# Patient Record
Sex: MALE | Race: WHITE | NOT HISPANIC OR LATINO | Employment: OTHER | URBAN - METROPOLITAN AREA
[De-identification: names, ages, dates, MRNs, and addresses within clinical notes are randomized per-mention and may not be internally consistent; named-entity substitution may affect disease eponyms.]

---

## 2017-10-12 ENCOUNTER — NON-PROVIDER VISIT (OUTPATIENT)
Dept: OCCUPATIONAL MEDICINE | Facility: CLINIC | Age: 42
End: 2017-10-12

## 2017-10-12 DIAGNOSIS — Z02.1 PRE-EMPLOYMENT DRUG SCREENING: ICD-10-CM

## 2017-10-12 LAB
AMP AMPHETAMINE: NORMAL
COC COCAINE: NORMAL
INT CON NEG: NORMAL
INT CON POS: NORMAL
MET METHAMPHETAMINES: NORMAL
OPI OPIATES: NORMAL
PCP PHENCYCLIDINE: NORMAL
POC DRUG COMMENT 753798-OCCUPATIONAL HEALTH: NORMAL
THC: NORMAL

## 2017-10-12 PROCEDURE — 80305 DRUG TEST PRSMV DIR OPT OBS: CPT | Performed by: PREVENTIVE MEDICINE

## 2020-05-18 ENCOUNTER — HOSPITAL ENCOUNTER (OUTPATIENT)
Facility: MEDICAL CENTER | Age: 45
End: 2020-05-19
Attending: EMERGENCY MEDICINE | Admitting: INTERNAL MEDICINE
Payer: MEDICAID

## 2020-05-18 ENCOUNTER — APPOINTMENT (OUTPATIENT)
Dept: RADIOLOGY | Facility: MEDICAL CENTER | Age: 45
End: 2020-05-18
Attending: EMERGENCY MEDICINE
Payer: MEDICAID

## 2020-05-18 ENCOUNTER — APPOINTMENT (OUTPATIENT)
Dept: RADIOLOGY | Facility: MEDICAL CENTER | Age: 45
End: 2020-05-18
Attending: INTERNAL MEDICINE
Payer: MEDICAID

## 2020-05-18 DIAGNOSIS — E11.9 NEWLY DIAGNOSED DIABETES (HCC): ICD-10-CM

## 2020-05-18 DIAGNOSIS — S67.22XA CRUSHING INJURY OF LEFT HAND, INITIAL ENCOUNTER: ICD-10-CM

## 2020-05-18 DIAGNOSIS — M79.642 HAND PAIN, LEFT: ICD-10-CM

## 2020-05-18 DIAGNOSIS — L03.119 CELLULITIS OF HAND: ICD-10-CM

## 2020-05-18 DIAGNOSIS — N28.9 RENAL INSUFFICIENCY: ICD-10-CM

## 2020-05-18 LAB
ANION GAP SERPL CALC-SCNC: 12 MMOL/L (ref 7–16)
BASOPHILS # BLD AUTO: 0.3 % (ref 0–1.8)
BASOPHILS # BLD: 0.04 K/UL (ref 0–0.12)
BUN SERPL-MCNC: 37 MG/DL (ref 8–22)
CALCIUM SERPL-MCNC: 8.3 MG/DL (ref 8.5–10.5)
CHLORIDE SERPL-SCNC: 105 MMOL/L (ref 96–112)
CK SERPL-CCNC: 322 U/L (ref 0–154)
CO2 SERPL-SCNC: 21 MMOL/L (ref 20–33)
COVID ORDER STATUS COVID19: NORMAL
CREAT SERPL-MCNC: 1.92 MG/DL (ref 0.5–1.4)
CRP SERPL HS-MCNC: 8.37 MG/DL (ref 0–0.75)
EOSINOPHIL # BLD AUTO: 0.03 K/UL (ref 0–0.51)
EOSINOPHIL NFR BLD: 0.2 % (ref 0–6.9)
ERYTHROCYTE [DISTWIDTH] IN BLOOD BY AUTOMATED COUNT: 44.2 FL (ref 35.9–50)
ERYTHROCYTE [SEDIMENTATION RATE] IN BLOOD BY WESTERGREN METHOD: 80 MM/HOUR (ref 0–15)
GLUCOSE SERPL-MCNC: 304 MG/DL (ref 65–99)
HCT VFR BLD AUTO: 34.3 % (ref 42–52)
HGB BLD-MCNC: 11.1 G/DL (ref 14–18)
IMM GRANULOCYTES # BLD AUTO: 0.04 K/UL (ref 0–0.11)
IMM GRANULOCYTES NFR BLD AUTO: 0.3 % (ref 0–0.9)
LYMPHOCYTES # BLD AUTO: 2.08 K/UL (ref 1–4.8)
LYMPHOCYTES NFR BLD: 15.5 % (ref 22–41)
MCH RBC QN AUTO: 29.9 PG (ref 27–33)
MCHC RBC AUTO-ENTMCNC: 32.4 G/DL (ref 33.7–35.3)
MCV RBC AUTO: 92.5 FL (ref 81.4–97.8)
MONOCYTES # BLD AUTO: 1.23 K/UL (ref 0–0.85)
MONOCYTES NFR BLD AUTO: 9.2 % (ref 0–13.4)
NEUTROPHILS # BLD AUTO: 10.01 K/UL (ref 1.82–7.42)
NEUTROPHILS NFR BLD: 74.5 % (ref 44–72)
NRBC # BLD AUTO: 0 K/UL
NRBC BLD-RTO: 0 /100 WBC
PLATELET # BLD AUTO: 202 K/UL (ref 164–446)
PMV BLD AUTO: 11 FL (ref 9–12.9)
POTASSIUM SERPL-SCNC: 4.1 MMOL/L (ref 3.6–5.5)
RBC # BLD AUTO: 3.71 M/UL (ref 4.7–6.1)
SODIUM SERPL-SCNC: 138 MMOL/L (ref 135–145)
WBC # BLD AUTO: 13.4 K/UL (ref 4.8–10.8)

## 2020-05-18 PROCEDURE — G0378 HOSPITAL OBSERVATION PER HR: HCPCS

## 2020-05-18 PROCEDURE — 700111 HCHG RX REV CODE 636 W/ 250 OVERRIDE (IP): Performed by: EMERGENCY MEDICINE

## 2020-05-18 PROCEDURE — 700117 HCHG RX CONTRAST REV CODE 255: Performed by: EMERGENCY MEDICINE

## 2020-05-18 PROCEDURE — 99220 PR INITIAL OBSERVATION CARE,LEVL III: CPT | Performed by: INTERNAL MEDICINE

## 2020-05-18 PROCEDURE — 85025 COMPLETE CBC W/AUTO DIFF WBC: CPT

## 2020-05-18 PROCEDURE — 700102 HCHG RX REV CODE 250 W/ 637 OVERRIDE(OP): Performed by: INTERNAL MEDICINE

## 2020-05-18 PROCEDURE — 73220 MRI UPPR EXTREMITY W/O&W/DYE: CPT | Mod: LT

## 2020-05-18 PROCEDURE — 96375 TX/PRO/DX INJ NEW DRUG ADDON: CPT

## 2020-05-18 PROCEDURE — 87040 BLOOD CULTURE FOR BACTERIA: CPT | Mod: 91

## 2020-05-18 PROCEDURE — 86140 C-REACTIVE PROTEIN: CPT

## 2020-05-18 PROCEDURE — 85652 RBC SED RATE AUTOMATED: CPT

## 2020-05-18 PROCEDURE — 99285 EMERGENCY DEPT VISIT HI MDM: CPT

## 2020-05-18 PROCEDURE — 96365 THER/PROPH/DIAG IV INF INIT: CPT

## 2020-05-18 PROCEDURE — C9803 HOPD COVID-19 SPEC COLLECT: HCPCS | Performed by: ORTHOPAEDIC SURGERY

## 2020-05-18 PROCEDURE — A9576 INJ PROHANCE MULTIPACK: HCPCS | Performed by: EMERGENCY MEDICINE

## 2020-05-18 PROCEDURE — 73130 X-RAY EXAM OF HAND: CPT | Mod: LT

## 2020-05-18 PROCEDURE — 700105 HCHG RX REV CODE 258: Performed by: EMERGENCY MEDICINE

## 2020-05-18 PROCEDURE — 80048 BASIC METABOLIC PNL TOTAL CA: CPT

## 2020-05-18 PROCEDURE — 700105 HCHG RX REV CODE 258: Performed by: INTERNAL MEDICINE

## 2020-05-18 PROCEDURE — A9270 NON-COVERED ITEM OR SERVICE: HCPCS | Performed by: INTERNAL MEDICINE

## 2020-05-18 PROCEDURE — 82550 ASSAY OF CK (CPK): CPT

## 2020-05-18 PROCEDURE — U0004 COV-19 TEST NON-CDC HGH THRU: HCPCS

## 2020-05-18 RX ORDER — POLYETHYLENE GLYCOL 3350 17 G/17G
1 POWDER, FOR SOLUTION ORAL
Status: DISCONTINUED | OUTPATIENT
Start: 2020-05-18 | End: 2020-05-19 | Stop reason: HOSPADM

## 2020-05-18 RX ORDER — PROCHLORPERAZINE EDISYLATE 5 MG/ML
5-10 INJECTION INTRAMUSCULAR; INTRAVENOUS EVERY 4 HOURS PRN
Status: DISCONTINUED | OUTPATIENT
Start: 2020-05-18 | End: 2020-05-19 | Stop reason: HOSPADM

## 2020-05-18 RX ORDER — ONDANSETRON 4 MG/1
4 TABLET, ORALLY DISINTEGRATING ORAL EVERY 4 HOURS PRN
Status: DISCONTINUED | OUTPATIENT
Start: 2020-05-18 | End: 2020-05-19 | Stop reason: HOSPADM

## 2020-05-18 RX ORDER — OXYCODONE HYDROCHLORIDE 5 MG/1
5 TABLET ORAL EVERY 4 HOURS PRN
Status: DISCONTINUED | OUTPATIENT
Start: 2020-05-18 | End: 2020-05-19 | Stop reason: HOSPADM

## 2020-05-18 RX ORDER — HYDROMORPHONE HYDROCHLORIDE 1 MG/ML
1 INJECTION, SOLUTION INTRAMUSCULAR; INTRAVENOUS; SUBCUTANEOUS ONCE
Status: COMPLETED | OUTPATIENT
Start: 2020-05-18 | End: 2020-05-18

## 2020-05-18 RX ORDER — ONDANSETRON 2 MG/ML
4 INJECTION INTRAMUSCULAR; INTRAVENOUS ONCE
Status: COMPLETED | OUTPATIENT
Start: 2020-05-18 | End: 2020-05-18

## 2020-05-18 RX ORDER — NICOTINE 21 MG/24HR
14 PATCH, TRANSDERMAL 24 HOURS TRANSDERMAL
Status: DISCONTINUED | OUTPATIENT
Start: 2020-05-18 | End: 2020-05-19 | Stop reason: HOSPADM

## 2020-05-18 RX ORDER — AMOXICILLIN 250 MG
2 CAPSULE ORAL 2 TIMES DAILY
Status: DISCONTINUED | OUTPATIENT
Start: 2020-05-18 | End: 2020-05-19 | Stop reason: HOSPADM

## 2020-05-18 RX ORDER — BISACODYL 10 MG
10 SUPPOSITORY, RECTAL RECTAL
Status: DISCONTINUED | OUTPATIENT
Start: 2020-05-18 | End: 2020-05-19 | Stop reason: HOSPADM

## 2020-05-18 RX ORDER — ONDANSETRON 2 MG/ML
4 INJECTION INTRAMUSCULAR; INTRAVENOUS EVERY 4 HOURS PRN
Status: DISCONTINUED | OUTPATIENT
Start: 2020-05-18 | End: 2020-05-19 | Stop reason: HOSPADM

## 2020-05-18 RX ORDER — ACETAMINOPHEN 325 MG/1
650 TABLET ORAL EVERY 6 HOURS PRN
Status: DISCONTINUED | OUTPATIENT
Start: 2020-05-18 | End: 2020-05-19 | Stop reason: HOSPADM

## 2020-05-18 RX ORDER — LABETALOL HYDROCHLORIDE 5 MG/ML
10 INJECTION, SOLUTION INTRAVENOUS EVERY 4 HOURS PRN
Status: DISCONTINUED | OUTPATIENT
Start: 2020-05-18 | End: 2020-05-19 | Stop reason: HOSPADM

## 2020-05-18 RX ORDER — PROMETHAZINE HYDROCHLORIDE 25 MG/1
12.5-25 TABLET ORAL EVERY 4 HOURS PRN
Status: DISCONTINUED | OUTPATIENT
Start: 2020-05-18 | End: 2020-05-19 | Stop reason: HOSPADM

## 2020-05-18 RX ORDER — PROMETHAZINE HYDROCHLORIDE 12.5 MG/1
12.5-25 SUPPOSITORY RECTAL EVERY 4 HOURS PRN
Status: DISCONTINUED | OUTPATIENT
Start: 2020-05-18 | End: 2020-05-19 | Stop reason: HOSPADM

## 2020-05-18 RX ORDER — SODIUM CHLORIDE 9 MG/ML
INJECTION, SOLUTION INTRAVENOUS CONTINUOUS
Status: DISCONTINUED | OUTPATIENT
Start: 2020-05-18 | End: 2020-05-19 | Stop reason: HOSPADM

## 2020-05-18 RX ADMIN — ONDANSETRON 4 MG: 2 INJECTION INTRAMUSCULAR; INTRAVENOUS at 11:32

## 2020-05-18 RX ADMIN — SODIUM CHLORIDE: 9 INJECTION, SOLUTION INTRAVENOUS at 16:31

## 2020-05-18 RX ADMIN — CEFTRIAXONE SODIUM 2 G: 2 INJECTION, POWDER, FOR SOLUTION INTRAMUSCULAR; INTRAVENOUS at 12:31

## 2020-05-18 RX ADMIN — HYDROMORPHONE HYDROCHLORIDE 1 MG: 1 INJECTION, SOLUTION INTRAMUSCULAR; INTRAVENOUS; SUBCUTANEOUS at 11:32

## 2020-05-18 RX ADMIN — GADOTERIDOL 17 ML: 279.3 INJECTION, SOLUTION INTRAVENOUS at 14:24

## 2020-05-18 RX ADMIN — OXYCODONE 5 MG: 5 TABLET ORAL at 21:56

## 2020-05-18 ASSESSMENT — LIFESTYLE VARIABLES
TOTAL SCORE: 0
CONSUMPTION TOTAL: NEGATIVE
EVER_SMOKED: YES
TOTAL SCORE: 0
EVER FELT BAD OR GUILTY ABOUT YOUR DRINKING: NO
ON A TYPICAL DAY WHEN YOU DRINK ALCOHOL HOW MANY DRINKS DO YOU HAVE: 0
TOTAL SCORE: 0
HOW MANY TIMES IN THE PAST YEAR HAVE YOU HAD 5 OR MORE DRINKS IN A DAY: 0
ALCOHOL_USE: NO
EVER HAD A DRINK FIRST THING IN THE MORNING TO STEADY YOUR NERVES TO GET RID OF A HANGOVER: NO
HAVE PEOPLE ANNOYED YOU BY CRITICIZING YOUR DRINKING: NO
AVERAGE NUMBER OF DAYS PER WEEK YOU HAVE A DRINK CONTAINING ALCOHOL: 0
HAVE YOU EVER FELT YOU SHOULD CUT DOWN ON YOUR DRINKING: NO

## 2020-05-18 ASSESSMENT — ENCOUNTER SYMPTOMS
MYALGIAS: 1
DIZZINESS: 0
EYES NEGATIVE: 1
CARDIOVASCULAR NEGATIVE: 1
TREMORS: 0
GASTROINTESTINAL NEGATIVE: 1
WEAKNESS: 0
FALLS: 0
SENSORY CHANGE: 0
RESPIRATORY NEGATIVE: 1
PSYCHIATRIC NEGATIVE: 1
CONSTITUTIONAL NEGATIVE: 1
NECK PAIN: 0
FOCAL WEAKNESS: 0
HEADACHES: 0
SPEECH CHANGE: 0
BACK PAIN: 0
TINGLING: 0

## 2020-05-18 ASSESSMENT — PATIENT HEALTH QUESTIONNAIRE - PHQ9
2. FEELING DOWN, DEPRESSED, IRRITABLE, OR HOPELESS: NOT AT ALL
SUM OF ALL RESPONSES TO PHQ9 QUESTIONS 1 AND 2: 0
1. LITTLE INTEREST OR PLEASURE IN DOING THINGS: NOT AT ALL

## 2020-05-18 ASSESSMENT — COGNITIVE AND FUNCTIONAL STATUS - GENERAL
SUGGESTED CMS G CODE MODIFIER MOBILITY: CI
MOBILITY SCORE: 23
DAILY ACTIVITIY SCORE: 24
WALKING IN HOSPITAL ROOM: A LITTLE
SUGGESTED CMS G CODE MODIFIER DAILY ACTIVITY: CH

## 2020-05-18 ASSESSMENT — COPD QUESTIONNAIRES
COPD SCREENING SCORE: 2
IN THE PAST 12 MONTHS DO YOU DO LESS THAN YOU USED TO BECAUSE OF YOUR BREATHING PROBLEMS: DISAGREE/UNSURE
DURING THE PAST 4 WEEKS HOW MUCH DID YOU FEEL SHORT OF BREATH: NONE/LITTLE OF THE TIME
DO YOU EVER COUGH UP ANY MUCUS OR PHLEGM?: NO/ONLY WITH OCCASIONAL COLDS OR INFECTIONS
HAVE YOU SMOKED AT LEAST 100 CIGARETTES IN YOUR ENTIRE LIFE: YES

## 2020-05-18 NOTE — PROGRESS NOTES
Assumed care of pt. Pt is A&O x4. Reports acute pain. Pt ambulated to bed from wheelchair without assistance. Hourly rounding in place.

## 2020-05-18 NOTE — ED NOTES
"Pt ambulates to triage with c/c pain/swelling to left hand/arm x2 days secondary to \"smashing it\" while working on his car.  A&ox4.  Pt to lobby & advised to inform RN of any changes.    Pt denies recent travel, denies exposure to coivd19.  Mask on.  "

## 2020-05-18 NOTE — PROGRESS NOTES
43yo left hand hand injury while working on his car , hyperglycemia likely undiagnosed dm, suzy.  Dr Price from ortho consulted requested  Mri    Patient received ceftriaxone    Dr Jorgensen will admit patient

## 2020-05-18 NOTE — ED NOTES
Med rec updated and complete. Allergies reviewed.   Pt is not currently taking any medications.    Home [facility].

## 2020-05-18 NOTE — CONSULTS
DATE OF SERVICE:  05/18/2020    ORTHOPEDIC CONSULTATION    REQUESTING PHYSICIAN:  Marck Murphy MD, emergency department.    REASON FOR CONSULTATION:  Left hand pain, concern for infection.    CHIEF COMPLAINT:  Left hand pain, worsening over the past 2 days.    HISTORY OF PRESENT ILLNESS:  The patient is a 44-year-old male.  He has a   history of left hand pain.  He states he smashed it about 4 days ago on a   truck that he was working on; and since then, especially over the last couple   of days, the pain has gotten significantly worse to the degree where he has   been unable to straighten his fingers or flex them actively.  He does have a   history of uncontrolled diabetes.  He denies other injuries or pain elsewhere.    PAST MEDICAL HISTORY:  ALLERGIES:  No known drug allergies.    OUTPATIENT MEDICATIONS:  None listed in the medical record.    PAST MEDICAL DIAGNOSES:  Diabetes and hypertension.    PAST SURGICAL HISTORY:  He has had left ankle surgery in the past.    SOCIAL HISTORY:  Patient smokes half pack of cigarettes a day.  He drinks   alcohol occasionally.  Denies illicit drug use, specifically injectable drugs.    FAMILY HISTORY:  Negative for significant medical problems according to the   medical record.    REVIEW OF SYSTEMS:  He denies feeling febrile, nausea, vomiting, shortness of   breath, chest pain.  Otherwise, normal per AMA criteria other than that   already stated in the HPI.    PHYSICAL EXAMINATION:  VITAL SIGNS:  Temperature is 97.3, heart rate 89, respiratory rate 14, blood   pressure 166/87, pulse oximetry 96% on room air.  GENERAL APPEARANCE:  Patient seems somewhat somnolent, but does awake and   respond to questioning.  HEAD, EYES, EARS, NOSE AND THROAT:  He has a surgical mask in place, otherwise   normocephalic and atraumatic.  Mucous membranes are moist.  PULMONARY:  Symmetric, unlabored breathing.  CARDIOVASCULAR:  Extremities are well perfused.  ABDOMEN:  Not obviously  distended.  MUSCULOSKELETAL:  Left upper extremity, there is some mild swelling dorsally   and palmarly at the hand.  His hand is resting with all the fingers in   flexion.  He has significant pain with attempted passive extension.  There is   no significant swelling in any of the fingers.  There are no obvious open   wounds present.  He is nontender on the dorsal of the hand and tender to   palpation of the palm of the hand.  There is no obvious erythema tracking   proximally or at the hand itself.    DIAGNOSTIC IMAGING:  Plain x-rays of left hand show no evidence of destructive   bony changes or fracture or retained radiopaque foreign body noted.    LABORATORY DATA:  White blood cell count is 13.4.  CRP is 8.37.    ASSESSMENT:  A 44-year-old male with left hand injury and concern clinically   for deep space infection to the left hand.    RECOMMENDATIONS:  1.  The patient is currently being evaluated for admission to the hospitalist   service.  2.  I recommend MRI preferably with contrast if that is clinically appropriate   to rule out deep space abscess and help guide further surgical treatment   recommendations.  3.  If there is confirmed deep space abscess in the hand or within the flexor   tendon sheath, I recommend surgical intervention for drainage.  4.  I think in the meantime, in order to prevent clinical worsening,   continuing empiric antibiotics is reasonable.  5.  For now, I recommend the patient be n.p.o. until we see results of the MRI   and help coordinate further treatment recommendations at that point.       ____________________________________     MD CINTHIA Robison / MORAIMA    DD:  05/18/2020 13:49:27  DT:  05/18/2020 13:57:25    D#:  [id number]  Job#:  [id number]

## 2020-05-18 NOTE — ED PROVIDER NOTES
"ED Provider Note    Scribed for Marck Murphy M.D. by Keli Acuna. 5/18/2020  11:13 AM    Primary care provider: Pcp Pt States None  Means of arrival: Walk-In  History obtained from: Patient  History limited by: None    CHIEF COMPLAINT  Chief Complaint   Patient presents with   • T-5000     left hand/arm pain/swelling secondary \"smashing\" hand while working on his car, x2 days       HPI  Ambrose Watkins is a 44 y.o. male who presents to the Emergency Department for worsening left hand pain that started after he smashed his hand 4 days ago. Patient states he was working on his truck when the caliber fell onto his open left palm. He states he was still able to continue working on his truck after the incident but the pain and swelling has progressively gotten worsen over the last couple of days. He notes he is unable to move his fingers or make a fist. He notes the pain is worsened when he tries to extend his fingers. He denies any elbow or wrist pain.     REVIEW OF SYSTEMS  Pertinent positives include hand pain and swelling.   Pertinent negatives include no elbow or wrist pain.    All other systems reviewed and negative. See HPI for further details.     PAST MEDICAL HISTORY   has a past medical history of Diabetes and Hypertension.    SURGICAL HISTORY  patient denies any surgical history    SOCIAL HISTORY  Social History     Tobacco Use   • Smoking status: Current Every Day Smoker     Packs/day: 0.50   Substance Use Topics   • Alcohol use: Yes     Comment: occ   • Drug use: No      Social History     Substance and Sexual Activity   Drug Use No       FAMILY HISTORY  No family history on file.    CURRENT MEDICATIONS  Home Medications     Reviewed by Conchita Cates R.N. (Registered Nurse) on 05/18/20 at 1042  Med List Status: Complete   Medication Last Dose Status        Patient Ric Taking any Medications                       ALLERGIES  No Known Allergies    PHYSICAL EXAM  VITAL SIGNS: BP (!) 177/98  " " Pulse 94   Temp 36.3 °C (97.3 °F) (Temporal)   Resp 18   Ht 1.778 m (5' 10\")   Wt 88.4 kg (194 lb 14.2 oz)   SpO2 97%   BMI 27.96 kg/m²     Nursing note and vitals reviewed.  Constitutional: Well-developed and well-nourished. Moderate distress.   HENT: Head is normocephalic and atraumatic. Oropharynx is clear and moist without exudate or erythema.   Eyes: Pupils are equal, round, and reactive to light. Conjunctiva are normal.   Cardiovascular: Normal rate and regular rhythm. No murmur heard. Normal radial pulses.  Pulmonary/Chest: Breath sounds normal. No wheezes or rales.   Abdominal: Soft and non-tender. No distention    Extremities: Left hand is held with the fingers in a semi flexed position. Diffuse swelling over palmar and dorsal aspect of the hand extending to fingers. Slight reddish coloration. Slight increase in temperature of left hand compared to right. Increased pain with extension of the fingers.   Musculoskeletal: Extremities exhibit normal range of motion without edema or tenderness.   Neurological: Awake, alert and oriented to person, place, and time. No focal deficits noted.  Skin: Skin is warm and dry. No rash.   Psychiatric: Normal mood and affect. Appropriate for clinical situation.    I verified that the patient was wearing a mask and I was wearing appropriate PPE every time I entered the room. The patient's mask was on the patient at all times during my encounter except for a brief view of the oropharynx.     DIAGNOSTIC STUDIES / PROCEDURES    LABS  Results for orders placed or performed during the hospital encounter of 05/18/20   CBC WITH DIFFERENTIAL   Result Value Ref Range    WBC 13.4 (H) 4.8 - 10.8 K/uL    RBC 3.71 (L) 4.70 - 6.10 M/uL    Hemoglobin 11.1 (L) 14.0 - 18.0 g/dL    Hematocrit 34.3 (L) 42.0 - 52.0 %    MCV 92.5 81.4 - 97.8 fL    MCH 29.9 27.0 - 33.0 pg    MCHC 32.4 (L) 33.7 - 35.3 g/dL    RDW 44.2 35.9 - 50.0 fL    Platelet Count 202 164 - 446 K/uL    MPV 11.0 9.0 - " 12.9 fL    Neutrophils-Polys 74.50 (H) 44.00 - 72.00 %    Lymphocytes 15.50 (L) 22.00 - 41.00 %    Monocytes 9.20 0.00 - 13.40 %    Eosinophils 0.20 0.00 - 6.90 %    Basophils 0.30 0.00 - 1.80 %    Immature Granulocytes 0.30 0.00 - 0.90 %    Nucleated RBC 0.00 /100 WBC    Neutrophils (Absolute) 10.01 (H) 1.82 - 7.42 K/uL    Lymphs (Absolute) 2.08 1.00 - 4.80 K/uL    Monos (Absolute) 1.23 (H) 0.00 - 0.85 K/uL    Eos (Absolute) 0.03 0.00 - 0.51 K/uL    Baso (Absolute) 0.04 0.00 - 0.12 K/uL    Immature Granulocytes (abs) 0.04 0.00 - 0.11 K/uL    NRBC (Absolute) 0.00 K/uL   BASIC METABOLIC PANEL   Result Value Ref Range    Sodium 138 135 - 145 mmol/L    Potassium 4.1 3.6 - 5.5 mmol/L    Chloride 105 96 - 112 mmol/L    Co2 21 20 - 33 mmol/L    Glucose 304 (H) 65 - 99 mg/dL    Bun 37 (H) 8 - 22 mg/dL    Creatinine 1.92 (H) 0.50 - 1.40 mg/dL    Calcium 8.3 (L) 8.5 - 10.5 mg/dL    Anion Gap 12.0 7.0 - 16.0   CRP QUANTITIVE (NON-CARDIAC)   Result Value Ref Range    Stat C-Reactive Protein 8.37 (H) 0.00 - 0.75 mg/dL   ESTIMATED GFR   Result Value Ref Range    GFR If  47 (A) >60 mL/min/1.73 m 2    GFR If Non  39 (A) >60 mL/min/1.73 m 2      All labs reviewed by me.    RADIOLOGY  DX-HAND 3+ LEFT   Final Result      Soft tissue swelling without acute osseous abnormality.      MR-HAND - WITH LEFT    (Results Pending)     The radiologist's interpretation of all radiological studies have been reviewed by me.    COURSE & MEDICAL DECISION MAKING  Nursing notes, VS, PMSFHx reviewed in chart.     Review of past medical records shows the patient was last seen here on 5/4/2014 for unrelated complaints.      11:13 AM - Patient seen and examined at bedside. I informed the patient the need for labs and radiology to rule out any emergent processes, including infection. Currently awaiting results before deciding if intervention is necessary. Patient verbalizes understanding and agreement to this plan of care.  Patient will be treated with Dilaudid injection 1 mg and Zofran injection 4 mg. Ordered DX-hand left, Sed rate, CRP quantitative, blood culture x2, CBC with diff, and BMP to evaluate his symptoms. The differential diagnoses include but are not limited to: fracture, contusion, crush injury, vs infection .     12:00 PM - Reviewed lab results at this time. His WBC is elevated at 13.4. I am concerned for infection. Will page orthopedics and treat with Rocephin 2 g in  mL IVPB.    12:07 PM - Paged orthopedics.     12:21 PM I discussed the patient's case and the above findings with Dr. Price (Orthopedics) who agreed to consult on the patient and discussed ordering MR hand with left.     1:21 PM - Patient was reevaluated at bedside. Discussed lab and radiology results with the patient and informed them that the hand specialist and myself highly suspect and infection. Given this, he will be hospitalized for further treatment. Patient verbalizes understanding and agreement to this plan of care.        1:21 PM Paged hospitalist.      1:31 PM - I discussed the patient's case and the above findings with Dr. Mk Rice (Hospitalist) who agreed to evaluate patient for hospitalization. Patients care will be transferred at this time.      Patient will be admitted to hospital.  Given empiric antibiotics with ceftriaxone.  Hand consult.  Will obtain an MRI to rule out abscess.    DISPOSITION:  Patient will be hospitalized by Dr. Mk Rice in guarded condition.     FINAL IMPRESSION  1. Hand pain, left    2. Crushing injury of left hand, initial encounter    3. Renal insufficiency    4. Newly diagnosed diabetes (HCC)    5. Cellulitis of hand          IKeli), am scribing for, and in the presence of, Marck Murphy M.D..    Electronically signed by: Keli Johnson), 5/18/2020    IMarck M.D. personally performed the services described in this documentation, as scribed by Keli  Armand in my presence, and it is both accurate and complete. C.    The note accurately reflects work and decisions made by me.  Marck Murphy M.D.  5/18/2020  2:51 PM

## 2020-05-18 NOTE — CARE PLAN
Problem: Communication  Goal: The ability to communicate needs accurately and effectively will improve  Outcome: PROGRESSING AS EXPECTED  Intervention: [place] patient and significant other/support system to call light to alert staff of needs  Note: Encouraged pt to voice needs and concerns, pt verbalized understanding.      Problem: Pain Management  Goal: Pain level will decrease to patient's comfort goal  Outcome: PROGRESSING AS EXPECTED  Intervention: Educate and implement non-pharmacologic comfort measures. Examples: relaxation, distration, play therapy, activity therapy, massage, etc.  Flowsheets (Taken 5/18/2020 3617)  Intervention:   Rest   Distraction  Note: Pt encouraged to utilize non pharmacological pain relief methods along with pharmacological methods.

## 2020-05-19 ENCOUNTER — APPOINTMENT (OUTPATIENT)
Dept: CARDIOLOGY | Facility: MEDICAL CENTER | Age: 45
End: 2020-05-19
Attending: INTERNAL MEDICINE
Payer: MEDICAID

## 2020-05-19 ENCOUNTER — HOSPITAL ENCOUNTER (OUTPATIENT)
Dept: CARDIOLOGY | Facility: MEDICAL CENTER | Age: 45
End: 2020-05-19
Attending: INTERNAL MEDICINE | Admitting: INTERNAL MEDICINE
Payer: MEDICAID

## 2020-05-19 VITALS
RESPIRATION RATE: 16 BRPM | WEIGHT: 194.89 LBS | HEIGHT: 70 IN | OXYGEN SATURATION: 99 % | TEMPERATURE: 97.7 F | HEART RATE: 78 BPM | SYSTOLIC BLOOD PRESSURE: 171 MMHG | DIASTOLIC BLOOD PRESSURE: 108 MMHG | BODY MASS INDEX: 27.9 KG/M2

## 2020-05-19 LAB
ALBUMIN SERPL BCP-MCNC: 2.8 G/DL (ref 3.2–4.9)
ALBUMIN/GLOB SERPL: 0.9 G/DL
ALP SERPL-CCNC: 88 U/L (ref 30–99)
ALT SERPL-CCNC: 15 U/L (ref 2–50)
ANION GAP SERPL CALC-SCNC: 9 MMOL/L (ref 7–16)
APPEARANCE UR: CLEAR
AST SERPL-CCNC: 18 U/L (ref 12–45)
BACTERIA #/AREA URNS HPF: NEGATIVE /HPF
BASOPHILS # BLD AUTO: 0.3 % (ref 0–1.8)
BASOPHILS # BLD: 0.03 K/UL (ref 0–0.12)
BILIRUB SERPL-MCNC: 0.2 MG/DL (ref 0.1–1.5)
BILIRUB UR QL STRIP.AUTO: NEGATIVE
BUN SERPL-MCNC: 28 MG/DL (ref 8–22)
CALCIUM SERPL-MCNC: 8.2 MG/DL (ref 8.5–10.5)
CHLORIDE SERPL-SCNC: 104 MMOL/L (ref 96–112)
CO2 SERPL-SCNC: 23 MMOL/L (ref 20–33)
COLOR UR: YELLOW
CREAT SERPL-MCNC: 1.56 MG/DL (ref 0.5–1.4)
CRP SERPL HS-MCNC: 7.05 MG/DL (ref 0–0.75)
EKG IMPRESSION: NORMAL
EOSINOPHIL # BLD AUTO: 0.06 K/UL (ref 0–0.51)
EOSINOPHIL NFR BLD: 0.6 % (ref 0–6.9)
EPI CELLS #/AREA URNS HPF: NEGATIVE /HPF
ERYTHROCYTE [DISTWIDTH] IN BLOOD BY AUTOMATED COUNT: 44.8 FL (ref 35.9–50)
EST. AVERAGE GLUCOSE BLD GHB EST-MCNC: 160 MG/DL
GLOBULIN SER CALC-MCNC: 3.1 G/DL (ref 1.9–3.5)
GLUCOSE SERPL-MCNC: 214 MG/DL (ref 65–99)
GLUCOSE UR STRIP.AUTO-MCNC: 250 MG/DL
HBA1C MFR BLD: 7.2 % (ref 0–5.6)
HCT VFR BLD AUTO: 30.8 % (ref 42–52)
HGB BLD-MCNC: 10.2 G/DL (ref 14–18)
HYALINE CASTS #/AREA URNS LPF: ABNORMAL /LPF
IMM GRANULOCYTES # BLD AUTO: 0.04 K/UL (ref 0–0.11)
IMM GRANULOCYTES NFR BLD AUTO: 0.4 % (ref 0–0.9)
KETONES UR STRIP.AUTO-MCNC: NEGATIVE MG/DL
LEUKOCYTE ESTERASE UR QL STRIP.AUTO: NEGATIVE
LYMPHOCYTES # BLD AUTO: 2.34 K/UL (ref 1–4.8)
LYMPHOCYTES NFR BLD: 21.7 % (ref 22–41)
MAGNESIUM SERPL-MCNC: 1.9 MG/DL (ref 1.5–2.5)
MCH RBC QN AUTO: 30.8 PG (ref 27–33)
MCHC RBC AUTO-ENTMCNC: 33.1 G/DL (ref 33.7–35.3)
MCV RBC AUTO: 93.1 FL (ref 81.4–97.8)
MICRO URNS: ABNORMAL
MONOCYTES # BLD AUTO: 1.28 K/UL (ref 0–0.85)
MONOCYTES NFR BLD AUTO: 11.9 % (ref 0–13.4)
NEUTROPHILS # BLD AUTO: 7.02 K/UL (ref 1.82–7.42)
NEUTROPHILS NFR BLD: 65.1 % (ref 44–72)
NITRITE UR QL STRIP.AUTO: NEGATIVE
NRBC # BLD AUTO: 0 K/UL
NRBC BLD-RTO: 0 /100 WBC
PH UR STRIP.AUTO: 5.5 [PH] (ref 5–8)
PLATELET # BLD AUTO: 185 K/UL (ref 164–446)
PMV BLD AUTO: 10.7 FL (ref 9–12.9)
POTASSIUM SERPL-SCNC: 4.3 MMOL/L (ref 3.6–5.5)
PROCALCITONIN SERPL-MCNC: 0.07 NG/ML
PROT SERPL-MCNC: 5.9 G/DL (ref 6–8.2)
PROT UR QL STRIP: 300 MG/DL
RBC # BLD AUTO: 3.31 M/UL (ref 4.7–6.1)
RBC # URNS HPF: ABNORMAL /HPF
RBC UR QL AUTO: ABNORMAL
SARS-COV-2 RNA RESP QL NAA+PROBE: NOTDETECTED
SODIUM SERPL-SCNC: 136 MMOL/L (ref 135–145)
SP GR UR STRIP.AUTO: 1.02
SPECIMEN SOURCE: NORMAL
UROBILINOGEN UR STRIP.AUTO-MCNC: 1 MG/DL
WBC # BLD AUTO: 10.8 K/UL (ref 4.8–10.8)
WBC #/AREA URNS HPF: ABNORMAL /HPF

## 2020-05-19 PROCEDURE — 84145 PROCALCITONIN (PCT): CPT

## 2020-05-19 PROCEDURE — 86140 C-REACTIVE PROTEIN: CPT

## 2020-05-19 PROCEDURE — 99217 PR OBSERVATION CARE DISCHARGE: CPT | Performed by: INTERNAL MEDICINE

## 2020-05-19 PROCEDURE — 93010 ELECTROCARDIOGRAM REPORT: CPT | Performed by: INTERNAL MEDICINE

## 2020-05-19 PROCEDURE — 96367 TX/PROPH/DG ADDL SEQ IV INF: CPT

## 2020-05-19 PROCEDURE — 80053 COMPREHEN METABOLIC PANEL: CPT

## 2020-05-19 PROCEDURE — 36415 COLL VENOUS BLD VENIPUNCTURE: CPT

## 2020-05-19 PROCEDURE — 93005 ELECTROCARDIOGRAM TRACING: CPT | Performed by: ORTHOPAEDIC SURGERY

## 2020-05-19 PROCEDURE — 700102 HCHG RX REV CODE 250 W/ 637 OVERRIDE(OP): Performed by: INTERNAL MEDICINE

## 2020-05-19 PROCEDURE — A9270 NON-COVERED ITEM OR SERVICE: HCPCS | Performed by: INTERNAL MEDICINE

## 2020-05-19 PROCEDURE — 83036 HEMOGLOBIN GLYCOSYLATED A1C: CPT

## 2020-05-19 PROCEDURE — 81001 URINALYSIS AUTO W/SCOPE: CPT

## 2020-05-19 PROCEDURE — 700105 HCHG RX REV CODE 258: Performed by: INTERNAL MEDICINE

## 2020-05-19 PROCEDURE — 93971 EXTREMITY STUDY: CPT | Mod: LT

## 2020-05-19 PROCEDURE — 85025 COMPLETE CBC W/AUTO DIFF WBC: CPT

## 2020-05-19 PROCEDURE — G0378 HOSPITAL OBSERVATION PER HR: HCPCS

## 2020-05-19 PROCEDURE — 700111 HCHG RX REV CODE 636 W/ 250 OVERRIDE (IP): Performed by: INTERNAL MEDICINE

## 2020-05-19 PROCEDURE — 83735 ASSAY OF MAGNESIUM: CPT

## 2020-05-19 RX ORDER — HEPARIN SODIUM 5000 [USP'U]/ML
5000 INJECTION, SOLUTION INTRAVENOUS; SUBCUTANEOUS EVERY 8 HOURS
Status: DISCONTINUED | OUTPATIENT
Start: 2020-05-20 | End: 2020-05-19 | Stop reason: HOSPADM

## 2020-05-19 RX ORDER — LISINOPRIL 2.5 MG/1
5 TABLET ORAL
Status: DISCONTINUED | OUTPATIENT
Start: 2020-05-20 | End: 2020-05-19 | Stop reason: HOSPADM

## 2020-05-19 RX ADMIN — SODIUM CHLORIDE: 9 INJECTION, SOLUTION INTRAVENOUS at 04:22

## 2020-05-19 RX ADMIN — SODIUM CHLORIDE 3 G: 900 INJECTION INTRAVENOUS at 05:47

## 2020-05-19 RX ADMIN — OXYCODONE 5 MG: 5 TABLET ORAL at 04:22

## 2020-05-19 RX ADMIN — ACETAMINOPHEN 650 MG: 325 TABLET, FILM COATED ORAL at 09:42

## 2020-05-19 RX ADMIN — SODIUM CHLORIDE 3 G: 900 INJECTION INTRAVENOUS at 12:22

## 2020-05-19 RX ADMIN — SODIUM CHLORIDE 3 G: 900 INJECTION INTRAVENOUS at 00:27

## 2020-05-19 ASSESSMENT — ENCOUNTER SYMPTOMS
NAUSEA: 0
DIZZINESS: 0
EYE REDNESS: 0
DIARRHEA: 0
CONSTIPATION: 0
FALLS: 0
CHILLS: 0
WEAKNESS: 0
LOSS OF CONSCIOUSNESS: 0
SHORTNESS OF BREATH: 0
FOCAL WEAKNESS: 0
EYE PAIN: 0
HEMOPTYSIS: 0
FEVER: 0
SEIZURES: 0
MYALGIAS: 1
ABDOMINAL PAIN: 0
BLOOD IN STOOL: 0
PALPITATIONS: 0
HEADACHES: 0
COUGH: 0
TREMORS: 0
WHEEZING: 0
VOMITING: 0
NERVOUS/ANXIOUS: 0
INSOMNIA: 0

## 2020-05-19 NOTE — PROGRESS NOTES
Received call from pre op RN, patient refusing surgery, and wants to leave AMA.  She will remove PIV and have security come to up the room to get his belongings before leaving.

## 2020-05-19 NOTE — DISCHARGE PLANNING
Patient is eligible for Medicaid Meds to Beds at discharge if they have coverage with [place] Medicaid, Medicaid FFS, Medicaid HMO ([facility]), or [place]. This service is provided through the [facility] if orders are received by the pharmacy prior to 4pm Monday through Friday excluding holidays. Preferred pharmacy has been changed to [facility]. Please call [phone] prior to discharge.

## 2020-05-19 NOTE — ASSESSMENT & PLAN NOTE
"\"He is not on any medication at home  Continue monitoring and start with ACE inhibitors if needed after improving kidney function\"  Uncontrolled.  Cr- downtrending.  EKG sinus  Ordered echo  Start lisinopril 5/20  "

## 2020-05-19 NOTE — H&P
"Hospital Medicine History & Physical Note    Date of Service  5/18/2020    Primary Care Physician  Pcp Pt States None    Code Status  Full Code    Chief Complaint  Chief Complaint   Patient presents with   • T-5000     left hand/arm pain/swelling secondary \"smashing\" hand while working on his car, x2 days       History of Presenting Illness    44-year-old male with with history of diabetes and hypertension(no medications) presented 5/18 with left hand swelling, 4 days before this admission he smashed his hand when he was working on his truck, however his pain and swelling progressively getting worse, denied any fever or chills no diarrhea or constipation no chest pain or shortness of breath, on admission MRI did not show any signs of osteomyelitis and no abscess, labs showed leukocytosis, antibiotic with Unasyn have been started, the patient was evaluated by orthopedic surgeon and no intervention needed.     Review of Systems  Review of Systems   Constitutional: Negative.    HENT: Negative.    Eyes: Negative.    Respiratory: Negative.    Cardiovascular: Negative.    Gastrointestinal: Negative.    Genitourinary: Negative.    Musculoskeletal: Positive for joint pain and myalgias. Negative for back pain, falls and neck pain.   Skin: Negative.    Neurological: Negative for dizziness, tingling, tremors, sensory change, speech change, focal weakness, weakness and headaches.        Swelling on the left hand   Endo/Heme/Allergies: Negative.    Psychiatric/Behavioral: Negative.        Past Medical History   has a past medical history of Diabetes and Hypertension.    Surgical History  No surgery    Family History  Reviewed, history of diabetes    Social History   reports that he has been smoking. He has been smoking about 0.50 packs per day. He does not have any smokeless tobacco history on file. He reports current alcohol use. He reports that he does not use drugs.    Allergies  No Known Allergies    Medications  None "       Physical Exam  Temp:  [35.9 °C (96.7 °F)-36.3 °C (97.3 °F)] 35.9 °C (96.7 °F)  Pulse:  [82-94] 82  Resp:  [14-18] 16  BP: (165-177)/(87-98) 165/97  SpO2:  [96 %-99 %] 99 %    Physical Exam  Constitutional:       Appearance: Normal appearance.   Eyes:      General: No scleral icterus.  Cardiovascular:      Rate and Rhythm: Normal rate.      Heart sounds: No murmur.   Abdominal:      General: Abdomen is flat. Bowel sounds are normal. There is no distension.      Tenderness: There is no abdominal tenderness. There is no right CVA tenderness or guarding.   Musculoskeletal:         General: Swelling, tenderness and signs of injury present. No deformity.      Right lower leg: No edema.      Left lower leg: No edema.      Comments: Swelling and very painful left hand with redness   Skin:     General: Skin is warm.      Coloration: Skin is not jaundiced.      Findings: No bruising or lesion.   Neurological:      General: No focal deficit present.      Mental Status: He is alert and oriented to person, place, and time. Mental status is at baseline.      Cranial Nerves: No cranial nerve deficit.      Motor: No weakness.      Gait: Gait normal.   Psychiatric:         Mood and Affect: Mood normal.         Laboratory:  Recent Labs     05/18/20  1125   WBC 13.4*   RBC 3.71*   HEMOGLOBIN 11.1*   HEMATOCRIT 34.3*   MCV 92.5   MCH 29.9   MCHC 32.4*   RDW 44.2   PLATELETCT 202   MPV 11.0     Recent Labs     05/18/20  1125   SODIUM 138   POTASSIUM 4.1   CHLORIDE 105   CO2 21   GLUCOSE 304*   BUN 37*   CREATININE 1.92*   CALCIUM 8.3*     Recent Labs     05/18/20  1125   GLUCOSE 304*         No results for input(s): NTPROBNP in the last 72 hours.      No results for input(s): TROPONINT in the last 72 hours.    Imaging:  MR-HAND - WITH & W/O LEFT   Final Result      1.  No abnormal marrow signal to suggest osteomyelitis.      2.  Slight increase in fluid within the tendon sheaths of the flexor tendons      3.  Diffuse soft tissue  edema is most pronounced in the dorsum of the hand. No abscess is identified.      DX-HAND 3+ LEFT   Final Result      Soft tissue swelling without acute osseous abnormality.      US-EXTREMITY VENOUS UPPER UNILAT LEFT    (Results Pending)         Assessment/Plan:    * Swelling of left hand- (present on admission)  Assessment & Plan  After smashed during work  Swelling with redness and pain  MRI did not show osteomyelitis or abscess  Orthopedic evaluated the patient no need for intervention  Unasyn and IV fluid  We will order ultrasound to rule out clots    JANICE (acute kidney injury) (HCC)- (present on admission)  Assessment & Plan  Came with creatinine 1.9 however there is no labs before  Continue IV fluid and consider ultrasound if needed  Avoid nephrotoxic  Mild elevation of CPK.     Diabetes- (present on admission)  Assessment & Plan  He is not on any medication at home  We will check A1c tomorrow  SSI    Leukocytosis- (present on admission)  Assessment & Plan  Due to cellulitis   Continue Unasyn       Hypertension- (present on admission)  Assessment & Plan  He is not on any medication at home  Continue monitoring and start with ACE inhibitors if needed after improving kidney function    Lovenox for DVT prophylaxis

## 2020-05-19 NOTE — PROGRESS NOTES
"Swab obtained for COVID test for pre-op. RN explained to patient what was ordered and how we were going to go about collecting sample. RN also explained that It is currently a requirement for patients going to surgery. Pt stated, \"no man I came here to relieve pain and you guys are causing more pain.\" pt agreeable to do nasal swab at a later time.   "

## 2020-05-19 NOTE — OR NURSING
"Pt states we have been \"starving him for two days\" and requests to know when he will go to OR. Gave pt estimate of 40 minutes based on surgery schedule. Pt yells that he will not wait \"forty fucking more minutes,\" and that he wants to leave now because this is \"bullshit.\"    Called into OR to notify Dr. Price and request estimated wait time. Dr. Price confirms a 40-minute wait and states pt may leave if he wishes.    Updated pt on surgery status. Pt states he will be leaving now. With charge SHASHI Alves, attempted to educate pt that by the time transport arrives to return pt to room and belongings pt could nearly be in surgery. Pt interrupts to state \"Oh you're going to do it on purpose! I'm walking out of here then!\" Continues to yell and use profanity.     Security called. IV removed. Pt escorted via wheelchair with security back to room. Floor RN updated.   "

## 2020-05-19 NOTE — ASSESSMENT & PLAN NOTE
"\"He is not on any medication at home  We will check A1c tomorrow  SSI\"  A1c PENDING  Ordered diabetes teaching  "

## 2020-05-19 NOTE — ASSESSMENT & PLAN NOTE
"\"After smashed during work  Swelling with redness and pain  MRI did not show osteomyelitis or abscess  Orthopedic evaluated the patient no need for intervention  Unasyn and IV fluid  We will order ultrasound to rule out clots\"  MRI hand:  1.  No abnormal marrow signal to suggest osteomyelitis.  2.  Slight increase in fluid within the tendon sheaths of the flexor tendons  3.  Diffuse soft tissue edema is most pronounced in the dorsum of the hand. No abscess is identified.  No DVTs on US.  On antibiotics.  Orthopedics recommends nonsurgical, conservative management.    "

## 2020-05-19 NOTE — PROGRESS NOTES
MD paged regarding patients request for pain medication. Per MD he will enter orders. RN also address Blood pressure with MD, RN will continue to monitor.

## 2020-05-19 NOTE — PROGRESS NOTES
44yoM with left hand pain and concern for infected flexor tenosynovitis.    S: states pain no better with abx.  Worst pain in middle and ring fingers, which he can't straighten.  Denies pain in index, thumb and small fingers.    O:    Vitals:    05/19/20 0800   BP: (!) 167/93   Pulse: 91   Resp: 16   Temp: 36.6 °C (97.9 °F)   SpO2: 96%     Exam:  General-sleeping comfortably, wakes and following commands  LUE-all fingers resting in flexion, +TTP palmar aspect of ring and middle fingers, mild dorsal hand erythema.    A: 44yoM with left hand pain and concern for infected flexor tenosynovitis.  MRI shows some increased fluid in flexor tendon sheath but no deep abscess or osteomyelitis.    Recs:  --Given lack of improvement with empiric abx, MRI findings and persistent symptoms concerning for infection, I recommend going to the OR today for incision and drainage of left middle and ring finger flexor tendon sheath and other indicated procedures.  He expressed understanding and wishes to proceed.  --continue empiric abx and NPO pending surgery this afternoon.

## 2020-05-19 NOTE — PROGRESS NOTES
"[facility] Medicine Daily Progress Note    Date of Service  5/19/2020    Chief Complaint  44 y.o. male admitted 5/18/2020 with T-5000 (left hand/arm pain/swelling secondary \"smashing\" hand while working on his car, x2 days)        Hospital Course    History of hypertension and diabetes not on medications.  Presented with T-5000 (left hand/arm pain/swelling secondary \"smashing\" hand while working on his car, x2 days)  4d prior he smashed his hand while working on his truck. Swelling and pain worsened. He decided tto come in to be evaluated.  At the ED, he is afebrile but somewhat hypertensive.  MRI:  1.  No abnormal marrow signal to suggest osteomyelitis.  2.  Slight increase in fluid within the tendon sheaths of the flexor tendons  3.  Diffuse soft tissue edema is most pronounced in the dorsum of the hand. No abscess is identified.  No VT on US.  Leukocytosis. Hyperglycemic. Elevated creatinine.  Antibiotics started  Orthopedics consulted.         Interval Problem Update  He couldn't make a fist or open his hand fully  Decsion made that he will undergo surgery today    Consultants/Specialty  Orthopedics    Code Status  Full    Disposition  Home with PT when better    Review of Systems  Review of Systems   Constitutional: Negative for chills and fever.   HENT: Negative for congestion, hearing loss and nosebleeds.    Eyes: Negative for pain and redness.   Respiratory: Negative for cough, hemoptysis, shortness of breath and wheezing.    Cardiovascular: Negative for chest pain and palpitations.   Gastrointestinal: Negative for abdominal pain, blood in stool, constipation, diarrhea, nausea and vomiting.   Genitourinary: Negative for dysuria, frequency and hematuria.   Musculoskeletal: Positive for joint pain and myalgias. Negative for falls.   Skin: Negative for rash.   Neurological: Negative for dizziness, tremors, focal weakness, seizures, loss of consciousness, weakness and headaches.   Psychiatric/Behavioral: The " patient is not nervous/anxious and does not have insomnia.    All other systems reviewed and are negative.       Physical Exam  Temp:  [35.9 °C (96.7 °F)-36.6 °C (97.9 °F)] 36.6 °C (97.9 °F)  Pulse:  [80-94] 91  Resp:  [14-18] 16  BP: (150-179)/() 167/93  SpO2:  [95 %-99 %] 96 %    Physical Exam  Vitals signs and nursing note reviewed.   HENT:      Head: Normocephalic and atraumatic.      Right Ear: External ear normal.      Left Ear: External ear normal.      Nose: Nose normal.      Mouth/Throat:      Mouth: Mucous membranes are moist.   Eyes:      General: No scleral icterus.     Conjunctiva/sclera: Conjunctivae normal.   Neck:      Musculoskeletal: Normal range of motion and neck supple.   Cardiovascular:      Rate and Rhythm: Normal rate and regular rhythm.      Heart sounds: No murmur. No friction rub. No gallop.    Pulmonary:      Effort: Pulmonary effort is normal.      Breath sounds: Normal breath sounds.   Abdominal:      General: Abdomen is flat. Bowel sounds are normal. There is no distension.      Palpations: Abdomen is soft.      Tenderness: There is no abdominal tenderness. There is no guarding.   Musculoskeletal: Normal range of motion.         General: Swelling (L hand), tenderness (L hand) and deformity (L hand) present.   Skin:     General: Skin is warm.   Neurological:      Mental Status: He is alert and oriented to person, place, and time. Mental status is at baseline.      Coordination: Coordination abnormal (L hand, ROM limited.).   Psychiatric:         Mood and Affect: Mood normal.         Behavior: Behavior normal.         Thought Content: Thought content normal.         Judgment: Judgment normal.         Fluids    Intake/Output Summary (Last 24 hours) at 5/19/2020 0856  Last data filed at 5/18/2020 2100  Gross per 24 hour   Intake 240 ml   Output --   Net 240 ml       Laboratory  Recent Labs     05/18/20  1125 05/19/20  0033   WBC 13.4* 10.8   RBC 3.71* 3.31*   HEMOGLOBIN 11.1* 10.2*  "  HEMATOCRIT 34.3* 30.8*   MCV 92.5 93.1   MCH 29.9 30.8   MCHC 32.4* 33.1*   RDW 44.2 44.8   PLATELETCT 202 185   MPV 11.0 10.7     Recent Labs     05/18/20  1125 05/19/20  0033   SODIUM 138 136   POTASSIUM 4.1 4.3   CHLORIDE 105 104   CO2 21 23   GLUCOSE 304* 214*   BUN 37* 28*   CREATININE 1.92* 1.56*   CALCIUM 8.3* 8.2*                   Imaging  US-EXTREMITY VENOUS UPPER UNILAT LEFT   Final Result      MR-HAND - WITH & W/O LEFT   Final Result      1.  No abnormal marrow signal to suggest osteomyelitis.      2.  Slight increase in fluid within the tendon sheaths of the flexor tendons      3.  Diffuse soft tissue edema is most pronounced in the dorsum of the hand. No abscess is identified.      DX-HAND 3+ LEFT   Final Result      Soft tissue swelling without acute osseous abnormality.      EC-ECHOCARDIOGRAM COMPLETE W/O CONT    (Results Pending)        Assessment/Plan  * Swelling of left hand, diabetes not on meds- (present on admission)  Assessment & Plan  \"After smashed during work  Swelling with redness and pain  MRI did not show osteomyelitis or abscess  Orthopedic evaluated the patient no need for intervention  Unasyn and IV fluid  We will order ultrasound to rule out clots\"  MRI hand:  1.  No abnormal marrow signal to suggest osteomyelitis.  2.  Slight increase in fluid within the tendon sheaths of the flexor tendons  3.  Diffuse soft tissue edema is most pronounced in the dorsum of the hand. No abscess is identified.  No DVTs on US.  On antibiotics.  Orthopedics recommends nonsurgical, conservative management.      JANICE (acute kidney injury) (HCC)- (present on admission)  Assessment & Plan  \"Came with creatinine 1.9 however there is no labs before  Continue IV fluid and consider ultrasound if needed  Avoid nephrotoxic  Mild elevation of CPK.\"  CT renal in 2014 normal.   Downtrending  Complete work-up with urinalysis  Could be related to his uncontrolled diabetes. Outpatient follow-up.    Diabetes- (present " "on admission)  Assessment & Plan  \"He is not on any medication at home  We will check A1c tomorrow  SSI\"  A1c PENDING  Ordered diabetes teaching    Leukocytosis- (present on admission)  Assessment & Plan  Due to cellulitis   Continue Unasyn       Hypertension- (present on admission)  Assessment & Plan  \"He is not on any medication at home  Continue monitoring and start with ACE inhibitors if needed after improving kidney function\"  Uncontrolled.  Cr- downtrending.  EKG sinus  Ordered echo  Start lisinopril 5/20       VTE prophylaxis: Switch Lovenox to heparin SQ because of elevated Cr-  Gastrointestinal prophylaxis: None  Antibiotics:   Ordered Anti-infectives (9999h ago, onward)     Ordered     Start    05/18/20 1532  ampicillin/sulbactam (UNASYN) 3 g in  mL IVPB  EVERY 6 HOURS      05/19/20 0000    05/18/20 1222  cefTRIAXone (ROCEPHIN) 2 g in  mL IVPB  ONCE      05/18/20 1245              Diet:   Orders Placed This Encounter   Procedures   • Diet NPO     Standing Status:   Standing     Number of Occurrences:   8     Order Specific Question:   Restrict to:     Answer:   Sips with Medications [3]      Prognosis: Guarded**  Risk: The Patient is at HIGH risk for inpatient complications and decompensation secondary to his multiple cormorbidities  listed above, especially   Problem   Diabetes   Felipe (Acute Kidney Injury) (Hcc)   Swelling of left hand, diabetes not on meds   Hypertension      I have personally reviewed notes, labs, vitals, imaging.  I discussed the plan of care with bedside RN as well as on multidisciplinary rounds  I have performed a physical exam and reviewed and updated ROS and Plan today 5/19/2020. In review of yesterday's note 5/18/2020   there are no changes except as documented above.       "

## 2020-05-19 NOTE — CARE PLAN
Problem: Pain Management  Goal: Pain level will decrease to patient's comfort goal  Outcome: PROGRESSING AS EXPECTED  Pain relief with prn medication.      Problem: Mobility  Goal: Risk for activity intolerance will decrease  Outcome: PROGRESSING AS EXPECTED   Mobilizing well unassisted.

## 2020-05-19 NOTE — ASSESSMENT & PLAN NOTE
"\"Came with creatinine 1.9 however there is no labs before  Continue IV fluid and consider ultrasound if needed  Avoid nephrotoxic  Mild elevation of CPK.\"  CT renal in 2014 normal.   Downtrending  Complete work-up with urinalysis  Could be related to his uncontrolled diabetes. Outpatient follow-up.  "

## 2020-05-19 NOTE — PROGRESS NOTES
Patient requesting being unhooked from IV to walk on the unit, will attempt to give urine sample prior to walk.

## 2020-05-19 NOTE — CARE PLAN
Problem: Communication  Goal: The ability to communicate needs accurately and effectively will improve  Outcome: PROGRESSING AS EXPECTED    Patient able to communicate needs to staff. Call light within reach, patient educated to call for assistance. Patient calls appropriately. Will continue to assess.      Problem: Safety  Goal: Will remain free from injury  Outcome: PROGRESSING AS EXPECTED    Patient educated to call for assistance. Precautions in place; Call light within reach. Bed locked and in lowest position. Treaded socks in place. Hourly rounding. Will continue to monitor.      Problem: Pain Management  Goal: Pain level will decrease to patient's comfort goal  Outcome: PROGRESSING AS EXPECTED    Pharmacologic and non-pharmacologic interventions in place to promote comfort. Pt able sleep comfortably

## 2020-05-19 NOTE — PROGRESS NOTES
Saline locked, PIV flushed, pre op checklist started, CHG was done today, underwear removed, to pre op area with transport.

## 2020-05-20 NOTE — PROGRESS NOTES
Patient left AMA escorted out by security refused education, and instructions or to sign paperwork.  Call placed to Dr. Martinez to notify of aware patient left without instruction, did not have surgery, did not have echo, did not allow for chance to educate the patient or print out paperwork.   IV was removed by pre op RN.     Mitch Nava patient has chosen to leave the hospital against medical advice. The attending physician has not discharged the patient. Patient is not a risk to himself or others. I have discussed with the patient the following:  Physician has not determined patient is ready for discharge.      Discharge against medical advice form has been Patient left abruptly - no chance to sign.      Patient is a 62 Y/o male and a referral to  was made to unit SW.   Attending physician has been notified.

## 2020-05-20 NOTE — DISCHARGE SUMMARY
"Discharge Summary    CHIEF COMPLAINT ON ADMISSION  Chief Complaint   Patient presents with   • T-5000     left hand/arm pain/swelling secondary \"smashing\" hand while working on his car, x2 days       Reason for Admission  Left Hand Swelling     Admission Date  5/18/2020    CODE STATUS  Full Code    HPI & HOSPITAL COURSE  This is a 44 y.o. male here with T-5000 (left hand/arm pain/swelling secondary \"smashing\" hand while working on his car, x2 days)  Please review Dr. Fay Jorgensen M.D. notes for further details of history of present illness, past medical/social/family histories, allergies and medications. Please review Dr. Price consultation notes.  History of hypertension and diabetes not on medications.  Presented with T-5000 (left hand/arm pain/swelling secondary \"smashing\" hand while working on his car, x2 days)  4d prior he smashed his hand while working on his truck. Swelling and pain worsened. He decided tto come in to be evaluated.  At the ED, he is afebrile but somewhat hypertensive.  MRI:  1.  No abnormal marrow signal to suggest osteomyelitis.  2.  Slight increase in fluid within the tendon sheaths of the flexor tendons  3.  Diffuse soft tissue edema is most pronounced in the dorsum of the hand. No abscess is identified.  No VT on US.  Leukocytosis. Hyperglycemic. Elevated creatinine.  Antibiotics started  Orthopedics consulted.  Because the patient couldn;t open or close his hand, there was also concern for compartment syndrome. He was scheduled to have surgery on 5/19. However at the preop area, nursing reported he was belligerent and aggressive. He decided not to undergo surgery and was returned to the surgical floor. At the surgical floor, he took his belongings and left AGAINST MEDICAL ADVICE. Nursing reported he did not bother speaking with the staff and left without hesitation or warning.     Notably the patient has uncontrolled hypertension and diabetes and he is not on any medications for " these.     Imaging  US-EXTREMITY VENOUS UPPER UNILAT LEFT   Final Result      MR-HAND - WITH & W/O LEFT   Final Result      1.  No abnormal marrow signal to suggest osteomyelitis.      2.  Slight increase in fluid within the tendon sheaths of the flexor tendons      3.  Diffuse soft tissue edema is most pronounced in the dorsum of the hand. No abscess is identified.      DX-HAND 3+ LEFT   Final Result      Soft tissue swelling without acute osseous abnormality.      EC-ECHOCARDIOGRAM COMPLETE W/O CONT    (Results Pending)             Discharge Date  5/19/2020    FOLLOW UP ITEMS POST DISCHARGE      DISCHARGE DIAGNOSES  Principal Problem:    Swelling of left hand, diabetes not on meds POA: Yes  Active Problems:    Diabetes POA: Yes    JANICE (acute kidney injury) (HCC) POA: Yes    Hypertension POA: Yes    Leukocytosis POA: Yes  Resolved Problems:    * No resolved hospital problems. *      FOLLOW UP  No future appointments.  No follow-up provider specified.    MEDICATIONS ON DISCHARGE     Medication List      You have not been prescribed any medications.         Allergies  No Known Allergies    DIET  Orders Placed This Encounter   Procedures   • Diet NPO     Standing Status:   Standing     Number of Occurrences:   8     Order Specific Question:   Restrict to:     Answer:   Sips with Medications [3]       ACTIVITY  Left AMA    CONSULTATIONS  Orthopedics    PROCEDURES  Dx-hand 3+ Left    Result Date: 5/18/2020 5/18/2020 11:22 AM HISTORY/REASON FOR EXAM:  Pain/Deformity Following Trauma. Pain and swelling after smash injury TECHNIQUE/EXAM DESCRIPTION AND NUMBER OF VIEWS:  3 views of the LEFT hand. COMPARISON: None FINDINGS: There is no fracture or dislocation. There is probably old posttraumatic deformity of the first metacarpal head. The visualized osseous structures are in anatomic alignment. The joint spaces are preserved. Bone mineralization is age-appropriate.. Soft tissue swelling dorsum of the hand.     Soft tissue  swelling without acute osseous abnormality.    Us-extremity Venous Upper Unilat Left    Result Date: 2020   Upper Extremity  Venous Duplex Report  Vascular Laboratory  CONCLUSIONS  No evidence of deep vein thrombosis or superficial thrombophlebitis in the  left upper extremity.  Soft tissue edema.  CONY MORAN  Exam Date:     2020 06:07  Room #:     Inpatient  Priority:     Routine  Ht (in):             Wt (lb):  Ordering Physician:        DAVIDA BLANTON  Referring Physician:       HARVINDER Alonzo  Sonographer:               Amando Ma RDMS  Study Type:                Complete Unilateral  Technical Quality:         Adequate  Age:    44    Gender:     M  MRN:    [id number]  :    1975      BSA:  Indications:     Localized swelling, mass and lump, left upper limb  CPT Codes:       39248  ICD Codes:       R22.32  History:         No Previous UEV, Left upper extremity swellin/edema  Limitations:  PROCEDURES:  Left upper extremity venous duplex imaging.  The following venous structures were evaluated: internal jugular vein,  subclavian vein, axillary vein, and brachial veins.  In addition, the basilic vein and cephalic vein.  Serial compression, augmentation maneuvers, and spectral Doppler flow  evaluation were performed.  FINDINGS:  Left upper extremity.  The veins of the left upper extremity are readily compressible with normal  venous flow dynamics including spontaneous flow, respiratory phasic  variation and augmentation.  There is subcutaneous left upper extremity edema noted.  No evidence of deep vein thrombosis or superficial thrombophlebitis in the  left upper extremity.  Flow was evaluated in the contralateral subclavian vein and normal venous  flow dynamics including  spontaneous flow, respiratory phasic variation and augmentation were  demonstrated.  Caleb Soria  (Electronically Signed)  Final Date:      19 May 2020 07:57    Mr-hand - With & W/o  Left    Result Date: 5/18/2020 5/18/2020 2:13 PM HISTORY/REASON FOR EXAM:  Hand pain following injury approximately 4 days ago TECHNIQUE/EXAM DESCRIPTION: MRI of the LEFT hand and wrist without and with contrast. The study was performed on a [employer] 1.5 Jacqueline MRI scanner. T1 coronal, sagittal, and axial, fast spin-echo; T2 fat-suppressed axial and coronal; gradient coronal; and thin-section 3D fast spin-echo T2 fat-suppressed coronal images were obtained. 17 mL ProHance contrast was administered intravenously. COMPARISON:  Hand x-ray from the same day FINDINGS: Bone marrow signal is normal. No bony destruction is identified. No fracture is identified. The visualized flexor and extensor tendons are intact. There is a small amount of fluid in the tendon sheaths of the flexor tendons, slightly greater than physiologic. The fluid does not extend into the carpal tunnel. There is diffuse soft tissue edema, primarily in the dorsum of the hand. No discrete fluid collection is identified.     1.  No abnormal marrow signal to suggest osteomyelitis. 2.  Slight increase in fluid within the tendon sheaths of the flexor tendons 3.  Diffuse soft tissue edema is most pronounced in the dorsum of the hand. No abscess is identified.      LABORATORY  Lab Results   Component Value Date    SODIUM 136 05/19/2020    POTASSIUM 4.3 05/19/2020    CHLORIDE 104 05/19/2020    CO2 23 05/19/2020    GLUCOSE 214 (H) 05/19/2020    BUN 28 (H) 05/19/2020    CREATININE 1.56 (H) 05/19/2020        Lab Results   Component Value Date    WBC 10.8 05/19/2020    HEMOGLOBIN 10.2 (L) 05/19/2020    HEMATOCRIT 30.8 (L) 05/19/2020    PLATELETCT 185 05/19/2020        Total time of the discharge process exceeds 35 minutes.

## 2020-05-23 LAB
BACTERIA BLD CULT: NORMAL
BACTERIA BLD CULT: NORMAL
SIGNIFICANT IND 70042: NORMAL
SIGNIFICANT IND 70042: NORMAL
SITE SITE: NORMAL
SITE SITE: NORMAL
SOURCE SOURCE: NORMAL
SOURCE SOURCE: NORMAL

## 2020-11-14 ENCOUNTER — HOSPITAL ENCOUNTER (EMERGENCY)
Facility: MEDICAL CENTER | Age: 45
End: 2020-11-14
Attending: EMERGENCY MEDICINE
Payer: MEDICAID

## 2020-11-14 VITALS
HEIGHT: 71 IN | DIASTOLIC BLOOD PRESSURE: 93 MMHG | HEART RATE: 82 BPM | BODY MASS INDEX: 27.16 KG/M2 | TEMPERATURE: 98.2 F | SYSTOLIC BLOOD PRESSURE: 184 MMHG | OXYGEN SATURATION: 98 % | RESPIRATION RATE: 21 BRPM | WEIGHT: 194 LBS

## 2020-11-14 DIAGNOSIS — I10 HYPERTENSION, UNSPECIFIED TYPE: ICD-10-CM

## 2020-11-14 PROCEDURE — 99283 EMERGENCY DEPT VISIT LOW MDM: CPT

## 2020-11-14 PROCEDURE — 700102 HCHG RX REV CODE 250 W/ 637 OVERRIDE(OP): Performed by: EMERGENCY MEDICINE

## 2020-11-14 PROCEDURE — A9270 NON-COVERED ITEM OR SERVICE: HCPCS | Performed by: EMERGENCY MEDICINE

## 2020-11-14 RX ORDER — AMLODIPINE BESYLATE 5 MG/1
5 TABLET ORAL DAILY
Qty: 30 TAB | Refills: 0 | Status: SHIPPED | OUTPATIENT
Start: 2020-11-14 | End: 2022-08-08

## 2020-11-14 RX ORDER — AMLODIPINE BESYLATE 5 MG/1
10 TABLET ORAL
Status: DISCONTINUED | OUTPATIENT
Start: 2020-11-14 | End: 2020-11-14 | Stop reason: HOSPADM

## 2020-11-14 RX ADMIN — AMLODIPINE BESYLATE 10 MG: 5 TABLET ORAL at 10:27

## 2020-11-14 ASSESSMENT — LIFESTYLE VARIABLES
EVER FELT BAD OR GUILTY ABOUT YOUR DRINKING: NO
HAVE YOU EVER FELT YOU SHOULD CUT DOWN ON YOUR DRINKING: NO
TOTAL SCORE: 0
TOTAL SCORE: 0
CONSUMPTION TOTAL: INCOMPLETE
DO YOU DRINK ALCOHOL: NO
EVER HAD A DRINK FIRST THING IN THE MORNING TO STEADY YOUR NERVES TO GET RID OF A HANGOVER: NO
TOTAL SCORE: 0
HAVE PEOPLE ANNOYED YOU BY CRITICIZING YOUR DRINKING: NO

## 2020-11-14 ASSESSMENT — ENCOUNTER SYMPTOMS
VOMITING: 0
FEVER: 0
SHORTNESS OF BREATH: 1
COUGH: 0
HEADACHES: 1
TINGLING: 1

## 2020-11-14 ASSESSMENT — FIBROSIS 4 INDEX
FIB4 SCORE: 1.08
FIB4 SCORE: 1.08

## 2020-11-14 NOTE — ED TRIAGE NOTES
Chief Complaint   Patient presents with   • Hypertension     Pt BIB [facility] w/ /127 on arrival. Pt is complaint free.

## 2020-11-14 NOTE — ED PROVIDER NOTES
ED Provider Note    Scribed for Torey Cross M.D. by Gerardo Hernandez. 11/14/2020, 10:14 AM.    Primary care provider: Pcp Pt States None  Means of arrival: Police  History obtained from: Patient  History limited by: None    CHIEF COMPLAINT  Chief Complaint   Patient presents with   • Hypertension     Pt BIB [facility] w/ /127 on arrival. Pt is complaint free.          HPI  Ambrose Watkins is a 45 y.o. male who presents to the Emergency Department brought in by Law Enforcement for medical clearance with acute hypertension onset earlier this morning. Patient was brought in after he was found to have a BP of 226/127. He admits to smoking methamphetamine 12 hours ago, and reports experiencing headaches, brief shortness of breath, mild paraesthesias to his fingers for the last couple hours. Upon arrival to ED he is currently asymptomatic and complaint free, only requests for something to eat. Patient denies any known history of hypertension. He also denies any recent alcohol usage, fevers, cough, vomiting,     REVIEW OF SYSTEMS  Review of Systems   Constitutional: Negative for fever.   Respiratory: Positive for shortness of breath (  resolved). Negative for cough.    Gastrointestinal: Negative for vomiting.   Neurological: Positive for tingling (  resolved) and headaches (  resolved).   All other systems reviewed and are negative.      PAST MEDICAL HISTORY   has a past medical history of Diabetes and Hypertension.    SURGICAL HISTORY  patient denies any surgical history    SOCIAL HISTORY  Social History     Tobacco Use   • Smoking status: Current Every Day Smoker     Packs/day: 0.50   Substance Use Topics   • Alcohol use: Yes     Comment: occ   • Drug use: No      Social History     Substance and Sexual Activity   Drug Use No       FAMILY HISTORY  No family history noted    CURRENT MEDICATIONS  No current outpatient medications noted.     ALLERGIES  No Known Allergies    PHYSICAL EXAM  VITAL SIGNS: BP (!)  226/127   Pulse 84   Temp 36.8 °C (98.2 °F) (Temporal)   Resp 19   Wt 88.5 kg (195 lb)   SpO2 100%   BMI 27.98 kg/m²   Constitutional:  No acute distress  HENT: Upper plate, Moist mucous membranes  Eyes: No conjunctivitis or icterus  Neck: Trachea is midline, no palpable thyroid  Lymphatic:  No cervical lymphadenopathy  Cardiovascular:  Regular rate and rhythm, no murmurs  Thorax & Lungs:  Normal breath sounds, no rhonchi  Abdomen:  Soft, Non-tender  Skin:.  no rash  Back:  Non-tender, no CVA tenderness  Extremities:   no edema  Vascular:  symmetric radial pulse  Neurologic:  Normal gross motor    COURSE & MEDICAL DECISION MAKING  Pertinent Labs & Imaging studies reviewed. (See chart for details)    10:14 AM - Patient seen and examined at bedside. The differential diagnoses include but are not limited to: hypertension, plan to treat with 10 mg amlodapine and reassess.      11:05 AM - Patient's BP improved to 184/93. He was cleared for discharge to Law Enforcement custody with prescription for amlodipine.   -Medical Decision Making:   Patient was given amlodipine 10 mg in the ER and observed he had good improvement of his blood pressure he has been asymptomatic in the whole time.  At this point I think the patient has primary hypertension exacerbated by methamphetamine abuse he is going to [facility].  I am placing him on amlodipine 5 mg a day and is given referral follow-up.    The patient will return for new or worsening symptoms and is stable at the time of discharge.    The patient is referred to a primary physician for blood pressure management, diabetic screening, and for all other preventative health concerns.    DISPOSITION:  Patient will be discharged in stable condition.    FOLLOW UP:  [address] [address] [phone]          OUTPATIENT MEDICATIONS:  New Prescriptions    AMLODIPINE (NORVASC) 5 MG TAB    Take 1 Tab by mouth every day.       FINAL  IMPRESSION  1. Hypertension, unspecified type          I, Gerardo Hernandez (Bruceibe), am scribing for, and in the presence of, Torey Cross M.D..    Electronically signed by: Gerardo Hernandez (Mar), 11/14/2020    ITorey M.D. personally performed the services described in this documentation, as scribed by Gerardo Hernandez in my presence, and it is both accurate and complete. C.    The note accurately reflects work and decisions made by me.  oTrey Cross M.D.  11/14/2020  5:18 PM

## 2021-03-03 ENCOUNTER — HOSPITAL ENCOUNTER (INPATIENT)
Dept: HOSPITAL 8 - ED | Age: 46
LOS: 3 days | Discharge: LEFT BEFORE DISCHARGE | DRG: 291 | End: 2021-03-06
Attending: INTERNAL MEDICINE | Admitting: INTERNAL MEDICINE
Payer: MEDICAID

## 2021-03-03 VITALS — DIASTOLIC BLOOD PRESSURE: 127 MMHG | SYSTOLIC BLOOD PRESSURE: 188 MMHG

## 2021-03-03 VITALS — HEIGHT: 69 IN | BODY MASS INDEX: 31.51 KG/M2 | WEIGHT: 212.75 LBS

## 2021-03-03 VITALS — SYSTOLIC BLOOD PRESSURE: 188 MMHG | DIASTOLIC BLOOD PRESSURE: 127 MMHG

## 2021-03-03 VITALS — SYSTOLIC BLOOD PRESSURE: 153 MMHG | DIASTOLIC BLOOD PRESSURE: 86 MMHG

## 2021-03-03 VITALS — DIASTOLIC BLOOD PRESSURE: 109 MMHG | SYSTOLIC BLOOD PRESSURE: 184 MMHG

## 2021-03-03 VITALS — DIASTOLIC BLOOD PRESSURE: 126 MMHG | SYSTOLIC BLOOD PRESSURE: 191 MMHG

## 2021-03-03 VITALS — SYSTOLIC BLOOD PRESSURE: 175 MMHG | DIASTOLIC BLOOD PRESSURE: 102 MMHG

## 2021-03-03 DIAGNOSIS — F17.210: ICD-10-CM

## 2021-03-03 DIAGNOSIS — N18.9: ICD-10-CM

## 2021-03-03 DIAGNOSIS — Z53.29: ICD-10-CM

## 2021-03-03 DIAGNOSIS — I13.0: Primary | ICD-10-CM

## 2021-03-03 DIAGNOSIS — Z20.822: ICD-10-CM

## 2021-03-03 DIAGNOSIS — I50.41: ICD-10-CM

## 2021-03-03 DIAGNOSIS — Z91.19: ICD-10-CM

## 2021-03-03 DIAGNOSIS — F15.10: ICD-10-CM

## 2021-03-03 DIAGNOSIS — D64.9: ICD-10-CM

## 2021-03-03 DIAGNOSIS — E87.2: ICD-10-CM

## 2021-03-03 DIAGNOSIS — E11.22: ICD-10-CM

## 2021-03-03 DIAGNOSIS — Z91.14: ICD-10-CM

## 2021-03-03 DIAGNOSIS — N17.0: ICD-10-CM

## 2021-03-03 DIAGNOSIS — Z79.84: ICD-10-CM

## 2021-03-03 LAB
ALBUMIN SERPL-MCNC: 2.4 G/DL (ref 3.4–5)
ALP SERPL-CCNC: 146 U/L (ref 45–117)
ALT SERPL-CCNC: 31 U/L (ref 12–78)
ANION GAP SERPL CALC-SCNC: 7 MMOL/L (ref 5–15)
BASOPHILS # BLD AUTO: 0 X10^3/UL (ref 0–0.1)
BASOPHILS NFR BLD AUTO: 1 % (ref 0–1)
BILIRUB SERPL-MCNC: 0.3 MG/DL (ref 0.2–1)
CALCIUM SERPL-MCNC: 7.7 MG/DL (ref 8.5–10.1)
CHLORIDE SERPL-SCNC: 116 MMOL/L (ref 98–107)
CREAT SERPL-MCNC: 3.21 MG/DL (ref 0.7–1.3)
EOSINOPHIL # BLD AUTO: 0 X10^3/UL (ref 0–0.4)
EOSINOPHIL NFR BLD AUTO: 1 % (ref 1–7)
ERYTHROCYTE [DISTWIDTH] IN BLOOD BY AUTOMATED COUNT: 14.7 % (ref 9.4–14.8)
EST. AVERAGE GLUCOSE BLD GHB EST-MCNC: 148 MG/DL (ref 0–126)
LYMPHOCYTES # BLD AUTO: 2 X10^3/UL (ref 1–3.4)
LYMPHOCYTES NFR BLD AUTO: 28 % (ref 22–44)
MCH RBC QN AUTO: 30.9 PG (ref 27.5–34.5)
MCHC RBC AUTO-ENTMCNC: 33.8 G/DL (ref 33.2–36.2)
MD: NO
MONOCYTES # BLD AUTO: 0.7 X10^3/UL (ref 0.2–0.8)
MONOCYTES NFR BLD AUTO: 10 % (ref 2–9)
NEUTROPHILS # BLD AUTO: 4.5 X10^3/UL (ref 1.8–6.8)
NEUTROPHILS NFR BLD AUTO: 61 % (ref 42–75)
PLATELET # BLD AUTO: 187 X10^3/UL (ref 130–400)
PMV BLD AUTO: 8.8 FL (ref 7.4–10.4)
PROT SERPL-MCNC: 6 G/DL (ref 6.4–8.2)
RBC # BLD AUTO: 3.54 X10^6/UL (ref 4.38–5.82)

## 2021-03-03 PROCEDURE — 76770 US EXAM ABDO BACK WALL COMP: CPT

## 2021-03-03 PROCEDURE — 84300 ASSAY OF URINE SODIUM: CPT

## 2021-03-03 PROCEDURE — 82570 ASSAY OF URINE CREATININE: CPT

## 2021-03-03 PROCEDURE — 84133 ASSAY OF URINE POTASSIUM: CPT

## 2021-03-03 PROCEDURE — 93356 MYOCRD STRAIN IMG SPCKL TRCK: CPT

## 2021-03-03 PROCEDURE — 80048 BASIC METABOLIC PNL TOTAL CA: CPT

## 2021-03-03 PROCEDURE — 93005 ELECTROCARDIOGRAM TRACING: CPT

## 2021-03-03 PROCEDURE — 83880 ASSAY OF NATRIURETIC PEPTIDE: CPT

## 2021-03-03 PROCEDURE — 93306 TTE W/DOPPLER COMPLETE: CPT

## 2021-03-03 PROCEDURE — 93970 EXTREMITY STUDY: CPT

## 2021-03-03 PROCEDURE — 96374 THER/PROPH/DIAG INJ IV PUSH: CPT

## 2021-03-03 PROCEDURE — 82962 GLUCOSE BLOOD TEST: CPT

## 2021-03-03 PROCEDURE — 82436 ASSAY OF URINE CHLORIDE: CPT

## 2021-03-03 PROCEDURE — 99285 EMERGENCY DEPT VISIT HI MDM: CPT

## 2021-03-03 PROCEDURE — 80053 COMPREHEN METABOLIC PANEL: CPT

## 2021-03-03 PROCEDURE — 81001 URINALYSIS AUTO W/SCOPE: CPT

## 2021-03-03 PROCEDURE — 83036 HEMOGLOBIN GLYCOSYLATED A1C: CPT

## 2021-03-03 PROCEDURE — 71045 X-RAY EXAM CHEST 1 VIEW: CPT

## 2021-03-03 PROCEDURE — 85025 COMPLETE CBC W/AUTO DIFF WBC: CPT

## 2021-03-03 PROCEDURE — 36415 COLL VENOUS BLD VENIPUNCTURE: CPT

## 2021-03-03 PROCEDURE — 80061 LIPID PANEL: CPT

## 2021-03-03 RX ADMIN — SODIUM CHLORIDE, PRESERVATIVE FREE SCH ML: 5 INJECTION INTRAVENOUS at 20:44

## 2021-03-03 RX ADMIN — CARVEDILOL SCH MG: 12.5 TABLET, FILM COATED ORAL at 20:13

## 2021-03-03 RX ADMIN — INSULIN LISPRO SCH UNITS: 100 INJECTION, SOLUTION INTRAVENOUS; SUBCUTANEOUS at 20:44

## 2021-03-03 RX ADMIN — HEPARIN SODIUM SCH UNITS: 5000 INJECTION, SOLUTION INTRAVENOUS; SUBCUTANEOUS at 20:14

## 2021-03-03 RX ADMIN — INSULIN LISPRO SCH UNITS: 100 INJECTION, SOLUTION INTRAVENOUS; SUBCUTANEOUS at 21:20

## 2021-03-03 NOTE — NUR
Pt sent from senior care, law enforcement at bedside. Pt denies any complaints, "I 
avoid the hospital like I avoid senior care." 



New onset swelling from umbilicus down x approx 4 weeks. Pt states that when he 
lays flat he "wheezes like there's water in the lungs" this started approx 2 
nights ago. Pt's legs are tight, and he stated pain when this RN pushed to 
asses edema. Connected to all monitors, call light in reach, bedrails up x2. 
NADN.

## 2021-03-04 VITALS — SYSTOLIC BLOOD PRESSURE: 133 MMHG | DIASTOLIC BLOOD PRESSURE: 79 MMHG

## 2021-03-04 VITALS — DIASTOLIC BLOOD PRESSURE: 88 MMHG | SYSTOLIC BLOOD PRESSURE: 145 MMHG

## 2021-03-04 VITALS — DIASTOLIC BLOOD PRESSURE: 87 MMHG | SYSTOLIC BLOOD PRESSURE: 164 MMHG

## 2021-03-04 VITALS — SYSTOLIC BLOOD PRESSURE: 161 MMHG | DIASTOLIC BLOOD PRESSURE: 97 MMHG

## 2021-03-04 VITALS — SYSTOLIC BLOOD PRESSURE: 142 MMHG | DIASTOLIC BLOOD PRESSURE: 80 MMHG

## 2021-03-04 VITALS — SYSTOLIC BLOOD PRESSURE: 138 MMHG | DIASTOLIC BLOOD PRESSURE: 80 MMHG

## 2021-03-04 LAB
ANION GAP SERPL CALC-SCNC: 7 MMOL/L (ref 5–15)
BASOPHILS # BLD AUTO: 0.1 X10^3/UL (ref 0–0.1)
BASOPHILS NFR BLD AUTO: 1 % (ref 0–1)
CALCIUM SERPL-MCNC: 8.5 MG/DL (ref 8.5–10.1)
CHLORIDE SERPL-SCNC: 111 MMOL/L (ref 98–107)
CHLORIDE,URINE RANDOM: 133 MMOL/L
CHOL/HDL RATIO: 3
CREAT SERPL-MCNC: 3.28 MG/DL (ref 0.7–1.3)
EOSINOPHIL # BLD AUTO: 0 X10^3/UL (ref 0–0.4)
EOSINOPHIL NFR BLD AUTO: 1 % (ref 1–7)
ERYTHROCYTE [DISTWIDTH] IN BLOOD BY AUTOMATED COUNT: 15.1 % (ref 9.4–14.8)
HDL CHOL %: 33 % (ref 26–37)
HDL CHOLESTEROL (DIRECT): 59 MG/DL (ref 40–60)
LDL CHOLESTEROL,CALCULATED: 106 MG/DL (ref 54–169)
LDLC/HDLC SERPL: 1.8 {RATIO} (ref 0.5–3)
LYMPHOCYTES # BLD AUTO: 1.3 X10^3/UL (ref 1–3.4)
LYMPHOCYTES NFR BLD AUTO: 17 % (ref 22–44)
MCH RBC QN AUTO: 30.4 PG (ref 27.5–34.5)
MCHC RBC AUTO-ENTMCNC: 33.6 G/DL (ref 33.2–36.2)
MD: NO
MICROSCOPIC: (no result)
MONOCYTES # BLD AUTO: 0.7 X10^3/UL (ref 0.2–0.8)
MONOCYTES NFR BLD AUTO: 9 % (ref 2–9)
NEUTROPHILS # BLD AUTO: 5.4 X10^3/UL (ref 1.8–6.8)
NEUTROPHILS NFR BLD AUTO: 73 % (ref 42–75)
PLATELET # BLD AUTO: 177 X10^3/UL (ref 130–400)
PMV BLD AUTO: 9.5 FL (ref 7.4–10.4)
POTASSIUM UR-SCNC: 16 MMOL/L
RBC # BLD AUTO: 3.64 X10^6/UL (ref 4.38–5.82)
SODIUM,URINE RANDOM: 121 MMOL/L
TRIGL SERPL-MCNC: 68 MG/DL (ref 50–200)
VLDLC SERPL CALC-MCNC: 14 MG/DL (ref 0–25)

## 2021-03-04 RX ADMIN — HEPARIN SODIUM SCH UNITS: 5000 INJECTION, SOLUTION INTRAVENOUS; SUBCUTANEOUS at 04:00

## 2021-03-04 RX ADMIN — INSULIN LISPRO SCH UNITS: 100 INJECTION, SOLUTION INTRAVENOUS; SUBCUTANEOUS at 17:45

## 2021-03-04 RX ADMIN — SODIUM CHLORIDE, SODIUM LACTATE, POTASSIUM CHLORIDE, AND CALCIUM CHLORIDE SCH MLS/HR: .6; .31; .03; .02 INJECTION, SOLUTION INTRAVENOUS at 20:00

## 2021-03-04 RX ADMIN — INSULIN LISPRO SCH UNITS: 100 INJECTION, SOLUTION INTRAVENOUS; SUBCUTANEOUS at 08:54

## 2021-03-04 RX ADMIN — CARVEDILOL SCH MG: 12.5 TABLET, FILM COATED ORAL at 08:55

## 2021-03-04 RX ADMIN — INSULIN LISPRO SCH UNITS: 100 INJECTION, SOLUTION INTRAVENOUS; SUBCUTANEOUS at 11:00

## 2021-03-04 RX ADMIN — SODIUM CHLORIDE, PRESERVATIVE FREE SCH ML: 5 INJECTION INTRAVENOUS at 08:50

## 2021-03-04 RX ADMIN — SODIUM CHLORIDE, PRESERVATIVE FREE SCH ML: 5 INJECTION INTRAVENOUS at 21:00

## 2021-03-04 RX ADMIN — HEPARIN SODIUM SCH UNITS: 5000 INJECTION, SOLUTION INTRAVENOUS; SUBCUTANEOUS at 20:59

## 2021-03-04 RX ADMIN — INSULIN GLARGINE SCH UNITS: 100 INJECTION, SOLUTION SUBCUTANEOUS at 20:59

## 2021-03-04 RX ADMIN — DOCUSATE SODIUM 50MG AND SENNOSIDES 8.6MG SCH TAB: 8.6; 5 TABLET, FILM COATED ORAL at 08:54

## 2021-03-04 RX ADMIN — ACETAMINOPHEN PRN MG: 325 TABLET, FILM COATED ORAL at 09:05

## 2021-03-04 RX ADMIN — INSULIN GLARGINE SCH UNITS: 100 INJECTION, SOLUTION SUBCUTANEOUS at 21:46

## 2021-03-04 RX ADMIN — CARVEDILOL SCH MG: 12.5 TABLET, FILM COATED ORAL at 17:54

## 2021-03-04 RX ADMIN — ACETAMINOPHEN PRN MG: 325 TABLET, FILM COATED ORAL at 21:45

## 2021-03-04 RX ADMIN — HEPARIN SODIUM SCH UNITS: 5000 INJECTION, SOLUTION INTRAVENOUS; SUBCUTANEOUS at 11:22

## 2021-03-04 RX ADMIN — INSULIN LISPRO SCH UNITS: 100 INJECTION, SOLUTION INTRAVENOUS; SUBCUTANEOUS at 20:50

## 2021-03-05 VITALS — SYSTOLIC BLOOD PRESSURE: 160 MMHG | DIASTOLIC BLOOD PRESSURE: 81 MMHG

## 2021-03-05 VITALS — SYSTOLIC BLOOD PRESSURE: 152 MMHG | DIASTOLIC BLOOD PRESSURE: 85 MMHG

## 2021-03-05 VITALS — DIASTOLIC BLOOD PRESSURE: 107 MMHG | SYSTOLIC BLOOD PRESSURE: 175 MMHG

## 2021-03-05 VITALS — DIASTOLIC BLOOD PRESSURE: 70 MMHG | SYSTOLIC BLOOD PRESSURE: 134 MMHG

## 2021-03-05 LAB
ANION GAP SERPL CALC-SCNC: 4 MMOL/L (ref 5–15)
CALCIUM SERPL-MCNC: 8.1 MG/DL (ref 8.5–10.1)
CHLORIDE SERPL-SCNC: 111 MMOL/L (ref 98–107)
CREAT SERPL-MCNC: 3.13 MG/DL (ref 0.7–1.3)

## 2021-03-05 RX ADMIN — INSULIN LISPRO SCH UNITS: 100 INJECTION, SOLUTION INTRAVENOUS; SUBCUTANEOUS at 11:00

## 2021-03-05 RX ADMIN — ACETAMINOPHEN PRN MG: 325 TABLET, FILM COATED ORAL at 20:17

## 2021-03-05 RX ADMIN — FUROSEMIDE SCH MG: 10 INJECTION, SOLUTION INTRAMUSCULAR; INTRAVENOUS at 17:04

## 2021-03-05 RX ADMIN — INSULIN GLARGINE SCH UNITS: 100 INJECTION, SOLUTION SUBCUTANEOUS at 09:11

## 2021-03-05 RX ADMIN — CARVEDILOL SCH MG: 12.5 TABLET, FILM COATED ORAL at 04:54

## 2021-03-05 RX ADMIN — TAMSULOSIN HYDROCHLORIDE SCH MG: 0.4 CAPSULE ORAL at 17:47

## 2021-03-05 RX ADMIN — INSULIN GLARGINE SCH UNITS: 100 INJECTION, SOLUTION SUBCUTANEOUS at 20:40

## 2021-03-05 RX ADMIN — INSULIN LISPRO SCH UNITS: 100 INJECTION, SOLUTION INTRAVENOUS; SUBCUTANEOUS at 20:39

## 2021-03-05 RX ADMIN — INSULIN LISPRO SCH UNITS: 100 INJECTION, SOLUTION INTRAVENOUS; SUBCUTANEOUS at 17:05

## 2021-03-05 RX ADMIN — SODIUM CHLORIDE, PRESERVATIVE FREE SCH ML: 5 INJECTION INTRAVENOUS at 09:00

## 2021-03-05 RX ADMIN — DOCUSATE SODIUM 50MG AND SENNOSIDES 8.6MG SCH TAB: 8.6; 5 TABLET, FILM COATED ORAL at 09:09

## 2021-03-05 RX ADMIN — CARVEDILOL SCH MG: 12.5 TABLET, FILM COATED ORAL at 17:06

## 2021-03-05 RX ADMIN — HEPARIN SODIUM SCH UNITS: 5000 INJECTION, SOLUTION INTRAVENOUS; SUBCUTANEOUS at 20:17

## 2021-03-05 RX ADMIN — SODIUM CHLORIDE, PRESERVATIVE FREE SCH ML: 5 INJECTION INTRAVENOUS at 20:17

## 2021-03-05 RX ADMIN — FUROSEMIDE SCH MG: 10 INJECTION, SOLUTION INTRAMUSCULAR; INTRAVENOUS at 12:00

## 2021-03-05 RX ADMIN — HEPARIN SODIUM SCH UNITS: 5000 INJECTION, SOLUTION INTRAVENOUS; SUBCUTANEOUS at 11:39

## 2021-03-05 RX ADMIN — HEPARIN SODIUM SCH UNITS: 5000 INJECTION, SOLUTION INTRAVENOUS; SUBCUTANEOUS at 03:57

## 2021-03-05 RX ADMIN — SODIUM CHLORIDE, SODIUM LACTATE, POTASSIUM CHLORIDE, AND CALCIUM CHLORIDE SCH MLS/HR: .6; .31; .03; .02 INJECTION, SOLUTION INTRAVENOUS at 09:11

## 2021-03-05 RX ADMIN — INSULIN LISPRO SCH UNITS: 100 INJECTION, SOLUTION INTRAVENOUS; SUBCUTANEOUS at 07:00

## 2021-03-06 VITALS — SYSTOLIC BLOOD PRESSURE: 129 MMHG | DIASTOLIC BLOOD PRESSURE: 75 MMHG

## 2021-03-06 VITALS — SYSTOLIC BLOOD PRESSURE: 131 MMHG | DIASTOLIC BLOOD PRESSURE: 74 MMHG

## 2021-03-06 VITALS — DIASTOLIC BLOOD PRESSURE: 72 MMHG | SYSTOLIC BLOOD PRESSURE: 130 MMHG

## 2021-03-06 LAB
ANION GAP SERPL CALC-SCNC: 7 MMOL/L (ref 5–15)
CALCIUM SERPL-MCNC: 8.2 MG/DL (ref 8.5–10.1)
CHLORIDE SERPL-SCNC: 109 MMOL/L (ref 98–107)
CREAT SERPL-MCNC: 3.43 MG/DL (ref 0.7–1.3)

## 2021-03-06 RX ADMIN — INSULIN LISPRO SCH UNITS: 100 INJECTION, SOLUTION INTRAVENOUS; SUBCUTANEOUS at 08:12

## 2021-03-06 RX ADMIN — FUROSEMIDE SCH MG: 10 INJECTION, SOLUTION INTRAMUSCULAR; INTRAVENOUS at 08:11

## 2021-03-06 RX ADMIN — INSULIN GLARGINE SCH UNITS: 100 INJECTION, SOLUTION SUBCUTANEOUS at 08:12

## 2021-03-06 RX ADMIN — INSULIN LISPRO SCH UNITS: 100 INJECTION, SOLUTION INTRAVENOUS; SUBCUTANEOUS at 11:00

## 2021-03-06 RX ADMIN — SODIUM CHLORIDE, PRESERVATIVE FREE SCH ML: 5 INJECTION INTRAVENOUS at 08:11

## 2021-03-06 RX ADMIN — CARVEDILOL SCH MG: 12.5 TABLET, FILM COATED ORAL at 05:33

## 2021-03-06 RX ADMIN — HEPARIN SODIUM SCH UNITS: 5000 INJECTION, SOLUTION INTRAVENOUS; SUBCUTANEOUS at 03:46

## 2021-03-06 RX ADMIN — DOCUSATE SODIUM 50MG AND SENNOSIDES 8.6MG SCH TAB: 8.6; 5 TABLET, FILM COATED ORAL at 08:11

## 2021-03-06 RX ADMIN — TAMSULOSIN HYDROCHLORIDE SCH MG: 0.4 CAPSULE ORAL at 08:11

## 2021-03-06 RX ADMIN — HEPARIN SODIUM SCH UNITS: 5000 INJECTION, SOLUTION INTRAVENOUS; SUBCUTANEOUS at 12:16

## 2022-02-21 ENCOUNTER — HOSPITAL ENCOUNTER (INPATIENT)
Facility: MEDICAL CENTER | Age: 47
LOS: 14 days | DRG: 264 | End: 2022-03-08
Attending: STUDENT IN AN ORGANIZED HEALTH CARE EDUCATION/TRAINING PROGRAM | Admitting: STUDENT IN AN ORGANIZED HEALTH CARE EDUCATION/TRAINING PROGRAM
Payer: MEDICARE

## 2022-02-21 ENCOUNTER — APPOINTMENT (OUTPATIENT)
Dept: RADIOLOGY | Facility: MEDICAL CENTER | Age: 47
DRG: 264 | End: 2022-02-21
Attending: STUDENT IN AN ORGANIZED HEALTH CARE EDUCATION/TRAINING PROGRAM
Payer: MEDICARE

## 2022-02-21 DIAGNOSIS — F42.4 COMPULSIVE SKIN PICKING: ICD-10-CM

## 2022-02-21 DIAGNOSIS — N18.6 ESRD (END STAGE RENAL DISEASE) (HCC): ICD-10-CM

## 2022-02-21 DIAGNOSIS — R09.02 HYPOXEMIA: ICD-10-CM

## 2022-02-21 DIAGNOSIS — F15.10 METHAMPHETAMINE USE (HCC): ICD-10-CM

## 2022-02-21 DIAGNOSIS — E87.5 HYPERKALEMIA: ICD-10-CM

## 2022-02-21 LAB
ALBUMIN SERPL BCP-MCNC: 3.5 G/DL (ref 3.2–4.9)
ALBUMIN/GLOB SERPL: 0.9 G/DL
ALP SERPL-CCNC: 183 U/L (ref 30–99)
ALT SERPL-CCNC: 18 U/L (ref 2–50)
ANION GAP SERPL CALC-SCNC: 22 MMOL/L (ref 7–16)
AST SERPL-CCNC: 20 U/L (ref 12–45)
BASOPHILS # BLD AUTO: 0.3 % (ref 0–1.8)
BASOPHILS # BLD: 0.03 K/UL (ref 0–0.12)
BILIRUB SERPL-MCNC: 0.5 MG/DL (ref 0.1–1.5)
BUN SERPL-MCNC: 128 MG/DL (ref 8–22)
CALCIUM SERPL-MCNC: 6.7 MG/DL (ref 8.5–10.5)
CHLORIDE SERPL-SCNC: 107 MMOL/L (ref 96–112)
CO2 SERPL-SCNC: 8 MMOL/L (ref 20–33)
CREAT SERPL-MCNC: 14.93 MG/DL (ref 0.5–1.4)
CRP SERPL HS-MCNC: 14.27 MG/DL (ref 0–0.75)
EKG IMPRESSION: NORMAL
EOSINOPHIL # BLD AUTO: 0.04 K/UL (ref 0–0.51)
EOSINOPHIL NFR BLD: 0.4 % (ref 0–6.9)
ERYTHROCYTE [DISTWIDTH] IN BLOOD BY AUTOMATED COUNT: 57.9 FL (ref 35.9–50)
ERYTHROCYTE [SEDIMENTATION RATE] IN BLOOD BY WESTERGREN METHOD: 45 MM/HOUR (ref 0–20)
GLOBULIN SER CALC-MCNC: 3.7 G/DL (ref 1.9–3.5)
GLUCOSE BLD-MCNC: 137 MG/DL (ref 65–99)
GLUCOSE BLD-MCNC: 141 MG/DL (ref 65–99)
GLUCOSE SERPL-MCNC: 106 MG/DL (ref 65–99)
HCT VFR BLD AUTO: 34.8 % (ref 42–52)
HGB BLD-MCNC: 11.1 G/DL (ref 14–18)
IMM GRANULOCYTES # BLD AUTO: 0.06 K/UL (ref 0–0.11)
IMM GRANULOCYTES NFR BLD AUTO: 0.5 % (ref 0–0.9)
LYMPHOCYTES # BLD AUTO: 1.66 K/UL (ref 1–4.8)
LYMPHOCYTES NFR BLD: 14.9 % (ref 22–41)
MCH RBC QN AUTO: 29.8 PG (ref 27–33)
MCHC RBC AUTO-ENTMCNC: 31.9 G/DL (ref 33.7–35.3)
MCV RBC AUTO: 93.5 FL (ref 81.4–97.8)
MONOCYTES # BLD AUTO: 1.03 K/UL (ref 0–0.85)
MONOCYTES NFR BLD AUTO: 9.2 % (ref 0–13.4)
NEUTROPHILS # BLD AUTO: 8.35 K/UL (ref 1.82–7.42)
NEUTROPHILS NFR BLD: 74.7 % (ref 44–72)
NRBC # BLD AUTO: 0 K/UL
NRBC BLD-RTO: 0 /100 WBC
PLATELET # BLD AUTO: 193 K/UL (ref 164–446)
PMV BLD AUTO: 11.3 FL (ref 9–12.9)
POTASSIUM SERPL-SCNC: 5.9 MMOL/L (ref 3.6–5.5)
PROT SERPL-MCNC: 7.2 G/DL (ref 6–8.2)
RBC # BLD AUTO: 3.72 M/UL (ref 4.7–6.1)
SODIUM SERPL-SCNC: 137 MMOL/L (ref 135–145)
WBC # BLD AUTO: 11.2 K/UL (ref 4.8–10.8)

## 2022-02-21 PROCEDURE — 86140 C-REACTIVE PROTEIN: CPT

## 2022-02-21 PROCEDURE — 96375 TX/PRO/DX INJ NEW DRUG ADDON: CPT

## 2022-02-21 PROCEDURE — 93005 ELECTROCARDIOGRAM TRACING: CPT | Performed by: STUDENT IN AN ORGANIZED HEALTH CARE EDUCATION/TRAINING PROGRAM

## 2022-02-21 PROCEDURE — 80053 COMPREHEN METABOLIC PANEL: CPT

## 2022-02-21 PROCEDURE — 85025 COMPLETE CBC W/AUTO DIFF WBC: CPT

## 2022-02-21 PROCEDURE — 99285 EMERGENCY DEPT VISIT HI MDM: CPT

## 2022-02-21 PROCEDURE — 700111 HCHG RX REV CODE 636 W/ 250 OVERRIDE (IP): Performed by: STUDENT IN AN ORGANIZED HEALTH CARE EDUCATION/TRAINING PROGRAM

## 2022-02-21 PROCEDURE — 82962 GLUCOSE BLOOD TEST: CPT | Mod: 91

## 2022-02-21 PROCEDURE — 96365 THER/PROPH/DIAG IV INF INIT: CPT

## 2022-02-21 PROCEDURE — 71045 X-RAY EXAM CHEST 1 VIEW: CPT

## 2022-02-21 PROCEDURE — 700105 HCHG RX REV CODE 258: Performed by: STUDENT IN AN ORGANIZED HEALTH CARE EDUCATION/TRAINING PROGRAM

## 2022-02-21 PROCEDURE — 85652 RBC SED RATE AUTOMATED: CPT

## 2022-02-21 PROCEDURE — 93005 ELECTROCARDIOGRAM TRACING: CPT

## 2022-02-21 PROCEDURE — 700102 HCHG RX REV CODE 250 W/ 637 OVERRIDE(OP): Performed by: STUDENT IN AN ORGANIZED HEALTH CARE EDUCATION/TRAINING PROGRAM

## 2022-02-21 RX ORDER — CALCIUM GLUCONATE 20 MG/ML
2 INJECTION, SOLUTION INTRAVENOUS ONCE
Status: COMPLETED | OUTPATIENT
Start: 2022-02-21 | End: 2022-02-21

## 2022-02-21 RX ORDER — MORPHINE SULFATE 4 MG/ML
4 INJECTION INTRAVENOUS ONCE
Status: COMPLETED | OUTPATIENT
Start: 2022-02-21 | End: 2022-02-21

## 2022-02-21 RX ADMIN — MORPHINE SULFATE 4 MG: 4 INJECTION INTRAVENOUS at 21:58

## 2022-02-21 RX ADMIN — INSULIN HUMAN 5 UNITS: 100 INJECTION, SOLUTION PARENTERAL at 22:47

## 2022-02-21 RX ADMIN — DEXTROSE 250 ML: 10 SOLUTION INTRAVENOUS at 22:43

## 2022-02-21 RX ADMIN — CALCIUM GLUCONATE 2 G: 20 INJECTION, SOLUTION INTRAVENOUS at 22:42

## 2022-02-21 ASSESSMENT — ENCOUNTER SYMPTOMS
DOUBLE VISION: 0
CHILLS: 0
BACK PAIN: 1
FALLS: 0
SHORTNESS OF BREATH: 1
NECK PAIN: 0
HEADACHES: 0
VOMITING: 0
COUGH: 0
NAUSEA: 0
LOSS OF CONSCIOUSNESS: 0
ABDOMINAL PAIN: 0
BLURRED VISION: 0
FEVER: 0
SORE THROAT: 0

## 2022-02-21 ASSESSMENT — LIFESTYLE VARIABLES: DO YOU DRINK ALCOHOL: NO

## 2022-02-21 ASSESSMENT — FIBROSIS 4 INDEX
FIB4 SCORE: 1.11
FIB4 SCORE: 1.11

## 2022-02-21 ASSESSMENT — PAIN DESCRIPTION - PAIN TYPE: TYPE: ACUTE PAIN

## 2022-02-22 ENCOUNTER — APPOINTMENT (OUTPATIENT)
Dept: RADIOLOGY | Facility: MEDICAL CENTER | Age: 47
DRG: 264 | End: 2022-02-22
Attending: STUDENT IN AN ORGANIZED HEALTH CARE EDUCATION/TRAINING PROGRAM
Payer: MEDICARE

## 2022-02-22 PROBLEM — F15.10 METHAMPHETAMINE ABUSE (HCC): Status: ACTIVE | Noted: 2022-02-22

## 2022-02-22 PROBLEM — E87.5 HYPERKALEMIA: Status: ACTIVE | Noted: 2022-02-22

## 2022-02-22 PROBLEM — D64.9 ANEMIA: Status: ACTIVE | Noted: 2022-02-22

## 2022-02-22 PROBLEM — E83.51 HYPOCALCEMIA: Status: ACTIVE | Noted: 2022-02-22

## 2022-02-22 PROBLEM — M54.9 BACK PAIN: Status: ACTIVE | Noted: 2022-02-22

## 2022-02-22 PROBLEM — R19.7 DIARRHEA: Status: ACTIVE | Noted: 2022-02-22

## 2022-02-22 PROBLEM — N18.6 END STAGE RENAL DISEASE (HCC): Status: ACTIVE | Noted: 2022-02-22

## 2022-02-22 LAB
25(OH)D3 SERPL-MCNC: 9 NG/ML (ref 30–100)
ANION GAP SERPL CALC-SCNC: 19 MMOL/L (ref 7–16)
ANION GAP SERPL CALC-SCNC: 21 MMOL/L (ref 7–16)
ANION GAP SERPL CALC-SCNC: 23 MMOL/L (ref 7–16)
BUN SERPL-MCNC: 102 MG/DL (ref 8–22)
BUN SERPL-MCNC: 120 MG/DL (ref 8–22)
BUN SERPL-MCNC: 127 MG/DL (ref 8–22)
CALCIUM SERPL-MCNC: 6.8 MG/DL (ref 8.5–10.5)
CALCIUM SERPL-MCNC: 6.9 MG/DL (ref 8.5–10.5)
CALCIUM SERPL-MCNC: 7.2 MG/DL (ref 8.5–10.5)
CHLORIDE SERPL-SCNC: 105 MMOL/L (ref 96–112)
CHLORIDE SERPL-SCNC: 107 MMOL/L (ref 96–112)
CHLORIDE SERPL-SCNC: 107 MMOL/L (ref 96–112)
CO2 SERPL-SCNC: 11 MMOL/L (ref 20–33)
CO2 SERPL-SCNC: 12 MMOL/L (ref 20–33)
CO2 SERPL-SCNC: 6 MMOL/L (ref 20–33)
CREAT SERPL-MCNC: 13.08 MG/DL (ref 0.5–1.4)
CREAT SERPL-MCNC: 14.04 MG/DL (ref 0.5–1.4)
CREAT SERPL-MCNC: 14.8 MG/DL (ref 0.5–1.4)
EKG IMPRESSION: NORMAL
EST. AVERAGE GLUCOSE BLD GHB EST-MCNC: 140 MG/DL
FERRITIN SERPL-MCNC: 665 NG/ML (ref 22–322)
GLUCOSE BLD STRIP.AUTO-MCNC: 76 MG/DL (ref 65–99)
GLUCOSE BLD STRIP.AUTO-MCNC: 80 MG/DL (ref 65–99)
GLUCOSE BLD STRIP.AUTO-MCNC: 88 MG/DL (ref 65–99)
GLUCOSE BLD STRIP.AUTO-MCNC: 95 MG/DL (ref 65–99)
GLUCOSE BLD-MCNC: 131 MG/DL (ref 65–99)
GLUCOSE BLD-MCNC: 81 MG/DL (ref 65–99)
GLUCOSE SERPL-MCNC: 111 MG/DL (ref 65–99)
GLUCOSE SERPL-MCNC: 131 MG/DL (ref 65–99)
GLUCOSE SERPL-MCNC: 155 MG/DL (ref 65–99)
HAV IGM SERPL QL IA: NORMAL
HBA1C MFR BLD: 6.5 % (ref 4–5.6)
HBV CORE IGM SER QL: NORMAL
HBV SURFACE AB SERPL IA-ACNC: 144 MIU/ML (ref 0–10)
HBV SURFACE AG SER QL: NORMAL
HCV AB SER QL: NORMAL
IRON SATN MFR SERPL: ABNORMAL % (ref 15–55)
IRON SERPL-MCNC: 8 UG/DL (ref 50–180)
LIPASE SERPL-CCNC: 51 U/L (ref 11–82)
MAGNESIUM SERPL-MCNC: 2.2 MG/DL (ref 1.5–2.5)
PHOSPHATE SERPL-MCNC: 9.5 MG/DL (ref 2.5–4.5)
POTASSIUM SERPL-SCNC: 4.5 MMOL/L (ref 3.6–5.5)
POTASSIUM SERPL-SCNC: 5 MMOL/L (ref 3.6–5.5)
POTASSIUM SERPL-SCNC: 6.2 MMOL/L (ref 3.6–5.5)
PTH-INTACT SERPL-MCNC: 823 PG/ML (ref 14–72)
SODIUM SERPL-SCNC: 136 MMOL/L (ref 135–145)
SODIUM SERPL-SCNC: 137 MMOL/L (ref 135–145)
SODIUM SERPL-SCNC: 138 MMOL/L (ref 135–145)
TIBC SERPL-MCNC: 178 UG/DL (ref 250–450)
TRANSFERRIN SERPL-MCNC: 149 MG/DL (ref 200–370)
UIBC SERPL-MCNC: 170 UG/DL (ref 110–370)

## 2022-02-22 PROCEDURE — 700101 HCHG RX REV CODE 250: Performed by: STUDENT IN AN ORGANIZED HEALTH CARE EDUCATION/TRAINING PROGRAM

## 2022-02-22 PROCEDURE — 82962 GLUCOSE BLOOD TEST: CPT

## 2022-02-22 PROCEDURE — 5A1D70Z PERFORMANCE OF URINARY FILTRATION, INTERMITTENT, LESS THAN 6 HOURS PER DAY: ICD-10-PCS | Performed by: STUDENT IN AN ORGANIZED HEALTH CARE EDUCATION/TRAINING PROGRAM

## 2022-02-22 PROCEDURE — 90935 HEMODIALYSIS ONE EVALUATION: CPT

## 2022-02-22 PROCEDURE — A9576 INJ PROHANCE MULTIPACK: HCPCS | Performed by: STUDENT IN AN ORGANIZED HEALTH CARE EDUCATION/TRAINING PROGRAM

## 2022-02-22 PROCEDURE — 83036 HEMOGLOBIN GLYCOSYLATED A1C: CPT

## 2022-02-22 PROCEDURE — 700111 HCHG RX REV CODE 636 W/ 250 OVERRIDE (IP): Performed by: HOSPITALIST

## 2022-02-22 PROCEDURE — 80074 ACUTE HEPATITIS PANEL: CPT

## 2022-02-22 PROCEDURE — 700102 HCHG RX REV CODE 250 W/ 637 OVERRIDE(OP): Performed by: INTERNAL MEDICINE

## 2022-02-22 PROCEDURE — 700102 HCHG RX REV CODE 250 W/ 637 OVERRIDE(OP): Performed by: STUDENT IN AN ORGANIZED HEALTH CARE EDUCATION/TRAINING PROGRAM

## 2022-02-22 PROCEDURE — 99223 1ST HOSP IP/OBS HIGH 75: CPT | Mod: AI | Performed by: STUDENT IN AN ORGANIZED HEALTH CARE EDUCATION/TRAINING PROGRAM

## 2022-02-22 PROCEDURE — 72157 MRI CHEST SPINE W/O & W/DYE: CPT | Mod: MG

## 2022-02-22 PROCEDURE — A9270 NON-COVERED ITEM OR SERVICE: HCPCS | Performed by: STUDENT IN AN ORGANIZED HEALTH CARE EDUCATION/TRAINING PROGRAM

## 2022-02-22 PROCEDURE — 93005 ELECTROCARDIOGRAM TRACING: CPT | Performed by: STUDENT IN AN ORGANIZED HEALTH CARE EDUCATION/TRAINING PROGRAM

## 2022-02-22 PROCEDURE — 83735 ASSAY OF MAGNESIUM: CPT

## 2022-02-22 PROCEDURE — 86364 TISS TRNSGLTMNASE EA IG CLAS: CPT

## 2022-02-22 PROCEDURE — 82306 VITAMIN D 25 HYDROXY: CPT

## 2022-02-22 PROCEDURE — 700102 HCHG RX REV CODE 250 W/ 637 OVERRIDE(OP): Performed by: NURSE PRACTITIONER

## 2022-02-22 PROCEDURE — 700102 HCHG RX REV CODE 250 W/ 637 OVERRIDE(OP): Performed by: HOSPITALIST

## 2022-02-22 PROCEDURE — 72158 MRI LUMBAR SPINE W/O & W/DYE: CPT | Mod: MG

## 2022-02-22 PROCEDURE — 700111 HCHG RX REV CODE 636 W/ 250 OVERRIDE (IP): Performed by: STUDENT IN AN ORGANIZED HEALTH CARE EDUCATION/TRAINING PROGRAM

## 2022-02-22 PROCEDURE — 96375 TX/PRO/DX INJ NEW DRUG ADDON: CPT

## 2022-02-22 PROCEDURE — 84466 ASSAY OF TRANSFERRIN: CPT

## 2022-02-22 PROCEDURE — 83690 ASSAY OF LIPASE: CPT

## 2022-02-22 PROCEDURE — 82652 VIT D 1 25-DIHYDROXY: CPT

## 2022-02-22 PROCEDURE — 84630 ASSAY OF ZINC: CPT

## 2022-02-22 PROCEDURE — 82728 ASSAY OF FERRITIN: CPT

## 2022-02-22 PROCEDURE — 93010 ELECTROCARDIOGRAM REPORT: CPT | Performed by: INTERNAL MEDICINE

## 2022-02-22 PROCEDURE — 86480 TB TEST CELL IMMUN MEASURE: CPT

## 2022-02-22 PROCEDURE — 83970 ASSAY OF PARATHORMONE: CPT

## 2022-02-22 PROCEDURE — 700117 HCHG RX CONTRAST REV CODE 255: Performed by: STUDENT IN AN ORGANIZED HEALTH CARE EDUCATION/TRAINING PROGRAM

## 2022-02-22 PROCEDURE — 96372 THER/PROPH/DIAG INJ SC/IM: CPT

## 2022-02-22 PROCEDURE — 83550 IRON BINDING TEST: CPT

## 2022-02-22 PROCEDURE — 96376 TX/PRO/DX INJ SAME DRUG ADON: CPT

## 2022-02-22 PROCEDURE — 36415 COLL VENOUS BLD VENIPUNCTURE: CPT

## 2022-02-22 PROCEDURE — A9270 NON-COVERED ITEM OR SERVICE: HCPCS | Performed by: NURSE PRACTITIONER

## 2022-02-22 PROCEDURE — A9270 NON-COVERED ITEM OR SERVICE: HCPCS | Performed by: HOSPITALIST

## 2022-02-22 PROCEDURE — 80048 BASIC METABOLIC PNL TOTAL CA: CPT | Mod: 91

## 2022-02-22 PROCEDURE — 700105 HCHG RX REV CODE 258: Performed by: STUDENT IN AN ORGANIZED HEALTH CARE EDUCATION/TRAINING PROGRAM

## 2022-02-22 PROCEDURE — 700101 HCHG RX REV CODE 250: Performed by: HOSPITALIST

## 2022-02-22 PROCEDURE — 770020 HCHG ROOM/CARE - TELE (206)

## 2022-02-22 PROCEDURE — 84100 ASSAY OF PHOSPHORUS: CPT

## 2022-02-22 PROCEDURE — 86706 HEP B SURFACE ANTIBODY: CPT

## 2022-02-22 PROCEDURE — A9270 NON-COVERED ITEM OR SERVICE: HCPCS | Performed by: INTERNAL MEDICINE

## 2022-02-22 PROCEDURE — 83540 ASSAY OF IRON: CPT

## 2022-02-22 RX ORDER — CALCITRIOL 0.25 UG/1
0.25 CAPSULE, LIQUID FILLED ORAL DAILY
Status: DISCONTINUED | OUTPATIENT
Start: 2022-02-22 | End: 2022-03-08 | Stop reason: HOSPADM

## 2022-02-22 RX ORDER — HYDROMORPHONE HYDROCHLORIDE 1 MG/ML
0.5 INJECTION, SOLUTION INTRAMUSCULAR; INTRAVENOUS; SUBCUTANEOUS EVERY 4 HOURS PRN
Status: DISCONTINUED | OUTPATIENT
Start: 2022-02-22 | End: 2022-03-01

## 2022-02-22 RX ORDER — SODIUM BICARBONATE IN D5W 150/1000ML
PLASTIC BAG, INJECTION (ML) INTRAVENOUS CONTINUOUS
Status: DISCONTINUED | OUTPATIENT
Start: 2022-02-22 | End: 2022-02-22

## 2022-02-22 RX ORDER — FUROSEMIDE 10 MG/ML
80 INJECTION INTRAMUSCULAR; INTRAVENOUS
Status: DISCONTINUED | OUTPATIENT
Start: 2022-02-22 | End: 2022-02-23

## 2022-02-22 RX ORDER — SEVELAMER CARBONATE 800 MG/1
1600 TABLET, FILM COATED ORAL
Status: DISCONTINUED | OUTPATIENT
Start: 2022-02-22 | End: 2022-02-24

## 2022-02-22 RX ORDER — HEPARIN SODIUM 5000 [USP'U]/ML
5000 INJECTION, SOLUTION INTRAVENOUS; SUBCUTANEOUS EVERY 8 HOURS
Status: DISCONTINUED | OUTPATIENT
Start: 2022-02-22 | End: 2022-03-08 | Stop reason: HOSPADM

## 2022-02-22 RX ORDER — LIDOCAINE 50 MG/G
1 PATCH TOPICAL EVERY 24 HOURS
Status: DISCONTINUED | OUTPATIENT
Start: 2022-02-22 | End: 2022-03-08 | Stop reason: HOSPADM

## 2022-02-22 RX ORDER — HYDRALAZINE HYDROCHLORIDE 20 MG/ML
10 INJECTION INTRAMUSCULAR; INTRAVENOUS EVERY 4 HOURS PRN
Status: DISCONTINUED | OUTPATIENT
Start: 2022-02-22 | End: 2022-02-22

## 2022-02-22 RX ORDER — CALCIUM GLUCONATE 20 MG/ML
2 INJECTION, SOLUTION INTRAVENOUS ONCE
Status: COMPLETED | OUTPATIENT
Start: 2022-02-22 | End: 2022-02-22

## 2022-02-22 RX ORDER — VITAMIN B COMPLEX
2000 TABLET ORAL DAILY
Status: DISCONTINUED | OUTPATIENT
Start: 2022-02-22 | End: 2022-03-08 | Stop reason: HOSPADM

## 2022-02-22 RX ORDER — OXYCODONE HYDROCHLORIDE 5 MG/1
5 TABLET ORAL EVERY 4 HOURS PRN
Status: DISPENSED | OUTPATIENT
Start: 2022-02-22 | End: 2022-02-25

## 2022-02-22 RX ORDER — DEXTROSE MONOHYDRATE 25 G/50ML
25 INJECTION, SOLUTION INTRAVENOUS ONCE
Status: COMPLETED | OUTPATIENT
Start: 2022-02-22 | End: 2022-02-22

## 2022-02-22 RX ORDER — OXYCODONE HYDROCHLORIDE 5 MG/1
5 TABLET ORAL EVERY 4 HOURS PRN
Status: DISCONTINUED | OUTPATIENT
Start: 2022-02-22 | End: 2022-02-22

## 2022-02-22 RX ORDER — AMLODIPINE BESYLATE 10 MG/1
10 TABLET ORAL DAILY
Status: DISCONTINUED | OUTPATIENT
Start: 2022-02-22 | End: 2022-02-23

## 2022-02-22 RX ORDER — CALCIUM CHLORIDE 100 MG/ML
1 INJECTION INTRAVENOUS; INTRAVENTRICULAR ONCE
Status: DISCONTINUED | OUTPATIENT
Start: 2022-02-22 | End: 2022-02-22

## 2022-02-22 RX ORDER — AMLODIPINE BESYLATE 5 MG/1
5 TABLET ORAL DAILY
Status: DISCONTINUED | OUTPATIENT
Start: 2022-02-22 | End: 2022-02-22

## 2022-02-22 RX ORDER — HYDROMORPHONE HYDROCHLORIDE 1 MG/ML
0.5 INJECTION, SOLUTION INTRAMUSCULAR; INTRAVENOUS; SUBCUTANEOUS EVERY 4 HOURS PRN
Status: DISCONTINUED | OUTPATIENT
Start: 2022-02-22 | End: 2022-02-22

## 2022-02-22 RX ORDER — HEPARIN SODIUM 1000 [USP'U]/ML
3200 INJECTION, SOLUTION INTRAVENOUS; SUBCUTANEOUS
Status: DISCONTINUED | OUTPATIENT
Start: 2022-02-22 | End: 2022-03-08 | Stop reason: HOSPADM

## 2022-02-22 RX ORDER — SODIUM POLYSTYRENE SULFONATE 15 G/60ML
15 SUSPENSION ORAL; RECTAL ONCE
Status: COMPLETED | OUTPATIENT
Start: 2022-02-22 | End: 2022-02-22

## 2022-02-22 RX ADMIN — OXYCODONE 5 MG: 5 TABLET ORAL at 16:28

## 2022-02-22 RX ADMIN — HEPARIN SODIUM 5000 UNITS: 5000 INJECTION, SOLUTION INTRAVENOUS; SUBCUTANEOUS at 02:43

## 2022-02-22 RX ADMIN — GADOTERIDOL 20 ML: 279.3 INJECTION, SOLUTION INTRAVENOUS at 02:11

## 2022-02-22 RX ADMIN — SEVELAMER CARBONATE 1600 MG: 800 TABLET, FILM COATED ORAL at 11:50

## 2022-02-22 RX ADMIN — AMLODIPINE BESYLATE 10 MG: 10 TABLET ORAL at 03:10

## 2022-02-22 RX ADMIN — HEPARIN SODIUM 3200 UNITS: 1000 INJECTION, SOLUTION INTRAVENOUS; SUBCUTANEOUS at 10:55

## 2022-02-22 RX ADMIN — HYDROMORPHONE HYDROCHLORIDE 0.5 MG: 1 INJECTION, SOLUTION INTRAMUSCULAR; INTRAVENOUS; SUBCUTANEOUS at 01:25

## 2022-02-22 RX ADMIN — HYDRALAZINE HYDROCHLORIDE 10 MG: 20 INJECTION, SOLUTION INTRAMUSCULAR; INTRAVENOUS at 10:13

## 2022-02-22 RX ADMIN — DEXTROSE MONOHYDRATE 25 G: 25 INJECTION, SOLUTION INTRAVENOUS at 05:52

## 2022-02-22 RX ADMIN — CALCITRIOL CAPSULES 0.25 MCG 0.25 MCG: 0.25 CAPSULE ORAL at 11:50

## 2022-02-22 RX ADMIN — OXYCODONE 5 MG: 5 TABLET ORAL at 11:50

## 2022-02-22 RX ADMIN — SODIUM BICARBONATE 50 MEQ: 84 INJECTION, SOLUTION INTRAVENOUS at 02:32

## 2022-02-22 RX ADMIN — SODIUM POLYSTYRENE SULFONATE 15 G: 15 SUSPENSION ORAL; RECTAL at 05:57

## 2022-02-22 RX ADMIN — SODIUM BICARBONATE: 84 INJECTION, SOLUTION INTRAVENOUS at 06:48

## 2022-02-22 RX ADMIN — LIDOCAINE 1 PATCH: 50 PATCH TOPICAL at 11:52

## 2022-02-22 RX ADMIN — OXYCODONE 5 MG: 5 TABLET ORAL at 23:28

## 2022-02-22 RX ADMIN — FUROSEMIDE 80 MG: 10 INJECTION, SOLUTION INTRAMUSCULAR; INTRAVENOUS at 19:59

## 2022-02-22 RX ADMIN — SODIUM BICARBONATE 50 MEQ: 84 INJECTION, SOLUTION INTRAVENOUS at 05:40

## 2022-02-22 RX ADMIN — SODIUM BICARBONATE 50 MEQ: 84 INJECTION, SOLUTION INTRAVENOUS at 02:38

## 2022-02-22 RX ADMIN — CALCIUM GLUCONATE 2 G: 20 INJECTION, SOLUTION INTRAVENOUS at 05:55

## 2022-02-22 RX ADMIN — SEVELAMER CARBONATE 1600 MG: 800 TABLET, FILM COATED ORAL at 16:28

## 2022-02-22 RX ADMIN — Medication 2000 UNITS: at 11:50

## 2022-02-22 RX ADMIN — HEPARIN SODIUM 5000 UNITS: 5000 INJECTION, SOLUTION INTRAVENOUS; SUBCUTANEOUS at 22:00

## 2022-02-22 ASSESSMENT — COGNITIVE AND FUNCTIONAL STATUS - GENERAL
SUGGESTED CMS G CODE MODIFIER MOBILITY: CH
DAILY ACTIVITIY SCORE: 24
MOBILITY SCORE: 24
SUGGESTED CMS G CODE MODIFIER DAILY ACTIVITY: CH

## 2022-02-22 ASSESSMENT — ENCOUNTER SYMPTOMS
DIZZINESS: 0
HEADACHES: 1
DEPRESSION: 0
PHOTOPHOBIA: 1
DOUBLE VISION: 0
BLURRED VISION: 0
COUGH: 1
BLURRED VISION: 1
HEADACHES: 0
DIZZINESS: 1
VOMITING: 0
BRUISES/BLEEDS EASILY: 0
ABDOMINAL PAIN: 0
BACK PAIN: 1
SHORTNESS OF BREATH: 1
HEMOPTYSIS: 0
CHILLS: 1
COUGH: 0
HEARTBURN: 0
TINGLING: 1
CHILLS: 0
WEAKNESS: 1
TINGLING: 0
NAUSEA: 0
CONSTIPATION: 0
PALPITATIONS: 0
MYALGIAS: 0
FEVER: 0
DIARRHEA: 1
NECK PAIN: 0

## 2022-02-22 ASSESSMENT — PAIN DESCRIPTION - PAIN TYPE
TYPE: ACUTE PAIN
TYPE: ACUTE PAIN

## 2022-02-22 ASSESSMENT — LIFESTYLE VARIABLES
HAVE YOU EVER FELT YOU SHOULD CUT DOWN ON YOUR DRINKING: NO
SUBSTANCE_ABUSE: 0
DOES PATIENT WANT TO STOP DRINKING: NO
HAVE PEOPLE ANNOYED YOU BY CRITICIZING YOUR DRINKING: NO
EVER HAD A DRINK FIRST THING IN THE MORNING TO STEADY YOUR NERVES TO GET RID OF A HANGOVER: NO
ALCOHOL_USE: NO
TOTAL SCORE: 0
CONSUMPTION TOTAL: NEGATIVE
ON A TYPICAL DAY WHEN YOU DRINK ALCOHOL HOW MANY DRINKS DO YOU HAVE: 0
EVER FELT BAD OR GUILTY ABOUT YOUR DRINKING: NO
AVERAGE NUMBER OF DAYS PER WEEK YOU HAVE A DRINK CONTAINING ALCOHOL: 0
TOTAL SCORE: 0
TOTAL SCORE: 0
HOW MANY TIMES IN THE PAST YEAR HAVE YOU HAD 5 OR MORE DRINKS IN A DAY: 0

## 2022-02-22 ASSESSMENT — PATIENT HEALTH QUESTIONNAIRE - PHQ9
2. FEELING DOWN, DEPRESSED, IRRITABLE, OR HOPELESS: NOT AT ALL
1. LITTLE INTEREST OR PLEASURE IN DOING THINGS: NOT AT ALL
SUM OF ALL RESPONSES TO PHQ9 QUESTIONS 1 AND 2: 0

## 2022-02-22 ASSESSMENT — FIBROSIS 4 INDEX: FIB4 SCORE: 1.07

## 2022-02-22 NOTE — PROGRESS NOTES
[facility] Progress Note    Hemodialysis treatment x 3 hours completed as ordered per Dr. CANDACE Hamilton  Treatment started at 0747 and ended at 1050.      Net UF Removed: 3,500 mL (see Dialysis I & O flow sheet)    Patient tolerated treatment well with elevated blood pressures, gradually improved during last hour of treatment, PRN BP meds given by Tele RN per MAR. Patient agitated, restless, verbally aggressive and yelling at dialysis staff.  See Acute HD flow sheets on clinical data notes under media for details.      Post tx access: Right chest tunneled HD catheter flushed with saline then locked with heparin 1000 units/ml per designated amount in each lumen (see MAR) then clamped, capped aseptically and labeled properly. No s/s of infection to HD catheter site. Heparin lock to be aspirated prior to next dialysis/CVC use by dialysis RN only. Please do not flush or draw from ports.     Please notify Nephrologist/Dialysis for follow-up.     1105 Report given to primary care nurse RAFAEL Clemens RN.    1120 Patient transported back to room via bed by this RN and PCT with O2 @ 2L/NC. Patient restless and irritable.

## 2022-02-22 NOTE — PROGRESS NOTES
Daily Progress Note:     Date of Service: 2/22/2022  Primary Team: [facility] Orange Team   Attending: Rian Szymanski M.D.   Senior Resident: Dr. Lamb  Intern: Dr. Chávez  Contact:  [phone]    Chief Complaint:   Low Back Pain    ID: Mr. Watkins is a 46 year old male who presents 2/21/22 for back pain, His past medical history includes Methamphetamine abuse, Hypertension, Diabetes Mellitus, End Stage Renal Disease on hemodialysis at [facility] every Tues/Thurs/Sat.  Reportedly missing hemodialysis due to transportation issues. Describes his back pain as dull, nonradiating, 8 out of 10 intensity, worse with movement relieved with rest.      Subjective:  -Patient was taken for hemodialysis in the morning  -After hemodialysis, patient reports weakness, feeling cold, and shivering. He reports that he always has these symptoms with dialysis  -Concerned about skin lesions    Consultants/Specialty:  Nephrology    Review of Systems:    Review of Systems   Constitutional: Positive for chills and malaise/fatigue.   HENT: Positive for tinnitus.    Eyes: Positive for blurred vision and photophobia. Negative for double vision.   Respiratory: Positive for cough and shortness of breath.    Cardiovascular: Negative for chest pain and palpitations.   Gastrointestinal: Positive for diarrhea. Negative for abdominal pain, constipation, nausea and vomiting.   Genitourinary: Negative for dysuria and hematuria.   Musculoskeletal: Positive for back pain.   Neurological: Positive for dizziness, tingling (in fingertips after dialysis), weakness and headaches.       Objective Data:   Physical Exam:   Vitals:   Temp:  [35.8 °C (96.5 °F)-37.1 °C (98.7 °F)] 37.1 °C (98.7 °F)  Pulse:  [] 107  Resp:  [10-24] 24  BP: (150-199)/() 150/39  SpO2:  [91 %-100 %] 98 %    Physical Exam  Constitutional:       Appearance: He is obese. He is ill-appearing.   HENT:      Head: Normocephalic and atraumatic.      Nose: No congestion.       Mouth/Throat:      Mouth: Mucous membranes are moist.      Comments: Poor dentition    Eyes:      General:         Right eye: No discharge.         Left eye: No discharge.      Pupils: Pupils are equal, round, and reactive to light.   Cardiovascular:      Rate and Rhythm: Regular rhythm. Tachycardia present.      Pulses: Normal pulses.      Heart sounds: No murmur heard.  Pulmonary:      Effort: Pulmonary effort is normal. No respiratory distress.      Breath sounds: No wheezing.   Abdominal:      General: Bowel sounds are normal. There is distension.      Palpations: Abdomen is soft. There is no mass.      Tenderness: There is no abdominal tenderness. There is no guarding or rebound.   Musculoskeletal:      Cervical back: Normal range of motion and neck supple. Tenderness present. No rigidity.      Right lower leg: Edema present.      Left lower leg: Edema present.   Skin:     General: Skin is dry.      Coloration: Skin is not jaundiced.      Findings: No erythema.      Comments: Excoriations and scabs on face, chest, and arms   Neurological:      Mental Status: He is oriented to person, place, and time.         Labs:   HEMATOLOGY/ ONCOLOGY/ID:            Recent Labs     02/21/22  2100   WBC 11.2*   RBC 3.72*   HEMOGLOBIN 11.1*   HEMATOCRIT 34.8*   MCV 93.5   MCH 29.8   RDW 57.9*   PLATELETCT 193   MPV 11.3   NEUTSPOLYS 74.70*   LYMPHOCYTES 14.90*   MONOCYTES 9.20   EOSINOPHILS 0.40   BASOPHILS 0.30     No results found for: [id number], FOLATE, FERRITIN, IRON, TOTIRONBC    RENAL:        Estimated GFR/CRCL = Estimated Creatinine Clearance: 8.8 mL/min (A) (by C-G formula based on SCr of 14.04 mg/dL (HH)).  Recent Labs     02/21/22  2100 02/22/22  0406 02/22/22  0800   SODIUM 137 136 138   POTASSIUM 5.9* 6.2* 5.0   CHLORIDE 107 107 107   CO2 8* 6* 12*   GLUCOSE 106* 111* 131*   * 127* 120*   CREATININE 14.93* 14.80* 14.04*   CALCIUM 6.7* 6.8* 7.2*   MAGNESIUM  --   --  2.2   PHOSPHORUS  --   --  9.5*   ALBUMIN  3.5  --   --        GASTROINTESTINAL/ HEPATIC:          Recent Labs     02/21/22  2100   ALTSGPT 18   ASTSGOT 20   ALKPHOSPHAT 183*   TBILIRUBIN 0.5   ALBUMIN 3.5   GLOBULIN 3.7*     No results found for: AMMONIA    ENDOCRINE:              Recent Labs     02/21/22  2100 02/21/22  2242 02/21/22  2337 02/22/22  0313 02/22/22  0406 02/22/22  0543 02/22/22  0800   GLUCOSE 106*  --   --   --  111*  --  131*   POCGLUCOSE  --    < > 137* 81  --  131*  --     < > = values in this interval not displayed.     Lab Results   Component Value Date    HBA1C 6.5 (H) 02/22/2022    HBA1C 7.2 (H) 05/19/2020       Imaging:   MR-LUMBAR SPINE-WITH & W/O  02/22/22 0737  1. Bilateral L5 pars defects. However, there is no anterolisthesis.     2. Small left facet origin subligamentous synovial cyst projects into the posterolateral spinal canal behind L5 without significant thecal sac compression. Bilateral mild facet degeneration at L4-5 and L5-S1.     3. No abnormal enhancement is identified in the lumbar spine to suggest an infectious process.    MR-THORACIC SPINE-WITH & W/O  02/22/22 0731  1. MRI of the thoracic spine without and with contrast within normal limits.    DX-CHEST-PORTABLE (1 VIEW) 02/21/22 2135  1.  No acute cardiopulmonary disease.   2.  Cardiomegaly      Problem Representation:  Mr. Watkins is a 46 year old male who presents 2/21/22 for back pain, His past medical history includes Methamphetamine abuse, Hypertension, Diabetes Mellitus, End Stage Renal Disease on hemodialysis at [facility] every Tues/Thurs/Sat.  Reportedly missing hemodialysis due to transportation issues. Describes his back pain as dull, nonradiating, 8 out of 10 intensity, worse with movement relieved with rest.      * Back pain  Assessment & Plan  Upon presentation, had elevated ESR, CRP. Patient denied any saddle anesthesia, numbness, bowel or urinary incontinence upon presentation. Other red flags include fever, preceding trauma, and cancer.    - MRI  thoracic and lumbar with and without ordered - follow official results   -Dilaudid for pain 7-10 intensity  -Oxycodone for pain 3-6 in intesity  -Lidoderm       End stage renal disease (HCC)  Assessment & Plan  Patient has End Stage Renal Disease on hemodialysis at [facility] every Tues/Thurs/Sat but has been Non-compliant with treatments. Per chart review, patient has not had dialysis for over 1.5 weeks. Nephrology consulted will dialyze. !st dialysis session 2/22/22.  -Dialysis as needed while inpatient  -monitor and replete electrolytes  -Diuretics if volume overloaded after dialysis    Anemia  Assessment & Plan  Hemoglobin at 11.1, MCV 93.5, RDW elevated at 57.9. further evaluate with labwork:  -Serum Iron  -ferritin  -TIBC  -transferrin    Diarrhea  Assessment & Plan  Reports diarrhea of 2 weeks duration.  -C. Difficile  -Stool Ova & parasites  -stool lactoferrin  -Tissue Transglutaminase Antibodies (tTG-IgA) for celiac disease evaluation  -monitor and replete electrolytes    Hypocalcemia  Assessment & Plan  Upon presentation, calcium is low at 6.7. Albumin is normal at 3.5. If patient is truly hypocalcemic, back pain could be secondary to bone breakdown for calcium. Hypocalcemia could also be from malabsorption.  -Ionized calcium  -PTH  -Vitamin D  -Magnesium level  -Phosphate level  -Continue to monitor  -calcium supplementation depending on lab results        Methamphetamine abuse (HCC)  Assessment & Plan  Actively uses methamphetamines.   -Counseled on cessation, denies use  -Monitor for agitation and signs of withdrawal    Hyperkalemia- (present on admission)  Assessment & Plan  Patient presents with potassium of 5.9, it increased to 6.2 on 2/22/22. Patient received insulin/D50/calcium gluconate/2 Amps of bicarb 50 mEq upon presentation. Hyperkalemia likely secondary to ESRD and missed dialysis sessions but could be 2/2 constipation.   -Telemetry monitoring  -Recheck potassium  levels  -CTM      Leukocytosis- (present on admission)  Assessment & Plan  Currently elevated at 11.2. Patient afebrile. Might be reactionary to ESRD. Will continue to evaluate and watch for signs of infection.   -CTM    Hypertension- (present on admission)  Assessment & Plan  Uncontrolled, with BP at 106/104 on the floor (before dialysis). Hypertension could be secondary to ESRD.  -Amlodipine 10 mg daily  -IV hydralazine 10 mg Q4H PRN for SBP greater than 180    DM (diabetes mellitus) ([facility])  Assessment & Plan  Hemoglobin A1c 5/19/2020 was 7.2%. Recent hemoglobin A1c was 6.5%.  - Sliding Scale Insulin  - Hypoglycemia protocol  - Frequent accu-checks   - discuss outpatient management with patient prior to discharge      Code Status: FULL  DVT ppx: Heparin   Diet: Renal      Majo Chávez MD MPH  PGY-1, [facility]    Assessment and plan discussed with senior resident and attending physician.

## 2022-02-22 NOTE — ASSESSMENT & PLAN NOTE
Glucose stable with some variations per intake. Patient has been having food delivered to the hospital and has been non-compliant with his diet.  -Sliding scale  -Hypoglycemia protocol   -Continue to monitor  -Renal diet

## 2022-02-22 NOTE — ASSESSMENT & PLAN NOTE
Stable  -vitamin D3 (cholecalciferol) tablet 2,000 Units  -calcitRIOL (ROCALTROL) capsule 0.25 mcg  -Calcium acetate  -CTM

## 2022-02-22 NOTE — ED TRIAGE NOTES
Ambrose Watkins  46 y.o. male    Pt arrives with complaints of itchy sores on body, generalized swelling, and low back pain. Pt missed multiple dialysis appointments- last dialysis apt 1.5 weeks ago.    Denies dysuria/chest pain. Endorses SOB- worse with exertion. + dry cough.     Vitals:    02/21/22 2020   BP: (!) 168/106   Pulse: 100   Resp: 18   Temp: 36.6 °C (97.9 °F)   SpO2: 91%       Triage process explained to patient, apologized for wait time, and returned to lobby.  Pt informed to notify staff of any change in condition.

## 2022-02-22 NOTE — ED NOTES
Pt reports missing dialysis because it is too far and he does not have reliable transport. He wants to move to a [facility] closer to his house but doesn't know how to due that.

## 2022-02-22 NOTE — ASSESSMENT & PLAN NOTE
ESRD initially requiring daily dialysis.  Complex fluid balance management   -Nephrology following, started on Lasix on 3/4/2022 by nephrology. Patient reports urinating evening of 3/4/22  -Continue calcitriol, calcium, patiromer.  Hold patiromer if K less than 4  -Outpatient dialysis after discharge

## 2022-02-22 NOTE — PROGRESS NOTES
"Patient back to unit from dialysis. Patient extremely aggressive, shouting, yelling and cussing and stating \"stupid slow ass bitches\". Security called to reset expectations.  "

## 2022-02-22 NOTE — CARE PLAN
The patient is Watcher - Medium risk of patient condition declining or worsening    Shift Goals  Clinical Goals: Dialysis      Problem: Pain - Standard  Goal: Alleviation of pain or a reduction in pain to the patient’s comfort goal  Outcome: Progressing     Problem: Knowledge Deficit - Standard  Goal: Patient and family/care givers will demonstrate understanding of plan of care, disease process/condition, diagnostic tests and medications  Outcome: Progressing

## 2022-02-22 NOTE — DISCHARGE PLANNING
Outpatient Dialysis Note    Per HD clinic an outpatient referral to Ranjan Loaiza was initiated by Arie NGUYEN at [facility], pending acceptance and confirmation.     Update: 02-23 10:15am- per Arie from [facility] Phanmario Loaiza was  denied by the medical director due to non-adherence.     Confirmed patient is active at:    [facility]    [address] [place] [address], [place] [address]    Schedule: Tuesday, Thursday, Saturday   Time: 05:15am    Patient is seen by Dr. Hernadez in HD clinic.    Spoke with Arie at facility who confirmed.    Forwarded records for review.    Katheryn Heredia- Dialysis Coordinator Ph# [phone]  [facility]

## 2022-02-22 NOTE — PROGRESS NOTES
Pt arrived via gurney and ambulated to the bed without assistance. VSS. Telemetry monitor placed and verified with monitor room, SR 96. Educated to unit policies, expectations and call system. All needs met at this time.

## 2022-02-22 NOTE — ASSESSMENT & PLAN NOTE
Persistent hypertension likely secondary to fluid overload. Blood pressure fluctuates and increases when patient is non-compliant with his diet.  -Continue dialysis per nephrology, hydralazine  -Optimize pain control

## 2022-02-22 NOTE — ED NOTES
Medicated for elevated K.     Spoke to MRI. Pt cannot have the MRI contrast unless he is going immediately to dialysis. MRI tech direct to Dr. Ballesteros.

## 2022-02-22 NOTE — ASSESSMENT & PLAN NOTE
Hyperkalemia secondary to ESRD. Continue hemodialysis per nephrology. Insulin with dextrose for hyperkalemia plus calcium gluconate for cardiac protection. Currently stable.  -On patiromer daily for hyperK+. Stop/hold if serum K+ <4   -Monitor labs

## 2022-02-22 NOTE — ED NOTES
Dr. Ballesteros aware that MRI is delayed related to need to have dialysis scheduled before they start the test. ERP is contacting MRI.

## 2022-02-22 NOTE — ED PROVIDER NOTES
"ED Provider Note    Chief Complaint:   Back pain    HPI:  Ambrose Watkins is a very pleasant 46-year-old male who presents with low back pain ongoing for the past 4 to 5 days which is moderate to severe intensity, dull, nonradiating, constant worse with movement.  Patient denies extremity weakness or numbness, saddle anesthesia, bowel or bladder incontinence, urinary retention.  Patient also complaining of itchy source throughout the body, does endorse smoking methamphetamine.  Patient has not had dialysis and 10 days and is supposed to be receiving it 3 times a week.    Review of Systems:  Review of Systems   Constitutional: Positive for malaise/fatigue. Negative for chills and fever.   HENT: Negative for congestion and sore throat.    Eyes: Negative for blurred vision and double vision.   Respiratory: Positive for shortness of breath. Negative for cough.    Cardiovascular: Negative for chest pain and leg swelling.   Gastrointestinal: Negative for abdominal pain, nausea and vomiting.   Genitourinary: Negative for dysuria and hematuria.   Musculoskeletal: Positive for back pain. Negative for falls and neck pain.   Skin: Positive for itching and rash.   Neurological: Negative for loss of consciousness and headaches.       Past Medical History:   has a past medical history of Diabetes and Hypertension.    Social History:  Social History     Tobacco Use   • Smoking status: Current Every Day Smoker     Packs/day: 0.50   • Smokeless tobacco: Not on file   Substance and Sexual Activity   • Alcohol use: Yes     Comment: occ   • Drug use: No   • Sexual activity: Not on file       Surgical History:  patient denies any surgical history    Allergies:  No Known Allergies    Physical Exam:  Vital Signs: BP (!) 162/101   Pulse 93   Temp 35.8 °C (96.5 °F) (Temporal)   Resp (!) 10   Ht 1.778 m (5' 10\")   Wt 124 kg (274 lb 7.6 oz)   SpO2 97%   BMI 39.38 kg/m²   Physical Exam  Vitals and nursing note reviewed. "   Constitutional:       Comments: Patient is lying in bed supine, pleasant, conversant, speaking in complete sentences   HENT:      Head: Normocephalic and atraumatic.   Eyes:      Extraocular Movements: Extraocular movements intact.      Conjunctiva/sclera: Conjunctivae normal.      Pupils: Pupils are equal, round, and reactive to light.   Cardiovascular:      Pulses: Normal pulses.      Comments:   Pulmonary:      Effort: Pulmonary effort is normal. No respiratory distress.   Musculoskeletal:         General: No swelling. Normal range of motion.      Cervical back: Normal range of motion. No rigidity.      Comments: Midline lower T spine and L spine TTP   Skin:     General: Skin is warm and dry.      Capillary Refill: Capillary refill takes less than 2 seconds.      Comments: Picking sores picking sores throughout face and upper extremities   Neurological:      Mental Status: He is alert.      Comments: Patient is moving all extremities, sensation light touch grossly intact in the bilateral upper and lower extremities         Medical records reviewed for continuity of care.     Results for orders placed or performed during the hospital encounter of 02/21/22   CBC with Differential   Result Value Ref Range    WBC 11.2 (H) 4.8 - 10.8 K/uL    RBC 3.72 (L) 4.70 - 6.10 M/uL    Hemoglobin 11.1 (L) 14.0 - 18.0 g/dL    Hematocrit 34.8 (L) 42.0 - 52.0 %    MCV 93.5 81.4 - 97.8 fL    MCH 29.8 27.0 - 33.0 pg    MCHC 31.9 (L) 33.7 - 35.3 g/dL    RDW 57.9 (H) 35.9 - 50.0 fL    Platelet Count 193 164 - 446 K/uL    MPV 11.3 9.0 - 12.9 fL    Neutrophils-Polys 74.70 (H) 44.00 - 72.00 %    Lymphocytes 14.90 (L) 22.00 - 41.00 %    Monocytes 9.20 0.00 - 13.40 %    Eosinophils 0.40 0.00 - 6.90 %    Basophils 0.30 0.00 - 1.80 %    Immature Granulocytes 0.50 0.00 - 0.90 %    Nucleated RBC 0.00 /100 WBC    Neutrophils (Absolute) 8.35 (H) 1.82 - 7.42 K/uL    Lymphs (Absolute) 1.66 1.00 - 4.80 K/uL    Monos (Absolute) 1.03 (H) 0.00 -  0.85 K/uL    Eos (Absolute) 0.04 0.00 - 0.51 K/uL    Baso (Absolute) 0.03 0.00 - 0.12 K/uL    Immature Granulocytes (abs) 0.06 0.00 - 0.11 K/uL    NRBC (Absolute) 0.00 K/uL   Comp Metabolic Panel   Result Value Ref Range    Sodium 137 135 - 145 mmol/L    Potassium 5.9 (H) 3.6 - 5.5 mmol/L    Chloride 107 96 - 112 mmol/L    Co2 8 (LL) 20 - 33 mmol/L    Anion Gap 22.0 (H) 7.0 - 16.0    Glucose 106 (H) 65 - 99 mg/dL    Bun 128 (HH) 8 - 22 mg/dL    Creatinine 14.93 (HH) 0.50 - 1.40 mg/dL    Calcium 6.7 (LL) 8.5 - 10.5 mg/dL    AST(SGOT) 20 12 - 45 U/L    ALT(SGPT) 18 2 - 50 U/L    Alkaline Phosphatase 183 (H) 30 - 99 U/L    Total Bilirubin 0.5 0.1 - 1.5 mg/dL    Albumin 3.5 3.2 - 4.9 g/dL    Total Protein 7.2 6.0 - 8.2 g/dL    Globulin 3.7 (H) 1.9 - 3.5 g/dL    A-G Ratio 0.9 g/dL   Sed Rate   Result Value Ref Range    Sed Rate Westergren 45 (H) 0 - 20 mm/hour   ESTIMATED GFR   Result Value Ref Range    GFR If  4 (A) >60 mL/min/1.73 m 2    GFR If Non African American 4 (A) >60 mL/min/1.73 m 2   CRP QUANTITIVE (NON-CARDIAC)   Result Value Ref Range    Stat C-Reactive Protein 14.27 (H) 0.00 - 0.75 mg/dL   EKG   Result Value Ref Range    Report       [facility] Emergency Dept.    Test Date:  2022  Pt Name:    CONY MORAN                Department: ER  MRN:        [id number]                      Room:  Gender:     Male                         Technician: [id number]  :        [id number]                   Requested By:ER TRIAGE PROTOCOL  Order #:    [id number]                    Reading MD: Buck Ballesteros MD    Measurements  Intervals                                Axis  Rate:       97                           P:          47  OK:         180                          QRS:        -45  QRSD:       100                          T:          89  QT:         392  QTc:        498    Interpretive Statements  SINUS RHYTHM  Left axis deviation  No ST elevations, or ST depressions greater than 1  mm, no T wave inversions  except for aVL, no obvious ischemic changes  Electronically Signed On 2- 22:36:49 PST by Buck Ballesteros MD     POCT glucose device results   Result Value Ref Range    Glucose - Accu-Ck 141 (H) 65 - 99 mg/dL   POCT glucose device results   Result Value Ref Range    Glucose - Accu-Ck 137 (H) 65 - 99 mg/dL       Radiology:  DX-CHEST-PORTABLE (1 VIEW)   Final Result         1.  No acute cardiopulmonary disease.   2.  Cardiomegaly      MR-THORACIC SPINE-WITH & W/O    (Results Pending)   MR-LUMBAR SPINE-WITH & W/O    (Results Pending)        MDM:  I do believe the patient's sores are likely secondary to methamphetamine use, no evidence of acute skin infection.  Patient found to be hyperkalemic likely secondary to missed dialysis also other electrolyte abnormalities include elevated BUN and hypocalcemia, nephrology will be consulted for dialysis, patient will likely require admission.  MRI of the thoracic and lumbar spine to rule out epidural abscess, patient has a very elevated CRP and ESR.  CBC demonstrates mild leukocytosis and mild anemia.  Disposition pending work-up.  IV morphine for symptom control.  Dextrose, calcium, insulin for hyperkalemia.    Electronically signed by: Buck Ballesteros M.D., 2/21/2022, 10:30 PM    Patient's MRI of the lumbar and thoracic spine has been delayed substantially due to radiology concerns of contrast in a patient with renal failure.  I spoken to the MRI technician and Dr. Mota who have expressed a requirement from nephrology that they will dialyze the patient prior to contrast administration for MRI.  I was then able to speak with Dr. Leahy of [facility] who assured me that he will proceed with dialysis following MRI and also for emergent dialysis requirements including elevated BUN and hyperkalemia.  Patient continues to demonstrate no lower extremity deficits, saddle anesthesia, bowel or bladder incontinence.  I am concerned  about epidural abscess however thankfully patient has no evidence of spinal cord compression at this time.  Patient's ESR and CRP are elevated concerning for acute infection.  No obvious source at this time however I will defer antibiotic administration to the inpatient hospitalist team.  Dr. Gee of [facility] medicine has graciously accepted the patient to his service for admission.    This dictation has been created using voice recognition software. I am continuously working with the software to minimize the number of voice recognition errors and I have made every attempt to manually correct the errors within my dictation. However errors  related to this voice recognition software may still exist and should be interpreted within the appropriate context.     Electronically signed by: Buck Ballesteros M.D., 2/22/2022 12:40 AM    Critical Care    I spent 41 minutes of critical care time with this patient. This does not include time spent on separately reported billable procedures.   This includes pulse ox interpretation, medical record review when available, interpretation of labs and imaging, specialist consultation, and hemodynamic monitoring.      Disposition:  Hospital medicine service    Final Impression:  1. Hyperkalemia    2. Hypoxemia    3. ESRD (end stage renal disease) ([facility])    4. Compulsive skin picking    5. Methamphetamine use ([facility])

## 2022-02-22 NOTE — H&P
Hospital Medicine History & Physical Note    Date of Service  2/22/2022    Primary Care Physician  Pcp Pt States None    Consultants  nephrology    Specialist Names: Dr. Leahy     Code Status  Full Code    Chief Complaint  Chief Complaint   Patient presents with   • Low Back Pain   • Rash   • Shortness of Breath       History of Presenting Illness  Ambrose Watkins is a 46 y.o. male past medical history of diabetes mellitus, hypertension, ESRD on hemodialysis who presented 2/21/2022 with low back pain. Describes his back pain as dull, nonradiating, 8 out of 10 intensity, worse with movement relieved with rest. He reports missing his dialysis last time he went was 1/2 weeks ago. Patient reported his hemodialysis center is too far.   In the ER, patient was noted to have potassium of 5.9, bicarb of 8, anion gap 22, CRP of 14.27. Given his low back pain, meth abuse and elevated CRP MRI of lumbar and thoracic spine was ordered in ER by ERP.     I discussed the plan of care with patient.    Review of Systems  Review of Systems   Constitutional: Positive for malaise/fatigue. Negative for chills and fever.   HENT: Negative for hearing loss and tinnitus.    Eyes: Negative for blurred vision and double vision.   Respiratory: Negative for cough and hemoptysis.    Cardiovascular: Negative for chest pain and palpitations.   Gastrointestinal: Negative for heartburn and nausea.   Genitourinary: Negative for dysuria and urgency.   Musculoskeletal: Positive for back pain. Negative for myalgias and neck pain.   Skin: Positive for itching and rash.   Neurological: Negative for dizziness, tingling and headaches.   Endo/Heme/Allergies: Does not bruise/bleed easily.   Psychiatric/Behavioral: Negative for depression, substance abuse and suicidal ideas.       Past Medical History   has a past medical history of Diabetes and Hypertension.    Surgical History  Reviewed and not pertinent    Family History  Reviewed and not  pertinent  Family history reviewed with patient. There is no family history that is pertinent to the chief complaint.     Social History   reports that he has been smoking. He has been smoking about 0.50 packs per day. He does not have any smokeless tobacco history on file. He reports current alcohol use. He reports that he does not use drugs.    Allergies  No Known Allergies    Medications  Prior to Admission Medications   Prescriptions Last Dose Informant Patient Reported? Taking?   amLODIPine (NORVASC) 5 MG Tab   No No   Sig: Take 1 Tab by mouth every day.      Facility-Administered Medications: None       Physical Exam  Temp:  [35.8 °C (96.5 °F)-36.6 °C (97.9 °F)] 35.8 °C (96.5 °F)  Pulse:  [] 93  Resp:  [10-21] 10  BP: (162-199)/(101-124) 162/101  SpO2:  [91 %-97 %] 97 %  Blood Pressure: (!) 162/101   Temperature: 35.8 °C (96.5 °F)   Pulse: 93   Respiration: (!) 10   Pulse Oximetry: 97 %       Physical Exam  Vitals and nursing note reviewed.   Constitutional:       Appearance: Normal appearance.   HENT:      Head: Normocephalic and atraumatic.      Right Ear: Tympanic membrane normal.      Left Ear: Tympanic membrane normal.      Nose: Nose normal.      Mouth/Throat:      Mouth: Mucous membranes are moist.      Pharynx: Oropharynx is clear.   Eyes:      Extraocular Movements: Extraocular movements intact.      Pupils: Pupils are equal, round, and reactive to light.   Cardiovascular:      Rate and Rhythm: Normal rate and regular rhythm.      Pulses: Normal pulses.      Heart sounds: Normal heart sounds.   Pulmonary:      Effort: Pulmonary effort is normal. No respiratory distress.      Breath sounds: Normal breath sounds. No stridor. No wheezing or rhonchi.      Comments: hemodialysis catheter present  Abdominal:      General: Bowel sounds are normal. There is no distension.      Palpations: Abdomen is soft. There is no mass.      Tenderness: There is no abdominal tenderness.      Hernia: No hernia is  present.   Musculoskeletal:         General: Tenderness (Midline lumbar and thoracic tenderness to palpation) present. No swelling. Normal range of motion.      Cervical back: Neck supple.   Skin:     General: Skin is warm.      Capillary Refill: Capillary refill takes less than 2 seconds.      Coloration: Skin is not jaundiced or pale.      Findings: No bruising or erythema.   Neurological:      General: No focal deficit present.      Mental Status: He is alert and oriented to person, place, and time. Mental status is at baseline.      Cranial Nerves: No cranial nerve deficit.      Sensory: No sensory deficit.      Motor: No weakness.      Coordination: Coordination normal.   Psychiatric:         Mood and Affect: Mood normal.         Behavior: Behavior normal.         Laboratory:  Recent Labs     02/21/22  2100   WBC 11.2*   RBC 3.72*   HEMOGLOBIN 11.1*   HEMATOCRIT 34.8*   MCV 93.5   MCH 29.8   MCHC 31.9*   RDW 57.9*   PLATELETCT 193   MPV 11.3     Recent Labs     02/21/22  2100   SODIUM 137   POTASSIUM 5.9*   CHLORIDE 107   CO2 8*   GLUCOSE 106*   *   CREATININE 14.93*   CALCIUM 6.7*     Recent Labs     02/21/22  2100   ALTSGPT 18   ASTSGOT 20   ALKPHOSPHAT 183*   TBILIRUBIN 0.5   GLUCOSE 106*       Imaging:  DX-CHEST-PORTABLE (1 VIEW)   Final Result         1.  No acute cardiopulmonary disease.   2.  Cardiomegaly      MR-THORACIC SPINE-WITH & W/O    (Results Pending)   MR-LUMBAR SPINE-WITH & W/O    (Results Pending)       X-Ray:  I have personally reviewed the images and compared with prior images.    Assessment/Plan:  I anticipate this patient will require at least two midnights for appropriate medical management, necessitating inpatient admission.    * Hyperkalemia- (present on admission)  Assessment & Plan  Telemetry monitoring  Patient received insulin/D50/calcium gluconate/2 Amps of bicarb 50 mEq  Recheck potassium levels      Methamphetamine abuse (HCC)  Assessment & Plan  Actively uses  Counseled  on cessation     Back pain  Assessment & Plan  Given elevated ESR, CRP. MRI thoracic and lumbar with and without ordered - follow official results   Patient does not have any saddle anesthesia, numbness, bowel or urinary incontinence.       End stage renal disease ([facility])  Assessment & Plan  Non-compliant  Patient has not had dialysis for over 1.5 weeks  Nephrology consulted will dialyze      Hypertension- (present on admission)  Assessment & Plan  Uncontrolled  Amlodipine 10 mg daily  IV hydralazine 10 mg every 4 hours for SBP greater than 180    DM (diabetes mellitus) ([facility])  Assessment & Plan  ISS+hypoglycemia protocol+frequent accu-checks       VTE prophylaxis: heparin ppx

## 2022-02-22 NOTE — ASSESSMENT & PLAN NOTE
History of methamphetamine use. Urine drug screen ordered but not processed 2/2 poor urine output on admission. Patient denies regular use.  -Counseled on cessation, denies use but is interested in abstaining  -Monitor for agitation and signs of withdrawal

## 2022-02-23 ENCOUNTER — APPOINTMENT (OUTPATIENT)
Dept: RADIOLOGY | Facility: MEDICAL CENTER | Age: 47
DRG: 264 | End: 2022-02-23
Attending: NURSE PRACTITIONER
Payer: MEDICARE

## 2022-02-23 ENCOUNTER — APPOINTMENT (OUTPATIENT)
Dept: RADIOLOGY | Facility: MEDICAL CENTER | Age: 47
DRG: 264 | End: 2022-02-23
Attending: HOSPITALIST
Payer: MEDICARE

## 2022-02-23 LAB
ALBUMIN SERPL BCP-MCNC: 2.8 G/DL (ref 3.2–4.9)
ALBUMIN/GLOB SERPL: 0.9 G/DL
ALP SERPL-CCNC: 138 U/L (ref 30–99)
ALT SERPL-CCNC: 15 U/L (ref 2–50)
ANION GAP SERPL CALC-SCNC: 21 MMOL/L (ref 7–16)
ANISOCYTOSIS BLD QL SMEAR: ABNORMAL
ANISOCYTOSIS BLD QL SMEAR: ABNORMAL
AST SERPL-CCNC: 18 U/L (ref 12–45)
BASOPHILS # BLD AUTO: 0 % (ref 0–1.8)
BASOPHILS # BLD AUTO: 0 % (ref 0–1.8)
BASOPHILS # BLD: 0 K/UL (ref 0–0.12)
BASOPHILS # BLD: 0 K/UL (ref 0–0.12)
BILIRUB SERPL-MCNC: 0.6 MG/DL (ref 0.1–1.5)
BUN SERPL-MCNC: 99 MG/DL (ref 8–22)
BURR CELLS BLD QL SMEAR: NORMAL
C DIFF DNA SPEC QL NAA+PROBE: NEGATIVE
C DIFF TOX GENS STL QL NAA+PROBE: NEGATIVE
CALCIUM SERPL-MCNC: 6.7 MG/DL (ref 8.5–10.5)
CHLORIDE SERPL-SCNC: 103 MMOL/L (ref 96–112)
CO2 SERPL-SCNC: 12 MMOL/L (ref 20–33)
CREAT SERPL-MCNC: 12.37 MG/DL (ref 0.5–1.4)
EOSINOPHIL # BLD AUTO: 0 K/UL (ref 0–0.51)
EOSINOPHIL # BLD AUTO: 0 K/UL (ref 0–0.51)
EOSINOPHIL NFR BLD: 0 % (ref 0–6.9)
EOSINOPHIL NFR BLD: 0 % (ref 0–6.9)
ERYTHROCYTE [DISTWIDTH] IN BLOOD BY AUTOMATED COUNT: 53.9 FL (ref 35.9–50)
ERYTHROCYTE [DISTWIDTH] IN BLOOD BY AUTOMATED COUNT: 57 FL (ref 35.9–50)
GAMMA INTERFERON BACKGROUND BLD IA-ACNC: 0.11 IU/ML
GLOBULIN SER CALC-MCNC: 3.2 G/DL (ref 1.9–3.5)
GLUCOSE BLD STRIP.AUTO-MCNC: 175 MG/DL (ref 65–99)
GLUCOSE BLD STRIP.AUTO-MCNC: 176 MG/DL (ref 65–99)
GLUCOSE SERPL-MCNC: 186 MG/DL (ref 65–99)
HCT VFR BLD AUTO: 26.9 % (ref 42–52)
HCT VFR BLD AUTO: 31.4 % (ref 42–52)
HGB BLD-MCNC: 10.3 G/DL (ref 14–18)
HGB BLD-MCNC: 9.3 G/DL (ref 14–18)
LYMPHOCYTES # BLD AUTO: 0 K/UL (ref 1–4.8)
LYMPHOCYTES # BLD AUTO: 0.57 K/UL (ref 1–4.8)
LYMPHOCYTES NFR BLD: 0 % (ref 22–41)
LYMPHOCYTES NFR BLD: 1.7 % (ref 22–41)
M TB IFN-G BLD-IMP: NEGATIVE
M TB IFN-G CD4+ BCKGRND COR BLD-ACNC: 0.01 IU/ML
MACROCYTES BLD QL SMEAR: ABNORMAL
MACROCYTES BLD QL SMEAR: ABNORMAL
MANUAL DIFF BLD: NORMAL
MANUAL DIFF BLD: NORMAL
MCH RBC QN AUTO: 29.9 PG (ref 27–33)
MCH RBC QN AUTO: 30.4 PG (ref 27–33)
MCHC RBC AUTO-ENTMCNC: 32.8 G/DL (ref 33.7–35.3)
MCHC RBC AUTO-ENTMCNC: 34.6 G/DL (ref 33.7–35.3)
MCV RBC AUTO: 87.9 FL (ref 81.4–97.8)
MCV RBC AUTO: 91.3 FL (ref 81.4–97.8)
MITOGEN IGNF BCKGRD COR BLD-ACNC: 1.28 IU/ML
MONOCYTES # BLD AUTO: 0.55 K/UL (ref 0–0.85)
MONOCYTES # BLD AUTO: 2.03 K/UL (ref 0–0.85)
MONOCYTES NFR BLD AUTO: 1.7 % (ref 0–13.4)
MONOCYTES NFR BLD AUTO: 6.1 % (ref 0–13.4)
MORPHOLOGY BLD-IMP: NORMAL
MORPHOLOGY BLD-IMP: NORMAL
NEUTROPHILS # BLD AUTO: 30.7 K/UL (ref 1.82–7.42)
NEUTROPHILS # BLD AUTO: 31.75 K/UL (ref 1.82–7.42)
NEUTROPHILS NFR BLD: 87 % (ref 44–72)
NEUTROPHILS NFR BLD: 97.4 % (ref 44–72)
NEUTS BAND NFR BLD MANUAL: 0.9 % (ref 0–10)
NEUTS BAND NFR BLD MANUAL: 5.2 % (ref 0–10)
NRBC # BLD AUTO: 0 K/UL
NRBC # BLD AUTO: 0 K/UL
NRBC BLD-RTO: 0 /100 WBC
NRBC BLD-RTO: 0 /100 WBC
OVALOCYTES BLD QL SMEAR: NORMAL
PHOSPHATE SERPL-MCNC: 7.8 MG/DL (ref 2.5–4.5)
PLATELET # BLD AUTO: 129 K/UL (ref 164–446)
PLATELET # BLD AUTO: 143 K/UL (ref 164–446)
PLATELET BLD QL SMEAR: NORMAL
PLATELET BLD QL SMEAR: NORMAL
PMV BLD AUTO: 11.4 FL (ref 9–12.9)
PMV BLD AUTO: 11.7 FL (ref 9–12.9)
POIKILOCYTOSIS BLD QL SMEAR: NORMAL
POTASSIUM SERPL-SCNC: 4.8 MMOL/L (ref 3.6–5.5)
PROCALCITONIN SERPL-MCNC: 115.58 NG/ML
PROT SERPL-MCNC: 6 G/DL (ref 6–8.2)
QFT TB2 - NIL TBQ2: 0.01 IU/ML
RBC # BLD AUTO: 3.06 M/UL (ref 4.7–6.1)
RBC # BLD AUTO: 3.44 M/UL (ref 4.7–6.1)
RBC BLD AUTO: PRESENT
RBC BLD AUTO: PRESENT
SODIUM SERPL-SCNC: 136 MMOL/L (ref 135–145)
WBC # BLD AUTO: 32.3 K/UL (ref 4.8–10.8)
WBC # BLD AUTO: 33.3 K/UL (ref 4.8–10.8)

## 2022-02-23 PROCEDURE — 770020 HCHG ROOM/CARE - TELE (206)

## 2022-02-23 PROCEDURE — 93990 DOPPLER FLOW TESTING: CPT

## 2022-02-23 PROCEDURE — 74176 CT ABD & PELVIS W/O CONTRAST: CPT | Mod: ME

## 2022-02-23 PROCEDURE — 85027 COMPLETE CBC AUTOMATED: CPT | Mod: 91

## 2022-02-23 PROCEDURE — A9270 NON-COVERED ITEM OR SERVICE: HCPCS | Performed by: HOSPITALIST

## 2022-02-23 PROCEDURE — 83630 LACTOFERRIN FECAL (QUAL): CPT

## 2022-02-23 PROCEDURE — 84145 PROCALCITONIN (PCT): CPT

## 2022-02-23 PROCEDURE — 700111 HCHG RX REV CODE 636 W/ 250 OVERRIDE (IP): Performed by: STUDENT IN AN ORGANIZED HEALTH CARE EDUCATION/TRAINING PROGRAM

## 2022-02-23 PROCEDURE — 82705 FATS/LIPIDS FECES QUAL: CPT

## 2022-02-23 PROCEDURE — 700111 HCHG RX REV CODE 636 W/ 250 OVERRIDE (IP): Performed by: INTERNAL MEDICINE

## 2022-02-23 PROCEDURE — 700102 HCHG RX REV CODE 250 W/ 637 OVERRIDE(OP): Performed by: STUDENT IN AN ORGANIZED HEALTH CARE EDUCATION/TRAINING PROGRAM

## 2022-02-23 PROCEDURE — 87045 FECES CULTURE AEROBIC BACT: CPT

## 2022-02-23 PROCEDURE — 90935 HEMODIALYSIS ONE EVALUATION: CPT

## 2022-02-23 PROCEDURE — A9270 NON-COVERED ITEM OR SERVICE: HCPCS | Performed by: STUDENT IN AN ORGANIZED HEALTH CARE EDUCATION/TRAINING PROGRAM

## 2022-02-23 PROCEDURE — 93990 DOPPLER FLOW TESTING: CPT | Mod: 26 | Performed by: INTERNAL MEDICINE

## 2022-02-23 PROCEDURE — A9270 NON-COVERED ITEM OR SERVICE: HCPCS | Performed by: NURSE PRACTITIONER

## 2022-02-23 PROCEDURE — 87493 C DIFF AMPLIFIED PROBE: CPT

## 2022-02-23 PROCEDURE — 87040 BLOOD CULTURE FOR BACTERIA: CPT

## 2022-02-23 PROCEDURE — 82962 GLUCOSE BLOOD TEST: CPT | Mod: 91

## 2022-02-23 PROCEDURE — 87899 AGENT NOS ASSAY W/OPTIC: CPT

## 2022-02-23 PROCEDURE — 700102 HCHG RX REV CODE 250 W/ 637 OVERRIDE(OP): Performed by: HOSPITALIST

## 2022-02-23 PROCEDURE — 700102 HCHG RX REV CODE 250 W/ 637 OVERRIDE(OP): Performed by: INTERNAL MEDICINE

## 2022-02-23 PROCEDURE — 99233 SBSQ HOSP IP/OBS HIGH 50: CPT | Mod: GC | Performed by: INTERNAL MEDICINE

## 2022-02-23 PROCEDURE — 80053 COMPREHEN METABOLIC PANEL: CPT

## 2022-02-23 PROCEDURE — 87186 SC STD MICRODIL/AGAR DIL: CPT

## 2022-02-23 PROCEDURE — 85007 BL SMEAR W/DIFF WBC COUNT: CPT

## 2022-02-23 PROCEDURE — 36415 COLL VENOUS BLD VENIPUNCTURE: CPT

## 2022-02-23 PROCEDURE — 700102 HCHG RX REV CODE 250 W/ 637 OVERRIDE(OP): Performed by: NURSE PRACTITIONER

## 2022-02-23 PROCEDURE — 71045 X-RAY EXAM CHEST 1 VIEW: CPT

## 2022-02-23 PROCEDURE — 700101 HCHG RX REV CODE 250: Performed by: HOSPITALIST

## 2022-02-23 PROCEDURE — 51798 US URINE CAPACITY MEASURE: CPT

## 2022-02-23 PROCEDURE — 84100 ASSAY OF PHOSPHORUS: CPT

## 2022-02-23 PROCEDURE — 700111 HCHG RX REV CODE 636 W/ 250 OVERRIDE (IP): Performed by: HOSPITALIST

## 2022-02-23 PROCEDURE — A9270 NON-COVERED ITEM OR SERVICE: HCPCS | Performed by: INTERNAL MEDICINE

## 2022-02-23 RX ORDER — FERROUS SULFATE 325(65) MG
325 TABLET ORAL
Status: DISCONTINUED | OUTPATIENT
Start: 2022-02-23 | End: 2022-03-08 | Stop reason: HOSPADM

## 2022-02-23 RX ORDER — DEXTROSE MONOHYDRATE 25 G/50ML
50 INJECTION, SOLUTION INTRAVENOUS
Status: DISCONTINUED | OUTPATIENT
Start: 2022-02-23 | End: 2022-02-23

## 2022-02-23 RX ORDER — METRONIDAZOLE 500 MG/1
500 TABLET ORAL EVERY 8 HOURS
Status: DISCONTINUED | OUTPATIENT
Start: 2022-02-23 | End: 2022-02-26

## 2022-02-23 RX ORDER — PROCHLORPERAZINE MALEATE 5 MG/1
5 TABLET ORAL EVERY 6 HOURS PRN
Status: DISCONTINUED | OUTPATIENT
Start: 2022-02-23 | End: 2022-03-08 | Stop reason: HOSPADM

## 2022-02-23 RX ORDER — INSULIN LISPRO 100 [IU]/ML
2-9 INJECTION, SOLUTION INTRAVENOUS; SUBCUTANEOUS
Status: DISCONTINUED | OUTPATIENT
Start: 2022-02-23 | End: 2022-02-23

## 2022-02-23 RX ORDER — MIDODRINE HYDROCHLORIDE 5 MG/1
2.5 TABLET ORAL
Status: DISCONTINUED | OUTPATIENT
Start: 2022-02-23 | End: 2022-02-23

## 2022-02-23 RX ORDER — MIDODRINE HYDROCHLORIDE 5 MG/1
5 TABLET ORAL
Status: DISCONTINUED | OUTPATIENT
Start: 2022-02-23 | End: 2022-02-25

## 2022-02-23 RX ORDER — INSULIN LISPRO 100 [IU]/ML
5 INJECTION, SOLUTION INTRAVENOUS; SUBCUTANEOUS
Status: DISCONTINUED | OUTPATIENT
Start: 2022-02-23 | End: 2022-02-23

## 2022-02-23 RX ORDER — INSULIN LISPRO 100 [IU]/ML
5 INJECTION, SOLUTION INTRAVENOUS; SUBCUTANEOUS
Status: DISCONTINUED | OUTPATIENT
Start: 2022-02-24 | End: 2022-02-25

## 2022-02-23 RX ORDER — DEXTROSE MONOHYDRATE 25 G/50ML
50 INJECTION, SOLUTION INTRAVENOUS
Status: DISCONTINUED | OUTPATIENT
Start: 2022-02-23 | End: 2022-02-25

## 2022-02-23 RX ORDER — INSULIN LISPRO 100 [IU]/ML
2-9 INJECTION, SOLUTION INTRAVENOUS; SUBCUTANEOUS
Status: DISCONTINUED | OUTPATIENT
Start: 2022-02-24 | End: 2022-02-25

## 2022-02-23 RX ADMIN — HEPARIN SODIUM 5000 UNITS: 5000 INJECTION, SOLUTION INTRAVENOUS; SUBCUTANEOUS at 21:50

## 2022-02-23 RX ADMIN — HEPARIN SODIUM 5000 UNITS: 5000 INJECTION, SOLUTION INTRAVENOUS; SUBCUTANEOUS at 15:49

## 2022-02-23 RX ADMIN — LIDOCAINE 1 PATCH: 50 PATCH TOPICAL at 11:45

## 2022-02-23 RX ADMIN — HYDROMORPHONE HYDROCHLORIDE 0.5 MG: 1 INJECTION, SOLUTION INTRAMUSCULAR; INTRAVENOUS; SUBCUTANEOUS at 21:50

## 2022-02-23 RX ADMIN — INSULIN GLARGINE 15 UNITS: 100 INJECTION, SOLUTION SUBCUTANEOUS at 18:43

## 2022-02-23 RX ADMIN — SEVELAMER CARBONATE 1600 MG: 800 TABLET, FILM COATED ORAL at 11:45

## 2022-02-23 RX ADMIN — MIDODRINE HYDROCHLORIDE 5 MG: 5 TABLET ORAL at 11:45

## 2022-02-23 RX ADMIN — INSULIN LISPRO 5 UNITS: 100 INJECTION, SOLUTION INTRAVENOUS; SUBCUTANEOUS at 18:44

## 2022-02-23 RX ADMIN — INSULIN LISPRO 2 UNITS: 100 INJECTION, SOLUTION INTRAVENOUS; SUBCUTANEOUS at 18:44

## 2022-02-23 RX ADMIN — FUROSEMIDE 80 MG: 10 INJECTION, SOLUTION INTRAMUSCULAR; INTRAVENOUS at 05:55

## 2022-02-23 RX ADMIN — INSULIN LISPRO 5 UNITS: 100 INJECTION, SOLUTION INTRAVENOUS; SUBCUTANEOUS at 11:58

## 2022-02-23 RX ADMIN — SEVELAMER CARBONATE 1600 MG: 800 TABLET, FILM COATED ORAL at 18:44

## 2022-02-23 RX ADMIN — FERROUS SULFATE TAB 325 MG (65 MG ELEMENTAL FE) 325 MG: 325 (65 FE) TAB at 08:01

## 2022-02-23 RX ADMIN — METRONIDAZOLE 500 MG: 500 TABLET ORAL at 21:51

## 2022-02-23 RX ADMIN — Medication 2000 UNITS: at 05:51

## 2022-02-23 RX ADMIN — HEPARIN SODIUM 5000 UNITS: 5000 INJECTION, SOLUTION INTRAVENOUS; SUBCUTANEOUS at 05:50

## 2022-02-23 RX ADMIN — MIDODRINE HYDROCHLORIDE 5 MG: 5 TABLET ORAL at 18:43

## 2022-02-23 RX ADMIN — INSULIN LISPRO 2 UNITS: 100 INJECTION, SOLUTION INTRAVENOUS; SUBCUTANEOUS at 11:55

## 2022-02-23 RX ADMIN — SEVELAMER CARBONATE 1600 MG: 800 TABLET, FILM COATED ORAL at 08:00

## 2022-02-23 RX ADMIN — AMLODIPINE BESYLATE 10 MG: 10 TABLET ORAL at 05:50

## 2022-02-23 RX ADMIN — CALCITRIOL CAPSULES 0.25 MCG 0.25 MCG: 0.25 CAPSULE ORAL at 05:51

## 2022-02-23 RX ADMIN — CEFTRIAXONE SODIUM 2 G: 10 INJECTION, POWDER, FOR SOLUTION INTRAVENOUS at 21:50

## 2022-02-23 ASSESSMENT — ENCOUNTER SYMPTOMS
BACK PAIN: 1
PHOTOPHOBIA: 1
SHORTNESS OF BREATH: 1
FLANK PAIN: 1
DIARRHEA: 1
PALPITATIONS: 0
CHILLS: 0
DIZZINESS: 0
FEVER: 0
SORE THROAT: 0
DOUBLE VISION: 0
BLURRED VISION: 0
COUGH: 1
HEADACHES: 0

## 2022-02-23 ASSESSMENT — PAIN DESCRIPTION - PAIN TYPE
TYPE: ACUTE PAIN

## 2022-02-23 ASSESSMENT — FIBROSIS 4 INDEX: FIB4 SCORE: 1.43

## 2022-02-23 NOTE — THERAPY
Missed Therapy     Patient Name: Ambrose Watkins  Age:  46 y.o., Sex:  male  Medical Record #: [id number]  Today's Date: 2/23/2022    Discussed missed therapy with RN        02/23/22 111   Initial Contact Note    Initial Contact Note Order Received and Verified, Occupational Therapy Evaluation in Progress with Full Report to Follow.   Interdisciplinary Plan of Care Collaboration   Collaboration Comments OT eval attempted pt in HD will attempt later as able   Session Information   Date / Session Number  2/23 attempted   Priority   (needs eval)

## 2022-02-23 NOTE — PROGRESS NOTES
Discharge instructions give to patient at bedside. Pt verbalizes understanding and states plans for follow up. New and home medications reviewed, post discharge activity level and worsening of symptoms needing follow up care discussed. Telemetry monitor and IV cathlon removed. All belongings accounted for, all questions answered at this time. Pt taken by [facility] to group home. Discharge instructions and face sheet sent with pt.

## 2022-02-23 NOTE — ASSESSMENT & PLAN NOTE
Resolved, patient started reporting constipation 3/5/22  -monitor and replete electrolytes if diarrhea recurs  -CTM for recurrence

## 2022-02-23 NOTE — PROGRESS NOTES
HD treatment today per routine order.BP soft mostly.Treatment tolerated well.Net UF removed 2000 ml.CVC locked with heparin.Report given to primary Rn.

## 2022-02-23 NOTE — PROGRESS NOTES
Assumed care of PT A&O 4. Pt sitting in chair with no signs of labored breathing. On RA. Call light within reach, bed in lowest position, upper bed rails up. Pt was updated on plan of care for the day.

## 2022-02-23 NOTE — PROGRESS NOTES
Brenna from Lab called with critical result of WBC of 32.3 at 0410. Critical lab result read back to Brenna.   Dr. Villanueva notified of critical lab result at 0420.  Critical lab result read back by Dr. Villanueva.

## 2022-02-23 NOTE — ASSESSMENT & PLAN NOTE
Per ID pharmacy regarding the rare blood culture growth, levofloxacin renally/dialysis dosed for 14 days. WBC within normal limits at 8.8 starting 3/1/22 but elevated to 14.2 on 3/6/22. CXR revealed cardiomegaly.  -PO Levofloxacin 750mg once, PO Levofloxacin 500mg Y58queyt for 14 days (last dose 3/12/22)  -Blood cultures and urinalysis pending  -Consider abdominal CT with contrast if non-resolving

## 2022-02-23 NOTE — PROGRESS NOTES
".  [facility] -  PROGRESS NOTE               Author: EMMA Dial Date & Time: 2/23/2022  9:32 AM     HPI:  This is a 46 year old male with past medical history of End Stage Renal Disease on hemodialysis at [facility] every Tues/Thurs/Sat, diabetes mellitus and Hypertension who presented to the emergency department on 2/21/22 complaining of lower back pain.  Reportedly missing hemodialysis due to transportation issues. MRI this am and medicated for pain. Seen on Hemodialysis this am, lethargic after PRN medications.     DAILY NEPHROLOGY SUMMARY:  2/23: 3.5L Net UF. Resting in bed. Calm and agreeable to HD today. C/o cont. Back pain. Bps low this am.     REVIEW OF SYSTEMS:    10 point ROS reviewed and is as per HPI/daily summary or otherwise negative    PMH/PSH/SH/FH: Reviewed and unchanged since admission note  CURRENT MEDICATIONS: Reviewed from admission to present day    VS:  BP (!) 89/58   Pulse (!) 102   Temp 37.3 °C (99.1 °F) (Temporal)   Resp 18   Ht 1.778 m (5' 10\")   Wt 121 kg (265 lb 14 oz)   SpO2 96%   BMI 38.15 kg/m²   Physical Exam  Constitutional:       General: He is not in acute distress.     Appearance: He is ill-appearing.   HENT:      Head: Normocephalic and atraumatic.      Mouth/Throat:      Dentition: Abnormal dentition.   Eyes:      Extraocular Movements: Extraocular movements intact.      Conjunctiva/sclera: Conjunctivae normal.      Pupils: Pupils are equal, round, and reactive to light.   Cardiovascular:      Rate and Rhythm: Tachycardia present.      Pulses: Normal pulses.      Heart sounds: Normal heart sounds. No murmur heard.    No friction rub. No gallop.      Comments: R TDC  L AVF   Pulmonary:      Effort: Pulmonary effort is normal. No respiratory distress.      Breath sounds: Normal breath sounds. No stridor. No wheezing or rhonchi.   Abdominal:      General: There is no distension.      Palpations: Abdomen is soft. There is no mass. "   Musculoskeletal:         General: Swelling present. Normal range of motion.      Right lower leg: Edema present.      Left lower leg: Edema present.   Skin:     General: Skin is warm and dry.      Comments: Scattered Scabs through out   Neurological:      Mental Status: He is alert.   Psychiatric:         Mood and Affect: Mood normal.         Fluids:  In: 2140 [P.O.:1640; Dialysis:500]  Out: 4000     LABS:  Recent Labs     02/22/22  0800 02/22/22  2228 02/23/22  0314   SODIUM 138 137 136   POTASSIUM 5.0 4.5 4.8   CHLORIDE 107 105 103   CO2 12* 11* 12*   GLUCOSE 131* 155* 186*   * 102* 99*   CREATININE 14.04* 13.08* 12.37*   CALCIUM 7.2* 6.9* 6.7*     MR-LUMBAR SPINE-WITH & W/O  Order: [id number]   Status: Final result     Visible to patient: No (inaccessible in MyChart)     Next appt: None     0 Result Notes    Details    Reading Physician Reading Date Result Priority   Lisa Link M.D.  [phone] 2/22/2022 STAT     Narrative & Impression     2/22/2022 1:06 AM     HISTORY/REASON FOR EXAM:  Epidural abscess suspected        TECHNIQUE/EXAM DESCRIPTION:  MRI of the lumbar spine without and with contrast.     The study was performed on a Axeda Signa 1.5 Jacqueline MRI scanner.     T1 sagittal, T2 fast spin-echo sagittal, and T2 axial images were obtained of the lumbar spine. T1 postcontrast fat-suppressed sagittal images were obtained.     20 mL ProHance contrast was administered intravenously.     COMPARISON:  None.     FINDINGS:  Lumbar spine alignment is normal.  Bilateral L5 pars defects without anterolisthesis.  Bone marrow signal intensity is normal. Mild disc desiccation is noted at L4-L5.  Bilateral pars defects noted at L5. The conus terminates at L1.     Lumbar spine levels:     T12-L1: No significant abnormality.     L1-2: Mild facet degeneration. The stenosis.     L2-3: Minimal facet degeneration. No significant canal stenosis or foraminal narrowing.     L3-4: Broad-based disc bulge and  bilateral mild facet degeneration. There is no significant spinal canal narrowing or neuroforaminal narrowing.     L4-5, bilateral facet degeneration. Broad-based disc bulge with a focal central protrusion that minimally encroaches upon the spinal canal. There is no significant foraminal narrowing. There is minimal canal narrowing without thecal sac compression.   There is a left facet origin synovial cyst that extends into the left subligamentous at the level of the left L5 pars defect and minimally encroaches upon the spinal canal from the posterolateral aspect. Bilateral L5 pars defects are present.     Postcontrast images through the lumbar spine does not demonstrate any abnormal enhancement. Minimal epidural enhancement identified at the dorsal aspect of L5 is likely related to degenerative changes in the facet joints and synovial hypertrophy. No   pathological epidural enhancement to suggest infection identified.     IMPRESSION:        Bilateral L5 pars defects. However, there is no anterolisthesis.     Small left facet origin subligamentous synovial cyst projects into the posterolateral spinal canal behind L5 without significant thecal sac compression. Bilateral mild facet degeneration at L4-5 and L5-S1.     No abnormal enhancement is identified in the lumbar spine to suggest an infectious process.      HISTORY/REASON FOR EXAM:  Epidural abscess suspected        TECHNIQUE/EXAM DESCRIPTION:  MRI of the thoracic spine without and with contrast.     The study was performed on a BESOS Signa 1.5 Jacqueline MRI scanner. T1 sagittal, T2 fast spin-echo sagittal, and T2 axial images were obtained of the thoracic spine. T1 post-contrast fat suppressed sagittal images were obtained. Optional T1 post-contrast   axial images may be obtained.     20 mL ProHance contrast was administered intravenously.     COMPARISON:  None.     FINDINGS:     Alignment in the thoracic spine is normal. Marrow signal in the vertebral bodies is  within normal limits. There is no abnormal osseous enhancement in the vertebral bodies or other abnormal extradural enhancement. The disc spaces are intact at all   thoracic levels. There are no significant osteophytic changes. The prevertebral and paraspinous soft tissues are unremarkable.     The thoracic spinal cord is normal in caliber and signal throughout its course. There is no abnormal cord enhancement. There is no abnormal intradural extramedullary enhancement.     At T1-2 through T11-12 there is no significant disc bulge or protrusion. The neural foramina are intact at all thoracic levels. There is no congenital or acquired central spinal stenosis at any thoracic level. There is no significant ligamentous or facet   hypertrophy.     IMPRESSION:        MRI of the thoracic spine without and with contrast within normal limits.    IMPRESSION:  # ESRD  - Etiology likely 2/2 DM/HTN via R TDC  # HTN  - Goal BP < 140/90  - At goal  # Anemia of CKD  - Goal Hgb 10-11  - At goal  # CKD-MBD  -   - Vit D 9  - CCa 7.1  - PO4 9.5  # Hyperkalemia  - Received Insulin/D50/Ca Gluconate/2 Amps Bicarb  # Back Pain  - Elevated CRP  - MRI of Lumbar and Thoracic spine 2/22 (-) for epidural abscess  # Methamphetamine abuse   # DM   - A1C 6.5  - Per Primary Services  # Severe Met Acidosis  - Bicarb Gtt stopped  - Correct with HD      PLAN:  - iHD today (WED), then QMWF iHD during stay  - UF as tolerated  - US of L AVF   - Hold antihtn  - Start Midodrine 5 mg TID   - Continue Cholecalciferol   - Continue Phos Binder with meals  - Stop Bicarb Gtt  - Hold JACKIE   - No dietary protein restrictions  - Dose all meds per ESRD

## 2022-02-23 NOTE — CARE PLAN
The patient is Stable - Low risk of patient condition declining or worsening    Shift Goals  Clinical Goals: monitor labs  Patient Goals: rest    Progress made toward(s) clinical / shift goals:    Problem: Pain - Standard  Goal: Alleviation of pain or a reduction in pain to the patient’s comfort goal  Outcome: Progressing     Problem: Knowledge Deficit - Standard  Goal: Patient and family/care givers will demonstrate understanding of plan of care, disease process/condition, diagnostic tests and medications  Outcome: Progressing       Patient is not progressing towards the following goals:

## 2022-02-23 NOTE — PROGRESS NOTES
Ashu from Lab called with critical result of calcium of 6.9 at 2357. Critical lab result read back to Ashu.     notified of critical lab result at [facility].  Critical lab result read back by Dr. Villanueva.

## 2022-02-23 NOTE — PROGRESS NOTES
Daily Progress Note:     Date of Service: 2/23/2022  Primary Team: [facility]   Attending: Rian Szymanski M.D.   Senior Resident: Dr. Lamb  Intern: Dr. Chávez  Contact:  [phone]    Chief Complaint:   Low Back Pain     ID: Mr. Watkins is a 46 year old male who presents 2/21/22 for back pain, His past medical history includes Methamphetamine abuse, Hypertension, Diabetes Mellitus, End Stage Renal Disease on hemodialysis at [facility] every Tues/Thurs/Sat.  Reportedly missing hemodialysis due to transportation issues. Describes his back pain as dull, nonradiating, 8 out of 10 intensity, worse with movement relieved with rest.    Subjective:  -Reports back pain  -decreased urine output overnigth  -diarrhea, with semi solid stool  -flank pain  -WBC increasing: CT abdomen, CXR, UA, labs ordered  -Started on midodrine    Consultants/Specialty:  Nephrology    Review of Systems:    Review of Systems   Constitutional: Negative for chills and fever.   HENT: Negative for sore throat.    Eyes: Positive for photophobia. Negative for blurred vision and double vision.   Respiratory: Positive for cough and shortness of breath.    Cardiovascular: Negative for chest pain and palpitations.   Gastrointestinal: Positive for diarrhea.   Genitourinary: Positive for flank pain.   Musculoskeletal: Positive for back pain.   Neurological: Negative for dizziness and headaches.        Denies lightheadedness        Objective Data:   Physical Exam:   Vitals:   Temp:  [36.5 °C (97.7 °F)-37.6 °C (99.6 °F)] 36.5 °C (97.7 °F)  Pulse:  [] 97  Resp:  [18-20] 18  BP: ()/(58-77) 109/69  SpO2:  [93 %-97 %] 97 %    Physical Exam  Constitutional:       Appearance: He is obese. He is ill-appearing.   HENT:      Head: Normocephalic and atraumatic.      Nose: No congestion.      Mouth/Throat:      Mouth: Mucous membranes are moist.      Comments: Poor dentition    Eyes:      General:         Right eye: No discharge.         Left  eye: No discharge.      Pupils: Pupils are equal, round, and reactive to light.   Cardiovascular:      Rate and Rhythm: Regular rhythm. Tachycardia present.      Pulses: Normal pulses.      Heart sounds: No murmur heard.  Pulmonary:      Effort: Pulmonary effort is normal. No respiratory distress.      Breath sounds: No wheezing.   Abdominal:      General: Bowel sounds are normal. There is distension.      Palpations: Abdomen is soft. There is no mass.      Tenderness: There is abdominal tenderness. There is no guarding or rebound.      Comments: Rigid flanks bilaterally, 1-2+ pitting edema in flanks bilaterally   Musculoskeletal:      Cervical back: Normal range of motion and neck supple. Tenderness present. No rigidity.      Right lower leg: Edema present.      Left lower leg: Edema present.   Skin:     General: Skin is warm and dry.      Coloration: Skin is not jaundiced.      Findings: No erythema.      Comments: Excoriations and scabs on face, chest, and arms   Neurological:      Mental Status: He is oriented to person, place, and time.       Labs:   HEMATOLOGY/ ONCOLOGY/ID:            Recent Labs     02/21/22 2100 02/23/22  0314 02/23/22  1501   WBC 11.2* 32.3* 33.3*   RBC 3.72* 3.44* 3.06*   HEMOGLOBIN 11.1* 10.3* 9.3*   HEMATOCRIT 34.8* 31.4* 26.9*   MCV 93.5 91.3 87.9   MCH 29.8 29.9 30.4   RDW 57.9* 57.0* 53.9*   PLATELETCT 193 143* 129*   MPV 11.3 11.7 11.4   NEUTSPOLYS 74.70* 97.40*  --    LYMPHOCYTES 14.90* 0.00*  --    MONOCYTES 9.20 1.70  --    EOSINOPHILS 0.40 0.00  --    BASOPHILS 0.30 0.00  --    RBCMORPHOLO  --  Present  --      Lab Results   Component Value Date    FERRITIN 665.0 (H) 02/22/2022    IRON 8 (L) 02/22/2022    TOTIRONBC 178 (L) 02/22/2022       RENAL:        Estimated GFR/CRCL = Estimated Creatinine Clearance: 9.9 mL/min (A) (by C-G formula based on SCr of 12.37 mg/dL (HH)).  Recent Labs     02/21/22 2100 02/22/22  0406 02/22/22  0800 02/22/22  2228 02/23/22  0314   SODIUM 137   < >  138 137 136   POTASSIUM 5.9*   < > 5.0 4.5 4.8   CHLORIDE 107   < > 107 105 103   CO2 8*   < > 12* 11* 12*   GLUCOSE 106*   < > 131* 155* 186*   *   < > 120* 102* 99*   CREATININE 14.93*   < > 14.04* 13.08* 12.37*   CALCIUM 6.7*   < > 7.2* 6.9* 6.7*   MAGNESIUM  --   --  2.2  --   --    PHOSPHORUS  --   --  9.5*  --  7.8*   ALBUMIN 3.5  --   --   --  2.8*    < > = values in this interval not displayed.       GASTROINTESTINAL/ HEPATIC:          Recent Labs     02/21/22  2100 02/22/22  2228 02/23/22  0314   ALTSGPT 18  --  15   ASTSGOT 20  --  18   ALKPHOSPHAT 183*  --  138*   TBILIRUBIN 0.5  --  0.6   LIPASE  --  51  --    ALBUMIN 3.5  --  2.8*   GLOBULIN 3.7*  --  3.2   MACROCYTOSIS  --   --  1+     No results found for: AMMONIA    ENDOCRINE:              Recent Labs     02/21/22  2337 02/22/22  0313 02/22/22  0406 02/22/22  0543 02/22/22  0800 02/22/22 2228 02/23/22 0314   GLUCOSE  --   --    < >  --  131* 155* 186*   POCGLUCOSE 137* 81  --  131*  --   --   --     < > = values in this interval not displayed.     Lab Results   Component Value Date    HBA1C 6.5 (H) 02/22/2022    HBA1C 7.2 (H) 05/19/2020       Imaging:   MR-LUMBAR SPINE-WITH & W/O  02/22/22 0737  1. Bilateral L5 pars defects. However, there is no anterolisthesis.     2. Small left facet origin subligamentous synovial cyst projects into the posterolateral spinal canal behind L5 without significant thecal sac compression. Bilateral mild facet degeneration at L4-5 and L5-S1.     3. No abnormal enhancement is identified in the lumbar spine to suggest an infectious process.     MR-THORACIC SPINE-WITH & W/O  02/22/22 0716  1. MRI of the thoracic spine without and with contrast within normal limits.     DX-CHEST-PORTABLE (1 VIEW) 02/21/22 2135  1.  No acute cardiopulmonary disease.   2.  Cardiomegaly      Problem Representation:  Mr. Watkins is a 46 year old male who presents 2/21/22 for back pain, His past medical history includes Methamphetamine  abuse, Hypertension, Diabetes Mellitus, End Stage Renal Disease on hemodialysis at [facility] every Tues/Thurs/Sat.  Reportedly missing hemodialysis due to transportation issues. Describes his back pain as dull, nonradiating, 8 out of 10 intensity, worse with movement relieved with rest.    * Back pain  Assessment & Plan  Upon presentation, had elevated ESR, CRP. Patient denied any saddle anesthesia, numbness, bowel or urinary incontinence upon presentation. Other red flags include fever, preceding trauma, and cancer.    - MRI thoracic and lumbar with and without ordered - follow official results   -Dilaudid for pain 7-10 intensity  -Oxycodone for pain 3-6 in intesity  -Lidoderm   -CTM      End stage renal disease (HCC)  Assessment & Plan  Patient has End Stage Renal Disease on hemodialysis at [facility] every Tues/Thurs/Sat but has been Non-compliant with treatments. Per chart review, patient has not had dialysis for over 1.5 weeks. Nephrology consulted will dialyze. !st dialysis session 2/22/22.  -Dialysis as needed while inpatient  -monitor and replete electrolytes  -Diuretics if volume overloaded after dialysis only if patient is able to produce urine    Anemia  Assessment & Plan  Hemoglobin at 11.1, MCV 93.5, RDW elevated at 57.9. 2/23/22 labwork revealed:Serum Iron 8 (L), ferritin 665 (H) , TIBC 178 (L),  Transferrin 149 (L). Suggesting a mixed picture of possible iron deficiency and ACD in the setting of renal disease.    -started on ferous sulfate 325mg  -consider epo if hgb decreased further    Diarrhea  Assessment & Plan  Reports diarrhea of 2 weeks duration. Semi-solid on 2/23/22.  -C. Difficile  -Stool Ova & parasites  -stool lactoferrin  -Tissue Transglutaminase Antibodies (tTG-IgA) for celiac disease evaluation  -monitor and replete electrolytes    Hypocalcemia  Assessment & Plan  Upon presentation, calcium is low at 6.7. Albumin is normal at 3.5. If patient is truly hypocalcemic, back pain could  be secondary to bone breakdown for calcium. Hypocalcemia could also be from malabsorption. On 2/23/22 labs revealed: Ionized calcium ,  (H), 25 Hydroxy Vitamin D 9 (L), Magnesium 2.2 (N), Phosphorus 7.8 (H).  -vitamin D3 (cholecalciferol) tablet 2,000 Units  -calcitRIOL (ROCALTROL) capsule 0.25 mcg  -CTM        Methamphetamine abuse ([facility])  Assessment & Plan  Actively uses methamphetamines.   -Counseled on cessation, denies use  -Monitor for agitation and signs of withdrawal    Hyperkalemia- (present on admission)  Assessment & Plan  Patient presents with potassium of 5.9, it increased to 6.2 on 2/22/22. Patient received insulin/D50/calcium gluconate/2 Amps of bicarb 50 mEq upon presentation. Hyperkalemia likely secondary to ESRD and missed dialysis sessions but could be 2/2 constipation. Improved after first dialysis session on 2/22/22  -Telemetry monitoring  -Recheck potassium levels and monitor   -CTM      Leukocytosis- (present on admission)  Assessment & Plan  Initialy elevated on 2/23/22 at 11.2, increased to 32.3 on 2/23. Patient afebrile. Might be reactionary to ESRD. Will continue to evaluate and watch for signs of infection.   -CXR  -Abdominal CT  -UA  -Bladder scan  -Blood cultures x2  -CTM    Hypertension- (present on admission)  Assessment & Plan  Uncontrolled, with BP at 106/104 on the floor (before dialysis). Hypertension could be secondary to ESRD.  -Amlodipine 10 mg daily held on 2/23/22  -IV hydralazine 10 mg Q4H PRN for SBP greater than 180    DM (diabetes mellitus) ([facility])  Assessment & Plan  Hemoglobin A1c 5/19/2020 was 7.2%. Recent hemoglobin A1c was 6.5%.  - Sliding Scale Insulin  - Hypoglycemia protocol  - Frequent accu-checks   - outpatient management and follow-up discussion with patient prior to discharge    Code Status: FULL  DVT ppx: Heparin   Diet: Renal    Majo Chávez MD MPH  PGY-1, [facility]    Assessment and plan discussed with senior resident and attending  physician.

## 2022-02-23 NOTE — ASSESSMENT & PLAN NOTE
Hemoglobin baseline is between 9 and 10.  -Ferous sulfate 325mg  -Epoetin 6,000 units during dialysis  -Dialysis  -nephrology onboard, recs appreciated   -CTM with routine labs

## 2022-02-24 ENCOUNTER — APPOINTMENT (OUTPATIENT)
Dept: CARDIOLOGY | Facility: MEDICAL CENTER | Age: 47
DRG: 264 | End: 2022-02-24
Attending: STUDENT IN AN ORGANIZED HEALTH CARE EDUCATION/TRAINING PROGRAM
Payer: MEDICARE

## 2022-02-24 ENCOUNTER — PATIENT OUTREACH (OUTPATIENT)
Dept: HEALTH INFORMATION MANAGEMENT | Facility: OTHER | Age: 47
End: 2022-02-24

## 2022-02-24 LAB
1,25(OH)2D3 SERPL-MCNC: 8.7 PG/ML (ref 19.9–79.3)
ALBUMIN SERPL BCP-MCNC: 2.5 G/DL (ref 3.2–4.9)
ALBUMIN/GLOB SERPL: 0.8 G/DL
ALP SERPL-CCNC: 169 U/L (ref 30–99)
ALT SERPL-CCNC: 11 U/L (ref 2–50)
ANION GAP SERPL CALC-SCNC: 14 MMOL/L (ref 7–16)
AST SERPL-CCNC: 9 U/L (ref 12–45)
BILIRUB SERPL-MCNC: 0.5 MG/DL (ref 0.1–1.5)
BUN SERPL-MCNC: 79 MG/DL (ref 8–22)
CA-I SERPL-SCNC: 0.8 MMOL/L (ref 1.1–1.3)
CALCIUM SERPL-MCNC: 6.6 MG/DL (ref 8.5–10.5)
CHLORIDE SERPL-SCNC: 100 MMOL/L (ref 96–112)
CO2 SERPL-SCNC: 22 MMOL/L (ref 20–33)
CREAT SERPL-MCNC: 12.38 MG/DL (ref 0.5–1.4)
E COLI SXT1+2 STL IA: NORMAL
ERYTHROCYTE [DISTWIDTH] IN BLOOD BY AUTOMATED COUNT: 54.4 FL (ref 35.9–50)
EXPOSED MRN EXMRN: NORMAL
GLOBULIN SER CALC-MCNC: 3.2 G/DL (ref 1.9–3.5)
GLUCOSE BLD STRIP.AUTO-MCNC: 103 MG/DL (ref 65–99)
GLUCOSE BLD STRIP.AUTO-MCNC: 127 MG/DL (ref 65–99)
GLUCOSE BLD STRIP.AUTO-MCNC: 147 MG/DL (ref 65–99)
GLUCOSE BLD STRIP.AUTO-MCNC: 97 MG/DL (ref 65–99)
GLUCOSE SERPL-MCNC: 89 MG/DL (ref 65–99)
HBV SURFACE AG SER QL: NORMAL
HCT VFR BLD AUTO: 28.4 % (ref 42–52)
HCV AB SER QL: NORMAL
HGB BLD-MCNC: 9.6 G/DL (ref 14–18)
HIV 1+2 AB+HIV1 P24 AG SERPL QL IA: NORMAL
INR PPP: 1.25 (ref 0.87–1.13)
LACTATE BLD-SCNC: 1.1 MMOL/L (ref 0.5–2)
LACTATE BLD-SCNC: 1.4 MMOL/L (ref 0.5–2)
LV EJECT FRACT  99904: 30
LV EJECT FRACT MOD 2C 99903: 27.74
LV EJECT FRACT MOD 4C 99902: 40.98
LV EJECT FRACT MOD BP 99901: 33.39
MCH RBC QN AUTO: 30 PG (ref 27–33)
MCHC RBC AUTO-ENTMCNC: 33.8 G/DL (ref 33.7–35.3)
MCV RBC AUTO: 88.8 FL (ref 81.4–97.8)
PHOSPHATE SERPL-MCNC: 6.8 MG/DL (ref 2.5–4.5)
PLATELET # BLD AUTO: 121 K/UL (ref 164–446)
PMV BLD AUTO: 11.5 FL (ref 9–12.9)
POTASSIUM SERPL-SCNC: 4 MMOL/L (ref 3.6–5.5)
PROT SERPL-MCNC: 5.7 G/DL (ref 6–8.2)
PROTHROMBIN TIME: 15.3 SEC (ref 12–14.6)
RBC # BLD AUTO: 3.2 M/UL (ref 4.7–6.1)
SIGNIFICANT IND 70042: NORMAL
SITE SITE: NORMAL
SODIUM SERPL-SCNC: 136 MMOL/L (ref 135–145)
SOURCE SOURCE: NORMAL
TTG IGA SER IA-ACNC: <2 U/ML (ref 0–3)
WBC # BLD AUTO: 26.5 K/UL (ref 4.8–10.8)

## 2022-02-24 PROCEDURE — 87077 CULTURE AEROBIC IDENTIFY: CPT

## 2022-02-24 PROCEDURE — 770020 HCHG ROOM/CARE - TELE (206)

## 2022-02-24 PROCEDURE — 93306 TTE W/DOPPLER COMPLETE: CPT | Mod: 26 | Performed by: INTERNAL MEDICINE

## 2022-02-24 PROCEDURE — 700111 HCHG RX REV CODE 636 W/ 250 OVERRIDE (IP): Performed by: STUDENT IN AN ORGANIZED HEALTH CARE EDUCATION/TRAINING PROGRAM

## 2022-02-24 PROCEDURE — 700102 HCHG RX REV CODE 250 W/ 637 OVERRIDE(OP): Performed by: STUDENT IN AN ORGANIZED HEALTH CARE EDUCATION/TRAINING PROGRAM

## 2022-02-24 PROCEDURE — C9803 HOPD COVID-19 SPEC COLLECT: HCPCS | Performed by: INTERNAL MEDICINE

## 2022-02-24 PROCEDURE — 36415 COLL VENOUS BLD VENIPUNCTURE: CPT

## 2022-02-24 PROCEDURE — 83605 ASSAY OF LACTIC ACID: CPT | Mod: 91

## 2022-02-24 PROCEDURE — 700101 HCHG RX REV CODE 250: Performed by: HOSPITALIST

## 2022-02-24 PROCEDURE — 97535 SELF CARE MNGMENT TRAINING: CPT

## 2022-02-24 PROCEDURE — 97165 OT EVAL LOW COMPLEX 30 MIN: CPT

## 2022-02-24 PROCEDURE — 84100 ASSAY OF PHOSPHORUS: CPT

## 2022-02-24 PROCEDURE — 700102 HCHG RX REV CODE 250 W/ 637 OVERRIDE(OP): Performed by: NURSE PRACTITIONER

## 2022-02-24 PROCEDURE — 99233 SBSQ HOSP IP/OBS HIGH 50: CPT | Mod: GC | Performed by: INTERNAL MEDICINE

## 2022-02-24 PROCEDURE — 87040 BLOOD CULTURE FOR BACTERIA: CPT | Mod: 91

## 2022-02-24 PROCEDURE — 82330 ASSAY OF CALCIUM: CPT

## 2022-02-24 PROCEDURE — A9270 NON-COVERED ITEM OR SERVICE: HCPCS | Performed by: NURSE PRACTITIONER

## 2022-02-24 PROCEDURE — 0240U HCHG SARS-COV-2 COVID-19 NFCT DS RESP RNA 3 TRGT MIC: CPT

## 2022-02-24 PROCEDURE — A9270 NON-COVERED ITEM OR SERVICE: HCPCS | Performed by: STUDENT IN AN ORGANIZED HEALTH CARE EDUCATION/TRAINING PROGRAM

## 2022-02-24 PROCEDURE — 80053 COMPREHEN METABOLIC PANEL: CPT

## 2022-02-24 PROCEDURE — 82962 GLUCOSE BLOOD TEST: CPT

## 2022-02-24 PROCEDURE — A9270 NON-COVERED ITEM OR SERVICE: HCPCS | Performed by: INTERNAL MEDICINE

## 2022-02-24 PROCEDURE — 700111 HCHG RX REV CODE 636 W/ 250 OVERRIDE (IP): Performed by: HOSPITALIST

## 2022-02-24 PROCEDURE — 700102 HCHG RX REV CODE 250 W/ 637 OVERRIDE(OP): Performed by: INTERNAL MEDICINE

## 2022-02-24 PROCEDURE — 700102 HCHG RX REV CODE 250 W/ 637 OVERRIDE(OP): Performed by: HOSPITALIST

## 2022-02-24 PROCEDURE — A9270 NON-COVERED ITEM OR SERVICE: HCPCS | Performed by: HOSPITALIST

## 2022-02-24 PROCEDURE — 85610 PROTHROMBIN TIME: CPT

## 2022-02-24 PROCEDURE — 93306 TTE W/DOPPLER COMPLETE: CPT

## 2022-02-24 PROCEDURE — 85027 COMPLETE CBC AUTOMATED: CPT

## 2022-02-24 PROCEDURE — 51798 US URINE CAPACITY MEASURE: CPT

## 2022-02-24 PROCEDURE — 700101 HCHG RX REV CODE 250: Performed by: INTERNAL MEDICINE

## 2022-02-24 PROCEDURE — 700111 HCHG RX REV CODE 636 W/ 250 OVERRIDE (IP): Performed by: INTERNAL MEDICINE

## 2022-02-24 RX ORDER — SEVELAMER CARBONATE 800 MG/1
2400 TABLET, FILM COATED ORAL
Status: DISCONTINUED | OUTPATIENT
Start: 2022-02-24 | End: 2022-02-25

## 2022-02-24 RX ADMIN — FERROUS SULFATE TAB 325 MG (65 MG ELEMENTAL FE) 325 MG: 325 (65 FE) TAB at 08:36

## 2022-02-24 RX ADMIN — MIDODRINE HYDROCHLORIDE 5 MG: 5 TABLET ORAL at 08:36

## 2022-02-24 RX ADMIN — METRONIDAZOLE 500 MG: 500 TABLET ORAL at 21:27

## 2022-02-24 RX ADMIN — SEVELAMER CARBONATE 2400 MG: 800 TABLET, FILM COATED ORAL at 18:22

## 2022-02-24 RX ADMIN — SEVELAMER CARBONATE 2400 MG: 800 TABLET, FILM COATED ORAL at 12:45

## 2022-02-24 RX ADMIN — METRONIDAZOLE 500 MG: 500 TABLET ORAL at 13:30

## 2022-02-24 RX ADMIN — HEPARIN SODIUM 5000 UNITS: 5000 INJECTION, SOLUTION INTRAVENOUS; SUBCUTANEOUS at 06:10

## 2022-02-24 RX ADMIN — HEPARIN SODIUM 5000 UNITS: 5000 INJECTION, SOLUTION INTRAVENOUS; SUBCUTANEOUS at 21:28

## 2022-02-24 RX ADMIN — MIDODRINE HYDROCHLORIDE 5 MG: 5 TABLET ORAL at 12:45

## 2022-02-24 RX ADMIN — MIDODRINE HYDROCHLORIDE 5 MG: 5 TABLET ORAL at 18:22

## 2022-02-24 RX ADMIN — CEFTRIAXONE SODIUM 2 G: 10 INJECTION, POWDER, FOR SOLUTION INTRAVENOUS at 21:27

## 2022-02-24 RX ADMIN — HYDROMORPHONE HYDROCHLORIDE 0.5 MG: 1 INJECTION, SOLUTION INTRAMUSCULAR; INTRAVENOUS; SUBCUTANEOUS at 21:27

## 2022-02-24 RX ADMIN — METRONIDAZOLE 500 MG: 500 TABLET ORAL at 06:10

## 2022-02-24 RX ADMIN — INSULIN LISPRO 5 UNITS: 100 INJECTION, SOLUTION INTRAVENOUS; SUBCUTANEOUS at 12:44

## 2022-02-24 RX ADMIN — Medication 2000 UNITS: at 06:10

## 2022-02-24 RX ADMIN — HEPARIN SODIUM 5000 UNITS: 5000 INJECTION, SOLUTION INTRAVENOUS; SUBCUTANEOUS at 13:30

## 2022-02-24 RX ADMIN — LIDOCAINE 1 PATCH: 50 PATCH TOPICAL at 12:45

## 2022-02-24 RX ADMIN — INSULIN LISPRO 5 UNITS: 100 INJECTION, SOLUTION INTRAVENOUS; SUBCUTANEOUS at 06:36

## 2022-02-24 RX ADMIN — CALCITRIOL CAPSULES 0.25 MCG 0.25 MCG: 0.25 CAPSULE ORAL at 06:10

## 2022-02-24 RX ADMIN — HYDROMORPHONE HYDROCHLORIDE 0.5 MG: 1 INJECTION, SOLUTION INTRAMUSCULAR; INTRAVENOUS; SUBCUTANEOUS at 06:29

## 2022-02-24 SDOH — ECONOMIC STABILITY: FOOD INSECURITY: WITHIN THE PAST 12 MONTHS, THE FOOD YOU BOUGHT JUST DIDN'T LAST AND YOU DIDN'T HAVE MONEY TO GET MORE.: NEVER TRUE

## 2022-02-24 SDOH — ECONOMIC STABILITY: TRANSPORTATION INSECURITY
IN THE PAST 12 MONTHS, HAS THE LACK OF TRANSPORTATION KEPT YOU FROM MEDICAL APPOINTMENTS OR FROM GETTING MEDICATIONS?: YES

## 2022-02-24 SDOH — ECONOMIC STABILITY: FOOD INSECURITY: WITHIN THE PAST 12 MONTHS, YOU WORRIED THAT YOUR FOOD WOULD RUN OUT BEFORE YOU GOT MONEY TO BUY MORE.: NEVER TRUE

## 2022-02-24 SDOH — ECONOMIC STABILITY: TRANSPORTATION INSECURITY
IN THE PAST 12 MONTHS, HAS LACK OF TRANSPORTATION KEPT YOU FROM MEETINGS, WORK, OR FROM GETTING THINGS NEEDED FOR DAILY LIVING?: YES

## 2022-02-24 ASSESSMENT — ENCOUNTER SYMPTOMS
BACK PAIN: 1
PHOTOPHOBIA: 1
PALPITATIONS: 0
VOMITING: 0
NAUSEA: 0
BLURRED VISION: 0
CONSTIPATION: 0
COUGH: 1
SORE THROAT: 0
FLANK PAIN: 1
CHILLS: 1
DIARRHEA: 0
DIZZINESS: 0
DOUBLE VISION: 0
HEADACHES: 0
FEVER: 0
SHORTNESS OF BREATH: 1
LOSS OF CONSCIOUSNESS: 0

## 2022-02-24 ASSESSMENT — PAIN DESCRIPTION - PAIN TYPE: TYPE: ACUTE PAIN

## 2022-02-24 ASSESSMENT — COGNITIVE AND FUNCTIONAL STATUS - GENERAL
SUGGESTED CMS G CODE MODIFIER DAILY ACTIVITY: CH
DAILY ACTIVITIY SCORE: 24

## 2022-02-24 ASSESSMENT — ACTIVITIES OF DAILY LIVING (ADL): TOILETING: INDEPENDENT

## 2022-02-24 ASSESSMENT — SOCIAL DETERMINANTS OF HEALTH (SDOH): HOW HARD IS IT FOR YOU TO PAY FOR THE VERY BASICS LIKE FOOD, HOUSING, MEDICAL CARE, AND HEATING?: SOMEWHAT HARD

## 2022-02-24 NOTE — DISCHARGE PLANNING
CHLUZ ELENA guillen introduced community care management to the patient. Patient states that he is living in his truck that does not run & is [place] front of the [place] [facility]. Patient states that he receives $700 from disability monthly, receives $100 SNAP benefits, and does not have a cell phone. CHW provided information to the [place] [address], [employer] [facility], 2 [employer] bus passes, and scheduled a new patient appointment with Magy Shukla on 3/11/2022. Patient states that [place] [facility] [place]. CHW talked to [facility] who explained that he was declined by lorin landry. CHW attempted to expain [place] benefits and purchasing a pay as you go phone from [employer] but the patient got upset. CHW left the resources bedside.     Community Health Worker Intake  • Social determinates of health intake complete.   • Identified barriers to housing.   • Contact information provided to Ambrose Watkins   • Has PCP appointment scheduled for 3/11/2022  • Accepted Meds-To-Beds.   • Inpatient assessment completed.    Plan: CHW has advised the patient to go to the [employer] [facility] to speak to CCM. Patient does not have a cell phone. CHW will remove the patient from CCM caseload after DC.

## 2022-02-24 NOTE — PROGRESS NOTES
Daily Progress Note:     Date of Service: 2/24/2022  Primary Team: [facility]   Attending: Rian Szymanski M.D.   Senior Resident: Dr. Lamb  Intern: Dr. Chávez  Contact:  [phone]    Chief Complaint:   Low Back Pain     ID: Mr. Watkins is a 46 year old male who presents 2/21/22 for back pain. His past medical history includes Methamphetamine abuse, Hypertension, Diabetes Mellitus, End Stage Renal Disease on hemodialysis at [facility] every Tues/Thurs/Sat.  Reportedly missing hemodialysis due to transportation issues. Describes his back pain as dull, nonradiating, 8 out of 10 intensity, worse with movement relieved with rest.    Subjective:  -still c/o photophobia, gets upset when the lights are on  -No bowel movement overnight  -No urine output since morning of 2/23  -Bladder scan reveals bladder with 221ml  -Blood cultures positive for gram negative rods, patient on ABx's  -US reveals No flow in the hemodialysis graft, vascular surgery consulted - they will  Evaluate patient and graft on 2/25/22; Patient NPO at midnight    Consultants/Specialty:  Nephrology    Review of Systems:    Review of Systems   Constitutional: Positive for chills. Negative for fever.   HENT: Positive for ear pain. Negative for sore throat.    Eyes: Positive for photophobia. Negative for blurred vision and double vision.   Respiratory: Positive for cough and shortness of breath.    Cardiovascular: Negative for chest pain and palpitations.   Gastrointestinal: Negative for constipation, diarrhea, nausea and vomiting.   Genitourinary: Positive for flank pain. Negative for dysuria, frequency, hematuria and urgency.   Musculoskeletal: Positive for back pain.   Neurological: Negative for dizziness, loss of consciousness and headaches.        Denies lightheadedness        Objective Data:   Physical Exam:   Vitals:   Temp:  [36.4 °C (97.5 °F)-37.5 °C (99.5 °F)] 36.4 °C (97.5 °F)  Pulse:  [77-96] 95  Resp:  [18] 18  BP:  (113-134)/(77-97) 134/89  SpO2:  [90 %-98 %] 94 %    Physical Exam  Constitutional:       Appearance: He is obese. He is ill-appearing.   HENT:      Head: Normocephalic and atraumatic.      Nose: No congestion.      Mouth/Throat:      Mouth: Mucous membranes are moist.      Comments: Poor dentition    Eyes:      General:         Right eye: No discharge.         Left eye: No discharge.      Pupils: Pupils are equal, round, and reactive to light.   Cardiovascular:      Rate and Rhythm: Regular rhythm. Tachycardia present.      Pulses: Normal pulses.      Heart sounds: No murmur heard.     Comments: No palpable thrill at fistula location  Pulmonary:      Effort: Pulmonary effort is normal. No respiratory distress.      Breath sounds: No wheezing.   Abdominal:      General: Bowel sounds are normal. There is distension.      Palpations: Abdomen is soft. There is no mass.      Tenderness: There is abdominal tenderness. There is no guarding or rebound.      Comments: Rigid flanks bilaterally, 1-2+ pitting edema in flanks bilaterally   Musculoskeletal:         General: Swelling (left leg slightly larger than right) present.      Cervical back: Normal range of motion and neck supple. Tenderness present. No rigidity.      Right lower leg: Edema present.      Left lower leg: Edema present.   Skin:     General: Skin is warm and dry.      Coloration: Skin is not jaundiced.      Findings: No erythema.      Comments: Excoriations and scabs on face, chest, and arms   Neurological:      Mental Status: He is oriented to person, place, and time. Mental status is at baseline.      Motor: Weakness present.       Labs:   HEMATOLOGY/ ONCOLOGY/ID:            Recent Labs     02/21/22  2100 02/23/22  0314 02/23/22  1501 02/24/22  0300   WBC 11.2* 32.3* 33.3* 26.5*   RBC 3.72* 3.44* 3.06* 3.20*   HEMOGLOBIN 11.1* 10.3* 9.3* 9.6*   HEMATOCRIT 34.8* 31.4* 26.9* 28.4*   MCV 93.5 91.3 87.9 88.8   MCH 29.8 29.9 30.4 30.0   RDW 57.9* 57.0* 53.9*  54.4*   PLATELETCT 193 143* 129* 121*   MPV 11.3 11.7 11.4 11.5   NEUTSPOLYS 74.70* 97.40* 87.00*  --    LYMPHOCYTES 14.90* 0.00* 1.70*  --    MONOCYTES 9.20 1.70 6.10  --    EOSINOPHILS 0.40 0.00 0.00  --    BASOPHILS 0.30 0.00 0.00  --    RBCMORPHOLO  --  Present Present  --      Lab Results   Component Value Date    FERRITIN 665.0 (H) 02/22/2022    IRON 8 (L) 02/22/2022    TOTIRONBC 178 (L) 02/22/2022       RENAL:        Estimated GFR/CRCL = Estimated Creatinine Clearance: 9.9 mL/min (A) (by C-G formula based on SCr of 12.38 mg/dL (HH)).  Recent Labs     02/21/22 2100 02/22/22  0406 02/22/22  0800 02/22/22 2228 02/23/22  0314 02/24/22  0300   SODIUM 137   < > 138 137 136 136   POTASSIUM 5.9*   < > 5.0 4.5 4.8 4.0   CHLORIDE 107   < > 107 105 103 100   CO2 8*   < > 12* 11* 12* 22   GLUCOSE 106*   < > 131* 155* 186* 89   *   < > 120* 102* 99* 79*   CREATININE 14.93*   < > 14.04* 13.08* 12.37* 12.38*   CALCIUM 6.7*   < > 7.2* 6.9* 6.7* 6.6*   MAGNESIUM  --   --  2.2  --   --   --    PHOSPHORUS  --   --  9.5*  --  7.8* 6.8*   ALBUMIN 3.5  --   --   --  2.8* 2.5*    < > = values in this interval not displayed.       GASTROINTESTINAL/ HEPATIC:          Recent Labs     02/21/22 2100 02/22/22 2228 02/23/22 0314 02/23/22  1501 02/24/22  0300   ALTSGPT 18  --  15  --  11   ASTSGOT 20  --  18  --  9*   ALKPHOSPHAT 183*  --  138*  --  169*   TBILIRUBIN 0.5  --  0.6  --  0.5   LIPASE  --  51  --   --   --    ALBUMIN 3.5  --  2.8*  --  2.5*   GLOBULIN 3.7*  --  3.2  --  3.2   MACROCYTOSIS  --   --  1+ 1+  --      No results found for: AMMONIA    ENDOCRINE:              Recent Labs     02/21/22  2337 02/22/22  0313 02/22/22  0406 02/22/22  0543 02/22/22  0800 02/22/22  2228 02/23/22  0314 02/24/22  0300   GLUCOSE  --   --    < >  --    < > 155* 186* 89   POCGLUCOSE 137* 81  --  131*  --   --   --   --     < > = values in this interval not displayed.     Lab Results   Component Value Date    HBA1C 6.5 (H) 02/22/2022     HBA1C 7.2 (H) 05/19/2020       Imaging:   MR-LUMBAR SPINE-WITH & W/O  02/22/22 0737  1. Bilateral L5 pars defects. However, there is no anterolisthesis.     2. Small left facet origin subligamentous synovial cyst projects into the posterolateral spinal canal behind L5 without significant thecal sac compression. Bilateral mild facet degeneration at L4-5 and L5-S1.     3. No abnormal enhancement is identified in the lumbar spine to suggest an infectious process.     MR-THORACIC SPINE-WITH & W/O  02/22/22 0731  1. MRI of the thoracic spine without and with contrast within normal limits.     DX-CHEST-PORTABLE (1 VIEW) 02/21/22 4854  1.  No acute cardiopulmonary disease.   2.  Cardiomegaly    CT scan of the abdomen and pelvis without contrast  1.  No evidence of acute inflammatory process in the abdomen or pelvis  2.  Anasarca  3.  Prominent venous collaterals in the BILATERAL inguinal region  4.  BILATERAL L5 pars defects without spondylolisthesis     Problem Representation:  Mr. Watkins is a 46 year old male who presents 2/21/22 for back pain, His past medical history includes Methamphetamine abuse, Hypertension, Diabetes Mellitus, End Stage Renal Disease on hemodialysis at [facility] every Tues/Thurs/Sat.  Reportedly missing hemodialysis due to transportation issues. Describes his back pain as dull, nonradiating, 8 out of 10 intensity, worse with movement relieved with rest.    * Back pain  Assessment & Plan  Upon presentation, had elevated ESR, CRP. Patient denied any saddle anesthesia, numbness, bowel or urinary incontinence upon presentation. Other red flags include fever, preceding trauma, and cancer.  CT scan of the abdomen and pelvis without contrast 2/23/22 revealed no evidence of acute inflammatory process in the abdomen or pelvis, Anasarca, Prominent venous collaterals in the BILATERAL inguinal region, and BILATERAL L5 pars defects without spondylolisthesis.  - MRI thoracic and lumbar with and without  ordered - follow official results   -Dilaudid for pain 7-10 intensity  -Oxycodone for pain 3-6 in intesity  -Lidoderm   -OT/PT  -CTM      End stage renal disease (HCC)  Assessment & Plan  Patient has End Stage Renal Disease on hemodialysis at [facility] every Tues/Thurs/Sat but has been Non-compliant with treatments. Per chart review, patient has not had dialysis for over 1.5 weeks. Nephrology consulted will dialyze. 1st dialysis session 2/22/22. Night of 2/23/22, US reveals no flow in the hemodialysis graft, vascular surgery consulted - they will evaluate patient and graft on 2/25/22; Patient NPO at midnight. On 2/24/22   -Dialysis as needed while inpatient  -monitor and replete electrolytes  -Diuretics if volume overloaded after dialysis only if patient is able to produce urine  -Vascular surgery consulted, following regarding hemodialysis graft occlusion     Anemia  Assessment & Plan  Hemoglobin at 11.1, MCV 93.5, RDW elevated at 57.9. 2/23/22 labwork revealed:Serum Iron 8 (L), ferritin 665 (H) , TIBC 178 (L),  Transferrin 149 (L). Suggesting a mixed picture of possible iron deficiency and ACD in the setting of renal disease.    -started on ferous sulfate 325mg  -consider epo if hgb decreased further  -nephrology onboard, recs appreciated     Diarrhea  Assessment & Plan  Reports diarrhea of 2 weeks duration. Semi-solid on 2/23/22, C. Difficile is negative. No bowel movement overnight or in the morning of 2/24.  -Stool Ova & parasites peneding  -stool lactoferrin  -Tissue Transglutaminase Antibodies (tTG-IgA) for celiac disease evaluation  -monitor and replete electrolytes    Hypocalcemia  Assessment & Plan  Upon presentation, calcium is low at 6.7. Albumin is normal at 3.5. If patient is truly hypocalcemic, back pain could be secondary to bone breakdown for calcium. Hypocalcemia could also be from malabsorption. On 2/23/22 labs revealed: Ionized calcium ,  (H), 25 Hydroxy Vitamin D 9 (L), Magnesium 2.2  (N), Phosphorus 7.8 (H).  -Ionized calcium pending  -vitamin D3 (cholecalciferol) tablet 2,000 Units  -calcitRIOL (ROCALTROL) capsule 0.25 mcg  -CTM        Methamphetamine abuse (HCC)  Assessment & Plan  Actively uses methamphetamines.   -Counseled on cessation, denies use  -Monitor for agitation and signs of withdrawal    Hyperkalemia- (present on admission)  Assessment & Plan  Patient presents with potassium of 5.9, it increased to 6.2 on 2/22/22. Patient received insulin/D50/calcium gluconate/2 Amps of bicarb 50 mEq upon presentation. Hyperkalemia likely secondary to ESRD and missed dialysis sessions but could be 2/2 constipation. Improved after first dialysis session on 2/22/22. Stable  -Telemetry monitoring  -Recheck potassium levels and monitor   -CTM      Leukocytosis- (present on admission)  Assessment & Plan  Initialy elevated on 2/23/22 at 11.2, increased to 32.3 on 2/23. Patient afebrile. Might be reactionary to ESRD. Will continue to evaluate and watch for signs of infection. CT scan of the abdomen and pelvis without contrast 2/23/22 revealed no evidence of acute inflammatory process in the abdomen or pelvis, Anasarca, Prominent venous collaterals in the BILATERAL inguinal region, and BILATERAL L5 pars defects without spondylolisthesis. ON 2/24/22 Bladder scan reveals bladder with 221ml (No urine output since morning of 2/23); Blood cultures positive for gram negative rods, patient placed on ABx's.  -cefTRIAXone (Rocephin) syringe 2 g  -metroNIDAZOLE (FLAGYL) tablet 500 mg  -ID consulted, following. Recs appreciated  -CTM    Hypertension- (present on admission)  Assessment & Plan  Uncontrolled on presentation, currently stable. BP at 106/104 on the floor (before dialysis on 2/23). Hypertension could be secondary to ESRD.  -Amlodipine 10 mg daily held on 2/23/22  -IV hydralazine 10 mg Q4H PRN for SBP greater than 180    DM (diabetes mellitus) ([facility])  Assessment & Plan  Hemoglobin A1c 5/19/2020 was 7.2%.  Recent hemoglobin A1c was 6.5%.  - Sliding Scale Insulin  - Hypoglycemia protocol  - Frequent accu-checks   - outpatient management and follow-up discussion with patient prior to discharge      Code Status: FULL  DVT ppx: Heparin   Diet: Renal      Majo Chávez MD MPH  PGY-1, [facility]    Assessment and plan discussed with senior resident and attending physician.

## 2022-02-24 NOTE — PROGRESS NOTES
".  [facility] -  PROGRESS NOTE               Author: EMMA Dial Date & Time: 2/24/2022  10:09 AM     HPI:  This is a 46 year old male with past medical history of End Stage Renal Disease on hemodialysis at [facility] every Tues/Thurs/Sat, diabetes mellitus and Hypertension who presented to the emergency department on 2/21/22 complaining of lower back pain.  Reportedly missing hemodialysis due to transportation issues. MRI this am and medicated for pain. Seen on Hemodialysis this am, lethargic after PRN medications.     DAILY NEPHROLOGY SUMMARY:  2/23: 3.5L Net UF. Resting in bed. Calm and agreeable to HD today. C/o cont. Back pain. Bps low this am.   2/24: Echo in progress. Primary team at . Resting in bed. C/o cont back pain. 2.0L net UF. K 4.0, CO2 22. Denies Diarrhea. Denies CP/SOB.     REVIEW OF SYSTEMS:    10 point ROS reviewed and is as per HPI/daily summary or otherwise negative    PMH/PSH/SH/FH: Reviewed and unchanged since admission note  CURRENT MEDICATIONS: Reviewed from admission to present day    VS:  /81   Pulse 94   Temp 36.4 °C (97.5 °F) (Temporal)   Resp 18   Ht 1.778 m (5' 10\")   Wt 121 kg (265 lb 14 oz)   SpO2 90%   BMI 38.15 kg/m²   Physical Exam  Constitutional:       General: He is not in acute distress.     Appearance: He is ill-appearing.   HENT:      Head: Normocephalic and atraumatic.      Mouth/Throat:      Dentition: Abnormal dentition.   Eyes:      Extraocular Movements: Extraocular movements intact.      Conjunctiva/sclera: Conjunctivae normal.      Pupils: Pupils are equal, round, and reactive to light.   Cardiovascular:      Rate and Rhythm: Tachycardia present.      Pulses: Normal pulses.      Heart sounds: Normal heart sounds. No murmur heard.    No friction rub. No gallop.      Comments: R TDC  L AVF   Pulmonary:      Effort: Pulmonary effort is normal. No respiratory distress.      Breath sounds: Normal breath sounds. No " stridor. No wheezing or rhonchi.   Abdominal:      General: There is no distension.      Palpations: Abdomen is soft. There is no mass.   Musculoskeletal:         General: Swelling present. Normal range of motion.      Right lower leg: Edema present.      Left lower leg: Edema present.   Skin:     General: Skin is warm and dry.      Comments: Scattered Scabs through out   Neurological:      Mental Status: He is alert.   Psychiatric:         Mood and Affect: Mood normal.         Fluids:  In: -   Out: 2000     LABS:  Recent Labs     02/22/22  2228 02/23/22  0314 02/24/22  0300   SODIUM 137 136 136   POTASSIUM 4.5 4.8 4.0   CHLORIDE 105 103 100   CO2 11* 12* 22   GLUCOSE 155* 186* 89   * 99* 79*   CREATININE 13.08* 12.37* 12.38*   CALCIUM 6.9* 6.7* 6.6*     MR-LUMBAR SPINE-WITH & W/O  Order: [id number]   Status: Final result     Visible to patient: No (inaccessible in MyChart)     Next appt: None     0 Result Notes    Details    Reading Physician Reading Date Result Priority   Lisa Link M.D.  [phone] 2/22/2022 STAT     Narrative & Impression     2/22/2022 1:06 AM     HISTORY/REASON FOR EXAM:  Epidural abscess suspected        TECHNIQUE/EXAM DESCRIPTION:  MRI of the lumbar spine without and with contrast.     The study was performed on a [employer] 1.5 Jacqueline MRI scanner.     T1 sagittal, T2 fast spin-echo sagittal, and T2 axial images were obtained of the lumbar spine. T1 postcontrast fat-suppressed sagittal images were obtained.     20 mL ProHance contrast was administered intravenously.     COMPARISON:  None.     FINDINGS:  Lumbar spine alignment is normal.  Bilateral L5 pars defects without anterolisthesis.  Bone marrow signal intensity is normal. Mild disc desiccation is noted at L4-L5.  Bilateral pars defects noted at L5. The conus terminates at L1.     Lumbar spine levels:     T12-L1: No significant abnormality.     L1-2: Mild facet degeneration. The stenosis.     L2-3: Minimal facet  degeneration. No significant canal stenosis or foraminal narrowing.     L3-4: Broad-based disc bulge and bilateral mild facet degeneration. There is no significant spinal canal narrowing or neuroforaminal narrowing.     L4-5, bilateral facet degeneration. Broad-based disc bulge with a focal central protrusion that minimally encroaches upon the spinal canal. There is no significant foraminal narrowing. There is minimal canal narrowing without thecal sac compression.   There is a left facet origin synovial cyst that extends into the left subligamentous at the level of the left L5 pars defect and minimally encroaches upon the spinal canal from the posterolateral aspect. Bilateral L5 pars defects are present.     Postcontrast images through the lumbar spine does not demonstrate any abnormal enhancement. Minimal epidural enhancement identified at the dorsal aspect of L5 is likely related to degenerative changes in the facet joints and synovial hypertrophy. No   pathological epidural enhancement to suggest infection identified.     IMPRESSION:        Bilateral L5 pars defects. However, there is no anterolisthesis.     Small left facet origin subligamentous synovial cyst projects into the posterolateral spinal canal behind L5 without significant thecal sac compression. Bilateral mild facet degeneration at L4-5 and L5-S1.     No abnormal enhancement is identified in the lumbar spine to suggest an infectious process.      HISTORY/REASON FOR EXAM:  Epidural abscess suspected        TECHNIQUE/EXAM DESCRIPTION:  MRI of the thoracic spine without and with contrast.     The study was performed on a [employer] 1.5 Jacqueline MRI scanner. T1 sagittal, T2 fast spin-echo sagittal, and T2 axial images were obtained of the thoracic spine. T1 post-contrast fat suppressed sagittal images were obtained. Optional T1 post-contrast   axial images may be obtained.     20 mL ProHance contrast was administered intravenously.     COMPARISON:   None.     FINDINGS:     Alignment in the thoracic spine is normal. Marrow signal in the vertebral bodies is within normal limits. There is no abnormal osseous enhancement in the vertebral bodies or other abnormal extradural enhancement. The disc spaces are intact at all   thoracic levels. There are no significant osteophytic changes. The prevertebral and paraspinous soft tissues are unremarkable.     The thoracic spinal cord is normal in caliber and signal throughout its course. There is no abnormal cord enhancement. There is no abnormal intradural extramedullary enhancement.     At T1-2 through T11-12 there is no significant disc bulge or protrusion. The neural foramina are intact at all thoracic levels. There is no congenital or acquired central spinal stenosis at any thoracic level. There is no significant ligamentous or facet   hypertrophy.     IMPRESSION:        MRI of the thoracic spine without and with contrast within normal limits.    US-HEMODIALYSIS GRAFT DUPLEX COMP UPPER EXTREMITY  Order: [id number]   Status: Final result     Visible to patient: No (inaccessible in MyChart)     Next appt: None     0 Result Notes    Details    Reading Physician Reading Date Result Priority   No Reading Provider Prelim 2/23/2022    Buck Kenyon M.D.  [phone] 2/23/2022      Narrative & Impression         Hemodialysis Access Report      Vascular Laboratory   Conclusions   Hemodialysis loop graft with the proximal anastomosis at the distal    brachial artery and the distal anastomosis at the basilic vein in the mid    biceps.   No flow is demonstrated in the hemodialysis graft. Echolucent material    fills the graft throughout its length.      No prior study available for comparison.      CONY MORAN      Exam Date:     02/23/2022 18:09      Room #:     Inpatient      Priority:     Routine      Ht (in):             Wt (lb):      Ordering Physician:        LIVAN BARBOZA      Referring Physician:       [id number],  TAMRA      Sonographer:               Kamala Ball RVT      Study Type:                Complete Unilateral      Technical Quality:         Adequate      Age:    46    Gender:     M      MRN:    [id number]      :    1975      BSA:      Indications:     AV Fistula - S/P, AV Fistula / Thrombosed      CPT Codes:       12391      ICD Codes:       447  996.73      History:         Left arm brachial to basilic loop graft. No prior duplex.      Limitations:      Exam Data:   Side:          Left            Access          Graft   Inflow Artery   Brachial   Outflow Vein    Basilic                         Velocity (cm/s)    Prox Inflow artery    Mid Inflow artery           109.0                                0    Distal Inflow artery        95.00    Inflow Anastomosis          0.00    Inflow Artery distal to     anastomosis          Proximal Graft              0.00    Mid Graft                   0.00    Distal Graft                0.00    Outflow Anastomosis         0.00    Proximal Outflow Vein       10.00    Mid Outflow Vein    Distal Outflow Vein    Flow Volume Mid Bicep            ml/min    Flow Volume Mid-Forearm          ml/min    Prox Outflow Vein Diam            cm    Mid Outflow Vein Diam             cm    Distal Outflow Vein Diam          cm      Findings   Hemodialysis loop graft with the proximal anastomosis at the distal    brachial artery and the distal anastomosis at the basilic vein in the mid    biceps.    Doppler waveforms of the brachial artery are of high amplitude and    triphasic.   No flow is demonstrated in the hemodialysis graft. Echolucent material    fills the graft throughout its length.    The basilic and brachial veins are compressible.      Buck Kenyon MD   (Electronically Signed)   Final Date:      2022                     20:51         IMPRESSION:  # ESRD  - Etiology likely 2/2 DM/HTN via R TDC  # Thrombosed Left AVF  - US  no flow  # HTN  - Goal BP < 140/90  - At  goal  # Anemia of CKD  - Goal Hgb 10-11  - At goal  # CKD-MBD  -   - Vit D 9  - CCa 7.1  - PO4 9.5  # Hyperkalemia, corrected   - Received Insulin/D50/Ca Gluconate/2 Amps Bicarb  # Back Pain  - Elevated CRP  - MRI of Lumbar and Thoracic spine 2/22 (-) for epidural abscess  # Methamphetamine abuse   # DM   - A1C 6.5  - Per Primary Services  # Severe Met Acidosis, Corrected  - Bicarb Gtt stopped  - Correct with HD   # Leukocytosis, Trending down  - 11.2 --> 32.3 -->33.3 --> 26.5  - BCx2 2/23 NGTD     PLAN:  - No iHD today (THURS), then QMWF iHD during stay  - UF as tolerated   - Primary to Consult Vascular Surgery  - Hold antihtn  - Start Midodrine 5 mg TID   - Continue Cholecalciferol   - Continue Phos Binder with meals  - Stop Bicarb Gtt  - Hold JACKIE   - No dietary protein restrictions  - Dose all meds per ESRD

## 2022-02-24 NOTE — CARE PLAN
The patient is Stable - Low risk of patient condition declining or worsening    Shift Goals  Clinical Goals: labs, i&os  Patient Goals: rest    Progress made toward(s) clinical / shift goals:    Problem: Pain - Standard  Goal: Alleviation of pain or a reduction in pain to the patient’s comfort goal  Outcome: Progressing     Problem: Knowledge Deficit - Standard  Goal: Patient and family/care givers will demonstrate understanding of plan of care, disease process/condition, diagnostic tests and medications  Outcome: Progressing       Patient is not progressing towards the following goals:

## 2022-02-24 NOTE — PROGRESS NOTES
Lab called with critical result of Ca at 6.6. Critical lab result read back to Lab.   Dr. Villanueva notified of critical lab result at [phone].  Critical lab result read back by Dr. Villanueva.

## 2022-02-24 NOTE — THERAPY
"Occupational Therapy   Initial Evaluation     Patient Name: Ambrose Watkins  Age:  46 y.o., Sex:  male  Medical Record #: [id number]  Today's Date: 2/24/2022       Assessment  Patient is 46 y.o. male admitted with back pain with Hx of meth abuse, HTN, diabetes, and ESRD (difficulty getting to apt 2/2 transport issues). Pt presented today at baseline level for mobility and self cares. Pt voiced concerns support upon DC (emotional and transportation concerns). Provided education regarding positioning and ADL modifications up DC, pt reported understanding and agreement. No further OT needs indicated at this time. Patient will not be actively followed for occupational therapy services at this time, however may be seen if requested by physician for 1 more visit within 30 days to address any discharge or equipment needs.     Plan    Recommend Occupational Therapy for Evaluation only          02/24/22 0805   Prior Living Situation   Prior Services None   Housing / Facility Homeless   Lives with - Patient's Self Care Capacity Alone and Able to Care For Self   Comments Pt lives in his car and reports he is able to comlete self cares and IADLs independently but has been having difficulty 2/2 to back pain   Prior Level of ADL Function   Self Feeding Independent   Grooming / Hygiene Independent   Bathing Independent   Dressing Independent   Toileting Independent   Prior Level of IADL Function   Comments hopeful for resources for assist with IADLs   Pain   Pain Scales 0 to 10 Scale    Pain 0 - 10 Group   Therapist Pain Assessment   (low back pain but agreeable to mobility)   Cognition    Cognition / Consciousness X   Comments pleasant and agreeable. verbalized \"if I dont get help, I might as well end it\" RN notified   Active ROM Upper Body   Active ROM Upper Body  WDL   Strength Upper Body   Upper Body Strength  WDL   Balance Assessment   Sitting Balance (Static) Fair +   Sitting Balance (Dynamic) Fair +   Standing Balance " (Static) Fair +   Standing Balance (Dynamic) Fair +   Weight Shift Sitting Good   Weight Shift Standing Good   Comments without AD   Bed Mobility    Supine to Sit Modified Independent   Sit to Supine Modified Independent   Scooting Modified Independent   Comments education on log roll for pain, education on positioning in care and hospital bed for LE swelling   ADL Assessment   Grooming Supervision;Standing   Upper Body Dressing Supervision   Lower Body Dressing Supervision   Toileting Supervision   How much help from another person does the patient currently need...   6 Clicks Daily Activity Score 24   Functional Mobility   Sit to Stand Supervised   Bed, Chair, Wheelchair Transfer Supervised   Toilet Transfers Supervised   Edema / Skin Assessment   Edema / Skin  X   Comments Swelling in BLE, increased swelling in LLE compared to R   Activity Tolerance   Sitting Edge of Bed 15 min   Standing 7 min   Patient / Family Goals   Patient / Family Goal #1 to get more help   Education Group   Education Provided Activities of Daily Living;Home Safety   Role of Occupational Therapist Patient Response Patient;Acceptance;Explanation   Home Safety Patient Response Patient;Acceptance;Explanation   ADL Patient Response Patient;Acceptance;Explanation

## 2022-02-24 NOTE — PROGRESS NOTES
CHLUZ ELENA guillen introduced community care management to the patient. Patient states that he is living in his truck that does not run & is parked in front of the [employer]. Patient states that he receives $700 from disability monthly, receives $100 SNAP benefits, and does not have a cell phone. CHW provided information to the [place] [address], [employer] [facility], 2 [employer] bus passes, and scheduled a new patient appointment with Magy Shukla on 3/11/2022. Patient states that he [place] not [place] at [place]. CHW talked to [facility] who explained that he was declined by lorin landry. CHW attempted to expain [place] benefits and purchasing a pay as you go phone from [employer] but the patient got upset. CHW left the resources bedside.     Community Health Worker Intake  • Social determinates of health intake complete.   • Identified barriers to housing.   • Contact information provided to Ambrose Watkins   • Has PCP appointment scheduled for 3/11/2022  • Accepted Meds-To-Beds.   • Inpatient assessment completed.    Plan: CHW has advised the patient to go to the [employer] to speak to CCM. Patient does not have a cell phone. CHW will remove the patient from CCM caseload after DC.

## 2022-02-24 NOTE — CARE PLAN
Problem: Pain - Standard  Goal: Alleviation of pain or a reduction in pain to the patient’s comfort goal  Outcome: Progressing     Problem: Knowledge Deficit - Standard  Goal: Patient and family/care givers will demonstrate understanding of plan of care, disease process/condition, diagnostic tests and medications  Outcome: Progressing   The patient is Watcher - Medium risk of patient condition declining or worsening    Shift Goals  Clinical Goals: labs, i&os  Patient Goals: rest    Progress made toward(s) clinical / shift goals:  labs, pt I&os recorded     Patient is not progressing towards the following goals:

## 2022-02-25 ENCOUNTER — ANESTHESIA EVENT (OUTPATIENT)
Dept: SURGERY | Facility: MEDICAL CENTER | Age: 47
DRG: 264 | End: 2022-02-25
Payer: MEDICARE

## 2022-02-25 ENCOUNTER — APPOINTMENT (OUTPATIENT)
Dept: RADIOLOGY | Facility: MEDICAL CENTER | Age: 47
DRG: 264 | End: 2022-02-25
Attending: HOSPITALIST
Payer: MEDICARE

## 2022-02-25 ENCOUNTER — APPOINTMENT (OUTPATIENT)
Dept: RADIOLOGY | Facility: MEDICAL CENTER | Age: 47
DRG: 264 | End: 2022-02-25
Attending: SURGERY
Payer: MEDICARE

## 2022-02-25 ENCOUNTER — ANESTHESIA (OUTPATIENT)
Dept: SURGERY | Facility: MEDICAL CENTER | Age: 47
DRG: 264 | End: 2022-02-25
Payer: MEDICARE

## 2022-02-25 LAB
ALBUMIN SERPL BCP-MCNC: 2.4 G/DL (ref 3.2–4.9)
ALBUMIN/GLOB SERPL: 0.7 G/DL
ALP SERPL-CCNC: 294 U/L (ref 30–99)
ALT SERPL-CCNC: 12 U/L (ref 2–50)
ANION GAP SERPL CALC-SCNC: 19 MMOL/L (ref 7–16)
AST SERPL-CCNC: 23 U/L (ref 12–45)
BACTERIA STL CULT: NORMAL
BASOPHILS # BLD AUTO: 0.3 % (ref 0–1.8)
BASOPHILS # BLD: 0.05 K/UL (ref 0–0.12)
BILIRUB SERPL-MCNC: 0.4 MG/DL (ref 0.1–1.5)
BUN SERPL-MCNC: 86 MG/DL (ref 8–22)
C JEJUNI+C COLI AG STL QL: NORMAL
CALCIUM SERPL-MCNC: 6.4 MG/DL (ref 8.5–10.5)
CHLORIDE SERPL-SCNC: 98 MMOL/L (ref 96–112)
CO2 SERPL-SCNC: 16 MMOL/L (ref 20–33)
CREAT SERPL-MCNC: 10.25 MG/DL (ref 0.5–1.4)
E COLI SXT1+2 STL IA: NORMAL
EOSINOPHIL # BLD AUTO: 0.1 K/UL (ref 0–0.51)
EOSINOPHIL NFR BLD: 0.6 % (ref 0–6.9)
ERYTHROCYTE [DISTWIDTH] IN BLOOD BY AUTOMATED COUNT: 55.3 FL (ref 35.9–50)
EXTERNAL QUALITY CONTROL: NORMAL
FLUAV RNA SPEC QL NAA+PROBE: NEGATIVE
FLUBV RNA SPEC QL NAA+PROBE: NEGATIVE
GGT SERPL-CCNC: 239 U/L (ref 7–51)
GLOBULIN SER CALC-MCNC: 3.6 G/DL (ref 1.9–3.5)
GLUCOSE BLD STRIP.AUTO-MCNC: 111 MG/DL (ref 65–99)
GLUCOSE BLD STRIP.AUTO-MCNC: 201 MG/DL (ref 65–99)
GLUCOSE BLD STRIP.AUTO-MCNC: 57 MG/DL (ref 65–99)
GLUCOSE BLD STRIP.AUTO-MCNC: 69 MG/DL (ref 65–99)
GLUCOSE BLD STRIP.AUTO-MCNC: 94 MG/DL (ref 65–99)
GLUCOSE SERPL-MCNC: 129 MG/DL (ref 65–99)
HCT VFR BLD AUTO: 28 % (ref 42–52)
HGB BLD-MCNC: 9.3 G/DL (ref 14–18)
IMM GRANULOCYTES # BLD AUTO: 0.35 K/UL (ref 0–0.11)
IMM GRANULOCYTES NFR BLD AUTO: 1.9 % (ref 0–0.9)
LACTATE BLD-SCNC: 0.9 MMOL/L (ref 0.5–2)
LYMPHOCYTES # BLD AUTO: 1.68 K/UL (ref 1–4.8)
LYMPHOCYTES NFR BLD: 9.3 % (ref 22–41)
MCH RBC QN AUTO: 30.1 PG (ref 27–33)
MCHC RBC AUTO-ENTMCNC: 33.2 G/DL (ref 33.7–35.3)
MCV RBC AUTO: 90.6 FL (ref 81.4–97.8)
MONOCYTES # BLD AUTO: 1.22 K/UL (ref 0–0.85)
MONOCYTES NFR BLD AUTO: 6.7 % (ref 0–13.4)
NEUTROPHILS # BLD AUTO: 14.71 K/UL (ref 1.82–7.42)
NEUTROPHILS NFR BLD: 81.2 % (ref 44–72)
NRBC # BLD AUTO: 0 K/UL
NRBC BLD-RTO: 0 /100 WBC
PHOSPHATE SERPL-MCNC: 7.9 MG/DL (ref 2.5–4.5)
PLATELET # BLD AUTO: 123 K/UL (ref 164–446)
PMV BLD AUTO: 11.4 FL (ref 9–12.9)
POTASSIUM SERPL-SCNC: 4.7 MMOL/L (ref 3.6–5.5)
PROT SERPL-MCNC: 6 G/DL (ref 6–8.2)
RBC # BLD AUTO: 3.09 M/UL (ref 4.7–6.1)
SARS-COV+SARS-COV-2 AG RESP QL IA.RAPID: NEGATIVE
SARS-COV-2 RNA RESP QL NAA+PROBE: NOTDETECTED
SIGNIFICANT IND 70042: NORMAL
SITE SITE: NORMAL
SODIUM SERPL-SCNC: 133 MMOL/L (ref 135–145)
SOURCE SOURCE: NORMAL
SPECIMEN SOURCE: NORMAL
WBC # BLD AUTO: 18.1 K/UL (ref 4.8–10.8)

## 2022-02-25 PROCEDURE — 160028 HCHG SURGERY MINUTES - 1ST 30 MINS LEVEL 3: Performed by: SURGERY

## 2022-02-25 PROCEDURE — 03180JD BYPASS LEFT BRACHIAL ARTERY TO UPPER ARM VEIN WITH SYNTHETIC SUBSTITUTE, OPEN APPROACH: ICD-10-PCS | Performed by: SURGERY

## 2022-02-25 PROCEDURE — 90935 HEMODIALYSIS ONE EVALUATION: CPT

## 2022-02-25 PROCEDURE — 36830 ARTERY-VEIN NONAUTOGRAFT: CPT | Performed by: SURGERY

## 2022-02-25 PROCEDURE — 501837 HCHG SUTURE CV: Performed by: SURGERY

## 2022-02-25 PROCEDURE — 770020 HCHG ROOM/CARE - TELE (206)

## 2022-02-25 PROCEDURE — 82977 ASSAY OF GGT: CPT

## 2022-02-25 PROCEDURE — 93970 EXTREMITY STUDY: CPT

## 2022-02-25 PROCEDURE — 700102 HCHG RX REV CODE 250 W/ 637 OVERRIDE(OP): Performed by: INTERNAL MEDICINE

## 2022-02-25 PROCEDURE — 700101 HCHG RX REV CODE 250: Performed by: INTERNAL MEDICINE

## 2022-02-25 PROCEDURE — 87426 SARSCOV CORONAVIRUS AG IA: CPT | Performed by: SURGERY

## 2022-02-25 PROCEDURE — 97162 PT EVAL MOD COMPLEX 30 MIN: CPT

## 2022-02-25 PROCEDURE — 64415 NJX AA&/STRD BRCH PLXS IMG: CPT | Performed by: SURGERY

## 2022-02-25 PROCEDURE — 83605 ASSAY OF LACTIC ACID: CPT

## 2022-02-25 PROCEDURE — 99233 SBSQ HOSP IP/OBS HIGH 50: CPT | Mod: GC | Performed by: INTERNAL MEDICINE

## 2022-02-25 PROCEDURE — 700105 HCHG RX REV CODE 258: Performed by: SURGERY

## 2022-02-25 PROCEDURE — 160009 HCHG ANES TIME/MIN: Performed by: SURGERY

## 2022-02-25 PROCEDURE — 97535 SELF CARE MNGMENT TRAINING: CPT

## 2022-02-25 PROCEDURE — 700102 HCHG RX REV CODE 250 W/ 637 OVERRIDE(OP): Performed by: NURSE PRACTITIONER

## 2022-02-25 PROCEDURE — 82962 GLUCOSE BLOOD TEST: CPT

## 2022-02-25 PROCEDURE — 700101 HCHG RX REV CODE 250: Performed by: ANESTHESIOLOGY

## 2022-02-25 PROCEDURE — 80053 COMPREHEN METABOLIC PANEL: CPT

## 2022-02-25 PROCEDURE — 700111 HCHG RX REV CODE 636 W/ 250 OVERRIDE (IP): Performed by: STUDENT IN AN ORGANIZED HEALTH CARE EDUCATION/TRAINING PROGRAM

## 2022-02-25 PROCEDURE — 700111 HCHG RX REV CODE 636 W/ 250 OVERRIDE (IP): Performed by: ANESTHESIOLOGY

## 2022-02-25 PROCEDURE — 700111 HCHG RX REV CODE 636 W/ 250 OVERRIDE (IP): Performed by: INTERNAL MEDICINE

## 2022-02-25 PROCEDURE — 700101 HCHG RX REV CODE 250: Performed by: SURGERY

## 2022-02-25 PROCEDURE — 501838 HCHG SUTURE GENERAL: Performed by: SURGERY

## 2022-02-25 PROCEDURE — 84100 ASSAY OF PHOSPHORUS: CPT

## 2022-02-25 PROCEDURE — 700111 HCHG RX REV CODE 636 W/ 250 OVERRIDE (IP): Performed by: SURGERY

## 2022-02-25 PROCEDURE — 36415 COLL VENOUS BLD VENIPUNCTURE: CPT

## 2022-02-25 PROCEDURE — 160039 HCHG SURGERY MINUTES - EA ADDL 1 MIN LEVEL 3: Performed by: SURGERY

## 2022-02-25 PROCEDURE — 110454 HCHG SHELL REV 250: Performed by: SURGERY

## 2022-02-25 PROCEDURE — 85025 COMPLETE CBC W/AUTO DIFF WBC: CPT

## 2022-02-25 PROCEDURE — A9270 NON-COVERED ITEM OR SERVICE: HCPCS | Performed by: NURSE PRACTITIONER

## 2022-02-25 PROCEDURE — 160048 HCHG OR STATISTICAL LEVEL 1-5: Performed by: SURGERY

## 2022-02-25 PROCEDURE — 700111 HCHG RX REV CODE 636 W/ 250 OVERRIDE (IP)

## 2022-02-25 PROCEDURE — C1768 GRAFT, VASCULAR: HCPCS | Performed by: SURGERY

## 2022-02-25 PROCEDURE — 160035 HCHG PACU - 1ST 60 MINS PHASE I: Performed by: SURGERY

## 2022-02-25 PROCEDURE — 160002 HCHG RECOVERY MINUTES (STAT): Performed by: SURGERY

## 2022-02-25 PROCEDURE — A9270 NON-COVERED ITEM OR SERVICE: HCPCS | Performed by: INTERNAL MEDICINE

## 2022-02-25 PROCEDURE — 99221 1ST HOSP IP/OBS SF/LOW 40: CPT | Mod: 57 | Performed by: SURGERY

## 2022-02-25 DEVICE — GRAFT GOR STRCH 4-7X45CM: Type: IMPLANTABLE DEVICE | Site: ARM | Status: FUNCTIONAL

## 2022-02-25 RX ORDER — ROPIVACAINE HYDROCHLORIDE 5 MG/ML
INJECTION, SOLUTION EPIDURAL; INFILTRATION; PERINEURAL
Status: COMPLETED | OUTPATIENT
Start: 2022-02-25 | End: 2022-02-25

## 2022-02-25 RX ORDER — HEPARIN SODIUM 1000 [USP'U]/ML
INJECTION, SOLUTION INTRAVENOUS; SUBCUTANEOUS
Status: COMPLETED
Start: 2022-02-25 | End: 2022-02-25

## 2022-02-25 RX ORDER — METOPROLOL TARTRATE 1 MG/ML
1 INJECTION, SOLUTION INTRAVENOUS
Status: DISCONTINUED | OUTPATIENT
Start: 2022-02-25 | End: 2022-02-25 | Stop reason: HOSPADM

## 2022-02-25 RX ORDER — HYDROMORPHONE HYDROCHLORIDE 1 MG/ML
0.1 INJECTION, SOLUTION INTRAMUSCULAR; INTRAVENOUS; SUBCUTANEOUS
Status: DISCONTINUED | OUTPATIENT
Start: 2022-02-25 | End: 2022-02-25 | Stop reason: HOSPADM

## 2022-02-25 RX ORDER — ONDANSETRON 2 MG/ML
4 INJECTION INTRAMUSCULAR; INTRAVENOUS
Status: DISCONTINUED | OUTPATIENT
Start: 2022-02-25 | End: 2022-02-25 | Stop reason: HOSPADM

## 2022-02-25 RX ORDER — PREDNISONE 20 MG/1
40 TABLET ORAL DAILY
Status: DISCONTINUED | OUTPATIENT
Start: 2022-02-26 | End: 2022-02-25

## 2022-02-25 RX ORDER — BUPIVACAINE HYDROCHLORIDE 5 MG/ML
INJECTION, SOLUTION EPIDURAL; INTRACAUDAL
Status: DISCONTINUED | OUTPATIENT
Start: 2022-02-25 | End: 2022-02-25 | Stop reason: HOSPADM

## 2022-02-25 RX ORDER — LABETALOL HYDROCHLORIDE 5 MG/ML
5 INJECTION, SOLUTION INTRAVENOUS
Status: DISCONTINUED | OUTPATIENT
Start: 2022-02-25 | End: 2022-02-25 | Stop reason: HOSPADM

## 2022-02-25 RX ORDER — HEPARIN SODIUM,PORCINE 1000/ML
VIAL (ML) INJECTION PRN
Status: DISCONTINUED | OUTPATIENT
Start: 2022-02-25 | End: 2022-02-25 | Stop reason: SURG

## 2022-02-25 RX ORDER — HYDROMORPHONE HYDROCHLORIDE 1 MG/ML
0.2 INJECTION, SOLUTION INTRAMUSCULAR; INTRAVENOUS; SUBCUTANEOUS
Status: DISCONTINUED | OUTPATIENT
Start: 2022-02-25 | End: 2022-02-25 | Stop reason: HOSPADM

## 2022-02-25 RX ORDER — OXYCODONE HCL 5 MG/5 ML
5 SOLUTION, ORAL ORAL
Status: DISCONTINUED | OUTPATIENT
Start: 2022-02-25 | End: 2022-02-25 | Stop reason: HOSPADM

## 2022-02-25 RX ORDER — LISINOPRIL 10 MG/1
10 TABLET ORAL
Status: DISCONTINUED | OUTPATIENT
Start: 2022-02-25 | End: 2022-02-25

## 2022-02-25 RX ORDER — OXYCODONE HCL 5 MG/5 ML
10 SOLUTION, ORAL ORAL
Status: DISCONTINUED | OUTPATIENT
Start: 2022-02-25 | End: 2022-02-25 | Stop reason: HOSPADM

## 2022-02-25 RX ORDER — DEXTROSE MONOHYDRATE 25 G/50ML
50 INJECTION, SOLUTION INTRAVENOUS
Status: DISCONTINUED | OUTPATIENT
Start: 2022-02-25 | End: 2022-02-26

## 2022-02-25 RX ORDER — PROTAMINE SULFATE 10 MG/ML
INJECTION, SOLUTION INTRAVENOUS PRN
Status: DISCONTINUED | OUTPATIENT
Start: 2022-02-25 | End: 2022-02-25 | Stop reason: SURG

## 2022-02-25 RX ORDER — MIDAZOLAM HYDROCHLORIDE 1 MG/ML
1 INJECTION INTRAMUSCULAR; INTRAVENOUS
Status: DISCONTINUED | OUTPATIENT
Start: 2022-02-25 | End: 2022-02-25 | Stop reason: HOSPADM

## 2022-02-25 RX ORDER — INSULIN LISPRO 100 [IU]/ML
2-9 INJECTION, SOLUTION INTRAVENOUS; SUBCUTANEOUS
Status: DISCONTINUED | OUTPATIENT
Start: 2022-02-25 | End: 2022-02-26

## 2022-02-25 RX ORDER — INSULIN LISPRO 100 [IU]/ML
5 INJECTION, SOLUTION INTRAVENOUS; SUBCUTANEOUS
Status: DISCONTINUED | OUTPATIENT
Start: 2022-02-25 | End: 2022-02-26

## 2022-02-25 RX ORDER — IPRATROPIUM BROMIDE AND ALBUTEROL SULFATE 2.5; .5 MG/3ML; MG/3ML
3 SOLUTION RESPIRATORY (INHALATION)
Status: DISCONTINUED | OUTPATIENT
Start: 2022-02-25 | End: 2022-02-25 | Stop reason: HOSPADM

## 2022-02-25 RX ORDER — MEPERIDINE HYDROCHLORIDE 25 MG/ML
12.5 INJECTION INTRAMUSCULAR; INTRAVENOUS; SUBCUTANEOUS
Status: DISCONTINUED | OUTPATIENT
Start: 2022-02-25 | End: 2022-02-25 | Stop reason: HOSPADM

## 2022-02-25 RX ORDER — SODIUM CHLORIDE, SODIUM LACTATE, POTASSIUM CHLORIDE, CALCIUM CHLORIDE 600; 310; 30; 20 MG/100ML; MG/100ML; MG/100ML; MG/100ML
INJECTION, SOLUTION INTRAVENOUS CONTINUOUS
Status: DISCONTINUED | OUTPATIENT
Start: 2022-02-25 | End: 2022-02-25 | Stop reason: HOSPADM

## 2022-02-25 RX ORDER — ROCURONIUM BROMIDE 10 MG/ML
INJECTION, SOLUTION INTRAVENOUS PRN
Status: DISCONTINUED | OUTPATIENT
Start: 2022-02-25 | End: 2022-02-25 | Stop reason: SURG

## 2022-02-25 RX ORDER — CEFAZOLIN SODIUM 1 G/3ML
INJECTION, POWDER, FOR SOLUTION INTRAMUSCULAR; INTRAVENOUS PRN
Status: DISCONTINUED | OUTPATIENT
Start: 2022-02-25 | End: 2022-02-25 | Stop reason: SURG

## 2022-02-25 RX ORDER — HALOPERIDOL 5 MG/ML
1 INJECTION INTRAMUSCULAR
Status: DISCONTINUED | OUTPATIENT
Start: 2022-02-25 | End: 2022-02-25 | Stop reason: HOSPADM

## 2022-02-25 RX ORDER — DIPHENHYDRAMINE HYDROCHLORIDE 50 MG/ML
12.5 INJECTION INTRAMUSCULAR; INTRAVENOUS
Status: DISCONTINUED | OUTPATIENT
Start: 2022-02-25 | End: 2022-02-25 | Stop reason: HOSPADM

## 2022-02-25 RX ORDER — CALCIUM ACETATE 667 MG/1
2001 TABLET ORAL
Status: DISCONTINUED | OUTPATIENT
Start: 2022-02-25 | End: 2022-02-27

## 2022-02-25 RX ORDER — HYDROMORPHONE HYDROCHLORIDE 1 MG/ML
0.4 INJECTION, SOLUTION INTRAMUSCULAR; INTRAVENOUS; SUBCUTANEOUS
Status: DISCONTINUED | OUTPATIENT
Start: 2022-02-25 | End: 2022-02-25 | Stop reason: HOSPADM

## 2022-02-25 RX ORDER — ENALAPRILAT 1.25 MG/ML
1.25 INJECTION INTRAVENOUS EVERY 6 HOURS PRN
Status: DISCONTINUED | OUTPATIENT
Start: 2022-02-25 | End: 2022-02-25 | Stop reason: HOSPADM

## 2022-02-25 RX ADMIN — METRONIDAZOLE 500 MG: 500 TABLET ORAL at 21:42

## 2022-02-25 RX ADMIN — HEPARIN SODIUM 3200 UNITS: 1000 INJECTION, SOLUTION INTRAVENOUS; SUBCUTANEOUS at 18:41

## 2022-02-25 RX ADMIN — PROTAMINE SULFATE 25 MG: 10 INJECTION, SOLUTION INTRAVENOUS at 11:58

## 2022-02-25 RX ADMIN — ROCURONIUM BROMIDE 50 MG: 10 INJECTION, SOLUTION INTRAVENOUS at 10:43

## 2022-02-25 RX ADMIN — CEFTRIAXONE SODIUM 2 G: 10 INJECTION, POWDER, FOR SOLUTION INTRAVENOUS at 21:42

## 2022-02-25 RX ADMIN — HEPARIN SODIUM 5000 UNITS: 5000 INJECTION, SOLUTION INTRAVENOUS; SUBCUTANEOUS at 21:42

## 2022-02-25 RX ADMIN — ROPIVACAINE HYDROCHLORIDE 25 ML: 5 INJECTION, SOLUTION EPIDURAL; INFILTRATION; PERINEURAL at 11:25

## 2022-02-25 RX ADMIN — Medication 2001 MG: at 19:21

## 2022-02-25 RX ADMIN — FENTANYL CITRATE 100 MCG: 50 INJECTION, SOLUTION INTRAMUSCULAR; INTRAVENOUS at 11:26

## 2022-02-25 RX ADMIN — CEFAZOLIN 3 G: 330 INJECTION, POWDER, FOR SOLUTION INTRAMUSCULAR; INTRAVENOUS at 10:45

## 2022-02-25 RX ADMIN — PROTAMINE SULFATE 25 MG: 10 INJECTION, SOLUTION INTRAVENOUS at 11:55

## 2022-02-25 RX ADMIN — PROPOFOL 150 MG: 10 INJECTION, EMULSION INTRAVENOUS at 10:43

## 2022-02-25 RX ADMIN — INSULIN LISPRO 3 UNITS: 100 INJECTION, SOLUTION INTRAVENOUS; SUBCUTANEOUS at 21:39

## 2022-02-25 RX ADMIN — METRONIDAZOLE 500 MG: 500 TABLET ORAL at 15:30

## 2022-02-25 RX ADMIN — FENTANYL CITRATE 50 MCG: 50 INJECTION, SOLUTION INTRAMUSCULAR; INTRAVENOUS at 11:39

## 2022-02-25 RX ADMIN — DEXTROSE MONOHYDRATE 50 ML: 25 INJECTION, SOLUTION INTRAVENOUS at 05:22

## 2022-02-25 RX ADMIN — HEPARIN SODIUM 8000 UNITS: 1000 INJECTION, SOLUTION INTRAVENOUS; SUBCUTANEOUS at 11:25

## 2022-02-25 ASSESSMENT — COGNITIVE AND FUNCTIONAL STATUS - GENERAL
TURNING FROM BACK TO SIDE WHILE IN FLAT BAD: A LITTLE
SUGGESTED CMS G CODE MODIFIER MOBILITY: CJ
MOBILITY SCORE: 22
MOVING TO AND FROM BED TO CHAIR: A LITTLE

## 2022-02-25 ASSESSMENT — GAIT ASSESSMENTS
GAIT LEVEL OF ASSIST: CONTACT GUARD ASSIST
DEVIATION: INCREASED BASE OF SUPPORT;BRADYKINETIC;DECREASED TOE OFF
DISTANCE (FEET): 10

## 2022-02-25 ASSESSMENT — ENCOUNTER SYMPTOMS
HEADACHES: 0
DIZZINESS: 0
BACK PAIN: 1

## 2022-02-25 ASSESSMENT — PAIN DESCRIPTION - PAIN TYPE: TYPE: ACUTE PAIN

## 2022-02-25 ASSESSMENT — FIBROSIS 4 INDEX: FIB4 SCORE: 2.38

## 2022-02-25 NOTE — ANESTHESIA PREPROCEDURE EVALUATION
Case: [id number] Date/Time: 02/25/22 1015    Procedures:       CREATION, AV FISTULA-UPPER EXTREMITY GRAFT, AND FISTULOGRAM (Left Arm Upper)      THROMBECTOMY (Left Arm Upper)    Anesthesia type: General    Location: [facility] OR  / SURGERY [facility]    Surgeons: Tone Hartman M.D.          Relevant Problems   CARDIAC   (positive) Hypertension         (positive) JANICE (acute kidney injury) (HCC)   (positive) End stage renal disease (HCC)       Physical Exam    Airway   Mallampati: II  TM distance: >3 FB  Neck ROM: full       Cardiovascular - normal exam  Rhythm: regular  Rate: normal  (-) murmur     Dental - normal exam           Pulmonary - normal exam  Breath sounds clear to auscultation     Abdominal    Neurological - normal exam                 Anesthesia Plan    ASA 4   ASA physical status 4 criteria: REJI, ESRD not undergoing regularly scheduled dialysis and other (comment)    Plan - general       Airway plan will be ETT          Induction: intravenous    Postoperative Plan: Postoperative administration of opioids is intended.    Pertinent diagnostic labs and testing reviewed    Informed Consent:    Anesthetic plan and risks discussed with patient.    Use of blood products discussed with: patient whom consented to blood products.

## 2022-02-25 NOTE — PROGRESS NOTES
"Daily Progress Note:     Date of Service: 2/25/2022  Primary Team: [facility] IM Orange Team   Attending: Rian Szymanski M.D.   Senior Resident: Dr. Lamb  Intern: Dr. Chávez  Contact:  [phone]    Chief Complaint:   Low Back Pain     ID: Mr. Watkins is a 46 year old male who presents 2/21/22 for back pain. His past medical history includes Methamphetamine abuse, Hypertension, Diabetes Mellitus, End Stage Renal Disease on hemodialysis at [facility] every Tues/Thurs/Sat.  Reportedly missing hemodialysis due to transportation issues. Describes his back pain as dull, nonradiating, 8 out of 10 intensity, worse with movement relieved with rest.    Subjective:  -Was NPO at midnight in preparation for hemodialysis graft  -Agitated 2/2 to being NPO for hemodialysis graft procedure  -Slept \"a little\"  -Reports back pain  -Patient will likely have AVG insertion as oppose to thrombectomy of existing hemodialysis graft, the existing graft however it has undergone 5 previous thrombectomies/revisions since it was put in 8 months ago  -WBC decreasing  -GGT ordered 2/2 elevated alkaline phosphatase  -LE ultrasound pending      Consultants/Specialty:  Nephrology  Vascular    Review of Systems:  Limited secondary to patient agitation and cooperation    Review of Systems   Musculoskeletal: Positive for back pain.   Neurological: Negative for dizziness and headaches.       Objective Data:   Physical Exam:   Vitals:   Temp:  [36.2 °C (97.2 °F)-37.4 °C (99.3 °F)] 36.2 °C (97.2 °F)  Pulse:  [78-98] 78  Resp:  [16-20] 17  BP: (129-152)/(78-97) 152/89  SpO2:  [88 %-97 %] 95 %      Physical Exam limited secondary to patient agitation and cooperation  Physical Exam  Constitutional:       General: He is in acute distress.      Appearance: He is obese. He is ill-appearing.   HENT:      Head: Normocephalic and atraumatic.      Nose: No congestion.      Mouth/Throat:      Mouth: Mucous membranes are moist.      Comments: Poor dentition    Eyes: "      General:         Right eye: No discharge.         Left eye: No discharge.      Pupils: Pupils are equal, round, and reactive to light.   Cardiovascular:      Rate and Rhythm: Regular rhythm.      Pulses: Normal pulses.      Heart sounds: Normal heart sounds. No murmur heard.     Comments: No palpable thrill at fistula location  Pulmonary:      Effort: Pulmonary effort is normal. No respiratory distress.      Breath sounds: No wheezing.   Abdominal:      General: Bowel sounds are normal. There is distension.      Palpations: Abdomen is soft. There is no mass.      Tenderness: There is abdominal tenderness. There is no guarding or rebound.      Comments: Rigid flanks bilaterally, 1-2+ pitting edema in flanks bilaterally   Musculoskeletal:         General: Tenderness present. Swelling: left leg slightly larger than right.      Cervical back: Normal range of motion.      Right lower leg: Edema present.      Left lower leg: Edema present.   Skin:     General: Skin is warm and dry.      Coloration: Skin is not jaundiced.      Findings: No erythema.      Comments: Excoriations and scabs on face, chest, and arms   Neurological:      Mental Status: He is oriented to person, place, and time. Mental status is at baseline.      Motor: Weakness present.        Labs:   HEMATOLOGY/ ONCOLOGY/ID:            Recent Labs     02/23/22  0314 02/23/22  1501 02/24/22  0300 02/25/22  0057   WBC 32.3* 33.3* 26.5* 18.1*   RBC 3.44* 3.06* 3.20* 3.09*   HEMOGLOBIN 10.3* 9.3* 9.6* 9.3*   HEMATOCRIT 31.4* 26.9* 28.4* 28.0*   MCV 91.3 87.9 88.8 90.6   MCH 29.9 30.4 30.0 30.1   RDW 57.0* 53.9* 54.4* 55.3*   PLATELETCT 143* 129* 121* 123*   MPV 11.7 11.4 11.5 11.4   NEUTSPOLYS 97.40* 87.00*  --  81.20*   LYMPHOCYTES 0.00* 1.70*  --  9.30*   MONOCYTES 1.70 6.10  --  6.70   EOSINOPHILS 0.00 0.00  --  0.60   BASOPHILS 0.00 0.00  --  0.30   RBCMORPHOLO Present Present  --   --      Lab Results   Component Value Date    FERRITIN 665.0 (H)  02/22/2022    IRON 8 (L) 02/22/2022    TOTIRONBC 178 (L) 02/22/2022       RENAL:        Estimated GFR/CRCL = Estimated Creatinine Clearance: 12 mL/min (A) (by C-G formula based on SCr of 10.25 mg/dL (HH)).  Recent Labs     02/23/22 0314 02/24/22  0300 02/25/22  0057   SODIUM 136 136 133*   POTASSIUM 4.8 4.0 4.7   CHLORIDE 103 100 98   CO2 12* 22 16*   GLUCOSE 186* 89 129*   BUN 99* 79* 86*   CREATININE 12.37* 12.38* 10.25*   CALCIUM 6.7* 6.6* 6.4*   PHOSPHORUS 7.8* 6.8* 7.9*   ALBUMIN 2.8* 2.5* 2.4*       GASTROINTESTINAL/ HEPATIC:          Recent Labs     02/22/22 2228 02/23/22 0314 02/23/22  1501 02/24/22  0300 02/24/22  1320 02/25/22  0057   ALTSGPT  --  15  --  11  --  12   ASTSGOT  --  18  --  9*  --  23   ALKPHOSPHAT  --  138*  --  169*  --  294*   TBILIRUBIN  --  0.6  --  0.5  --  0.4   GAMMAGT  --   --   --   --   --  239*   LIPASE 51  --   --   --   --   --    ALBUMIN  --  2.8*  --  2.5*  --  2.4*   GLOBULIN  --  3.2  --  3.2  --  3.6*   INR  --   --   --   --  1.25*  --    MACROCYTOSIS  --  1+ 1+  --   --   --      No results found for: AMMONIA    ENDOCRINE:              Recent Labs     02/23/22 0314 02/24/22  0300 02/25/22  0057   GLUCOSE 186* 89 129*     Lab Results   Component Value Date    HBA1C 6.5 (H) 02/22/2022    HBA1C 7.2 (H) 05/19/2020         Imaging:   MR-LUMBAR SPINE-WITH & W/O  02/22/22 0737  1. Bilateral L5 pars defects. However, there is no anterolisthesis.     2. Small left facet origin subligamentous synovial cyst projects into the posterolateral spinal canal behind L5 without significant thecal sac compression. Bilateral mild facet degeneration at L4-5 and L5-S1.     3. No abnormal enhancement is identified in the lumbar spine to suggest an infectious process.     MR-THORACIC SPINE-WITH & W/O  02/22/22 0587  1. MRI of the thoracic spine without and with contrast within normal limits.     DX-CHEST-PORTABLE (1 VIEW) 02/21/22 5721  1.  No acute cardiopulmonary disease.   2.   Cardiomegaly     CT scan of the abdomen and pelvis without contrast   1.  No evidence of acute inflammatory process in the abdomen or pelvis  2.  Anasarca  3.  Prominent venous collaterals in the BILATERAL inguinal region  4.  BILATERAL L5 pars defects without spondylolisthesis       ECHO  LV EF:  30    %   Global hypokinesis.  Probably grade II diastolic dysfunction.      Problem Representation:  Mr. Watkins is a 46 year old male who presents 2/21/22 for back pain, His past medical history includes Methamphetamine abuse, Hypertension, Diabetes Mellitus, End Stage Renal Disease on hemodialysis at [facility] every Tues/Thurs/Sat.  Reportedly missing hemodialysis due to transportation issues. Describes his back pain as dull, nonradiating, 8 out of 10 intensity, worse with movement relieved with rest.      * Back pain  Assessment & Plan  Upon presentation, had elevated ESR, CRP. Patient denied any saddle anesthesia, numbness, bowel or urinary incontinence upon presentation. Other red flags include fever, preceding trauma, and cancer.  CT scan of the abdomen and pelvis without contrast 2/23/22 revealed no evidence of acute inflammatory process in the abdomen or pelvis, Anasarca, Prominent venous collaterals in the BILATERAL inguinal region, and BILATERAL L5 pars defects without spondylolisthesis.  MRI thoracic WNL. MRI lumbar revealed Bilateral L5 pars defects, Small left facet origin subligamentous synovial cyst projects into the posterolateral spinal canal behind L5 without significant thecal sac compression, and Bilateral mild facet degeneration at L4-5 and L5-S1.   -Dilaudid for pain 7-10 intensity  -Oxycodone for pain 3-6 in intesity  -Lidoderm   -OT/PT  -CTM      End stage renal disease (HCC)  Assessment & Plan  Patient has End Stage Renal Disease on hemodialysis at [facility] every Tues/Thurs/Sat but has been Non-compliant with treatments. Per chart review, patient has not had dialysis for over 1.5 weeks.  Nephrology consulted will dialyze. 1st dialysis session 2/22/22. Night of 2/23/22, US reveals no flow in the hemodialysis graft, vascular surgery consulted - they will evaluate patient and graft on 2/25/22; Patient NPO at midnight. On 2/24/22, Vascular surgery consulted, they Originally considered thrombectomy of the existing graft however it has undergone 5 previous thrombectomies/revisions since it was put in 8 months ago, therefore  new AVG insertion on 2/25/22. Midodrine stopped 2/25/22.  -Dialysis as needed while inpatient  -monitor and replete electrolytes  -Diuretics if volume overloaded after dialysis only if patient is able to produce urine  -calcium acetate (phos-lo) 667mg tablet per nephrology    Anemia  Assessment & Plan  Hemoglobin at 11.1, MCV 93.5, RDW elevated at 57.9. 2/23/22 labwork revealed:Serum Iron 8 (L), ferritin 665 (H) , TIBC 178 (L),  Transferrin 149 (L). Suggesting a mixed picture of possibly iron deficiency and ACD in the setting of renal disease.    -started on ferous sulfate 325mg  -consider epo if hgb decreased further  -nephrology onboard, recs appreciated     Diarrhea  Assessment & Plan  Reports diarrhea of 2 weeks duration. Semi-solid on 2/23/22, C. Difficile is negative. Tissue Transglutaminase Antibodies (tTG-IgA) for celiac disease evaluation was negative. No bowel movement overnight or in the morning of 2/24. Stable  -Stool lactoferrin, Ova & parasites pending  -monitor and replete electrolytes    Hypocalcemia  Assessment & Plan  Upon presentation, calcium is low at 6.7. Albumin is normal at 3.5. If patient is truly hypocalcemic, back pain could be secondary to bone breakdown for calcium. Hypocalcemia could also be from malabsorption. On 2/23/22 labs revealed: Ionized calcium ,  (H), 25 Hydroxy Vitamin D 9 (L), Magnesium 2.2 (N), Phosphorus 7.8 (H). Ionized calcium low at 0.8 on 2/25/22. Started on calcium carbonate.   -vitamin D3 (cholecalciferol) tablet 2,000  Units  -calcitRIOL (ROCALTROL) capsule 0.25 mcg  -Calcium carbonate tablets for calcium repletion  -CTM        Methamphetamine abuse (HCC)  Assessment & Plan  Actively uses methamphetamines. Urine drug screen ordered but not processed 2/2 limited urine output.  -Counseled on cessation, denies use  -Monitor for agitation and signs of withdrawal    Hyperkalemia- (present on admission)  Assessment & Plan  Patient presents with potassium of 5.9, it increased to 6.2 on 2/22/22. Patient received insulin/D50/calcium gluconate/2 Amps of bicarb 50 mEq upon presentation. Hyperkalemia likely secondary to ESRD and missed dialysis sessions but could be 2/2 constipation. Improved after first dialysis session on 2/22/22. Stable  - Continue scheduled dialysis  -Telemetry monitoring  -Recheck potassium levels and monitor   -CTM      Leukocytosis- (present on admission)  Assessment & Plan  Initialy elevated on 2/23/22 at 11.2, increased to 32.3 on 2/23. Patient afebrile. Might be reactionary to ESRD. Will continue to evaluate and watch for signs of infection. CT scan of the abdomen and pelvis without contrast 2/23/22 revealed no evidence of acute inflammatory process in the abdomen or pelvis, Anasarca, Prominent venous collaterals in the BILATERAL inguinal region, and BILATERAL L5 pars defects without spondylolisthesis. ON 2/24/22 Bladder scan reveals bladder with 221ml (No urine output since morning of 2/23); Blood cultures positive for gram negative rods, patient will continue on ABx's.  -cefTRIAXone (Rocephin) syringe 2 g  -metroNIDAZOLE (FLAGYL) tablet 500 mg  -Repeat blood cultures x 2  -ID consulted, following. Recs appreciated  -CTM    Hypertension- (present on admission)  Assessment & Plan  Uncontrolled on presentation, currently stable. BP at 106/104 on the floor (before dialysis on 2/23). Hypertension could be secondary to ESRD. Patient had some soft blood pressures (SBP ~100) and high WBC's midodrine was started, but  discontinued on 2/25 when WBC continued to decrease and blood pressure stabilized. ECHO 2/24/22 revealed  LV EF:  30%, Global hypokinesis.  Probably grade II diastolic dysfunction. Consider lisinopril if SBP >130.  -Amlodipine 10 mg daily held on 2/23/22  -IV hydralazine 10 mg Q4H PRN for SBP greater than 180  -CTM    DM (diabetes mellitus) ([facility])  Assessment & Plan  Hemoglobin A1c 5/19/2020 was 7.2%. Recent hemoglobin A1c was 6.5%.  - Insulin Glargine 6 units in the evening  - Lispro 5 units TID  - Sliding Scale Insulin  - Hypoglycemia protocol  - Frequent accu-checks   - Outpatient management and follow-up discussion with patient prior to discharge      Code Status: FULL  DVT ppx: Heparin   Diet: Renal       Majo Chávez MD MPH  PGY-1, [employer] [facility]    Assessment and plan discussed with senior resident and attending physician.

## 2022-02-25 NOTE — OR SURGEON
Immediate Post OP Note    PreOp Diagnosis: esrd      PostOp Diagnosis: esrd      Procedure(s):  CREATION, AV FISTULA-UPPER EXTREMITY GRAFT, AND FISTULOGRAM - Wound Class: Clean    Insertion of left brachio-axillary AVG with 4-7mm PTFE conduit    Surgeon(s):  Tone Hartman M.D.    Anesthesiologist/Type of Anesthesia:  Anesthesiologist: Tevin Mccoy M.D.; Glenn Saleh M.D./General    Surgical Staff:  Circulator: Lukas D. Gansert, R.N.  Scrub Person: Nba Rosenthal    Specimens removed if any:  * No specimens in log *    Estimated Blood Loss: 35cc    Complications: none        2/25/2022 1:17 PM Tone Hartman M.D.

## 2022-02-25 NOTE — PROGRESS NOTES
".  [facility] -  PROGRESS NOTE               Author: EMMA Dial Date & Time: 2/25/2022  9:44 AM     HPI:  This is a 46 year old male with past medical history of End Stage Renal Disease on hemodialysis at [facility] every Tues/Thurs/Sat, diabetes mellitus and Hypertension who presented to the emergency department on 2/21/22 complaining of lower back pain.  Reportedly missing hemodialysis due to transportation issues. MRI this am and medicated for pain. Seen on Hemodialysis this am, lethargic after PRN medications.     DAILY NEPHROLOGY SUMMARY:  2/23: 3.5L Net UF. Resting in bed. Calm and agreeable to HD today. C/o cont. Back pain. Bps low this am.   2/24: Echo in progress. Primary team at . Resting in bed. C/o cont back pain. 2.0L net UF. K 4.0, CO2 22. Denies Diarrhea. Denies CP/SOB.   2/25: Upset about NPO this am for OR.  In bed, no acute distress. Bps improved. K 4.7, CO2 16.     REVIEW OF SYSTEMS:    10 point ROS reviewed and is as per HPI/daily summary or otherwise negative    PMH/PSH/SH/FH: Reviewed and unchanged since admission note  CURRENT MEDICATIONS: Reviewed from admission to present day    VS:  /91   Pulse 84   Temp 36.9 °C (98.5 °F) (Temporal)   Resp 16   Ht 1.778 m (5' 10\")   Wt 121 kg (265 lb 14 oz)   SpO2 97%   BMI 38.15 kg/m²   Physical Exam  Constitutional:       General: He is not in acute distress.     Appearance: He is ill-appearing.   HENT:      Head: Normocephalic and atraumatic.      Mouth/Throat:      Dentition: Abnormal dentition.   Eyes:      Extraocular Movements: Extraocular movements intact.      Conjunctiva/sclera: Conjunctivae normal.      Pupils: Pupils are equal, round, and reactive to light.   Cardiovascular:      Rate and Rhythm: Normal rate and regular rhythm.      Pulses: Normal pulses.      Heart sounds: Normal heart sounds. No murmur heard.    No friction rub. No gallop.      Comments: R TDC  L AVF   Pulmonary:      " Effort: Pulmonary effort is normal. No respiratory distress.      Breath sounds: Normal breath sounds. No stridor. No wheezing or rhonchi.   Abdominal:      General: There is no distension.      Palpations: Abdomen is soft. There is no mass.   Musculoskeletal:         General: Swelling present. Normal range of motion.      Right lower leg: Edema present.      Left lower leg: Edema present.   Skin:     General: Skin is warm and dry.      Comments: Scattered Scabs through out   Neurological:      Mental Status: He is alert.   Psychiatric:         Mood and Affect: Mood is anxious. Affect is angry.         Behavior: Behavior is agitated and aggressive.         Fluids:  In: 360 [P.O.:360]  Out: -     LABS:  Recent Labs     02/23/22  0314 02/24/22  0300 02/25/22  0057   SODIUM 136 136 133*   POTASSIUM 4.8 4.0 4.7   CHLORIDE 103 100 98   CO2 12* 22 16*   GLUCOSE 186* 89 129*   BUN 99* 79* 86*   CREATININE 12.37* 12.38* 10.25*   CALCIUM 6.7* 6.6* 6.4*     MR-LUMBAR SPINE-WITH & W/O  Order: [id number]   Status: Final result     Visible to patient: No (inaccessible in MyChart)     Next appt: None     0 Result Notes    Details    Reading Physician Reading Date Result Priority   Lisa Link M.D.  [phone] 2/22/2022 STAT     Narrative & Impression     2/22/2022 1:06 AM     HISTORY/REASON FOR EXAM:  Epidural abscess suspected        TECHNIQUE/EXAM DESCRIPTION:  MRI of the lumbar spine without and with contrast.     The study was performed on a [employer] 1.5 Jacqueline MRI scanner.     T1 sagittal, T2 fast spin-echo sagittal, and T2 axial images were obtained of the lumbar spine. T1 postcontrast fat-suppressed sagittal images were obtained.     20 mL ProHance contrast was administered intravenously.     COMPARISON:  None.     FINDINGS:  Lumbar spine alignment is normal.  Bilateral L5 pars defects without anterolisthesis.  Bone marrow signal intensity is normal. Mild disc desiccation is noted at L4-L5.  Bilateral pars  defects noted at L5. The conus terminates at L1.     Lumbar spine levels:     T12-L1: No significant abnormality.     L1-2: Mild facet degeneration. The stenosis.     L2-3: Minimal facet degeneration. No significant canal stenosis or foraminal narrowing.     L3-4: Broad-based disc bulge and bilateral mild facet degeneration. There is no significant spinal canal narrowing or neuroforaminal narrowing.     L4-5, bilateral facet degeneration. Broad-based disc bulge with a focal central protrusion that minimally encroaches upon the spinal canal. There is no significant foraminal narrowing. There is minimal canal narrowing without thecal sac compression.   There is a left facet origin synovial cyst that extends into the left subligamentous at the level of the left L5 pars defect and minimally encroaches upon the spinal canal from the posterolateral aspect. Bilateral L5 pars defects are present.     Postcontrast images through the lumbar spine does not demonstrate any abnormal enhancement. Minimal epidural enhancement identified at the dorsal aspect of L5 is likely related to degenerative changes in the facet joints and synovial hypertrophy. No   pathological epidural enhancement to suggest infection identified.     IMPRESSION:        Bilateral L5 pars defects. However, there is no anterolisthesis.     Small left facet origin subligamentous synovial cyst projects into the posterolateral spinal canal behind L5 without significant thecal sac compression. Bilateral mild facet degeneration at L4-5 and L5-S1.     No abnormal enhancement is identified in the lumbar spine to suggest an infectious process.      HISTORY/REASON FOR EXAM:  Epidural abscess suspected        TECHNIQUE/EXAM DESCRIPTION:  MRI of the thoracic spine without and with contrast.     The study was performed on a [employer] 1.5 Jacqueline MRI scanner. T1 sagittal, T2 fast spin-echo sagittal, and T2 axial images were obtained of the thoracic spine. T1 post-contrast  fat suppressed sagittal images were obtained. Optional T1 post-contrast   axial images may be obtained.     20 mL ProHance contrast was administered intravenously.     COMPARISON:  None.     FINDINGS:     Alignment in the thoracic spine is normal. Marrow signal in the vertebral bodies is within normal limits. There is no abnormal osseous enhancement in the vertebral bodies or other abnormal extradural enhancement. The disc spaces are intact at all   thoracic levels. There are no significant osteophytic changes. The prevertebral and paraspinous soft tissues are unremarkable.     The thoracic spinal cord is normal in caliber and signal throughout its course. There is no abnormal cord enhancement. There is no abnormal intradural extramedullary enhancement.     At T1-2 through T11-12 there is no significant disc bulge or protrusion. The neural foramina are intact at all thoracic levels. There is no congenital or acquired central spinal stenosis at any thoracic level. There is no significant ligamentous or facet   hypertrophy.     IMPRESSION:        MRI of the thoracic spine without and with contrast within normal limits.    US-HEMODIALYSIS GRAFT DUPLEX COMP UPPER EXTREMITY  Order: [id number]   Status: Final result     Visible to patient: No (inaccessible in MyChart)     Next appt: None     0 Result Notes    Details    Reading Physician Reading Date Result Priority   No Reading Provider Prelim 2/23/2022    Buck Kenyon M.D.  [phone] 2/23/2022      Narrative & Impression         Hemodialysis Access Report      Vascular Laboratory   Conclusions   Hemodialysis loop graft with the proximal anastomosis at the distal    brachial artery and the distal anastomosis at the basilic vein in the mid    biceps.   No flow is demonstrated in the hemodialysis graft. Echolucent material    fills the graft throughout its length.      No prior study available for comparison.      CONY MORAN      Exam Date:     02/23/2022  18:09      Room #:     Inpatient      Priority:     Routine      Ht (in):             Wt (lb):      Ordering Physician:        LIVAN BARBOZA      Referring Physician:       [id number]      Sonographer:               Kamala Ball RVBARNEY      Study Type:                Complete Unilateral      Technical Quality:         Adequate      Age:    46    Gender:     M      MRN:    [id number]      :    1975      BSA:      Indications:     AV Fistula - S/P, AV Fistula / Thrombosed      CPT Codes:       48120      ICD Codes:       447  996.73      History:         Left arm brachial to basilic loop graft. No prior duplex.      Limitations:      Exam Data:   Side:          Left            Access          Graft   Inflow Artery   Brachial   Outflow Vein    Basilic                         Velocity (cm/s)    Prox Inflow artery    Mid Inflow artery           109.0                                0    Distal Inflow artery        95.00    Inflow Anastomosis          0.00    Inflow Artery distal to     anastomosis          Proximal Graft              0.00    Mid Graft                   0.00    Distal Graft                0.00    Outflow Anastomosis         0.00    Proximal Outflow Vein       10.00    Mid Outflow Vein    Distal Outflow Vein    Flow Volume Mid Bicep            ml/min    Flow Volume Mid-Forearm          ml/min    Prox Outflow Vein Diam            cm    Mid Outflow Vein Diam             cm    Distal Outflow Vein Diam          cm      Findings   Hemodialysis loop graft with the proximal anastomosis at the distal    brachial artery and the distal anastomosis at the basilic vein in the mid    biceps.    Doppler waveforms of the brachial artery are of high amplitude and    triphasic.   No flow is demonstrated in the hemodialysis graft. Echolucent material    fills the graft throughout its length.    The basilic and brachial veins are compressible.      Buck Kenyon MD   (Electronically Signed)   Final Date:        February 2022                     20:51         IMPRESSION:  # ESRD  - Etiology likely 2/2 DM/HTN via R TDC  # Thrombosed Left AVF  - US 2/23 no flow  - OR for thrombectomy today 2/25  # HTN  - Goal BP < 140/90  - At goal  # Anemia of CKD  - Goal Hgb 10-11  - At goal  # CKD-MBD  -   - Vit D 9  - CCa 7.1 --> 7.68  - PO4 9.5  # Hyperkalemia, corrected   - Received Insulin/D50/Ca Gluconate/2 Amps Bicarb  # Back Pain  - Elevated CRP  - MRI of Lumbar and Thoracic spine 2/22 (-) for epidural abscess  # Methamphetamine abuse   # DM   - A1C 6.5  - Per Primary Services  # Severe Met Acidosis, Corrected  - Bicarb Gtt stopped  - Correct with HD   # Leukocytosis, Trending down  - 11.2 --> 32.3 -->33.3 --> 26.5 --> 18.1  - BCx2 2/23 (+) Gram - Rods   - C.diff (-) 2/23     PLAN:  - iHD today (FRI), then QMWF iHD during stay  - UF as tolerated   - To OR with Dr. Hartman, Vascular Surgery today  - Hold antihtn  - Stop Midodrine  - Continue Cholecalciferol   - Continue Phos Binder with meals, change to PhosLo  - Stop Bicarb Gtt  - Hold JACKIE   - No dietary protein restrictions  - Dose all meds per ESRD

## 2022-02-25 NOTE — ANESTHESIA PROCEDURE NOTES
Airway    Date/Time: 2/25/2022 10:44 AM  Performed by: Glenn Saleh M.D.  Authorized by: Glenn Saleh M.D.     Location:  OR  Urgency:  Elective  Indications for Airway Management:  Anesthesia      Spontaneous Ventilation: absent    Sedation Level:  Deep  Preoxygenated: Yes    Patient Position:  Sniffing  Final Airway Type:  Endotracheal airway  Final Endotracheal Airway:  ETT  Cuffed: Yes    Technique Used for Successful ETT Placement:  Direct laryngoscopy    Insertion Site:  Oral  Blade Type:  Oscar  Laryngoscope Blade/Videolaryngoscope Blade Size:  3  ETT Size (mm):  7.0  Measured from:  Teeth  ETT to Teeth (cm):  22  Placement Verified by: auscultation and capnometry    Cormack-Lehane Classification:  Grade I - full view of glottis  Number of Attempts at Approach:  1

## 2022-02-25 NOTE — PROGRESS NOTES
"Pt irritable and swearing at staff to get out of room and states he is \"pissed off\" because he is unable to eat breakfast due to procedure being scheduled at 10 am. Pt pacing around room at this time and states staff needs to \"get it together\" because procedure is too late in the day. Pt was educated on importance of being NPO and purpose of procedure.   "

## 2022-02-25 NOTE — PROGRESS NOTES
ACUTE CARE VASCULAR SERVICE  PROGRESS NOTE      OR today for AVG thrombectomy  Tentatively around 1030    Tone Hartman MD  [facility]  Voalte preferred. Otherwise text to cell [phone] or call my office [phone]  __________________________________________________________________  Patient:Ambrose Watkins   MRN:[id number]   CSN:[id number]

## 2022-02-25 NOTE — ANESTHESIA PROCEDURE NOTES
Peripheral Block    Date/Time: 2/25/2022 11:25 AM  Performed by: Glenn Saleh M.D.  Authorized by: Glenn Saleh M.D.     Patient Location:  OR  Start Time:  2/25/2022 11:25 AM  End Time:  2/25/2022 11:27 AM  Reason for Block: at surgeon's request and post-op pain management ONLY    patient identified, IV checked, site marked, risks and benefits discussed, surgical consent, monitors and equipment checked, pre-op evaluation and timeout performed    Patient Position:  Supine  Prep: ChloraPrep    Monitoring:  Heart rate, continuous pulse ox and cardiac monitor  Block Region:  Upper Extremity  Upper Extremity - Block Type:  BRACHIAL PLEXUS block, Infraclavicular approach    Laterality:  Left  Procedures: ultrasound guided  Image captured, interpreted and electronically stored.  Local Infiltration:  Lidocaine  Strength:  1 %  Dose:  3 ml  Block Type:  Single-shot  Needle Length:  100mm  Needle Gauge:  21 G  Needle Localization:  Ultrasound guidance  Injection Assessment:  Negative aspiration for heme, no paresthesia on injection, incremental injection and local visualized surrounding nerve on ultrasound  Evidence of intravascular injection: No     Ultrasound image in chart

## 2022-02-25 NOTE — CONSULTS
ACUTE CARE VASCULAR SERVICE  CONSULT NOTE      Date: 2/25/2022    Referring Provider: Rian Szymanski M.d.    Consulting Physician: Tone Hartman M.D. [facility]    -------------------------------------------------------------------------------------------------    Reason for consultation:  HD access issues    HPI:  This is a 46 y.o. male who is presenting with a thrombosed left forearm loop AV graft.  He says this graft was put in about 8 months ago in [place] and he reports that it has undergone 5 previous thrombectomies and revisions.  He is alert and conversant in no distress.    Past Medical History:   Diagnosis Date   • Diabetes    • Hypertension        History reviewed. No pertinent surgical history.    Current Facility-Administered Medications   Medication Dose Route Frequency Provider Last Rate Last Admin   • [MAR Hold] insulin GLARGINE (Lantus,Semglee) injection  6 Units Subcutaneous Q EVENING Rivka Lamb M.D.        And   • [MAR Hold] insulin LISPRO (AdmeLOG,HumaLOG) injection  5 Units Subcutaneous TID AC Rivka Lamb M.D.        And   • [MAR Hold] insulin LISPRO (AdmeLOG,HumaLOG) injection  2-9 Units Subcutaneous 4X/DAY ACHS Rivka Lamb M.D.        And   • [MAR Hold] dextrose 50% (D50W) injection 50 mL  50 mL Intravenous Q15 MIN PRN Rivka Lamb M.D.       • [MAR Hold] calcium acetate (PHOS-LO) 667 MG tablet 2,001 mg  2,001 mg Oral TID AC EMMA Dial       • [MAR Hold] ferrous sulfate tablet 325 mg  325 mg Oral QDAY with Breakfast Rivka Lamb M.D.   325 mg at 02/24/22 0836   • [MAR Hold] prochlorperazine (COMPAZINE) tablet 5 mg  5 mg Oral Q6HRS PRN Majo Kangimian, M.D.       • [MAR Hold] cefTRIAXone (Rocephin) syringe 2 g  2 g Intravenous Q24HR Rian Szymanski M.D.   2 g at 02/24/22 2127   • [MAR Hold] metroNIDAZOLE (FLAGYL) tablet 500 mg  500 mg Oral Q8HRS Rian Szymanski M.D.   500 mg at 02/24/22 2127   • [MAR  Hold] heparin injection 5,000 Units  5,000 Units Subcutaneous Q8HRS Tristan Gee M.D.   5,000 Units at 02/24/22 2128   • [MAR Hold] heparin injection 3,200 Units  3,200 Units Intracatheter ACUTE DIALYSIS PRN Lena Hamilton M.D.   3,200 Units at 02/22/22 1055   • [MAR Hold] lidocaine (LIDODERM) 5 % 1 Patch  1 Patch Transdermal Q24HR Majo Chávez M.D.   1 Patch at 02/24/22 1245   • [MAR Hold] vitamin D3 (cholecalciferol) tablet 2,000 Units  2,000 Units Oral DAILY Roxanne Chen, A.P.R.N.   2,000 Units at 02/24/22 0610   • [MAR Hold] calcitRIOL (ROCALTROL) capsule 0.25 mcg  0.25 mcg Oral DAILY Roxanne Chen A.P.R.N.   0.25 mcg at 02/24/22 0610   • [MAR Hold] HYDROmorphone (Dilaudid) injection 0.5 mg  0.5 mg Intravenous Q4HRS PRN Majo Chávez M.D.   0.5 mg at 02/24/22 2127   • [MAR Hold] oxyCODONE immediate-release (ROXICODONE) tablet 5 mg  5 mg Oral Q4HRS PRN Rian Szymanski M.D.   5 mg at 02/22/22 2328       Social History     Socioeconomic History   • Marital status: Single     Spouse name: Not on file   • Number of children: Not on file   • Years of education: Not on file   • Highest education level: Not on file   Occupational History   • Not on file   Tobacco Use   • Smoking status: Current Every Day Smoker     Packs/day: 0.50   • Smokeless tobacco: Not on file   Substance and Sexual Activity   • Alcohol use: Yes     Comment: occ   • Drug use: No   • Sexual activity: Not on file   Other Topics Concern   • Not on file   Social History Narrative   • Not on file     Social Determinants of Health     Financial Resource Strain: Medium Risk   • Difficulty of Paying Living Expenses: Somewhat hard   Food Insecurity: No Food Insecurity   • Worried About Running Out of Food in the Last Year: Never true   • Ran Out of Food in the Last Year: Never true   Transportation Needs: Unmet Transportation Needs   • Lack of Transportation (Medical): Yes   • Lack of Transportation (Non-Medical): Yes   Physical  "Activity: Not on file   Stress: Not on file   Social Connections: Not on file   Intimate Partner Violence: Not on file   Housing Stability: Not on file       History reviewed. No pertinent family history.    Allergies:  Patient has no known allergies.    Review of Systems:  Constitutional: Negative for fever, chills, weight loss,   HENT:   Negative for hearing loss or tinnitus    Eyes:    Negative for blurred vision, double vision, or loss of vision  Respiratory:  Negative for cough, hemoptysis, or wheezing    Cardiac:  Negative for chest pain or palpitations or orthopnea  Vascular:  Negative for claudication or rest pain   Gastrointestinal: Negative for nausea, vomiting, or abdominal pain     Negative for hematochezia or melena   Genitourinary: Negative for dysuria, frequency, or hematuria   Musculoskeletal: Negative for myalgias, back pain, or joint pain  Skin:   Negative for itching or rash  Neurological:  Negative for dizziness, headaches, or tremors     Negative for speech disturbance     Negative for extremity weakness or paresthesias  Endo/Heme:  Negative for easy bruising or bleeding  Psychiatric:  Negative for depression, suicidal ideas, or hallucinations    Physical Exam:  /89   Pulse 78   Temp 36.2 °C (97.2 °F) (Temporal)   Resp 17   Ht 1.778 m (5' 10\")   Wt 121 kg (265 lb 14 oz)   SpO2 95%     Constitutional: Alert, oriented, no acute distress  HEENT:  Normocephalic and atraumatic, EOMI  Neck:   Supple, no JVD, no bruits  Cardiovascular: Regular rate and rhythm, no murmurs  Pulmonary:  Good air entry bilaterally, no wheezing   Abdominal:  Soft, non-tender, non-distended     Aortic impulse not widened  Musculoskeletal: No edema, no tenderness  Neurological:  CN II-XII grossly intact, no focal deficits  Skin:   Skin is warm and dry. No rash noted.  Psychiatric:  Normal mood and affect.  Vascular:  Extremities warm and well perfused  Left forearm loop AV graft is thrombosed    Labs:  Recent Labs "     02/23/22  1501 02/24/22  0300 02/25/22  0057   WBC 33.3* 26.5* 18.1*   RBC 3.06* 3.20* 3.09*   HEMOGLOBIN 9.3* 9.6* 9.3*   HEMATOCRIT 26.9* 28.4* 28.0*   MCV 87.9 88.8 90.6   MCH 30.4 30.0 30.1   MCHC 34.6 33.8 33.2*   RDW 53.9* 54.4* 55.3*   PLATELETCT 129* 121* 123*   MPV 11.4 11.5 11.4     Recent Labs     02/23/22  0314 02/24/22  0300 02/25/22  0057   SODIUM 136 136 133*   POTASSIUM 4.8 4.0 4.7   CHLORIDE 103 100 98   CO2 12* 22 16*   GLUCOSE 186* 89 129*   BUN 99* 79* 86*   CREATININE 12.37* 12.38* 10.25*   CALCIUM 6.7* 6.6* 6.4*     Recent Labs     02/24/22  1320   INR 1.25*     Recent Labs     02/23/22  0314 02/24/22  0300 02/24/22  1320 02/25/22  0057   ASTSGOT 18 9*  --  23   ALTSGPT 15 11  --  12   TBILIRUBIN 0.6 0.5  --  0.4   ALKPHOSPHAT 138* 169*  --  294*   GLOBULIN 3.2 3.2  --  3.6*   INR  --   --  1.25*  --          Assessment/Plan:  -End-stage renal disease on dialysis  -Malfunctioning AV graft  -Diabetes  -Hypertension    OR today for new AVG insertion. Originally considered thrombectomy of the existing graft however it has undergone 5 previous thrombectomies/revisions since it was put in 8 months ago, therefore additional revisions would be futile.    We discussed the steps of the operation and the expected recovery and we also discussed the risks.  Questions were answered he agreed to proceed.    Tone Hartman MD  [facility]  Voalte preferred. Otherwise text to cell [phone] or call my office [phone]  __________________________________________________________________  Patient:Ambrose Galvez Roland   MRN:[id number]   [facility]:[id number]

## 2022-02-25 NOTE — PROGRESS NOTES
Report received. Assumed care. Pt sitting up in bed. A/O x4. VSS, tele monitoring in place. Responds appropriately. C/O pain, medicated per MAR, denies SOB. Assessment complete. Discussed POC, pt verbalizes understanding. Pt to be NPO at midnight for procedure tomorrow, pt verbalized understanding. Explained importance of calling before getting OOB. Call light and belongings within reach. Bed in the lowest position. Treaded socks in place. Hourly rounding in progress. Will continue to monitor.

## 2022-02-25 NOTE — PROGRESS NOTES
ACUTE CARE VASCULAR SERVICE  PROGRESS NOTE      OR today for new AVG insertion. Originally considered thrombectomy of the existing graft however it has undergone 5 previous thrombectomies/revisions since it was put in 8 months ago, therefore additional revisions would be futile.      Planning insertion of a new brachio-axillary AVG    Tone Hartman MD  [facility]  Voalte preferred. Otherwise text to cell [phone] or call my office [phone]  __________________________________________________________________  Patient:Ambrosemelody Galvez Watkins   MRN:[id number]   CSN:[id number]

## 2022-02-25 NOTE — PROGRESS NOTES
"Pt reports lethargy and \"feeling like something is wrong\". This RN checked pt blood glucose, FSBS = 69. This RN administered D50W per MAR. Rechecked blood glucose, FSBS = 111. Will continue to monitor.   " no

## 2022-02-25 NOTE — ANESTHESIA TIME REPORT
Anesthesia Start and Stop Event Times     Date Time Event    2/25/2022 1017 Ready for Procedure     1040 Anesthesia Start     1223 Anesthesia Stop        Responsible Staff  02/25/22    Name Role Begin End    Glenn Saleh M.D. Anesth 1040 1128    Tevin Mccoy M.D. Anesth 1128 1223        Preop Diagnosis (Free Text):  Pre-op Diagnosis     End Stage Renal Disease on hemodialysis         Preop Diagnosis (Codes):    Premium Reason  Non-Premium    Comments:

## 2022-02-25 NOTE — THERAPY
Physical Therapy   Initial Evaluation     Patient Name: Ambrose Watkins  Age:  46 y.o., Sex:  male  Medical Record #: [id number]  Today's Date: 2/25/2022     Precautions  Precautions: Other (See Comments)     HD MWF    Assessment  Patient is 46 y.o. male presenting for back pain, missed HD tx, and anemia w/ a PMH of homelessness, ESRD, and methamphetamine use. He reports being independent w/ functional mobility tasks prior to admit, and living out of his vehicle w/ limited support available.  The pt was received seated eob, and limited in functional mobility by low back pain at this time.  He demo's STS eob and chair sba w/ no AD, along w/ gait distance 10' using no AD CGA via slow dena, limited toe clearance, and increased BULL, no LOB observed, distance limited by pt c/o low back pain.  Proved pt edu on benefits of mobility and LE exercise for BLE edema management w/ limited carryover by the pt.  Recommend pt dc to community w/ OP PT follow up for higher level deficits and low back pain given functional independence demo'd at this time.  Will follow for increased gait distance.        Plan    Recommend Physical Therapy 4 times per week until therapy goals are met for the following treatments:  Bed Mobility, Community Re-integration, Equipment, Gait Training, Manual Therapy, Neuro Re-Education / Balance, Orthotics Training, Self Care/Home Evaluation, Stair Training, Therapeutic Activities, and Therapeutic Exercises    DC Equipment Recommendations: Unable to determine at this time  Discharge Recommendations: Recommend outpatient physical therapy services to address higher level deficits       Subjective/Objective       02/25/22 0931   Prior Living Situation   Equipment Owned None   Lives with - Patient's Self Care Capacity Alone and Able to Care For Self   Comments the pt reports living in his vehicle and previously independent w/ limited support available   Prior Level of Functional Mobility   Bed Mobility  Independent   Transfer Status Independent   Ambulation Independent   Distance Ambulation (Feet)   (community distances)   Assistive Devices Used None   Stairs Independent   History of Falls   History of Falls No   Date of Last Fall   (pt reports no falls)   Cognition    Cognition / Consciousness WDL   Comments pt cooperative but perseverating on low back pain throughout   Passive ROM Lower Body   Passive ROM Lower Body WDL   Active ROM Lower Body    Active ROM Lower Body  WDL   Comments observed during functional mobility   Strength Lower Body   Lower Body Strength  WDL   Comments as above   Sensation Lower Body   Lower Extremity Sensation   WDL   Comments sensation intact to LT/DP BLE   Coordination Lower Body    Coordination Lower Body  WDL   Balance Assessment   Sitting Balance (Static) Fair +   Sitting Balance (Dynamic) Fair +   Standing Balance (Static) Fair +   Standing Balance (Dynamic) Fair +   Weight Shift Sitting Good   Weight Shift Standing Good   Comments stand w/ no AD   Gait Analysis   Gait Level Of Assist Contact Guard Assist   Assistive Device None   Distance (Feet) 10   # of Times Distance was Traveled 1   Deviation Increased Base Of Support;Bradykinetic;Decreased Toe Off   # of Stairs Climbed 0   Weight Bearing Status no restrictions   Comments distance limited by pt c/o back pain   Bed Mobility    Supine to Sit   (pt position seated eob to begin session)   Sit to Supine   (pt position seated eob end of tx session)   Scooting Supervised   Comments pt seated eob pre- and post-   Functional Mobility   Sit to Stand Standby Assist   Bed, Chair, Wheelchair Transfer Contact Guard Assist   Transfer Method Stand Step   Mobility STS eob w/ no AD, eob<>chair w/ no AD   Activity Tolerance   Sitting in Chair <30s   Sitting Edge of Bed >30min   Standing 2min   Edema / Skin Assessment   Edema / Skin  Not Assessed   Short Term Goals    Short Term Goal # 1 Pt will demo gait distance >250' using no AD spv in a  moving environment for community ambulation.   Short Term Goal # 2 Pt will demo ability to ascend/descend 1 platform step w/ no AD spv in 6 visits for access to the community.   Education Group   Education Provided Role of Physical Therapist  (benefits of mobility on LE edema)   Role of Physical Therapist Patient Response Patient;Acceptance;Explanation;Demonstration;Action Demonstration;Reinforcement Needed   Additional Comments pt receptive of edu provided   Problem List    Problems Pain;Decreased Activity Tolerance   Session Information   Date / Session Number  2/25- 1 (1/4, 3/3)

## 2022-02-25 NOTE — PROGRESS NOTES
Lab notified this RN to critical lab result of calcium of 6.4. This RN notified MD, no new orders received. Will continue to monitor.

## 2022-02-25 NOTE — CARE PLAN
The patient is Watcher - Medium risk of patient condition declining or worsening    Shift Goals  Clinical Goals: labs, safety, NPO at midnight  Patient Goals: rest    Progress made toward(s) clinical / shift goals:    Problem: Pain - Standard  Goal: Alleviation of pain or a reduction in pain to the patient’s comfort goal  Outcome: Progressing     Problem: Knowledge Deficit - Standard  Goal: Patient and family/care givers will demonstrate understanding of plan of care, disease process/condition, diagnostic tests and medications  Outcome: Progressing     Problem: Hemodynamics  Goal: Patient's hemodynamics, fluid balance and neurologic status will be stable or improve  Outcome: Progressing     Problem: Respiratory  Goal: Patient will achieve/maintain optimum respiratory ventilation and gas exchange  Outcome: Progressing       Patient is not progressing towards the following goals:

## 2022-02-25 NOTE — ANESTHESIA POSTPROCEDURE EVALUATION
Patient: Ambrose Watkins    Procedure Summary     Date: 02/25/22 Room / Location: [place] [address] / SURGERY [facility]    Anesthesia Start: 1040 Anesthesia Stop: 1223    Procedures:       CREATION, AV FISTULA-UPPER EXTREMITY GRAFT, AND FISTULOGRAM (Left Arm Upper)      THROMBECTOMY (Left Arm Upper) Diagnosis: (End Stage Renal Disease on hemodialysis )    Surgeons: Tone Hartman M.D. Responsible Provider: Tevin Mccoy M.D.    Anesthesia Type: general ASA Status: 4          Final Anesthesia Type: general  Last vitals  BP   Blood Pressure: 152/89    Temp   36.2 °C (97.2 °F)    Pulse   78   Resp   17    SpO2   95 %      Anesthesia Post Evaluation    Patient location during evaluation: PACU  Patient participation: complete - patient participated  Level of consciousness: awake and alert    Airway patency: patent  Anesthetic complications: no  Cardiovascular status: hemodynamically stable  Respiratory status: acceptable  Hydration status: euvolemic    PONV: none          No complications documented.     Nurse Pain Score: 7 (NPRS)

## 2022-02-26 PROBLEM — R20.2 PARESTHESIA: Status: ACTIVE | Noted: 2022-02-26

## 2022-02-26 LAB
ALBUMIN SERPL BCP-MCNC: 2.6 G/DL (ref 3.2–4.9)
ALBUMIN/GLOB SERPL: 0.7 G/DL
ALP SERPL-CCNC: 415 U/L (ref 30–99)
ALT SERPL-CCNC: 14 U/L (ref 2–50)
ANION GAP SERPL CALC-SCNC: 16 MMOL/L (ref 7–16)
AST SERPL-CCNC: 20 U/L (ref 12–45)
BASOPHILS # BLD AUTO: 0.3 % (ref 0–1.8)
BASOPHILS # BLD: 0.04 K/UL (ref 0–0.12)
BILIRUB SERPL-MCNC: 0.5 MG/DL (ref 0.1–1.5)
BUN SERPL-MCNC: 60 MG/DL (ref 8–22)
CALCIUM SERPL-MCNC: 7.2 MG/DL (ref 8.5–10.5)
CHLORIDE SERPL-SCNC: 94 MMOL/L (ref 96–112)
CO2 SERPL-SCNC: 23 MMOL/L (ref 20–33)
CREAT SERPL-MCNC: 8.64 MG/DL (ref 0.5–1.4)
EOSINOPHIL # BLD AUTO: 0.12 K/UL (ref 0–0.51)
EOSINOPHIL NFR BLD: 0.9 % (ref 0–6.9)
ERYTHROCYTE [DISTWIDTH] IN BLOOD BY AUTOMATED COUNT: 53.4 FL (ref 35.9–50)
GLOBULIN SER CALC-MCNC: 3.8 G/DL (ref 1.9–3.5)
GLUCOSE BLD STRIP.AUTO-MCNC: 123 MG/DL (ref 65–99)
GLUCOSE BLD STRIP.AUTO-MCNC: 126 MG/DL (ref 65–99)
GLUCOSE BLD STRIP.AUTO-MCNC: 128 MG/DL (ref 65–99)
GLUCOSE SERPL-MCNC: 143 MG/DL (ref 65–99)
HCT VFR BLD AUTO: 30.7 % (ref 42–52)
HGB BLD-MCNC: 10.1 G/DL (ref 14–18)
IMM GRANULOCYTES # BLD AUTO: 0.12 K/UL (ref 0–0.11)
IMM GRANULOCYTES NFR BLD AUTO: 0.9 % (ref 0–0.9)
LYMPHOCYTES # BLD AUTO: 1.31 K/UL (ref 1–4.8)
LYMPHOCYTES NFR BLD: 10.1 % (ref 22–41)
MCH RBC QN AUTO: 29.7 PG (ref 27–33)
MCHC RBC AUTO-ENTMCNC: 32.9 G/DL (ref 33.7–35.3)
MCV RBC AUTO: 90.3 FL (ref 81.4–97.8)
MONOCYTES # BLD AUTO: 0.82 K/UL (ref 0–0.85)
MONOCYTES NFR BLD AUTO: 6.3 % (ref 0–13.4)
NEUTROPHILS # BLD AUTO: 10.58 K/UL (ref 1.82–7.42)
NEUTROPHILS NFR BLD: 81.5 % (ref 44–72)
NRBC # BLD AUTO: 0 K/UL
NRBC BLD-RTO: 0 /100 WBC
PHOSPHATE SERPL-MCNC: 6 MG/DL (ref 2.5–4.5)
PLATELET # BLD AUTO: 140 K/UL (ref 164–446)
PMV BLD AUTO: 12.5 FL (ref 9–12.9)
POTASSIUM SERPL-SCNC: 4.8 MMOL/L (ref 3.6–5.5)
PROT SERPL-MCNC: 6.4 G/DL (ref 6–8.2)
RBC # BLD AUTO: 3.4 M/UL (ref 4.7–6.1)
SODIUM SERPL-SCNC: 133 MMOL/L (ref 135–145)
WBC # BLD AUTO: 13 K/UL (ref 4.8–10.8)
ZINC SERPL-MCNC: 21.4 UG/DL (ref 60–120)

## 2022-02-26 PROCEDURE — 700102 HCHG RX REV CODE 250 W/ 637 OVERRIDE(OP): Performed by: INTERNAL MEDICINE

## 2022-02-26 PROCEDURE — 700111 HCHG RX REV CODE 636 W/ 250 OVERRIDE (IP): Performed by: HOSPITALIST

## 2022-02-26 PROCEDURE — 700111 HCHG RX REV CODE 636 W/ 250 OVERRIDE (IP): Performed by: STUDENT IN AN ORGANIZED HEALTH CARE EDUCATION/TRAINING PROGRAM

## 2022-02-26 PROCEDURE — 700111 HCHG RX REV CODE 636 W/ 250 OVERRIDE (IP): Performed by: INTERNAL MEDICINE

## 2022-02-26 PROCEDURE — 700102 HCHG RX REV CODE 250 W/ 637 OVERRIDE(OP): Performed by: NURSE PRACTITIONER

## 2022-02-26 PROCEDURE — 85025 COMPLETE CBC W/AUTO DIFF WBC: CPT

## 2022-02-26 PROCEDURE — A9270 NON-COVERED ITEM OR SERVICE: HCPCS | Performed by: NURSE PRACTITIONER

## 2022-02-26 PROCEDURE — 700102 HCHG RX REV CODE 250 W/ 637 OVERRIDE(OP): Performed by: STUDENT IN AN ORGANIZED HEALTH CARE EDUCATION/TRAINING PROGRAM

## 2022-02-26 PROCEDURE — A9270 NON-COVERED ITEM OR SERVICE: HCPCS | Performed by: INTERNAL MEDICINE

## 2022-02-26 PROCEDURE — 80053 COMPREHEN METABOLIC PANEL: CPT

## 2022-02-26 PROCEDURE — 700101 HCHG RX REV CODE 250: Performed by: INTERNAL MEDICINE

## 2022-02-26 PROCEDURE — 770020 HCHG ROOM/CARE - TELE (206)

## 2022-02-26 PROCEDURE — 700101 HCHG RX REV CODE 250: Performed by: HOSPITALIST

## 2022-02-26 PROCEDURE — 82962 GLUCOSE BLOOD TEST: CPT | Mod: 91

## 2022-02-26 PROCEDURE — A9270 NON-COVERED ITEM OR SERVICE: HCPCS | Performed by: STUDENT IN AN ORGANIZED HEALTH CARE EDUCATION/TRAINING PROGRAM

## 2022-02-26 PROCEDURE — 36415 COLL VENOUS BLD VENIPUNCTURE: CPT

## 2022-02-26 PROCEDURE — 84100 ASSAY OF PHOSPHORUS: CPT

## 2022-02-26 PROCEDURE — 99233 SBSQ HOSP IP/OBS HIGH 50: CPT | Mod: GC | Performed by: INTERNAL MEDICINE

## 2022-02-26 RX ORDER — LOSARTAN POTASSIUM 25 MG/1
25 TABLET ORAL
Status: DISCONTINUED | OUTPATIENT
Start: 2022-02-26 | End: 2022-03-01

## 2022-02-26 RX ORDER — DEXTROSE MONOHYDRATE 25 G/50ML
50 INJECTION, SOLUTION INTRAVENOUS
Status: DISCONTINUED | OUTPATIENT
Start: 2022-02-26 | End: 2022-03-08 | Stop reason: HOSPADM

## 2022-02-26 RX ORDER — GABAPENTIN 300 MG/1
300 CAPSULE ORAL ONCE
Status: DISCONTINUED | OUTPATIENT
Start: 2022-02-26 | End: 2022-02-26

## 2022-02-26 RX ORDER — INSULIN LISPRO 100 [IU]/ML
2-9 INJECTION, SOLUTION INTRAVENOUS; SUBCUTANEOUS
Status: DISCONTINUED | OUTPATIENT
Start: 2022-02-26 | End: 2022-03-08 | Stop reason: HOSPADM

## 2022-02-26 RX ORDER — ACETAMINOPHEN 325 MG/1
975 TABLET ORAL EVERY 6 HOURS
Status: DISCONTINUED | OUTPATIENT
Start: 2022-02-26 | End: 2022-03-07

## 2022-02-26 RX ADMIN — FERROUS SULFATE TAB 325 MG (65 MG ELEMENTAL FE) 325 MG: 325 (65 FE) TAB at 07:30

## 2022-02-26 RX ADMIN — Medication 2001 MG: at 10:20

## 2022-02-26 RX ADMIN — ACETAMINOPHEN 975 MG: 325 TABLET, FILM COATED ORAL at 23:24

## 2022-02-26 RX ADMIN — Medication 2000 UNITS: at 05:41

## 2022-02-26 RX ADMIN — INSULIN LISPRO 5 UNITS: 100 INJECTION, SOLUTION INTRAVENOUS; SUBCUTANEOUS at 05:49

## 2022-02-26 RX ADMIN — HEPARIN SODIUM 5000 UNITS: 5000 INJECTION, SOLUTION INTRAVENOUS; SUBCUTANEOUS at 20:37

## 2022-02-26 RX ADMIN — ACETAMINOPHEN 975 MG: 325 TABLET, FILM COATED ORAL at 17:26

## 2022-02-26 RX ADMIN — HEPARIN SODIUM 5000 UNITS: 5000 INJECTION, SOLUTION INTRAVENOUS; SUBCUTANEOUS at 05:41

## 2022-02-26 RX ADMIN — INSULIN LISPRO 2 UNITS: 100 INJECTION, SOLUTION INTRAVENOUS; SUBCUTANEOUS at 05:48

## 2022-02-26 RX ADMIN — CALCITRIOL CAPSULES 0.25 MCG 0.25 MCG: 0.25 CAPSULE ORAL at 05:41

## 2022-02-26 RX ADMIN — HYDROMORPHONE HYDROCHLORIDE 0.5 MG: 1 INJECTION, SOLUTION INTRAMUSCULAR; INTRAVENOUS; SUBCUTANEOUS at 22:26

## 2022-02-26 RX ADMIN — HYDROMORPHONE HYDROCHLORIDE 0.5 MG: 1 INJECTION, SOLUTION INTRAMUSCULAR; INTRAVENOUS; SUBCUTANEOUS at 11:46

## 2022-02-26 RX ADMIN — Medication 2001 MG: at 17:26

## 2022-02-26 RX ADMIN — HYDROMORPHONE HYDROCHLORIDE 0.5 MG: 1 INJECTION, SOLUTION INTRAMUSCULAR; INTRAVENOUS; SUBCUTANEOUS at 17:27

## 2022-02-26 RX ADMIN — CEFTRIAXONE SODIUM 2 G: 10 INJECTION, POWDER, FOR SOLUTION INTRAVENOUS at 20:37

## 2022-02-26 RX ADMIN — Medication 2001 MG: at 05:40

## 2022-02-26 RX ADMIN — METRONIDAZOLE 500 MG: 500 TABLET ORAL at 05:41

## 2022-02-26 RX ADMIN — HYDROMORPHONE HYDROCHLORIDE 0.5 MG: 1 INJECTION, SOLUTION INTRAMUSCULAR; INTRAVENOUS; SUBCUTANEOUS at 03:50

## 2022-02-26 RX ADMIN — LIDOCAINE 1 PATCH: 50 PATCH TOPICAL at 11:47

## 2022-02-26 RX ADMIN — HEPARIN SODIUM 5000 UNITS: 5000 INJECTION, SOLUTION INTRAVENOUS; SUBCUTANEOUS at 13:27

## 2022-02-26 RX ADMIN — LOSARTAN POTASSIUM 25 MG: 25 TABLET, FILM COATED ORAL at 17:26

## 2022-02-26 ASSESSMENT — ENCOUNTER SYMPTOMS
DEPRESSION: 0
FEVER: 0
HEMOPTYSIS: 0
NAUSEA: 0
DIARRHEA: 0
DIZZINESS: 0
VOMITING: 0
HEADACHES: 0
COUGH: 0
SORE THROAT: 0
SPUTUM PRODUCTION: 0
BACK PAIN: 0
TINGLING: 1
CHILLS: 0
WEAKNESS: 1
ABDOMINAL PAIN: 0
PALPITATIONS: 0

## 2022-02-26 ASSESSMENT — PAIN DESCRIPTION - PAIN TYPE
TYPE: ACUTE PAIN

## 2022-02-26 ASSESSMENT — FIBROSIS 4 INDEX
FIB4 SCORE: 1.68
FIB4 SCORE: 1.68

## 2022-02-26 NOTE — CARE PLAN
Problem: Pain - Standard  Goal: Alleviation of pain or a reduction in pain to the patient’s comfort goal  Outcome: Progressing     Problem: Knowledge Deficit - Standard  Goal: Patient and family/care givers will demonstrate understanding of plan of care, disease process/condition, diagnostic tests and medications  Outcome: Progressing   The patient is Stable - Low risk of patient condition declining or worsening    Shift Goals  Clinical Goals: Pain control  Patient Goals: Rest  Family Goals: N/A

## 2022-02-26 NOTE — PROGRESS NOTES
".  [facility] -  PROGRESS NOTE               Author: EMMA Dial Date & Time: 2/26/2022  11:18 AM     HPI:  This is a 46 year old male with past medical history of End Stage Renal Disease on hemodialysis at [facility] every Tues/Thurs/Sat, diabetes mellitus and Hypertension who presented to the emergency department on 2/21/22 complaining of lower back pain.  Reportedly missing hemodialysis due to transportation issues. MRI this am and medicated for pain. Seen on Hemodialysis this am, lethargic after PRN medications.     DAILY NEPHROLOGY SUMMARY:  2/23: 3.5L Net UF. Resting in bed. Calm and agreeable to HD today. C/o cont. Back pain. Bps low this am.   2/24: Echo in progress. Primary team at . Resting in bed. C/o cont back pain. 2.0L net UF. K 4.0, CO2 22. Denies Diarrhea. Denies CP/SOB.   2/25: Upset about NPO this am for OR.  In bed, no acute distress. Bps improved. K 4.7, CO2 16.   2/26: S/p AVG creation and fistulogram. 1.5 L net UF. Sitting up at side of bed, eating lunch. C/o SOB and nausea.  C/o L AVF pain.     REVIEW OF SYSTEMS:    10 point ROS reviewed and is as per HPI/daily summary or otherwise negative    PMH/PSH/SH/FH: Reviewed and unchanged since admission note  CURRENT MEDICATIONS: Reviewed from admission to present day    VS:  /96   Pulse 92   Temp 37.2 °C (99 °F) (Temporal)   Resp 19   Ht 1.778 m (5' 10\")   Wt (!) 129 kg (284 lb 9.8 oz)   SpO2 91%   BMI 40.84 kg/m²   Physical Exam  Constitutional:       General: He is not in acute distress.     Appearance: He is ill-appearing.   HENT:      Head: Normocephalic and atraumatic.      Mouth/Throat:      Dentition: Abnormal dentition.   Eyes:      Extraocular Movements: Extraocular movements intact.      Conjunctiva/sclera: Conjunctivae normal.      Pupils: Pupils are equal, round, and reactive to light.   Cardiovascular:      Rate and Rhythm: Normal rate and regular rhythm.      Pulses: Normal " pulses.      Heart sounds: Normal heart sounds. No murmur heard.    No friction rub. No gallop.      Comments: R TDC  L AVF   Pulmonary:      Effort: Pulmonary effort is normal. No respiratory distress.      Breath sounds: Normal breath sounds. No stridor. No wheezing or rhonchi.   Abdominal:      General: There is no distension.      Palpations: Abdomen is soft. There is no mass.   Musculoskeletal:         General: Swelling present. Normal range of motion.      Right lower leg: Edema present.      Left lower leg: Edema present.   Skin:     General: Skin is warm and dry.      Comments: Scattered Scabs through out   Neurological:      Mental Status: He is alert.   Psychiatric:         Behavior: Behavior is cooperative.         Fluids:  In: 1445 [P.O.:480; I.V.:465; Dialysis:500]  Out: 2070     LABS:  Recent Labs     02/24/22  0300 02/25/22  0057 02/26/22  0341   SODIUM 136 133* 133*   POTASSIUM 4.0 4.7 4.8   CHLORIDE 100 98 94*   CO2 22 16* 23   GLUCOSE 89 129* 143*   BUN 79* 86* 60*   CREATININE 12.38* 10.25* 8.64*   CALCIUM 6.6* 6.4* 7.2*     MR-LUMBAR SPINE-WITH & W/O  Order: [id number]   Status: Final result     Visible to patient: No ([facility])     Next appt: None     0 Result Notes    Details    Reading Physician Reading Date Result Priority   Lisa Link M.D.  [phone] 2/22/2022 STAT     Narrative & Impression     2/22/2022 1:06 AM     HISTORY/REASON FOR EXAM:  Epidural abscess suspected        TECHNIQUE/EXAM DESCRIPTION:  MRI of the lumbar spine without and with contrast.     The study was performed on a Proximianta 1.5 Jacqueline MRI scanner.     T1 sagittal, T2 fast spin-echo sagittal, and T2 axial images were obtained of the lumbar spine. T1 postcontrast fat-suppressed sagittal images were obtained.     20 mL ProHance contrast was administered intravenously.     COMPARISON:  None.     FINDINGS:  Lumbar spine alignment is normal.  Bilateral L5 pars defects without anterolisthesis.  Bone  marrow signal intensity is normal. Mild disc desiccation is noted at L4-L5.  Bilateral pars defects noted at L5. The conus terminates at L1.     Lumbar spine levels:     T12-L1: No significant abnormality.     L1-2: Mild facet degeneration. The stenosis.     L2-3: Minimal facet degeneration. No significant canal stenosis or foraminal narrowing.     L3-4: Broad-based disc bulge and bilateral mild facet degeneration. There is no significant spinal canal narrowing or neuroforaminal narrowing.     L4-5, bilateral facet degeneration. Broad-based disc bulge with a focal central protrusion that minimally encroaches upon the spinal canal. There is no significant foraminal narrowing. There is minimal canal narrowing without thecal sac compression.   There is a left facet origin synovial cyst that extends into the left subligamentous at the level of the left L5 pars defect and minimally encroaches upon the spinal canal from the posterolateral aspect. Bilateral L5 pars defects are present.     Postcontrast images through the lumbar spine does not demonstrate any abnormal enhancement. Minimal epidural enhancement identified at the dorsal aspect of L5 is likely related to degenerative changes in the facet joints and synovial hypertrophy. No   pathological epidural enhancement to suggest infection identified.     IMPRESSION:        Bilateral L5 pars defects. However, there is no anterolisthesis.     Small left facet origin subligamentous synovial cyst projects into the posterolateral spinal canal behind L5 without significant thecal sac compression. Bilateral mild facet degeneration at L4-5 and L5-S1.     No abnormal enhancement is identified in the lumbar spine to suggest an infectious process.      HISTORY/REASON FOR EXAM:  Epidural abscess suspected        TECHNIQUE/EXAM DESCRIPTION:  MRI of the thoracic spine without and with contrast.     The study was performed on a [employer] 1.5 Jacqueline MRI scanner. T1 sagittal, T2 fast  spin-echo sagittal, and T2 axial images were obtained of the thoracic spine. T1 post-contrast fat suppressed sagittal images were obtained. Optional T1 post-contrast   axial images may be obtained.     20 mL ProHance contrast was administered intravenously.     COMPARISON:  None.     FINDINGS:     Alignment in the thoracic spine is normal. Marrow signal in the vertebral bodies is within normal limits. There is no abnormal osseous enhancement in the vertebral bodies or other abnormal extradural enhancement. The disc spaces are intact at all   thoracic levels. There are no significant osteophytic changes. The prevertebral and paraspinous soft tissues are unremarkable.     The thoracic spinal cord is normal in caliber and signal throughout its course. There is no abnormal cord enhancement. There is no abnormal intradural extramedullary enhancement.     At T1-2 through T11-12 there is no significant disc bulge or protrusion. The neural foramina are intact at all thoracic levels. There is no congenital or acquired central spinal stenosis at any thoracic level. There is no significant ligamentous or facet   hypertrophy.     IMPRESSION:        MRI of the thoracic spine without and with contrast within normal limits.    US-HEMODIALYSIS GRAFT DUPLEX COMP UPPER EXTREMITY  Order: [id number]   Status: Final result     Visible to patient: No (inaccessible in MyChart)     Next appt: None     0 Result Notes    Details    Reading Physician Reading Date Result Priority   No Reading Provider Prelim 2/23/2022    Buck Kenyon M.D.  [phone] 2/23/2022      Narrative & Impression         Hemodialysis Access Report      Vascular Laboratory   Conclusions   Hemodialysis loop graft with the proximal anastomosis at the distal    brachial artery and the distal anastomosis at the basilic vein in the mid    biceps.   No flow is demonstrated in the hemodialysis graft. Echolucent material    fills the graft throughout its length.       No prior study available for comparison.      CONY MORAN      Exam Date:     2022 18:09      Room #:     Inpatient      Priority:     Routine      Ht (in):             Wt (lb):      Ordering Physician:        LIVAN BARBOZA      Referring Physician:       [id number]      Sonographer:               Kamala Ball RVBARNEY      Study Type:                Complete Unilateral      Technical Quality:         Adequate      Age:    46    Gender:     M      MRN:    [id number]      :    1975      BSA:      Indications:     AV Fistula - S/P, AV Fistula / Thrombosed      CPT Codes:       30340      ICD Codes:       447  996.73      History:         Left arm brachial to basilic loop graft. No prior duplex.      Limitations:      Exam Data:   Side:          Left            Access          Graft   Inflow Artery   Brachial   Outflow Vein    Basilic                         Velocity (cm/s)    Prox Inflow artery    Mid Inflow artery           109.0                                0    Distal Inflow artery        95.00    Inflow Anastomosis          0.00    Inflow Artery distal to     anastomosis          Proximal Graft              0.00    Mid Graft                   0.00    Distal Graft                0.00    Outflow Anastomosis         0.00    Proximal Outflow Vein       10.00    Mid Outflow Vein    Distal Outflow Vein    Flow Volume Mid Bicep            ml/min    Flow Volume Mid-Forearm          ml/min    Prox Outflow Vein Diam            cm    Mid Outflow Vein Diam             cm    Distal Outflow Vein Diam          cm      Findings   Hemodialysis loop graft with the proximal anastomosis at the distal    brachial artery and the distal anastomosis at the basilic vein in the mid    biceps.    Doppler waveforms of the brachial artery are of high amplitude and    triphasic.   No flow is demonstrated in the hemodialysis graft. Echolucent material    fills the graft throughout its length.    The basilic and brachial veins are  compressible.      Buck Kenyon MD   (Electronically Signed)   Final Date:      23 February 2022                     20:51         IMPRESSION:  # ESRD  - Etiology likely 2/2 DM/HTN via R TDC  # Thrombosed Left AVF  - US 2/23 no flow  - OR 2/25 AVG Brachio-axillary Creation, thrombectomy and fistulogram today 2/25  # HTN  - Goal BP < 140/90  - At goal  # Anemia of CKD  - Goal Hgb 10-11  - At goal  # CKD-MBD  -   - Vit D 9  - CCa 7.1 --> 7.68  - PO4 9.5  # Hyperkalemia, corrected   - Received Insulin/D50/Ca Gluconate/2 Amps Bicarb  # Back Pain  - Elevated CRP  - MRI of Lumbar and Thoracic spine 2/22 (-) for epidural abscess  # Methamphetamine abuse   # DM   - A1C 6.5  - Per Primary Services  # Severe Met Acidosis, Corrected  - Bicarb Gtt stopped  - Correct with HD   # Leukocytosis, Trending down  - 11.2 --> 32.3 -->33.3 --> 26.5 --> 18.1  - BCx2 2/23 (+) Gram - Rods   - C.diff (-) 2/23     PLAN:  - No iHD today (SAT), then QMWF iHD during stay  - UF as tolerated   - Hold antihtn  - Stop Midodrine  - Continue Cholecalciferol   - Continue Phos Binder with meals, change to PhosLo  - Stop Bicarb Gtt  - Start JACKIE with HD   - No dietary protein restrictions  - Dose all meds per ESRD

## 2022-02-26 NOTE — PROGRESS NOTES
Hemodialysis ordered by Dr. Hamilton. Treatment started at 1606 and ended at 1837 d/t clotting dialyzer and lines. Blood returned. Dr. Hamilton notified and ok to stop tx 30 mins early. Pt stable, vss, no c/o post tx. Net UF 1.545 L. Report to BALAJI Taylor RN.

## 2022-02-26 NOTE — PROGRESS NOTES
Report received. Assumed care. Pt in bed awake. A/O x4. VS, tele monitoring in place. Responds appropriately. Denies pain, denies SOB on RA. Assessment complete. Discussed POC, pt verbalizes understanding. Explained importance of calling before getting OOB. Call light and belongings within reach. Bed in the lowest position. Treaded socks in place. Hourly rounding in progress. Will continue to monitor.

## 2022-02-26 NOTE — PROGRESS NOTES
Pt reported L hand pain/numbess/weakness. Radial pulse present. Minimal swelling seen. This RN paged MD. MD to bedside. MD concerned about ischemic neuropathy. This RN paged vascular MD. Per vascular MD, not concerned at this time. Will continue to monitor.

## 2022-02-26 NOTE — PROGRESS NOTES
Daily Progress Note:     Date of Service: 2/26/2022  Primary Team: [facility] Team   Attending: Rian Szymanski M.D.   Senior Resident: Dr. Yarely Lamb  Intern: Dr. Chávez  Contact:  [phone]    Chief Complaint:   Low Back Pain     ID: Mr. Watkins is a 46 year old male who presents 2/21/22 for back pain. His past medical history includes Methamphetamine abuse, Hypertension, Diabetes Mellitus, End Stage Renal Disease on hemodialysis at [facility] every Tues/Thurs/Sat.  Reportedly missing hemodialysis due to transportation issues. Describes his back pain as dull, nonradiating, 8 out of 10 intensity, worse with movement relieved with rest.    Subjective:  -AV fistula placed on the 02/25/2022  -Post procedure, patient said that he felt better, but overnight, patient said that his arm felt numb.  -Dr Hartman spoken to on the phone on 02/26/2022 after he saw the patient.  His point of view was that there was no apparent Mishap from the surgery.  This would have to be watched.  -Patient crackles in the lungs better.  He still hypervolemic especially in the lower extremities.  Next session for hemodialysis is supposed to be 2 days from now      Consultants/Specialty:  Nephrology  Vascular    Review of Systems:    Review of Systems   Constitutional: Negative for chills and fever.   HENT: Negative for sore throat.    Respiratory: Negative for cough, hemoptysis and sputum production.    Cardiovascular: Negative for chest pain and palpitations.   Gastrointestinal: Negative for abdominal pain, diarrhea, nausea and vomiting.   Musculoskeletal: Negative for back pain.   Neurological: Positive for tingling (left arm) and weakness (left arm). Negative for dizziness and headaches.   Psychiatric/Behavioral: Negative for depression.       Objective Data:   Physical Exam:   Vitals:   Temp:  [35.7 °C (96.3 °F)-37.2 °C (99 °F)] 37.2 °C (99 °F)  Pulse:  [] 92  Resp:  [15-19] 19  BP: ()/(62-96) 159/96  SpO2:  [90  %-96 %] 91 %      Physical Exam  Constitutional:       General: He is in acute distress.      Appearance: He is obese. He is ill-appearing.   HENT:      Head: Normocephalic and atraumatic.      Nose: No congestion.      Mouth/Throat:      Mouth: Mucous membranes are moist.      Comments: Poor dentition    Eyes:      General:         Right eye: No discharge.         Left eye: No discharge.      Pupils: Pupils are equal, round, and reactive to light.   Cardiovascular:      Rate and Rhythm: Regular rhythm.      Pulses: Normal pulses.      Heart sounds: Normal heart sounds. No murmur heard.     Comments: No palpable thrill at fistula location  Pulmonary:      Effort: Pulmonary effort is normal. No respiratory distress.      Breath sounds: No wheezing.   Abdominal:      General: Bowel sounds are normal. There is distension.      Palpations: Abdomen is soft. There is no mass.      Tenderness: There is abdominal tenderness. There is no guarding or rebound.      Comments: Rigid flanks bilaterally, 1-2+ pitting edema in flanks bilaterally   Musculoskeletal:         General: Tenderness present. Swelling: left leg slightly larger than right.      Cervical back: Normal range of motion.      Right lower leg: Edema present.      Left lower leg: Edema present.   Skin:     General: Skin is warm and dry.      Coloration: Skin is not jaundiced.      Findings: No erythema.      Comments: Excoriations and scabs on face, chest, and arms   Neurological:      Mental Status: He is alert and oriented to person, place, and time. Mental status is at baseline.      Motor: Weakness present.        Labs:   HEMATOLOGY/ ONCOLOGY/ID:            Recent Labs     02/23/22  1501 02/24/22  0300 02/25/22  0057 02/26/22  0341   WBC 33.3* 26.5* 18.1* 13.0*   RBC 3.06* 3.20* 3.09* 3.40*   HEMOGLOBIN 9.3* 9.6* 9.3* 10.1*   HEMATOCRIT 26.9* 28.4* 28.0* 30.7*   MCV 87.9 88.8 90.6 90.3   MCH 30.4 30.0 30.1 29.7   RDW 53.9* 54.4* 55.3* 53.4*   PLATELETCT 129*  121* 123* 140*   MPV 11.4 11.5 11.4 12.5   NEUTSPOLYS 87.00*  --  81.20* 81.50*   LYMPHOCYTES 1.70*  --  9.30* 10.10*   MONOCYTES 6.10  --  6.70 6.30   EOSINOPHILS 0.00  --  0.60 0.90   BASOPHILS 0.00  --  0.30 0.30   RBCMORPHOLO Present  --   --   --      Lab Results   Component Value Date    FERRITIN 665.0 (H) 02/22/2022    IRON 8 (L) 02/22/2022    TOTIRONBC 178 (L) 02/22/2022       RENAL:        Estimated GFR/CRCL = Estimated Creatinine Clearance: 14.4 mL/min (A) (by C-G formula based on SCr of 8.64 mg/dL (HH)).  Recent Labs     02/24/22  0300 02/25/22 0057 02/26/22  0341   SODIUM 136 133* 133*   POTASSIUM 4.0 4.7 4.8   CHLORIDE 100 98 94*   CO2 22 16* 23   GLUCOSE 89 129* 143*   BUN 79* 86* 60*   CREATININE 12.38* 10.25* 8.64*   CALCIUM 6.6* 6.4* 7.2*   PHOSPHORUS 6.8* 7.9* 6.0*   ALBUMIN 2.5* 2.4* 2.6*       GASTROINTESTINAL/ HEPATIC:          Recent Labs     02/23/22  1501 02/24/22 0300 02/24/22  1320 02/25/22 0057 02/26/22  0341   ALTSGPT  --  11  --  12 14   ASTSGOT  --  9*  --  23 20   ALKPHOSPHAT  --  169*  --  294* 415*   TBILIRUBIN  --  0.5  --  0.4 0.5   GAMMAGT  --   --   --  239*  --    ALBUMIN  --  2.5*  --  2.4* 2.6*   GLOBULIN  --  3.2  --  3.6* 3.8*   INR  --   --  1.25*  --   --    MACROCYTOSIS 1+  --   --   --   --      No results found for: AMMONIA    ENDOCRINE:              Recent Labs     02/24/22  0300 02/25/22 0057 02/26/22  0341   GLUCOSE 89 129* 143*     Lab Results   Component Value Date    HBA1C 6.5 (H) 02/22/2022    HBA1C 7.2 (H) 05/19/2020         Imaging:   MR-LUMBAR SPINE-WITH & W/O  02/22/22 0737  1. Bilateral L5 pars defects. However, there is no anterolisthesis.     2. Small left facet origin subligamentous synovial cyst projects into the posterolateral spinal canal behind L5 without significant thecal sac compression. Bilateral mild facet degeneration at L4-5 and L5-S1.     3. No abnormal enhancement is identified in the lumbar spine to suggest an infectious  process.     MR-THORACIC SPINE-WITH & W/O  02/22/22 0731  1. MRI of the thoracic spine without and with contrast within normal limits.     DX-CHEST-PORTABLE (1 VIEW) 02/21/22 7528  1.  No acute cardiopulmonary disease.   2.  Cardiomegaly     CT scan of the abdomen and pelvis without contrast   1.  No evidence of acute inflammatory process in the abdomen or pelvis  2.  Anasarca  3.  Prominent venous collaterals in the BILATERAL inguinal region  4.  BILATERAL L5 pars defects without spondylolisthesis       ECHO  LV EF:  30    %   Global hypokinesis.  Probably grade II diastolic dysfunction.      Problem Representation:  Mr. Watkins is a 46 year old male who presents 2/21/22 for back pain, His past medical history includes Methamphetamine abuse, Hypertension, Diabetes Mellitus, End Stage Renal Disease on hemodialysis at [facility] every Tues/Thurs/Sat.  Reportedly missing hemodialysis due to transportation issues. Describes his back pain as dull, nonradiating, 8 out of 10 intensity, worse with movement relieved with rest.      * Back pain  Assessment & Plan  Upon presentation, had elevated ESR, CRP. Patient denied any saddle anesthesia, numbness, bowel or urinary incontinence upon presentation. Other red flags include fever, preceding trauma, and cancer.  CT scan of the abdomen and pelvis without contrast 2/23/22 revealed no evidence of acute inflammatory process in the abdomen or pelvis, Anasarca, Prominent venous collaterals in the BILATERAL inguinal region, and BILATERAL L5 pars defects without spondylolisthesis.  MRI thoracic WNL. MRI lumbar revealed Bilateral L5 pars defects, Small left facet origin subligamentous synovial cyst projects into the posterolateral spinal canal behind L5 without significant thecal sac compression, and Bilateral mild facet degeneration at L4-5 and L5-S1.   -Dilaudid for pain 7-10 intensity  -Oxycodone for pain 3-6 in intesity  -Lidoderm   -OT/PT  -CTM      Paresthesia  Assessment &  Plan  Patient started having left-sided paresthesia after his AV fistula placement  On physical exam, at 1 point he was able to grasp with the hand, but at other time, he seemed unable to do so.  Sensation also appear to be reduced, although patient states that he can feel pressure on his hand and forearm as well as the arms.  -Spoke to vascular surgeon Dr. Hartman and his remarks were greatly appreciated.  -We will continue pain management, and if symptoms are worsening, will consider doing an ultrasound of the left upper extremity.    End stage renal disease (HCC)  Assessment & Plan  Patient has End Stage Renal Disease on hemodialysis at [facility] every Tues/Thurs/Sat but has been Non-compliant with treatments. Per chart review, patient has not had dialysis for over 1.5 weeks. Nephrology consulted will dialyze. 1st dialysis session 2/22/22. Night of 2/23/22, US reveals no flow in the hemodialysis graft, vascular surgery consulted - they will evaluate patient and graft on 2/25/22; Patient NPO at midnight. On 2/24/22, Vascular surgery consulted, they Originally considered thrombectomy of the existing graft however it has undergone 5 previous thrombectomies/revisions since it was put in 8 months ago, therefore  new AVG insertion on 2/25/22. Midodrine stopped 2/25/22.  -Dialysis as needed while inpatient  -monitor and replete electrolytes  -Diuretics if volume overloaded after dialysis only if patient is able to produce urine  -calcium acetate (phos-lo) 667mg tablet per nephrology    Leukocytosis- (present on admission)  Assessment & Plan  Initialy elevated on 2/23/22 at 11.2, increased to 32.3 on 2/23.   Patient was without sign of sepsis such as fever and low blood pressure.  He was complaining of lower abdominal discomfort.  CT of the abdomen and pelvis without contrast without any evidence of acute inflammatory process.  Blood culture at that point showed gram-negative gian.  He was initially started on  ceftriaxone and Flagyl.  -We will continue ceftriaxone at the moment to cover gram-negative gian    Anemia  Assessment & Plan  Hemoglobin at 11.1, MCV 93.5, RDW elevated at 57.9. 2/23/22 labwork revealed:Serum Iron 8 (L), ferritin 665 (H) , TIBC 178 (L),  Transferrin 149 (L). Suggesting a mixed picture of possibly iron deficiency and ACD in the setting of renal disease.    -started on ferous sulfate 325mg  -EPO during dialysis  -nephrology onboard, recs appreciated     Hypocalcemia  Assessment & Plan  Upon presentation, calcium is low at 6.7. Albumin is normal at 3.5. If patient is truly hypocalcemic, back pain could be secondary to bone breakdown for calcium. Hypocalcemia could also be from malabsorption. On 2/23/22 labs revealed: Ionized calcium ,  (H), 25 Hydroxy Vitamin D 9 (L), Magnesium 2.2 (N), Phosphorus 7.8 (H). Ionized calcium low at 0.8 on 2/25/22. Started on calcium carbonate.   -vitamin D3 (cholecalciferol) tablet 2,000 Units  -calcitRIOL (ROCALTROL) capsule 0.25 mcg  -Calcium acetate        Methamphetamine abuse (HCC)  Assessment & Plan  Actively uses methamphetamines. Urine drug screen ordered but not processed 2/2 limited urine output.  -Counseled on cessation, denies use  -Monitor for agitation and signs of withdrawal    Diarrhea  Assessment & Plan  Reports diarrhea of 2 weeks duration. Semi-solid on 2/23/22, C. Difficile is negative. Tissue Transglutaminase Antibodies (tTG-IgA) for celiac disease evaluation was negative. No bowel movement overnight or in the morning of 2/24. Stable  -Stool lactoferrin, Ova & parasites pending  -monitor and replete electrolytes    Hyperkalemia- (present on admission)  Assessment & Plan  Patient presents with potassium of 5.9, it increased to 6.2 on 2/22/22. Patient received insulin/D50/calcium gluconate/2 Amps of bicarb 50 mEq upon presentation. Hyperkalemia likely secondary to ESRD and missed dialysis sessions but could be 2/2 constipation. Improved after  first dialysis session on 2/22/22. Stable  - Continue scheduled dialysis  -Telemetry monitoring  -Recheck potassium levels and monitor   -CTM      Hypertension- (present on admission)  Assessment & Plan  Uncontrolled on presentation, currently stable. BP at 106/104 on the floor (before dialysis on 2/23). Hypertension could be secondary to ESRD. Patient had some soft blood pressures (SBP ~100) and high WBC's midodrine was started, but discontinued on 2/25 when WBC continued to decrease and blood pressure stabilized. ECHO 2/24/22 revealed  LV EF:  30%, Global hypokinesis.  Probably grade II diastolic dysfunction. Consider lisinopril if SBP >130.  -Amlodipine initially held on 02/23/2022 because of hypotension.  Patient also has lower extremity edema, so we will not be restarting amlodipine.  -Blood pressure has been increasing, and a daily dose of losartan has been ordered.  -IV hydralazine 10 mg Q4H PRN for SBP greater than 180  -CTM    DM (diabetes mellitus) ([facility])  Assessment & Plan  Hemoglobin A1c 5/19/2020 was 7.2%. Recent hemoglobin A1c was 6.5%.  -Initially started on glargine, aspart and sliding scale  -This was scaled back given good glucose level      Code Status: FULL  DVT ppx: Heparin   Diet: Renal

## 2022-02-26 NOTE — CARE PLAN
The patient is Watcher - Medium risk of patient condition declining or worsening    Shift Goals  Clinical Goals: safety, rest, pain control  Patient Goals: rest    Progress made toward(s) clinical / shift goals:   Problem: Pain - Standard  Goal: Alleviation of pain or a reduction in pain to the patient’s comfort goal  Outcome: Progressing     Problem: Knowledge Deficit - Standard  Goal: Patient and family/care givers will demonstrate understanding of plan of care, disease process/condition, diagnostic tests and medications  Outcome: Progressing     Problem: Respiratory  Goal: Patient will achieve/maintain optimum respiratory ventilation and gas exchange  Outcome: Progressing       Patient is not progressing towards the following goals:

## 2022-02-26 NOTE — PROGRESS NOTES
CROSS COVERAGE NOTE:    Paged ~4 am that patient was having pain and numbness in left arm where AV fistula was placed. I examined patient at the bedside, he was noting pain with movement of the left shoulder and full extension at elbow but at rest no pain in left hand. He was noting numbness and tingling in the entire hand as well as left side of the forearm compared to the right with decreased pain sensation, as well as somewhat decreased motor strength with . Radial pulse is 2+ compared to opposite side, and compression of AV fistula did not demonstrate improvement in sensation.    A/P:  Acute neuropathy  -patient has never had this before, is post-op after AV fistula and diabetic with risk for Ischemic monomelic neuropathy > usual dialysis access steal syndrome, appears to not be severe    Plan:  -Instructed nursing to discuss with vascular Dr. Gonzalez to see if he would prefer imaging studies or assessment now or in the morning when he rounds. He informed nursing staff he will see in the morning  -Clinical observation indicated, discussed with patient and nursing staff to watch for evolution or change of symptoms

## 2022-02-26 NOTE — ASSESSMENT & PLAN NOTE
Some improvement on 3/7/22  -Vascular surgery consulted, following  -Pain management and observation for increased swelling, new loss of sensation, or loss of strength; if symptoms are worsening, ultrasound of the left upper extremity  -CTM

## 2022-02-26 NOTE — CARE PLAN
The patient is Stable - Low risk of patient condition declining or worsening    Shift Goals  Clinical Goals: labs, safety, NPO at midnight  Patient Goals: rest    Progress made toward(s) clinical / shift goals:    Problem: Knowledge Deficit - Standard  Goal: Patient and family/care givers will demonstrate understanding of plan of care, disease process/condition, diagnostic tests and medications  Outcome: Progressing       Patient is not progressing towards the following goals:

## 2022-02-27 LAB
ALBUMIN SERPL BCP-MCNC: 2.3 G/DL (ref 3.2–4.9)
ALBUMIN/GLOB SERPL: 0.6 G/DL
ALP SERPL-CCNC: 356 U/L (ref 30–99)
ALT SERPL-CCNC: 5 U/L (ref 2–50)
ANION GAP SERPL CALC-SCNC: 17 MMOL/L (ref 7–16)
AST SERPL-CCNC: 16 U/L (ref 12–45)
BACTERIA BLD CULT: ABNORMAL
BASOPHILS # BLD AUTO: 0.4 % (ref 0–1.8)
BASOPHILS # BLD: 0.04 K/UL (ref 0–0.12)
BILIRUB SERPL-MCNC: 0.4 MG/DL (ref 0.1–1.5)
BUN SERPL-MCNC: 79 MG/DL (ref 8–22)
CALCIUM SERPL-MCNC: 7.1 MG/DL (ref 8.5–10.5)
CHLORIDE SERPL-SCNC: 93 MMOL/L (ref 96–112)
CO2 SERPL-SCNC: 23 MMOL/L (ref 20–33)
CREAT SERPL-MCNC: 9.94 MG/DL (ref 0.5–1.4)
EOSINOPHIL # BLD AUTO: 0.17 K/UL (ref 0–0.51)
EOSINOPHIL NFR BLD: 1.7 % (ref 0–6.9)
ERYTHROCYTE [DISTWIDTH] IN BLOOD BY AUTOMATED COUNT: 53.2 FL (ref 35.9–50)
GLOBULIN SER CALC-MCNC: 3.6 G/DL (ref 1.9–3.5)
GLUCOSE BLD STRIP.AUTO-MCNC: 139 MG/DL (ref 65–99)
GLUCOSE BLD STRIP.AUTO-MCNC: 143 MG/DL (ref 65–99)
GLUCOSE SERPL-MCNC: 146 MG/DL (ref 65–99)
HCT VFR BLD AUTO: 30.7 % (ref 42–52)
HGB BLD-MCNC: 9.9 G/DL (ref 14–18)
IMM GRANULOCYTES # BLD AUTO: 0.09 K/UL (ref 0–0.11)
IMM GRANULOCYTES NFR BLD AUTO: 0.9 % (ref 0–0.9)
LACTOFERRIN STL QL IA: NEGATIVE
LYMPHOCYTES # BLD AUTO: 1.74 K/UL (ref 1–4.8)
LYMPHOCYTES NFR BLD: 17.5 % (ref 22–41)
MCH RBC QN AUTO: 29.6 PG (ref 27–33)
MCHC RBC AUTO-ENTMCNC: 32.2 G/DL (ref 33.7–35.3)
MCV RBC AUTO: 91.9 FL (ref 81.4–97.8)
MONOCYTES # BLD AUTO: 1.06 K/UL (ref 0–0.85)
MONOCYTES NFR BLD AUTO: 10.6 % (ref 0–13.4)
NEUTROPHILS # BLD AUTO: 6.87 K/UL (ref 1.82–7.42)
NEUTROPHILS NFR BLD: 68.9 % (ref 44–72)
NRBC # BLD AUTO: 0 K/UL
NRBC BLD-RTO: 0 /100 WBC
PHOSPHATE SERPL-MCNC: 7.5 MG/DL (ref 2.5–4.5)
PLATELET # BLD AUTO: 136 K/UL (ref 164–446)
PMV BLD AUTO: 11.9 FL (ref 9–12.9)
POTASSIUM SERPL-SCNC: 4.8 MMOL/L (ref 3.6–5.5)
PROT SERPL-MCNC: 5.9 G/DL (ref 6–8.2)
RBC # BLD AUTO: 3.34 M/UL (ref 4.7–6.1)
SIGNIFICANT IND 70042: ABNORMAL
SIGNIFICANT IND 70042: ABNORMAL
SITE SITE: ABNORMAL
SITE SITE: ABNORMAL
SODIUM SERPL-SCNC: 133 MMOL/L (ref 135–145)
SOURCE SOURCE: ABNORMAL
SOURCE SOURCE: ABNORMAL
WBC # BLD AUTO: 10 K/UL (ref 4.8–10.8)

## 2022-02-27 PROCEDURE — A9270 NON-COVERED ITEM OR SERVICE: HCPCS | Performed by: NURSE PRACTITIONER

## 2022-02-27 PROCEDURE — 770020 HCHG ROOM/CARE - TELE (206)

## 2022-02-27 PROCEDURE — 700102 HCHG RX REV CODE 250 W/ 637 OVERRIDE(OP): Performed by: NURSE PRACTITIONER

## 2022-02-27 PROCEDURE — 80053 COMPREHEN METABOLIC PANEL: CPT

## 2022-02-27 PROCEDURE — 700111 HCHG RX REV CODE 636 W/ 250 OVERRIDE (IP): Performed by: INTERNAL MEDICINE

## 2022-02-27 PROCEDURE — 99233 SBSQ HOSP IP/OBS HIGH 50: CPT | Mod: GC | Performed by: INTERNAL MEDICINE

## 2022-02-27 PROCEDURE — 36415 COLL VENOUS BLD VENIPUNCTURE: CPT

## 2022-02-27 PROCEDURE — 84100 ASSAY OF PHOSPHORUS: CPT

## 2022-02-27 PROCEDURE — 700102 HCHG RX REV CODE 250 W/ 637 OVERRIDE(OP): Performed by: STUDENT IN AN ORGANIZED HEALTH CARE EDUCATION/TRAINING PROGRAM

## 2022-02-27 PROCEDURE — 85025 COMPLETE CBC W/AUTO DIFF WBC: CPT

## 2022-02-27 PROCEDURE — A9270 NON-COVERED ITEM OR SERVICE: HCPCS | Performed by: STUDENT IN AN ORGANIZED HEALTH CARE EDUCATION/TRAINING PROGRAM

## 2022-02-27 PROCEDURE — 700101 HCHG RX REV CODE 250: Performed by: HOSPITALIST

## 2022-02-27 PROCEDURE — 82962 GLUCOSE BLOOD TEST: CPT | Mod: 91

## 2022-02-27 PROCEDURE — 700111 HCHG RX REV CODE 636 W/ 250 OVERRIDE (IP): Performed by: HOSPITALIST

## 2022-02-27 PROCEDURE — 700111 HCHG RX REV CODE 636 W/ 250 OVERRIDE (IP): Performed by: STUDENT IN AN ORGANIZED HEALTH CARE EDUCATION/TRAINING PROGRAM

## 2022-02-27 RX ORDER — CALCIUM ACETATE 667 MG/1
2668 TABLET ORAL
Status: DISCONTINUED | OUTPATIENT
Start: 2022-02-27 | End: 2022-03-08 | Stop reason: HOSPADM

## 2022-02-27 RX ORDER — HYDRALAZINE HYDROCHLORIDE 25 MG/1
25 TABLET, FILM COATED ORAL EVERY 8 HOURS
Status: DISCONTINUED | OUTPATIENT
Start: 2022-02-27 | End: 2022-03-08 | Stop reason: HOSPADM

## 2022-02-27 RX ORDER — OXYCODONE HYDROCHLORIDE 5 MG/1
2.5 TABLET ORAL EVERY 4 HOURS PRN
Status: DISCONTINUED | OUTPATIENT
Start: 2022-02-27 | End: 2022-03-01

## 2022-02-27 RX ADMIN — Medication 2668 MG: at 11:59

## 2022-02-27 RX ADMIN — OXYCODONE 2.5 MG: 5 TABLET ORAL at 23:14

## 2022-02-27 RX ADMIN — HEPARIN SODIUM 5000 UNITS: 5000 INJECTION, SOLUTION INTRAVENOUS; SUBCUTANEOUS at 05:16

## 2022-02-27 RX ADMIN — ACETAMINOPHEN 975 MG: 325 TABLET, FILM COATED ORAL at 11:59

## 2022-02-27 RX ADMIN — Medication 2000 UNITS: at 06:00

## 2022-02-27 RX ADMIN — ACETAMINOPHEN 975 MG: 325 TABLET, FILM COATED ORAL at 17:34

## 2022-02-27 RX ADMIN — CEFTRIAXONE SODIUM 2 G: 10 INJECTION, POWDER, FOR SOLUTION INTRAVENOUS at 20:10

## 2022-02-27 RX ADMIN — HYDROMORPHONE HYDROCHLORIDE 0.5 MG: 1 INJECTION, SOLUTION INTRAMUSCULAR; INTRAVENOUS; SUBCUTANEOUS at 12:00

## 2022-02-27 RX ADMIN — ACETAMINOPHEN 975 MG: 325 TABLET, FILM COATED ORAL at 05:16

## 2022-02-27 RX ADMIN — HYDROMORPHONE HYDROCHLORIDE 0.5 MG: 1 INJECTION, SOLUTION INTRAMUSCULAR; INTRAVENOUS; SUBCUTANEOUS at 18:10

## 2022-02-27 RX ADMIN — FERROUS SULFATE TAB 325 MG (65 MG ELEMENTAL FE) 325 MG: 325 (65 FE) TAB at 07:30

## 2022-02-27 RX ADMIN — Medication 2668 MG: at 17:34

## 2022-02-27 RX ADMIN — Medication 2001 MG: at 05:17

## 2022-02-27 RX ADMIN — HEPARIN SODIUM 5000 UNITS: 5000 INJECTION, SOLUTION INTRAVENOUS; SUBCUTANEOUS at 14:30

## 2022-02-27 RX ADMIN — ACETAMINOPHEN 975 MG: 325 TABLET, FILM COATED ORAL at 23:09

## 2022-02-27 RX ADMIN — LOSARTAN POTASSIUM 25 MG: 25 TABLET, FILM COATED ORAL at 05:21

## 2022-02-27 RX ADMIN — HEPARIN SODIUM 5000 UNITS: 5000 INJECTION, SOLUTION INTRAVENOUS; SUBCUTANEOUS at 23:10

## 2022-02-27 RX ADMIN — HYDRALAZINE HYDROCHLORIDE 25 MG: 25 TABLET, FILM COATED ORAL at 17:39

## 2022-02-27 RX ADMIN — HYDROMORPHONE HYDROCHLORIDE 0.5 MG: 1 INJECTION, SOLUTION INTRAMUSCULAR; INTRAVENOUS; SUBCUTANEOUS at 05:16

## 2022-02-27 RX ADMIN — LIDOCAINE 1 PATCH: 50 PATCH TOPICAL at 11:59

## 2022-02-27 RX ADMIN — HYDRALAZINE HYDROCHLORIDE 25 MG: 25 TABLET, FILM COATED ORAL at 23:09

## 2022-02-27 RX ADMIN — CALCITRIOL CAPSULES 0.25 MCG 0.25 MCG: 0.25 CAPSULE ORAL at 05:17

## 2022-02-27 ASSESSMENT — ENCOUNTER SYMPTOMS
TINGLING: 1
HEADACHES: 0
HEMOPTYSIS: 0
PALPITATIONS: 0
DEPRESSION: 0
MYALGIAS: 1
NAUSEA: 0
VOMITING: 0
ABDOMINAL PAIN: 0
COUGH: 0
FOCAL WEAKNESS: 1
BACK PAIN: 1
BLURRED VISION: 0
DOUBLE VISION: 0
SPUTUM PRODUCTION: 0
DIARRHEA: 0
FEVER: 0
SORE THROAT: 0
DIZZINESS: 0
WEAKNESS: 1
CHILLS: 0

## 2022-02-27 ASSESSMENT — PAIN DESCRIPTION - PAIN TYPE
TYPE: ACUTE PAIN

## 2022-02-27 ASSESSMENT — FIBROSIS 4 INDEX: FIB4 SCORE: 2.31

## 2022-02-27 NOTE — PROGRESS NOTES
ACUTE CARE VASCULAR SERVICE  PROGRESS NOTE      POD 2 LUE AVG insertion  Reporting left upper arm pain and left forearm numbness, reportedly unchanged from yesterday however appears more comfortable than yesterday.  Mild swelling, no hematoma, graft patent, strong palpable left radial pulse  Motor function of the hand is intact    No evidence of surgical complications, suspect this is postoperative pain/paresthesias and will resolve     Following along    Tone Hartman MD  [facility]  Voalte preferred. Otherwise text to cell [phone] or call my office [phone]  __________________________________________________________________  Patient:Ambrose Watkins   MRN:[id number]   CSN:[id number]

## 2022-02-27 NOTE — PROGRESS NOTES
Daily Progress Note:     Date of Service: 2/27/2022  Primary Team: [facility] Team   Attending: Rian Szymanski M.D.   Senior Resident: Dr. Lamb  Intern: Dr. Chávez  Contact:  [phone]    Chief Complaint:   Low Back Pain     ID: Mr. Watkins is a 46 year old male who presents 2/21/22 for back pain. His past medical history includes Methamphetamine abuse, Hypertension, Diabetes Mellitus, End Stage Renal Disease on hemodialysis at [facility] every Tues/Thurs/Sat.  Reportedly missing hemodialysis due to transportation issues. Describes his back pain as dull, nonradiating, 8 out of 10 intensity, worse with movement relieved with rest.    Subjective:  -Reports upper left arm swelling, pain, and discomfort  -Disappointed in food portion sizes  -Hypervolemic, with bilateral leg swelling, and abdominal edema  -pruritis worse  -Hemodialysis on Monday     Consultants/Specialty:  Nephrology  Vascular    Review of Systems:    Review of Systems   Constitutional: Negative for chills and fever.   HENT: Negative for sore throat.    Eyes: Negative for blurred vision and double vision.   Respiratory: Negative for cough, hemoptysis and sputum production.    Cardiovascular: Positive for leg swelling. Negative for chest pain and palpitations.   Gastrointestinal: Negative for abdominal pain, diarrhea, nausea and vomiting.   Musculoskeletal: Positive for back pain and myalgias (left upper arm pain).   Neurological: Positive for tingling (left arm), focal weakness (left upper arm weakness) and weakness (left arm). Negative for dizziness and headaches.   Psychiatric/Behavioral: Negative for depression.       Objective Data:   Physical Exam:   Vitals:   Temp:  [36 °C (96.8 °F)-37.2 °C (99 °F)] 36.2 °C (97.1 °F)  Pulse:  [80-94] 90  Resp:  [18-20] 18  BP: (153-159)/(95-99) 153/99  SpO2:  [91 %-94 %] 94 %    Physical Exam  Constitutional:       Appearance: He is obese. He is ill-appearing.   HENT:      Head: Normocephalic and  "atraumatic.      Nose: No congestion.      Mouth/Throat:      Mouth: Mucous membranes are moist.      Comments: Poor dentition    Eyes:      General:         Right eye: No discharge.         Left eye: No discharge.      Pupils: Pupils are equal, round, and reactive to light.   Cardiovascular:      Rate and Rhythm: Regular rhythm.      Pulses: Normal pulses.      Heart sounds: Normal heart sounds. No murmur heard.  Pulmonary:      Effort: Pulmonary effort is normal. No respiratory distress.      Breath sounds: No wheezing.   Abdominal:      General: Bowel sounds are normal. There is distension.      Palpations: Abdomen is soft. There is no mass.      Tenderness: There is abdominal tenderness. There is no guarding or rebound.      Comments: Rigid flanks bilaterally, 1-2+ pitting edema in flanks bilaterally   Musculoskeletal:         General: Swelling (left upper arm) and tenderness (left upper arm) present.      Cervical back: Normal range of motion.      Right lower leg: Edema present.      Left lower leg: Edema present.   Skin:     General: Skin is warm and dry.      Coloration: Skin is not jaundiced.      Findings: No erythema.      Comments: Excoriations and scabs on face, chest, and arms   Neurological:      Mental Status: He is alert and oriented to person, place, and time. Mental status is at baseline.      Motor: Weakness (left arm weakness, but able to dorsiflex his left hand) present.   Psychiatric:      Comments: Upset and agitated because of \"small food portions\"       Labs:   HEMATOLOGY/ ONCOLOGY/ID:            Recent Labs     02/25/22  0057 02/26/22  0341 02/27/22  0640   WBC 18.1* 13.0* 10.0   RBC 3.09* 3.40* 3.34*   HEMOGLOBIN 9.3* 10.1* 9.9*   HEMATOCRIT 28.0* 30.7* 30.7*   MCV 90.6 90.3 91.9   MCH 30.1 29.7 29.6   RDW 55.3* 53.4* 53.2*   PLATELETCT 123* 140* 136*   MPV 11.4 12.5 11.9   NEUTSPOLYS 81.20* 81.50* 68.90   LYMPHOCYTES 9.30* 10.10* 17.50*   MONOCYTES 6.70 6.30 10.60   EOSINOPHILS 0.60 0.90 " 1.70   BASOPHILS 0.30 0.30 0.40     Lab Results   Component Value Date    FERRITIN 665.0 (H) 02/22/2022    IRON 8 (L) 02/22/2022    TOTIRONBC 178 (L) 02/22/2022       RENAL:        Estimated GFR/CRCL = Estimated Creatinine Clearance: 12.5 mL/min (A) (by C-G formula based on SCr of 9.94 mg/dL (HH)).  Recent Labs     02/25/22 0057 02/26/22 0341 02/27/22  0640   SODIUM 133* 133* 133*   POTASSIUM 4.7 4.8 4.8   CHLORIDE 98 94* 93*   CO2 16* 23 23   GLUCOSE 129* 143* 146*   BUN 86* 60* 79*   CREATININE 10.25* 8.64* 9.94*   CALCIUM 6.4* 7.2* 7.1*   PHOSPHORUS 7.9* 6.0* 7.5*   ALBUMIN 2.4* 2.6* 2.3*       GASTROINTESTINAL/ HEPATIC:          Recent Labs     02/24/22  1320 02/25/22 0057 02/26/22 0341 02/27/22  0640   ALTSGPT  --  12 14 5   ASTSGOT  --  23 20 16   ALKPHOSPHAT  --  294* 415* 356*   TBILIRUBIN  --  0.4 0.5 0.4   GAMMAGT  --  239*  --   --    ALBUMIN  --  2.4* 2.6* 2.3*   GLOBULIN  --  3.6* 3.8* 3.6*   INR 1.25*  --   --   --      No results found for: AMMONIA    ENDOCRINE:              Recent Labs     02/25/22 0057 02/26/22 0341 02/27/22  0640   GLUCOSE 129* 143* 146*     Lab Results   Component Value Date    HBA1C 6.5 (H) 02/22/2022    HBA1C 7.2 (H) 05/19/2020       Imaging:   MR-LUMBAR SPINE-WITH & W/O  02/22/22 0737  1. Bilateral L5 pars defects. However, there is no anterolisthesis.     2. Small left facet origin subligamentous synovial cyst projects into the posterolateral spinal canal behind L5 without significant thecal sac compression. Bilateral mild facet degeneration at L4-5 and L5-S1.     3. No abnormal enhancement is identified in the lumbar spine to suggest an infectious process.     MR-THORACIC SPINE-WITH & W/O  02/22/22 0719  1. MRI of the thoracic spine without and with contrast within normal limits.     DX-CHEST-PORTABLE (1 VIEW) 02/21/22 3734  1.  No acute cardiopulmonary disease.   2.  Cardiomegaly     CT scan of the abdomen and pelvis without contrast   1.  No evidence of acute  inflammatory process in the abdomen or pelvis  2.  Anasarca  3.  Prominent venous collaterals in the BILATERAL inguinal region  4.  BILATERAL L5 pars defects without spondylolisthesis        ECHO  LV EF:  30    %   Global hypokinesis.  Probably grade II diastolic dysfunction.        Problem Representation:  Mr. Watkins is a 46 year old male who presents 2/21/22 for back pain, His past medical history includes Methamphetamine abuse, Hypertension, Diabetes Mellitus, End Stage Renal Disease on hemodialysis at [facility] every Tues/Thurs/Sat.  Reportedly missing hemodialysis due to transportation issues. Describes his back pain as dull, nonradiating, 8 out of 10 intensity, worse with movement relieved with rest.     * Back pain  Assessment & Plan  Upon presentation, had elevated ESR, CRP. Patient denied any saddle anesthesia, numbness, bowel or urinary incontinence upon presentation. Other red flags include fever, preceding trauma, and cancer.  CT scan of the abdomen and pelvis without contrast 2/23/22 revealed no evidence of acute inflammatory process in the abdomen or pelvis, Anasarca, Prominent venous collaterals in the BILATERAL inguinal region, and BILATERAL L5 pars defects without spondylolisthesis.  MRI thoracic WNL. MRI lumbar revealed Bilateral L5 pars defects, Small left facet origin subligamentous synovial cyst projects into the posterolateral spinal canal behind L5 without significant thecal sac compression, and Bilateral mild facet degeneration at L4-5 and L5-S1.   -Dilaudid for pain 7-10 intensity  -Oxycodone for pain 3-6 in intensity  -Tylenol PRN  -Lidoderm   -OT/PT  -CTM      End stage renal disease (HCC)  Assessment & Plan  Patient has End Stage Renal Disease on hemodialysis at [facility] every Tues/Thurs/Sat but has been Non-compliant with treatments. Per chart review, patient has not had dialysis for over 1.5 weeks. Nephrology consulted will dialyze. 1st dialysis session 2/22/22. Night of  2/23/22, US reveals no flow in the hemodialysis graft, vascular surgery consulted - they will evaluate patient and graft on 2/25/22; Patient NPO at midnight. On 2/24/22, Vascular surgery consulted, they Originally considered thrombectomy of the existing graft however it has undergone 5 previous thrombectomies/revisions since it was put in 8 months ago, therefore  new AVG insertion on 2/25/22. Midodrine stopped 2/25/22.  -Dialysis MWF while inpatient  -monitor and replete electrolytes  -Diuretics if volume overloaded after dialysis only if patient is able to produce urine  -calcium acetate (phos-lo) 667mg tablet per nephrology    Anemia  Assessment & Plan  Hemoglobin at 11.1, MCV 93.5, RDW elevated at 57.9. 2/23/22 labwork revealed:Serum Iron 8 (L), ferritin 665 (H) , TIBC 178 (L),  Transferrin 149 (L). Suggesting a mixed picture of possibly iron deficiency and ACD in the setting of renal disease.    -started on ferous sulfate 325mg  -Epoetin 6,000 units during dialysis  -Dialysis MWF  -nephrology onboard, recs appreciated     Paresthesia  Assessment & Plan  Patient started having left-sided paresthesia after his AV fistula placement  On physical exam, at 1 point he was able to grasp with the hand, but at other time, he seemed unable to do so.  Sensation also appear to be reduced, although patient states that he can feel pressure on his hand and forearm as well as the arms. Patient able to dorsiflex fingers on 2/27/22.  -Spoke to vascular surgeon Dr. Hartman and his remarks were greatly appreciated.  -We will continue pain management, and if symptoms are worsening, will consider doing an ultrasound of the left upper extremity.    Diarrhea  Assessment & Plan  Reports diarrhea of 2 weeks duration. Semi-solid on 2/23/22, C. Difficile is negative. Tissue Transglutaminase Antibodies (tTG-IgA) for celiac disease evaluation was negative. No bowel movement overnight or in the morning of 2/24. Stable.  -Stool lactoferrin, Ova  & parasites pending  -monitor and replete electrolytes  -CTM for recurrence     Hypocalcemia  Assessment & Plan  Upon presentation, calcium is low at 6.7. Albumin is normal at 3.5. If patient is truly hypocalcemic, back pain could be secondary to bone breakdown for calcium. Hypocalcemia could also be from malabsorption. On 2/23/22 labs revealed: Ionized calcium ,  (H), 25 Hydroxy Vitamin D 9 (L), Magnesium 2.2 (N), Phosphorus 7.8 (H). Ionized calcium low at 0.8 on 2/25/22. Started on calcium carbonate.   -vitamin D3 (cholecalciferol) tablet 2,000 Units  -calcitRIOL (ROCALTROL) capsule 0.25 mcg  -Calcium acetate  -CTM        Methamphetamine abuse (HCC)  Assessment & Plan  Actively uses methamphetamines. Urine drug screen ordered but not processed 2/2 limited urine output.  -Counseled on cessation, denies use  -Monitor for agitation and signs of withdrawal    Hyperkalemia- (present on admission)  Assessment & Plan  Patient presents with potassium of 5.9, it increased to 6.2 on 2/22/22. Patient received insulin/D50/calcium gluconate/2 Amps of bicarb 50 mEq upon presentation. Hyperkalemia likely secondary to ESRD and missed dialysis sessions but could be 2/2 constipation. Improved after first dialysis session on 2/22/22. Stable  -Continue scheduled dialysis  -Telemetry monitoring  -Recheck potassium levels and monitor   -CTM      Leukocytosis- (present on admission)  Assessment & Plan  Initialy elevated on 2/23/22 at 11.2, increased to 32.3 on 2/23.   Patient was without sign of sepsis such as fever and low blood pressure.  He was complaining of lower abdominal discomfort.  CT of the abdomen and pelvis without contrast without any evidence of acute inflammatory process.  Blood culture at that point showed gram-negative gian, grew stenotrophomonas maltophilia.  He was initially started on ceftriaxone and Flagyl.  -We will continue ceftriaxone at the moment to cover gram-negative gian  -Consult ID regarding the rare  blood culture growth    Hypertension- (present on admission)  Assessment & Plan  Uncontrolled on presentation, currently stable. BP at 106/104 on the floor (before dialysis on 2/23). Hypertension could be secondary to ESRD. Patient had some soft blood pressures (SBP ~100) and high WBC's midodrine was started, but discontinued on 2/25 when WBC continued to decrease and blood pressure stabilized. ECHO 2/24/22 revealed  LV EF:  30%, Global hypokinesis.  Probably grade II diastolic dysfunction.   -Amlodipine initially held on 02/23/2022 because of hypotension.  Patient also has lower extremity edema, so we will not be restarting amlodipine.  -Blood pressure has been increasing, losartan 25 daily was added on 2/26/22  -IV hydralazine 10 mg Q4H PRN for SBP greater than 180  -CTM    DM (diabetes mellitus) ([facility])  Assessment & Plan  Hemoglobin A1c 5/19/2020 was 7.2%. Recent hemoglobin A1c was 6.5%.  -Initially started on glargine, aspart and sliding scale  -This was scaled back given good glucose level  -Continue to monitor    Code Status: FULL  DVT ppx: Heparin   Diet: Renal    Majo Chávez MD MPH  PGY-1, [facility]    Assessment and plan discussed with senior resident and attending physician.

## 2022-02-27 NOTE — PROGRESS NOTES
".  [facility] -  PROGRESS NOTE               Author: EMMA Dial Date & Time: 2/27/2022  11:02 AM     HPI:  This is a 46 year old male with past medical history of End Stage Renal Disease on hemodialysis at [facility] every Tues/Thurs/Sat, diabetes mellitus and Hypertension who presented to the emergency department on 2/21/22 complaining of lower back pain.  Reportedly missing hemodialysis due to transportation issues. MRI this am and medicated for pain. Seen on Hemodialysis this am, lethargic after PRN medications.     DAILY NEPHROLOGY SUMMARY:  2/23: 3.5L Net UF. Resting in bed. Calm and agreeable to HD today. C/o cont. Back pain. Bps low this am.   2/24: Echo in progress. Primary team at . Resting in bed. C/o cont back pain. 2.0L net UF. K 4.0, CO2 22. Denies Diarrhea. Denies CP/SOB.   2/25: Upset about NPO this am for OR.  In bed, no acute distress. Bps improved. K 4.7, CO2 16.   2/26: S/p AVG creation and fistulogram. 1.5 L net UF. Sitting up at side of bed, eating lunch. C/o SOB and nausea.  C/o L AVF pain.   2/27: Laying in bed. C/o L arm pain.     REVIEW OF SYSTEMS:    10 point ROS reviewed and is as per HPI/daily summary or otherwise negative    PMH/PSH/SH/FH: Reviewed and unchanged since admission note  CURRENT MEDICATIONS: Reviewed from admission to present day    VS:  /99   Pulse 90   Temp 36.2 °C (97.1 °F) (Temporal)   Resp 18   Ht 1.778 m (5' 10\")   Wt 124 kg (272 lb 4.3 oz)   SpO2 94%   BMI 39.07 kg/m²   Physical Exam  Constitutional:       General: He is not in acute distress.     Appearance: He is ill-appearing.   HENT:      Head: Normocephalic and atraumatic.      Mouth/Throat:      Dentition: Abnormal dentition.   Eyes:      Extraocular Movements: Extraocular movements intact.      Conjunctiva/sclera: Conjunctivae normal.      Pupils: Pupils are equal, round, and reactive to light.   Cardiovascular:      Rate and Rhythm: Normal rate and " regular rhythm.      Pulses: Normal pulses.      Heart sounds: Normal heart sounds. No murmur heard.    No friction rub. No gallop.      Comments: R TDC  L AVF +Bruit/+Thrill  Pulmonary:      Effort: Pulmonary effort is normal. No respiratory distress.      Breath sounds: Normal breath sounds. No stridor. No wheezing or rhonchi.   Abdominal:      General: There is no distension.      Palpations: Abdomen is soft. There is no mass.   Musculoskeletal:         General: Swelling present. Normal range of motion.      Right lower leg: Edema present.      Left lower leg: Edema present.      Comments: CARLOS edema    Skin:     General: Skin is warm and dry.      Comments: Scattered Scabs through out   Neurological:      Mental Status: He is alert.   Psychiatric:         Behavior: Behavior is cooperative.         Fluids:  In: 960 [P.O.:960]  Out: -     LABS:  Recent Labs     02/25/22  0057 02/26/22  0341 02/27/22  0640   SODIUM 133* 133* 133*   POTASSIUM 4.7 4.8 4.8   CHLORIDE 98 94* 93*   CO2 16* 23 23   GLUCOSE 129* 143* 146*   BUN 86* 60* 79*   CREATININE 10.25* 8.64* 9.94*   CALCIUM 6.4* 7.2* 7.1*     MR-LUMBAR SPINE-WITH & W/O  Order: [id number]   Status: Final result     Visible to patient: No (inaccessible in [facility])     Next appt: None     0 Result Notes    Details    Reading Physician Reading Date Result Priority   Lisa Link M.D.  [phone] 2/22/2022 STAT     Narrative & Impression     2/22/2022 1:06 AM     HISTORY/REASON FOR EXAM:  Epidural abscess suspected        TECHNIQUE/EXAM DESCRIPTION:  MRI of the lumbar spine without and with contrast.     The study was performed on a [employer] 1.5 Jacqueline MRI scanner.     T1 sagittal, T2 fast spin-echo sagittal, and T2 axial images were obtained of the lumbar spine. T1 postcontrast fat-suppressed sagittal images were obtained.     20 mL ProHance contrast was administered intravenously.     COMPARISON:  None.     FINDINGS:  Lumbar spine alignment is  normal.  Bilateral L5 pars defects without anterolisthesis.  Bone marrow signal intensity is normal. Mild disc desiccation is noted at L4-L5.  Bilateral pars defects noted at L5. The conus terminates at L1.     Lumbar spine levels:     T12-L1: No significant abnormality.     L1-2: Mild facet degeneration. The stenosis.     L2-3: Minimal facet degeneration. No significant canal stenosis or foraminal narrowing.     L3-4: Broad-based disc bulge and bilateral mild facet degeneration. There is no significant spinal canal narrowing or neuroforaminal narrowing.     L4-5, bilateral facet degeneration. Broad-based disc bulge with a focal central protrusion that minimally encroaches upon the spinal canal. There is no significant foraminal narrowing. There is minimal canal narrowing without thecal sac compression.   There is a left facet origin synovial cyst that extends into the left subligamentous at the level of the left L5 pars defect and minimally encroaches upon the spinal canal from the posterolateral aspect. Bilateral L5 pars defects are present.     Postcontrast images through the lumbar spine does not demonstrate any abnormal enhancement. Minimal epidural enhancement identified at the dorsal aspect of L5 is likely related to degenerative changes in the facet joints and synovial hypertrophy. No   pathological epidural enhancement to suggest infection identified.     IMPRESSION:        Bilateral L5 pars defects. However, there is no anterolisthesis.     Small left facet origin subligamentous synovial cyst projects into the posterolateral spinal canal behind L5 without significant thecal sac compression. Bilateral mild facet degeneration at L4-5 and L5-S1.     No abnormal enhancement is identified in the lumbar spine to suggest an infectious process.      HISTORY/REASON FOR EXAM:  Epidural abscess suspected        TECHNIQUE/EXAM DESCRIPTION:  MRI of the thoracic spine without and with contrast.     The study was  performed on a [employer] 1.5 Jacqueline MRI scanner. T1 sagittal, T2 fast spin-echo sagittal, and T2 axial images were obtained of the thoracic spine. T1 post-contrast fat suppressed sagittal images were obtained. Optional T1 post-contrast   axial images may be obtained.     20 mL ProHance contrast was administered intravenously.     COMPARISON:  None.     FINDINGS:     Alignment in the thoracic spine is normal. Marrow signal in the vertebral bodies is within normal limits. There is no abnormal osseous enhancement in the vertebral bodies or other abnormal extradural enhancement. The disc spaces are intact at all   thoracic levels. There are no significant osteophytic changes. The prevertebral and paraspinous soft tissues are unremarkable.     The thoracic spinal cord is normal in caliber and signal throughout its course. There is no abnormal cord enhancement. There is no abnormal intradural extramedullary enhancement.     At T1-2 through T11-12 there is no significant disc bulge or protrusion. The neural foramina are intact at all thoracic levels. There is no congenital or acquired central spinal stenosis at any thoracic level. There is no significant ligamentous or facet   hypertrophy.     IMPRESSION:        MRI of the thoracic spine without and with contrast within normal limits.    US-HEMODIALYSIS GRAFT DUPLEX COMP UPPER EXTREMITY  Order: [id number]   Status: Final result     Visible to patient: No (inaccessible in MyChart)     Next appt: None     0 Result Notes    Details    Reading Physician Reading Date Result Priority   No Reading Provider Prelim 2/23/2022    Buck Kenyon M.D.  [phone] 2/23/2022      Narrative & Impression         Hemodialysis Access Report      Vascular Laboratory   Conclusions   Hemodialysis loop graft with the proximal anastomosis at the distal    brachial artery and the distal anastomosis at the basilic vein in the mid    biceps.   No flow is demonstrated in the hemodialysis  graft. Echolucent material    fills the graft throughout its length.      No prior study available for comparison.      CONY MORAN      Exam Date:     2022 18:09      Room #:     Inpatient      Priority:     Routine      Ht (in):             Wt (lb):      Ordering Physician:        LIVAN BARBOZA      Referring Physician:       [id number]      Sonographer:               Kamala Ball RVT      Study Type:                Complete Unilateral      Technical Quality:         Adequate      Age:    46    Gender:     M      MRN:    [id number]      :    1975      BSA:      Indications:     AV Fistula - S/P, AV Fistula / Thrombosed      CPT Codes:       72211      ICD Codes:       447  996.73      History:         Left arm brachial to basilic loop graft. No prior duplex.      Limitations:      Exam Data:   Side:          Left            Access          Graft   Inflow Artery   Brachial   Outflow Vein    Basilic                         Velocity (cm/s)    Prox Inflow artery    Mid Inflow artery           109.0                                0    Distal Inflow artery        95.00    Inflow Anastomosis          0.00    Inflow Artery distal to     anastomosis          Proximal Graft              0.00    Mid Graft                   0.00    Distal Graft                0.00    Outflow Anastomosis         0.00    Proximal Outflow Vein       10.00    Mid Outflow Vein    Distal Outflow Vein    Flow Volume Mid Bicep            ml/min    Flow Volume Mid-Forearm          ml/min    Prox Outflow Vein Diam            cm    Mid Outflow Vein Diam             cm    Distal Outflow Vein Diam          cm      Findings   Hemodialysis loop graft with the proximal anastomosis at the distal    brachial artery and the distal anastomosis at the basilic vein in the mid    biceps.    Doppler waveforms of the brachial artery are of high amplitude and    triphasic.   No flow is demonstrated in the hemodialysis graft. Echolucent material     fills the graft throughout its length.    The basilic and brachial veins are compressible.      Buck Kenyon MD   (Electronically Signed)   Final Date:      23 February 2022                     20:51         IMPRESSION:  # ESRD  - Etiology likely 2/2 DM/HTN via R TDC  # Thrombosed Left AVF  - US 2/23 no flow  - OR 2/25 AVG Brachio-axillary Creation, thrombectomy and fistulogram today 2/25  # HTN  - Goal BP < 140/90  - At goal  # Anemia of CKD  - Goal Hgb 10-11  - At goal  # CKD-MBD  -   - Vit D 9  - CCa 7.1 --> 7.68  - PO4 9.5  # Hyperkalemia, corrected   - Received Insulin/D50/Ca Gluconate/2 Amps Bicarb  # Back Pain  - Elevated CRP  - MRI of Lumbar and Thoracic spine 2/22 (-) for epidural abscess  # Methamphetamine abuse   # DM   - A1C 6.5  - Per Primary Services  # Severe Met Acidosis, Corrected  - Bicarb Gtt stopped  - Correct with HD   # Leukocytosis, Trending down  - 11.2 --> 32.3 -->33.3 --> 26.5 --> 18.1  - BCx2 2/23 (+) Gram - Rods   - C.diff (-) 2/23     PLAN:  - No iHD today (SUN), then QMWF iHD during stay  - UF as tolerated   - Hold antihtn  - Stop Midodrine  - Continue Cholecalciferol   - Continue Phos Binder with meals, change to PhosLo  - Stop Bicarb Gtt  - Start JACKIE with HD   - No dietary protein restrictions  - Dose all meds per ESRD

## 2022-02-27 NOTE — CARE PLAN
Problem: Pain - Standard  Goal: Alleviation of pain or a reduction in pain to the patient’s comfort goal  Note: Prn pain medication as needed    The patient is Watcher - Medium risk of patient condition declining or worsening    Shift Goals  Clinical Goals: no worsening pain on l arm  Patient Goals: pain control  Family Goals: N/A    Progress made toward(s) clinical / shift goals:  assessment of left arm, pain medication as needed     Patient is not progressing towards the following goals:

## 2022-02-27 NOTE — PROGRESS NOTES
Report received. Patient oriented x4.. Denies SOB. Left arm fistula with bruit and thrill present, swelling noted on left arm per patient has been since procedure, states pain has been unchanged. Radial pulse present. Fall risk interventions in place, bed in low position, pt upself. Assessment completed.

## 2022-02-28 PROBLEM — I50.20 HEART FAILURE WITH REDUCED EJECTION FRACTION (HCC): Status: ACTIVE | Noted: 2022-02-28

## 2022-02-28 LAB
ALBUMIN SERPL BCP-MCNC: 2.8 G/DL (ref 3.2–4.9)
ALBUMIN/GLOB SERPL: 0.8 G/DL
ALP SERPL-CCNC: 342 U/L (ref 30–99)
ALT SERPL-CCNC: <5 U/L (ref 2–50)
ANION GAP SERPL CALC-SCNC: 15 MMOL/L (ref 7–16)
AST SERPL-CCNC: 8 U/L (ref 12–45)
BASOPHILS # BLD AUTO: 0.5 % (ref 0–1.8)
BASOPHILS # BLD: 0.05 K/UL (ref 0–0.12)
BILIRUB SERPL-MCNC: 0.4 MG/DL (ref 0.1–1.5)
BUN SERPL-MCNC: 85 MG/DL (ref 8–22)
CALCIUM SERPL-MCNC: 7.3 MG/DL (ref 8.5–10.5)
CHLORIDE SERPL-SCNC: 92 MMOL/L (ref 96–112)
CO2 SERPL-SCNC: 24 MMOL/L (ref 20–33)
CREAT SERPL-MCNC: 10.73 MG/DL (ref 0.5–1.4)
EOSINOPHIL # BLD AUTO: 0.16 K/UL (ref 0–0.51)
EOSINOPHIL NFR BLD: 1.5 % (ref 0–6.9)
ERYTHROCYTE [DISTWIDTH] IN BLOOD BY AUTOMATED COUNT: 52.1 FL (ref 35.9–50)
FAT STL QL: ABNORMAL
GLOBULIN SER CALC-MCNC: 3.3 G/DL (ref 1.9–3.5)
GLUCOSE BLD STRIP.AUTO-MCNC: 114 MG/DL (ref 65–99)
GLUCOSE BLD STRIP.AUTO-MCNC: 139 MG/DL (ref 65–99)
GLUCOSE BLD STRIP.AUTO-MCNC: 149 MG/DL (ref 65–99)
GLUCOSE BLD STRIP.AUTO-MCNC: 158 MG/DL (ref 65–99)
GLUCOSE BLD STRIP.AUTO-MCNC: 172 MG/DL (ref 65–99)
GLUCOSE BLD STRIP.AUTO-MCNC: 173 MG/DL (ref 65–99)
GLUCOSE BLD STRIP.AUTO-MCNC: 187 MG/DL (ref 65–99)
GLUCOSE SERPL-MCNC: 142 MG/DL (ref 65–99)
HCT VFR BLD AUTO: 32.7 % (ref 42–52)
HGB BLD-MCNC: 10.6 G/DL (ref 14–18)
IMM GRANULOCYTES # BLD AUTO: 0.12 K/UL (ref 0–0.11)
IMM GRANULOCYTES NFR BLD AUTO: 1.1 % (ref 0–0.9)
LYMPHOCYTES # BLD AUTO: 1.67 K/UL (ref 1–4.8)
LYMPHOCYTES NFR BLD: 15.4 % (ref 22–41)
MCH RBC QN AUTO: 29.7 PG (ref 27–33)
MCHC RBC AUTO-ENTMCNC: 32.4 G/DL (ref 33.7–35.3)
MCV RBC AUTO: 91.6 FL (ref 81.4–97.8)
MONOCYTES # BLD AUTO: 1.03 K/UL (ref 0–0.85)
MONOCYTES NFR BLD AUTO: 9.5 % (ref 0–13.4)
NEUTRAL FAT STL QL: ABNORMAL
NEUTROPHILS # BLD AUTO: 7.81 K/UL (ref 1.82–7.42)
NEUTROPHILS NFR BLD: 72 % (ref 44–72)
NRBC # BLD AUTO: 0 K/UL
NRBC BLD-RTO: 0 /100 WBC
PHOSPHATE SERPL-MCNC: 7.5 MG/DL (ref 2.5–4.5)
PLATELET # BLD AUTO: 159 K/UL (ref 164–446)
PMV BLD AUTO: 11.8 FL (ref 9–12.9)
POTASSIUM SERPL-SCNC: 5.1 MMOL/L (ref 3.6–5.5)
PROT SERPL-MCNC: 6.1 G/DL (ref 6–8.2)
RBC # BLD AUTO: 3.57 M/UL (ref 4.7–6.1)
SODIUM SERPL-SCNC: 131 MMOL/L (ref 135–145)
WBC # BLD AUTO: 10.8 K/UL (ref 4.8–10.8)

## 2022-02-28 PROCEDURE — 700101 HCHG RX REV CODE 250: Performed by: HOSPITALIST

## 2022-02-28 PROCEDURE — 700102 HCHG RX REV CODE 250 W/ 637 OVERRIDE(OP): Performed by: STUDENT IN AN ORGANIZED HEALTH CARE EDUCATION/TRAINING PROGRAM

## 2022-02-28 PROCEDURE — 700111 HCHG RX REV CODE 636 W/ 250 OVERRIDE (IP): Performed by: STUDENT IN AN ORGANIZED HEALTH CARE EDUCATION/TRAINING PROGRAM

## 2022-02-28 PROCEDURE — A9270 NON-COVERED ITEM OR SERVICE: HCPCS | Performed by: STUDENT IN AN ORGANIZED HEALTH CARE EDUCATION/TRAINING PROGRAM

## 2022-02-28 PROCEDURE — 36415 COLL VENOUS BLD VENIPUNCTURE: CPT

## 2022-02-28 PROCEDURE — 700111 HCHG RX REV CODE 636 W/ 250 OVERRIDE (IP): Performed by: HOSPITALIST

## 2022-02-28 PROCEDURE — 700102 HCHG RX REV CODE 250 W/ 637 OVERRIDE(OP): Performed by: NURSE PRACTITIONER

## 2022-02-28 PROCEDURE — 99232 SBSQ HOSP IP/OBS MODERATE 35: CPT | Mod: GC | Performed by: INTERNAL MEDICINE

## 2022-02-28 PROCEDURE — 80053 COMPREHEN METABOLIC PANEL: CPT

## 2022-02-28 PROCEDURE — 700111 HCHG RX REV CODE 636 W/ 250 OVERRIDE (IP)

## 2022-02-28 PROCEDURE — A9270 NON-COVERED ITEM OR SERVICE: HCPCS | Performed by: HOSPITALIST

## 2022-02-28 PROCEDURE — 90935 HEMODIALYSIS ONE EVALUATION: CPT

## 2022-02-28 PROCEDURE — 85025 COMPLETE CBC W/AUTO DIFF WBC: CPT

## 2022-02-28 PROCEDURE — 84100 ASSAY OF PHOSPHORUS: CPT

## 2022-02-28 PROCEDURE — 700102 HCHG RX REV CODE 250 W/ 637 OVERRIDE(OP): Performed by: HOSPITALIST

## 2022-02-28 PROCEDURE — A9270 NON-COVERED ITEM OR SERVICE: HCPCS | Performed by: NURSE PRACTITIONER

## 2022-02-28 PROCEDURE — 82962 GLUCOSE BLOOD TEST: CPT

## 2022-02-28 PROCEDURE — 770020 HCHG ROOM/CARE - TELE (206)

## 2022-02-28 RX ORDER — LEVOFLOXACIN 750 MG/1
750 TABLET, FILM COATED ORAL ONCE
Status: COMPLETED | OUTPATIENT
Start: 2022-02-28 | End: 2022-02-28

## 2022-02-28 RX ORDER — DIAPER,BRIEF,INFANT-TODD,DISP
EACH MISCELLANEOUS 2 TIMES DAILY
Status: DISCONTINUED | OUTPATIENT
Start: 2022-02-28 | End: 2022-03-08 | Stop reason: HOSPADM

## 2022-02-28 RX ORDER — HEPARIN SODIUM 1000 [USP'U]/ML
2000 INJECTION, SOLUTION INTRAVENOUS; SUBCUTANEOUS
Status: DISCONTINUED | OUTPATIENT
Start: 2022-02-28 | End: 2022-03-04

## 2022-02-28 RX ORDER — CHOLECALCIFEROL (VITAMIN D3) 125 MCG
2.5 CAPSULE ORAL NIGHTLY PRN
Status: DISCONTINUED | OUTPATIENT
Start: 2022-02-28 | End: 2022-03-07

## 2022-02-28 RX ORDER — LEVOFLOXACIN 500 MG/1
500 TABLET, FILM COATED ORAL
Status: DISCONTINUED | OUTPATIENT
Start: 2022-03-02 | End: 2022-03-08 | Stop reason: HOSPADM

## 2022-02-28 RX ORDER — LEVOFLOXACIN 500 MG/1
500 TABLET, FILM COATED ORAL
Status: DISCONTINUED | OUTPATIENT
Start: 2022-03-02 | End: 2022-02-28

## 2022-02-28 RX ORDER — HEPARIN SODIUM 1000 [USP'U]/ML
INJECTION, SOLUTION INTRAVENOUS; SUBCUTANEOUS
Status: COMPLETED
Start: 2022-02-28 | End: 2022-02-28

## 2022-02-28 RX ADMIN — HYDRALAZINE HYDROCHLORIDE 25 MG: 25 TABLET, FILM COATED ORAL at 14:06

## 2022-02-28 RX ADMIN — HEPARIN SODIUM 5000 UNITS: 5000 INJECTION, SOLUTION INTRAVENOUS; SUBCUTANEOUS at 20:38

## 2022-02-28 RX ADMIN — INSULIN LISPRO 2 UNITS: 100 INJECTION, SOLUTION INTRAVENOUS; SUBCUTANEOUS at 20:21

## 2022-02-28 RX ADMIN — LOSARTAN POTASSIUM 25 MG: 25 TABLET, FILM COATED ORAL at 06:19

## 2022-02-28 RX ADMIN — EPOETIN ALFA-EPBX 6000 UNITS: 3000 INJECTION, SOLUTION INTRAVENOUS; SUBCUTANEOUS at 09:16

## 2022-02-28 RX ADMIN — FERROUS SULFATE TAB 325 MG (65 MG ELEMENTAL FE) 325 MG: 325 (65 FE) TAB at 06:20

## 2022-02-28 RX ADMIN — LIDOCAINE 1 PATCH: 50 PATCH TOPICAL at 14:11

## 2022-02-28 RX ADMIN — HYDROMORPHONE HYDROCHLORIDE 0.5 MG: 1 INJECTION, SOLUTION INTRAMUSCULAR; INTRAVENOUS; SUBCUTANEOUS at 14:02

## 2022-02-28 RX ADMIN — HEPARIN SODIUM 2000 UNITS: 1000 INJECTION, SOLUTION INTRAVENOUS; SUBCUTANEOUS at 08:49

## 2022-02-28 RX ADMIN — Medication 2668 MG: at 06:17

## 2022-02-28 RX ADMIN — Medication 2000 UNITS: at 06:19

## 2022-02-28 RX ADMIN — INSULIN LISPRO 2 UNITS: 100 INJECTION, SOLUTION INTRAVENOUS; SUBCUTANEOUS at 17:09

## 2022-02-28 RX ADMIN — HYDROCORTISONE: 5 CREAM TOPICAL at 20:20

## 2022-02-28 RX ADMIN — HYDROMORPHONE HYDROCHLORIDE 0.5 MG: 1 INJECTION, SOLUTION INTRAMUSCULAR; INTRAVENOUS; SUBCUTANEOUS at 03:57

## 2022-02-28 RX ADMIN — HYDRALAZINE HYDROCHLORIDE 25 MG: 25 TABLET, FILM COATED ORAL at 20:37

## 2022-02-28 RX ADMIN — CALCITRIOL CAPSULES 0.25 MCG 0.25 MCG: 0.25 CAPSULE ORAL at 06:19

## 2022-02-28 RX ADMIN — Medication 2668 MG: at 14:01

## 2022-02-28 RX ADMIN — ACETAMINOPHEN 975 MG: 325 TABLET, FILM COATED ORAL at 14:00

## 2022-02-28 RX ADMIN — LEVOFLOXACIN 750 MG: 750 TABLET, FILM COATED ORAL at 14:06

## 2022-02-28 RX ADMIN — OXYCODONE 2.5 MG: 5 TABLET ORAL at 17:17

## 2022-02-28 RX ADMIN — Medication 2.5 MG: at 20:37

## 2022-02-28 RX ADMIN — HEPARIN SODIUM 3200 UNITS: 1000 INJECTION, SOLUTION INTRAVENOUS; SUBCUTANEOUS at 11:57

## 2022-02-28 RX ADMIN — HYDRALAZINE HYDROCHLORIDE 25 MG: 25 TABLET, FILM COATED ORAL at 06:19

## 2022-02-28 RX ADMIN — INSULIN LISPRO 2 UNITS: 100 INJECTION, SOLUTION INTRAVENOUS; SUBCUTANEOUS at 14:07

## 2022-02-28 RX ADMIN — ACETAMINOPHEN 975 MG: 325 TABLET, FILM COATED ORAL at 06:20

## 2022-02-28 RX ADMIN — HEPARIN SODIUM 5000 UNITS: 5000 INJECTION, SOLUTION INTRAVENOUS; SUBCUTANEOUS at 06:20

## 2022-02-28 RX ADMIN — Medication 2668 MG: at 17:09

## 2022-02-28 RX ADMIN — OXYCODONE 2.5 MG: 5 TABLET ORAL at 10:49

## 2022-02-28 RX ADMIN — HYDROMORPHONE HYDROCHLORIDE 0.5 MG: 1 INJECTION, SOLUTION INTRAMUSCULAR; INTRAVENOUS; SUBCUTANEOUS at 20:33

## 2022-02-28 RX ADMIN — HEPARIN SODIUM 5000 UNITS: 5000 INJECTION, SOLUTION INTRAVENOUS; SUBCUTANEOUS at 14:10

## 2022-02-28 ASSESSMENT — ENCOUNTER SYMPTOMS
PHOTOPHOBIA: 1
DOUBLE VISION: 0
PALPITATIONS: 0
FALLS: 0
COUGH: 1
CHILLS: 0
BLURRED VISION: 0
DIZZINESS: 0
FEVER: 0
SHORTNESS OF BREATH: 1
CONSTIPATION: 0
BACK PAIN: 1
DIARRHEA: 0
NECK PAIN: 0
TINGLING: 1

## 2022-02-28 ASSESSMENT — PAIN DESCRIPTION - PAIN TYPE
TYPE: ACUTE PAIN

## 2022-02-28 ASSESSMENT — FIBROSIS 4 INDEX: FIB4 SCORE: 2.31

## 2022-02-28 NOTE — CARE PLAN
The patient is Stable - Low risk of patient condition declining or worsening    Shift Goals  Clinical Goals: Pain management  Patient Goals: Pain control; rest  Family Goals: POLINA    Progress made toward(s) clinical / shift goals:  Patient still complains of numbness in left arm, but pain is better managed with medication .    Problem: Pain - Standard  Goal: Alleviation of pain or a reduction in pain to the patient’s comfort goal  Outcome: Progressing       Patient is not progressing towards the following goals: Kidney diagnostic lab results still critical and outside accepted limits.       Problem: Physical Regulation  Goal: Diagnostic test results will improve  Outcome: Not Progressing

## 2022-02-28 NOTE — PROGRESS NOTES
Handoff report received from day shift nurse. Patient care assumed. Patient is currently resting in bed. Plan of care discussed with the patient and the patient verbalizes no questions at this time. Patient is A&O x4, on room air, telemetry monitoring in place and rhythm verified, and vital signs are stable. Patient denies pain at this time. Call light and belongings within reach, bed in lowest and locked position, and patient educated on the use of call light. Will continue plan of care.

## 2022-02-28 NOTE — PROGRESS NOTES
Patient arrived to unit, patient agitated and requesting to shower immediately. Refuses to put on tele. Patient refuses to let RN cover dressings before shower. RN educated patient on safety and fall precautions. Patient still insisting to shower immediately. Refuses vitals to be taken. Will continue to monitor patient.

## 2022-02-28 NOTE — PROGRESS NOTES
3hr HD started @ 0852 and completed @ 1154,tx well tolerated,net UF = 3100ml.RIJOSÉ TDC dressing CDI,report given to Lola Arevalo RN.

## 2022-02-28 NOTE — PROGRESS NOTES
".  [facility] -  PROGRESS NOTE               Author: EMMA Beck Date & Time: 2/28/2022  9:01 AM     HPI:  This is a 46 year old male with past medical history of End Stage Renal Disease on hemodialysis at [facility] every Tues/Thurs/Sat, diabetes mellitus and Hypertension who presented to the emergency department on 2/21/22 complaining of lower back pain.  Reportedly missing hemodialysis due to transportation issues. MRI this am and medicated for pain. Seen on Hemodialysis this am, lethargic after PRN medications.     DAILY NEPHROLOGY SUMMARY:  2/23: 3.5L Net UF. Resting in bed. Calm and agreeable to HD today. C/o cont. Back pain. Bps low this am.   2/24: Echo in progress. Primary team at . Resting in bed. C/o cont back pain. 2.0L net UF. K 4.0, CO2 22. Denies Diarrhea. Denies CP/SOB.   2/25: Upset about NPO this am for OR.  In bed, no acute distress. Bps improved. K 4.7, CO2 16.   2/26: S/p AVG creation and fistulogram. 1.5 L net UF. Sitting up at side of bed, eating lunch. C/o SOB and nausea.  C/o L AVF pain.   2/27: Laying in bed. C/o L arm pain.   2/28: Pt seen on HD, said he did not sleep well, some ongoing LUE discomfort, VSS on RA, labs reviewed    REVIEW OF SYSTEMS:    10 point ROS reviewed and is as per HPI/daily summary or otherwise negative    PMH/PSH/SH/FH: Reviewed and unchanged since admission note  CURRENT MEDICATIONS: Reviewed from admission to present day    VS:  /95   Pulse 96   Temp 36.6 °C (97.8 °F) (Oral)   Resp 18   Ht 1.778 m (5' 10\")   Wt (!) 130 kg (286 lb 13.1 oz)   SpO2 96%   BMI 41.15 kg/m²   Physical Exam  Constitutional:       General: He is not in acute distress.     Appearance: He is ill-appearing.   HENT:      Head: Normocephalic and atraumatic.      Mouth/Throat:      Dentition: Abnormal dentition.   Eyes:      Extraocular Movements: Extraocular movements intact.      Conjunctiva/sclera: Conjunctivae normal.      Pupils: " Pupils are equal, round, and reactive to light.   Cardiovascular:      Rate and Rhythm: Normal rate and regular rhythm.      Pulses: Normal pulses.      Heart sounds: Normal heart sounds. No murmur heard.    No friction rub. No gallop.      Comments: R TDC  L AVF +Bruit/+Thrill  Pulmonary:      Effort: Pulmonary effort is normal. No respiratory distress.      Breath sounds: Normal breath sounds. No stridor. No wheezing or rhonchi.   Abdominal:      General: There is no distension.      Palpations: Abdomen is soft. There is no mass.   Musculoskeletal:         General: Swelling present. Normal range of motion.      Right lower leg: Edema present.      Left lower leg: Edema present.      Comments: LUE edema, improving   Skin:     General: Skin is warm and dry.      Comments: Scattered scabbing   Neurological:      Mental Status: He is alert.   Psychiatric:         Behavior: Behavior is cooperative.         Fluids:  In: 200 [P.O.:200]  Out: -     LABS:  Recent Labs     02/26/22  0341 02/27/22  0640 02/28/22  0306   SODIUM 133* 133* 131*   POTASSIUM 4.8 4.8 5.1   CHLORIDE 94* 93* 92*   CO2 23 23 24   GLUCOSE 143* 146* 142*   BUN 60* 79* 85*   CREATININE 8.64* 9.94* 10.73*   CALCIUM 7.2* 7.1* 7.3*     MR-LUMBAR SPINE-WITH & W/O  Order: [id number]   Status: Final result     Visible to patient: No (inaccessible in MyChart)     Next appt: None     0 Result Notes    Details    Reading Physician Reading Date Result Priority   Lisa Link M.D.  [phone] 2/22/2022 STAT     Narrative & Impression     2/22/2022 1:06 AM     HISTORY/REASON FOR EXAM:  Epidural abscess suspected        TECHNIQUE/EXAM DESCRIPTION:  MRI of the lumbar spine without and with contrast.     The study was performed on a Tagent Signa 1.5 Jacqueline MRI scanner.     T1 sagittal, T2 fast spin-echo sagittal, and T2 axial images were obtained of the lumbar spine. T1 postcontrast fat-suppressed sagittal images were obtained.     20 mL ProHance contrast was  administered intravenously.     COMPARISON:  None.     FINDINGS:  Lumbar spine alignment is normal.  Bilateral L5 pars defects without anterolisthesis.  Bone marrow signal intensity is normal. Mild disc desiccation is noted at L4-L5.  Bilateral pars defects noted at L5. The conus terminates at L1.     Lumbar spine levels:     T12-L1: No significant abnormality.     L1-2: Mild facet degeneration. The stenosis.     L2-3: Minimal facet degeneration. No significant canal stenosis or foraminal narrowing.     L3-4: Broad-based disc bulge and bilateral mild facet degeneration. There is no significant spinal canal narrowing or neuroforaminal narrowing.     L4-5, bilateral facet degeneration. Broad-based disc bulge with a focal central protrusion that minimally encroaches upon the spinal canal. There is no significant foraminal narrowing. There is minimal canal narrowing without thecal sac compression.   There is a left facet origin synovial cyst that extends into the left subligamentous at the level of the left L5 pars defect and minimally encroaches upon the spinal canal from the posterolateral aspect. Bilateral L5 pars defects are present.     Postcontrast images through the lumbar spine does not demonstrate any abnormal enhancement. Minimal epidural enhancement identified at the dorsal aspect of L5 is likely related to degenerative changes in the facet joints and synovial hypertrophy. No   pathological epidural enhancement to suggest infection identified.     IMPRESSION:        Bilateral L5 pars defects. However, there is no anterolisthesis.     Small left facet origin subligamentous synovial cyst projects into the posterolateral spinal canal behind L5 without significant thecal sac compression. Bilateral mild facet degeneration at L4-5 and L5-S1.     No abnormal enhancement is identified in the lumbar spine to suggest an infectious process.      HISTORY/REASON FOR EXAM:  Epidural abscess suspected        TECHNIQUE/EXAM  DESCRIPTION:  MRI of the thoracic spine without and with contrast.     The study was performed on a [employer] Signa 1.5 Jacqueline MRI scanner. T1 sagittal, T2 fast spin-echo sagittal, and T2 axial images were obtained of the thoracic spine. T1 post-contrast fat suppressed sagittal images were obtained. Optional T1 post-contrast   axial images may be obtained.     20 mL ProHance contrast was administered intravenously.     COMPARISON:  None.     FINDINGS:     Alignment in the thoracic spine is normal. Marrow signal in the vertebral bodies is within normal limits. There is no abnormal osseous enhancement in the vertebral bodies or other abnormal extradural enhancement. The disc spaces are intact at all   thoracic levels. There are no significant osteophytic changes. The prevertebral and paraspinous soft tissues are unremarkable.     The thoracic spinal cord is normal in caliber and signal throughout its course. There is no abnormal cord enhancement. There is no abnormal intradural extramedullary enhancement.     At T1-2 through T11-12 there is no significant disc bulge or protrusion. The neural foramina are intact at all thoracic levels. There is no congenital or acquired central spinal stenosis at any thoracic level. There is no significant ligamentous or facet   hypertrophy.     IMPRESSION:        MRI of the thoracic spine without and with contrast within normal limits.    US-HEMODIALYSIS GRAFT DUPLEX COMP UPPER EXTREMITY  Order: [id number]   Status: Final result     Visible to patient: No (inaccessible in MyChart)     Next appt: None     0 Result Notes    Details    Reading Physician Reading Date Result Priority   No Reading Provider [facility] 2/23/2022    Buck Kneyon M.D.  [phone] 2/23/2022      Narrative & Impression         Hemodialysis Access Report      Vascular Laboratory   Conclusions   Hemodialysis loop graft with the proximal anastomosis at the distal    brachial artery and the distal anastomosis at the  basilic vein in the mid    biceps.   No flow is demonstrated in the hemodialysis graft. Echolucent material    fills the graft throughout its length.      No prior study available for comparison.      CONY MORAN      Exam Date:     2022 18:09      Room #:     Inpatient      Priority:     Routine      Ht (in):             Wt (lb):      Ordering Physician:        LIVAN BARBOZA      Referring Physician:       [id number]      Sonographer:               Kamala Ball RVT      Study Type:                Complete Unilateral      Technical Quality:         Adequate      Age:    46    Gender:     M      MRN:    [id number]      :    1975      BSA:      Indications:     AV Fistula - S/P, AV Fistula / Thrombosed      CPT Codes:       57849      ICD Codes:       447  996.73      History:         Left arm brachial to basilic loop graft. No prior duplex.      Limitations:      Exam Data:   Side:          Left            Access          Graft   Inflow Artery   Brachial   Outflow Vein    Basilic                         Velocity (cm/s)    Prox Inflow artery    Mid Inflow artery           109.0                                0    Distal Inflow artery        95.00    Inflow Anastomosis          0.00    Inflow Artery distal to     anastomosis          Proximal Graft              0.00    Mid Graft                   0.00    Distal Graft                0.00    Outflow Anastomosis         0.00    Proximal Outflow Vein       10.00    Mid Outflow Vein    Distal Outflow Vein    Flow Volume Mid Bicep            ml/min    Flow Volume Mid-Forearm          ml/min    Prox Outflow Vein Diam            cm    Mid Outflow Vein Diam             cm    Distal Outflow Vein Diam          cm      Findings   Hemodialysis loop graft with the proximal anastomosis at the distal    brachial artery and the distal anastomosis at the basilic vein in the mid    biceps.    Doppler waveforms of the brachial artery are of high amplitude and     triphasic.   No flow is demonstrated in the hemodialysis graft. Echolucent material    fills the graft throughout its length.    The basilic and brachial veins are compressible.      Buck Kenyon MD   (Electronically Signed)   Final Date:      23 February 2022                     20:51         IMPRESSION:  # ESRD  - Etiology likely 2/2 DM/HTN via R TDC  # Thrombosed Left AVF  - US 2/23 no flow  - s/p OR 2/25 AVG Brachio-axillary Creation, thrombectomy and fistulogram   # HTN, labile control   - Goal BP < 140/90  # Anemia of CKD  - Goal Hgb 10-11  - At goal  - On PO Fe  # CKD-MBD  -   - Vit D 9  - CCa 7.1 --> 7.68  - PO4 9.5-->7.5  - On calcitriol, calcium acetate and cholecalciferol   # Hyperkalemia, corrected   - Received Insulin/D50/Ca Gluconate/2 Amps Bicarb  # Back Pain  - Elevated CRP  - MRI of Lumbar and Thoracic spine 2/22 (-) for epidural abscess  # Methamphetamine use   # DM   - A1C 6.5  # Severe Metabolic Acidosis, Corrected  - Bicarb Gtt stopped  - Correct with HD   # Leukocytosis, resolved   - BCx2 2/23 (+) Gram - Rods   - C.diff (-) 2/23  - CT abd pelvis no acute abnormality  # Stenotrophomonas maltophilia bacteremia  - On IV C3     PLAN:  - Continue iHD qMWF/PRN, treatment due today (MON)  - UF as tolerated   - Hold antihtn prior to HD to allow for UF  - Off midodrine  - Continue cholecalciferol   - Continue calcium acetate WM; may need to add sevelamer if phos not decreasing  - JACKIE with HD to maintain Hgb between 10-11  - Transfuse PRN Hgb <7   - No dietary protein restrictions  - Dose all meds per ESRD/iHD  - Abx per primary team/ID  - Low Na/fluid restricted renal diet  - Follow labs    Thank you,

## 2022-02-28 NOTE — PROGRESS NOTES
"Patient removed all his telebox leads and refused to have it back for monitoring,\"stated its bothering me\",denies any chest pain/discomfort,primary RN notified.  "

## 2022-02-28 NOTE — ASSESSMENT & PLAN NOTE
Acute heart failure: Echo 2/4/2022 EF 35%, likely secondary to fluid overload from ESRD.   -Continue hemodialysis per nephrology  -Continue hydralazine  -1.5L Fluid restriction, salt restriction  -Daily weights  -Repeat Echo when at 121kg; if HFrEF persist consider further cardiology evaluation.   -Lipid panel pending

## 2022-02-28 NOTE — PROGRESS NOTES
12 hour chart check.    Have a great shift!    MONITOR SUMMARY:    SR 87-91 with First Degree HB and BBB  (r)PVC   .21/.11/.40

## 2022-03-01 PROBLEM — L29.9 PRURITUS: Status: ACTIVE | Noted: 2022-03-01

## 2022-03-01 LAB
ALBUMIN SERPL BCP-MCNC: 2.8 G/DL (ref 3.2–4.9)
ALBUMIN/GLOB SERPL: 0.7 G/DL
ALP SERPL-CCNC: 327 U/L (ref 30–99)
ALT SERPL-CCNC: 5 U/L (ref 2–50)
ANION GAP SERPL CALC-SCNC: 15 MMOL/L (ref 7–16)
AST SERPL-CCNC: 23 U/L (ref 12–45)
BACTERIA BLD CULT: NORMAL
BASOPHILS # BLD AUTO: 0.6 % (ref 0–1.8)
BASOPHILS # BLD: 0.05 K/UL (ref 0–0.12)
BILIRUB SERPL-MCNC: 0.4 MG/DL (ref 0.1–1.5)
BUN SERPL-MCNC: 59 MG/DL (ref 8–22)
CALCIUM SERPL-MCNC: 7.8 MG/DL (ref 8.5–10.5)
CHLORIDE SERPL-SCNC: 95 MMOL/L (ref 96–112)
CO2 SERPL-SCNC: 24 MMOL/L (ref 20–33)
CREAT SERPL-MCNC: 8.44 MG/DL (ref 0.5–1.4)
EOSINOPHIL # BLD AUTO: 0.12 K/UL (ref 0–0.51)
EOSINOPHIL NFR BLD: 1.4 % (ref 0–6.9)
ERYTHROCYTE [DISTWIDTH] IN BLOOD BY AUTOMATED COUNT: 53.7 FL (ref 35.9–50)
GLOBULIN SER CALC-MCNC: 3.8 G/DL (ref 1.9–3.5)
GLUCOSE BLD STRIP.AUTO-MCNC: 134 MG/DL (ref 65–99)
GLUCOSE BLD STRIP.AUTO-MCNC: 149 MG/DL (ref 65–99)
GLUCOSE BLD STRIP.AUTO-MCNC: 155 MG/DL (ref 65–99)
GLUCOSE BLD STRIP.AUTO-MCNC: 156 MG/DL (ref 65–99)
GLUCOSE SERPL-MCNC: 105 MG/DL (ref 65–99)
HCT VFR BLD AUTO: 32.4 % (ref 42–52)
HGB BLD-MCNC: 10.2 G/DL (ref 14–18)
IMM GRANULOCYTES # BLD AUTO: 0.14 K/UL (ref 0–0.11)
IMM GRANULOCYTES NFR BLD AUTO: 1.6 % (ref 0–0.9)
LYMPHOCYTES # BLD AUTO: 1.68 K/UL (ref 1–4.8)
LYMPHOCYTES NFR BLD: 19.2 % (ref 22–41)
MCH RBC QN AUTO: 29.5 PG (ref 27–33)
MCHC RBC AUTO-ENTMCNC: 31.5 G/DL (ref 33.7–35.3)
MCV RBC AUTO: 93.6 FL (ref 81.4–97.8)
MONOCYTES # BLD AUTO: 1.32 K/UL (ref 0–0.85)
MONOCYTES NFR BLD AUTO: 15.1 % (ref 0–13.4)
NEUTROPHILS # BLD AUTO: 5.45 K/UL (ref 1.82–7.42)
NEUTROPHILS NFR BLD: 62.1 % (ref 44–72)
NRBC # BLD AUTO: 0 K/UL
NRBC BLD-RTO: 0 /100 WBC
PLATELET # BLD AUTO: 193 K/UL (ref 164–446)
PMV BLD AUTO: 11.4 FL (ref 9–12.9)
POTASSIUM SERPL-SCNC: 5.3 MMOL/L (ref 3.6–5.5)
PROT SERPL-MCNC: 6.6 G/DL (ref 6–8.2)
RBC # BLD AUTO: 3.46 M/UL (ref 4.7–6.1)
SIGNIFICANT IND 70042: NORMAL
SITE SITE: NORMAL
SODIUM SERPL-SCNC: 134 MMOL/L (ref 135–145)
SOURCE SOURCE: NORMAL
WBC # BLD AUTO: 8.8 K/UL (ref 4.8–10.8)

## 2022-03-01 PROCEDURE — 97116 GAIT TRAINING THERAPY: CPT | Mod: CQ

## 2022-03-01 PROCEDURE — 99232 SBSQ HOSP IP/OBS MODERATE 35: CPT | Mod: GC | Performed by: INTERNAL MEDICINE

## 2022-03-01 PROCEDURE — 700102 HCHG RX REV CODE 250 W/ 637 OVERRIDE(OP): Performed by: HOSPITALIST

## 2022-03-01 PROCEDURE — 36415 COLL VENOUS BLD VENIPUNCTURE: CPT

## 2022-03-01 PROCEDURE — 700111 HCHG RX REV CODE 636 W/ 250 OVERRIDE (IP): Performed by: STUDENT IN AN ORGANIZED HEALTH CARE EDUCATION/TRAINING PROGRAM

## 2022-03-01 PROCEDURE — 700102 HCHG RX REV CODE 250 W/ 637 OVERRIDE(OP): Performed by: STUDENT IN AN ORGANIZED HEALTH CARE EDUCATION/TRAINING PROGRAM

## 2022-03-01 PROCEDURE — 90935 HEMODIALYSIS ONE EVALUATION: CPT

## 2022-03-01 PROCEDURE — A9270 NON-COVERED ITEM OR SERVICE: HCPCS | Performed by: STUDENT IN AN ORGANIZED HEALTH CARE EDUCATION/TRAINING PROGRAM

## 2022-03-01 PROCEDURE — 700111 HCHG RX REV CODE 636 W/ 250 OVERRIDE (IP): Performed by: INTERNAL MEDICINE

## 2022-03-01 PROCEDURE — 80053 COMPREHEN METABOLIC PANEL: CPT

## 2022-03-01 PROCEDURE — 700111 HCHG RX REV CODE 636 W/ 250 OVERRIDE (IP)

## 2022-03-01 PROCEDURE — 82962 GLUCOSE BLOOD TEST: CPT | Mod: 91

## 2022-03-01 PROCEDURE — 700102 HCHG RX REV CODE 250 W/ 637 OVERRIDE(OP): Performed by: NURSE PRACTITIONER

## 2022-03-01 PROCEDURE — 700111 HCHG RX REV CODE 636 W/ 250 OVERRIDE (IP): Performed by: HOSPITALIST

## 2022-03-01 PROCEDURE — 85025 COMPLETE CBC W/AUTO DIFF WBC: CPT

## 2022-03-01 PROCEDURE — A9270 NON-COVERED ITEM OR SERVICE: HCPCS | Performed by: HOSPITALIST

## 2022-03-01 PROCEDURE — 770020 HCHG ROOM/CARE - TELE (206)

## 2022-03-01 PROCEDURE — A9270 NON-COVERED ITEM OR SERVICE: HCPCS | Performed by: NURSE PRACTITIONER

## 2022-03-01 RX ORDER — HEPARIN SODIUM 1000 [USP'U]/ML
1500 INJECTION, SOLUTION INTRAVENOUS; SUBCUTANEOUS
Status: COMPLETED | OUTPATIENT
Start: 2022-03-01 | End: 2022-03-01

## 2022-03-01 RX ORDER — DIPHENHYDRAMINE HCL 25 MG
12.5 TABLET ORAL EVERY 6 HOURS PRN
Status: DISCONTINUED | OUTPATIENT
Start: 2022-03-01 | End: 2022-03-08 | Stop reason: HOSPADM

## 2022-03-01 RX ORDER — LOSARTAN POTASSIUM 50 MG/1
50 TABLET ORAL
Status: DISCONTINUED | OUTPATIENT
Start: 2022-03-02 | End: 2022-03-01

## 2022-03-01 RX ORDER — HEPARIN SODIUM 1000 [USP'U]/ML
INJECTION, SOLUTION INTRAVENOUS; SUBCUTANEOUS
Status: COMPLETED
Start: 2022-03-01 | End: 2022-03-01

## 2022-03-01 RX ORDER — OXYCODONE HYDROCHLORIDE 5 MG/1
5 TABLET ORAL EVERY 4 HOURS PRN
Status: DISCONTINUED | OUTPATIENT
Start: 2022-03-01 | End: 2022-03-01

## 2022-03-01 RX ORDER — OXYCODONE HYDROCHLORIDE 5 MG/1
5 TABLET ORAL EVERY 8 HOURS PRN
Status: DISCONTINUED | OUTPATIENT
Start: 2022-03-01 | End: 2022-03-02

## 2022-03-01 RX ORDER — LOSARTAN POTASSIUM 25 MG/1
25 TABLET ORAL
Status: DISCONTINUED | OUTPATIENT
Start: 2022-03-02 | End: 2022-03-03

## 2022-03-01 RX ADMIN — HYDROCORTISONE: 5 CREAM TOPICAL at 17:35

## 2022-03-01 RX ADMIN — HYDROMORPHONE HYDROCHLORIDE 0.5 MG: 1 INJECTION, SOLUTION INTRAMUSCULAR; INTRAVENOUS; SUBCUTANEOUS at 09:57

## 2022-03-01 RX ADMIN — HYDROMORPHONE HYDROCHLORIDE 0.5 MG: 1 INJECTION, SOLUTION INTRAMUSCULAR; INTRAVENOUS; SUBCUTANEOUS at 01:02

## 2022-03-01 RX ADMIN — HEPARIN SODIUM 1500 UNITS: 1000 INJECTION, SOLUTION INTRAVENOUS; SUBCUTANEOUS at 14:50

## 2022-03-01 RX ADMIN — FERROUS SULFATE TAB 325 MG (65 MG ELEMENTAL FE) 325 MG: 325 (65 FE) TAB at 08:04

## 2022-03-01 RX ADMIN — Medication 2668 MG: at 17:32

## 2022-03-01 RX ADMIN — OXYCODONE 5 MG: 5 TABLET ORAL at 17:32

## 2022-03-01 RX ADMIN — ACETAMINOPHEN 975 MG: 325 TABLET, FILM COATED ORAL at 11:20

## 2022-03-01 RX ADMIN — HEPARIN SODIUM 5000 UNITS: 5000 INJECTION, SOLUTION INTRAVENOUS; SUBCUTANEOUS at 05:53

## 2022-03-01 RX ADMIN — HEPARIN SODIUM 5000 UNITS: 5000 INJECTION, SOLUTION INTRAVENOUS; SUBCUTANEOUS at 22:11

## 2022-03-01 RX ADMIN — LOSARTAN POTASSIUM 25 MG: 25 TABLET, FILM COATED ORAL at 05:52

## 2022-03-01 RX ADMIN — HEPARIN SODIUM 3200 UNITS: 1000 INJECTION, SOLUTION INTRAVENOUS; SUBCUTANEOUS at 16:57

## 2022-03-01 RX ADMIN — OXYCODONE 2.5 MG: 5 TABLET ORAL at 08:04

## 2022-03-01 RX ADMIN — INSULIN LISPRO 2 UNITS: 100 INJECTION, SOLUTION INTRAVENOUS; SUBCUTANEOUS at 22:12

## 2022-03-01 RX ADMIN — HYDROMORPHONE HYDROCHLORIDE 0.5 MG: 1 INJECTION, SOLUTION INTRAMUSCULAR; INTRAVENOUS; SUBCUTANEOUS at 05:53

## 2022-03-01 RX ADMIN — HEPARIN SODIUM 5000 UNITS: 5000 INJECTION, SOLUTION INTRAVENOUS; SUBCUTANEOUS at 14:32

## 2022-03-01 RX ADMIN — OXYCODONE 2.5 MG: 5 TABLET ORAL at 12:25

## 2022-03-01 RX ADMIN — OXYCODONE 5 MG: 5 TABLET ORAL at 14:32

## 2022-03-01 RX ADMIN — DIPHENHYDRAMINE HYDROCHLORIDE 12.5 MG: 25 TABLET ORAL at 12:25

## 2022-03-01 RX ADMIN — HYDRALAZINE HYDROCHLORIDE 25 MG: 25 TABLET, FILM COATED ORAL at 22:11

## 2022-03-01 RX ADMIN — OXYCODONE 2.5 MG: 5 TABLET ORAL at 00:13

## 2022-03-01 RX ADMIN — HYDRALAZINE HYDROCHLORIDE 25 MG: 25 TABLET, FILM COATED ORAL at 14:32

## 2022-03-01 RX ADMIN — Medication 2668 MG: at 11:21

## 2022-03-01 RX ADMIN — Medication 2000 UNITS: at 05:53

## 2022-03-01 RX ADMIN — CALCITRIOL CAPSULES 0.25 MCG 0.25 MCG: 0.25 CAPSULE ORAL at 05:52

## 2022-03-01 RX ADMIN — ACETAMINOPHEN 975 MG: 325 TABLET, FILM COATED ORAL at 00:14

## 2022-03-01 RX ADMIN — HYDRALAZINE HYDROCHLORIDE 25 MG: 25 TABLET, FILM COATED ORAL at 05:53

## 2022-03-01 RX ADMIN — ACETAMINOPHEN 975 MG: 325 TABLET, FILM COATED ORAL at 05:53

## 2022-03-01 RX ADMIN — ACETAMINOPHEN 975 MG: 325 TABLET, FILM COATED ORAL at 17:31

## 2022-03-01 RX ADMIN — Medication 2668 MG: at 05:52

## 2022-03-01 RX ADMIN — HYDROCORTISONE: 5 CREAM TOPICAL at 05:54

## 2022-03-01 RX ADMIN — INSULIN LISPRO 2 UNITS: 100 INJECTION, SOLUTION INTRAVENOUS; SUBCUTANEOUS at 06:11

## 2022-03-01 ASSESSMENT — COGNITIVE AND FUNCTIONAL STATUS - GENERAL
WALKING IN HOSPITAL ROOM: A LITTLE
MOBILITY SCORE: 22
SUGGESTED CMS G CODE MODIFIER MOBILITY: CJ
SUGGESTED CMS G CODE MODIFIER MOBILITY: CI
CLIMB 3 TO 5 STEPS WITH RAILING: A LITTLE
SUGGESTED CMS G CODE MODIFIER DAILY ACTIVITY: CH
MOBILITY SCORE: 23
CLIMB 3 TO 5 STEPS WITH RAILING: A LITTLE
DAILY ACTIVITIY SCORE: 24

## 2022-03-01 ASSESSMENT — PAIN DESCRIPTION - PAIN TYPE
TYPE: ACUTE PAIN

## 2022-03-01 ASSESSMENT — ENCOUNTER SYMPTOMS
FLANK PAIN: 1
DOUBLE VISION: 0
SHORTNESS OF BREATH: 1
CHILLS: 0
VOMITING: 0
PALPITATIONS: 0
DIARRHEA: 0
SORE THROAT: 0
FEVER: 0
CONSTIPATION: 0
NAUSEA: 0
PHOTOPHOBIA: 1
BLURRED VISION: 0

## 2022-03-01 ASSESSMENT — GAIT ASSESSMENTS
DISTANCE (FEET): 20
GAIT LEVEL OF ASSIST: STANDBY ASSIST

## 2022-03-01 NOTE — PROGRESS NOTES
Monitor Summary:    Rhythm: NSR  Rate: 88-92  Ectopy: PVC, rare  Measurements:            12 hour chart check complete.  Hourly rounding completed throughout the shift.

## 2022-03-01 NOTE — PROGRESS NOTES
ACUTE CARE VASCULAR SERVICE  PROGRESS NOTE      POD 4 LUE AVG insertion  Reporting left upper arm pain and left forearm numbness, reportedly unchanged from yesterday   Mild swelling, no hematoma, graft patent, strong palpable left radial pulse  Motor function of the hand is intact    No evidence of surgical complications, suspect this is postoperative pain/paresthesias and will resolve     BLE edema, duplex on 2/25/22 was negative for DVT    Following along    Tone Hartman MD  [facility]  Voalte preferred. Otherwise text to cell [phone] or call my office [phone]  __________________________________________________________________  Patient:Ambrose Watkins   MRN:[id number]   CSN:[id number]

## 2022-03-01 NOTE — THERAPY
Physical Therapy   Daily Treatment     Patient Name: Ambrose Watkins  Age:  46 y.o., Sex:  male  Medical Record #: [id number]  Today's Date: 3/1/2022     Precautions  Comments: HD MWF    Assessment    Pt was agreeable after encouragement to participate in therapy session. Reporting he is mainly limited due to pain at this time and some self limiting behaviors. He reached EOB with with spv and completed transfers with SPV and no AD. He ambulated 20ft with no AD, again deferring further due to pain. Educated on increasing gait and mobility to reach prior level of function. Will follow up while here.     Plan    Continue current treatment plan.    DC Equipment Recommendations: Unable to determine at this time  Discharge Recommendations: Recommend outpatient physical therapy services to address higher level deficits           03/01/22 0845   Other Treatments   Other Treatments Provided educated on benefits of mobility and increasing gait with therapy to reach prior level of function   Balance   Sitting Balance (Static) Fair +   Sitting Balance (Dynamic) Fair +   Standing Balance (Static) Fair   Standing Balance (Dynamic) Fair   Weight Shift Sitting Good   Weight Shift Standing Fair   Comments no AD   Gait Analysis   Gait Level Of Assist Standby Assist   Assistive Device None   Distance (Feet) 20   # of Times Distance was Traveled 1   Comments distance limited due to pain, reports he is up self in room and frequently goes to chair and restroom. Rn confirmed he is up self in room.   Bed Mobility    Supine to Sit Supervised   Sit to Supine Supervised   Scooting Supervised   Comments no bed features at this time   Functional Mobility   Sit to Stand Supervised   Bed, Chair, Wheelchair Transfer Supervised   Skilled Intervention Verbal Cuing   Comments no lob but spv with no AD   Short Term Goals    Short Term Goal # 1 Pt will demo gait distance >250' using no AD spv in a moving environment for community ambulation.   Goal  Outcome # 1 goal not met   Short Term Goal # 2 Pt will demo ability to ascend/descend 1 platform step w/ no AD spv in 6 visits for access to the community.   Goal Outcome # 2 Goal not met

## 2022-03-01 NOTE — CARE PLAN
The patient is Stable - Low risk of patient condition declining or worsening    Shift Goals  Clinical Goals: pain management/ discharge planning  Patient Goals: SLEEP  Family Goals: POLINA    Progress made toward(s) clinical / shift goals:    Problem: Knowledge Deficit - Standard  Goal: Patient and family/care givers will demonstrate understanding of plan of care, disease process/condition, diagnostic tests and medications  Outcome: Progressing     Problem: Hemodynamics  Goal: Patient's hemodynamics, fluid balance and neurologic status will be stable or improve  Outcome: Progressing     Problem: Respiratory  Goal: Patient will achieve/maintain optimum respiratory ventilation and gas exchange  Outcome: Progressing       Patient is not progressing towards the following goals:      Problem: Pain - Standard  Goal: Alleviation of pain or a reduction in pain to the patient’s comfort goal  Outcome: Not Progressing

## 2022-03-01 NOTE — PROGRESS NOTES
/104. MD notified. No new orders at this time.   Rechecked BP for 164/102. Medicated for pain. No new orders at this time.   Will continue to monitor patient.

## 2022-03-01 NOTE — NON-PROVIDER
Internal Medicine Medical Student Note  Note Author: Samanta Rojas, Student    Name mAbrose Watkins     1975   Age/Sex 46 y.o. male   MRN [id number]   Code Status Full Code         Reason for interval visit  (Principal Problem)   Back pain    Interval Problem Daily Status Update  (problem status, last 24 hours, new history, new data )   Mr. Watkins is a 46 y.o. male with history of diabetes mellitus, essential HTN, and ESRD on HD presented to ED  with low back pain and missed dialysis appointment. Back pain rated as 8/10, located mid-lumbar region, dull, and worsened with movement. Patient usually has dialysis Tuesday, Thursday, and Saturday but had missed  and hadn't been since . In ED labs showed hyperkalemia (K 5.9),  Anion gap 22, bicarb 8, and CRP 14.27. MRI lumbar spine: bilat L5 pars defects, no anterolisthesis, small synovial cyst projecting into posterolateral spinal canal behind L5 w/out significant thecal sac compression, no finding suggestive of infectious process.     : WBC increasing, patient reporting SOB. CXR, CT abd, UA, labs (BCX) ordered.     : No urine output since morning of . BCX + for gram negative rods, patient placed on ABXs. US of HD graft reveals no flow, vascular surgery consulted.    : HD graft procedure performed and successful. WBC decreasing.     : Reports LUE swelling and tenderness. Hypervolemia with bilateral LE edema. Rash worsens, multiple pruritic 1-2 cm erythematous lesions distributed over entire body with multiple excoriations.     : Hemodialysis performed. Patient still reporting arm pain and swelling, unable to dorsiflex arm due to pain. Rash worsening, becoming more pruritic, prescribed hydrocortisone cream. Temp of 103 recorded some time between .    3/1: Mr. Watkins feels that pain and swelling has spread to his entire left side, not just his arm. Reporting tingling and numbness in a glove  distribution from left elbow down to fingers and pain from elbow up to shoulder. Also reporting pain and worsened swelling in LLE. Rash: he feels that the cream is giving him some relief from the itching, but only lasts 20 min or so. Also reports feeling cold from dialysis, states that he has to stand under hot shower for hours to get relief.     ROS:  General: denies fever, weight loss, fatigue, chills  Skin: denies discoloration, pallor, +rash  HEENT: denies HA, visual changes, hearing changes, epistaxis  Respiratory: + SOB, cough, no wheezing  Cardiovascular: Denies CP, palpitations, chest tightness, orthopnea, exertional SOB  GI: Denies abd pain, hematochezia, melena, N/V/D/C  : Denies hematuria, dysuria   Extremities: +left leg pain, denies swelling   Neuro: +back pain, + paresthesia’s, + numbness  Psych: Denies SI/HI, feels agitated     Physical Exam       Vitals:    03/01/22 0000 03/01/22 0500 03/01/22 0708 03/01/22 1111   BP: 142/92 144/93 138/87 154/98   Pulse: 93 89 87 (!) 102   Resp: 19 20 (!) 22 20   Temp: 37.3 °C (99.1 °F) 36.7 °C (98 °F) 37.1 °C (98.8 °F) 36.9 °C (98.4 °F)   TempSrc: Temporal Temporal Temporal Temporal   SpO2: 93% 97% 95% 95%   Weight:       Height:         Body mass index is 41.15 kg/m².   Oxygen Therapy:  Pulse Oximetry: 95 %, O2 (LPM): 0, O2 Delivery Device: None - Room Air    Physical Exam  General: obese, ill-appearing  Skin: warm, dry  HEENT: +Photophobia, +multiple lesions on scalp  Respiratory: lungs CTAB, no wheeze or rhonchi  Cardiovascular: RRR  GI: Abdomen soft, +tender to light palpation of LUQ/LLQ, +distention  MSK: +Edema in LUE and bilat LE, +tenderness of LUE and LLE,   Neuro: +LUE weakness, unable to dorsiflex  Psych: agitated    Recent Results (from the past 24 hour(s))   POCT glucose device results    Collection Time: 02/28/22  1:59 PM   Result Value Ref Range    POC Glucose, Blood 158 (H) 65 - 99 mg/dL   POCT glucose device results    Collection Time: 02/28/22   5:04 PM   Result Value Ref Range    POC Glucose, Blood 187 (H) 65 - 99 mg/dL   POCT glucose device results    Collection Time: 02/28/22  8:19 PM   Result Value Ref Range    POC Glucose, Blood 173 (H) 65 - 99 mg/dL   CBC WITH DIFFERENTIAL    Collection Time: 03/01/22  2:18 AM   Result Value Ref Range    WBC 8.8 4.8 - 10.8 K/uL    RBC 3.46 (L) 4.70 - 6.10 M/uL    Hemoglobin 10.2 (L) 14.0 - 18.0 g/dL    Hematocrit 32.4 (L) 42.0 - 52.0 %    MCV 93.6 81.4 - 97.8 fL    MCH 29.5 27.0 - 33.0 pg    MCHC 31.5 (L) 33.7 - 35.3 g/dL    RDW 53.7 (H) 35.9 - 50.0 fL    Platelet Count 193 164 - 446 K/uL    MPV 11.4 9.0 - 12.9 fL    Neutrophils-Polys 62.10 44.00 - 72.00 %    Lymphocytes 19.20 (L) 22.00 - 41.00 %    Monocytes 15.10 (H) 0.00 - 13.40 %    Eosinophils 1.40 0.00 - 6.90 %    Basophils 0.60 0.00 - 1.80 %    Immature Granulocytes 1.60 (H) 0.00 - 0.90 %    Nucleated RBC 0.00 /100 WBC    Neutrophils (Absolute) 5.45 1.82 - 7.42 K/uL    Lymphs (Absolute) 1.68 1.00 - 4.80 K/uL    Monos (Absolute) 1.32 (H) 0.00 - 0.85 K/uL    Eos (Absolute) 0.12 0.00 - 0.51 K/uL    Baso (Absolute) 0.05 0.00 - 0.12 K/uL    Immature Granulocytes (abs) 0.14 (H) 0.00 - 0.11 K/uL    NRBC (Absolute) 0.00 K/uL   Comp Metabolic Panel    Collection Time: 03/01/22  2:18 AM   Result Value Ref Range    Sodium 134 (L) 135 - 145 mmol/L    Potassium 5.3 3.6 - 5.5 mmol/L    Chloride 95 (L) 96 - 112 mmol/L    Co2 24 20 - 33 mmol/L    Anion Gap 15.0 7.0 - 16.0    Glucose 105 (H) 65 - 99 mg/dL    Bun 59 (H) 8 - 22 mg/dL    Creatinine 8.44 (HH) 0.50 - 1.40 mg/dL    Calcium 7.8 (L) 8.5 - 10.5 mg/dL    AST(SGOT) 23 12 - 45 U/L    ALT(SGPT) 5 2 - 50 U/L    Alkaline Phosphatase 327 (H) 30 - 99 U/L    Total Bilirubin 0.4 0.1 - 1.5 mg/dL    Albumin 2.8 (L) 3.2 - 4.9 g/dL    Total Protein 6.6 6.0 - 8.2 g/dL    Globulin 3.8 (H) 1.9 - 3.5 g/dL    A-G Ratio 0.7 g/dL   ESTIMATED GFR    Collection Time: 03/01/22  2:18 AM   Result Value Ref Range    GFR If  8 (A)  >60 mL/min/1.73 m 2    GFR If Non African American 7 (A) >60 mL/min/1.73 m 2   POCT glucose device results    Collection Time: 03/01/22  6:10 AM   Result Value Ref Range    POC Glucose, Blood 156 (H) 65 - 99 mg/dL   POCT glucose device results    Collection Time: 03/01/22 11:20 AM   Result Value Ref Range    POC Glucose, Blood 134 (H) 65 - 99 mg/dL     Imaging:  ECG: normal sinus rhythm, PVCs  CXR: cardiomegaly  Echo: LVEF 30%  MRI: bilat L5 pars defects, no anterolisthesis, small synovial cyst projecting into posterolateral spinal canal behind L5 w/out significant thecal sac compression, no finding suggestive of infectious process  CT abd: anasarca, prominent venous collaterals in bilat inguinal region, bilat L5 pars defects w/out spondylolisthesis     Assessment/Plan   Assessment:  1. Back pain  2. ESRD  3. HFrEF  4. Uraemic pruritis   5. HTN  6. LUE pain/paresthesias   7. Substance abuse     Plan:  1. Back pain  -Continue dilaudid 0.5 and oxycodone 2.5 for pain  -Tylenol prn and lidocaine patch   -Discuss transition from IV opioids to PO with patient  -continue OT/PT    2. ESRD  -Discuss with nephrology: dialyzing everyday to remove excess fluid   -Nephrology has been consulted and is following     3. HFrEF  -Continue hydralazine and Losartan  -Continue dialyzing to remove excess fluid   -Keep on fluid restriction 1.5L  -If dialysis unsuccessful with improving HFrEF, consider long acting nitrates    4. Uraemic pruritis   -Optimize dialysis   -Pramoxine lotion  -Continue hydrocortisone cream prn     5. HTN  -Amlodipine D/C due to edema  -Continue Losartan 50 mg and hydralazine 10 mg IV    6. LUE pain/paresthesias   -Pain likely due to edema, optimize dialysis   -Consider US  -Vascular surgery has been consulted and is following    7. Substance abuse  -Active use of methamphetamines   -monitor for withdrawal symptoms   -counseling provided 2/28

## 2022-03-01 NOTE — HEART FAILURE PROGRAM
New diagnosis of heart failure with reduced ejection fraction (HFrEF) in the setting of ESRD, missing dialysis due to transportation issues, BMI of 41.15, labile HTN, history of methamphetamine use. Patient did not appear on HF reporting until 3/1/22.    EF: 30%  QRSD: 100 ms  EF <35% for greater than 3 months?: no so device consideration not yet a factor  Discharge Planning  • Residence: [place]  • Insurance: Medicare and Medicaid  • Indication on facesheet for Hydralazine Hydrochloride/Isosorbide- Dinitrate? no  • [facility] Paramedicine Program Eligibility: no  • Referral to disease management program specializing in heart failure care- requested that schedulers notify me if patient cannot be scheduled at the Heart Failure Clinic  • Opted in for Meds to Beds? Not addressed in admit navigator but pharmacy did file a comment that he is eligible nursing to please call on day of discharge  • LACE Risk Score: 73    Special Clinical Considerations Pertinent to HF    • Pneumococcal vaccine: missing  • Influenza vaccine: missing  • Tobacco Status: active per h/p  • Documentation of tobacco cessation counseling: not documented in h/p  • DM dx: yes - is on insulin but we're missing lipid lowering medication  • Atrial arrhythmia dx: no    Nurses: HF has been added as a title to the education tab and to the care plan. Would you please take credit for the great work that you're doing by documenting in these? Thank you!    Providers: below are Guideline Directed Medical Therapy (GDMT) for HFrEF. If any cannot be prescribed by discharge, would you please note the clinical reason for each in your discharge summary? Thanks! Merari    • Evidence Based Beta Blocker (bisoprolol, carvedilol, or toprol xl), for EF of 40% or less  • SHILPA - I, for EF of 40% or less ARNI is preferred If not cost prohibitive for patient  • SGLT2 inhibitor with proven ASCVD, HF, or DKD benefit Jardiance/empagliflozin): If not cost prohibitive for  patient  • Aldosterone antagonist, for EF of 35% or less, if applicable  • Anticoagulation for atrial arrhythmia, if applicable  • Glycemic control for DM + HF, if applicable  • Lipid lowering medication for DM + HF, if applicable  • Hydralazine Hydrochloride/Isosorbide Dinitrate. The combination of hydralazine and isosorbide- dinitrate is recommended to reduce morbidity and mortality for patients self-described  Americans with NYHA class III-IV HFrEF (EF 40% or less), receiving optimal therapy with ACE inhibitors and beta blockers, unless contraindicated (Class I, HEIDY: A).  • Pneumococcal vaccine, if not previously received  • Influenza vaccine for current season, if not previously received  • Smoking cessation counseling documented, if applicable  • Device screening, if applicable  • Referral to disease management program specializing in heart failure care- requested that schedulers notify me if patient cannot be scheduled at the Heart Failure Clinic

## 2022-03-01 NOTE — PROGRESS NOTES
"Daily Progress Note:     Date of Service: 2/28/2022  Primary Team: [facility] Team   Attending: Nino Reyes M.D.   Senior Resident: Dr. Doran  Intern: Dr. Chávez  Contact:  [phone]    Chief Complaint:   Low Back Pain     ID: Mr. Watkins is a 46 year old male who presents 2/21/22 for back pain. His past medical history includes Methamphetamine abuse, Hypertension, Diabetes Mellitus, End Stage Renal Disease on hemodialysis at [facility] every Tues/Thurs/Sat.  Reportedly missing hemodialysis due to transportation issues. Describes his back pain as dull, nonradiating, 8 out of 10 intensity, worse with movement relieved with rest.    Subjective:  -Left upper arm pain and swelling  -Left lower arm \"tingling\", able to dorsiflex his left hand  -No urination overnight  -Formed bowel movement overnight  -hemodialysis today    Consultants/Specialty:  Nephrology  Vascular    Review of Systems:    Review of Systems   Constitutional: Negative for chills and fever.   Eyes: Positive for photophobia. Negative for blurred vision and double vision.   Respiratory: Positive for cough and shortness of breath.    Cardiovascular: Negative for chest pain and palpitations.   Gastrointestinal: Negative for constipation and diarrhea.   Musculoskeletal: Positive for back pain. Negative for falls and neck pain.        Bilateral flank pain   Neurological: Positive for tingling. Negative for dizziness.       Objective Data:   Physical Exam:   Vitals:   Temp:  [36.6 °C (97.8 °F)-37.1 °C (98.7 °F)] 37.1 °C (98.7 °F)  Pulse:  [] 103  Resp:  [17-22] 17  BP: (151-176)/() 164/102  SpO2:  [91 %-97 %] 91 %    Physical Exam  Constitutional:       Appearance: He is obese. He is ill-appearing.   HENT:      Head: Normocephalic and atraumatic.      Nose: No congestion.      Mouth/Throat:      Mouth: Mucous membranes are moist.      Comments: Poor dentition    Eyes:      General:         Right eye: No discharge.         Left " "eye: No discharge.      Pupils: Pupils are equal, round, and reactive to light.   Cardiovascular:      Rate and Rhythm: Regular rhythm.      Pulses: Normal pulses.      Heart sounds: Normal heart sounds. No murmur heard.  Pulmonary:      Effort: Pulmonary effort is normal. No respiratory distress.      Breath sounds: No wheezing.   Abdominal:      General: Bowel sounds are normal. There is distension.      Palpations: Abdomen is soft. There is no mass.      Tenderness: There is abdominal tenderness. There is no guarding or rebound.      Comments: Rigid flanks bilaterally, 1-2+ pitting edema in flanks bilaterally   Musculoskeletal:         General: Swelling (left upper arm) and tenderness (left upper arm) present.      Cervical back: Normal range of motion.      Right lower leg: Edema present.      Left lower leg: Edema present.   Skin:     General: Skin is warm and dry.      Coloration: Skin is not jaundiced.      Findings: No erythema.      Comments: Excoriations and scabs on face, chest, and arms   Neurological:      Mental Status: He is alert and oriented to person, place, and time. Mental status is at baseline.      Motor: Weakness (left arm weakness, but able to dorsiflex his left hand) present.   Psychiatric:      Comments: Upset and agitated because of \"small food portions\"       Labs:   HEMATOLOGY/ ONCOLOGY/ID:            Recent Labs     02/26/22  0341 02/27/22  0640 02/28/22  0306   WBC 13.0* 10.0 10.8   RBC 3.40* 3.34* 3.57*   HEMOGLOBIN 10.1* 9.9* 10.6*   HEMATOCRIT 30.7* 30.7* 32.7*   MCV 90.3 91.9 91.6   MCH 29.7 29.6 29.7   RDW 53.4* 53.2* 52.1*   PLATELETCT 140* 136* 159*   MPV 12.5 11.9 11.8   NEUTSPOLYS 81.50* 68.90 72.00   LYMPHOCYTES 10.10* 17.50* 15.40*   MONOCYTES 6.30 10.60 9.50   EOSINOPHILS 0.90 1.70 1.50   BASOPHILS 0.30 0.40 0.50     Lab Results   Component Value Date    FERRITIN 665.0 (H) 02/22/2022    IRON 8 (L) 02/22/2022    TOTIRONBC 178 (L) 02/22/2022       RENAL:        Estimated " GFR/CRCL = Estimated Creatinine Clearance: 11.9 mL/min (A) (by C-G formula based on SCr of 10.73 mg/dL (HH)).  Recent Labs     02/26/22 0341 02/27/22  0640 02/28/22  0306   SODIUM 133* 133* 131*   POTASSIUM 4.8 4.8 5.1   CHLORIDE 94* 93* 92*   CO2 23 23 24   GLUCOSE 143* 146* 142*   BUN 60* 79* 85*   CREATININE 8.64* 9.94* 10.73*   CALCIUM 7.2* 7.1* 7.3*   PHOSPHORUS 6.0* 7.5* 7.5*   ALBUMIN 2.6* 2.3* 2.8*       GASTROINTESTINAL/ HEPATIC:          Recent Labs     02/26/22 0341 02/27/22  0640 02/28/22  0306   ALTSGPT 14 5 <5   ASTSGOT 20 16 8*   ALKPHOSPHAT 415* 356* 342*   TBILIRUBIN 0.5 0.4 0.4   ALBUMIN 2.6* 2.3* 2.8*   GLOBULIN 3.8* 3.6* 3.3     No results found for: AMMONIA    ENDOCRINE:              Recent Labs     02/26/22  0341 02/27/22  0640 02/28/22  0306   GLUCOSE 143* 146* 142*     Lab Results   Component Value Date    HBA1C 6.5 (H) 02/22/2022    HBA1C 7.2 (H) 05/19/2020       Imaging:   MR-LUMBAR SPINE-WITH & W/O  02/22/22 0737  1. Bilateral L5 pars defects. However, there is no anterolisthesis.     2. Small left facet origin subligamentous synovial cyst projects into the posterolateral spinal canal behind L5 without significant thecal sac compression. Bilateral mild facet degeneration at L4-5 and L5-S1.     3. No abnormal enhancement is identified in the lumbar spine to suggest an infectious process.     MR-THORACIC SPINE-WITH & W/O  02/22/22 0731  1. MRI of the thoracic spine without and with contrast within normal limits.     DX-CHEST-PORTABLE (1 VIEW) 02/21/22 4146  1.  No acute cardiopulmonary disease.   2.  Cardiomegaly     CT scan of the abdomen and pelvis without contrast   1.  No evidence of acute inflammatory process in the abdomen or pelvis  2.  Anasarca  3.  Prominent venous collaterals in the BILATERAL inguinal region  4.  BILATERAL L5 pars defects without spondylolisthesis     ECHO  LV EF:  30    %   Global hypokinesis.  Probably grade II diastolic dysfunction.     Problem  Representation:  Mr. Watkins is a 46 year old male who presents 2/21/22 for back pain, His past medical history includes Methamphetamine abuse, Hypertension, Diabetes Mellitus, End Stage Renal Disease on hemodialysis at [facility] every Tues/Thurs/Sat.  Reportedly missing hemodialysis due to transportation issues. Describes his back pain as dull, nonradiating, 8 out of 10 intensity, worse with movement relieved with rest.    * Back pain  Assessment & Plan  -Dilaudid for pain 7-10 intensity  -Oxycodone for pain 3-6 in intensity  -Tylenol PRN  -Lidoderm   -OT/PT  -CTM    End stage renal disease (HCC)  Assessment & Plan  -Dialysis MWF while inpatient  -monitor and replete electrolytes  -Diuretics if volume overloaded after dialysis only if patient is able to produce urine  -calcium acetate (phos-lo) 667mg tablet per nephrology    Anemia  Assessment & Plan  Hemoglobin at 11.1, MCV 93.5, RDW elevated at 57.9. 2/23/22 labwork revealed:Serum Iron 8 (L), ferritin 665 (H) , TIBC 178 (L),  Transferrin 149 (L). Suggesting a mixed picture of possibly iron deficiency and ACD in the setting of renal disease.    -started on ferous sulfate 325mg  -Epoetin 6,000 units during dialysis  -Dialysis MWF  -nephrology onboard, recs appreciated     Heart failure with reduced ejection fraction (HCC)  Assessment & Plan  -BP control with hemodilaysis, hydralazine, and Norvasc   -diuresis when appropriate    -stabilize electrolyte (K/MG); dialysis scheduled MWF   -1.5L Fluid restriction     Paresthesia  Assessment & Plan  -Spoke to vascular surgeon Dr. Hartman and his remarks were greatly appreciated. Vascular surgery following  -We will continue pain management, and if symptoms are worsening, will consider doing an ultrasound of the left upper extremity.    Diarrhea  Assessment & Plan  -monitor and replete electrolytes  -CTM for recurrence    Hypocalcemia  Assessment & Plan  -vitamin D3 (cholecalciferol) tablet 2,000 Units  -calcitRIOL  (ROCALTROL) capsule 0.25 mcg  -Calcium acetate  -CTM    Methamphetamine abuse ([facility])  Assessment & Plan  Actively uses methamphetamines. Urine drug screen ordered but not processed 2/2 limited urine output. Patient denies regular use.  -Counseled on cessation, denies use  -Monitor for agitation and signs of withdrawal    Hyperkalemia- (present on admission)  Assessment & Plan  -Continue scheduled dialysis  -Telemetry monitoring  -Recheck potassium levels and monitor   -CTM      Leukocytosis- (present on admission)  Assessment & Plan  Per ID pharmacy regarding the rare blood culture growth, levofloxacin renally/dialysis dosed for 14 days.  -PO Levofloxacin 750mg once  -PO Levofloxacin 500mg [id number]    Hypertension- (present on admission)  Assessment & Plan  -Amlodipine initially held on 02/23/2022 because of hypotension.  Patient also has lower extremity edema, so we will not be restarting amlodipine.  -Losartan 25 daily   -IV hydralazine 10 mg Q8H   -CTM    DM (diabetes mellitus) ([facility])  Assessment & Plan  Hemoglobin A1c 5/19/2020 was 7.2%. Recent hemoglobin A1c was 6.5%. Initially started on glargine and aspart and sliding scale  -This was scaled back to sliding scale for better glucose level  -Continue to monitor      Code Status: FULL  DVT ppx: Heparin   Diet: Renal    Majo Chávez MD MPH  PGY-1, [facility]    Assessment and plan discussed with senior resident and attending physician.

## 2022-03-01 NOTE — PROGRESS NOTES
".  [facility] -  PROGRESS NOTE               Author: EMMA Beck Date & Time: 3/1/2022  9:49 AM     HPI:  This is a 46 year old male with past medical history of End Stage Renal Disease on hemodialysis at [facility] every Tues/Thurs/Sat, diabetes mellitus and Hypertension who presented to the emergency department on 2/21/22 complaining of lower back pain.  Reportedly missing hemodialysis due to transportation issues. MRI this am and medicated for pain. Seen on Hemodialysis this am, lethargic after PRN medications.     DAILY NEPHROLOGY SUMMARY:  2/23: 3.5L Net UF. Resting in bed. Calm and agreeable to HD today. C/o cont. Back pain. Bps low this am.   2/24: Echo in progress. Primary team at . Resting in bed. C/o cont back pain. 2.0L net UF. K 4.0, CO2 22. Denies Diarrhea. Denies CP/SOB.   2/25: Upset about NPO this am for OR.  In bed, no acute distress. Bps improved. K 4.7, CO2 16.   2/26: S/p AVG creation and fistulogram. 1.5 L net UF. Sitting up at side of bed, eating lunch. C/o SOB and nausea.  C/o L AVF pain.   2/27: Laying in bed. C/o L arm pain.   2/28: Pt seen on HD, said he did not sleep well, some ongoing LUE discomfort, VSS on RA, labs reviewed  3/1: Pt in bed, asking for pain meds, says his whole L side hurts, iHD yesterday with 3.1L net UF, refusing tele monitor overnight, labs reviewed, VSS on RA, says he is not making urine    REVIEW OF SYSTEMS:    10 point ROS reviewed and is as per HPI/daily summary or otherwise negative    PMH/PSH/SH/FH: Reviewed and unchanged since admission note  CURRENT MEDICATIONS: Reviewed from admission to present day    VS:  /87   Pulse 87   Temp 37.1 °C (98.8 °F) (Temporal)   Resp (!) 22   Ht 1.778 m (5' 10\")   Wt (!) 130 kg (286 lb 13.1 oz)   SpO2 95%   BMI 41.15 kg/m²   Physical Exam  Constitutional:       General: He is not in acute distress.     Appearance: He is ill-appearing.   HENT:      Head: Normocephalic and " atraumatic.      Mouth/Throat:      Dentition: Abnormal dentition.   Eyes:      Extraocular Movements: Extraocular movements intact.      Conjunctiva/sclera: Conjunctivae normal.      Pupils: Pupils are equal, round, and reactive to light.   Cardiovascular:      Rate and Rhythm: Normal rate and regular rhythm.      Pulses: Normal pulses.      Heart sounds: Normal heart sounds. No murmur heard.    No friction rub. No gallop.      Comments: RIJ TDC  L AVF +Bruit/+Thrill  Pulmonary:      Effort: Pulmonary effort is normal. No respiratory distress.      Breath sounds: Normal breath sounds. No stridor. No wheezing or rhonchi.   Abdominal:      General: There is no distension.      Palpations: Abdomen is soft. There is no mass.   Musculoskeletal:         General: Swelling present. Normal range of motion.      Right lower leg: Edema present.      Left lower leg: Edema present.      Comments: LUE edema, improved some   Skin:     General: Skin is warm and dry.      Comments: Scattered scabbing   Neurological:      Mental Status: He is alert.   Psychiatric:      Comments: Irritable         Fluids:  In: 500 [Dialysis:500]  Out: 3600     LABS:  Recent Labs     02/27/22  0640 02/28/22  0306 03/01/22  0218   SODIUM 133* 131* 134*   POTASSIUM 4.8 5.1 5.3   CHLORIDE 93* 92* 95*   CO2 23 24 24   GLUCOSE 146* 142* 105*   BUN 79* 85* 59*   CREATININE 9.94* 10.73* 8.44*   CALCIUM 7.1* 7.3* 7.8*     MR-LUMBAR SPINE-WITH & W/O  Order: [id number]   Status: Final result     Visible to patient: No ([facility])     Next appt: None     0 Result Notes    Details    Reading Physician Reading Date Result Priority   Lisa Link M.D.  [phone] 2/22/2022 STAT     Narrative & Impression     2/22/2022 1:06 AM     HISTORY/REASON FOR EXAM:  Epidural abscess suspected        TECHNIQUE/EXAM DESCRIPTION:  MRI of the lumbar spine without and with contrast.     The study was performed on a Scancell 1.5 Jacqueline MRI scanner.     T1  sagittal, T2 fast spin-echo sagittal, and T2 axial images were obtained of the lumbar spine. T1 postcontrast fat-suppressed sagittal images were obtained.     20 mL ProHance contrast was administered intravenously.     COMPARISON:  None.     FINDINGS:  Lumbar spine alignment is normal.  Bilateral L5 pars defects without anterolisthesis.  Bone marrow signal intensity is normal. Mild disc desiccation is noted at L4-L5.  Bilateral pars defects noted at L5. The conus terminates at L1.     Lumbar spine levels:     T12-L1: No significant abnormality.     L1-2: Mild facet degeneration. The stenosis.     L2-3: Minimal facet degeneration. No significant canal stenosis or foraminal narrowing.     L3-4: Broad-based disc bulge and bilateral mild facet degeneration. There is no significant spinal canal narrowing or neuroforaminal narrowing.     L4-5, bilateral facet degeneration. Broad-based disc bulge with a focal central protrusion that minimally encroaches upon the spinal canal. There is no significant foraminal narrowing. There is minimal canal narrowing without thecal sac compression.   There is a left facet origin synovial cyst that extends into the left subligamentous at the level of the left L5 pars defect and minimally encroaches upon the spinal canal from the posterolateral aspect. Bilateral L5 pars defects are present.     Postcontrast images through the lumbar spine does not demonstrate any abnormal enhancement. Minimal epidural enhancement identified at the dorsal aspect of L5 is likely related to degenerative changes in the facet joints and synovial hypertrophy. No   pathological epidural enhancement to suggest infection identified.     IMPRESSION:        Bilateral L5 pars defects. However, there is no anterolisthesis.     Small left facet origin subligamentous synovial cyst projects into the posterolateral spinal canal behind L5 without significant thecal sac compression. Bilateral mild facet degeneration at L4-5  and L5-S1.     No abnormal enhancement is identified in the lumbar spine to suggest an infectious process.      HISTORY/REASON FOR EXAM:  Epidural abscess suspected        TECHNIQUE/EXAM DESCRIPTION:  MRI of the thoracic spine without and with contrast.     The study was performed on a ncyclo Signa 1.5 Jacqueline MRI scanner. T1 sagittal, T2 fast spin-echo sagittal, and T2 axial images were obtained of the thoracic spine. T1 post-contrast fat suppressed sagittal images were obtained. Optional T1 post-contrast   axial images may be obtained.     20 mL ProHance contrast was administered intravenously.     COMPARISON:  None.     FINDINGS:     Alignment in the thoracic spine is normal. Marrow signal in the vertebral bodies is within normal limits. There is no abnormal osseous enhancement in the vertebral bodies or other abnormal extradural enhancement. The disc spaces are intact at all   thoracic levels. There are no significant osteophytic changes. The prevertebral and paraspinous soft tissues are unremarkable.     The thoracic spinal cord is normal in caliber and signal throughout its course. There is no abnormal cord enhancement. There is no abnormal intradural extramedullary enhancement.     At T1-2 through T11-12 there is no significant disc bulge or protrusion. The neural foramina are intact at all thoracic levels. There is no congenital or acquired central spinal stenosis at any thoracic level. There is no significant ligamentous or facet   hypertrophy.     IMPRESSION:        MRI of the thoracic spine without and with contrast within normal limits.    US-HEMODIALYSIS GRAFT DUPLEX COMP UPPER EXTREMITY  Order: [id number]   Status: Final result     Visible to patient: No (inaccessible in MyChart)     Next appt: None     0 Result Notes    Details    Reading Physician Reading Date Result Priority   No Reading Provider Prelim 2/23/2022    Buck Kenyon M.D.  [phone] 2/23/2022      Narrative & Impression          Hemodialysis Access Report      Vascular Laboratory   Conclusions   Hemodialysis loop graft with the proximal anastomosis at the distal    brachial artery and the distal anastomosis at the basilic vein in the mid    biceps.   No flow is demonstrated in the hemodialysis graft. Echolucent material    fills the graft throughout its length.      No prior study available for comparison.      CONY MORAN      Exam Date:     2022 18:09      Room #:     Inpatient      Priority:     Routine      Ht (in):             Wt (lb):      Ordering Physician:        LIVAN BARBOZA      Referring Physician:       [id number] Vigil      Sonographer:               Kamala Ball RVT      Study Type:                Complete Unilateral      Technical Quality:         Adequate      Age:    46    Gender:     M      MRN:    [id number]      :    1975      BSA:      Indications:     AV Fistula - S/P, AV Fistula / Thrombosed      CPT Codes:       55205      ICD Codes:       447  996.73      History:         Left arm brachial to basilic loop graft. No prior duplex.      Limitations:      Exam Data:   Side:          Left            Access          Graft   Inflow Artery   Brachial   Outflow Vein    Basilic                         Velocity (cm/s)    Prox Inflow artery    Mid Inflow artery           109.0                                0    Distal Inflow artery        95.00    Inflow Anastomosis          0.00    Inflow Artery distal to     anastomosis          Proximal Graft              0.00    Mid Graft                   0.00    Distal Graft                0.00    Outflow Anastomosis         0.00    Proximal Outflow Vein       10.00    Mid Outflow Vein    Distal Outflow Vein    Flow Volume Mid Bicep            ml/min    Flow Volume Mid-Forearm          ml/min    Prox Outflow Vein Diam            cm    Mid Outflow Vein Diam             cm    Distal Outflow Vein Diam          cm      Findings   Hemodialysis loop graft with the proximal  anastomosis at the distal    brachial artery and the distal anastomosis at the basilic vein in the mid    biceps.    Doppler waveforms of the brachial artery are of high amplitude and    triphasic.   No flow is demonstrated in the hemodialysis graft. Echolucent material    fills the graft throughout its length.    The basilic and brachial veins are compressible.      Buck Kenyon MD   (Electronically Signed)   Final Date:      23 February 2022                     20:51         IMPRESSION:  # ESRD  - Etiology likely 2/2 DM/HTN  - via RIJ TDC  # Thrombosed Left AVF  - US 2/23 no flow  - s/p OR 2/25 AVG Brachio-axillary Creation, thrombectomy and fistulogram   # HTN, labile control   - Goal BP < 140/90  # Anemia of CKD  - Goal Hgb 10-11  - At goal  - On PO Fe  # CKD-MBD  -   - Vit D 9  - CCa 7.1 --> 7.68  - PO4 9.5-->7.5  - On calcitriol, calcium acetate and cholecalciferol   # Hyperkalemia, corrected   - Received Insulin/D50/Ca Gluconate/2 Amps Bicarb  # Back Pain  - Elevated CRP  - MRI of Lumbar and Thoracic spine 2/22 (-) for epidural abscess  # Methamphetamine use   - Cessation advised  # DM   - A1C 6.5  # Severe metabolic acidosis, corrected  - Off bicarb gtt   - Correct with HD   # Leukocytosis, resolved   - BCx2 2/23 (+) Gram - Rods   - C.diff (-) 2/23  - CT abd pelvis no acute abnormality  # Stenotrophomonas maltophilia bacteremia  - IV C3--> PO Levaquin x 14 days per ID recs     PLAN:  - Continue iHD qMWF/PRN, treatment due tomorrow (WED)  - Challenge UF as able   - Hold antihtn prior to HD to allow for UF  - Off midodrine  - Continue cholecalciferol   - Continue calcium acetate WM; may need to add sevelamer if phos not decreasing  - JACKIE with HD to maintain Hgb between 10-11  - Transfuse PRN Hgb <7   - No dietary protein restrictions  - Dose all meds per ESRD/iHD  - Abx per primary team/ID  - Low Na/fluid restricted renal diet  - Follow labs  - OK to transition back to OP HD once medically  cleared    Thank you,

## 2022-03-01 NOTE — PROGRESS NOTES
"Daily Progress Note:     Date of Service: 3/1/2022  Primary Team: [facility]   Attending: Nino Reyes M.D.   Senior Resident: Dr. Doran  Intern: Dr. Chávez  Contact:  [phone]    Chief Complaint:   Low Back Pain     ID: Mr. Watkins is a 46 year old male who presents 2/21/22 for back pain. His past medical history includes Methamphetamine abuse, Hypertension, Diabetes Mellitus, End Stage Renal Disease on hemodialysis at [facility] every Tues/Thurs/Sat.  Reportedly missing hemodialysis due to transportation issues. Describes his back pain as dull, nonradiating, 8 out of 10 intensity, worse with movement relieved with rest.    Interval:  -Had well formed bowel movement overnight  -no urination     Subjective:  -Left upper and lower arm swelling and pain worse; minimal erythema  -Reports increased overall weakness  -Feels \"worse after dialysis\" and reports \"increased itching\"    Consultants/Specialty:  Nephrology  Vascular     Review of Systems:    Review of Systems   Constitutional: Negative for chills and fever.   HENT: Negative for nosebleeds and sore throat.    Eyes: Positive for photophobia. Negative for blurred vision and double vision.   Respiratory: Positive for shortness of breath.    Cardiovascular: Negative for chest pain and palpitations.   Gastrointestinal: Negative for constipation, diarrhea, nausea and vomiting.   Genitourinary: Positive for flank pain (left flank worse than right). Negative for dysuria and hematuria.       Objective Data:   Physical Exam:   Vitals:   Temp:  [36.7 °C (98 °F)-37.3 °C (99.1 °F)] 37.1 °C (98.8 °F)  Pulse:  [] 87  Resp:  [17-22] 22  BP: (138-176)/() 138/87  SpO2:  [91 %-97 %] 95 %    Physical Exam  Constitutional:       Appearance: He is obese. He is ill-appearing.   HENT:      Head: Normocephalic and atraumatic.      Nose: No congestion.      Mouth/Throat:      Mouth: Mucous membranes are moist.      Comments: Poor dentition    Eyes:    " "  General:         Right eye: No discharge.         Left eye: No discharge.      Pupils: Pupils are equal, round, and reactive to light.   Cardiovascular:      Rate and Rhythm: Normal rate and regular rhythm.      Pulses: Normal pulses.      Heart sounds: Normal heart sounds. No murmur heard.  Pulmonary:      Effort: Pulmonary effort is normal. No respiratory distress.      Breath sounds: No wheezing.   Abdominal:      General: Bowel sounds are normal. There is distension.      Palpations: Abdomen is soft. There is no mass.      Tenderness: There is abdominal tenderness. There is no guarding or rebound.      Comments: Rigid flanks bilaterally, 1-2+ pitting edema in flanks bilaterally   Musculoskeletal:         General: Swelling (left upper arm) and tenderness (left upper arm) present.      Cervical back: Normal range of motion.      Right lower leg: Edema present.      Left lower leg: Edema present.   Skin:     General: Skin is warm and dry.      Coloration: Skin is not jaundiced.      Findings: No erythema.      Comments: Excoriations and scabs on face, chest, and arms   Neurological:      Mental Status: He is alert and oriented to person, place, and time. Mental status is at baseline.      Motor: Weakness (left arm weakness, but able to dorsiflex his left hand) present.   Psychiatric:      Comments: Upset and agitated because of \"small food portions\"       Labs:   HEMATOLOGY/ ONCOLOGY/ID:            Recent Labs     02/27/22  0640 02/28/22  0306 03/01/22  0218   WBC 10.0 10.8 8.8   RBC 3.34* 3.57* 3.46*   HEMOGLOBIN 9.9* 10.6* 10.2*   HEMATOCRIT 30.7* 32.7* 32.4*   MCV 91.9 91.6 93.6   MCH 29.6 29.7 29.5   RDW 53.2* 52.1* 53.7*   PLATELETCT 136* 159* 193   MPV 11.9 11.8 11.4   NEUTSPOLYS 68.90 72.00 62.10   LYMPHOCYTES 17.50* 15.40* 19.20*   MONOCYTES 10.60 9.50 15.10*   EOSINOPHILS 1.70 1.50 1.40   BASOPHILS 0.40 0.50 0.60     Lab Results   Component Value Date    FERRITIN 665.0 (H) 02/22/2022    IRON 8 (L) " 02/22/2022    [facility] 178 (L) 02/22/2022       RENAL:        Estimated GFR/CRCL = Estimated Creatinine Clearance: 15.1 mL/min (A) (by C-G formula based on SCr of 8.44 mg/dL (HH)).  Recent Labs     02/27/22  0640 02/28/22  0306 03/01/22 0218   SODIUM 133* 131* 134*   POTASSIUM 4.8 5.1 5.3   CHLORIDE 93* 92* 95*   CO2 23 24 24   GLUCOSE 146* 142* 105*   BUN 79* 85* 59*   CREATININE 9.94* 10.73* 8.44*   CALCIUM 7.1* 7.3* 7.8*   PHOSPHORUS 7.5* 7.5*  --    ALBUMIN 2.3* 2.8* 2.8*       GASTROINTESTINAL/ HEPATIC:          Recent Labs     02/27/22  0640 02/28/22  0306 03/01/22 0218   ALTSGPT 5 <5 5   ASTSGOT 16 8* 23   ALKPHOSPHAT 356* 342* 327*   TBILIRUBIN 0.4 0.4 0.4   ALBUMIN 2.3* 2.8* 2.8*   GLOBULIN 3.6* 3.3 3.8*     No results found for: AMMONIA    ENDOCRINE:              Recent Labs     02/27/22  0640 02/28/22  0306 03/01/22 0218   GLUCOSE 146* 142* 105*     Lab Results   Component Value Date    HBA1C 6.5 (H) 02/22/2022    HBA1C 7.2 (H) 05/19/2020         Imaging:   MR-LUMBAR SPINE-WITH & W/O  02/22/22 0737  1. Bilateral L5 pars defects. However, there is no anterolisthesis.     2. Small left facet origin subligamentous synovial cyst projects into the posterolateral spinal canal behind L5 without significant thecal sac compression. Bilateral mild facet degeneration at L4-5 and L5-S1.     3. No abnormal enhancement is identified in the lumbar spine to suggest an infectious process.     MR-THORACIC SPINE-WITH & W/O  02/22/22 0731  1. MRI of the thoracic spine without and with contrast within normal limits.     DX-CHEST-PORTABLE (1 VIEW) 02/21/22 6096  1.  No acute cardiopulmonary disease.   2.  Cardiomegaly     CT scan of the abdomen and pelvis without contrast   1.  No evidence of acute inflammatory process in the abdomen or pelvis  2.  Anasarca  3.  Prominent venous collaterals in the BILATERAL inguinal region  4.  BILATERAL L5 pars defects without spondylolisthesis     ECHO  LV EF:  30    %   Global  hypokinesis.  Probably grade II diastolic dysfunction.     Problem Representation:  Mr. Watkins is a 46 year old male who presents 2/21/22 for back pain, His past medical history includes Methamphetamine abuse, Hypertension, Diabetes Mellitus, End Stage Renal Disease on hemodialysis at [facility] every Tues/Thurs/Sat.  Reportedly missing hemodialysis due to transportation issues. Describes his back pain as dull, nonradiating, 8 out of 10 intensity, worse with movement relieved with rest.       * Back pain  Assessment & Plan  Stable  -Dilaudid for pain 7-10 intensity  -Oxycodone for pain 3-6 in intensity  -Tylenol PRN  -Lidoderm   -OT/PT  -CTM    End stage renal disease (HCC)  Assessment & Plan  -Dialysis MWF while inpatient  -monitor and replete electrolytes  -Diuretics if volume overloaded after dialysis only if patient is able to produce urine  -calcium acetate (phos-lo) 667mg tablet per nephrology  -BP management for SBP<140 (see hypertension assessment and plan)    Anemia  Assessment & Plan  -Ferous sulfate 325mg  -Epoetin 6,000 units during dialysis  -Dialysis MWF  -nephrology onboard, recs appreciated     Pruritus  Assessment & Plan  -benadryl  -hydrocortisone cream     Heart failure with reduced ejection fraction (HCC)  Assessment & Plan  -BP control with hemodilaysis, hydralazine, and Norvasc - titrate as needed for SBP <140  -diuresis when appropriate    -stabilize electrolyte (K/MG); dialysis scheduled MWF   -1.5L Fluid restriction     Paresthesia  Assessment & Plan  -Vascular surgery consulted, following  -Pain management, observation for increased swelling, new loss of sensation, or loss of strength - if symptoms are worsening, ultrasound of the left upper extremity.    Diarrhea  Assessment & Plan  -monitor and replete electrolytes  -CTM for recurrence    Hypocalcemia  Assessment & Plan  Improving.  -vitamin D3 (cholecalciferol) tablet 2,000 Units  -calcitRIOL (ROCALTROL) capsule 0.25 mcg  -Calcium  acetate  -CTM    Methamphetamine abuse ([facility])  Assessment & Plan  Actively uses methamphetamines. Urine drug screen ordered but not processed 2/2 limited urine output. Patient denies regular use.  -Counseled on cessation, denies use  -Monitor for agitation and signs of withdrawal    Hyperkalemia- (present on admission)  Assessment & Plan  Stable in the low 5.0's  -Continue scheduled dialysis  -Telemetry monitoring  -Recheck potassium levels and monitor   -CTM      Leukocytosis- (present on admission)  Assessment & Plan  Per ID pharmacy regarding the rare blood culture growth, levofloxacin renally/dialysis dosed for 14 days. WBC within normal limits at 8.8 on 3/1/22.  -PO Levofloxacin 750mg once  -PO Levofloxacin 500mg [id number]    Hypertension- (present on admission)  Assessment & Plan  -Losartan 50mg daily   -IV hydralazine 10 mg Q8H   -CTM    DM (diabetes mellitus) ([facility])  Assessment & Plan  -Sliding scale  - Hypoglycemia protocol   -Continue to monitor    Code Status: FULL  DVT ppx: Heparin   Diet: Renal  Majo Chávez MD MPH  PGY-1, [facility]    Assessment and plan discussed with senior resident and attending physician.

## 2022-03-01 NOTE — PROGRESS NOTES
12 hour chart check    Monitor Summary  Rhythm: SR  Rate: 88-96  Ectopy: (R) PVC, (O) PVC  .18 / .08 / .38

## 2022-03-02 LAB
ALBUMIN SERPL BCP-MCNC: 2.8 G/DL (ref 3.2–4.9)
ALBUMIN/GLOB SERPL: 0.7 G/DL
ALP SERPL-CCNC: 354 U/L (ref 30–99)
ALT SERPL-CCNC: <5 U/L (ref 2–50)
ANION GAP SERPL CALC-SCNC: 14 MMOL/L (ref 7–16)
AST SERPL-CCNC: 14 U/L (ref 12–45)
BASOPHILS # BLD AUTO: 0.5 % (ref 0–1.8)
BASOPHILS # BLD: 0.05 K/UL (ref 0–0.12)
BILIRUB SERPL-MCNC: 0.4 MG/DL (ref 0.1–1.5)
BUN SERPL-MCNC: 71 MG/DL (ref 8–22)
CALCIUM SERPL-MCNC: 8.4 MG/DL (ref 8.5–10.5)
CHLORIDE SERPL-SCNC: 93 MMOL/L (ref 96–112)
CO2 SERPL-SCNC: 24 MMOL/L (ref 20–33)
CREAT SERPL-MCNC: 9.65 MG/DL (ref 0.5–1.4)
EOSINOPHIL # BLD AUTO: 0.06 K/UL (ref 0–0.51)
EOSINOPHIL NFR BLD: 0.6 % (ref 0–6.9)
ERYTHROCYTE [DISTWIDTH] IN BLOOD BY AUTOMATED COUNT: 54.3 FL (ref 35.9–50)
GLOBULIN SER CALC-MCNC: 3.9 G/DL (ref 1.9–3.5)
GLUCOSE BLD STRIP.AUTO-MCNC: 155 MG/DL (ref 65–99)
GLUCOSE BLD STRIP.AUTO-MCNC: 167 MG/DL (ref 65–99)
GLUCOSE BLD STRIP.AUTO-MCNC: 217 MG/DL (ref 65–99)
GLUCOSE SERPL-MCNC: 164 MG/DL (ref 65–99)
HCT VFR BLD AUTO: 35.5 % (ref 42–52)
HGB BLD-MCNC: 11.3 G/DL (ref 14–18)
IMM GRANULOCYTES # BLD AUTO: 0.17 K/UL (ref 0–0.11)
IMM GRANULOCYTES NFR BLD AUTO: 1.8 % (ref 0–0.9)
LYMPHOCYTES # BLD AUTO: 1.58 K/UL (ref 1–4.8)
LYMPHOCYTES NFR BLD: 17.1 % (ref 22–41)
MCH RBC QN AUTO: 29.8 PG (ref 27–33)
MCHC RBC AUTO-ENTMCNC: 31.8 G/DL (ref 33.7–35.3)
MCV RBC AUTO: 93.7 FL (ref 81.4–97.8)
MONOCYTES # BLD AUTO: 1.16 K/UL (ref 0–0.85)
MONOCYTES NFR BLD AUTO: 12.5 % (ref 0–13.4)
NEUTROPHILS # BLD AUTO: 6.24 K/UL (ref 1.82–7.42)
NEUTROPHILS NFR BLD: 67.5 % (ref 44–72)
NRBC # BLD AUTO: 0 K/UL
NRBC BLD-RTO: 0 /100 WBC
PLATELET # BLD AUTO: 227 K/UL (ref 164–446)
PMV BLD AUTO: 11.1 FL (ref 9–12.9)
POTASSIUM SERPL-SCNC: 5.9 MMOL/L (ref 3.6–5.5)
PROT SERPL-MCNC: 6.7 G/DL (ref 6–8.2)
RBC # BLD AUTO: 3.79 M/UL (ref 4.7–6.1)
SODIUM SERPL-SCNC: 131 MMOL/L (ref 135–145)
WBC # BLD AUTO: 9.3 K/UL (ref 4.8–10.8)

## 2022-03-02 PROCEDURE — 36415 COLL VENOUS BLD VENIPUNCTURE: CPT

## 2022-03-02 PROCEDURE — 85025 COMPLETE CBC W/AUTO DIFF WBC: CPT

## 2022-03-02 PROCEDURE — 700102 HCHG RX REV CODE 250 W/ 637 OVERRIDE(OP): Performed by: NURSE PRACTITIONER

## 2022-03-02 PROCEDURE — A9270 NON-COVERED ITEM OR SERVICE: HCPCS | Performed by: NURSE PRACTITIONER

## 2022-03-02 PROCEDURE — 700102 HCHG RX REV CODE 250 W/ 637 OVERRIDE(OP): Performed by: STUDENT IN AN ORGANIZED HEALTH CARE EDUCATION/TRAINING PROGRAM

## 2022-03-02 PROCEDURE — 700111 HCHG RX REV CODE 636 W/ 250 OVERRIDE (IP): Performed by: NURSE PRACTITIONER

## 2022-03-02 PROCEDURE — 80053 COMPREHEN METABOLIC PANEL: CPT

## 2022-03-02 PROCEDURE — 700111 HCHG RX REV CODE 636 W/ 250 OVERRIDE (IP)

## 2022-03-02 PROCEDURE — A9270 NON-COVERED ITEM OR SERVICE: HCPCS | Performed by: STUDENT IN AN ORGANIZED HEALTH CARE EDUCATION/TRAINING PROGRAM

## 2022-03-02 PROCEDURE — 700101 HCHG RX REV CODE 250: Performed by: HOSPITALIST

## 2022-03-02 PROCEDURE — A9270 NON-COVERED ITEM OR SERVICE: HCPCS | Performed by: HOSPITALIST

## 2022-03-02 PROCEDURE — 700111 HCHG RX REV CODE 636 W/ 250 OVERRIDE (IP): Performed by: STUDENT IN AN ORGANIZED HEALTH CARE EDUCATION/TRAINING PROGRAM

## 2022-03-02 PROCEDURE — 82962 GLUCOSE BLOOD TEST: CPT | Mod: 91

## 2022-03-02 PROCEDURE — 770020 HCHG ROOM/CARE - TELE (206)

## 2022-03-02 PROCEDURE — 90935 HEMODIALYSIS ONE EVALUATION: CPT

## 2022-03-02 PROCEDURE — 99232 SBSQ HOSP IP/OBS MODERATE 35: CPT | Mod: GC | Performed by: INTERNAL MEDICINE

## 2022-03-02 PROCEDURE — 700102 HCHG RX REV CODE 250 W/ 637 OVERRIDE(OP): Performed by: HOSPITALIST

## 2022-03-02 RX ORDER — HEPARIN SODIUM 1000 [USP'U]/ML
INJECTION, SOLUTION INTRAVENOUS; SUBCUTANEOUS
Status: COMPLETED
Start: 2022-03-02 | End: 2022-03-02

## 2022-03-02 RX ORDER — HYDROMORPHONE HYDROCHLORIDE 2 MG/1
1-2 TABLET ORAL EVERY 6 HOURS PRN
Status: DISCONTINUED | OUTPATIENT
Start: 2022-03-02 | End: 2022-03-04

## 2022-03-02 RX ADMIN — HYDROMORPHONE HYDROCHLORIDE 2 MG: 2 TABLET ORAL at 20:00

## 2022-03-02 RX ADMIN — HYDROCORTISONE: 5 CREAM TOPICAL at 16:55

## 2022-03-02 RX ADMIN — EPOETIN ALFA-EPBX 6000 UNITS: 3000 INJECTION, SOLUTION INTRAVENOUS; SUBCUTANEOUS at 07:55

## 2022-03-02 RX ADMIN — ACETAMINOPHEN 975 MG: 325 TABLET, FILM COATED ORAL at 06:31

## 2022-03-02 RX ADMIN — LIDOCAINE 1 PATCH: 50 PATCH TOPICAL at 11:17

## 2022-03-02 RX ADMIN — Medication 2000 UNITS: at 06:31

## 2022-03-02 RX ADMIN — HYDRALAZINE HYDROCHLORIDE 25 MG: 25 TABLET, FILM COATED ORAL at 06:31

## 2022-03-02 RX ADMIN — HEPARIN SODIUM 2000 UNITS: 1000 INJECTION, SOLUTION INTRAVENOUS; SUBCUTANEOUS at 07:05

## 2022-03-02 RX ADMIN — ACETAMINOPHEN 975 MG: 325 TABLET, FILM COATED ORAL at 11:16

## 2022-03-02 RX ADMIN — HYDROMORPHONE HYDROCHLORIDE 2 MG: 2 TABLET ORAL at 13:35

## 2022-03-02 RX ADMIN — HEPARIN SODIUM 5000 UNITS: 5000 INJECTION, SOLUTION INTRAVENOUS; SUBCUTANEOUS at 06:31

## 2022-03-02 RX ADMIN — Medication 2668 MG: at 11:17

## 2022-03-02 RX ADMIN — HEPARIN SODIUM 5000 UNITS: 5000 INJECTION, SOLUTION INTRAVENOUS; SUBCUTANEOUS at 21:52

## 2022-03-02 RX ADMIN — OXYCODONE 5 MG: 5 TABLET ORAL at 01:35

## 2022-03-02 RX ADMIN — Medication 2668 MG: at 16:37

## 2022-03-02 RX ADMIN — ACETAMINOPHEN 975 MG: 325 TABLET, FILM COATED ORAL at 16:37

## 2022-03-02 RX ADMIN — LOSARTAN POTASSIUM 25 MG: 25 TABLET, FILM COATED ORAL at 06:31

## 2022-03-02 RX ADMIN — ACETAMINOPHEN 975 MG: 325 TABLET, FILM COATED ORAL at 01:35

## 2022-03-02 RX ADMIN — HYDRALAZINE HYDROCHLORIDE 25 MG: 25 TABLET, FILM COATED ORAL at 22:00

## 2022-03-02 RX ADMIN — Medication 2668 MG: at 06:30

## 2022-03-02 RX ADMIN — INSULIN LISPRO 2 UNITS: 100 INJECTION, SOLUTION INTRAVENOUS; SUBCUTANEOUS at 06:33

## 2022-03-02 RX ADMIN — DIPHENHYDRAMINE HYDROCHLORIDE 12.5 MG: 25 TABLET ORAL at 11:17

## 2022-03-02 RX ADMIN — LEVOFLOXACIN 500 MG: 500 TABLET, FILM COATED ORAL at 06:31

## 2022-03-02 RX ADMIN — DIPHENHYDRAMINE HYDROCHLORIDE 12.5 MG: 25 TABLET ORAL at 01:36

## 2022-03-02 RX ADMIN — INSULIN LISPRO 2 UNITS: 100 INJECTION, SOLUTION INTRAVENOUS; SUBCUTANEOUS at 21:42

## 2022-03-02 RX ADMIN — HEPARIN SODIUM 3200 UNITS: 1000 INJECTION, SOLUTION INTRAVENOUS; SUBCUTANEOUS at 10:15

## 2022-03-02 RX ADMIN — INSULIN LISPRO 3 UNITS: 100 INJECTION, SOLUTION INTRAVENOUS; SUBCUTANEOUS at 16:49

## 2022-03-02 RX ADMIN — EPOETIN ALFA-EPBX 6000 UNITS: 10000 INJECTION, SOLUTION INTRAVENOUS; SUBCUTANEOUS at 07:55

## 2022-03-02 RX ADMIN — CALCITRIOL CAPSULES 0.25 MCG 0.25 MCG: 0.25 CAPSULE ORAL at 06:31

## 2022-03-02 RX ADMIN — HYDRALAZINE HYDROCHLORIDE 25 MG: 25 TABLET, FILM COATED ORAL at 13:35

## 2022-03-02 ASSESSMENT — PAIN DESCRIPTION - PAIN TYPE
TYPE: ACUTE PAIN

## 2022-03-02 ASSESSMENT — ENCOUNTER SYMPTOMS
FEVER: 0
CHILLS: 0
PALPITATIONS: 0
EYE DISCHARGE: 0
SORE THROAT: 0

## 2022-03-02 NOTE — PROGRESS NOTES
Daily Progress Note:     Date of Service: 3/2/2022  Primary Team: [facility]   Attending: Nino Reyes M.D.   Senior Resident: Dr. Doran   Intern: Dr. Chávez  Contact:  [phone]    Chief Complaint:   Low Back Pain     ID: Mr. Watkins is a 46 year old male who presents 2/21/22 for back pain. His past medical history includes Methamphetamine abuse, Hypertension, Diabetes Mellitus, End Stage Renal Disease on hemodialysis at [facility] every Tues/Thurs/Sat.  Reportedly missing hemodialysis due to transportation issues. Describes his back pain as dull, nonradiating, 8 out of 10 intensity, worse with movement relieved with rest.    Subjective:  -received hemodliaysis on 3/1/22; will receive hemodialysis today. Goal is daily hemodialysis until euvolemic  -concerned that the oxycodone is connected to his itching  -seen on his way to dialysis and while receiving dialysis    Consultants/Specialty:  Nephrology  Vascular    Review of Systems:    Review of Systems   Constitutional: Negative for chills and fever.   HENT: Negative for nosebleeds and sore throat.    Eyes: Negative for discharge.   Cardiovascular: Negative for chest pain and palpitations.   Genitourinary: Negative for dysuria and hematuria. Flank pain: left flank worse than right.   Skin: Positive for itching and rash.       Objective Data:   Physical Exam:   Vitals:   Temp:  [36.3 °C (97.3 °F)-37.1 °C (98.7 °F)] 36.3 °C (97.3 °F)  Pulse:  [] 107  Resp:  [18-20] 18  BP: (143-158)/() 158/104  SpO2:  [92 %-95 %] 94 %    Physical Exam  Constitutional:       General: He is not in acute distress.     Appearance: He is obese.   HENT:      Head: Normocephalic and atraumatic.      Nose: No congestion.      Mouth/Throat:      Mouth: Mucous membranes are moist.      Comments: Poor dentition    Eyes:      General:         Right eye: No discharge.         Left eye: No discharge.      Pupils: Pupils are equal, round, and reactive to light.  "  Cardiovascular:      Rate and Rhythm: Normal rate.   Pulmonary:      Effort: Pulmonary effort is normal. No respiratory distress.   Abdominal:      General: Bowel sounds are normal. There is distension.      Palpations: Abdomen is soft.      Comments: Rigid flanks bilaterally, 1-2+ pitting edema in flanks bilaterally   Musculoskeletal:         General: Swelling (left upper arm) and tenderness (left upper arm) present.      Cervical back: Normal range of motion.      Right lower leg: Edema present.      Left lower leg: Edema present.   Skin:     General: Skin is warm and dry.      Coloration: Skin is not jaundiced.      Findings: Erythema and rash present.      Comments: Excoriations and scabs on face, chest, and arms   Neurological:      Mental Status: He is alert. Mental status is at baseline.      Motor: Weakness (left arm weakness, but able to dorsiflex his left hand) present.   Psychiatric:      Comments: Upset and agitated because of \"small food portions\"       Labs:   HEMATOLOGY/ ONCOLOGY/ID:            Recent Labs     02/28/22 0306 03/01/22 0218 03/02/22 0259   WBC 10.8 8.8 9.3   RBC 3.57* 3.46* 3.79*   HEMOGLOBIN 10.6* 10.2* 11.3*   HEMATOCRIT 32.7* 32.4* 35.5*   MCV 91.6 93.6 93.7   MCH 29.7 29.5 29.8   RDW 52.1* 53.7* 54.3*   PLATELETCT 159* 193 227   MPV 11.8 11.4 11.1   NEUTSPOLYS 72.00 62.10 67.50   LYMPHOCYTES 15.40* 19.20* 17.10*   MONOCYTES 9.50 15.10* 12.50   EOSINOPHILS 1.50 1.40 0.60   BASOPHILS 0.50 0.60 0.50     Lab Results   Component Value Date    FERRITIN 665.0 (H) 02/22/2022    IRON 8 (L) 02/22/2022    TOTIRONBC 178 (L) 02/22/2022       RENAL:        Estimated GFR/CRCL = Estimated Creatinine Clearance: 13.2 mL/min (A) (by C-G formula based on SCr of 9.65 mg/dL (HH)).  Recent Labs     02/28/22 0306 03/01/22 0218 03/02/22 0259   SODIUM 131* 134* 131*   POTASSIUM 5.1 5.3 5.9*   CHLORIDE 92* 95* 93*   CO2 24 24 24   GLUCOSE 142* 105* 164*   BUN 85* 59* 71*   CREATININE 10.73* 8.44* 9.65* "   CALCIUM 7.3* 7.8* 8.4*   PHOSPHORUS 7.5*  --   --    ALBUMIN 2.8* 2.8* 2.8*       GASTROINTESTINAL/ HEPATIC:          Recent Labs     02/28/22  0306 03/01/22  0218 03/02/22  0259   ALTSGPT <5 5 <5   ASTSGOT 8* 23 14   ALKPHOSPHAT 342* 327* 354*   TBILIRUBIN 0.4 0.4 0.4   ALBUMIN 2.8* 2.8* 2.8*   GLOBULIN 3.3 3.8* 3.9*     No results found for: AMMONIA    ENDOCRINE:              Recent Labs     02/28/22  0306 03/01/22 0218 03/02/22  0259   GLUCOSE 142* 105* 164*     Lab Results   Component Value Date    HBA1C 6.5 (H) 02/22/2022    HBA1C 7.2 (H) 05/19/2020       Imaging:     MR-LUMBAR SPINE-WITH & W/O  02/22/22 0737  1. Bilateral L5 pars defects. However, there is no anterolisthesis.     2. Small left facet origin subligamentous synovial cyst projects into the posterolateral spinal canal behind L5 without significant thecal sac compression. Bilateral mild facet degeneration at L4-5 and L5-S1.     3. No abnormal enhancement is identified in the lumbar spine to suggest an infectious process.     MR-THORACIC SPINE-WITH & W/O  02/22/22 0731  1. MRI of the thoracic spine without and with contrast within normal limits.     DX-CHEST-PORTABLE (1 VIEW) 02/21/22 7228  1.  No acute cardiopulmonary disease.   2.  Cardiomegaly     CT scan of the abdomen and pelvis without contrast   1.  No evidence of acute inflammatory process in the abdomen or pelvis  2.  Anasarca  3.  Prominent venous collaterals in the BILATERAL inguinal region  4.  BILATERAL L5 pars defects without spondylolisthesis     ECHO  LV EF:  30%   Global hypokinesis.  Probably grade II diastolic dysfunction.     Problem Representation:  Mr. Watkins is a 46 year old male who presents 2/21/22 for back pain, His past medical history includes Methamphetamine abuse, Hypertension, Diabetes Mellitus, End Stage Renal Disease on hemodialysis at [facility] every Tues/Thurs/Sat.  Reportedly missing hemodialysis due to transportation issues. Describes his back pain as  dull, nonradiating, 8 out of 10 intensity, worse with movement relieved with rest.    * Back pain  Assessment & Plan  -Oral Hydromorphone 1-2mg Q6hrs PRN for pain 3-10 in intensity; titrate as directed for pain intensity.  -Tylenol PRN  -Lidoderm   -OT/PT  -CTM    End stage renal disease (HCC)  Assessment & Plan  -Daily dialysis while inpatient until euvolemic  -monitor and replete electrolytes  -Diuretics if volume overloaded after dialysis only if patient is able to produce urine  -calcium acetate (phos-lo) 667mg tablet per nephrology  -BP management for SBP<140 (see hypertension assessment and plan)    Anemia  Assessment & Plan  -Ferous sulfate 325mg  -Epoetin 6,000 units during dialysis  -Dialysis  -nephrology onboard, recs appreciated     Pruritus  Assessment & Plan  -benadryl  -hydrocortisone cream   -CTM    Heart failure with reduced ejection fraction (HCC)  Assessment & Plan  -BP control with hemodilaysis, hydralazine, and Norvasc - titrate as needed for SBP <140 once patient is euvolemic  -diuresis when appropriate    -stabilize electrolyte (K/MG); dialysis scheduled MWF   -1.5L Fluid restriction   -Repeat Echo once euvolemic; if patient still has poor heart function, consider additional management     Paresthesia  Assessment & Plan  -Vascular surgery consulted, following  -Pain management, observation for increased swelling, new loss of sensation, or loss of strength - if symptoms are worsening, ultrasound of the left upper extremity  -CTM    Diarrhea  Assessment & Plan  Resolved  -monitor and replete electrolytes  -CTM for recurrence    Hypocalcemia  Assessment & Plan  Stable  -vitamin D3 (cholecalciferol) tablet 2,000 Units  -calcitRIOL (ROCALTROL) capsule 0.25 mcg  -Calcium acetate  -CTM    Methamphetamine abuse (HCC)  Assessment & Plan  Actively uses methamphetamines. Urine drug screen ordered but not processed 2/2 limited urine output. Patient denies regular use.  -Counseled on cessation, denies  use  -Monitor for agitation and signs of withdrawal    Hyperkalemia- (present on admission)  Assessment & Plan  -Continue scheduled dialysis  -Telemetry monitoring  -Recheck potassium levels and monitor   -CTM      Leukocytosis- (present on admission)  Assessment & Plan  Per ID pharmacy regarding the rare blood culture growth, levofloxacin renally/dialysis dosed for 14 days. WBC within normal limits at 8.8 starting 3/1/22.  -PO Levofloxacin 750mg once  -PO Levofloxacin 500mg [id number]    Hypertension- (present on admission)  Assessment & Plan  -Losartan 25g daily, titrate as needed once euvolemic  -IV hydralazine 10 mg Q8H   -CTM    DM (diabetes mellitus) (HCC)  Assessment & Plan  -Sliding scale  -Hypoglycemia protocol   -Continue to monitor  -Renal diet    Code Status: FULL  DVT ppx: Heparin   Diet: Renal    Majo Chávez MD MPH  PGY-1, [facility]    Assessment and plan discussed with senior resident and attending physician.

## 2022-03-02 NOTE — PROGRESS NOTES
".  [facility] -  PROGRESS NOTE               Author: EMMA Dial Date & Time: 3/2/2022  9:36 AM     HPI:  This is a 46 year old male with past medical history of End Stage Renal Disease on hemodialysis at [facility] every Tues/Thurs/Sat, diabetes mellitus and Hypertension who presented to the emergency department on 2/21/22 complaining of lower back pain.  Reportedly missing hemodialysis due to transportation issues. MRI this am and medicated for pain. Seen on Hemodialysis this am, lethargic after PRN medications.     DAILY NEPHROLOGY SUMMARY:  2/23: 3.5L Net UF. Resting in bed. Calm and agreeable to HD today. C/o cont. Back pain. Bps low this am.   2/24: Echo in progress. Primary team at . Resting in bed. C/o cont back pain. 2.0L net UF. K 4.0, CO2 22. Denies Diarrhea. Denies CP/SOB.   2/25: Upset about NPO this am for OR.  In bed, no acute distress. Bps improved. K 4.7, CO2 16.   2/26: S/p AVG creation and fistulogram. 1.5 L net UF. Sitting up at side of bed, eating lunch. C/o SOB and nausea.  C/o L AVF pain.   2/27: Laying in bed. C/o L arm pain.   2/28: Pt seen on HD, said he did not sleep well, some ongoing LUE discomfort, VSS on RA, labs reviewed  3/1: Pt in bed, asking for pain meds, says his whole L side hurts, iHD yesterday with 3.1L net UF, refusing tele monitor overnight, labs reviewed, VSS on RA, says he is not making urine  3/2: Seen on HD.  C/o cramping. 3.5L net UF. Denies fever/chills. C/o SOB with exertion, on RA. K 5.9.     REVIEW OF SYSTEMS:    10 point ROS reviewed and is as per HPI/daily summary or otherwise negative    PMH/PSH/SH/FH: Reviewed and unchanged since admission note  CURRENT MEDICATIONS: Reviewed from admission to present day    VS:  /90   Pulse 92   Temp 36.4 °C (97.5 °F) (Temporal)   Resp 20   Ht 1.778 m (5' 10\")   Wt (!) 130 kg (286 lb 13.1 oz)   SpO2 95%   BMI 41.15 kg/m²   Physical Exam  Constitutional:       General: He " is not in acute distress.     Appearance: He is ill-appearing.   HENT:      Head: Normocephalic and atraumatic.      Mouth/Throat:      Dentition: Abnormal dentition.   Eyes:      Extraocular Movements: Extraocular movements intact.      Conjunctiva/sclera: Conjunctivae normal.      Pupils: Pupils are equal, round, and reactive to light.   Cardiovascular:      Rate and Rhythm: Normal rate and regular rhythm.      Pulses: Normal pulses.      Heart sounds: Normal heart sounds. No murmur heard.    No friction rub. No gallop.      Comments: RIJ TDC  L AVF +Bruit/+Thrill  Pulmonary:      Effort: Pulmonary effort is normal. No respiratory distress.      Breath sounds: Normal breath sounds. No stridor. No wheezing or rhonchi.   Abdominal:      General: There is no distension.      Palpations: Abdomen is soft. There is no mass.   Musculoskeletal:         General: Swelling present. Normal range of motion.      Right lower leg: Edema present.      Left lower leg: Edema present.      Comments: LUE edema, improved some   Skin:     General: Skin is warm and dry.      Comments: Scattered scabbing   Neurological:      Mental Status: He is alert.   Psychiatric:      Comments: Irritable         Fluids:  In: 1590 [P.O.:1090; Dialysis:500]  Out: 4000     LABS:  Recent Labs     02/28/22  0306 03/01/22  0218 03/02/22  0259   SODIUM 131* 134* 131*   POTASSIUM 5.1 5.3 5.9*   CHLORIDE 92* 95* 93*   CO2 24 24 24   GLUCOSE 142* 105* 164*   BUN 85* 59* 71*   CREATININE 10.73* 8.44* 9.65*   CALCIUM 7.3* 7.8* 8.4*     MR-LUMBAR SPINE-WITH & W/O  Order: [id number]   Status: Final result     Visible to patient: No (inaccessible in MyChart)     Next appt: None     0 Result Notes    Details    Reading Physician Reading Date Result Priority   Lisa Link M.D.  [phone] 2/22/2022 STAT     Narrative & Impression     2/22/2022 1:06 AM     HISTORY/REASON FOR EXAM:  Epidural abscess suspected        TECHNIQUE/EXAM DESCRIPTION:  MRI of the  lumbar spine without and with contrast.     The study was performed on a [employer] Signa 1.5 Jacqueline MRI scanner.     T1 sagittal, T2 fast spin-echo sagittal, and T2 axial images were obtained of the lumbar spine. T1 postcontrast fat-suppressed sagittal images were obtained.     20 mL ProHance contrast was administered intravenously.     COMPARISON:  None.     FINDINGS:  Lumbar spine alignment is normal.  Bilateral L5 pars defects without anterolisthesis.  Bone marrow signal intensity is normal. Mild disc desiccation is noted at L4-L5.  Bilateral pars defects noted at L5. The conus terminates at L1.     Lumbar spine levels:     T12-L1: No significant abnormality.     L1-2: Mild facet degeneration. The stenosis.     L2-3: Minimal facet degeneration. No significant canal stenosis or foraminal narrowing.     L3-4: Broad-based disc bulge and bilateral mild facet degeneration. There is no significant spinal canal narrowing or neuroforaminal narrowing.     L4-5, bilateral facet degeneration. Broad-based disc bulge with a focal central protrusion that minimally encroaches upon the spinal canal. There is no significant foraminal narrowing. There is minimal canal narrowing without thecal sac compression.   There is a left facet origin synovial cyst that extends into the left subligamentous at the level of the left L5 pars defect and minimally encroaches upon the spinal canal from the posterolateral aspect. Bilateral L5 pars defects are present.     Postcontrast images through the lumbar spine does not demonstrate any abnormal enhancement. Minimal epidural enhancement identified at the dorsal aspect of L5 is likely related to degenerative changes in the facet joints and synovial hypertrophy. No   pathological epidural enhancement to suggest infection identified.     IMPRESSION:        Bilateral L5 pars defects. However, there is no anterolisthesis.     Small left facet origin subligamentous synovial cyst projects into the  posterolateral spinal canal behind L5 without significant thecal sac compression. Bilateral mild facet degeneration at L4-5 and L5-S1.     No abnormal enhancement is identified in the lumbar spine to suggest an infectious process.      HISTORY/REASON FOR EXAM:  Epidural abscess suspected        TECHNIQUE/EXAM DESCRIPTION:  MRI of the thoracic spine without and with contrast.     The study was performed on a [employer] Signa 1.5 Jacqueline MRI scanner. T1 sagittal, T2 fast spin-echo sagittal, and T2 axial images were obtained of the thoracic spine. T1 post-contrast fat suppressed sagittal images were obtained. Optional T1 post-contrast   axial images may be obtained.     20 mL ProHance contrast was administered intravenously.     COMPARISON:  None.     FINDINGS:     Alignment in the thoracic spine is normal. Marrow signal in the vertebral bodies is within normal limits. There is no abnormal osseous enhancement in the vertebral bodies or other abnormal extradural enhancement. The disc spaces are intact at all   thoracic levels. There are no significant osteophytic changes. The prevertebral and paraspinous soft tissues are unremarkable.     The thoracic spinal cord is normal in caliber and signal throughout its course. There is no abnormal cord enhancement. There is no abnormal intradural extramedullary enhancement.     At T1-2 through T11-12 there is no significant disc bulge or protrusion. The neural foramina are intact at all thoracic levels. There is no congenital or acquired central spinal stenosis at any thoracic level. There is no significant ligamentous or facet   hypertrophy.     IMPRESSION:        MRI of the thoracic spine without and with contrast within normal limits.    US-HEMODIALYSIS GRAFT DUPLEX COMP UPPER EXTREMITY  Order: [id number]   Status: Final result     Visible to patient: No (inaccessible in MyChart)     Next appt: None     0 Result Notes    Details    Reading Physician Reading Date Result Priority   No  Reading Provider Prelim 2022    Buck Kenyon M.D.  [phone] 2022      Narrative & Impression         Hemodialysis Access Report      Vascular Laboratory   Conclusions   Hemodialysis loop graft with the proximal anastomosis at the distal    brachial artery and the distal anastomosis at the basilic vein in the mid    biceps.   No flow is demonstrated in the hemodialysis graft. Echolucent material    fills the graft throughout its length.      No prior study available for comparison.      CONY MORAN      Exam Date:     2022 18:09      Room #:     Inpatient      Priority:     Routine      Ht (in):             Wt (lb):      Ordering Physician:        LIVAN BARBOZA      Referring Physician:       [id number]      Sonographer:               Kamala Ball RVBARNEY      Study Type:                Complete Unilateral      Technical Quality:         Adequate      Age:    46    Gender:     M      MRN:    [id number]      :    1975      BSA:      Indications:     AV Fistula - S/P, AV Fistula / Thrombosed      CPT Codes:       91067      ICD Codes:       447  996.73      History:         Left arm brachial to basilic loop graft. No prior duplex.      Limitations:      Exam Data:   Side:          Left            Access          Graft   Inflow Artery   Brachial   Outflow Vein    Basilic                         Velocity (cm/s)    Prox Inflow artery    Mid Inflow artery           109.0                                0    Distal Inflow artery        95.00    Inflow Anastomosis          0.00    Inflow Artery distal to     anastomosis          Proximal Graft              0.00    Mid Graft                   0.00    Distal Graft                0.00    Outflow Anastomosis         0.00    Proximal Outflow Vein       10.00    Mid Outflow Vein    Distal Outflow Vein    Flow Volume Mid Bicep            ml/min    Flow Volume Mid-Forearm          ml/min    Prox Outflow Vein Diam            cm    Mid Outflow Vein  Diam             cm    Distal Outflow Vein Diam          cm      Findings   Hemodialysis loop graft with the proximal anastomosis at the distal    brachial artery and the distal anastomosis at the basilic vein in the mid    biceps.    Doppler waveforms of the brachial artery are of high amplitude and    triphasic.   No flow is demonstrated in the hemodialysis graft. Echolucent material    fills the graft throughout its length.    The basilic and brachial veins are compressible.      Buck Kenyon MD   (Electronically Signed)   Final Date:      23 February 2022                     20:51         IMPRESSION:  # ESRD  - Etiology likely 2/2 DM/HTN  - via RIJ TDC  # Thrombosed Left AVF  - US 2/23 no flow  - s/p OR 2/25 AVG Brachio-axillary Creation, thrombectomy and fistulogram   # HTN, labile control   - Goal BP < 140/90  # Anemia of CKD  - Goal Hgb 10-11  - At goal  - On PO Fe  # CKD-MBD  -   - Vit D 9  - CCa 7.1 --> 7.68  - PO4 9.5-->7.5  - On calcitriol, calcium acetate and cholecalciferol   # Hyperkalemia, corrected   - Received Insulin/D50/Ca Gluconate/2 Amps Bicarb  # Back Pain  - Elevated CRP  - MRI of Lumbar and Thoracic spine 2/22 (-) for epidural abscess  # Methamphetamine use   - Cessation advised  # DM   - A1C 6.5  # Severe metabolic acidosis, corrected  - Off bicarb gtt   - Correct with HD   # Leukocytosis, resolved   - BCx2 2/23 (+) Gram - Rods   - C.diff (-) 2/23  - CT abd pelvis no acute abnormality  # Stenotrophomonas maltophilia bacteremia  - IV C3--> PO Levaquin x 14 days per ID recs     PLAN:  - Continue iHD qMWF/PRN, treatment due today (WED)  - Challenge UF as able   - Hold antihtn prior to HD to allow for UF  - Off midodrine  - Continue cholecalciferol   - Continue calcium acetate WM; may need to add sevelamer if phos not decreasing  - JACKIE with HD to maintain Hgb between 10-11  - Transfuse PRN Hgb <7   - No dietary protein restrictions  - Dose all meds per ESRD/iHD  - Abx per primary  team/ID  - Low Na/fluid restricted renal diet  - Follow labs  - Utilize AVG per Vasular Surgery   - OK to transition back to OP HD once medically cleared    Thank you,

## 2022-03-02 NOTE — DISCHARGE PLANNING
Outpatient Dialysis Note    I spoke with patient regarding outpatient dialysis pt. voiced concern that [facility] [place] too far and had no transportation to get there. I relayed information that [facility] and [facility] both denied due to non-compliant.  Patient was educated on the importance of making every tx and transportation resources available in the community.     López FERNANDO pt. has been given information to [facility] to set up his own transportation.     Katheryn Heredia- Dialysis Coordinator Ph# [phone]  [facility]

## 2022-03-02 NOTE — NON-PROVIDER
Internal Medicine Medical Student Note  Note Author: Samanta Rojas, Student    Name Ambrose Watkins     1975   Age/Sex 46 y.o. male   MRN [id number]   Code Status Full Code       Reason for interval visit  (Principal Problem)   Back pain    Interval Problem Daily Status Update  (problem status, last 24 hours, new history, new data )   Mr. Watkins is a 46 y.o. male with history of diabetes mellitus, essential HTN, and ESRD on HD presented to ED  with low back pain and missed dialysis appointment. Back pain rated as 8/10, located mid-lumbar region, dull, and worsened with movement. Patient usually has dialysis Tuesday, Thursday, and Saturday but had missed  and hadn't been since . In ED labs showed hyperkalemia (K 5.9),  Anion gap 22, bicarb 8, and CRP 14.27. MRI lumbar spine: bilat L5 pars defects, no anterolisthesis, small synovial cyst projecting into posterolateral spinal canal behind L5 w/out significant thecal sac compression, no finding suggestive of infectious process.     : WBC increasing, patient reporting SOB. CXR, CT abd, UA, labs (BCX) ordered.     : No urine output since morning of . BCX + for gram negative rods, patient placed on ABXs. US of HD graft reveals no flow, vascular surgery consulted.    : HD graft procedure performed and successful. WBC decreasing.     : Reports LUE swelling and tenderness. Hypervolemia with bilateral LE edema. Rash worsens, multiple pruritic 1-2 cm erythematous lesions distributed over entire body with multiple excoriations.     : Hemodialysis performed. Patient still reporting arm pain and swelling, unable to dorsiflex arm due to pain. Rash worsening, becoming more pruritic, prescribed hydrocortisone cream. Temp of 103 recorded some time between .    3/1: Mr. Watkins feels that pain and swelling has spread to his entire left side, not just his arm. Reporting tingling and numbness in a glove  distribution from left elbow down to fingers and pain from elbow up to shoulder. Also reporting pain and worsened swelling in LLE. Rash: he feels that the cream is giving him some relief from the itching, but only lasts 20 min or so. Also reports feeling cold from dialysis, states that he has to stand under hot shower for hours to get relief.     3/2: Patient in dialysis when speaking with him, states he feels the same as yesterday. Still having pruritis, does not think benadryl helped, thinks itching is due to oxycodone for back pain. Wants to see social work to discuss his living situation and transportation. Currently lives in his truck which does not run, and is worried if he does not get help he will continue to miss dialysis due to transportation issues. Nephrology began daily dialysis yesterday.     ROS:  General: denies fever, weight loss, fatigue, chills  Skin: denies discoloration, pallor, +rash  HEENT: denies HA, visual changes, hearing changes, epistaxis  Respiratory: + SOB, cough, no wheezing  Cardiovascular: Denies CP, palpitations, chest tightness, orthopnea, exertional SOB  GI: Denies abd pain, hematochezia, melena, N/V/D/C  : Denies hematuria, dysuria   Extremities: +left leg pain, denies swelling   Neuro: +back pain, + paresthesia’s, + numbness  Psych: Denies SI/HI, feels agitated     Physical Exam       Vitals:    03/02/22 0000 03/02/22 0400 03/02/22 0631 03/02/22 1141   BP: 153/95 151/93 143/90 158/104   Pulse: 94 92  (!) 107   Resp: 20 20  18   Temp: 37.1 °C (98.7 °F) 36.4 °C (97.5 °F)  36.3 °C (97.3 °F)   TempSrc: Temporal Temporal  Temporal   SpO2: 92% 95%  94%   Weight:       Height:         Body mass index is 41.15 kg/m².   Oxygen Therapy:  Pulse Oximetry: 94 %, O2 (LPM): 0, O2 Delivery Device: None - Room Air    Physical Exam  General: obese, ill-appearing  Skin: warm, dry  HEENT: +multiple lesions on scalp  Respiratory: lungs CTAB, no wheeze or rhonchi  Cardiovascular: RRR  GI: Abdomen  soft, +tender to light palpation of LUQ/LLQ, +distention  MSK: +Edema in LUE and bilat LE, +tenderness of LUE and LLE,   Neuro: +LUE weakness, unable to dorsiflex  Psych: appropriate mood and affect    Recent Results (from the past 24 hour(s))   POCT glucose device results    Collection Time: 03/01/22  5:33 PM   Result Value Ref Range    POC Glucose, Blood 149 (H) 65 - 99 mg/dL   POCT glucose device results    Collection Time: 03/01/22 10:10 PM   Result Value Ref Range    POC Glucose, Blood 155 (H) 65 - 99 mg/dL   CBC WITH DIFFERENTIAL    Collection Time: 03/02/22  2:59 AM   Result Value Ref Range    WBC 9.3 4.8 - 10.8 K/uL    RBC 3.79 (L) 4.70 - 6.10 M/uL    Hemoglobin 11.3 (L) 14.0 - 18.0 g/dL    Hematocrit 35.5 (L) 42.0 - 52.0 %    MCV 93.7 81.4 - 97.8 fL    MCH 29.8 27.0 - 33.0 pg    MCHC 31.8 (L) 33.7 - 35.3 g/dL    RDW 54.3 (H) 35.9 - 50.0 fL    Platelet Count 227 164 - 446 K/uL    MPV 11.1 9.0 - 12.9 fL    Neutrophils-Polys 67.50 44.00 - 72.00 %    Lymphocytes 17.10 (L) 22.00 - 41.00 %    Monocytes 12.50 0.00 - 13.40 %    Eosinophils 0.60 0.00 - 6.90 %    Basophils 0.50 0.00 - 1.80 %    Immature Granulocytes 1.80 (H) 0.00 - 0.90 %    Nucleated RBC 0.00 /100 WBC    Neutrophils (Absolute) 6.24 1.82 - 7.42 K/uL    Lymphs (Absolute) 1.58 1.00 - 4.80 K/uL    Monos (Absolute) 1.16 (H) 0.00 - 0.85 K/uL    Eos (Absolute) 0.06 0.00 - 0.51 K/uL    Baso (Absolute) 0.05 0.00 - 0.12 K/uL    Immature Granulocytes (abs) 0.17 (H) 0.00 - 0.11 K/uL    NRBC (Absolute) 0.00 K/uL   Comp Metabolic Panel    Collection Time: 03/02/22  2:59 AM   Result Value Ref Range    Sodium 131 (L) 135 - 145 mmol/L    Potassium 5.9 (H) 3.6 - 5.5 mmol/L    Chloride 93 (L) 96 - 112 mmol/L    Co2 24 20 - 33 mmol/L    Anion Gap 14.0 7.0 - 16.0    Glucose 164 (H) 65 - 99 mg/dL    Bun 71 (HH) 8 - 22 mg/dL    Creatinine 9.65 (HH) 0.50 - 1.40 mg/dL    Calcium 8.4 (L) 8.5 - 10.5 mg/dL    AST(SGOT) 14 12 - 45 U/L    ALT(SGPT) <5 2 - 50 U/L    Alkaline  Phosphatase 354 (H) 30 - 99 U/L    Total Bilirubin 0.4 0.1 - 1.5 mg/dL    Albumin 2.8 (L) 3.2 - 4.9 g/dL    Total Protein 6.7 6.0 - 8.2 g/dL    Globulin 3.9 (H) 1.9 - 3.5 g/dL    A-G Ratio 0.7 g/dL   ESTIMATED GFR    Collection Time: 03/02/22  2:59 AM   Result Value Ref Range    GFR If  7 (A) >60 mL/min/1.73 m 2    GFR If Non African American 6 (A) >60 mL/min/1.73 m 2   POCT glucose device results    Collection Time: 03/02/22  6:30 AM   Result Value Ref Range    POC Glucose, Blood 155 (H) 65 - 99 mg/dL     Imaging:  ECG: normal sinus rhythm, PVCs  CXR: cardiomegaly  Echo: LVEF 30%  MRI: bilat L5 pars defects, no anterolisthesis, small synovial cyst projecting into posterolateral spinal canal behind L5 w/out significant thecal sac compression, no finding suggestive of infectious process  CT abd: anasarca, prominent venous collaterals in bilat inguinal region, bilat L5 pars defects w/out spondylolisthesis     Assessment/Plan   Problem representation: Mr. Watkins is a 47 yo male w/ hx of ESRD on HD, HTN, diabetes mellitus, and methamphetamine abuse admitted 2/21 for back pain and missed dialysis. IP labs and imaging revealed bacteremia and HFrEF EF 30%.    Assessment:  1. ESRD  2. HFrEF  3. Bacteremia    4. HTN  5. Back pain  6. LUE pain/paresthesias  7. Uremic pruritis    8. Substance abuse     Plan:    1. ESRD  -dialyzing everyday to remove excess fluid   -Nephrology has been consulted and is following     2. HFrEF  -Continue hydralazine and Losartan  -Continue dialyzing to remove excess fluid   -Keep on fluid restriction 1.5L  -If dialysis unsuccessful with improving HFrEF, consider long acting nitrates    3. Bacteremia   -BCX + for stenotrophomonas maltophilia   -Levoflox end date 3/13    4. HTN  -Amlodipine D/C due to edema  -Continue Losartan 50 mg and hydralazine 10 mg IV    5. Back pain  -IV opioids changed to PO oxycodone 5mg Q8 on 3/1  -Tylenol prn and lidocaine patch   -continue OT/PT    6.  LUE pain/paresthesias   -Vascular surgery has been consulted and believes symptoms are normal postop pain/swelling    7. Uraemic pruritis   -Optimize dialysis   -Pramoxine lotion  -Continue hydrocortisone cream prn  -Benadryl 12.5 mg prn    8. Substance abuse  -Active use of methamphetamines   -monitor for withdrawal symptoms   -counseling provided 2/28

## 2022-03-02 NOTE — PROGRESS NOTES
Pt complaining of increasing pruritus with use of oxycodone but asking for stronger medication than tylenol. MD notified and will get back to this RN about medications options.

## 2022-03-02 NOTE — PROGRESS NOTES
Received bedside report from RN, pt care assumed, VSS, pt assessment complete. Pt AAOx4, with c/o of arm, leg, and back pain (4/10) at this time. No signs of acute distress noted at this time. Plan of care discussed with pt and verbalizes no questions. Pt denies any additional needs at this time. Bed locked/in lowest position, bed alarm on, pt educated on fall risk and verbalized understanding, call light within reach, hourly rounding initiated.

## 2022-03-02 NOTE — PROGRESS NOTES
Bedside report received and care assumed. Pt is out of room in dialysis. Will continue to monitor.

## 2022-03-02 NOTE — PROGRESS NOTES
2hr PUF started @ 1453 and completed @ 1654,tx well tolerated,VSS,net UF = 3500ml.RIJ TDC dressing CDI,report given to Charlotte Roman RN.

## 2022-03-02 NOTE — PROGRESS NOTES
[facility] Progress Note       HD ordered by Dr. Simon. Treatment started at 0710 and ended at 1010.    Net UF removed: 2500mL.     Pt tolerated treatment well, had complaints of cramping 2 hours into HD, UFG lowered with positive effect. See flow sheet for details. CVC patent, locked with Heparin 1:1000 units; 1.6 mL to RED port and 1.6 mL to BLUE port post dialysis. No s/s of infection present. Dressing in place, CDI. Report given to DANE Roman RN.

## 2022-03-03 LAB
ALBUMIN SERPL BCP-MCNC: 2.9 G/DL (ref 3.2–4.9)
ALBUMIN/GLOB SERPL: 0.8 G/DL
ALP SERPL-CCNC: 345 U/L (ref 30–99)
ALT SERPL-CCNC: 5 U/L (ref 2–50)
ANION GAP SERPL CALC-SCNC: 12 MMOL/L (ref 7–16)
AST SERPL-CCNC: 26 U/L (ref 12–45)
BASOPHILS # BLD AUTO: 0.6 % (ref 0–1.8)
BASOPHILS # BLD: 0.05 K/UL (ref 0–0.12)
BILIRUB SERPL-MCNC: 0.4 MG/DL (ref 0.1–1.5)
BUN SERPL-MCNC: 51 MG/DL (ref 8–22)
CALCIUM SERPL-MCNC: 8.3 MG/DL (ref 8.5–10.5)
CHLORIDE SERPL-SCNC: 93 MMOL/L (ref 96–112)
CO2 SERPL-SCNC: 26 MMOL/L (ref 20–33)
CREAT SERPL-MCNC: 7.44 MG/DL (ref 0.5–1.4)
EOSINOPHIL # BLD AUTO: 0.05 K/UL (ref 0–0.51)
EOSINOPHIL NFR BLD: 0.6 % (ref 0–6.9)
ERYTHROCYTE [DISTWIDTH] IN BLOOD BY AUTOMATED COUNT: 55.5 FL (ref 35.9–50)
GLOBULIN SER CALC-MCNC: 3.5 G/DL (ref 1.9–3.5)
GLUCOSE BLD STRIP.AUTO-MCNC: 146 MG/DL (ref 65–99)
GLUCOSE BLD STRIP.AUTO-MCNC: 170 MG/DL (ref 65–99)
GLUCOSE BLD STRIP.AUTO-MCNC: 189 MG/DL (ref 65–99)
GLUCOSE BLD STRIP.AUTO-MCNC: 211 MG/DL (ref 65–99)
GLUCOSE BLD STRIP.AUTO-MCNC: 215 MG/DL (ref 65–99)
GLUCOSE SERPL-MCNC: 178 MG/DL (ref 65–99)
HCT VFR BLD AUTO: 32.5 % (ref 42–52)
HGB BLD-MCNC: 10.2 G/DL (ref 14–18)
IMM GRANULOCYTES # BLD AUTO: 0.1 K/UL (ref 0–0.11)
IMM GRANULOCYTES NFR BLD AUTO: 1.3 % (ref 0–0.9)
LYMPHOCYTES # BLD AUTO: 2.04 K/UL (ref 1–4.8)
LYMPHOCYTES NFR BLD: 25.5 % (ref 22–41)
MCH RBC QN AUTO: 29.6 PG (ref 27–33)
MCHC RBC AUTO-ENTMCNC: 31.4 G/DL (ref 33.7–35.3)
MCV RBC AUTO: 94.2 FL (ref 81.4–97.8)
MONOCYTES # BLD AUTO: 1.08 K/UL (ref 0–0.85)
MONOCYTES NFR BLD AUTO: 13.5 % (ref 0–13.4)
NEUTROPHILS # BLD AUTO: 4.68 K/UL (ref 1.82–7.42)
NEUTROPHILS NFR BLD: 58.5 % (ref 44–72)
NRBC # BLD AUTO: 0 K/UL
NRBC BLD-RTO: 0 /100 WBC
PHOSPHATE SERPL-MCNC: 5.6 MG/DL (ref 2.5–4.5)
PLATELET # BLD AUTO: 277 K/UL (ref 164–446)
PMV BLD AUTO: 10.8 FL (ref 9–12.9)
POTASSIUM SERPL-SCNC: 6.4 MMOL/L (ref 3.6–5.5)
PROT SERPL-MCNC: 6.4 G/DL (ref 6–8.2)
RBC # BLD AUTO: 3.45 M/UL (ref 4.7–6.1)
SODIUM SERPL-SCNC: 131 MMOL/L (ref 135–145)
WBC # BLD AUTO: 8 K/UL (ref 4.8–10.8)

## 2022-03-03 PROCEDURE — 700102 HCHG RX REV CODE 250 W/ 637 OVERRIDE(OP): Performed by: STUDENT IN AN ORGANIZED HEALTH CARE EDUCATION/TRAINING PROGRAM

## 2022-03-03 PROCEDURE — 770020 HCHG ROOM/CARE - TELE (206)

## 2022-03-03 PROCEDURE — 700102 HCHG RX REV CODE 250 W/ 637 OVERRIDE(OP): Performed by: NURSE PRACTITIONER

## 2022-03-03 PROCEDURE — 82962 GLUCOSE BLOOD TEST: CPT | Mod: 91

## 2022-03-03 PROCEDURE — 700111 HCHG RX REV CODE 636 W/ 250 OVERRIDE (IP): Performed by: STUDENT IN AN ORGANIZED HEALTH CARE EDUCATION/TRAINING PROGRAM

## 2022-03-03 PROCEDURE — 700102 HCHG RX REV CODE 250 W/ 637 OVERRIDE(OP): Performed by: HOSPITALIST

## 2022-03-03 PROCEDURE — 700101 HCHG RX REV CODE 250: Performed by: HOSPITALIST

## 2022-03-03 PROCEDURE — A9270 NON-COVERED ITEM OR SERVICE: HCPCS | Performed by: NURSE PRACTITIONER

## 2022-03-03 PROCEDURE — 97530 THERAPEUTIC ACTIVITIES: CPT

## 2022-03-03 PROCEDURE — A9270 NON-COVERED ITEM OR SERVICE: HCPCS | Performed by: HOSPITALIST

## 2022-03-03 PROCEDURE — 700111 HCHG RX REV CODE 636 W/ 250 OVERRIDE (IP)

## 2022-03-03 PROCEDURE — 80053 COMPREHEN METABOLIC PANEL: CPT

## 2022-03-03 PROCEDURE — 97116 GAIT TRAINING THERAPY: CPT

## 2022-03-03 PROCEDURE — 90935 HEMODIALYSIS ONE EVALUATION: CPT

## 2022-03-03 PROCEDURE — 700111 HCHG RX REV CODE 636 W/ 250 OVERRIDE (IP): Performed by: HOSPITALIST

## 2022-03-03 PROCEDURE — 84100 ASSAY OF PHOSPHORUS: CPT

## 2022-03-03 PROCEDURE — 85025 COMPLETE CBC W/AUTO DIFF WBC: CPT

## 2022-03-03 PROCEDURE — 700101 HCHG RX REV CODE 250: Performed by: STUDENT IN AN ORGANIZED HEALTH CARE EDUCATION/TRAINING PROGRAM

## 2022-03-03 PROCEDURE — 99233 SBSQ HOSP IP/OBS HIGH 50: CPT | Mod: GC | Performed by: INTERNAL MEDICINE

## 2022-03-03 PROCEDURE — 36415 COLL VENOUS BLD VENIPUNCTURE: CPT

## 2022-03-03 PROCEDURE — A9270 NON-COVERED ITEM OR SERVICE: HCPCS | Performed by: STUDENT IN AN ORGANIZED HEALTH CARE EDUCATION/TRAINING PROGRAM

## 2022-03-03 RX ORDER — CALCIUM GLUCONATE 20 MG/ML
1 INJECTION, SOLUTION INTRAVENOUS ONCE
Status: DISCONTINUED | OUTPATIENT
Start: 2022-03-03 | End: 2022-03-03

## 2022-03-03 RX ORDER — CALCIUM GLUCONATE 20 MG/ML
2 INJECTION, SOLUTION INTRAVENOUS ONCE
Status: COMPLETED | OUTPATIENT
Start: 2022-03-03 | End: 2022-03-03

## 2022-03-03 RX ORDER — HEPARIN SODIUM 1000 [USP'U]/ML
INJECTION, SOLUTION INTRAVENOUS; SUBCUTANEOUS
Status: COMPLETED
Start: 2022-03-03 | End: 2022-03-03

## 2022-03-03 RX ORDER — DEXTROSE MONOHYDRATE 25 G/50ML
25 INJECTION, SOLUTION INTRAVENOUS ONCE
Status: COMPLETED | OUTPATIENT
Start: 2022-03-03 | End: 2022-03-03

## 2022-03-03 RX ADMIN — ACETAMINOPHEN 975 MG: 325 TABLET, FILM COATED ORAL at 06:43

## 2022-03-03 RX ADMIN — HEPARIN SODIUM 2000 UNITS: 1000 INJECTION, SOLUTION INTRAVENOUS; SUBCUTANEOUS at 10:56

## 2022-03-03 RX ADMIN — HYDRALAZINE HYDROCHLORIDE 25 MG: 25 TABLET, FILM COATED ORAL at 22:13

## 2022-03-03 RX ADMIN — Medication 2668 MG: at 06:44

## 2022-03-03 RX ADMIN — Medication 2000 UNITS: at 06:43

## 2022-03-03 RX ADMIN — HEPARIN SODIUM 5000 UNITS: 5000 INJECTION, SOLUTION INTRAVENOUS; SUBCUTANEOUS at 14:25

## 2022-03-03 RX ADMIN — INSULIN LISPRO 3 UNITS: 100 INJECTION, SOLUTION INTRAVENOUS; SUBCUTANEOUS at 16:37

## 2022-03-03 RX ADMIN — HYDROCORTISONE: 5 CREAM TOPICAL at 16:41

## 2022-03-03 RX ADMIN — HYDROMORPHONE HYDROCHLORIDE 2 MG: 2 TABLET ORAL at 16:39

## 2022-03-03 RX ADMIN — FERROUS SULFATE TAB 325 MG (65 MG ELEMENTAL FE) 325 MG: 325 (65 FE) TAB at 06:43

## 2022-03-03 RX ADMIN — INSULIN HUMAN 6.5 UNITS: 100 INJECTION, SOLUTION PARENTERAL at 08:03

## 2022-03-03 RX ADMIN — HYDROMORPHONE HYDROCHLORIDE 2 MG: 2 TABLET ORAL at 05:42

## 2022-03-03 RX ADMIN — HEPARIN SODIUM 5000 UNITS: 5000 INJECTION, SOLUTION INTRAVENOUS; SUBCUTANEOUS at 22:13

## 2022-03-03 RX ADMIN — DEXTROSE MONOHYDRATE 25 G: 25 INJECTION, SOLUTION INTRAVENOUS at 08:03

## 2022-03-03 RX ADMIN — Medication 2668 MG: at 16:39

## 2022-03-03 RX ADMIN — LIDOCAINE 1 PATCH: 50 PATCH TOPICAL at 14:24

## 2022-03-03 RX ADMIN — INSULIN LISPRO 3 UNITS: 100 INJECTION, SOLUTION INTRAVENOUS; SUBCUTANEOUS at 22:13

## 2022-03-03 RX ADMIN — ACETAMINOPHEN 975 MG: 325 TABLET, FILM COATED ORAL at 14:25

## 2022-03-03 RX ADMIN — CALCITRIOL CAPSULES 0.25 MCG 0.25 MCG: 0.25 CAPSULE ORAL at 06:43

## 2022-03-03 RX ADMIN — HYDRALAZINE HYDROCHLORIDE 25 MG: 25 TABLET, FILM COATED ORAL at 08:14

## 2022-03-03 RX ADMIN — HEPARIN SODIUM 3200 UNITS: 1000 INJECTION, SOLUTION INTRAVENOUS; SUBCUTANEOUS at 14:00

## 2022-03-03 RX ADMIN — HEPARIN SODIUM 5000 UNITS: 5000 INJECTION, SOLUTION INTRAVENOUS; SUBCUTANEOUS at 06:43

## 2022-03-03 RX ADMIN — CALCIUM GLUCONATE 2 G: 20 INJECTION, SOLUTION INTRAVENOUS at 08:26

## 2022-03-03 ASSESSMENT — ENCOUNTER SYMPTOMS
CHILLS: 0
PALPITATIONS: 0
SHORTNESS OF BREATH: 0
WEIGHT LOSS: 0
SORE THROAT: 0
EYE DISCHARGE: 0
FEVER: 0

## 2022-03-03 ASSESSMENT — FIBROSIS 4 INDEX
FIB4 SCORE: 1.85
FIB4 SCORE: 1.85

## 2022-03-03 ASSESSMENT — COGNITIVE AND FUNCTIONAL STATUS - GENERAL
MOBILITY SCORE: 24
SUGGESTED CMS G CODE MODIFIER MOBILITY: CH

## 2022-03-03 ASSESSMENT — GAIT ASSESSMENTS
DEVIATION: INCREASED BASE OF SUPPORT;DECREASED TOE OFF
GAIT LEVEL OF ASSIST: SUPERVISED
DISTANCE (FEET): 250

## 2022-03-03 ASSESSMENT — PAIN DESCRIPTION - PAIN TYPE: TYPE: ACUTE PAIN

## 2022-03-03 NOTE — NON-PROVIDER
Internal Medicine Medical Student Note  Note Author: Samanta Rjoas, Student    Name Ambrose Watkins     1975   Age/Sex 46 y.o. male   MRN [id number]   Code Status Full Code       Reason for interval visit  (Principal Problem)   Back pain    Interval Problem Daily Status Update  (problem status, last 24 hours, new history, new data )   Mr. Watkins is a 46 y.o. male with history of diabetes mellitus, essential HTN, and ESRD on HD presented to ED  with low back pain and missed dialysis appointment. Back pain rated as 8/10, located mid-lumbar region, dull, and worsened with movement. Patient usually has dialysis Tuesday, Thursday, and Saturday but had missed  and hadn't been since . In ED labs showed hyperkalemia (K 5.9),  Anion gap 22, bicarb 8, and CRP 14.27. MRI lumbar spine: bilat L5 pars defects, no anterolisthesis, small synovial cyst projecting into posterolateral spinal canal behind L5 w/out significant thecal sac compression, no finding suggestive of infectious process.     : WBC increasing, patient reporting SOB. CXR, CT abd, UA, labs (BCX) ordered.     : No urine output since morning of . BCX + for gram negative rods, patient placed on ABXs. US of HD graft reveals no flow, vascular surgery consulted.    : HD graft procedure performed and successful. WBC decreasing.     : Reports LUE swelling and tenderness. Hypervolemia with bilateral LE edema. Rash worsens, multiple pruritic 1-2 cm erythematous lesions distributed over entire body with multiple excoriations.     : Hemodialysis performed. Patient still reporting arm pain and swelling, unable to dorsiflex arm due to pain. Rash worsening, becoming more pruritic, prescribed hydrocortisone cream. Temp of 103 recorded some time between .    3/1: Mr. Watkins feels that pain and swelling has spread to his entire left side, not just his arm. Reporting tingling and numbness in a glove  distribution from left elbow down to fingers and pain from elbow up to shoulder. Also reporting pain and worsened swelling in LLE. Rash: he feels that the cream is giving him some relief from the itching, but only lasts 20 min or so. Also reports feeling cold from dialysis, states that he has to stand under hot shower for hours to get relief.     3/2: Patient in dialysis when speaking with him, states he feels the same as yesterday. Still having pruritis, does not think benadryl helped, thinks itching is due to oxycodone for back pain. Wants to see social work to discuss his living situation and transportation. Currently lives in his truck which does not run, and is worried if he does not get help he will continue to miss dialysis due to transportation issues. Nephrology began daily dialysis yesterday.     3/3: Mr. Watkins feels same as he did on 3/2. Itching has pretty much resolved since being switched to PO dilaudid from oxycodone. Numbness of left lower arm has resolved, still painful and tingly. Upset about food portions, states that he is still hungry after meals. Would like to be set up with group housing or rehab facility.     ROS:  General: denies fever, weight loss, fatigue, chills  Skin: denies discoloration, pallor, +rash  HEENT: denies HA, visual changes, hearing changes, epistaxis  Respiratory: + SOB, cough, no wheezing  Cardiovascular: Denies CP, palpitations, chest tightness, orthopnea, exertional SOB  GI: Denies abd pain, hematochezia, melena, N/V/D/C  : Denies hematuria, dysuria   Extremities: +left leg pain, denies swelling   Neuro: +back pain, + paresthesia’s, denies numbness  Psych: Denies SI/HI, feels agitated     Physical Exam     Vitals:    03/03/22 0707   BP: 147/88   Pulse: 92   Resp: 20   Temp: 36.2 °C (97.2 °F)   SpO2: 97%       Physical Exam  General: obese, ill-appearing  Skin: warm, dry  HEENT: +multiple lesions on scalp  Respiratory: lungs CTAB, no wheeze or rhonchi  Cardiovascular:  RRR  GI: Abdomen soft, non-tender, +distention  MSK: 2+ pitting Edema bilat LE, +tenderness of LUE and LLE,   Neuro: LUE weakness improving  Psych: appropriate mood and affect    Labs:  CMP  Sodium 131 L  Cl 93 L  BUN 51 H  K+ 6.4 H  Creatinine 7.44 H  Glucose 178  Phos 5.6 H  Ca 8.3 L    Imaging:  ECG: normal sinus rhythm, PVCs  CXR: cardiomegaly  Echo: LVEF 30%  MRI: bilat L5 pars defects, no anterolisthesis, small synovial cyst projecting into posterolateral spinal canal behind L5 w/out significant thecal sac compression, no finding suggestive of infectious process  CT abd: anasarca, prominent venous collaterals in bilat inguinal region, bilat L5 pars defects w/out spondylolisthesis     Assessment/Plan   Problem representation: Mr. Watkins is a 45 yo male w/ hx of ESRD on HD, HTN, diabetes mellitus, and methamphetamine abuse admitted 2/21 for back pain and missed dialysis. IP labs and imaging revealed bacteremia and HFrEF EF 30%.    Assessment:  1. Hyperkalemia  2. ESRD  3. HFrEF  4. Bacteremia    5. HTN  6. Back pain  7. LUE pain/paresthesias  8. Uremic pruritis    9. Substance abuse     Plan:  1. Hyperkalemia   -K+ 6.4 up from 5.9 on 3/2   -Patient denies symptoms of hyperkalemia including chest pain/ muscle pain  -Patient already on renal/low K+ diet  -Losartan D/C   -Calcium gluconate 2g IV administered 0800 and 0900   -Calcium gluconate D/C, Patiromer 8.4g suspension started 1500  -Continue daily dialysis  -CTM    2. ESRD  -dialyzing everyday to remove excess fluid   -Nephrology has been consulted and is following     3. HFrEF  -Continue hydralazine 25 mg q8  -Continue dialyzing to remove excess fluid   -Keep on fluid restriction 1.5L    4. Bacteremia   -BCX + for stenotrophomonas maltophilia   -Levoflox end date 3/13    5. HTN  -Stable, improving   -Continue hydralazine 25 mg     6. Back pain  -Dilaudid PO 1-2 mg q6 prn   -Tylenol prn and lidocaine patch   -continue OT/PT    7. LUE pain/paresthesias    -Stable, improving  -Vascular surgery has been consulted and believes symptoms are normal postop pain/swelling  -Numbness resolved    8. Uraemic pruritis   -Optimize dialysis   -Pramoxine lotion    9. Substance abuse  -Active use of methamphetamines   -monitor for withdrawal symptoms   -counseling provided 2/28

## 2022-03-03 NOTE — CARE PLAN
The patient is Stable - Low risk of patient condition declining or worsening    Shift Goals  Clinical Goals: pain/itch control, euvolemia  Patient Goals: rest, pain control  Family Goals: N/A    Progress made toward(s) clinical / shift goals:    Problem: Pain - Standard  Goal: Alleviation of pain or a reduction in pain to the patient’s comfort goal  Outcome: Progressing     Problem: Knowledge Deficit - Standard  Goal: Patient and family/care givers will demonstrate understanding of plan of care, disease process/condition, diagnostic tests and medications  Outcome: Progressing     Problem: Hemodynamics  Goal: Patient's hemodynamics, fluid balance and neurologic status will be stable or improve  Outcome: Progressing       Patient is not progressing towards the following goals:

## 2022-03-03 NOTE — PROGRESS NOTES
[facility] Progress Note    Hemodialysis treatment ordered today per Dr. Simon x 3 hours. Treatment initiated at 1056 and ended at 1358. Pt stable, A/Ox3, denies any discomfort.    Pt tolerated treatment, VSS, had lunch during HD tx, VSS.; see paper flow sheets for details.    Net UF 3, 000 mL    Post tx, CVC flushed with saline then locked with heparin 1000 units/mL per designated amount in each wing then clamped and capped. Aspirate heparin prior to next CVC use.    Report given to Primary RN.

## 2022-03-03 NOTE — PROGRESS NOTES
.  [facility] -  PROGRESS NOTE               Author: EMMA Beck Date & Time: 3/3/2022  10:15 AM     HPI:  This is a 46 year old male with past medical history of End Stage Renal Disease on hemodialysis at [facility] every Tues/Thurs/Sat, diabetes mellitus and Hypertension who presented to the emergency department on 2/21/22 complaining of lower back pain.  Reportedly missing hemodialysis due to transportation issues. MRI this am and medicated for pain. Seen on Hemodialysis this am, lethargic after PRN medications.     DAILY NEPHROLOGY SUMMARY:  2/23: 3.5L Net UF. Resting in bed. Calm and agreeable to HD today. C/o cont. Back pain. Bps low this am.   2/24: Echo in progress. Primary team at . Resting in bed. C/o cont back pain. 2.0L net UF. K 4.0, CO2 22. Denies Diarrhea. Denies CP/SOB.   2/25: Upset about NPO this am for OR.  In bed, no acute distress. Bps improved. K 4.7, CO2 16.   2/26: S/p AVG creation and fistulogram. 1.5 L net UF. Sitting up at side of bed, eating lunch. C/o SOB and nausea.  C/o L AVF pain.   2/27: Laying in bed. C/o L arm pain.   2/28: Pt seen on HD, said he did not sleep well, some ongoing LUE discomfort, VSS on RA, labs reviewed  3/1: Pt in bed, asking for pain meds, says his whole L side hurts, iHD yesterday with 3.1L net UF, refusing tele monitor overnight, labs reviewed, VSS on RA, says he is not making urine  3/2: Seen on HD.  C/o cramping. 3.5L net UF. Denies fever/chills. C/o SOB with exertion, on RA. K 5.9.   3/3: Pt ambulatory in room, reports that LLE edema is not improving, K+ 6.4 this AM, iHD yesterday with 2.5L net UF, repeat HD ordered today for hyperK, pt given temporizing measures this AM per primary team, VSS on RA    REVIEW OF SYSTEMS:    10 point ROS reviewed and is as per HPI/daily summary or otherwise negative    PMH/PSH/SH/FH: Reviewed and unchanged since admission note  CURRENT MEDICATIONS: Reviewed from admission to present  "day    VS:  /88   Pulse 92   Temp 36.2 °C (97.2 °F) (Temporal)   Resp 20   Ht 1.778 m (5' 10\")   Wt (!) 130 kg (286 lb 13.1 oz)   SpO2 97%   BMI 41.15 kg/m²   Physical Exam  Constitutional:       General: He is not in acute distress.     Appearance: He is ill-appearing.   HENT:      Head: Normocephalic and atraumatic.      Mouth/Throat:      Dentition: Abnormal dentition.   Eyes:      Extraocular Movements: Extraocular movements intact.      Conjunctiva/sclera: Conjunctivae normal.      Pupils: Pupils are equal, round, and reactive to light.   Cardiovascular:      Rate and Rhythm: Normal rate and regular rhythm.      Pulses: Normal pulses.      Heart sounds: Normal heart sounds. No murmur heard.    No friction rub. No gallop.      Comments: RIJ TDC  L AVG +Bruit/+Thrill, some surrounding edema  Pulmonary:      Effort: Pulmonary effort is normal. No respiratory distress.      Breath sounds: Normal breath sounds. No stridor. No wheezing or rhonchi.   Abdominal:      General: There is no distension.      Palpations: Abdomen is soft. There is no mass.   Musculoskeletal:         General: Swelling present. Normal range of motion.      Right lower leg: Edema present.      Left lower leg: Edema (LLE>RLE) present.      Comments: LUE edema, improved some   Skin:     General: Skin is warm and dry.      Comments: Scattered scabbing   Neurological:      Mental Status: He is alert.   Psychiatric:      Comments: Irritable         Fluids:  In: 1480 [P.O.:980; Dialysis:500]  Out: 3000     LABS:  Recent Labs     03/01/22  0218 03/02/22  0259 03/03/22  0404   SODIUM 134* 131* 131*   POTASSIUM 5.3 5.9* 6.4*   CHLORIDE 95* 93* 93*   CO2 24 24 26   GLUCOSE 105* 164* 178*   BUN 59* 71* 51*   CREATININE 8.44* 9.65* 7.44*   CALCIUM 7.8* 8.4* 8.3*     MR-LUMBAR SPINE-WITH & W/O  Order: [id number]   Status: Final result     Visible to patient: No (inaccessible in MyChart)     Next appt: None     0 Result " Notes    Details    Reading Physician Reading Date Result Priority   Lisa Link M.D.  [phone] 2/22/2022 STAT     Narrative & Impression     2/22/2022 1:06 AM     HISTORY/REASON FOR EXAM:  Epidural abscess suspected        TECHNIQUE/EXAM DESCRIPTION:  MRI of the lumbar spine without and with contrast.     The study was performed on a [employer] Signa 1.5 Jacqueline MRI scanner.     T1 sagittal, T2 fast spin-echo sagittal, and T2 axial images were obtained of the lumbar spine. T1 postcontrast fat-suppressed sagittal images were obtained.     20 mL ProHance contrast was administered intravenously.     COMPARISON:  None.     FINDINGS:  Lumbar spine alignment is normal.  Bilateral L5 pars defects without anterolisthesis.  Bone marrow signal intensity is normal. Mild disc desiccation is noted at L4-L5.  Bilateral pars defects noted at L5. The conus terminates at L1.     Lumbar spine levels:     T12-L1: No significant abnormality.     L1-2: Mild facet degeneration. The stenosis.     L2-3: Minimal facet degeneration. No significant canal stenosis or foraminal narrowing.     L3-4: Broad-based disc bulge and bilateral mild facet degeneration. There is no significant spinal canal narrowing or neuroforaminal narrowing.     L4-5, bilateral facet degeneration. Broad-based disc bulge with a focal central protrusion that minimally encroaches upon the spinal canal. There is no significant foraminal narrowing. There is minimal canal narrowing without thecal sac compression.   There is a left facet origin synovial cyst that extends into the left subligamentous at the level of the left L5 pars defect and minimally encroaches upon the spinal canal from the posterolateral aspect. Bilateral L5 pars defects are present.     Postcontrast images through the lumbar spine does not demonstrate any abnormal enhancement. Minimal epidural enhancement identified at the dorsal aspect of L5 is likely related to degenerative changes in the facet  joints and synovial hypertrophy. No   pathological epidural enhancement to suggest infection identified.     IMPRESSION:        Bilateral L5 pars defects. However, there is no anterolisthesis.     Small left facet origin subligamentous synovial cyst projects into the posterolateral spinal canal behind L5 without significant thecal sac compression. Bilateral mild facet degeneration at L4-5 and L5-S1.     No abnormal enhancement is identified in the lumbar spine to suggest an infectious process.      HISTORY/REASON FOR EXAM:  Epidural abscess suspected        TECHNIQUE/EXAM DESCRIPTION:  MRI of the thoracic spine without and with contrast.     The study was performed on a [employer] Signa 1.5 Jacqueline MRI scanner. T1 sagittal, T2 fast spin-echo sagittal, and T2 axial images were obtained of the thoracic spine. T1 post-contrast fat suppressed sagittal images were obtained. Optional T1 post-contrast   axial images may be obtained.     20 mL ProHance contrast was administered intravenously.     COMPARISON:  None.     FINDINGS:     Alignment in the thoracic spine is normal. Marrow signal in the vertebral bodies is within normal limits. There is no abnormal osseous enhancement in the vertebral bodies or other abnormal extradural enhancement. The disc spaces are intact at all   thoracic levels. There are no significant osteophytic changes. The prevertebral and paraspinous soft tissues are unremarkable.     The thoracic spinal cord is normal in caliber and signal throughout its course. There is no abnormal cord enhancement. There is no abnormal intradural extramedullary enhancement.     At T1-2 through T11-12 there is no significant disc bulge or protrusion. The neural foramina are intact at all thoracic levels. There is no congenital or acquired central spinal stenosis at any thoracic level. There is no significant ligamentous or facet   hypertrophy.     IMPRESSION:        MRI of the thoracic spine without and with contrast within  normal limits.    US-HEMODIALYSIS GRAFT DUPLEX COMP UPPER EXTREMITY  Order: [id number]   Status: Final result     Visible to patient: No (inaccessible in MyChart)     Next appt: None     0 Result Notes    Details    Reading Physician Reading Date Result Priority   No Reading Provider [facility] 2022    Buck Kenyon M.D.  [phone] 2022      Narrative & Impression         Hemodialysis Access Report      Vascular Laboratory   Conclusions   Hemodialysis loop graft with the proximal anastomosis at the distal    brachial artery and the distal anastomosis at the basilic vein in the mid    biceps.   No flow is demonstrated in the hemodialysis graft. Echolucent material    fills the graft throughout its length.      No prior study available for comparison.      CONY MORAN      Exam Date:     2022 18:09      Room #:     Inpatient      Priority:     Routine      Ht (in):             Wt (lb):      Ordering Physician:        LIVAN BARBOZA      Referring Physician:       [id number]      Sonographer:               Kamala Ball RVT      Study Type:                Complete Unilateral      Technical Quality:         Adequate      Age:    46    Gender:     M      MRN:    [id number]      :    1975      BSA:      Indications:     AV Fistula - S/P, AV Fistula / Thrombosed      CPT Codes:       15845      ICD Codes:       447  996.73      History:         Left arm brachial to basilic loop graft. No prior duplex.      Limitations:      Exam Data:   Side:          Left            Access          Graft   Inflow Artery   Brachial   Outflow Vein    Basilic                         Velocity (cm/s)    Prox Inflow artery    Mid Inflow artery           109.0                                0    Distal Inflow artery        95.00    Inflow Anastomosis          0.00    Inflow Artery distal to     anastomosis          Proximal Graft              0.00    Mid Graft                   0.00    Distal Graft                 0.00    Outflow Anastomosis         0.00    Proximal Outflow Vein       10.00    Mid Outflow Vein    Distal Outflow Vein    Flow Volume Mid Bicep            ml/min    Flow Volume Mid-Forearm          ml/min    Prox Outflow Vein Diam            cm    Mid Outflow Vein Diam             cm    Distal Outflow Vein Diam          cm      Findings   Hemodialysis loop graft with the proximal anastomosis at the distal    brachial artery and the distal anastomosis at the basilic vein in the mid    biceps.    Doppler waveforms of the brachial artery are of high amplitude and    triphasic.   No flow is demonstrated in the hemodialysis graft. Echolucent material    fills the graft throughout its length.    The basilic and brachial veins are compressible.      Buck Kenyon MD   (Electronically Signed)   Final Date:      23 February 2022                     20:51     IMPRESSION:  # ESRD  - Etiology likely 2/2 DM/HTN  - via RIJ TDC  # Thrombosed Left AVF  - US 2/23 no flow  - s/p OR 2/25 AVG Brachio-axillary Creation, thrombectomy and fistulogram   # HTN, labile control   - Goal BP < 140/90  # Anemia of CKD  - Goal Hgb 10-11  - At goal  - On PO Fe  # CKD-MBD  -   - Vit D 9  - CCa 7.1 --> 7.68  - PO4 9.5-->7.5-->5.6  - On calcitriol, calcium acetate and cholecalciferol   # Hyperkalemia  - s/p temporizing measures x 2  # Back Pain  - Elevated CRP  - MRI of Lumbar and Thoracic spine 2/22 (-) for epidural abscess  # Methamphetamine use   - Cessation advised  # DM   - A1C 6.5  # Severe metabolic acidosis, corrected  - Off bicarb gtt   - Correct with HD   # Leukocytosis, resolved   - BCx2 2/23 (+) Gram - Rods   - C.diff (-) 2/23  - CT abd pelvis no acute abnormality  # Stenotrophomonas maltophilia bacteremia  - IV C3--> PO Levaquin x 14 days per ID recs     PLAN:  - Continue iHD qMWF/PRN, treatment due today (THURS) for hyperK  - Challenge UF as able   - Hold antihtn prior to HD to allow for UF  - Off midodrine  - Continue  cholecalciferol   - Continue calcium acetate WM, phos improved  - JACKIE with HD to maintain Hgb between 10-11  - Transfuse PRN Hgb <7   - No dietary protein restrictions  - Dose all meds per ESRD/iHD  - Abx per primary team/ID  - Low Na/fluid restricted renal diet  - Follow labs  - AVG use per Vasc Surg recs; currently using RIJ TDC  - Adding patiromer daily, can stop/hold if serum K+ <4   - OK to transition back to OP HD once medically cleared    Thank you,

## 2022-03-04 LAB
ALBUMIN SERPL BCP-MCNC: 2.9 G/DL (ref 3.2–4.9)
ALBUMIN/GLOB SERPL: 0.8 G/DL
ALP SERPL-CCNC: 360 U/L (ref 30–99)
ALT SERPL-CCNC: 6 U/L (ref 2–50)
ANION GAP SERPL CALC-SCNC: 11 MMOL/L (ref 7–16)
AST SERPL-CCNC: 28 U/L (ref 12–45)
BASOPHILS # BLD AUTO: 0.7 % (ref 0–1.8)
BASOPHILS # BLD: 0.06 K/UL (ref 0–0.12)
BILIRUB SERPL-MCNC: 0.4 MG/DL (ref 0.1–1.5)
BUN SERPL-MCNC: 49 MG/DL (ref 8–22)
CALCIUM SERPL-MCNC: 8.2 MG/DL (ref 8.5–10.5)
CHLORIDE SERPL-SCNC: 94 MMOL/L (ref 96–112)
CO2 SERPL-SCNC: 27 MMOL/L (ref 20–33)
CREAT SERPL-MCNC: 7.05 MG/DL (ref 0.5–1.4)
EOSINOPHIL # BLD AUTO: 0.02 K/UL (ref 0–0.51)
EOSINOPHIL NFR BLD: 0.2 % (ref 0–6.9)
ERYTHROCYTE [DISTWIDTH] IN BLOOD BY AUTOMATED COUNT: 56.4 FL (ref 35.9–50)
GLOBULIN SER CALC-MCNC: 3.5 G/DL (ref 1.9–3.5)
GLUCOSE BLD STRIP.AUTO-MCNC: 268 MG/DL (ref 65–99)
GLUCOSE SERPL-MCNC: 192 MG/DL (ref 65–99)
HCT VFR BLD AUTO: 29.6 % (ref 42–52)
HGB BLD-MCNC: 9.2 G/DL (ref 14–18)
IMM GRANULOCYTES # BLD AUTO: 0.08 K/UL (ref 0–0.11)
IMM GRANULOCYTES NFR BLD AUTO: 0.9 % (ref 0–0.9)
LYMPHOCYTES # BLD AUTO: 1.74 K/UL (ref 1–4.8)
LYMPHOCYTES NFR BLD: 19 % (ref 22–41)
MCH RBC QN AUTO: 29.6 PG (ref 27–33)
MCHC RBC AUTO-ENTMCNC: 31.1 G/DL (ref 33.7–35.3)
MCV RBC AUTO: 95.2 FL (ref 81.4–97.8)
MONOCYTES # BLD AUTO: 0.98 K/UL (ref 0–0.85)
MONOCYTES NFR BLD AUTO: 10.7 % (ref 0–13.4)
NEUTROPHILS # BLD AUTO: 6.26 K/UL (ref 1.82–7.42)
NEUTROPHILS NFR BLD: 68.5 % (ref 44–72)
NRBC # BLD AUTO: 0 K/UL
NRBC BLD-RTO: 0 /100 WBC
PLATELET # BLD AUTO: 285 K/UL (ref 164–446)
PMV BLD AUTO: 10.5 FL (ref 9–12.9)
POTASSIUM SERPL-SCNC: 5.8 MMOL/L (ref 3.6–5.5)
PROT SERPL-MCNC: 6.4 G/DL (ref 6–8.2)
RBC # BLD AUTO: 3.11 M/UL (ref 4.7–6.1)
SODIUM SERPL-SCNC: 132 MMOL/L (ref 135–145)
WBC # BLD AUTO: 9.1 K/UL (ref 4.8–10.8)

## 2022-03-04 PROCEDURE — 700102 HCHG RX REV CODE 250 W/ 637 OVERRIDE(OP): Performed by: NURSE PRACTITIONER

## 2022-03-04 PROCEDURE — A9270 NON-COVERED ITEM OR SERVICE: HCPCS | Performed by: NURSE PRACTITIONER

## 2022-03-04 PROCEDURE — 700102 HCHG RX REV CODE 250 W/ 637 OVERRIDE(OP): Performed by: STUDENT IN AN ORGANIZED HEALTH CARE EDUCATION/TRAINING PROGRAM

## 2022-03-04 PROCEDURE — A9270 NON-COVERED ITEM OR SERVICE: HCPCS | Performed by: HOSPITALIST

## 2022-03-04 PROCEDURE — A9270 NON-COVERED ITEM OR SERVICE: HCPCS | Performed by: STUDENT IN AN ORGANIZED HEALTH CARE EDUCATION/TRAINING PROGRAM

## 2022-03-04 PROCEDURE — 770020 HCHG ROOM/CARE - TELE (206)

## 2022-03-04 PROCEDURE — 36415 COLL VENOUS BLD VENIPUNCTURE: CPT

## 2022-03-04 PROCEDURE — 90935 HEMODIALYSIS ONE EVALUATION: CPT

## 2022-03-04 PROCEDURE — 80053 COMPREHEN METABOLIC PANEL: CPT

## 2022-03-04 PROCEDURE — 99232 SBSQ HOSP IP/OBS MODERATE 35: CPT | Mod: GC | Performed by: INTERNAL MEDICINE

## 2022-03-04 PROCEDURE — 700102 HCHG RX REV CODE 250 W/ 637 OVERRIDE(OP): Performed by: INTERNAL MEDICINE

## 2022-03-04 PROCEDURE — 85025 COMPLETE CBC W/AUTO DIFF WBC: CPT

## 2022-03-04 PROCEDURE — 82962 GLUCOSE BLOOD TEST: CPT

## 2022-03-04 PROCEDURE — 700102 HCHG RX REV CODE 250 W/ 637 OVERRIDE(OP): Performed by: HOSPITALIST

## 2022-03-04 PROCEDURE — A9270 NON-COVERED ITEM OR SERVICE: HCPCS | Performed by: INTERNAL MEDICINE

## 2022-03-04 PROCEDURE — 700111 HCHG RX REV CODE 636 W/ 250 OVERRIDE (IP): Performed by: STUDENT IN AN ORGANIZED HEALTH CARE EDUCATION/TRAINING PROGRAM

## 2022-03-04 PROCEDURE — 700111 HCHG RX REV CODE 636 W/ 250 OVERRIDE (IP)

## 2022-03-04 RX ORDER — HYDROMORPHONE HYDROCHLORIDE 2 MG/1
1 TABLET ORAL EVERY 6 HOURS PRN
Status: DISCONTINUED | OUTPATIENT
Start: 2022-03-04 | End: 2022-03-06

## 2022-03-04 RX ORDER — HEPARIN SODIUM 1000 [USP'U]/ML
2300 INJECTION, SOLUTION INTRAVENOUS; SUBCUTANEOUS
Status: DISCONTINUED | OUTPATIENT
Start: 2022-03-04 | End: 2022-03-08 | Stop reason: HOSPADM

## 2022-03-04 RX ORDER — HEPARIN SODIUM 1000 [USP'U]/ML
INJECTION, SOLUTION INTRAVENOUS; SUBCUTANEOUS
Status: COMPLETED
Start: 2022-03-04 | End: 2022-03-04

## 2022-03-04 RX ORDER — FUROSEMIDE 40 MG/1
80 TABLET ORAL
Status: DISCONTINUED | OUTPATIENT
Start: 2022-03-04 | End: 2022-03-08 | Stop reason: HOSPADM

## 2022-03-04 RX ADMIN — HYDROCORTISONE: 5 CREAM TOPICAL at 18:11

## 2022-03-04 RX ADMIN — PATIROMER 8.4 G: 8.4 POWDER, FOR SUSPENSION ORAL at 22:15

## 2022-03-04 RX ADMIN — HYDRALAZINE HYDROCHLORIDE 25 MG: 25 TABLET, FILM COATED ORAL at 18:11

## 2022-03-04 RX ADMIN — EPOETIN ALFA-EPBX 6000 UNITS: 3000 INJECTION, SOLUTION INTRAVENOUS; SUBCUTANEOUS at 10:22

## 2022-03-04 RX ADMIN — HEPARIN SODIUM 5000 UNITS: 5000 INJECTION, SOLUTION INTRAVENOUS; SUBCUTANEOUS at 06:26

## 2022-03-04 RX ADMIN — ACETAMINOPHEN 975 MG: 325 TABLET, FILM COATED ORAL at 06:29

## 2022-03-04 RX ADMIN — INSULIN LISPRO 5 UNITS: 100 INJECTION, SOLUTION INTRAVENOUS; SUBCUTANEOUS at 22:23

## 2022-03-04 RX ADMIN — Medication 2668 MG: at 06:26

## 2022-03-04 RX ADMIN — HEPARIN SODIUM 2300 UNITS: 1000 INJECTION, SOLUTION INTRAVENOUS; SUBCUTANEOUS at 10:00

## 2022-03-04 RX ADMIN — HEPARIN SODIUM 5000 UNITS: 5000 INJECTION, SOLUTION INTRAVENOUS; SUBCUTANEOUS at 22:15

## 2022-03-04 RX ADMIN — INSULIN LISPRO 2 UNITS: 100 INJECTION, SOLUTION INTRAVENOUS; SUBCUTANEOUS at 06:23

## 2022-03-04 RX ADMIN — Medication 2668 MG: at 18:11

## 2022-03-04 RX ADMIN — ACETAMINOPHEN 975 MG: 325 TABLET, FILM COATED ORAL at 18:11

## 2022-03-04 RX ADMIN — Medication 2000 UNITS: at 06:27

## 2022-03-04 RX ADMIN — Medication 2.5 MG: at 18:38

## 2022-03-04 RX ADMIN — LEVOFLOXACIN 500 MG: 500 TABLET, FILM COATED ORAL at 06:27

## 2022-03-04 RX ADMIN — HEPARIN SODIUM 3200 UNITS: 1000 INJECTION, SOLUTION INTRAVENOUS; SUBCUTANEOUS at 13:34

## 2022-03-04 RX ADMIN — FUROSEMIDE 80 MG: 40 TABLET ORAL at 18:11

## 2022-03-04 RX ADMIN — CALCITRIOL CAPSULES 0.25 MCG 0.25 MCG: 0.25 CAPSULE ORAL at 06:27

## 2022-03-04 ASSESSMENT — ENCOUNTER SYMPTOMS
FEVER: 0
WEIGHT LOSS: 0
SHORTNESS OF BREATH: 0
SORE THROAT: 0
EYE DISCHARGE: 0
FLANK PAIN: 0
PALPITATIONS: 0
CHILLS: 0

## 2022-03-04 ASSESSMENT — PAIN DESCRIPTION - PAIN TYPE
TYPE: ACUTE PAIN
TYPE: ACUTE PAIN

## 2022-03-04 ASSESSMENT — FIBROSIS 4 INDEX: FIB4 SCORE: 1.76

## 2022-03-04 NOTE — NON-PROVIDER
Internal Medicine PA Student Note  Note Author: Samanta Rojas, Student    Name Ambrose Watkins     1975   Age/Sex 46 y.o. male   MRN [id number]   Code Status Full Code       Reason for interval visit  (Principal Problem)   Back pain    Interval Problem Daily Status Update  (problem status, last 24 hours, new history, new data )   Mr. Watkins is a 46 y.o. male with history of diabetes mellitus, essential HTN, and ESRD on HD presented to ED  with low back pain and missed dialysis appointment. Back pain rated as 8/10, located mid-lumbar region, dull, and worsened with movement. Patient usually has dialysis Tuesday, Thursday, and Saturday but had missed  and hadn't been since . In ED labs showed hyperkalemia (K 5.9),  Anion gap 22, bicarb 8, and CRP 14.27. MRI lumbar spine: bilat L5 pars defects, no anterolisthesis, small synovial cyst projecting into posterolateral spinal canal behind L5 w/out significant thecal sac compression, no finding suggestive of infectious process.     : WBC increasing, patient reporting SOB. CXR, CT abd, UA, labs (BCX) ordered.     : No urine output since morning of . BCX + for gram negative rods, patient placed on ABXs. US of HD graft reveals no flow, vascular surgery consulted.    : HD graft procedure performed and successful. WBC decreasing.     : Reports LUE swelling and tenderness. Hypervolemia with bilateral LE edema. Rash worsens, multiple pruritic 1-2 cm erythematous lesions distributed over entire body with multiple excoriations.     : Hemodialysis performed. Patient still reporting arm pain and swelling, unable to dorsiflex arm due to pain. Rash worsening, becoming more pruritic, prescribed hydrocortisone cream. Temp of 103 recorded some time between .    3/1: Mr. Watkins feels that pain and swelling has spread to his entire left side, not just his arm. Reporting tingling and numbness in a glove distribution  from left elbow down to fingers and pain from elbow up to shoulder. Also reporting pain and worsened swelling in LLE. Rash: he feels that the cream is giving him some relief from the itching, but only lasts 20 min or so. Also reports feeling cold from dialysis, states that he has to stand under hot shower for hours to get relief.     3/2: Patient in dialysis when speaking with him, states he feels the same as yesterday. Still having pruritis, does not think benadryl helped, thinks itching is due to oxycodone for back pain. Wants to see social work to discuss his living situation and transportation. Currently lives in his truck which does not run, and is worried if he does not get help he will continue to miss dialysis due to transportation issues. Nephrology began daily dialysis yesterday.     3/3: Mr. Watkins feels same as he did on 3/2. Itching has pretty much resolved since being switched to PO dilaudid from oxycodone. Numbness of left lower arm has resolved, still painful and tingly. Upset about food portions, states that he is still hungry after meals. Would like to be set up with group housing or rehab facility.     3/4: Patient reports feeling same as yesterday, feel no improvement. Seen by PT, they recommend OT/PT, patient doesn't believe it will help, he si wondering if he can get compression stocking for the swelling.     ROS:  General: denies fever, weight loss, fatigue, chills  Skin: denies discoloration, pallor, +rash  HEENT: denies HA, visual changes, hearing changes, epistaxis  Respiratory: + SOB, cough, no wheezing  Cardiovascular: Denies CP, palpitations, chest tightness, orthopnea, exertional SOB  GI: Denies abd pain, hematochezia, melena, N/V/D/C  : Denies hematuria, dysuria   Extremities: +left leg pain, denies swelling   Neuro: +back pain, + paresthesia’s, denies numbness  Psych: Denies SI/HI, feels agitated     Physical Exam     Vitals:    03/04/22 1340   BP: (!) 161/88   Pulse: 95   Resp: 16    Temp: 36.2 °C (97.1 °F)   SpO2: 97%       Physical Exam  General: obese, ill-appearing  Skin: warm, dry  HEENT: +multiple lesions on scalp  Respiratory: lungs CTAB, no wheeze or rhonchi  Cardiovascular: RRR  GI: Abdomen soft, non-tender, +distention  MSK: 2+ pitting Edema bilat LE, +tenderness of LUE and LLE,   Neuro: LUE weakness improving  Psych: appropriate mood and affect    Labs:  CMP  Sodium 132 L  Cl 94 L  BUN 49 H  K+ 5.8 H  Creatinine 7.05 H  Glucose 192  Ca 8.2 L    Imaging:  ECG: normal sinus rhythm, PVCs  CXR: cardiomegaly  Echo: LVEF 30%  MRI: bilat L5 pars defects, no anterolisthesis, small synovial cyst projecting into posterolateral spinal canal behind L5 w/out significant thecal sac compression, no finding suggestive of infectious process  CT abd: anasarca, prominent venous collaterals in bilat inguinal region, bilat L5 pars defects w/out spondylolisthesis     Assessment/Plan   Problem representation: Mr. Watkins is a 45 yo male w/ hx of ESRD on HD, HTN, diabetes mellitus, and methamphetamine abuse admitted 2/21 for back pain and missed dialysis. IP labs and imaging revealed bacteremia and HFrEF EF 30%.    Assessment:  1. Hyperkalemia  2. ESRD  3. HFrEF  4. Bacteremia    5. HTN  6. Back pain  7. LUE pain/paresthesias  8. Uremic pruritis    9. Substance abuse     Plan:  1. Hyperkalemia   -K+ improved to 5.8 improved from 6.4 on 3/03  -Patient denies symptoms of hyperkalemia including chest pain/ muscle pain  -Patient on renal/low K+ diet  -Losartan D/C   -Continue daily dialysis  -CTM    2. ESRD  -dialyzing everyday to remove excess fluid   -Nephrology has been consulted and is following     3. HFrEF  -Continue dialyzing to remove excess fluid   -Keep on fluid restriction 1.5L  -Repeat echo, possibly tomorrow     4. Bacteremia   -BCX + for stenotrophomonas maltophilia   -Levoflox end date 3/13    5. HTN  -latest /88  -Hydralazine 25 mg held 0600 and 1200    6. Back pain  -Dilaudid PO 1-2 mg q6  prn   -Tylenol prn and lidocaine patch   -continue OT/PT    7. LUE pain/paresthesias   -Stable, improving  -Vascular surgery has been consulted and believes symptoms are normal postop pain/swelling  -Numbness resolved    8. Uraemic pruritis   -Optimize dialysis   -Pramoxine lotion    9. Substance abuse  -Active use of methamphetamines   -monitor for withdrawal symptoms   -counseling provided 2/28

## 2022-03-04 NOTE — PROGRESS NOTES
Received bedside report from RN, pt care assumed, VSS, pt assessment complete. Pt AAOx4. No signs of acute distress noted at this time. Plan of care discussed with pt and verbalizes no questions. Pt denies any additional needs at this time. Bed locked/in lowest position, bed alarm on, pt educated on fall risk and verbalized understanding, call light within reach, hourly rounding initiated.

## 2022-03-04 NOTE — DISCHARGE PLANNING
Anticipated Discharge Disposition: Home    Action: Pt discussed during IDDR. Pt 6 clicks 24. Resources have been provided for pt via [facility].     Barriers to Discharge: medical stability    Plan: continue to follow

## 2022-03-04 NOTE — CARE PLAN
Problem: Pain - Standard  Goal: Alleviation of pain or a reduction in pain to the patient’s comfort goal  Outcome: Progressing     Problem: Urinary - Renal Perfusion  Goal: Ability to achieve and maintain adequate renal perfusion and functioning will improve  Outcome: Progressing  Note: Needing daily dialysis    The patient is Stable - Low risk of patient condition declining or worsening    Shift Goals  Clinical Goals: Pain/itch control, monitor labs  Patient Goals: pain control,  Family Goals: N/A    Progress made toward(s) clinical / shift goals:  slower than expected     Patient is not progressing towards the following goals:

## 2022-03-04 NOTE — PROGRESS NOTES
[facility] Progress Note     Hemodialysis treatment ordered today per Dr. Simon x 3.5 hours.   Patient awake and oriented x 4; on room air  Treatment initiated at 1003 ended at 1333.      Patient tolerated treatment; see paper flow sheet for details.      Net UF 3000 mL.      Post tx, CVC flushed with saline then locked with heparin 1000 units/mL per designated amount in each wing then clamped and capped. Aspirate heparin prior to next CVC use.     Report given to Primary RN ELO Pickett.

## 2022-03-04 NOTE — THERAPY
Physical Therapy   Daily Treatment     Patient Name: Ambrose Watkins  Age:  46 y.o., Sex:  male  Medical Record #: [id number]  Today's Date: 3/3/2022     Precautions  Precautions: Other (See Comments)  Comments: HD MWF    Assessment    The pt presents today agreeable to participate in tx session.  He was instructed on seated LE ther-ex w/ proper demo of each exercise by the pt.  The pt was able to complete STS from chair x3 w/ no AD spv prior to gait distance 250' w/ no AD spv via wide BULL and limited toe clearance, no LOB observed and distance limited by fatigue.  Recommend pt dc home w/ OP PT f/u for higher level deficits and low back pain.  Will no longer follow 2/2 acute PT goals met.  Please re consult should there be a change in the pt's condition.    Plan    Discharge secondary to goals met.    DC Equipment Recommendations: None  Discharge Recommendations: Recommend outpatient physical therapy services to address higher level deficits (for low back pain)      Subjective/Objective     03/03/22 1625   Cognition    Cognition / Consciousness WDL   Level of Consciousness Alert   Comments pleasant and cooperative   Passive ROM Lower Body   Passive ROM Lower Body WDL   Active ROM Lower Body    Active ROM Lower Body  WDL   Strength Lower Body   Lower Body Strength  WDL   Sensation Lower Body   Lower Extremity Sensation   WDL   Sitting Lower Body Exercises   Sitting Lower Body Exercises Yes   Ankle Pumps 1 set of 10;Bilateral   Long Arc Quad 1 set of 10;Bilateral   Standing Lower Body Exercises   Standing Lower Body Exercises Yes   Marching 1 set of 10   Comments no AD, SBA   Neuro-Muscular Treatments   Neuro-Muscular Treatments Anterior weight shift;Weight Shift Right;Weight Shift Left   Comments stand w/ no AD   Other Treatments   Other Treatments Provided gait and endurance   Balance   Sitting Balance (Static) Good   Sitting Balance (Dynamic) Good   Standing Balance (Static) Fair +   Standing Balance (Dynamic)  Fair +   Weight Shift Sitting Good   Weight Shift Standing Good   Skilled Intervention Verbal Cuing   Comments stand w/ no AD   Gait Analysis   Gait Level Of Assist Supervised   Assistive Device None   Distance (Feet) 250   # of Times Distance was Traveled 1   Deviation Increased Base Of Support;Decreased Toe Off   Weight Bearing Status no restrictions   Skilled Intervention Verbal Cuing   Comments distance limited by fatigue   Bed Mobility    Supine to Sit   (pt received seated up in chair)   Sit to Supine   (pt position seated up in chair end of session)   Comments pt position seated in chair pre- and post-   Functional Mobility   Sit to Stand Supervised   Bed, Chair, Wheelchair Transfer Supervised   Transfer Method Stand Step   Mobility STSx3 chair w/ no AD   Skilled Intervention Verbal Cuing   Activity Tolerance   Sitting in Chair >30min   Standing 15min   Short Term Goals    Short Term Goal # 1 Pt will demo gait distance >250' using no AD spv in a moving environment for community ambulation.   Goal Outcome # 1 Goal met   Short Term Goal # 2 Pt will demo ability to ascend/descend 1 platform step w/ no AD spv in 6 visits for access to the community.   Goal Outcome # 2 Other (see comments)  (anticipate no difficulty w/ demo of SLS >3s BLE w/ no AD)   Education Group   Education Provided Role of Physical Therapist;Gait Training   Role of Physical Therapist Patient Response Patient;Acceptance;Explanation;Demonstration;Action Demonstration   Gait Training Patient Response Patient;Acceptance;Explanation;Demonstration;Action Demonstration   Additional Comments pt receptive of [facility] provided   Session Information   Date / Session Number  3/3- 3 (dc goals met)

## 2022-03-04 NOTE — DOCUMENTATION QUERY
"                                                                         [facility]                                                                       Query Response Note      PATIENT:               CONY MORAN  ACCT #:                  [id number]  MRN:                     [id number]  :                      1975  ADMIT DATE:       2022 8:51 PM  DISCH DATE:          RESPONDING  PROVIDER #:        [id number]           QUERY TEXT:    HFrEF is documented in the Progress Notes. Please specify the acuity.     NOTE:  If an appropriate response is not listed below, please respond with a new note.    The patient's Clinical Indicators include:  U/S Cardiac ECHO on  noted moderately reduced LVSF, global hypokinesis, probable grade II diastolic dysfunction, volume overload, mildly reduced RVSF, and an LVEF of 30%. \"HFrEF- diuresis when appropriate ... 1.5L fluid restriction, repeat ECHO once euvolemic\" is documented in the Progress Notes on 3/2.    Treatments include: Lasix 80mg IV, Hemodialysis, Hydralazine, Norvasc, and U/S Cardiac ECHO    Risk factors include: dx HTN + SCHF + ESRD Requiring Hemodialysis, dx Atrial Fibrillation, and hx DM II.    Thank you,  Stone Del Rosario RN, BSN  Clinical   Connect via [employer]  Options provided:   -- Acute Systolic heart failure   -- Chronic Systolic heart failure   -- Acute on Chronic Systolic heart failure   -- Unable to determine      Query created by: Stone Del Rosario on 3/3/2022 8:38 AM    RESPONSE TEXT:    Acute Systolic heart failure          Electronically signed by:  SAL MOLINA MD 3/4/2022 8:43 AM              "

## 2022-03-04 NOTE — PROGRESS NOTES
.  [facility] -  PROGRESS NOTE               Author: DVAID Patel. Date & Time: 3/4/2022  8:18 AM     HPI:  This is a 46 year old male with past medical history of End Stage Renal Disease on hemodialysis at [facility] every Tues/Thurs/Sat, diabetes mellitus and Hypertension who presented to the emergency department on 2/21/22 complaining of lower back pain.  Reportedly missing hemodialysis due to transportation issues. MRI this am and medicated for pain. Seen on Hemodialysis this am, lethargic after PRN medications.     DAILY NEPHROLOGY SUMMARY:  2/23: 3.5L Net UF. Resting in bed. Calm and agreeable to HD today. C/o cont. Back pain. Bps low this am.   2/24: Echo in progress. Primary team at . Resting in bed. C/o cont back pain. 2.0L net UF. K 4.0, CO2 22. Denies Diarrhea. Denies CP/SOB.   2/25: Upset about NPO this am for OR.  In bed, no acute distress. Bps improved. K 4.7, CO2 16.   2/26: S/p AVG creation and fistulogram. 1.5 L net UF. Sitting up at side of bed, eating lunch. C/o SOB and nausea.  C/o L AVF pain.   2/27: Laying in bed. C/o L arm pain.   2/28: Pt seen on HD, said he did not sleep well, some ongoing LUE discomfort, VSS on RA, labs reviewed  3/1: Pt in bed, asking for pain meds, says his whole L side hurts, iHD yesterday with 3.1L net UF, refusing tele monitor overnight, labs reviewed, VSS on RA, says he is not making urine  3/2: Seen on HD.  C/o cramping. 3.5L net UF. Denies fever/chills. C/o SOB with exertion, on RA. K 5.9.   3/3: Pt ambulatory in room, reports that LLE edema is not improving, K+ 6.4 this AM, iHD yesterday with 2.5L net UF, repeat HD ordered today for hyperK, pt given temporizing measures this AM per primary team, VSS on RA  3/4: HD yest, UF 3L. K+ still elevated, lots of edema. Pt making urine. Feels depressed.    REVIEW OF SYSTEMS:    10 point ROS reviewed and is as per HPI/daily summary or otherwise negative    PMH/PSH/SH/FH: Reviewed  "and unchanged since admission note  CURRENT MEDICATIONS: Reviewed from admission to present day    VS:  /82   Pulse 93   Temp 36.8 °C (98.3 °F) (Temporal)   Resp 17   Ht 1.778 m (5' 10\")   Wt 121 kg (265 lb 10.5 oz)   SpO2 96%   BMI 38.12 kg/m²   Physical Exam  Constitutional:       General: He is not in acute distress.     Appearance: He is ill-appearing.   HENT:      Head: Normocephalic and atraumatic.      Mouth/Throat:      Dentition: Abnormal dentition.   Eyes:      Extraocular Movements: Extraocular movements intact.      Conjunctiva/sclera: Conjunctivae normal.      Pupils: Pupils are equal, round, and reactive to light.   Cardiovascular:      Rate and Rhythm: Normal rate and regular rhythm.      Pulses: Normal pulses.      Heart sounds: Normal heart sounds. No murmur heard.    No friction rub. No gallop.      Comments: RIJ TDC  L AVG +Bruit/+Thrill, some surrounding edema  Pulmonary:      Effort: Pulmonary effort is normal. No respiratory distress.      Breath sounds: Normal breath sounds. No stridor. No wheezing or rhonchi.   Abdominal:      General: There is no distension.      Palpations: Abdomen is soft. There is no mass.   Musculoskeletal:         General: Swelling present. Normal range of motion.      Right lower leg: Edema present.      Left lower leg: Edema (LLE>RLE) present.      Comments: LUE edema, improved some   Skin:     General: Skin is warm and dry.      Comments: Scattered scabbing   Neurological:      Mental Status: He is alert.   Psychiatric:      Comments: Irritable         Fluids:  In: 980 [P.O.:480; Dialysis:500]  Out: 3500     LABS:  Recent Labs     03/02/22  0259 03/03/22  0404 03/04/22  0543   SODIUM 131* 131* 132*   POTASSIUM 5.9* 6.4* 5.8*   CHLORIDE 93* 93* 94*   CO2 24 26 27   GLUCOSE 164* 178* 192*   BUN 71* 51* 49*   CREATININE 9.65* 7.44* 7.05*   CALCIUM 8.4* 8.3* 8.2*     MR-LUMBAR SPINE-WITH & W/O  Order: [id number]   Status: Final result     Visible to patient: " No (inaccessible in MyChart)     Next appt: None     0 Result Notes    Details    Reading Physician Reading Date Result Priority   Lisa Link M.D.  [phone] 2/22/2022 STAT     Narrative & Impression     2/22/2022 1:06 AM     HISTORY/REASON FOR EXAM:  Epidural abscess suspected        TECHNIQUE/EXAM DESCRIPTION:  MRI of the lumbar spine without and with contrast.     The study was performed on a [employer] Signa 1.5 Jacqueline MRI scanner.     T1 sagittal, T2 fast spin-echo sagittal, and T2 axial images were obtained of the lumbar spine. T1 postcontrast fat-suppressed sagittal images were obtained.     20 mL ProHance contrast was administered intravenously.     COMPARISON:  None.     FINDINGS:  Lumbar spine alignment is normal.  Bilateral L5 pars defects without anterolisthesis.  Bone marrow signal intensity is normal. Mild disc desiccation is noted at L4-L5.  Bilateral pars defects noted at L5. The conus terminates at L1.     Lumbar spine levels:     T12-L1: No significant abnormality.     L1-2: Mild facet degeneration. The stenosis.     L2-3: Minimal facet degeneration. No significant canal stenosis or foraminal narrowing.     L3-4: Broad-based disc bulge and bilateral mild facet degeneration. There is no significant spinal canal narrowing or neuroforaminal narrowing.     L4-5, bilateral facet degeneration. Broad-based disc bulge with a focal central protrusion that minimally encroaches upon the spinal canal. There is no significant foraminal narrowing. There is minimal canal narrowing without thecal sac compression.   There is a left facet origin synovial cyst that extends into the left subligamentous at the level of the left L5 pars defect and minimally encroaches upon the spinal canal from the posterolateral aspect. Bilateral L5 pars defects are present.     Postcontrast images through the lumbar spine does not demonstrate any abnormal enhancement. Minimal epidural enhancement identified at the dorsal aspect  of L5 is likely related to degenerative changes in the facet joints and synovial hypertrophy. No   pathological epidural enhancement to suggest infection identified.     IMPRESSION:        Bilateral L5 pars defects. However, there is no anterolisthesis.     Small left facet origin subligamentous synovial cyst projects into the posterolateral spinal canal behind L5 without significant thecal sac compression. Bilateral mild facet degeneration at L4-5 and L5-S1.     No abnormal enhancement is identified in the lumbar spine to suggest an infectious process.      HISTORY/REASON FOR EXAM:  Epidural abscess suspected        TECHNIQUE/EXAM DESCRIPTION:  MRI of the thoracic spine without and with contrast.     The study was performed on a [employer] 1.5 Jacqueline MRI scanner. T1 sagittal, T2 fast spin-echo sagittal, and T2 axial images were obtained of the thoracic spine. T1 post-contrast fat suppressed sagittal images were obtained. Optional T1 post-contrast   axial images may be obtained.     20 mL ProHance contrast was administered intravenously.     COMPARISON:  None.     FINDINGS:     Alignment in the thoracic spine is normal. Marrow signal in the vertebral bodies is within normal limits. There is no abnormal osseous enhancement in the vertebral bodies or other abnormal extradural enhancement. The disc spaces are intact at all   thoracic levels. There are no significant osteophytic changes. The prevertebral and paraspinous soft tissues are unremarkable.     The thoracic spinal cord is normal in caliber and signal throughout its course. There is no abnormal cord enhancement. There is no abnormal intradural extramedullary enhancement.     At T1-2 through T11-12 there is no significant disc bulge or protrusion. The neural foramina are intact at all thoracic levels. There is no congenital or acquired central spinal stenosis at any thoracic level. There is no significant ligamentous or facet   hypertrophy.     IMPRESSION:         MRI of the thoracic spine without and with contrast within normal limits.    US-HEMODIALYSIS GRAFT DUPLEX COMP UPPER EXTREMITY  Order: [id number]   Status: Final result     Visible to patient: No (inaccessible in MyChart)     Next appt: None     0 Result Notes    Details    Reading Physician Reading Date Result Priority   No Reading Provider [facility] 2022    Buck Kenyon M.D.  [phone] 2022      Narrative & Impression         Hemodialysis Access Report      Vascular Laboratory   Conclusions   Hemodialysis loop graft with the proximal anastomosis at the distal    brachial artery and the distal anastomosis at the basilic vein in the mid    biceps.   No flow is demonstrated in the hemodialysis graft. Echolucent material    fills the graft throughout its length.      No prior study available for comparison.      CONY MORAN      Exam Date:     2022 18:09      Room #:     Inpatient      Priority:     Routine      Ht (in):             Wt (lb):      Ordering Physician:        LIVAN BARBOZA      Referring Physician:       [id number] Mckeon      Sonographer:               Kamala Ball RVT      Study Type:                Complete Unilateral      Technical Quality:         Adequate      Age:    46    Gender:     M      MRN:    [id number]      :    1975      BSA:      Indications:     AV Fistula - S/P, AV Fistula / Thrombosed      CPT Codes:       80381      ICD Codes:       447  996.73      History:         Left arm brachial to basilic loop graft. No prior duplex.      Limitations:      Exam Data:   Side:          Left            Access          Graft   Inflow Artery   Brachial   Outflow Vein    Basilic                         Velocity (cm/s)    Prox Inflow artery    Mid Inflow artery           109.0                                0    Distal Inflow artery        95.00    Inflow Anastomosis          0.00    Inflow Artery distal to     anastomosis          Proximal Graft              0.00    Mid  Graft                   0.00    Distal Graft                0.00    Outflow Anastomosis         0.00    Proximal Outflow Vein       10.00    Mid Outflow Vein    Distal Outflow Vein    Flow Volume Mid Bicep            ml/min    Flow Volume Mid-Forearm          ml/min    Prox Outflow Vein Diam            cm    Mid Outflow Vein Diam             cm    Distal Outflow Vein Diam          cm      Findings   Hemodialysis loop graft with the proximal anastomosis at the distal    brachial artery and the distal anastomosis at the basilic vein in the mid    biceps.    Doppler waveforms of the brachial artery are of high amplitude and    triphasic.   No flow is demonstrated in the hemodialysis graft. Echolucent material    fills the graft throughout its length.    The basilic and brachial veins are compressible.      Buck Kenyon MD   (Electronically Signed)   Final Date:      23 February 2022                     20:51     IMPRESSION:  # ESRD  - Etiology likely 2/2 DM/HTN  - via RIJ TDC  # Thrombosed Left AVF  - US 2/23 no flow  - s/p OR 2/25 AVG Brachio-axillary Creation, thrombectomy and fistulogram   # HTN, labile control   - Goal BP < 140/90  # Anemia of CKD  - Goal Hgb 10-11  - At goal  - On PO Fe  # CKD-MBD  -   - Vit D 9  - CCa 7.1 --> 7.68  - PO4 9.5-->7.5-->5.6  - On calcitriol, calcium acetate and cholecalciferol   # Hyperkalemia  - s/p temporizing measures x 2  # Back Pain  - Elevated CRP  - MRI of Lumbar and Thoracic spine 2/22 (-) for epidural abscess  # Methamphetamine use   - Cessation advised  # DM   - A1C 6.5  # Severe metabolic acidosis, corrected  - Off bicarb gtt   - Correct with HD   # Leukocytosis, resolved   - BCx2 2/23 (+) Gram - Rods   - C.diff (-) 2/23  - CT abd pelvis no acute abnormality  # Stenotrophomonas maltophilia bacteremia  - IV C3--> PO Levaquin x 14 days per ID recs     PLAN:  - iHD today (Friday)   - Evaluate the need for dialysis daily  - Continue iHD qMWF  - Challenge UF as  able   - Add furosemide 80 mg BID  - Hold antihtn prior to HD to allow for UF  - Off midodrine  - Continue cholecalciferol   - Continue calcium acetate WM, phos improved  - JACKIE with HD to maintain Hgb between 10-11  - Transfuse PRN Hgb <7   - No dietary protein restrictions  - Dose all meds per ESRD/iHD  - Abx per primary team/ID  - Low Na/fluid restricted renal diet  - Follow labs  - AVG use per Vasc Surg recs; currently using RIJ TDC  - Daily patiromer for hyperK+. Stop/hold if serum K+ <4   - OK to transition back to OP HD once medically cleared    Thank you,

## 2022-03-04 NOTE — CARE PLAN
Problem: Pain - Standard  Goal: Alleviation of pain or a reduction in pain to the patient’s comfort goal  Outcome: Progressing     Problem: Knowledge Deficit - Standard  Goal: Patient and family/care givers will demonstrate understanding of plan of care, disease process/condition, diagnostic tests and medications  Outcome: Progressing   The patient is Stable - Low risk of patient condition declining or worsening    Shift Goals  Clinical Goals: Increase ambulation  Patient Goals: Rest  Family Goals: N/A

## 2022-03-05 PROBLEM — I50.9 ACUTE HEART FAILURE (HCC): Status: ACTIVE | Noted: 2022-02-28

## 2022-03-05 PROBLEM — K59.00 CONSTIPATION: Status: ACTIVE | Noted: 2022-03-05

## 2022-03-05 LAB
ALBUMIN SERPL BCP-MCNC: 2.9 G/DL (ref 3.2–4.9)
ALBUMIN/GLOB SERPL: 0.8 G/DL
ALP SERPL-CCNC: 385 U/L (ref 30–99)
ALT SERPL-CCNC: 11 U/L (ref 2–50)
ANION GAP SERPL CALC-SCNC: 14 MMOL/L (ref 7–16)
AST SERPL-CCNC: 29 U/L (ref 12–45)
BILIRUB SERPL-MCNC: 0.4 MG/DL (ref 0.1–1.5)
BUN SERPL-MCNC: 49 MG/DL (ref 8–22)
CALCIUM SERPL-MCNC: 8.7 MG/DL (ref 8.5–10.5)
CHLORIDE SERPL-SCNC: 95 MMOL/L (ref 96–112)
CO2 SERPL-SCNC: 25 MMOL/L (ref 20–33)
CREAT SERPL-MCNC: 6.47 MG/DL (ref 0.5–1.4)
GLOBULIN SER CALC-MCNC: 3.7 G/DL (ref 1.9–3.5)
GLUCOSE BLD STRIP.AUTO-MCNC: 142 MG/DL (ref 65–99)
GLUCOSE BLD STRIP.AUTO-MCNC: 193 MG/DL (ref 65–99)
GLUCOSE BLD STRIP.AUTO-MCNC: 252 MG/DL (ref 65–99)
GLUCOSE SERPL-MCNC: 166 MG/DL (ref 65–99)
POTASSIUM SERPL-SCNC: 5 MMOL/L (ref 3.6–5.5)
PROT SERPL-MCNC: 6.6 G/DL (ref 6–8.2)
SODIUM SERPL-SCNC: 134 MMOL/L (ref 135–145)

## 2022-03-05 PROCEDURE — 770020 HCHG ROOM/CARE - TELE (206)

## 2022-03-05 PROCEDURE — 36415 COLL VENOUS BLD VENIPUNCTURE: CPT

## 2022-03-05 PROCEDURE — 700111 HCHG RX REV CODE 636 W/ 250 OVERRIDE (IP): Performed by: STUDENT IN AN ORGANIZED HEALTH CARE EDUCATION/TRAINING PROGRAM

## 2022-03-05 PROCEDURE — 306310 ANTI-EMBOLISM STOCKINGS XXLRG REG: Performed by: STUDENT IN AN ORGANIZED HEALTH CARE EDUCATION/TRAINING PROGRAM

## 2022-03-05 PROCEDURE — 700102 HCHG RX REV CODE 250 W/ 637 OVERRIDE(OP): Performed by: HOSPITALIST

## 2022-03-05 PROCEDURE — 80053 COMPREHEN METABOLIC PANEL: CPT

## 2022-03-05 PROCEDURE — A9270 NON-COVERED ITEM OR SERVICE: HCPCS | Performed by: STUDENT IN AN ORGANIZED HEALTH CARE EDUCATION/TRAINING PROGRAM

## 2022-03-05 PROCEDURE — 700102 HCHG RX REV CODE 250 W/ 637 OVERRIDE(OP): Performed by: NURSE PRACTITIONER

## 2022-03-05 PROCEDURE — 99232 SBSQ HOSP IP/OBS MODERATE 35: CPT | Mod: GC | Performed by: INTERNAL MEDICINE

## 2022-03-05 PROCEDURE — A9270 NON-COVERED ITEM OR SERVICE: HCPCS | Performed by: HOSPITALIST

## 2022-03-05 PROCEDURE — 700102 HCHG RX REV CODE 250 W/ 637 OVERRIDE(OP): Performed by: STUDENT IN AN ORGANIZED HEALTH CARE EDUCATION/TRAINING PROGRAM

## 2022-03-05 PROCEDURE — A9270 NON-COVERED ITEM OR SERVICE: HCPCS | Performed by: NURSE PRACTITIONER

## 2022-03-05 PROCEDURE — 700101 HCHG RX REV CODE 250: Performed by: HOSPITALIST

## 2022-03-05 PROCEDURE — 90935 HEMODIALYSIS ONE EVALUATION: CPT

## 2022-03-05 PROCEDURE — 51798 US URINE CAPACITY MEASURE: CPT

## 2022-03-05 PROCEDURE — 700111 HCHG RX REV CODE 636 W/ 250 OVERRIDE (IP)

## 2022-03-05 PROCEDURE — 82962 GLUCOSE BLOOD TEST: CPT

## 2022-03-05 RX ORDER — DOCUSATE SODIUM 100 MG/1
100 CAPSULE, LIQUID FILLED ORAL 2 TIMES DAILY
Status: DISCONTINUED | OUTPATIENT
Start: 2022-03-05 | End: 2022-03-08 | Stop reason: HOSPADM

## 2022-03-05 RX ORDER — HEPARIN SODIUM 1000 [USP'U]/ML
INJECTION, SOLUTION INTRAVENOUS; SUBCUTANEOUS
Status: COMPLETED
Start: 2022-03-05 | End: 2022-03-05

## 2022-03-05 RX ORDER — AMOXICILLIN 250 MG
2 CAPSULE ORAL 2 TIMES DAILY
Status: DISCONTINUED | OUTPATIENT
Start: 2022-03-05 | End: 2022-03-07

## 2022-03-05 RX ORDER — POLYETHYLENE GLYCOL 3350 17 G/17G
1 POWDER, FOR SOLUTION ORAL
Status: DISCONTINUED | OUTPATIENT
Start: 2022-03-05 | End: 2022-03-07

## 2022-03-05 RX ORDER — BISACODYL 10 MG
10 SUPPOSITORY, RECTAL RECTAL
Status: DISCONTINUED | OUTPATIENT
Start: 2022-03-05 | End: 2022-03-07

## 2022-03-05 RX ADMIN — Medication 2668 MG: at 14:11

## 2022-03-05 RX ADMIN — FUROSEMIDE 80 MG: 40 TABLET ORAL at 06:57

## 2022-03-05 RX ADMIN — HEPARIN SODIUM 3200 UNITS: 1000 INJECTION, SOLUTION INTRAVENOUS; SUBCUTANEOUS at 18:35

## 2022-03-05 RX ADMIN — HEPARIN SODIUM 2300 UNITS: 1000 INJECTION, SOLUTION INTRAVENOUS; SUBCUTANEOUS at 15:39

## 2022-03-05 RX ADMIN — Medication 2668 MG: at 07:50

## 2022-03-05 RX ADMIN — ACETAMINOPHEN 975 MG: 325 TABLET, FILM COATED ORAL at 20:33

## 2022-03-05 RX ADMIN — HYDRALAZINE HYDROCHLORIDE 25 MG: 25 TABLET, FILM COATED ORAL at 11:43

## 2022-03-05 RX ADMIN — ACETAMINOPHEN 975 MG: 325 TABLET, FILM COATED ORAL at 06:56

## 2022-03-05 RX ADMIN — HYDROCORTISONE: 5 CREAM TOPICAL at 06:59

## 2022-03-05 RX ADMIN — ACETAMINOPHEN 975 MG: 325 TABLET, FILM COATED ORAL at 01:05

## 2022-03-05 RX ADMIN — Medication 2000 UNITS: at 06:56

## 2022-03-05 RX ADMIN — HYDRALAZINE HYDROCHLORIDE 25 MG: 25 TABLET, FILM COATED ORAL at 20:34

## 2022-03-05 RX ADMIN — HEPARIN SODIUM 5000 UNITS: 5000 INJECTION, SOLUTION INTRAVENOUS; SUBCUTANEOUS at 22:17

## 2022-03-05 RX ADMIN — LIDOCAINE 1 PATCH: 50 PATCH TOPICAL at 11:43

## 2022-03-05 RX ADMIN — DOCUSATE SODIUM 50 MG AND SENNOSIDES 8.6 MG 2 TABLET: 8.6; 5 TABLET, FILM COATED ORAL at 20:35

## 2022-03-05 RX ADMIN — HEPARIN SODIUM 5000 UNITS: 5000 INJECTION, SOLUTION INTRAVENOUS; SUBCUTANEOUS at 06:58

## 2022-03-05 RX ADMIN — DOCUSATE SODIUM 100 MG: 100 CAPSULE, LIQUID FILLED ORAL at 14:11

## 2022-03-05 RX ADMIN — ACETAMINOPHEN 975 MG: 325 TABLET, FILM COATED ORAL at 11:43

## 2022-03-05 RX ADMIN — HYDROCORTISONE: 5 CREAM TOPICAL at 20:39

## 2022-03-05 RX ADMIN — FERROUS SULFATE TAB 325 MG (65 MG ELEMENTAL FE) 325 MG: 325 (65 FE) TAB at 06:56

## 2022-03-05 RX ADMIN — HYDRALAZINE HYDROCHLORIDE 25 MG: 25 TABLET, FILM COATED ORAL at 06:57

## 2022-03-05 RX ADMIN — Medication 2668 MG: at 20:36

## 2022-03-05 RX ADMIN — DIPHENHYDRAMINE HYDROCHLORIDE 12.5 MG: 25 TABLET ORAL at 14:11

## 2022-03-05 RX ADMIN — INSULIN LISPRO 2 UNITS: 100 INJECTION, SOLUTION INTRAVENOUS; SUBCUTANEOUS at 07:03

## 2022-03-05 RX ADMIN — INSULIN LISPRO 5 UNITS: 100 INJECTION, SOLUTION INTRAVENOUS; SUBCUTANEOUS at 11:46

## 2022-03-05 RX ADMIN — CALCITRIOL CAPSULES 0.25 MCG 0.25 MCG: 0.25 CAPSULE ORAL at 06:57

## 2022-03-05 RX ADMIN — HEPARIN SODIUM 5000 UNITS: 5000 INJECTION, SOLUTION INTRAVENOUS; SUBCUTANEOUS at 14:11

## 2022-03-05 ASSESSMENT — ENCOUNTER SYMPTOMS
CHILLS: 0
PALPITATIONS: 0
SENSORY CHANGE: 1
WEIGHT LOSS: 0
EYE DISCHARGE: 0
SORE THROAT: 0
ABDOMINAL PAIN: 1
CONSTIPATION: 1
TINGLING: 1
FLANK PAIN: 1
FEVER: 0
SHORTNESS OF BREATH: 0
BACK PAIN: 1
PHOTOPHOBIA: 1

## 2022-03-05 ASSESSMENT — PAIN DESCRIPTION - PAIN TYPE: TYPE: ACUTE PAIN

## 2022-03-05 ASSESSMENT — FIBROSIS 4 INDEX: FIB4 SCORE: 1.35

## 2022-03-05 NOTE — PROGRESS NOTES
"Daily Progress Note:     Date of Service: 3/5/2022  Primary Team: [facility] Team   Attending: Nino Reyes M.D.   Senior Resident: Dr. Doran  Intern: Dr. Chávez  Contact:  [phone]    Chief Complaint:   Low Back Pain     ID: Mr. Watkins is a 46 year old male who presents 2/21/22 for back pain. His past medical history includes Methamphetamine abuse, Hypertension, Diabetes Mellitus, End Stage Renal Disease on hemodialysis at [facility] every Tues/Thurs/Sat.  Reportedly missing hemodialysis due to transportation issues. Describes his back pain as dull, nonradiating, 8 out of 10 intensity, worse with movement relieved with rest.    Subjective:  -No acute events overnight  -Reports return of left lower arm \"numbness\"  -urinated evening of 3/4/22  -reports daily bowel movements but still feels \"constipated\". Previous CT reported anasarca and prominent venous collaterals in the inguinal regions bilaterally.    Consultants/Specialty:  Nephrology  Vascular    Review of Systems:    Review of Systems   Constitutional: Negative for chills, fever and weight loss.   HENT: Negative for nosebleeds and sore throat.    Eyes: Positive for photophobia. Negative for discharge.   Respiratory: Negative for shortness of breath.    Cardiovascular: Negative for chest pain and palpitations.   Gastrointestinal: Positive for abdominal pain and constipation.   Genitourinary: Positive for flank pain. Negative for dysuria and hematuria.   Musculoskeletal: Positive for back pain.   Skin: Positive for rash.   Neurological: Positive for tingling and sensory change.       Objective Data:   Physical Exam:   Vitals:   Temp:  [36.2 °C (97.1 °F)-37.2 °C (98.9 °F)] 36.7 °C (98 °F)  Pulse:  [] 99  Resp:  [16-18] 18  BP: (136-161)/() 157/96  SpO2:  [93 %-97 %] 96 %    Physical Exam  Constitutional:       General: He is not in acute distress.     Appearance: He is obese.   HENT:      Head: Normocephalic and atraumatic.      " Nose: No congestion.      Mouth/Throat:      Mouth: Mucous membranes are moist.      Comments: Poor dentition    Eyes:      General:         Right eye: No discharge.         Left eye: No discharge.      Pupils: Pupils are equal, round, and reactive to light.   Cardiovascular:      Rate and Rhythm: Normal rate.      Pulses: Normal pulses.      Heart sounds: Normal heart sounds. No murmur heard.  Pulmonary:      Effort: Pulmonary effort is normal. No respiratory distress.   Abdominal:      General: Bowel sounds are normal. There is distension.      Palpations: Abdomen is soft.      Tenderness: There is abdominal tenderness (bilaterally at flanks).      Comments: Rigid flanks bilaterally, 1-2+ pitting edema in flanks bilaterally   Musculoskeletal:         General: Swelling (left upper and lower arm) and tenderness (left upper and lower arm) present.      Cervical back: Normal range of motion. No rigidity.      Right lower leg: Edema present.      Left lower leg: Edema present.   Skin:     General: Skin is warm and dry.      Coloration: Skin is not jaundiced.      Findings: Erythema and rash present.      Comments: Excoriations and scabs on face, chest, and arms   Neurological:      Mental Status: He is alert. Mental status is at baseline.      Motor: Weakness (left arm weakness, but able to dorsiflex his left hand) present.       Labs:   HEMATOLOGY/ ONCOLOGY/ID:            Recent Labs     03/03/22  0404 03/04/22  0543   WBC 8.0 9.1   RBC 3.45* 3.11*   HEMOGLOBIN 10.2* 9.2*   HEMATOCRIT 32.5* 29.6*   MCV 94.2 95.2   MCH 29.6 29.6   RDW 55.5* 56.4*   PLATELETCT 277 285   MPV 10.8 10.5   NEUTSPOLYS 58.50 68.50   LYMPHOCYTES 25.50 19.00*   MONOCYTES 13.50* 10.70   EOSINOPHILS 0.60 0.20   BASOPHILS 0.60 0.70     Lab Results   Component Value Date    FERRITIN 665.0 (H) 02/22/2022    IRON 8 (L) 02/22/2022    TOTIRONBC 178 (L) 02/22/2022       RENAL:        Estimated GFR/CRCL = Estimated Creatinine Clearance: 19 mL/min (A) (by  C-G formula based on SCr of 6.47 mg/dL ()).  Recent Labs     03/03/22  0404 03/04/22  0543 03/05/22  0534   SODIUM 131* 132* 134*   POTASSIUM 6.4* 5.8* 5.0   CHLORIDE 93* 94* 95*   CO2 26 27 25   GLUCOSE 178* 192* 166*   BUN 51* 49* 49*   CREATININE 7.44* 7.05* 6.47*   CALCIUM 8.3* 8.2* 8.7   PHOSPHORUS 5.6*  --   --    ALBUMIN 2.9* 2.9* 2.9*       GASTROINTESTINAL/ HEPATIC:          Recent Labs     03/03/22  0404 03/04/22  0543 03/05/22  0534   ALTSGPT 5 6 11   ASTSGOT 26 28 29   ALKPHOSPHAT 345* 360* 385*   TBILIRUBIN 0.4 0.4 0.4   ALBUMIN 2.9* 2.9* 2.9*   GLOBULIN 3.5 3.5 3.7*     No results found for: AMMONIA    ENDOCRINE:              Recent Labs     03/03/22  0404 03/04/22  0543 03/05/22  0534   GLUCOSE 178* 192* 166*     Lab Results   Component Value Date    HBA1C 6.5 (H) 02/22/2022    HBA1C 7.2 (H) 05/19/2020       Imaging:   MR-LUMBAR SPINE-WITH & W/O  02/22/22 0737  1. Bilateral L5 pars defects. However, there is no anterolisthesis.     2. Small left facet origin subligamentous synovial cyst projects into the posterolateral spinal canal behind L5 without significant thecal sac compression. Bilateral mild facet degeneration at L4-5 and L5-S1.     3. No abnormal enhancement is identified in the lumbar spine to suggest an infectious process.     MR-THORACIC SPINE-WITH & W/O  02/22/22 0731  1. MRI of the thoracic spine without and with contrast within normal limits.     DX-CHEST-PORTABLE (1 VIEW) 02/21/22 2134  1.  No acute cardiopulmonary disease.   2.  Cardiomegaly     CT scan of the abdomen and pelvis without contrast   1.  No evidence of acute inflammatory process in the abdomen or pelvis  2.  Anasarca  3.  Prominent venous collaterals in the BILATERAL inguinal region  4.  BILATERAL L5 pars defects without spondylolisthesis     ECHO  LV EF:  30%   Global hypokinesis.  Probably grade II diastolic dysfunction.     Problem Representation:  Mr. Watkins is a 46 year old male who presents 2/21/22 for back pain,  "His past medical history includes Methamphetamine abuse, Hypertension, Diabetes Mellitus, End Stage Renal Disease on hemodialysis at [facility] every Tues/Thurs/Sat.  Reportedly missing hemodialysis due to transportation issues. Describes his back pain as dull, nonradiating, 8 out of 10 intensity, worse with movement relieved with rest.     * End stage renal disease (HCC)  Assessment & Plan  ESRD currently requiring daily dialysis.  Complex fluid balance management   -Nephrology following, started on Lasix on 3/4/2022 by nephrology. Patient reports urinating evening of 3/4/22  -Continue calcitriol, calcium, patiromer.  Hold patiromer if K less than 4    Anemia  Assessment & Plan  Hemoglobin near baseline.  -Ferous sulfate 325mg  -Epoetin 6,000 units during dialysis  -Dialysis  -nephrology onboard, recs appreciated   -CTM    Constipation  Assessment & Plan  Patient reports daily stools, that are hard in the setting of lower abdominal \"fullness\". Constipation likely secondary to narcotic use.   -Colace, titrate as needed. Hold for loose stools  -CTM    Pruritus  Assessment & Plan    Stable-hydrocortisone cream   -CTM    Acute heart failure (HCC)  Assessment & Plan  Acute heart failure - Echo 2/4/2022 EF 35%, likely secondary to fluid overload from ESRD.    -Continue hemodialysis per nephrology  -Continue hydralazine  -1.5L Fluid restriction, salt restriction  -Daily weights  -Repeat Echo once euvolemic; if HFrEF persist consider further cardiology evaluation.     Paresthesia  Assessment & Plan  -Vascular surgery consulted, following  -Pain management and observation for increased swelling, new loss of sensation, or loss of strength; if symptoms are worsening, ultrasound of the left upper extremity  -CTM    Diarrhea  Assessment & Plan  Resolved, patient started reporting constipation 3/5/22  -monitor and replete electrolytes  -CTM for recurrence      Hypocalcemia  Assessment & Plan  Stable.  -vitamin D3 " (cholecalciferol) tablet 2,000 Units  -calcitRIOL (ROCALTROL) capsule 0.25 mcg  -Calcium acetate  -CTM    Methamphetamine abuse ([facility])  Assessment & Plan  History of methamphetamine use. Urine drug screen ordered but not processed 2/2 poor urine output on admission. Patient denies regular use.  -Counseled on cessation, denies use  -Monitor for agitation and signs of withdrawal    Back pain  Assessment & Plan  -Oral Hydromorphone 1-2mg Q6hrs PRN for pain 3-10 in intensity; titrate as directed by pain intensity.  -Tylenol PRN  -Lidoderm   -OT/PT  -CTM      Hyperkalemia- (present on admission)  Assessment & Plan  Hyperkalemia secondary to ESRD.    Continue hemodialysis per nephrology. Insulin with dextrose for hyperkalemia plus calcium gluconate for cardiac protection.  -On patiromer  -Monitor labs      Leukocytosis- (present on admission)  Assessment & Plan  Per ID pharmacy regarding the rare blood culture growth, levofloxacin renally/dialysis dosed for 14 days. WBC within normal limits at 8.8 starting 3/1/22.  -PO Levofloxacin 750mg once, PO Levofloxacin 500mg [id number] for 14 days (last dose 3/12/22)    Hypertension- (present on admission)  Assessment & Plan  Persistent hypertension likely secondary to fluid overload  Continue dialysis per nephrology, hydralazine  Optimize pain control    DM (diabetes mellitus) ([facility])  Assessment & Plan  Glucose stable with some variations per intake  -Sliding scale  -Hypoglycemia protocol   -Continue to monitor  -Renal diet      Code Status: FULL  DVT ppx: Heparin   Diet: Renal      Majo Chávez MD MPH  PGY-1, [facility]    Assessment and plan discussed with senior resident and attending physician.

## 2022-03-05 NOTE — HOSPITAL COURSE
Mr Watkins 46-year-old male with past medical history of ESRD on HD TTS, methamphetamine use, diabetes, hypertension who presented to [facility] on 2/21/2022 with complaints of low back pain.  Patient  missed his dialysis sessions for 10 days prior to hospital admissions.  Diagnostic work-up on admission remarkable for leukocytosis, hyperkalemia, high anion gap metabolic acidosis, elevated creatinine, elevated CRP and ESR.  MRI thoracic and lumbar spine negative for cord compression/abscess.  Nephrology consulted patient underwent inpatient hemodialysis.  Diagnostic evaluation for anemia suggestive of possible iron deficiency and anemia of chronic disease in the setting of renal disease.  Infectious work-up for leukocytosis remarkable for gram-negative bacteremia, patient started on ceftriaxone and metronidazole, culture sensitivity showed growth of Stenotrophomonas maltophilia , ID consulted, recs?? work-up for diarrhea: Tissue transglutaminase IgA negative, C. difficile negative, ova and parasites PEG pending***.  Patient reported left-sided upper extremity paresthesia post AV fistula placement, vascular surgery on board, recommended ultrasound left affected upper extremity if symptoms worsen.  Discharge pending medical clearance.

## 2022-03-05 NOTE — PROGRESS NOTES
Daily Progress Note:     Date of Service: 3/4/2022  Primary Team: [facility] Team   Attending: Nino Reyes M.D.   Senior Resident: Dr. Doran  Intern: Dr. Chávez  Contact:  [phone]    Chief Complaint:   Low Back Pain     ID: Mr. Watkins is a 46 year old male who presents 2/21/22 for back pain. His past medical history includes Methamphetamine abuse, Hypertension, Diabetes Mellitus, End Stage Renal Disease on hemodialysis at [facility] every Tues/Thurs/Sat.  Reportedly missing hemodialysis due to transportation issues. Describes his back pain as dull, nonradiating, 8 out of 10 intensity, worse with movement relieved with rest.     Subjective:  -No acute events overnight.  Patient reports minimal improvement in pain.    Consultants/Specialty:  Nephrology  Vascular    Review of Systems:    Review of Systems   Constitutional: Negative for chills, fever and weight loss.   HENT: Negative for nosebleeds and sore throat.    Eyes: Negative for discharge.   Respiratory: Negative for shortness of breath.    Cardiovascular: Negative for chest pain and palpitations.   Genitourinary: Negative for dysuria, flank pain and hematuria.   Skin: Positive for rash.       Objective Data:   Physical Exam:   Vitals:   Temp:  [36.1 °C (96.9 °F)-37.2 °C (98.9 °F)] 36.2 °C (97.1 °F)  Pulse:  [] 95  Resp:  [16-19] 16  BP: (135-161)/(82-91) 161/88  SpO2:  [92 %-97 %] 97 %    Physical Exam  Constitutional:       General: He is not in acute distress.     Appearance: He is obese.   HENT:      Head: Normocephalic and atraumatic.      Nose: No congestion.      Mouth/Throat:      Mouth: Mucous membranes are moist.      Comments: Poor dentition    Eyes:      General:         Right eye: No discharge.         Left eye: No discharge.      Pupils: Pupils are equal, round, and reactive to light.   Cardiovascular:      Rate and Rhythm: Normal rate.   Pulmonary:      Effort: Pulmonary effort is normal. No respiratory distress.    Abdominal:      General: Bowel sounds are normal. There is distension.      Palpations: Abdomen is soft.      Comments: Rigid flanks bilaterally, 1-2+ pitting edema in flanks bilaterally   Musculoskeletal:         General: Swelling (left upper arm) and tenderness (left upper arm) present.      Cervical back: Normal range of motion. No rigidity.      Right lower leg: Edema present.      Left lower leg: Edema present.   Skin:     General: Skin is warm and dry.      Coloration: Skin is not jaundiced.      Findings: Erythema and rash present.      Comments: Excoriations and scabs on face, chest, and arms   Neurological:      Mental Status: He is alert. Mental status is at baseline.      Motor: Weakness (left arm weakness, but able to dorsiflex his left hand) present.       Labs:   HEMATOLOGY/ ONCOLOGY/ID:            Recent Labs     03/02/22 0259 03/03/22 0404 03/04/22  0543   WBC 9.3 8.0 9.1   RBC 3.79* 3.45* 3.11*   HEMOGLOBIN 11.3* 10.2* 9.2*   HEMATOCRIT 35.5* 32.5* 29.6*   MCV 93.7 94.2 95.2   MCH 29.8 29.6 29.6   RDW 54.3* 55.5* 56.4*   PLATELETCT 227 277 285   MPV 11.1 10.8 10.5   NEUTSPOLYS 67.50 58.50 68.50   LYMPHOCYTES 17.10* 25.50 19.00*   MONOCYTES 12.50 13.50* 10.70   EOSINOPHILS 0.60 0.60 0.20   BASOPHILS 0.50 0.60 0.70     Lab Results   Component Value Date    FERRITIN 665.0 (H) 02/22/2022    IRON 8 (L) 02/22/2022    TOTIRONBC 178 (L) 02/22/2022       RENAL:        Estimated GFR/CRCL = Estimated Creatinine Clearance: 17.4 mL/min (A) (by C-G formula based on SCr of 7.05 mg/dL (HH)).  Recent Labs     03/02/22 0259 03/03/22 0404 03/04/22  0543   SODIUM 131* 131* 132*   POTASSIUM 5.9* 6.4* 5.8*   CHLORIDE 93* 93* 94*   CO2 24 26 27   GLUCOSE 164* 178* 192*   BUN 71* 51* 49*   CREATININE 9.65* 7.44* 7.05*   CALCIUM 8.4* 8.3* 8.2*   PHOSPHORUS  --  5.6*  --    ALBUMIN 2.8* 2.9* 2.9*       GASTROINTESTINAL/ HEPATIC:          Recent Labs     03/02/22  0259 03/03/22  0404 03/04/22  0543   ALTSGPT <5 5 6    ASTSGOT 14 26 28   ALKPHOSPHAT 354* 345* 360*   TBILIRUBIN 0.4 0.4 0.4   ALBUMIN 2.8* 2.9* 2.9*   GLOBULIN 3.9* 3.5 3.5     No results found for: AMMONIA    ENDOCRINE:              Recent Labs     03/02/22  0259 03/03/22  0404 03/04/22  0543   GLUCOSE 164* 178* 192*     Lab Results   Component Value Date    HBA1C 6.5 (H) 02/22/2022    HBA1C 7.2 (H) 05/19/2020       Imaging:   MR-LUMBAR SPINE-WITH & W/O  02/22/22 0737  1. Bilateral L5 pars defects. However, there is no anterolisthesis.     2. Small left facet origin subligamentous synovial cyst projects into the posterolateral spinal canal behind L5 without significant thecal sac compression. Bilateral mild facet degeneration at L4-5 and L5-S1.     3. No abnormal enhancement is identified in the lumbar spine to suggest an infectious process.     MR-THORACIC SPINE-WITH & W/O  02/22/22 0731  1. MRI of the thoracic spine without and with contrast within normal limits.     DX-CHEST-PORTABLE (1 VIEW) 02/21/22 2135  1.  No acute cardiopulmonary disease.   2.  Cardiomegaly     CT scan of the abdomen and pelvis without contrast   1.  No evidence of acute inflammatory process in the abdomen or pelvis  2.  Anasarca  3.  Prominent venous collaterals in the BILATERAL inguinal region  4.  BILATERAL L5 pars defects without spondylolisthesis     ECHO  LV EF:  30%   Global hypokinesis.  Probably grade II diastolic dysfunction.     Problem Representation:  Mr. Watkins is a 46 year old male who presents 2/21/22 for back pain, His past medical history includes Methamphetamine abuse, Hypertension, Diabetes Mellitus, End Stage Renal Disease on hemodialysis at [facility] every Tues/Thurs/Sat.  Reportedly missing hemodialysis due to transportation issues. Describes his back pain as dull, nonradiating, 8 out of 10 intensity, worse with movement relieved with rest.      * End stage renal disease (HCC)  Assessment & Plan  ESRD currently requiring daily dialysis.  Complex fluid balance  management   -Nephrology following, started on Lasix on  3/4/2022 by nephrology.  -Continue calcitriol, calcium, patiromer.  Hold patiromer if K less than 4    Heart failure with reduced ejection fraction (HCC)  Assessment & Plan  Echo 2/4/2022 EF 35%, likely secondary to fluid overload from ESRD.    -Continue hemodialysis per nephrology  -Continue hydralazine  -1.5L Fluid restriction, salt restriction  -Daily weights  -Repeat Echo once euvolemic; if HFrEF persist consider further cardiology evaluation.     Hypertension- (present on admission)  Assessment & Plan  Persistent hypotension likely secondary to fluid overload  Continue dialysis per nephrology, hydralazine  Optimize pain control    Pruritus  Assessment & Plan  -hydrocortisone cream   -CTM    Paresthesia  Assessment & Plan  -Vascular surgery consulted, following  -Pain management, observation for increased swelling, new loss of sensation, or loss of strength; if symptoms are worsening, ultrasound of the left upper extremity  -CTM    Diarrhea  Assessment & Plan  Resolved  -monitor and replete electrolytes  -CTM for recurrence      Anemia  Assessment & Plan  Hemoglobin near baseline.  -Ferous sulfate 325mg  -Epoetin 6,000 units during dialysis  -Dialysis  -nephrology onboard, recs appreciated     Hypocalcemia  Assessment & Plan  Stable.  -vitamin D3 (cholecalciferol) tablet 2,000 Units  -calcitRIOL (ROCALTROL) capsule 0.25 mcg  -Calcium acetate  -CTM    Methamphetamine abuse (HCC)  Assessment & Plan  History of methamphetamine use. Urine drug screen ordered but not processed 2/2 limited urine output. Patient denies regular use.  -Counseled on cessation, denies use  -Monitor for agitation and signs of withdrawal    Back pain  Assessment & Plan  -Oral Hydromorphone 1-2mg Q6hrs PRN for pain 3-10 in intensity; titrate as directed for pain intensity.  -Tylenol PRN  -Lidoderm   -OT/PT  -CTM      Hyperkalemia- (present on admission)  Assessment & Plan  Hyperkalemia  secondary to ESRD.    Continue hemodialysis per nephrology.  -On patiromer  -Monitor labs      Leukocytosis- (present on admission)  Assessment & Plan  Per ID pharmacy regarding the rare blood culture growth, levofloxacin renally/dialysis dosed for 14 days. WBC within normal limits at 8.8 starting 3/1/22.  -PO Levofloxacin 750mg once, PO Levofloxacin 500mg [id number] for 14 days    DM (diabetes mellitus) ([facility])  Assessment & Plan  -Sliding scale  -Hypoglycemia protocol   -Continue to monitor  -Renal diet      Code Status: FULL  DVT ppx: Heparin   Diet: Renal

## 2022-03-05 NOTE — PROGRESS NOTES
Monitor summary:   Sinus rhythm-sinus tachycardia   Rare PVC's and couplets   0.18/0.09/0.36

## 2022-03-05 NOTE — PROGRESS NOTES
".  [facility] -  PROGRESS NOTE               Author: EMMA Garcia Date & Time: 3/5/2022  1:52 PM     HPI:  This is a 46 year old male with past medical history of End Stage Renal Disease on hemodialysis at [facility] every Tues/Thurs/Sat, diabetes mellitus and Hypertension who presented to the emergency department on 2/21/22 complaining of lower back pain.  Reportedly missing hemodialysis due to transportation issues. MRI this am and medicated for pain. Seen on Hemodialysis this am, lethargic after PRN medications.     DAILY NEPHROLOGY SUMMARY:  2/23: 3.5L Net UF. Resting in bed. Calm and agreeable to HD today. C/o cont. Back pain. Bps low this am.   2/24: Echo in progress. Primary team at . Resting in bed. C/o cont back pain. 2.0L net UF. K 4.0, CO2 22. Denies Diarrhea. Denies CP/SOB.   2/25: Upset about NPO this am for OR.  In bed, no acute distress. Bps improved. K 4.7, CO2 16.   2/26: S/p AVG creation and fistulogram. 1.5 L net UF. Sitting up at side of bed, eating lunch. C/o SOB and nausea.  C/o L AVF pain.   2/27: Laying in bed. C/o L arm pain.   2/28: Pt seen on HD, said he did not sleep well, some ongoing LUE discomfort, VSS on RA, labs reviewed  3/1: Pt in bed, asking for pain meds, says his whole L side hurts, iHD yesterday with 3.1L net UF, refusing tele monitor overnight, labs reviewed, VSS on RA, says he is not making urine  3/2: Seen on HD.  C/o cramping. 3.5L net UF. Denies fever/chills. C/o SOB with exertion, on RA. K 5.9.   3/3: Pt ambulatory in room, reports that LLE edema is not improving, K+ 6.4 this AM, iHD yesterday with 2.5L net UF, repeat HD ordered today for hyperK, pt given temporizing measures this AM per primary team, VSS on RA  3/4: HD yest, UF 3L. K+ still elevated, lots of edema. Pt making urine. Feels depressed.  3/5: IHD yesterday, net UF 3L. VSS RA, SBP 150s. Na 134, K 5.0  No CP, some shortness of breath that is \"how he is all " "the time\" also itchy, which he reports he is all the time.      REVIEW OF SYSTEMS:    10 point ROS reviewed and is as per HPI/daily summary or otherwise negative    PMH/PSH/SH/FH: Reviewed and unchanged since admission note  CURRENT MEDICATIONS: Reviewed from admission to present day    VS:  /96   Pulse 99   Temp 36.7 °C (98 °F) (Temporal)   Resp 18   Ht 1.778 m (5' 10\")   Wt 121 kg (266 lb 15.6 oz)   SpO2 96%   BMI 38.31 kg/m²   Physical Exam  Constitutional:       General: He is not in acute distress.     Appearance: He is ill-appearing.   HENT:      Head: Normocephalic and atraumatic.      Mouth/Throat:      Dentition: Abnormal dentition.   Eyes:      Extraocular Movements: Extraocular movements intact.      Conjunctiva/sclera: Conjunctivae normal.      Pupils: Pupils are equal, round, and reactive to light.   Cardiovascular:      Rate and Rhythm: Normal rate and regular rhythm.      Pulses: Normal pulses.      Heart sounds: Normal heart sounds. No murmur heard.    No friction rub. No gallop.      Comments: RIJ TDC  L AVG +Bruit/+Thrill, some surrounding edema  Pulmonary:      Effort: Pulmonary effort is normal. No respiratory distress.      Breath sounds: Normal breath sounds. No stridor. No wheezing or rhonchi.   Abdominal:      General: There is no distension.      Palpations: Abdomen is soft. There is no mass.   Musculoskeletal:         General: Swelling present. Normal range of motion.      Right lower leg: Edema present.      Left lower leg: Edema (LLE>RLE) present.      Comments: 2+ generalized edema    Skin:     General: Skin is warm and dry.      Comments: Scattered scabbing   Neurological:      Mental Status: He is alert.   Psychiatric:      Comments: Irritable         Fluids:  In: 860 [P.O.:360; Dialysis:500]  Out: 3500     LABS:  Recent Labs     03/03/22  0404 03/04/22  0543 03/05/22  0534   SODIUM 131* 132* 134*   POTASSIUM 6.4* 5.8* 5.0   CHLORIDE 93* 94* 95*   CO2 26 27 25   GLUCOSE " 178* 192* 166*   BUN 51* 49* 49*   CREATININE 7.44* 7.05* 6.47*   CALCIUM 8.3* 8.2* 8.7     MR-LUMBAR SPINE-WITH & W/O  Order: [id number]   Status: Final result     Visible to patient: No (inaccessible in MyChart)     Next appt: None     0 Result Notes    Details    Reading Physician Reading Date Result Priority   Lisa Link M.D.  [phone] 2/22/2022 STAT     Narrative & Impression     2/22/2022 1:06 AM     HISTORY/REASON FOR EXAM:  Epidural abscess suspected        TECHNIQUE/EXAM DESCRIPTION:  MRI of the lumbar spine without and with contrast.     The study was performed on a [email or web address] Signa 1.5 Jacqueline MRI scanner.     T1 sagittal, T2 fast spin-echo sagittal, and T2 axial images were obtained of the lumbar spine. T1 postcontrast fat-suppressed sagittal images were obtained.     20 mL ProHance contrast was administered intravenously.     COMPARISON:  None.     FINDINGS:  Lumbar spine alignment is normal.  Bilateral L5 pars defects without anterolisthesis.  Bone marrow signal intensity is normal. Mild disc desiccation is noted at L4-L5.  Bilateral pars defects noted at L5. The conus terminates at L1.     Lumbar spine levels:     T12-L1: No significant abnormality.     L1-2: Mild facet degeneration. The stenosis.     L2-3: Minimal facet degeneration. No significant canal stenosis or foraminal narrowing.     L3-4: Broad-based disc bulge and bilateral mild facet degeneration. There is no significant spinal canal narrowing or neuroforaminal narrowing.     L4-5, bilateral facet degeneration. Broad-based disc bulge with a focal central protrusion that minimally encroaches upon the spinal canal. There is no significant foraminal narrowing. There is minimal canal narrowing without thecal sac compression.   There is a left facet origin synovial cyst that extends into the left subligamentous at the level of the left L5 pars defect and minimally encroaches upon the spinal canal from the posterolateral aspect. Bilateral L5  pars defects are present.     Postcontrast images through the lumbar spine does not demonstrate any abnormal enhancement. Minimal epidural enhancement identified at the dorsal aspect of L5 is likely related to degenerative changes in the facet joints and synovial hypertrophy. No   pathological epidural enhancement to suggest infection identified.     IMPRESSION:        Bilateral L5 pars defects. However, there is no anterolisthesis.     Small left facet origin subligamentous synovial cyst projects into the posterolateral spinal canal behind L5 without significant thecal sac compression. Bilateral mild facet degeneration at L4-5 and L5-S1.     No abnormal enhancement is identified in the lumbar spine to suggest an infectious process.      HISTORY/REASON FOR EXAM:  Epidural abscess suspected        TECHNIQUE/EXAM DESCRIPTION:  MRI of the thoracic spine without and with contrast.     The study was performed on a [employer] Signa 1.5 Jacqueline MRI scanner. T1 sagittal, T2 fast spin-echo sagittal, and T2 axial images were obtained of the thoracic spine. T1 post-contrast fat suppressed sagittal images were obtained. Optional T1 post-contrast   axial images may be obtained.     20 mL ProHance contrast was administered intravenously.     COMPARISON:  None.     FINDINGS:     Alignment in the thoracic spine is normal. Marrow signal in the vertebral bodies is within normal limits. There is no abnormal osseous enhancement in the vertebral bodies or other abnormal extradural enhancement. The disc spaces are intact at all   thoracic levels. There are no significant osteophytic changes. The prevertebral and paraspinous soft tissues are unremarkable.     The thoracic spinal cord is normal in caliber and signal throughout its course. There is no abnormal cord enhancement. There is no abnormal intradural extramedullary enhancement.     At T1-2 through T11-12 there is no significant disc bulge or protrusion. The neural foramina are intact at  all thoracic levels. There is no congenital or acquired central spinal stenosis at any thoracic level. There is no significant ligamentous or facet   hypertrophy.     IMPRESSION:        MRI of the thoracic spine without and with contrast within normal limits.    US-HEMODIALYSIS GRAFT DUPLEX COMP UPPER EXTREMITY  Order: [id number]   Status: Final result     Visible to patient: No (inaccessible in MyChart)     Next appt: None     0 Result Notes    Details    Reading Physician Reading Date Result Priority   No Reading Provider Prelim 2022    Buck Kenyon M.D.  [phone] 2022      Narrative & Impression         Hemodialysis Access Report      Vascular Laboratory   Conclusions   Hemodialysis loop graft with the proximal anastomosis at the distal    brachial artery and the distal anastomosis at the basilic vein in the mid    biceps.   No flow is demonstrated in the hemodialysis graft. Echolucent material    fills the graft throughout its length.      No prior study available for comparison.      CONY MORAN      Exam Date:     2022 18:09      Room #:     Inpatient      Priority:     Routine      Ht (in):             Wt (lb):      Ordering Physician:        LIVAN BARBOZA      Referring Physician:       [id number] Mckeon      Sonographer:               Kamala Ball RVT      Study Type:                Complete Unilateral      Technical Quality:         Adequate      Age:    46    Gender:     M      MRN:    [id number]      :    1975      BSA:      Indications:     AV Fistula - S/P, AV Fistula / Thrombosed      CPT Codes:       93680      ICD Codes:       447  996.73      History:         Left arm brachial to basilic loop graft. No prior duplex.      Limitations:      Exam Data:   Side:          Left            Access          Graft   Inflow Artery   Brachial   Outflow Vein    Basilic                         Velocity (cm/s)    Prox Inflow artery    Mid Inflow artery           109.0                                 0    Distal Inflow artery        95.00    Inflow Anastomosis          0.00    Inflow Artery distal to     anastomosis          Proximal Graft              0.00    Mid Graft                   0.00    Distal Graft                0.00    Outflow Anastomosis         0.00    Proximal Outflow Vein       10.00    Mid Outflow Vein    Distal Outflow Vein    Flow Volume Mid Bicep            ml/min    Flow Volume Mid-Forearm          ml/min    Prox Outflow Vein Diam            cm    Mid Outflow Vein Diam             cm    Distal Outflow Vein Diam          cm      Findings   Hemodialysis loop graft with the proximal anastomosis at the distal    brachial artery and the distal anastomosis at the basilic vein in the mid    biceps.    Doppler waveforms of the brachial artery are of high amplitude and    triphasic.   No flow is demonstrated in the hemodialysis graft. Echolucent material    fills the graft throughout its length.    The basilic and brachial veins are compressible.      Buck Kenyon MD   (Electronically Signed)   Final Date:      23 February 2022                     20:51     IMPRESSION:  # ESRD  - Etiology likely 2/2 DM/HTN  - via RIJ TDC  # Thrombosed Left AVF  - US 2/23 no flow  - s/p OR 2/25 AVG Brachio-axillary Creation, thrombectomy and fistulogram   # HTN, labile control   - Goal BP < 140/90  # Anemia of CKD  - Goal Hgb 10-11  - At goal  - On PO Fe  # CKD-MBD  -   - Vit D 9  - CCa 7.1 --> 7.68  - PO4 9.5-->7.5-->5.6  - On calcitriol, calcium acetate and cholecalciferol   # Hyperkalemia  - s/p temporizing measures x 2  - on Veltessa  # Back Pain  - Elevated CRP  - MRI of Lumbar and Thoracic spine 2/22 (-) for epidural abscess  # Methamphetamine use   - Cessation advised  # DM   - A1C 6.5  # Severe metabolic acidosis, corrected  - Off bicarb gtt   - Correct with HD   # Leukocytosis, resolved   - BCx2 2/23 (+) Gram - Rods   - C.diff (-) 2/23  - CT abd pelvis no acute abnormality  #  Stenotrophomonas maltophilia bacteremia  - IV C3--> PO Levaquin x 14 days per ID recs     PLAN:  - iHD today (SAT)   - Evaluate the need for dialysis daily  - Continue iHD qMWF  - Challenge UF as able   - Add furosemide 80 mg BID  - Hold antihtn prior to HD to allow for UF  - Off midodrine  - Continue cholecalciferol   - Continue calcium acetate WM, phos improved  - JACKIE with HD to maintain Hgb between 10-11  - Transfuse PRN Hgb <7   - No dietary protein restrictions  - Dose all meds per ESRD/iHD  - Abx per primary team/ID  - Low Na/fluid restricted renal diet  - Follow labs  -Check PHOS  - AVG use per Vasc Surg recs; currently using RIJ TDC  - Daily patiromer for hyperK+. Stop/hold if serum K+ <4   - OK to transition back to OP HD once medically cleared    Thank you,

## 2022-03-05 NOTE — CARE PLAN
Problem: Pain - Standard  Goal: Alleviation of pain or a reduction in pain to the patient’s comfort goal  Outcome: Progressing   Pain moderately controlled with scheduled tylenol.   Problem: Hemodynamics  Goal: Patient's hemodynamics, fluid balance and neurologic status will be stable or improve  Outcome: Progressing  VS stable. HTN meds administered.   The patient is Stable - Low risk of patient condition declining or worsening    Shift Goals  Clinical Goals: stable VS, pain control, and rest  Patient Goals: rest  Family Goals: N/A    Progress made toward(s) clinical / shift goals:  stable Vs, rest    Patient is not progressing towards the following goals:

## 2022-03-05 NOTE — ASSESSMENT & PLAN NOTE
"On 3/5/22 patient reports daily stools, that are hard in the setting of lower abdominal \"fullness\". Constipation likely secondary to narcotic use.   -Colace dialy, titrate as needed. Hold for loose stools  -Miralax daily, titrate as clinically indicated. Hold for loose stools.  -CTM  "

## 2022-03-06 ENCOUNTER — APPOINTMENT (OUTPATIENT)
Dept: RADIOLOGY | Facility: MEDICAL CENTER | Age: 47
DRG: 264 | End: 2022-03-06
Attending: HOSPITALIST
Payer: MEDICARE

## 2022-03-06 LAB
ALBUMIN SERPL BCP-MCNC: 3.3 G/DL (ref 3.2–4.9)
ALBUMIN/GLOB SERPL: 0.9 G/DL
ALP SERPL-CCNC: 395 U/L (ref 30–99)
ALT SERPL-CCNC: 14 U/L (ref 2–50)
ANION GAP SERPL CALC-SCNC: 13 MMOL/L (ref 7–16)
AST SERPL-CCNC: 35 U/L (ref 12–45)
BASOPHILS # BLD AUTO: 0.3 % (ref 0–1.8)
BASOPHILS # BLD: 0.04 K/UL (ref 0–0.12)
BILIRUB SERPL-MCNC: 0.6 MG/DL (ref 0.1–1.5)
BUN SERPL-MCNC: 42 MG/DL (ref 8–22)
CALCIUM SERPL-MCNC: 8.8 MG/DL (ref 8.5–10.5)
CHLORIDE SERPL-SCNC: 91 MMOL/L (ref 96–112)
CO2 SERPL-SCNC: 27 MMOL/L (ref 20–33)
CREAT SERPL-MCNC: 5.82 MG/DL (ref 0.5–1.4)
EOSINOPHIL # BLD AUTO: 0.02 K/UL (ref 0–0.51)
EOSINOPHIL NFR BLD: 0.1 % (ref 0–6.9)
ERYTHROCYTE [DISTWIDTH] IN BLOOD BY AUTOMATED COUNT: 57.6 FL (ref 35.9–50)
GLOBULIN SER CALC-MCNC: 3.6 G/DL (ref 1.9–3.5)
GLUCOSE BLD STRIP.AUTO-MCNC: 149 MG/DL (ref 65–99)
GLUCOSE BLD STRIP.AUTO-MCNC: 167 MG/DL (ref 65–99)
GLUCOSE BLD STRIP.AUTO-MCNC: 184 MG/DL (ref 65–99)
GLUCOSE SERPL-MCNC: 198 MG/DL (ref 65–99)
HCT VFR BLD AUTO: 28.5 % (ref 42–52)
HGB BLD-MCNC: 8.9 G/DL (ref 14–18)
IMM GRANULOCYTES # BLD AUTO: 0.1 K/UL (ref 0–0.11)
IMM GRANULOCYTES NFR BLD AUTO: 0.7 % (ref 0–0.9)
LYMPHOCYTES # BLD AUTO: 1.5 K/UL (ref 1–4.8)
LYMPHOCYTES NFR BLD: 10.6 % (ref 22–41)
MCH RBC QN AUTO: 29.4 PG (ref 27–33)
MCHC RBC AUTO-ENTMCNC: 31.2 G/DL (ref 33.7–35.3)
MCV RBC AUTO: 94.1 FL (ref 81.4–97.8)
MONOCYTES # BLD AUTO: 1.01 K/UL (ref 0–0.85)
MONOCYTES NFR BLD AUTO: 7.1 % (ref 0–13.4)
NEUTROPHILS # BLD AUTO: 11.48 K/UL (ref 1.82–7.42)
NEUTROPHILS NFR BLD: 81.2 % (ref 44–72)
NRBC # BLD AUTO: 0 K/UL
NRBC BLD-RTO: 0 /100 WBC
PHOSPHATE SERPL-MCNC: 4.2 MG/DL (ref 2.5–4.5)
PLATELET # BLD AUTO: 314 K/UL (ref 164–446)
PMV BLD AUTO: 10.2 FL (ref 9–12.9)
POTASSIUM SERPL-SCNC: 5.2 MMOL/L (ref 3.6–5.5)
PROT SERPL-MCNC: 6.9 G/DL (ref 6–8.2)
RBC # BLD AUTO: 3.03 M/UL (ref 4.7–6.1)
SODIUM SERPL-SCNC: 131 MMOL/L (ref 135–145)
WBC # BLD AUTO: 14.2 K/UL (ref 4.8–10.8)

## 2022-03-06 PROCEDURE — 700102 HCHG RX REV CODE 250 W/ 637 OVERRIDE(OP): Performed by: STUDENT IN AN ORGANIZED HEALTH CARE EDUCATION/TRAINING PROGRAM

## 2022-03-06 PROCEDURE — 700111 HCHG RX REV CODE 636 W/ 250 OVERRIDE (IP): Performed by: STUDENT IN AN ORGANIZED HEALTH CARE EDUCATION/TRAINING PROGRAM

## 2022-03-06 PROCEDURE — 84100 ASSAY OF PHOSPHORUS: CPT

## 2022-03-06 PROCEDURE — 700102 HCHG RX REV CODE 250 W/ 637 OVERRIDE(OP): Performed by: NURSE PRACTITIONER

## 2022-03-06 PROCEDURE — A9270 NON-COVERED ITEM OR SERVICE: HCPCS | Performed by: INTERNAL MEDICINE

## 2022-03-06 PROCEDURE — A9270 NON-COVERED ITEM OR SERVICE: HCPCS | Performed by: HOSPITALIST

## 2022-03-06 PROCEDURE — A9270 NON-COVERED ITEM OR SERVICE: HCPCS | Performed by: NURSE PRACTITIONER

## 2022-03-06 PROCEDURE — 700102 HCHG RX REV CODE 250 W/ 637 OVERRIDE(OP): Performed by: INTERNAL MEDICINE

## 2022-03-06 PROCEDURE — 99232 SBSQ HOSP IP/OBS MODERATE 35: CPT | Mod: GC | Performed by: INTERNAL MEDICINE

## 2022-03-06 PROCEDURE — 71045 X-RAY EXAM CHEST 1 VIEW: CPT

## 2022-03-06 PROCEDURE — 700101 HCHG RX REV CODE 250: Performed by: HOSPITALIST

## 2022-03-06 PROCEDURE — A9270 NON-COVERED ITEM OR SERVICE: HCPCS | Performed by: STUDENT IN AN ORGANIZED HEALTH CARE EDUCATION/TRAINING PROGRAM

## 2022-03-06 PROCEDURE — 770020 HCHG ROOM/CARE - TELE (206)

## 2022-03-06 PROCEDURE — 700102 HCHG RX REV CODE 250 W/ 637 OVERRIDE(OP): Performed by: HOSPITALIST

## 2022-03-06 PROCEDURE — 85025 COMPLETE CBC W/AUTO DIFF WBC: CPT

## 2022-03-06 PROCEDURE — 80053 COMPREHEN METABOLIC PANEL: CPT

## 2022-03-06 PROCEDURE — 36415 COLL VENOUS BLD VENIPUNCTURE: CPT

## 2022-03-06 PROCEDURE — 87040 BLOOD CULTURE FOR BACTERIA: CPT

## 2022-03-06 PROCEDURE — 82962 GLUCOSE BLOOD TEST: CPT | Mod: 91

## 2022-03-06 RX ORDER — HYDROMORPHONE HYDROCHLORIDE 2 MG/1
1 TABLET ORAL EVERY 8 HOURS PRN
Status: DISCONTINUED | OUTPATIENT
Start: 2022-03-06 | End: 2022-03-07

## 2022-03-06 RX ADMIN — PATIROMER 8.4 G: 8.4 POWDER, FOR SUSPENSION ORAL at 02:36

## 2022-03-06 RX ADMIN — FERROUS SULFATE TAB 325 MG (65 MG ELEMENTAL FE) 325 MG: 325 (65 FE) TAB at 09:17

## 2022-03-06 RX ADMIN — ACETAMINOPHEN 975 MG: 325 TABLET, FILM COATED ORAL at 18:05

## 2022-03-06 RX ADMIN — DOCUSATE SODIUM 50 MG AND SENNOSIDES 8.6 MG 2 TABLET: 8.6; 5 TABLET, FILM COATED ORAL at 06:19

## 2022-03-06 RX ADMIN — DOCUSATE SODIUM 100 MG: 100 CAPSULE, LIQUID FILLED ORAL at 06:00

## 2022-03-06 RX ADMIN — PATIROMER 8.4 G: 8.4 POWDER, FOR SUSPENSION ORAL at 21:29

## 2022-03-06 RX ADMIN — ACETAMINOPHEN 975 MG: 325 TABLET, FILM COATED ORAL at 23:34

## 2022-03-06 RX ADMIN — INSULIN LISPRO 2 UNITS: 100 INJECTION, SOLUTION INTRAVENOUS; SUBCUTANEOUS at 09:15

## 2022-03-06 RX ADMIN — DIPHENHYDRAMINE HYDROCHLORIDE 12.5 MG: 25 TABLET ORAL at 06:21

## 2022-03-06 RX ADMIN — HEPARIN SODIUM 5000 UNITS: 5000 INJECTION, SOLUTION INTRAVENOUS; SUBCUTANEOUS at 06:23

## 2022-03-06 RX ADMIN — HEPARIN SODIUM 5000 UNITS: 5000 INJECTION, SOLUTION INTRAVENOUS; SUBCUTANEOUS at 22:00

## 2022-03-06 RX ADMIN — ACETAMINOPHEN 975 MG: 325 TABLET, FILM COATED ORAL at 06:20

## 2022-03-06 RX ADMIN — INSULIN LISPRO 2 UNITS: 100 INJECTION, SOLUTION INTRAVENOUS; SUBCUTANEOUS at 21:33

## 2022-03-06 RX ADMIN — HYDRALAZINE HYDROCHLORIDE 25 MG: 25 TABLET, FILM COATED ORAL at 06:20

## 2022-03-06 RX ADMIN — DIPHENHYDRAMINE HYDROCHLORIDE 12.5 MG: 25 TABLET ORAL at 23:35

## 2022-03-06 RX ADMIN — HEPARIN SODIUM 5000 UNITS: 5000 INJECTION, SOLUTION INTRAVENOUS; SUBCUTANEOUS at 18:06

## 2022-03-06 RX ADMIN — LIDOCAINE 1 PATCH: 50 PATCH TOPICAL at 18:05

## 2022-03-06 RX ADMIN — HYDROMORPHONE HYDROCHLORIDE 1 MG: 2 TABLET ORAL at 19:40

## 2022-03-06 RX ADMIN — DOCUSATE SODIUM 50 MG AND SENNOSIDES 8.6 MG 2 TABLET: 8.6; 5 TABLET, FILM COATED ORAL at 18:05

## 2022-03-06 RX ADMIN — FUROSEMIDE 80 MG: 40 TABLET ORAL at 18:04

## 2022-03-06 RX ADMIN — Medication 2668 MG: at 09:17

## 2022-03-06 RX ADMIN — FUROSEMIDE 80 MG: 40 TABLET ORAL at 06:19

## 2022-03-06 RX ADMIN — ACETAMINOPHEN 975 MG: 325 TABLET, FILM COATED ORAL at 00:52

## 2022-03-06 RX ADMIN — Medication 2000 UNITS: at 06:20

## 2022-03-06 RX ADMIN — CALCITRIOL CAPSULES 0.25 MCG 0.25 MCG: 0.25 CAPSULE ORAL at 06:19

## 2022-03-06 RX ADMIN — HYDROMORPHONE HYDROCHLORIDE 1 MG: 2 TABLET ORAL at 23:34

## 2022-03-06 RX ADMIN — Medication 2668 MG: at 18:04

## 2022-03-06 RX ADMIN — LEVOFLOXACIN 500 MG: 500 TABLET, FILM COATED ORAL at 06:20

## 2022-03-06 RX ADMIN — HYDRALAZINE HYDROCHLORIDE 25 MG: 25 TABLET, FILM COATED ORAL at 18:05

## 2022-03-06 RX ADMIN — DOCUSATE SODIUM 100 MG: 100 CAPSULE, LIQUID FILLED ORAL at 18:05

## 2022-03-06 ASSESSMENT — ENCOUNTER SYMPTOMS
PHOTOPHOBIA: 1
SORE THROAT: 0
FEVER: 0
EYE DISCHARGE: 0
TINGLING: 1
WEIGHT LOSS: 0
PALPITATIONS: 0
CONSTIPATION: 1
SHORTNESS OF BREATH: 1
SENSORY CHANGE: 1
DIZZINESS: 0
CHILLS: 1
FLANK PAIN: 1
BACK PAIN: 1
ABDOMINAL PAIN: 1
LOSS OF CONSCIOUSNESS: 0

## 2022-03-06 NOTE — PROGRESS NOTES
"Daily Progress Note:     Date of Service: 3/6/2022  Primary Team: [facility]   Attending: Nino Reyes M.D.   Senior Resident: Dr. Doran  Intern: Dr. Chávez  Contact:  [phone]    Chief Complaint:   Low Back Pain     ID: Mr. Watkins is a 46 year old male who presents 2/21/22 for back pain. His past medical history includes Methamphetamine abuse, Hypertension, Diabetes Mellitus, End Stage Renal Disease on hemodialysis at [facility] every Tues/Thurs/Sat.  Reportedly missing hemodialysis due to transportation issues. Describes his back pain as dull, nonradiating, 8 out of 10 intensity, worse with movement relieved with rest.    Subjective:  -Received dialysis on 3/5/22 reports \"chills\" afterwards. -Urinated in the evening of 3/5/22 and morning of 3/6/22 - low volume, light yellow  -Reports bowel movement 3/6/22  -some left arm tenderness and \"tingling\"  -WBC increasing, patient reports some SOB blood cultures and CXR ordered  -Patient with a large cup of Soda from [employer]. He reports he placed an order to the hospital because he \"wanted a sand which\"     Consultants/Specialty:  Nephrology  Vascular    Review of Systems:    Review of Systems   Constitutional: Positive for chills. Negative for fever and weight loss.   HENT: Negative for nosebleeds and sore throat.    Eyes: Positive for photophobia. Negative for discharge.   Respiratory: Positive for shortness of breath.    Cardiovascular: Negative for chest pain and palpitations.   Gastrointestinal: Positive for abdominal pain and constipation.   Genitourinary: Positive for flank pain. Negative for dysuria and hematuria.   Musculoskeletal: Positive for back pain.   Skin: Positive for rash.   Neurological: Positive for tingling and sensory change. Negative for dizziness and loss of consciousness.       Objective Data:   Physical Exam:   Vitals:   Temp:  [36.8 °C (98.2 °F)-36.9 °C (98.5 °F)] 36.9 °C (98.4 °F)  Pulse:  [] 88  Resp:  " [16-20] 16  BP: (139-156)/(80-94) 139/81  SpO2:  [96 %-99 %] 96 %    Physical Exam  Constitutional:       General: He is not in acute distress.     Appearance: He is obese.   HENT:      Head: Normocephalic and atraumatic.      Nose: No congestion.      Mouth/Throat:      Mouth: Mucous membranes are moist.      Comments: Poor dentition    Eyes:      General:         Right eye: No discharge.         Left eye: No discharge.      Pupils: Pupils are equal, round, and reactive to light.   Cardiovascular:      Rate and Rhythm: Normal rate.      Pulses: Normal pulses.      Heart sounds: Normal heart sounds. No murmur heard.  Pulmonary:      Effort: Pulmonary effort is normal. No respiratory distress.   Abdominal:      General: Bowel sounds are normal. There is distension.      Palpations: Abdomen is soft.      Tenderness: There is abdominal tenderness (bilaterally at flanks).      Comments: Rigid flanks bilaterally, 1-2+ pitting edema in flanks bilaterally   Musculoskeletal:         General: Swelling (left upper and lower arm) and tenderness (left upper and lower arm) present.      Cervical back: Normal range of motion. No rigidity.      Right lower leg: Edema present.      Left lower leg: Edema present.      Comments: Left lower leg and left foot swelling worse than on the right   Skin:     General: Skin is warm and dry.      Coloration: Skin is not jaundiced.      Findings: Erythema and rash present.      Comments: Excoriations and scabs on face, chest, and arms   Neurological:      Mental Status: He is alert. Mental status is at baseline.      Motor: Weakness (left arm weakness, but able to dorsiflex his left hand) present.       Labs:   HEMATOLOGY/ ONCOLOGY/ID:            Recent Labs     03/04/22  0543 03/06/22  0324   WBC 9.1 14.2*   RBC 3.11* 3.03*   HEMOGLOBIN 9.2* 8.9*   HEMATOCRIT 29.6* 28.5*   MCV 95.2 94.1   MCH 29.6 29.4   RDW 56.4* 57.6*   PLATELETCT 285 314   MPV 10.5 10.2   NEUTSPOLYS 68.50 81.20*    LYMPHOCYTES 19.00* 10.60*   MONOCYTES 10.70 7.10   EOSINOPHILS 0.20 0.10   BASOPHILS 0.70 0.30     Lab Results   Component Value Date    FERRITIN 665.0 (H) 02/22/2022    IRON 8 (L) 02/22/2022    TOTIRONBC 178 (L) 02/22/2022       RENAL:        Estimated GFR/CRCL = Estimated Creatinine Clearance: 21.5 mL/min (A) (by C-G formula based on SCr of 5.82 mg/dL (HH)).  Recent Labs     03/04/22 0543 03/05/22  0534 03/06/22  0324   SODIUM 132* 134* 131*   POTASSIUM 5.8* 5.0 5.2   CHLORIDE 94* 95* 91*   CO2 27 25 27   GLUCOSE 192* 166* 198*   BUN 49* 49* 42*   CREATININE 7.05* 6.47* 5.82*   CALCIUM 8.2* 8.7 8.8   PHOSPHORUS  --   --  4.2   ALBUMIN 2.9* 2.9* 3.3       GASTROINTESTINAL/ HEPATIC:          Recent Labs     03/04/22 0543 03/05/22  0534 03/06/22  0324   ALTSGPT 6 11 14   ASTSGOT 28 29 35   ALKPHOSPHAT 360* 385* 395*   TBILIRUBIN 0.4 0.4 0.6   ALBUMIN 2.9* 2.9* 3.3   GLOBULIN 3.5 3.7* 3.6*     No results found for: AMMONIA    ENDOCRINE:              Recent Labs     03/04/22 0543 03/05/22  0534 03/06/22  0324   GLUCOSE 192* 166* 198*     Lab Results   Component Value Date    HBA1C 6.5 (H) 02/22/2022    HBA1C 7.2 (H) 05/19/2020       Imaging:   MR-LUMBAR SPINE-WITH & W/O  02/22/22 0737  1. Bilateral L5 pars defects. However, there is no anterolisthesis.     2. Small left facet origin subligamentous synovial cyst projects into the posterolateral spinal canal behind L5 without significant thecal sac compression. Bilateral mild facet degeneration at L4-5 and L5-S1.     3. No abnormal enhancement is identified in the lumbar spine to suggest an infectious process.     MR-THORACIC SPINE-WITH & W/O  02/22/22 0732  1. MRI of the thoracic spine without and with contrast within normal limits.     DX-CHEST-PORTABLE (1 VIEW) 02/21/22 6600  1.  No acute cardiopulmonary disease.   2.  Cardiomegaly     CT scan of the abdomen and pelvis without contrast   1.  No evidence of acute inflammatory process in the abdomen or pelvis  2.   Anasarca  3.  Prominent venous collaterals in the BILATERAL inguinal region  4.  BILATERAL L5 pars defects without spondylolisthesis     ECHO  LV EF:  30%   Global hypokinesis.  Probably grade II diastolic dysfunction.     Problem Representation:  Mr. Watkins is a 46 year old male who presents 2/21/22 for back pain, His past medical history includes Methamphetamine abuse, Hypertension, Diabetes Mellitus, End Stage Renal Disease on hemodialysis at [facility] every Tues/Thurs/Sat.  Reportedly missing hemodialysis due to transportation issues. Describes his back pain as dull, nonradiating, 8 out of 10 intensity, worse with movement relieved with rest.      * End stage renal disease (HCC)  Assessment & Plan  ESRD initially requiring daily dialysis.  Complex fluid balance management   -Nephrology following, started on Lasix on 3/4/2022 by nephrology. Patient reports urinating evening of 3/4/22  -Continue calcitriol, calcium, patiromer.  Hold patiromer if K less than 4    Acute heart failure (HCC)  Assessment & Plan  Acute heart failure: Echo 2/4/2022 EF 35%, likely secondary to fluid overload from ESRD.    -Continue hemodialysis per nephrology  -Continue hydralazine  -1.5L Fluid restriction, salt restriction  -Daily weights  -Repeat Echo once euvolemic; if HFrEF persist consider further cardiology evaluation.     Leukocytosis- (present on admission)  Assessment & Plan  Per ID pharmacy regarding the rare blood culture growth, levofloxacin renally/dialysis dosed for 14 days. WBC within normal limits at 8.8 starting 3/1/22 but elevated to 14.2 on 3/6/22.  -PO Levofloxacin 750mg once, PO Levofloxacin 500mg O09wween for 14 days (last dose 3/12/22)  -CXR, blood cultures, and urinalysis pending  -Consider abdominal CT with contrast if non-resolving     Paresthesia  Assessment & Plan  -Vascular surgery consulted, following  -Pain management and observation for increased swelling, new loss of sensation, or loss of strength; if  "symptoms are worsening, ultrasound of the left upper extremity  -CTM    Anemia  Assessment & Plan  Hemoglobin baseline is between 9 and 10.  -Ferous sulfate 325mg  -Epoetin 6,000 units during dialysis  -Dialysis  -nephrology onboard, recs appreciated   -CTM    Hyperkalemia- (present on admission)  Assessment & Plan  Hyperkalemia secondary to ESRD. Continue hemodialysis per nephrology. Insulin with dextrose for hyperkalemia plus calcium gluconate for cardiac protection.  -On patiromer  -Monitor labs      Constipation  Assessment & Plan  On 3/5/22 patient reports daily stools, that are hard in the setting of lower abdominal \"fullness\". Constipation likely secondary to narcotic use.   -Colace dialy, titrate as needed. Hold for loose stools  -CTM    Pruritus  Assessment & Plan  Stable  -hydrocortisone cream PRN  -CTM    Diarrhea  Assessment & Plan  Resolved, patient started reporting constipation 3/5/22  -monitor and replete electrolytes if diarrhea recurs  -CTM for recurrence      Hypocalcemia  Assessment & Plan  Stable  -vitamin D3 (cholecalciferol) tablet 2,000 Units  -calcitRIOL (ROCALTROL) capsule 0.25 mcg  -Calcium acetate  -CTM    Methamphetamine abuse (HCC)  Assessment & Plan  History of methamphetamine use. Urine drug screen ordered but not processed 2/2 poor urine output on admission. Patient denies regular use.  -Counseled on cessation, denies use  -Monitor for agitation and signs of withdrawal    Back pain  Assessment & Plan  -Oral Hydromorphone 1-2mg Q8hrs PRN for pain 3-10 in intensity; titrate as directed by pain intensity.  -Tylenol PRN  -Lidoderm   -OT/PT  -CTM      Hypertension- (present on admission)  Assessment & Plan  Persistent hypertension likely secondary to fluid overload  -Continue dialysis per nephrology, hydralazine  -Optimize pain control    DM (diabetes mellitus) ([facility])  Assessment & Plan  Glucose stable with some variations per intake. Patient has been having food delivered to the " [facility].  -Sliding scale  -Hypoglycemia protocol   -Continue to monitor  -Renal diet      Code Status: FULL  DVT ppx: Heparin   Diet: Renal      Majo Chávez MD MPH  PGY-1, [facility]    Assessment and plan discussed with senior resident and attending physician.

## 2022-03-06 NOTE — CARE PLAN
The patient is Stable - Low risk of patient condition declining or worsening    Shift Goals  Clinical Goals: stable vitals, pain control  Patient Goals: rest  Family Goals: N/A    Progress made toward(s) clinical / shift goals:    Problem: Pain - Standard  Goal: Alleviation of pain or a reduction in pain to the patient’s comfort goal  Outcome: Progressing     Problem: Hemodynamics  Goal: Patient's hemodynamics, fluid balance and neurologic status will be stable or improve  Outcome: Progressing     Problem: Respiratory  Goal: Patient will achieve/maintain optimum respiratory ventilation and gas exchange  Outcome: Progressing       Patient is not progressing towards the following goals:  Problem: Knowledge Deficit - Standard  Goal: Patient and family/care givers will demonstrate understanding of plan of care, disease process/condition, diagnostic tests and medications  Outcome: Not Met   Patient refusing tele box despite encouragement and education from staff. Charge RN aware.

## 2022-03-06 NOTE — PROGRESS NOTES
[facility] Progress Note    Hemodialysis treatment ordered today per Dr. Simon x 3 hours. Treatment initiated at 1538 and ended at 1840. Pt A/Ox4, VSS, requested 3hrs of HD, Kenia ROSAS notified and agreed.    Pt tolerated treatment, denies any discomfort, VSS; see paper flow sheets for details.    Net UF 3,000 mL    Post tx, CVC flushed with saline then locked with heparin 1000 units/mL per designated amount in each wing then clamped and capped. Aspirate heparin prior to next CVC use.    Report given to Primary RN.

## 2022-03-06 NOTE — PROGRESS NOTES
".  [facility] -  PROGRESS NOTE               Author: EMMA Garcia Date & Time: 3/6/2022  12:25 PM     HPI:  This is a 46 year old male with past medical history of End Stage Renal Disease on hemodialysis at [facility] every Tues/Thurs/Sat, diabetes mellitus and Hypertension who presented to the emergency department on 2/21/22 complaining of lower back pain.  Reportedly missing hemodialysis due to transportation issues. MRI this am and medicated for pain. Seen on Hemodialysis this am, lethargic after PRN medications.     DAILY NEPHROLOGY SUMMARY:  2/23: 3.5L Net UF. Resting in bed. Calm and agreeable to HD today. C/o cont. Back pain. Bps low this am.   2/24: Echo in progress. Primary team at . Resting in bed. C/o cont back pain. 2.0L net UF. K 4.0, CO2 22. Denies Diarrhea. Denies CP/SOB.   2/25: Upset about NPO this am for OR.  In bed, no acute distress. Bps improved. K 4.7, CO2 16.   2/26: S/p AVG creation and fistulogram. 1.5 L net UF. Sitting up at side of bed, eating lunch. C/o SOB and nausea.  C/o L AVF pain.   2/27: Laying in bed. C/o L arm pain.   2/28: Pt seen on HD, said he did not sleep well, some ongoing LUE discomfort, VSS on RA, labs reviewed  3/1: Pt in bed, asking for pain meds, says his whole L side hurts, iHD yesterday with 3.1L net UF, refusing tele monitor overnight, labs reviewed, VSS on RA, says he is not making urine  3/2: Seen on HD.  C/o cramping. 3.5L net UF. Denies fever/chills. C/o SOB with exertion, on RA. K 5.9.   3/3: Pt ambulatory in room, reports that LLE edema is not improving, K+ 6.4 this AM, iHD yesterday with 2.5L net UF, repeat HD ordered today for hyperK, pt given temporizing measures this AM per primary team, VSS on RA  3/4: HD yest, UF 3L. K+ still elevated, lots of edema. Pt making urine. Feels depressed.  3/5: IHD yesterday, net UF 3L. VSS RA, SBP 150s. Na 134, K 5.0  No CP, some shortness of breath that is \"how he is all " "the time\" also itchy, which he reports he is all the time.    3/6: Standing at bedside.  Itching improved.  No Cp, SOB. iHD yesterday, 3L net UF.   VSS.  Expressed his desire to stop his drug abuse to help facilitate dialysis compliance.      REVIEW OF SYSTEMS:    10 point ROS reviewed and is as per HPI/daily summary or otherwise negative    PMH/PSH/SH/FH: Reviewed and unchanged since admission note  CURRENT MEDICATIONS: Reviewed from admission to present day    VS:  /81   Pulse 88   Temp 36.9 °C (98.4 °F) (Temporal)   Resp 16   Ht 1.778 m (5' 10\")   Wt 125 kg (275 lb 9.2 oz)   SpO2 96%   BMI 39.54 kg/m²   Physical Exam  Constitutional:       General: He is not in acute distress.     Appearance: He is obese. He is ill-appearing.   HENT:      Head: Normocephalic and atraumatic.      Mouth/Throat:      Dentition: Abnormal dentition.   Eyes:      Extraocular Movements: Extraocular movements intact.      Conjunctiva/sclera: Conjunctivae normal.      Pupils: Pupils are equal, round, and reactive to light.   Cardiovascular:      Rate and Rhythm: Normal rate and regular rhythm.      Pulses: Normal pulses.      Heart sounds: Normal heart sounds. No murmur heard.    No friction rub. No gallop.      Comments: RIJ TDC  L AVG +Bruit/+Thrill  Pulmonary:      Effort: Pulmonary effort is normal. No respiratory distress.      Breath sounds: Normal breath sounds. No stridor. No wheezing or rhonchi.   Abdominal:      General: There is no distension.      Palpations: Abdomen is soft. There is no mass.   Musculoskeletal:         General: Swelling present. Normal range of motion.      Right lower leg: Edema present.      Left lower leg: Edema (LLE>RLE) present.      Comments: 2+ generalized edema    Skin:     General: Skin is warm and dry.      Comments: Scattered scabbing   Neurological:      Mental Status: He is alert.   Psychiatric:         Mood and Affect: Mood normal.         Behavior: Behavior normal.         Thought " Content: Thought content normal.         Judgment: Judgment normal.         Fluids:  In: 1030 [P.O.:530; Dialysis:500]  Out: 3500     LABS:  Recent Labs     03/04/22  0543 03/05/22  0534 03/06/22  0324   SODIUM 132* 134* 131*   POTASSIUM 5.8* 5.0 5.2   CHLORIDE 94* 95* 91*   CO2 27 25 27   GLUCOSE 192* 166* 198*   BUN 49* 49* 42*   CREATININE 7.05* 6.47* 5.82*   CALCIUM 8.2* 8.7 8.8     MR-LUMBAR SPINE-WITH & W/O  Order: [id number]   Status: Final result     Visible to patient: No (inaccessible in MyChart)     Next appt: None     0 Result Notes    Details    Reading Physician Reading Date Result Priority   Lisa Link M.D.  [phone] 2/22/2022 STAT     Narrative & Impression     2/22/2022 1:06 AM     HISTORY/REASON FOR EXAM:  Epidural abscess suspected        TECHNIQUE/EXAM DESCRIPTION:  MRI of the lumbar spine without and with contrast.     The study was performed on a [employer] Signa 1.5 Jacqueline MRI scanner.     T1 sagittal, T2 fast spin-echo sagittal, and T2 axial images were obtained of the lumbar spine. T1 postcontrast fat-suppressed sagittal images were obtained.     20 mL ProHance contrast was administered intravenously.     COMPARISON:  None.     FINDINGS:  Lumbar spine alignment is normal.  Bilateral L5 pars defects without anterolisthesis.  Bone marrow signal intensity is normal. Mild disc desiccation is noted at L4-L5.  Bilateral pars defects noted at L5. The conus terminates at L1.     Lumbar spine levels:     T12-L1: No significant abnormality.     L1-2: Mild facet degeneration. The stenosis.     L2-3: Minimal facet degeneration. No significant canal stenosis or foraminal narrowing.     L3-4: Broad-based disc bulge and bilateral mild facet degeneration. There is no significant spinal canal narrowing or neuroforaminal narrowing.     L4-5, bilateral facet degeneration. Broad-based disc bulge with a focal central protrusion that minimally encroaches upon the spinal canal. There is no significant  foraminal narrowing. There is minimal canal narrowing without thecal sac compression.   There is a left facet origin synovial cyst that extends into the left subligamentous at the level of the left L5 pars defect and minimally encroaches upon the spinal canal from the posterolateral aspect. Bilateral L5 pars defects are present.     Postcontrast images through the lumbar spine does not demonstrate any abnormal enhancement. Minimal epidural enhancement identified at the dorsal aspect of L5 is likely related to degenerative changes in the facet joints and synovial hypertrophy. No   pathological epidural enhancement to suggest infection identified.     IMPRESSION:        Bilateral L5 pars defects. However, there is no anterolisthesis.     Small left facet origin subligamentous synovial cyst projects into the posterolateral spinal canal behind L5 without significant thecal sac compression. Bilateral mild facet degeneration at L4-5 and L5-S1.     No abnormal enhancement is identified in the lumbar spine to suggest an infectious process.      HISTORY/REASON FOR EXAM:  Epidural abscess suspected        TECHNIQUE/EXAM DESCRIPTION:  MRI of the thoracic spine without and with contrast.     The study was performed on a [employer] Signa 1.5 Jacqueline MRI scanner. T1 sagittal, T2 fast spin-echo sagittal, and T2 axial images were obtained of the thoracic spine. T1 post-contrast fat suppressed sagittal images were obtained. Optional T1 post-contrast   axial images may be obtained.     20 mL ProHance contrast was administered intravenously.     COMPARISON:  None.     FINDINGS:     Alignment in the thoracic spine is normal. Marrow signal in the vertebral bodies is within normal limits. There is no abnormal osseous enhancement in the vertebral bodies or other abnormal extradural enhancement. The disc spaces are intact at all   thoracic levels. There are no significant osteophytic changes. The prevertebral and paraspinous soft tissues are  unremarkable.     The thoracic spinal cord is normal in caliber and signal throughout its course. There is no abnormal cord enhancement. There is no abnormal intradural extramedullary enhancement.     At T1-2 through T11-12 there is no significant disc bulge or protrusion. The neural foramina are intact at all thoracic levels. There is no congenital or acquired central spinal stenosis at any thoracic level. There is no significant ligamentous or facet   hypertrophy.     IMPRESSION:        MRI of the thoracic spine without and with contrast within normal limits.    US-HEMODIALYSIS GRAFT DUPLEX COMP UPPER EXTREMITY  Order: [id number]   Status: Final result     Visible to patient: No (inaccessible in MyChart)     Next appt: None     0 Result Notes    Details    Reading Physician Reading Date Result Priority   No Reading Provider Prelim 2022    Buck Kenyon M.D.  [phone] 2022      Narrative & Impression         Hemodialysis Access Report      Vascular Laboratory   Conclusions   Hemodialysis loop graft with the proximal anastomosis at the distal    brachial artery and the distal anastomosis at the basilic vein in the mid    biceps.   No flow is demonstrated in the hemodialysis graft. Echolucent material    fills the graft throughout its length.      No prior study available for comparison.      CONY MORAN      Exam Date:     2022 18:09      Room #:     Inpatient      Priority:     Routine      Ht (in):             Wt (lb):      Ordering Physician:        LIVAN BARBOZA      Referring Physician:       [id number]      Sonographer:               Kamala Ball RVT      Study Type:                Complete Unilateral      Technical Quality:         Adequate      Age:    46    Gender:     M      MRN:    [id number]      :    1975      BSA:      Indications:     AV Fistula - S/P, AV Fistula / Thrombosed      CPT Codes:       94338      ICD Codes:       447  996.73      History:         Left  arm brachial to basilic loop graft. No prior duplex.      Limitations:      Exam Data:   Side:          Left            Access          Graft   Inflow Artery   Brachial   Outflow Vein    Basilic                         Velocity (cm/s)    Prox Inflow artery    Mid Inflow artery           109.0                                0    Distal Inflow artery        95.00    Inflow Anastomosis          0.00    Inflow Artery distal to     anastomosis          Proximal Graft              0.00    Mid Graft                   0.00    Distal Graft                0.00    Outflow Anastomosis         0.00    Proximal Outflow Vein       10.00    Mid Outflow Vein    Distal Outflow Vein    Flow Volume Mid Bicep            ml/min    Flow Volume Mid-Forearm          ml/min    Prox Outflow Vein Diam            cm    Mid Outflow Vein Diam             cm    Distal Outflow Vein Diam          cm      Findings   Hemodialysis loop graft with the proximal anastomosis at the distal    brachial artery and the distal anastomosis at the basilic vein in the mid    biceps.    Doppler waveforms of the brachial artery are of high amplitude and    triphasic.   No flow is demonstrated in the hemodialysis graft. Echolucent material    fills the graft throughout its length.    The basilic and brachial veins are compressible.      Buck Kenyon MD   (Electronically Signed)   Final Date:      23 February 2022                     20:51     IMPRESSION:  # ESRD  - Etiology likely 2/2 DM/HTN  - via RIJ TDC  # Thrombosed Left AVF  - US 2/23 no flow  - s/p OR 2/25 AVG Brachio-axillary Creation, thrombectomy and fistulogram   # HTN, labile control   - Goal BP < 140/90  # Anemia of CKD  - Goal Hgb 10-11  - At goal  - On PO Fe  # CKD-MBD  -   - Vit D 9  - CCa 7.1 --> 7.68  - PO4 9.5-->7.5-->5.6->4.2  - On calcitriol, calcium acetate and cholecalciferol   # Hyperkalemia  - s/p temporizing measures x 2  - on Veltessa  # Back Pain  - Elevated CRP  - MRI of  "Lumbar and Thoracic spine 2/22 (-) for epidural abscess  # Methamphetamine use   #Homelessness  - Cessation advised  -Consult place for social work  # DM   - A1C 6.5  # Severe metabolic acidosis, corrected  - Off bicarb gtt   - Correct with HD   # Leukocytosis, resolved   - BCx2 2/23 (+) Gram - Rods   - C.diff (-) 2/23  - CT abd pelvis no acute abnormality  # Stenotrophomonas maltophilia bacteremia  - IV C3--> PO Levaquin x 14 days per ID recs     PLAN:  - iHD tomorrow (MON)   - Evaluate the need for dialysis daily  - Continue iHD qMWF  - Challenge UF as able   - On furosemide 80 mg BID- evaluate urination. Reports going \"small amounts\"   - Hold antihtn prior to HD to allow for UF  - Off midodrine  - Continue cholecalciferol   - Continue calcium acetate WM, phos improved  - JACKIE with HD to maintain Hgb between 10-11  - Transfuse PRN Hgb <7   - No dietary protein restrictions  - Dose all meds per ESRD/iHD  - Abx per primary team/ID  - Low Na/fluid restricted renal diet  - Follow labs  - AVG use per Vasc Surg recs; currently using RIJ TDC last note was 3/1 not yet cleared for use   - Daily patiromer for hyperK+. Stop/hold if serum K+ <4   - OK to transition back to OP HD once medically cleared    Thank you,   "

## 2022-03-06 NOTE — PROGRESS NOTES
Bedside report received, pt care assumed, tele box on. VSS, pt assessment complete. Pt aaox4, no signs of distress noted at this time. POC discussed with pt and verbalizes no questions. Pt denies any additional needs at this time. Bed in lowest position, bed alarm on, pt educated on fall risk and verbalized understanding, call light within reach.

## 2022-03-07 LAB
ALBUMIN SERPL BCP-MCNC: 3.4 G/DL (ref 3.2–4.9)
ALBUMIN/GLOB SERPL: 0.9 G/DL
ALP SERPL-CCNC: 398 U/L (ref 30–99)
ALT SERPL-CCNC: 14 U/L (ref 2–50)
ANION GAP SERPL CALC-SCNC: 14 MMOL/L (ref 7–16)
AST SERPL-CCNC: 27 U/L (ref 12–45)
BASOPHILS # BLD AUTO: 0.5 % (ref 0–1.8)
BASOPHILS # BLD: 0.07 K/UL (ref 0–0.12)
BILIRUB SERPL-MCNC: 0.8 MG/DL (ref 0.1–1.5)
BUN SERPL-MCNC: 39 MG/DL (ref 8–22)
CALCIUM SERPL-MCNC: 8.7 MG/DL (ref 8.5–10.5)
CHLORIDE SERPL-SCNC: 93 MMOL/L (ref 96–112)
CHOLEST SERPL-MCNC: 154 MG/DL (ref 100–199)
CO2 SERPL-SCNC: 26 MMOL/L (ref 20–33)
CREAT SERPL-MCNC: 5.13 MG/DL (ref 0.5–1.4)
EOSINOPHIL # BLD AUTO: 0.02 K/UL (ref 0–0.51)
EOSINOPHIL NFR BLD: 0.1 % (ref 0–6.9)
ERYTHROCYTE [DISTWIDTH] IN BLOOD BY AUTOMATED COUNT: 56.3 FL (ref 35.9–50)
GLOBULIN SER CALC-MCNC: 3.7 G/DL (ref 1.9–3.5)
GLUCOSE BLD STRIP.AUTO-MCNC: 187 MG/DL (ref 65–99)
GLUCOSE BLD STRIP.AUTO-MCNC: 206 MG/DL (ref 65–99)
GLUCOSE SERPL-MCNC: 165 MG/DL (ref 65–99)
HCT VFR BLD AUTO: 28.6 % (ref 42–52)
HDLC SERPL-MCNC: 93 MG/DL
HGB BLD-MCNC: 9.1 G/DL (ref 14–18)
IMM GRANULOCYTES # BLD AUTO: 0.06 K/UL (ref 0–0.11)
IMM GRANULOCYTES NFR BLD AUTO: 0.4 % (ref 0–0.9)
LDLC SERPL CALC-MCNC: 52 MG/DL
LYMPHOCYTES # BLD AUTO: 0.57 K/UL (ref 1–4.8)
LYMPHOCYTES NFR BLD: 4.2 % (ref 22–41)
MCH RBC QN AUTO: 29.5 PG (ref 27–33)
MCHC RBC AUTO-ENTMCNC: 31.8 G/DL (ref 33.7–35.3)
MCV RBC AUTO: 92.9 FL (ref 81.4–97.8)
MONOCYTES # BLD AUTO: 1.27 K/UL (ref 0–0.85)
MONOCYTES NFR BLD AUTO: 9.2 % (ref 0–13.4)
NEUTROPHILS # BLD AUTO: 11.74 K/UL (ref 1.82–7.42)
NEUTROPHILS NFR BLD: 85.6 % (ref 44–72)
NRBC # BLD AUTO: 0 K/UL
NRBC BLD-RTO: 0 /100 WBC
PLATELET # BLD AUTO: 297 K/UL (ref 164–446)
PMV BLD AUTO: 10.2 FL (ref 9–12.9)
POTASSIUM SERPL-SCNC: 4.4 MMOL/L (ref 3.6–5.5)
PROT SERPL-MCNC: 7.1 G/DL (ref 6–8.2)
RBC # BLD AUTO: 3.08 M/UL (ref 4.7–6.1)
SODIUM SERPL-SCNC: 133 MMOL/L (ref 135–145)
TRIGL SERPL-MCNC: 47 MG/DL (ref 0–149)
WBC # BLD AUTO: 13.7 K/UL (ref 4.8–10.8)

## 2022-03-07 PROCEDURE — 700102 HCHG RX REV CODE 250 W/ 637 OVERRIDE(OP): Performed by: STUDENT IN AN ORGANIZED HEALTH CARE EDUCATION/TRAINING PROGRAM

## 2022-03-07 PROCEDURE — A9270 NON-COVERED ITEM OR SERVICE: HCPCS | Performed by: NURSE PRACTITIONER

## 2022-03-07 PROCEDURE — A9270 NON-COVERED ITEM OR SERVICE: HCPCS | Performed by: STUDENT IN AN ORGANIZED HEALTH CARE EDUCATION/TRAINING PROGRAM

## 2022-03-07 PROCEDURE — 700102 HCHG RX REV CODE 250 W/ 637 OVERRIDE(OP): Performed by: INTERNAL MEDICINE

## 2022-03-07 PROCEDURE — 770020 HCHG ROOM/CARE - TELE (206)

## 2022-03-07 PROCEDURE — 700111 HCHG RX REV CODE 636 W/ 250 OVERRIDE (IP)

## 2022-03-07 PROCEDURE — 85025 COMPLETE CBC W/AUTO DIFF WBC: CPT

## 2022-03-07 PROCEDURE — 700102 HCHG RX REV CODE 250 W/ 637 OVERRIDE(OP): Performed by: NURSE PRACTITIONER

## 2022-03-07 PROCEDURE — 90935 HEMODIALYSIS ONE EVALUATION: CPT

## 2022-03-07 PROCEDURE — 99232 SBSQ HOSP IP/OBS MODERATE 35: CPT | Mod: GC | Performed by: INTERNAL MEDICINE

## 2022-03-07 PROCEDURE — A9270 NON-COVERED ITEM OR SERVICE: HCPCS | Performed by: HOSPITALIST

## 2022-03-07 PROCEDURE — 36415 COLL VENOUS BLD VENIPUNCTURE: CPT

## 2022-03-07 PROCEDURE — 700111 HCHG RX REV CODE 636 W/ 250 OVERRIDE (IP): Performed by: STUDENT IN AN ORGANIZED HEALTH CARE EDUCATION/TRAINING PROGRAM

## 2022-03-07 PROCEDURE — 82962 GLUCOSE BLOOD TEST: CPT | Mod: 91

## 2022-03-07 PROCEDURE — 700102 HCHG RX REV CODE 250 W/ 637 OVERRIDE(OP): Performed by: HOSPITALIST

## 2022-03-07 PROCEDURE — 80053 COMPREHEN METABOLIC PANEL: CPT

## 2022-03-07 PROCEDURE — 80061 LIPID PANEL: CPT

## 2022-03-07 PROCEDURE — A9270 NON-COVERED ITEM OR SERVICE: HCPCS | Performed by: INTERNAL MEDICINE

## 2022-03-07 RX ORDER — HEPARIN SODIUM 1000 [USP'U]/ML
INJECTION, SOLUTION INTRAVENOUS; SUBCUTANEOUS
Status: COMPLETED
Start: 2022-03-07 | End: 2022-03-07

## 2022-03-07 RX ORDER — ACETAMINOPHEN 325 MG/1
975 TABLET ORAL EVERY 12 HOURS
Status: DISCONTINUED | OUTPATIENT
Start: 2022-03-08 | End: 2022-03-08 | Stop reason: HOSPADM

## 2022-03-07 RX ORDER — HYDROCODONE BITARTRATE AND ACETAMINOPHEN 5; 325 MG/1; MG/1
1 TABLET ORAL EVERY 8 HOURS PRN
Status: DISCONTINUED | OUTPATIENT
Start: 2022-03-07 | End: 2022-03-07

## 2022-03-07 RX ORDER — MIRTAZAPINE 15 MG/1
15 TABLET, FILM COATED ORAL
Status: DISCONTINUED | OUTPATIENT
Start: 2022-03-07 | End: 2022-03-08 | Stop reason: HOSPADM

## 2022-03-07 RX ORDER — POLYETHYLENE GLYCOL 3350 17 G/17G
1 POWDER, FOR SOLUTION ORAL DAILY
Status: DISCONTINUED | OUTPATIENT
Start: 2022-03-07 | End: 2022-03-08 | Stop reason: HOSPADM

## 2022-03-07 RX ORDER — BISACODYL 10 MG
10 SUPPOSITORY, RECTAL RECTAL
Status: DISCONTINUED | OUTPATIENT
Start: 2022-03-07 | End: 2022-03-08 | Stop reason: HOSPADM

## 2022-03-07 RX ORDER — HYDROCODONE BITARTRATE AND ACETAMINOPHEN 5; 325 MG/1; MG/1
0.5 TABLET ORAL EVERY 8 HOURS PRN
Status: DISCONTINUED | OUTPATIENT
Start: 2022-03-07 | End: 2022-03-08 | Stop reason: HOSPADM

## 2022-03-07 RX ORDER — CHOLECALCIFEROL (VITAMIN D3) 125 MCG
5 CAPSULE ORAL NIGHTLY
Status: DISCONTINUED | OUTPATIENT
Start: 2022-03-07 | End: 2022-03-08 | Stop reason: HOSPADM

## 2022-03-07 RX ORDER — TRAMADOL HYDROCHLORIDE 50 MG/1
50 TABLET ORAL EVERY 12 HOURS
Status: DISCONTINUED | OUTPATIENT
Start: 2022-03-07 | End: 2022-03-07

## 2022-03-07 RX ORDER — AMOXICILLIN 250 MG
2 CAPSULE ORAL 2 TIMES DAILY
Status: DISCONTINUED | OUTPATIENT
Start: 2022-03-07 | End: 2022-03-08 | Stop reason: HOSPADM

## 2022-03-07 RX ADMIN — HEPARIN SODIUM 2300 UNITS: 1000 INJECTION, SOLUTION INTRAVENOUS; SUBCUTANEOUS at 08:21

## 2022-03-07 RX ADMIN — FUROSEMIDE 80 MG: 40 TABLET ORAL at 15:49

## 2022-03-07 RX ADMIN — HYDROCODONE BITARTRATE AND ACETAMINOPHEN 0.5 TABLET: 5; 325 TABLET ORAL at 16:57

## 2022-03-07 RX ADMIN — DIPHENHYDRAMINE HYDROCHLORIDE 12.5 MG: 25 TABLET ORAL at 22:26

## 2022-03-07 RX ADMIN — EPOETIN ALFA-EPBX 6000 UNITS: 3000 INJECTION, SOLUTION INTRAVENOUS; SUBCUTANEOUS at 08:31

## 2022-03-07 RX ADMIN — FERROUS SULFATE TAB 325 MG (65 MG ELEMENTAL FE) 325 MG: 325 (65 FE) TAB at 07:50

## 2022-03-07 RX ADMIN — INSULIN LISPRO 3 UNITS: 100 INJECTION, SOLUTION INTRAVENOUS; SUBCUTANEOUS at 06:16

## 2022-03-07 RX ADMIN — HYDROCORTISONE: 5 CREAM TOPICAL at 06:00

## 2022-03-07 RX ADMIN — HYDRALAZINE HYDROCHLORIDE 25 MG: 25 TABLET, FILM COATED ORAL at 05:12

## 2022-03-07 RX ADMIN — HEPARIN SODIUM 5000 UNITS: 5000 INJECTION, SOLUTION INTRAVENOUS; SUBCUTANEOUS at 14:08

## 2022-03-07 RX ADMIN — SENNOSIDES AND DOCUSATE SODIUM 2 TABLET: 50; 8.6 TABLET ORAL at 16:52

## 2022-03-07 RX ADMIN — PATIROMER 8.4 G: 8.4 POWDER, FOR SUSPENSION ORAL at 01:21

## 2022-03-07 RX ADMIN — Medication 2668 MG: at 07:50

## 2022-03-07 RX ADMIN — Medication 2000 UNITS: at 05:11

## 2022-03-07 RX ADMIN — HEPARIN SODIUM 3200 UNITS: 1000 INJECTION, SOLUTION INTRAVENOUS; SUBCUTANEOUS at 11:58

## 2022-03-07 RX ADMIN — MIRTAZAPINE 15 MG: 15 TABLET, FILM COATED ORAL at 22:08

## 2022-03-07 RX ADMIN — ACETAMINOPHEN 975 MG: 325 TABLET, FILM COATED ORAL at 14:08

## 2022-03-07 RX ADMIN — DIPHENHYDRAMINE HYDROCHLORIDE 12.5 MG: 25 TABLET ORAL at 16:52

## 2022-03-07 RX ADMIN — Medication 5 MG: at 22:08

## 2022-03-07 RX ADMIN — FUROSEMIDE 80 MG: 40 TABLET ORAL at 05:12

## 2022-03-07 RX ADMIN — INSULIN LISPRO 2 UNITS: 100 INJECTION, SOLUTION INTRAVENOUS; SUBCUTANEOUS at 22:13

## 2022-03-07 RX ADMIN — ACETAMINOPHEN 975 MG: 325 TABLET, FILM COATED ORAL at 05:12

## 2022-03-07 RX ADMIN — DOCUSATE SODIUM 100 MG: 100 CAPSULE, LIQUID FILLED ORAL at 16:52

## 2022-03-07 RX ADMIN — DOCUSATE SODIUM 100 MG: 100 CAPSULE, LIQUID FILLED ORAL at 05:11

## 2022-03-07 RX ADMIN — CALCITRIOL CAPSULES 0.25 MCG 0.25 MCG: 0.25 CAPSULE ORAL at 05:13

## 2022-03-07 RX ADMIN — PROCHLORPERAZINE MALEATE 5 MG: 5 TABLET ORAL at 00:02

## 2022-03-07 RX ADMIN — Medication 2668 MG: at 16:52

## 2022-03-07 RX ADMIN — DIPHENHYDRAMINE HYDROCHLORIDE 12.5 MG: 25 TABLET ORAL at 06:43

## 2022-03-07 RX ADMIN — DOCUSATE SODIUM 50 MG AND SENNOSIDES 8.6 MG 2 TABLET: 8.6; 5 TABLET, FILM COATED ORAL at 05:12

## 2022-03-07 RX ADMIN — HEPARIN SODIUM 5000 UNITS: 5000 INJECTION, SOLUTION INTRAVENOUS; SUBCUTANEOUS at 05:11

## 2022-03-07 RX ADMIN — HYDRALAZINE HYDROCHLORIDE 25 MG: 25 TABLET, FILM COATED ORAL at 14:09

## 2022-03-07 RX ADMIN — HEPARIN SODIUM 5000 UNITS: 5000 INJECTION, SOLUTION INTRAVENOUS; SUBCUTANEOUS at 22:08

## 2022-03-07 RX ADMIN — HYDRALAZINE HYDROCHLORIDE 25 MG: 25 TABLET, FILM COATED ORAL at 16:52

## 2022-03-07 ASSESSMENT — ENCOUNTER SYMPTOMS
CHILLS: 1
SORE THROAT: 0
WEIGHT LOSS: 0
LOSS OF CONSCIOUSNESS: 0
TINGLING: 1
DIZZINESS: 0
ABDOMINAL PAIN: 1
EYE DISCHARGE: 0
BACK PAIN: 1
CONSTIPATION: 1
FLANK PAIN: 1
SENSORY CHANGE: 1
PALPITATIONS: 0
SHORTNESS OF BREATH: 1
FEVER: 0

## 2022-03-07 ASSESSMENT — PAIN DESCRIPTION - PAIN TYPE: TYPE: ACUTE PAIN

## 2022-03-07 ASSESSMENT — FIBROSIS 4 INDEX: FIB4 SCORE: 1.31

## 2022-03-07 NOTE — DISCHARGE SUMMARY
Discharge Summary    CHIEF COMPLAINT ON ADMISSION  Chief Complaint   Patient presents with   • Low Back Pain   • Rash   • Shortness of Breath       Reason for Admission  Other     Admission Date  2/21/2022    CODE STATUS  Full Code    HPI & HOSPITAL COURSE    Mr Watkins 46-year-old male with past medical history of ESRD on HD TTS, methamphetamine use, diabetes, hypertension who presented to [facility] on 2/21/2022 with complaints of low back pain and missed hemodialysis sessions due to transportation issues. Diagnostic work-up on admission remarkable for leukocytosis, hyperkalemia, high anion gap metabolic acidosis, elevated creatinine, elevated CRP and ESR.  MRI thoracic and lumbar spine negative for cord compression/abscess.    #End-stage renal disease on hemodialysis  #Heart failure with reduced ejection fraction likely secondary to fluid overload  #Hypertension   Nephrology consulted for inpatient hemodialysis.   Echocardiogram 2/24/2022 showed ejection fraction 30% with global hypokinesis, grade 2 diastolic dysfunction and flattened septum/dilated right ventricle consistent with volume overload.  Patient remained to have difficulty with fluid balance management during hospital stay eventually requiring daily hemodialysis sessions. Patient was educated on the significance of sodium and fluid restrictions. Patient had difficulty with compliance, he often requested and received double meal portions and ordered take out to the hospital. Patient was advised to follow-up with [facility] for transportation to [facility] for outpatient dialysis. He was to have repeat echocardiogram once his fluid overload improved (lowest weight during admission was 121kg) but patient left against medical advice.     #Back Pain  Upon presentation, had elevated ESR, CRP. Patient denied any saddle anesthesia, numbness, bowel or urinary incontinence upon presentation. He was found to negative for fever and preceding trauma.  CT scan of the  abdomen and pelvis without contrast 2/23/22 revealed no evidence of acute inflammatory process in the abdomen or pelvis but was positive for BILATERAL L5 pars defects without spondylolisthesis. MRI thoracic WNL. MRI lumbar revealed Bilateral L5 pars defects, Small left facet origin subligamentous synovial cyst projects into the posterolateral spinal canal behind L5 without significant thecal sac compression, and Bilateral mild facet degeneration at L4-5 and L5-S1. Patient was transitioned on 3/1/22 off IV dilaudid for pain 7-10 intensity. During hospitalization, his back pain improved. Patient was taking hydrocodone-acetaminophen prior to leaving against medical advice.       #AV graft thrombosis  Vascular surgery consulted for left AV graft thrombosis.  Patient underwent left upper extremity AV graft insertion by vascular surgery on 2/25/2022.  Patient reported postoperative pain and paresthesias in left upper extremity following the procedure, no evidence of surgical complications noted on clinical evaluation.  Further evaluation not pursued as symptoms improved.    #Gram-negative bacteremia.   Infectious work-up on admission showed gram-negative bacteremia with growth of Stenotrophomonas maltophilia,  he was initially started on ceftriaxone and metronidazole, antibiotics later switched to oral levofloxacin every 48 hours after discussion with infectious disease.  Antibiotics to be continued for total 14 days with last dose on 3/12/2022. Patient left against medical advice and was unable to complete the course of antibiotics.    #Normocytic anemia  Diagnostic evaluation for anemia suggestive of possible iron deficiency and anemia of chronic disease in the setting of renal disease.  Patient was continued on iron supplements inpatient.    Patient is still fluid overloaded, and was receiving daily dialysis. We were also working with case management to assist with housing, group home placement, and transportation to  outpatient dialysis. Patient left against medical advice. Team tried to provide counseling and education regarding the risks of leaving AMA in his current condition - patient was verbally aggressive and not willing to receive medical education.      Therefore, he left in guarded condition against medcial advice.    The patient met 2-midnight criteria for an inpatient stay at the time of discharge.    Discharge Date  03/08/22    FOLLOW UP ITEMS POST DISCHARGE  -PCP for outpatient echocardiogram per specialists recommendations, ESRD and post hospitalization management  -[facility] for continued dialysis    DISCHARGE DIAGNOSES  Principal Problem:    End stage renal disease (HCC) POA: Unknown      Overview: Patient has End Stage Renal Disease on hemodialysis at [facility] every       Tues/Thurs/Sat but has been Non-compliant with treatments. Per chart       review, patient has not had dialysis for over 1.5 weeks. Nephrology       consulted will dialyze. 1st dialysis session 2/22/22. Night of 2/23/22, US       reveals no flow in the hemodialysis graft, vascular surgery consulted -       they will evaluate patient and graft on 2/25/22; Patient NPO at midnight.       On 2/24/22, Vascular surgery consulted, they Originally considered       thrombectomy of the existing graft however it has undergone 5 previous       thrombectomies/revisions since it was put in 8 months ago, therefore  new       AVG insertion on 2/25/22. Midodrine stopped 2/25/22.  Active Problems:    Acute heart failure (HCC) POA: Unknown      Overview: ECHO from 2/24/22 revealed  HFrEF with a LVEF of 35% , likely type 2 pulm       HTN with a possible nonischemic component secondary to history of       methamphetamine use. Specific ECHO findings below:       Moderate concentric left ventricular hypertrophy.       Moderately reduced left ventricular systolic function.       The left ventricular ejection fraction is visually estimated to be 30%.        Global hypokinesis.        Probably grade II diastolic       dysfunction.     Hypertension POA: Yes      Overview: Uncontrolled on presentation, currently stable. BP at 106/104 on the floor       (before dialysis on 2/23). Hypertension could be secondary to ESRD.       Patient had some soft blood pressures (SBP ~100) and high WBC's midodrine       was started, but discontinued on 2/25 when WBC continued to decrease and       blood pressure stabilized. ECHO 2/24/22 revealed  LV EF:  30%, Global       hypokinesis.  Probably grade II diastolic dysfunction. -Amlodipine       initially held on 02/23/2022 because of hypotension.  Patient also has       lower extremity edema, so we will not be restarting amlodipine. Losartan       dosage increased to 50 on 3/1/22 (effective 3/2) for better BP control    DM (diabetes mellitus) (HCC) POA: Unknown      Overview: Hemoglobin A1c 5/19/2020 was 7.2%. Recent hemoglobin A1c was 6.5%.       Initially started on glargine and aspart and sliding scale.     Leukocytosis POA: Yes      Overview: Initialy elevated on 2/23/22 at 11.2, increased to 32.3 on 2/23.       Patient was without sign of sepsis such as fever and low blood pressure.        He was complaining of lower abdominal discomfort.  CT of the abdomen and       pelvis without contrast without any evidence of acute inflammatory       process.  Blood culture at that point showed gram-negative gian, grew       stenotrophomonas maltophilia.  He was initially started on ceftriaxone and       Flagyl, but this was changed on 2/28 per ID to levofloxacin.    Hyperkalemia POA: Yes      Overview: Patient presents with potassium of 5.9, it increased to 6.2 on 2/22/22.       Patient received insulin/D50/calcium gluconate/2 Amps of bicarb 50 mEq       upon presentation. Hyperkalemia likely secondary to ESRD and missed       dialysis sessions but could be 2/2 constipation. Improved after first       dialysis session on 2/22/22. Stable    Back  pain POA: Unknown      Overview: Upon presentation, had elevated ESR, CRP. Patient denied any saddle       anesthesia, numbness, bowel or urinary incontinence upon presentation.       Other red flags include fever, preceding trauma, and cancer.  CT scan of       the abdomen and pelvis without contrast 2/23/22 revealed no evidence of       acute inflammatory process in the abdomen or pelvis, Anasarca, Prominent       venous collaterals in the BILATERAL inguinal region, and BILATERAL L5 pars       defects without spondylolisthesis.      MRI thoracic WNL. MRI lumbar revealed Bilateral L5 pars defects, Small       left facet origin subligamentous synovial cyst projects into the       posterolateral spinal canal behind L5 without significant thecal sac       compression, and Bilateral mild facet degeneration at L4-5 and L5-S1.       Patient was transitioned on 3/1/22 off IV dilaudid for pain 7-10       intensity. Patient suspects he has urticaria from oxycodone, Oxycodone was       discontinued on 3/2/22 and the patient was placed on oral hydromorphone.     Methamphetamine abuse (HCC) POA: Unknown    Hypocalcemia POA: Unknown      Overview: Upon presentation, calcium is low at 6.7. Albumin is normal at 3.5. If       patient is truly hypocalcemic, back pain could be secondary to bone       breakdown for calcium. Hypocalcemia could also be from malabsorption. On       2/23/22 labs revealed: Ionized calcium ,  (H), 25 Hydroxy Vitamin D       9 (L), Magnesium 2.2 (N), Phosphorus 7.8 (H). Ionized calcium low at 0.8       on 2/25/22, started on calcium carbonate.    Anemia POA: Unknown      Overview: Hemoglobin at 11.1, MCV 93.5, RDW elevated at 57.9. 2/23/22 labwork       revealed:Serum Iron 8 (L), ferritin 665 (H) , TIBC 178 (L),  Transferrin       149 (L). Suggesting a mixed picture of possibly iron deficiency and ACD in       the setting of renal disease.  Stable    Diarrhea POA: Unknown      Overview: Reports  diarrhea of 2 weeks duration. Semi-solid on 2/23/22, C. Difficile       is negative. Tissue Transglutaminase Antibodies (tTG-IgA) for celiac       disease evaluation was negative. No bowel movement overnight or in the       morning of 2/24. Solid formed stool night of 2/27/22 and 2/28/22. Stable.    Paresthesia POA: Unknown      Overview: Patient started having left-sided paresthesia after his AV fistula       placement      On physical exam, at 1 point he was able to grasp with the hand, but at       other time, he seemed unable to do so.      Sensation also appear to be reduced, although patient states that he can       feel pressure on his hand and forearm as well as the arms. Patient able to       dorsiflex fingers on 2/27/22.    Pruritus POA: Unknown    Constipation POA: Unknown  Resolved Problems:    * No resolved hospital problems. *      FOLLOW UP  Future Appointments   Date Time Provider Department Center   3/9/2022 10:30 AM Vince Ahumada D.O. [address]   3/15/2022  3:00 PM Buck Luz M.D. [facility] None     [facility] on [address] [address]  [phone]  Go to  Please go the the [facility] to see if there is a room available for you to rent.     [facility]  [phone]  Call  48 hours in advance to schedule your free ride to dialysis.       MEDICATIONS ON DISCHARGE     Medication List      ASK your doctor about these medications      Instructions   amLODIPine 5 MG Tabs  Commonly known as: NORVASC   Take 1 Tab by mouth every day.  Dose: 5 mg            Allergies  No Known Allergies    DIET  Orders Placed This Encounter   Procedures   • Diet Order Diet: Renal; Tray Modifications (optional): Double Portions     Standing Status:   Standing     Number of Occurrences:   1     Order Specific Question:   Diet:     Answer:   Renal [8]     Order Specific Question:   Tray Modifications (optional)     Answer:   Double Portions       ACTIVITY  As tolerated.  Weight  bearing as tolerated    CONSULTATIONS  Nephrology  Vascular    PROCEDURES  Patient underwent left upper extremity AV graft insertion by vascular surgery on 2/25/2022.    LABORATORY  Lab Results   Component Value Date    SODIUM 131 (L) 03/06/2022    POTASSIUM 5.2 03/06/2022    CHLORIDE 91 (L) 03/06/2022    CO2 27 03/06/2022    GLUCOSE 198 (H) 03/06/2022    BUN 42 (H) 03/06/2022    CREATININE 5.82 (HH) 03/06/2022        Lab Results   Component Value Date    WBC 14.2 (H) 03/06/2022    HEMOGLOBIN 8.9 (L) 03/06/2022    HEMATOCRIT 28.5 (L) 03/06/2022    PLATELETCT 314 03/06/2022        Total time of the discharge process exceeds 30 minutes.

## 2022-03-07 NOTE — PROGRESS NOTES
3hr HD started @ 0824 and completed @ 1156,had 1 episode of muscle cramps otherwise able to tolerate 3000ml net UF.RIJ TDC dressing changed,CDI,report given to Maylin Quintana RN.

## 2022-03-07 NOTE — NON-PROVIDER
Internal Medicine PA Student Note  Note Author: Samanta Rojas, Student    Name Ambrose Watkins     1975   Age/Sex 46 y.o. male   MRN [id number]   Code Status Full Code       Reason for interval visit  (Principal Problem)   End stage renal disease (HCC)    HPI   Mr. Watkins is a 46 y.o. male with history of diabetes mellitus, essential HTN, and ESRD on HD presented to ED  with low back pain and missed dialysis appointment. Back pain rated as 8/10, located mid-lumbar region, dull, and worsened with movement. Patient usually has dialysis Tuesday, Thursday, and Saturday but had missed . During his hospital course Mr. Watkins was discovered to have bacteremia, HFrEF, and electrolyte abnormalities.     Daily Status Update   No acute overnight events. Complaining of burning 8/10 pain to left foot. Discussed noncompliance with renal diet/ordering takeout, he agrees to try harder with the diet but is upset about portion sizes and never feels full. Still producing urine, on lasix.       ROS:  General: denies fever, weight loss, fatigue, chills  Skin: denies discoloration, pallor, +rash (improving)  HEENT: denies HA, visual changes, hearing changes, epistaxis  Respiratory: + SOB, cough, no wheezing  Cardiovascular: Denies CP, palpitations, chest tightness, orthopnea, exertional SOB  GI: Denies abd pain, hematochezia, melena, N/V/D/C  : Denies hematuria, dysuria   Extremities: +left leg pain, +left foot pain, denies swelling   Neuro: +back pain, + paresthesia’s, denies numbness  Psych: Denies SI/HI, feels agitated     Physical Exam     Vitals:    22 0748   BP: 153/96   Pulse: 92   Resp: 18   Temp: 36.3 °C (97.3 °F)   SpO2: 96%       Physical Exam  General: obese, ill-appearing  Skin: warm, dry, +red, flat, reticular rash dorsum of left foot   HEENT: +multiple lesions on scalp  Respiratory: lungs CTAB, no wheeze or rhonchi  Cardiovascular: RRR  GI: Abdomen soft, non-tender,  +distention  MSK: 2+ pitting Edema bilat LE, +tenderness of LUE and LLE,   Neuro: LUE weakness improving  Psych: appropriate mood and affect    Labs: *3/7 labs not resulted yet  CBC 3/6  WBC 14.2 H, increased from 9.1 (3/4)  HGB 8.9 L  HCT 28.5 L    Neut (polys) 81.2 H  Neut (abs) 11.48 H    CMP 3/6  Sodium 131 L  Cl 91 L  BUN 42 H  K+ 5.2  Creatinine 5.82 H  Glucose 198      Imaging:  ECG: normal sinus rhythm, PVCs  CXR 3/6: cardiomegaly  Echo: LVEF 30%  MRI: bilat L5 pars defects, no anterolisthesis, small synovial cyst projecting into posterolateral spinal canal behind L5 w/out significant thecal sac compression, no finding suggestive of infectious process  CT abd: anasarca, prominent venous collaterals in bilat inguinal region, bilat L5 pars defects w/out spondylolisthesis     Assessment/Plan   Problem representation: Mr. Watkins is a 47 yo male w/ hx of ESRD on HD, HTN, diabetes mellitus, and methamphetamine abuse admitted 2/21 for back pain and missed dialysis. IP labs and imaging revealed bacteremia and HFrEF EF 30%.    Assessment:  1. ESRD  2. HFrEF  3. Bacteremia  4. Leukocytosis     5. HTN  6. Back pain  7. LUE pain/paresthesias  8. Uremic pruritis    9. Substance abuse     Plan:    1. ESRD  -Optimizing dialysis  -Patient is urinating, continue lasix 80 mg BID   -Nephrology has cleared patient for discharge      2. HFrEF  -Continue dialyzing to remove excess fluid   -Keep on fluid restriction 1.5L  -Repeat echo when weight reaches 121 kg, weight after dialysis today    3. Bacteremia   -BCX + for stenotrophomonas maltophilia   -Levoflox end date 3/13    4. Leukocytosis   -WBC increased to 14.2 (3/6) from 9.1 (3/4)  -CXR (3/6) unremarkable except for cardiomegaly (old finding)  -BCX collected 3/6    5. HTN  -latest /96  -Continue hydralazine 25 mg PO q8 hrs     6. Back pain  -Dilaudid PO 1 mg q8 prn   -Tylenol prn and lidocaine patch   -continue OT/PT    7. LUE pain/paresthesias   -Significant  improvement   -Vascular surgery has been consulted and believes symptoms are normal postop pain/swelling      8. Uraemic pruritis   -Optimize dialysis   -Pramoxine lotion    9. Substance abuse  -Active use of methamphetamines   -monitor for withdrawal symptoms   -counseling provided 2/28

## 2022-03-07 NOTE — PROGRESS NOTES
Report received from night RN and care of patient assumed. Pt in the shower. A&Ox4, VSS, no complaints at this time. POC reviewed and white board updated. Patient continuing to refuse telemetry box Call light within reach, Bed locked in lowest position and side rails up x2. Will monitor.

## 2022-03-07 NOTE — PROGRESS NOTES
"Daily Progress Note:     Date of Service: 3/7/2022  Primary Team: [facility] Team   Attending: Nino Reyes M.D.   Senior Resident: Dr. Shi  Intern: Dr. Chávez  Contact:  [phone]    Chief Complaint:   Low Back Pain     ID: Mr. Watkins is a 46 year old male who presents 2/21/22 for back pain. His past medical history includes Methamphetamine abuse, Hypertension, Diabetes Mellitus, End Stage Renal Disease on hemodialysis at [facility] every Tues/Thurs/Sat.  Reportedly missing hemodialysis due to transportation issues. Describes his back pain as dull, nonradiating, 8 out of 10 intensity, worse with movement relieved with rest.    Subjective:  -Producing small amount of urin 3/6/22  -Having daily bowel movements but reports feeling \"bloated\"  -Reports left lower leg pain overnight  -interested in a new pain medication   -reports improved back pain    Consultants/Specialty:  Nephrology  Vascular    Review of Systems:    Review of Systems   Constitutional: Positive for chills and malaise/fatigue. Negative for fever and weight loss.   HENT: Negative for nosebleeds and sore throat.    Eyes: Negative for discharge.   Respiratory: Positive for shortness of breath.    Cardiovascular: Positive for leg swelling. Negative for chest pain and palpitations.   Gastrointestinal: Positive for abdominal pain and constipation.   Genitourinary: Positive for flank pain. Negative for dysuria and hematuria.   Musculoskeletal: Positive for back pain.   Skin: Positive for rash.   Neurological: Positive for tingling and sensory change. Negative for dizziness and loss of consciousness.       Objective Data:   Physical Exam:   Vitals:   Temp:  [36.3 °C (97.3 °F)-36.8 °C (98.3 °F)] 36.3 °C (97.3 °F)  Pulse:  [76-99] 92  Resp:  [16-18] 18  BP: (140-155)/(82-96) 153/96  SpO2:  [91 %-97 %] 96 %    Physical Exam  Constitutional:       General: He is not in acute distress.     Appearance: He is obese.   HENT:      Head: " Normocephalic and atraumatic.      Nose: No congestion.      Mouth/Throat:      Mouth: Mucous membranes are moist.      Comments: Poor dentition    Eyes:      General:         Right eye: No discharge.         Left eye: No discharge.      Pupils: Pupils are equal, round, and reactive to light.   Cardiovascular:      Rate and Rhythm: Normal rate.      Pulses: Normal pulses.      Heart sounds: Normal heart sounds. No murmur heard.  Pulmonary:      Effort: Pulmonary effort is normal. No respiratory distress.      Breath sounds: Normal breath sounds. No wheezing.   Abdominal:      General: Bowel sounds are normal. There is distension.      Palpations: Abdomen is soft.      Tenderness: There is abdominal tenderness (bilaterally at flanks).      Comments: Rigid flanks bilaterally, 1-2+ pitting edema in flanks bilaterally   Musculoskeletal:         General: Swelling (left upper and lower arm) and tenderness (left upper and lower arm) present.      Cervical back: Normal range of motion. No rigidity.      Right lower leg: Edema present.      Left lower leg: Edema present.      Comments: Left lower leg and left foot swelling worse than on the right   Skin:     General: Skin is warm and dry.      Coloration: Skin is not jaundiced.      Findings: Erythema (erythema on abdomen at injection site and on left foot.) and rash present.      Comments: Excoriations and scabs on face, chest, and arms   Neurological:      Mental Status: He is alert. Mental status is at baseline.      Motor: Weakness (left arm weakness, but able to dorsiflex his left hand) present.       Labs:   HEMATOLOGY/ ONCOLOGY/ID:            Recent Labs     03/06/22  0324   WBC 14.2*   RBC 3.03*   HEMOGLOBIN 8.9*   HEMATOCRIT 28.5*   MCV 94.1   MCH 29.4   RDW 57.6*   PLATELETCT 314   MPV 10.2   NEUTSPOLYS 81.20*   LYMPHOCYTES 10.60*   MONOCYTES 7.10   EOSINOPHILS 0.10   BASOPHILS 0.30     Lab Results   Component Value Date    FERRITIN 665.0 (H) 02/22/2022    IRON 8  (L) 02/22/2022    TOTIRONBC 178 (L) 02/22/2022       RENAL:        Estimated GFR/CRCL = Estimated Creatinine Clearance: 21.3 mL/min (A) (by C-G formula based on SCr of 5.82 mg/dL (HH)).  Recent Labs     03/05/22 0534 03/06/22  0324   SODIUM 134* 131*   POTASSIUM 5.0 5.2   CHLORIDE 95* 91*   CO2 25 27   GLUCOSE 166* 198*   BUN 49* 42*   CREATININE 6.47* 5.82*   CALCIUM 8.7 8.8   PHOSPHORUS  --  4.2   ALBUMIN 2.9* 3.3       GASTROINTESTINAL/ HEPATIC:          Recent Labs     03/05/22 0534 03/06/22  0324   ALTSGPT 11 14   ASTSGOT 29 35   ALKPHOSPHAT 385* 395*   TBILIRUBIN 0.4 0.6   ALBUMIN 2.9* 3.3   GLOBULIN 3.7* 3.6*     No results found for: AMMONIA    ENDOCRINE:              Recent Labs     03/05/22 0534 03/06/22 0324   GLUCOSE 166* 198*     Lab Results   Component Value Date    HBA1C 6.5 (H) 02/22/2022    HBA1C 7.2 (H) 05/19/2020       Imaging:   MR-LUMBAR SPINE-WITH & W/O  02/22/22 0737  1. Bilateral L5 pars defects. However, there is no anterolisthesis.     2. Small left facet origin subligamentous synovial cyst projects into the posterolateral spinal canal behind L5 without significant thecal sac compression. Bilateral mild facet degeneration at L4-5 and L5-S1.     3. No abnormal enhancement is identified in the lumbar spine to suggest an infectious process.     MR-THORACIC SPINE-WITH & W/O  02/22/22 0731  1. MRI of the thoracic spine without and with contrast within normal limits.     DX-CHEST-PORTABLE (1 VIEW) 02/21/22 2135  1.  No acute cardiopulmonary disease.   2.  Cardiomegaly     CT scan of the abdomen and pelvis without contrast   1.  No evidence of acute inflammatory process in the abdomen or pelvis  2.  Anasarca  3.  Prominent venous collaterals in the BILATERAL inguinal region  4.  BILATERAL L5 pars defects without spondylolisthesis     ECHO  LV EF:  30%   Global hypokinesis.  Probably grade II diastolic dysfunction.     Problem Representation:  Mr. Watkins is a 46 year old male who presents  2/21/22 for back pain, His past medical history includes Methamphetamine abuse, Hypertension, Diabetes Mellitus, End Stage Renal Disease on hemodialysis at [facility] every Tues/Thurs/Sat.  Reportedly missing hemodialysis due to transportation issues. Describes his back pain as dull, nonradiating, 8 out of 10 intensity, worse with movement relieved with rest.    * End stage renal disease (HCC)  Assessment & Plan  ESRD initially requiring daily dialysis.  Complex fluid balance management   -Nephrology following, started on Lasix on 3/4/2022 by nephrology. Patient reports urinating evening of 3/4/22  -Continue calcitriol, calcium, patiromer.  Hold patiromer if K less than 4  -Outpatient dialysis after discharge    Acute heart failure (HCC)  Assessment & Plan  Acute heart failure: Echo 2/4/2022 EF 35%, likely secondary to fluid overload from ESRD.   -Continue hemodialysis per nephrology  -Continue hydralazine  -1.5L Fluid restriction, salt restriction  -Daily weights  -Repeat Echo when at 121kg; if HFrEF persist consider further cardiology evaluation.   -Lipid panel pending    Leukocytosis- (present on admission)  Assessment & Plan  Per ID pharmacy regarding the rare blood culture growth, levofloxacin renally/dialysis dosed for 14 days. WBC within normal limits at 8.8 starting 3/1/22 but elevated to 14.2 on 3/6/22. CXR revealed cardiomegaly.  -PO Levofloxacin 750mg once, PO Levofloxacin 500mg N80lmojd for 14 days (last dose 3/12/22)  -Blood cultures and urinalysis pending  -Consider abdominal CT with contrast if non-resolving     Paresthesia  Assessment & Plan  Some improvement on 3/7/22  -Vascular surgery consulted, following  -Pain management and observation for increased swelling, new loss of sensation, or loss of strength; if symptoms are worsening, ultrasound of the left upper extremity  -CTM    Anemia  Assessment & Plan  Hemoglobin baseline is between 9 and 10.  -Ferous sulfate 325mg  -Epoetin 6,000 units  "during dialysis  -Dialysis  -nephrology onboard, recs appreciated   -CTM with routine labs    Back pain  Assessment & Plan  -Tramadol renally dosed per pharmacy  -Tylenol PRN  -Lidoderm   -OT/PT  -CTM      Hyperkalemia- (present on admission)  Assessment & Plan  Hyperkalemia secondary to ESRD. Continue hemodialysis per nephrology. Insulin with dextrose for hyperkalemia plus calcium gluconate for cardiac protection. Currently stable.  -On patiromer daily for hyperK+. Stop/hold if serum K+ <4   -Monitor labs      Constipation  Assessment & Plan  On 3/5/22 patient reports daily stools, that are hard in the setting of lower abdominal \"fullness\". Constipation likely secondary to narcotic use.   -Colace dialy, titrate as needed. Hold for loose stools  -Miralax daily, titrate as clinically indicated. Hold for loose stools.  -CTM    Pruritus  Assessment & Plan  Stable   -hydrocortisone cream PRN  -CTM    Diarrhea  Assessment & Plan  Resolved, patient started reporting constipation 3/5/22  -monitor and replete electrolytes if diarrhea recurs  -CTM for recurrence      Hypocalcemia  Assessment & Plan  Stable  -vitamin D3 (cholecalciferol) tablet 2,000 Units  -calcitRIOL (ROCALTROL) capsule 0.25 mcg  -Calcium acetate  -CTM    Methamphetamine abuse ([facility])  Assessment & Plan  History of methamphetamine use. Urine drug screen ordered but not processed 2/2 poor urine output on admission. Patient denies regular use.  -Counseled on cessation, denies use but is interested in abstaining  -Monitor for agitation and signs of withdrawal    Hypertension- (present on admission)  Assessment & Plan  Persistent hypertension likely secondary to fluid overload. Blood pressure fluctuates and increases when patient is non-compliant with his diet.  -Continue dialysis per nephrology, hydralazine  -Optimize pain control    DM (diabetes mellitus) ([facility])  Assessment & Plan  Glucose stable with some variations per intake. Patient has been having food " delivered to the hospital and has been non-compliant with his diet.  -Sliding scale  -Hypoglycemia protocol   -Continue to monitor  -Renal diet      Code Status: FULL  DVT ppx: Heparin   Diet: Renal      Majo Chávez MD MPH  PGY-1, [facility]    Assessment and plan discussed with senior resident and attending physician.

## 2022-03-07 NOTE — PROGRESS NOTES
".  [facility] -  PROGRESS NOTE               Author: EMMA Beck Date & Time: 3/7/2022  8:49 AM     HPI:  This is a 46 year old male with past medical history of End Stage Renal Disease on hemodialysis at [facility] every Tues/Thurs/Sat, diabetes mellitus and Hypertension who presented to the emergency department on 2/21/22 complaining of lower back pain.  Reportedly missing hemodialysis due to transportation issues. MRI this am and medicated for pain. Seen on Hemodialysis this am, lethargic after PRN medications.     DAILY NEPHROLOGY SUMMARY:  2/23: 3.5L Net UF. Resting in bed. Calm and agreeable to HD today. C/o cont. Back pain. Bps low this am.   2/24: Echo in progress. Primary team at . Resting in bed. C/o cont back pain. 2.0L net UF. K 4.0, CO2 22. Denies Diarrhea. Denies CP/SOB.   2/25: Upset about NPO this am for OR.  In bed, no acute distress. Bps improved. K 4.7, CO2 16.   2/26: S/p AVG creation and fistulogram. 1.5 L net UF. Sitting up at side of bed, eating lunch. C/o SOB and nausea.  C/o L AVF pain.   2/27: Laying in bed. C/o L arm pain.   2/28: Pt seen on HD, said he did not sleep well, some ongoing LUE discomfort, VSS on RA, labs reviewed  3/1: Pt in bed, asking for pain meds, says his whole L side hurts, iHD yesterday with 3.1L net UF, refusing tele monitor overnight, labs reviewed, VSS on RA, says he is not making urine  3/2: Seen on HD.  C/o cramping. 3.5L net UF. Denies fever/chills. C/o SOB with exertion, on RA. K 5.9.   3/3: Pt ambulatory in room, reports that LLE edema is not improving, K+ 6.4 this AM, iHD yesterday with 2.5L net UF, repeat HD ordered today for hyperK, pt given temporizing measures this AM per primary team, VSS on RA  3/4: HD yest, UF 3L. K+ still elevated, lots of edema. Pt making urine. Feels depressed.  3/5: IHD yesterday, net UF 3L. VSS RA, SBP 150s. Na 134, K 5.0  No CP, some shortness of breath that is \"how he is all the time\" " "also itchy, which he reports he is all the time.    3/6: Standing at bedside.  Itching improved.  No Cp, SOB. iHD yesterday, 3L net UF.   VSS.  Expressed his desire to stop his drug abuse to help facilitate dialysis compliance.    3/7: Pt seen on HD, denies complaints, VSS on RA, showered this AM    REVIEW OF SYSTEMS:    10 point ROS reviewed and is as per HPI/daily summary or otherwise negative    PMH/PSH/SH/FH: Reviewed and unchanged since admission note  CURRENT MEDICATIONS: Reviewed from admission to present day    VS:  /96   Pulse 92   Temp 36.3 °C (97.3 °F) (Temporal)   Resp 18   Ht 1.778 m (5' 10\")   Wt 123 kg (271 lb 13.2 oz)   SpO2 96%   BMI 39.00 kg/m²   Physical Exam  Constitutional:       General: He is not in acute distress.     Appearance: He is obese. He is ill-appearing.   HENT:      Head: Normocephalic and atraumatic.      Mouth/Throat:      Dentition: Abnormal dentition.   Eyes:      Extraocular Movements: Extraocular movements intact.      Conjunctiva/sclera: Conjunctivae normal.      Pupils: Pupils are equal, round, and reactive to light.   Cardiovascular:      Rate and Rhythm: Normal rate and regular rhythm.      Pulses: Normal pulses.      Heart sounds: Normal heart sounds. No murmur heard.    No friction rub. No gallop.      Comments: RIJ TDC  L AVG +Bruit/+Thrill  Pulmonary:      Effort: Pulmonary effort is normal. No respiratory distress.      Breath sounds: Normal breath sounds. No stridor. No wheezing or rhonchi.   Abdominal:      General: There is no distension.      Palpations: Abdomen is soft. There is no mass.   Musculoskeletal:         General: Swelling present. Normal range of motion.      Right lower leg: Edema present.      Left lower leg: Edema (LLE>RLE) present.   Skin:     General: Skin is warm and dry.      Comments: Scattered scabbing   Neurological:      Mental Status: He is alert.   Psychiatric:         Mood and Affect: Mood normal.         Behavior: Behavior " normal.         Thought Content: Thought content normal.         Judgment: Judgment normal.         Fluids:  In: 880 [P.O.:880]  Out: -     LABS:  Recent Labs     03/05/22  0534 03/06/22  0324   SODIUM 134* 131*   POTASSIUM 5.0 5.2   CHLORIDE 95* 91*   CO2 25 27   GLUCOSE 166* 198*   BUN 49* 42*   CREATININE 6.47* 5.82*   CALCIUM 8.7 8.8     MR-LUMBAR SPINE-WITH & W/O  Order: [id number]   Status: Final result     Visible to patient: No (inaccessible in MyChart)     Next appt: None     0 Result Notes    Details    Reading Physician Reading Date Result Priority   Lisa Link M.D.  [phone] 2/22/2022 STAT     Narrative & Impression     2/22/2022 1:06 AM     HISTORY/REASON FOR EXAM:  Epidural abscess suspected        TECHNIQUE/EXAM DESCRIPTION:  MRI of the lumbar spine without and with contrast.     The study was performed on a [email or web address] Signa 1.5 Jacqueline MRI scanner.     T1 sagittal, T2 fast spin-echo sagittal, and T2 axial images were obtained of the lumbar spine. T1 postcontrast fat-suppressed sagittal images were obtained.     20 mL ProHance contrast was administered intravenously.     COMPARISON:  None.     FINDINGS:  Lumbar spine alignment is normal.  Bilateral L5 pars defects without anterolisthesis.  Bone marrow signal intensity is normal. Mild disc desiccation is noted at L4-L5.  Bilateral pars defects noted at L5. The conus terminates at L1.     Lumbar spine levels:     T12-L1: No significant abnormality.     L1-2: Mild facet degeneration. The stenosis.     L2-3: Minimal facet degeneration. No significant canal stenosis or foraminal narrowing.     L3-4: Broad-based disc bulge and bilateral mild facet degeneration. There is no significant spinal canal narrowing or neuroforaminal narrowing.     L4-5, bilateral facet degeneration. Broad-based disc bulge with a focal central protrusion that minimally encroaches upon the spinal canal. There is no significant foraminal narrowing. There is minimal canal  narrowing without thecal sac compression.   There is a left facet origin synovial cyst that extends into the left subligamentous at the level of the left L5 pars defect and minimally encroaches upon the spinal canal from the posterolateral aspect. Bilateral L5 pars defects are present.     Postcontrast images through the lumbar spine does not demonstrate any abnormal enhancement. Minimal epidural enhancement identified at the dorsal aspect of L5 is likely related to degenerative changes in the facet joints and synovial hypertrophy. No   pathological epidural enhancement to suggest infection identified.     IMPRESSION:        Bilateral L5 pars defects. However, there is no anterolisthesis.     Small left facet origin subligamentous synovial cyst projects into the posterolateral spinal canal behind L5 without significant thecal sac compression. Bilateral mild facet degeneration at L4-5 and L5-S1.     No abnormal enhancement is identified in the lumbar spine to suggest an infectious process.      HISTORY/REASON FOR EXAM:  Epidural abscess suspected        TECHNIQUE/EXAM DESCRIPTION:  MRI of the thoracic spine without and with contrast.     The study was performed on a [employer] Signa 1.5 Jacqueline MRI scanner. T1 sagittal, T2 fast spin-echo sagittal, and T2 axial images were obtained of the thoracic spine. T1 post-contrast fat suppressed sagittal images were obtained. Optional T1 post-contrast   axial images may be obtained.     20 mL ProHance contrast was administered intravenously.     COMPARISON:  None.     FINDINGS:     Alignment in the thoracic spine is normal. Marrow signal in the vertebral bodies is within normal limits. There is no abnormal osseous enhancement in the vertebral bodies or other abnormal extradural enhancement. The disc spaces are intact at all   thoracic levels. There are no significant osteophytic changes. The prevertebral and paraspinous soft tissues are unremarkable.     The thoracic spinal cord is  normal in caliber and signal throughout its course. There is no abnormal cord enhancement. There is no abnormal intradural extramedullary enhancement.     At T1-2 through T11-12 there is no significant disc bulge or protrusion. The neural foramina are intact at all thoracic levels. There is no congenital or acquired central spinal stenosis at any thoracic level. There is no significant ligamentous or facet   hypertrophy.     IMPRESSION:        MRI of the thoracic spine without and with contrast within normal limits.    US-HEMODIALYSIS GRAFT DUPLEX COMP UPPER EXTREMITY  Order: [id number]   Status: Final result     Visible to patient: No (inaccessible in MyChart)     Next appt: None     0 Result Notes    Details    Reading Physician Reading Date Result Priority   No Reading Provider Prelim 2022    Buck Kenyon M.D.  [phone] 2022      Narrative & Impression         Hemodialysis Access Report      Vascular Laboratory   Conclusions   Hemodialysis loop graft with the proximal anastomosis at the distal    brachial artery and the distal anastomosis at the basilic vein in the mid    biceps.   No flow is demonstrated in the hemodialysis graft. Echolucent material    fills the graft throughout its length.      No prior study available for comparison.      CONY MORAN      Exam Date:     2022 18:09      Room #:     Inpatient      Priority:     Routine      Ht (in):             Wt (lb):      Ordering Physician:        LIVAN BARBOZA      Referring Physician:       [id number] Mckeon      Sonographer:               Kamala Ball RVT      Study Type:                Complete Unilateral      Technical Quality:         Adequate      Age:    46    Gender:     M      MRN:    [id number]      :    1975      BSA:      Indications:     AV Fistula - S/P, AV Fistula / Thrombosed      CPT Codes:       37761      ICD Codes:       447  996.73      History:         Left arm brachial to basilic loop graft. No prior  duplex.      Limitations:      Exam Data:   Side:          Left            Access          Graft   Inflow Artery   Brachial   Outflow Vein    Basilic                         Velocity (cm/s)    Prox Inflow artery    Mid Inflow artery           109.0                                0    Distal Inflow artery        95.00    Inflow Anastomosis          0.00    Inflow Artery distal to     anastomosis          Proximal Graft              0.00    Mid Graft                   0.00    Distal Graft                0.00    Outflow Anastomosis         0.00    Proximal Outflow Vein       10.00    Mid Outflow Vein    Distal Outflow Vein    Flow Volume Mid Bicep            ml/min    Flow Volume Mid-Forearm          ml/min    Prox Outflow Vein Diam            cm    Mid Outflow Vein Diam             cm    Distal Outflow Vein Diam          cm      Findings   Hemodialysis loop graft with the proximal anastomosis at the distal    brachial artery and the distal anastomosis at the basilic vein in the mid    biceps.    Doppler waveforms of the brachial artery are of high amplitude and    triphasic.   No flow is demonstrated in the hemodialysis graft. Echolucent material    fills the graft throughout its length.    The basilic and brachial veins are compressible.      Buck Kenyon MD   (Electronically Signed)   Final Date:      23 February 2022                     20:51     IMPRESSION:  # ESRD  - Etiology likely 2/2 DM/HTN  - via RIJ TDC  # Thrombosed Left AVF  - US 2/23 no flow  - s/p OR 2/25 AVG Brachio-axillary Creation, thrombectomy and fistulogram   # HTN, labile control   - Goal BP < 140/90  # Anemia of CKD  - Goal Hgb 10-11  - At goal  - On PO Fe  # CKD-MBD  -   - Vit D 9  - CCa 7.1 --> 7.68  - PO4 9.5-->7.5-->5.6->4.2  - On calcitriol, calcium acetate and cholecalciferol   # Hyperkalemia, corrected   - s/p temporizing measures x 2  - on patiromer daily   # Back Pain, improved   - Elevated CRP  - MRI of Lumbar and  Thoracic spine 2/22 (-) for epidural abscess  # Methamphetamine use   - Cessation advised  - Consult place for social work  # Homelessness  # DM   - A1C 6.5  # Severe metabolic acidosis, corrected  - Off bicarb gtt   - Correct with HD   # Leukocytosis, resolved   - BCx2 2/23 (+) Gram - Rods   - C.diff (-) 2/23  - CT abd pelvis no acute abnormality  # Stenotrophomonas maltophilia bacteremia  - IV C3--> PO Levaquin x 14 days per ID recs     PLAN:  - iHD today (MON)  - Evaluate the need for additional treatments daily  - Continue iHD qMWF  - Challenge UF as able   - Continue Lasix; can stop if anuric   - Hold antihtn prior to HD to allow for optimal UF  - Off midodrine  - Continue cholecalciferol   - Continue calcium acetate WM, phos now controlled  - JACKIE with HD to maintain Hgb between 10-11  - Transfuse PRN Hgb <7   - No dietary protein restrictions  - Dose all meds per ESRD/iHD  - Abx per primary team/ID  - Low Na/fluid restricted renal diet  - Follow labs  - AVG use per Vasc Surg recs; currently using RIJ TDC, last note 3/1 not yet cleared for use   - Daily patiromer for hyperK+. Stop/hold if serum K+ <4   - OK to transition back to OP HD once medically cleared    Thank you,

## 2022-03-07 NOTE — CARE PLAN
The patient is Stable - Low risk of patient condition declining or worsening    Shift Goals  Clinical Goals: monitor VS, Labs  Patient Goals: pain management, rest  Family Goals: POLINA    Progress made toward(s) clinical / shift goals:    Problem: Hemodynamics  Goal: Patient's hemodynamics, fluid balance and neurologic status will be stable or improve  Outcome: Progressing     Problem: Fluid Volume  Goal: Fluid volume balance will be maintained  Outcome: Progressing     Problem: Urinary - Renal Perfusion  Goal: Ability to achieve and maintain adequate renal perfusion and functioning will improve  Outcome: Progressing     Problem: Respiratory  Goal: Patient will achieve/maintain optimum respiratory ventilation and gas exchange  Outcome: Progressing     Problem: Physical Regulation  Goal: Diagnostic test results will improve  Outcome: Progressing  Goal: Signs and symptoms of infection will decrease  Outcome: Progressing       Patient is not progressing towards the following goals:      Problem: Pain - Standard  Goal: Alleviation of pain or a reduction in pain to the patient’s comfort goal  Outcome: Not Progressing     Problem: Knowledge Deficit - Standard  Goal: Patient and family/care givers will demonstrate understanding of plan of care, disease process/condition, diagnostic tests and medications  Outcome: Not Progressing

## 2022-03-08 VITALS
BODY MASS INDEX: 38.95 KG/M2 | RESPIRATION RATE: 16 BRPM | HEART RATE: 99 BPM | OXYGEN SATURATION: 96 % | HEIGHT: 70 IN | SYSTOLIC BLOOD PRESSURE: 166 MMHG | DIASTOLIC BLOOD PRESSURE: 102 MMHG | TEMPERATURE: 97.6 F | WEIGHT: 272.05 LBS

## 2022-03-08 LAB — GLUCOSE BLD STRIP.AUTO-MCNC: 123 MG/DL (ref 65–99)

## 2022-03-08 PROCEDURE — A9270 NON-COVERED ITEM OR SERVICE: HCPCS | Performed by: HOSPITALIST

## 2022-03-08 PROCEDURE — A9270 NON-COVERED ITEM OR SERVICE: HCPCS | Performed by: NURSE PRACTITIONER

## 2022-03-08 PROCEDURE — 700102 HCHG RX REV CODE 250 W/ 637 OVERRIDE(OP): Performed by: STUDENT IN AN ORGANIZED HEALTH CARE EDUCATION/TRAINING PROGRAM

## 2022-03-08 PROCEDURE — 700102 HCHG RX REV CODE 250 W/ 637 OVERRIDE(OP): Performed by: HOSPITALIST

## 2022-03-08 PROCEDURE — A9270 NON-COVERED ITEM OR SERVICE: HCPCS | Performed by: STUDENT IN AN ORGANIZED HEALTH CARE EDUCATION/TRAINING PROGRAM

## 2022-03-08 PROCEDURE — 700102 HCHG RX REV CODE 250 W/ 637 OVERRIDE(OP): Performed by: NURSE PRACTITIONER

## 2022-03-08 PROCEDURE — 700111 HCHG RX REV CODE 636 W/ 250 OVERRIDE (IP): Performed by: STUDENT IN AN ORGANIZED HEALTH CARE EDUCATION/TRAINING PROGRAM

## 2022-03-08 PROCEDURE — 82962 GLUCOSE BLOOD TEST: CPT

## 2022-03-08 PROCEDURE — 99238 HOSP IP/OBS DSCHRG MGMT 30/<: CPT | Mod: GC | Performed by: INTERNAL MEDICINE

## 2022-03-08 RX ORDER — HYDRALAZINE HYDROCHLORIDE 20 MG/ML
10 INJECTION INTRAMUSCULAR; INTRAVENOUS EVERY 6 HOURS PRN
Status: DISCONTINUED | OUTPATIENT
Start: 2022-03-08 | End: 2022-03-08 | Stop reason: HOSPADM

## 2022-03-08 RX ADMIN — FERROUS SULFATE TAB 325 MG (65 MG ELEMENTAL FE) 325 MG: 325 (65 FE) TAB at 07:57

## 2022-03-08 RX ADMIN — LEVOFLOXACIN 500 MG: 500 TABLET, FILM COATED ORAL at 06:00

## 2022-03-08 RX ADMIN — HYDROCORTISONE: 5 CREAM TOPICAL at 06:01

## 2022-03-08 RX ADMIN — DOCUSATE SODIUM 100 MG: 100 CAPSULE, LIQUID FILLED ORAL at 05:58

## 2022-03-08 RX ADMIN — Medication 2668 MG: at 05:59

## 2022-03-08 RX ADMIN — HEPARIN SODIUM 5000 UNITS: 5000 INJECTION, SOLUTION INTRAVENOUS; SUBCUTANEOUS at 05:58

## 2022-03-08 RX ADMIN — FUROSEMIDE 80 MG: 40 TABLET ORAL at 05:59

## 2022-03-08 RX ADMIN — POLYETHYLENE GLYCOL 3350 1 PACKET: 17 POWDER, FOR SOLUTION ORAL at 05:57

## 2022-03-08 RX ADMIN — CALCITRIOL CAPSULES 0.25 MCG 0.25 MCG: 0.25 CAPSULE ORAL at 05:59

## 2022-03-08 RX ADMIN — ACETAMINOPHEN 975 MG: 325 TABLET, FILM COATED ORAL at 05:59

## 2022-03-08 RX ADMIN — Medication 2000 UNITS: at 05:58

## 2022-03-08 RX ADMIN — HYDRALAZINE HYDROCHLORIDE 25 MG: 25 TABLET, FILM COATED ORAL at 06:00

## 2022-03-08 RX ADMIN — SENNOSIDES AND DOCUSATE SODIUM 2 TABLET: 50; 8.6 TABLET ORAL at 05:58

## 2022-03-08 ASSESSMENT — ENCOUNTER SYMPTOMS
SORE THROAT: 0
WHEEZING: 0
BLURRED VISION: 0
FEVER: 0
CONSTIPATION: 0
VOMITING: 0
SHORTNESS OF BREATH: 1
PALPITATIONS: 0
NAUSEA: 0
ABDOMINAL PAIN: 0
CHILLS: 0
DOUBLE VISION: 0

## 2022-03-08 ASSESSMENT — FIBROSIS 4 INDEX: FIB4 SCORE: 1.07

## 2022-03-08 ASSESSMENT — PAIN DESCRIPTION - PAIN TYPE: TYPE: ACUTE PAIN

## 2022-03-08 NOTE — PROGRESS NOTES
Ambrose Watkins patient has chosen to leave the hospital against medical advice. The attending physician has not discharged the patient. Patient is not a risk to himself or others. I have discussed with the patient the following:  Physician has not determined patient is ready for discharge, Risks and consequences of leaving the hospital too soon and Benefit of continued hospitalization.      Attending physician has been notified.

## 2022-03-08 NOTE — PROGRESS NOTES
"Daily Progress Note:     Date of Service: 3/8/2022  Primary Team: [facility]   Attending: No att. providers found   Senior Resident: Dr. Shi  Intern: Dr. Chávez  Contact:  [phone]    Chief Complaint:   Low Back Pain     ID: Mr. Watkins is a 46 year old male who presents 2/21/22 for back pain. His past medical history includes Methamphetamine abuse, Hypertension, Diabetes Mellitus, End Stage Renal Disease on hemodialysis at [facility] every Tues/Thurs/Sat.  Reportedly missing hemodialysis due to transportation issues. Describes his back pain as dull, nonradiating, 8 out of 10 intensity, worse with movement relieved with rest.    Subjective:  -Reports large bowel movement previous day and feeling \"cleaned out\"  -Patient dressed in street clothes and stating that his sister is on her way  -Cursing and yelling at myself and staff regarding his swelling and body habitus  -Patient upset that he has not yet been placed in group.   -Denies recollection of previous conversations regarding transportation to dialysis and other services  -Refuses medical evaluation and unwilling to discuss next steps regarding management  -Patient adamant to leave against medical advice    Consultants/Specialty:  Nephrology  Vascular    Review of Systems:  Limited secondary to compliance   Review of Systems   Constitutional: Negative for chills and fever.   HENT: Negative for sore throat.    Eyes: Negative for blurred vision and double vision.   Respiratory: Positive for shortness of breath. Negative for wheezing.    Cardiovascular: Negative for chest pain and palpitations.   Gastrointestinal: Negative for abdominal pain, constipation, nausea and vomiting.   Genitourinary: Negative for dysuria and hematuria.        Objective Data:   Physical Exam:   Vitals:   Temp:  [36.4 °C (97.6 °F)-37.1 °C (98.8 °F)] 36.4 °C (97.6 °F)  Pulse:  [86-99] 99  Resp:  [16] 16  BP: (142-166)/() 166/102  SpO2:  [92 %-96 %] 96 %    Physical " Exam Unable to perform as patient refused     Labs:   HEMATOLOGY/ ONCOLOGY/ID:            Recent Labs     03/06/22  0324 03/07/22  1418   WBC 14.2* 13.7*   RBC 3.03* 3.08*   HEMOGLOBIN 8.9* 9.1*   HEMATOCRIT 28.5* 28.6*   MCV 94.1 92.9   MCH 29.4 29.5   RDW 57.6* 56.3*   PLATELETCT 314 297   MPV 10.2 10.2   NEUTSPOLYS 81.20* 85.60*   LYMPHOCYTES 10.60* 4.20*   MONOCYTES 7.10 9.20   EOSINOPHILS 0.10 0.10   BASOPHILS 0.30 0.50     Lab Results   Component Value Date    FERRITIN 665.0 (H) 02/22/2022    IRON 8 (L) 02/22/2022    TOTIRONBC 178 (L) 02/22/2022       RENAL:        Estimated GFR/CRCL = Estimated Creatinine Clearance: 24.2 mL/min (A) (by C-G formula based on SCr of 5.13 mg/dL (HH)).  Recent Labs     03/06/22 0324 03/07/22  1418   SODIUM 131* 133*   POTASSIUM 5.2 4.4   CHLORIDE 91* 93*   CO2 27 26   GLUCOSE 198* 165*   BUN 42* 39*   CREATININE 5.82* 5.13*   CALCIUM 8.8 8.7   PHOSPHORUS 4.2  --    ALBUMIN 3.3 3.4       GASTROINTESTINAL/ HEPATIC:          Recent Labs     03/06/22  0324 03/07/22  1418   ALTSGPT 14 14   ASTSGOT 35 27   ALKPHOSPHAT 395* 398*   TBILIRUBIN 0.6 0.8   ALBUMIN 3.3 3.4   GLOBULIN 3.6* 3.7*     No results found for: AMMONIA    ENDOCRINE:              Recent Labs     03/06/22  0324 03/07/22  1418   GLUCOSE 198* 165*     Lab Results   Component Value Date    HBA1C 6.5 (H) 02/22/2022    HBA1C 7.2 (H) 05/19/2020       Imaging:   MR-LUMBAR SPINE-WITH & W/O  02/22/22 0737  1. Bilateral L5 pars defects. However, there is no anterolisthesis.     2. Small left facet origin subligamentous synovial cyst projects into the posterolateral spinal canal behind L5 without significant thecal sac compression. Bilateral mild facet degeneration at L4-5 and L5-S1.     3. No abnormal enhancement is identified in the lumbar spine to suggest an infectious process.     MR-THORACIC SPINE-WITH & W/O  02/22/22 0731  1. MRI of the thoracic spine without and with contrast within normal limits.     DX-CHEST-PORTABLE (1  VIEW) 02/21/22 2135  1.  No acute cardiopulmonary disease.   2.  Cardiomegaly     CT scan of the abdomen and pelvis without contrast   1.  No evidence of acute inflammatory process in the abdomen or pelvis  2.  Anasarca  3.  Prominent venous collaterals in the BILATERAL inguinal region  4.  BILATERAL L5 pars defects without spondylolisthesis     ECHO  LV EF:  30%   Global hypokinesis.  Probably grade II diastolic dysfunction.     Problem Representation:  Mr. Watkins is a 46 year old male who presents 2/21/22 for back pain, His past medical history includes Methamphetamine abuse, Hypertension, Diabetes Mellitus, End Stage Renal Disease on hemodialysis at [facility] every Tues/Thurs/Sat.  Reportedly missing hemodialysis due to transportation issues. Describes his back pain as dull, nonradiating, 8 out of 10 intensity, worse with movement relieved with rest.     * End stage renal disease (HCC)  Assessment & Plan  ESRD initially requiring daily dialysis.  Complex fluid balance management   -Nephrology following, started on Lasix on 3/4/2022 by nephrology. Patient reports urinating evening of 3/4/22  -Continue calcitriol, calcium, patiromer.  Hold patiromer if K less than 4  -Outpatient dialysis after discharge    Acute heart failure (HCC)  Assessment & Plan  Acute heart failure: Echo 2/4/2022 EF 35%, likely secondary to fluid overload from ESRD.   -Continue hemodialysis per nephrology  -Continue hydralazine  -1.5L Fluid restriction, salt restriction  -Daily weights  -Repeat Echo when at 121kg; if HFrEF persist consider further cardiology evaluation.   -Lipid panel pending    Leukocytosis- (present on admission)  Assessment & Plan  Per ID pharmacy regarding the rare blood culture growth, levofloxacin renally/dialysis dosed for 14 days. WBC within normal limits at 8.8 starting 3/1/22 but elevated to 14.2 on 3/6/22. CXR revealed cardiomegaly.  -PO Levofloxacin 750mg once, PO Levofloxacin 500mg [id number] for 14 days (last  "dose 3/12/22)  -Blood cultures and urinalysis pending  -Consider abdominal CT with contrast if non-resolving     Paresthesia  Assessment & Plan  Some improvement on 3/7/22  -Vascular surgery consulted, following  -Pain management and observation for increased swelling, new loss of sensation, or loss of strength; if symptoms are worsening, ultrasound of the left upper extremity  -CTM    Anemia  Assessment & Plan  Hemoglobin baseline is between 9 and 10.  -Ferous sulfate 325mg  -Epoetin 6,000 units during dialysis  -Dialysis  -nephrology onboard, recs appreciated   -CTM with routine labs    Back pain  Assessment & Plan  -Hydrocodone-acetaminophen renally dosed per pharmacy  -Tylenol PRN  -Lidoderm   -OT/PT  -CTM      Hyperkalemia- (present on admission)  Assessment & Plan  Hyperkalemia secondary to ESRD. Continue hemodialysis per nephrology. Insulin with dextrose for hyperkalemia plus calcium gluconate for cardiac protection. Currently stable.  -On patiromer daily for hyperK+. Stop/hold if serum K+ <4   -Monitor labs      Constipation  Assessment & Plan  On 3/5/22 patient reports daily stools, that are hard in the setting of lower abdominal \"fullness\". Constipation likely secondary to narcotic use.   -Colace dialy, titrate as needed. Hold for loose stools  -Miralax daily, titrate as clinically indicated. Hold for loose stools.  -CTM    Pruritus  Assessment & Plan  Stable   -hydrocortisone cream PRN  -CTM    Diarrhea  Assessment & Plan  Resolved, patient started reporting constipation 3/5/22  -monitor and replete electrolytes if diarrhea recurs  -CTM for recurrence      Hypocalcemia  Assessment & Plan  Stable  -vitamin D3 (cholecalciferol) tablet 2,000 Units  -calcitRIOL (ROCALTROL) capsule 0.25 mcg  -Calcium acetate  -CTM    Methamphetamine abuse (HCC)  Assessment & Plan  History of methamphetamine use. Urine drug screen ordered but not processed 2/2 poor urine output on admission. Patient denies regular " use.  -Counseled on cessation, denies use but is interested in abstaining  -Monitor for agitation and signs of withdrawal    Hypertension- (present on admission)  Assessment & Plan  Persistent hypertension likely secondary to fluid overload. Blood pressure fluctuates and increases when patient is non-compliant with his diet.  -Continue dialysis per nephrology, hydralazine  -Optimize pain control    DM (diabetes mellitus) ([facility])  Assessment & Plan  Glucose stable with some variations per intake. Patient has been having food delivered to the hospital and has been non-compliant with his diet.  -Sliding scale  -Hypoglycemia protocol   -Continue to monitor  -Renal diet        Code Status: FULL  DVT ppx: Heparin   Diet: Renal      Majo Chávez MD MPH  PGY-1, [facility]    Assessment and plan discussed with senior resident and attending physician.

## 2022-03-08 NOTE — DISCHARGE PLANNING
Outpatient Dialysis Note    I spoke with prior to leaving the hospital pt. was given a new chair time for his Outpatient HD tx at [facility] on Thursday at 10:00am, pt. voiced understanding and per pt. his sister will drive him to HD tx.     Katheryn Heredia- Dialysis Coordinator # [phone]  [facility]

## 2022-03-09 PROBLEM — F15.10 METHAMPHETAMINE ABUSE (HCC): Chronic | Status: ACTIVE | Noted: 2022-02-22

## 2022-03-09 PROBLEM — I50.9 ACUTE HEART FAILURE (HCC): Chronic | Status: ACTIVE | Noted: 2022-02-28

## 2022-03-09 PROBLEM — N18.6 END STAGE RENAL DISEASE (HCC): Chronic | Status: ACTIVE | Noted: 2022-02-22

## 2022-03-10 ENCOUNTER — TELEPHONE (OUTPATIENT)
Dept: CARDIOLOGY | Facility: MEDICAL CENTER | Age: 47
End: 2022-03-10
Payer: MEDICARE

## 2022-03-10 NOTE — TELEPHONE ENCOUNTER
Called patient to request records for NP appointment with . Called to confirm if this will be first time patient is seeing a cardiologist Phone number we have ist wrong for patient.

## 2022-03-21 ENCOUNTER — TELEPHONE (OUTPATIENT)
Dept: CARDIOLOGY | Facility: MEDICAL CENTER | Age: 47
End: 2022-03-21
Payer: MEDICARE

## 2022-03-23 NOTE — OP REPORT
ACUTE CARE VASCULAR SERVICE  OPERATIVE NOTE              DATE OF SERVICE: 2/25/2022     PREOPERATIVE DIAGNOSES:  - End-stage renal disease   -Thrombosed left forearm AV graft     POSTOPERATIVE DIAGNOSES:  - End-stage renal disease   -Thrombosed left forearm AV graft     PROCEDURES PERFORMED:  Creation of a left brachial axillary AV graft with 4 mm to 7 mm tapered PTFE conduit     SURGEON:  Tone Hartman MD     ASSISTANT: None     ANESTHESIA:  General plus local anesthetic.     BLOOD LOSS:  5 mL.     DISPOSITION:  Patient was extubated and sent to recovery in stable condition.     DESCRIPTION OF PROCEDURE:    Following informed consent, patient was placed supine on the operating table and general anesthesia was administered.  Patient's left arm was prepped and draped in a sterile using ChloraPrep.  Surgical time-out was called to identify the correct patient, procedure and equipment; everyone was in agreement.  Patient received preoperative prophylactic antibiotics.     Local anesthetic was used to both incisions and along the tunneling tract.  We made a longitudinal incision over the brachial artery at the antecubital fossa and overlying the axillary vein in the axilla.  The brachial artery was dissected out for a length of about 2 cm and encircled proximally and distally with Vesseloops.  The axillary vein was dissected out for a length of about 3 cm and encircled proximally and distally with Vesseloops.  A PTFE graft was tunneled between the 2 incisions.  Patient was systemically heparinized.  The brachial artery was clamped and a longitudinal arteriotomy was made there was good antegrade and retrograde bleeding.  The graft was anastomosed to the artery in an end-to-side fashion using a running 6-0 Prolene suture line.  The anastomosis was pressurized and found to be hemostatic and then the graft was flushed.     At this point the vein was clamped and a longitudinal venotomy was created.  A bell was  fashioned and the distal end of the PTFE conduit and it was anastomosed to the vein in an end-to-side fashion using a running 5-0 Prolene suture line.  The anastomosis was flushed and irrigated prior to completion and once complete it was pressurized and found to be hemostatic and then outflow was opened and there was excellent flow through the graft.  Topical hemostatic was applied.     At this point, hemostasis was achieved.  The subcutaneous tissues were reapproximated with interrupted 3-0 Vicryl sutures.  The skin was reapproximated with a running subcuticular 4-0 Stratafix suture.  Skin glue was then applied to complete the closure.  Patient tolerated the procedure well.  All counts were correct.  Patient was extubated and sent to recovery in stable condition.     Tone Hartman MD  General and Vascular Surgery  [facility]  [phone]

## 2022-03-23 NOTE — OP REPORT
ACUTE CARE VASCULAR SERVICE  OPERATIVE NOTE          DATE OF SERVICE: 2/25/2022    PREOPERATIVE DIAGNOSES:  - End-stage renal disease   -Thrombosed left forearm AV graft    POSTOPERATIVE DIAGNOSES:  - End-stage renal disease   -Thrombosed left forearm AV graft    PROCEDURES PERFORMED:  Creation of a left brachial axillary AV graft with 4 mm to 7 mm tapered PTFE conduit    SURGEON:  Tone Hartman MD    ASSISTANT: None    ANESTHESIA:  General plus local anesthetic.    BLOOD LOSS:  5 mL.    DISPOSITION:  Patient was extubated and sent to recovery in stable condition.    DESCRIPTION OF PROCEDURE:    Following informed consent, patient was placed supine on the operating table and general anesthesia was administered.  Patient's left arm was prepped and draped in a sterile using ChloraPrep.  Surgical time-out was called to identify the correct patient, procedure and equipment; everyone was in agreement.  Patient received preoperative prophylactic antibiotics.    Local anesthetic was used to both incisions and along the tunneling tract.  We made a longitudinal incision over the brachial artery at the antecubital fossa and overlying the axillary vein in the axilla.  The brachial artery was dissected out for a length of about 2 cm and encircled proximally and distally with Vesseloops.  The axillary vein was dissected out for a length of about 3 cm and encircled proximally and distally with Vesseloops.  A PTFE graft was tunneled between the 2 incisions.  Patient was systemically heparinized.  The brachial artery was clamped and a longitudinal arteriotomy was made there was good antegrade and retrograde bleeding.  The graft was anastomosed to the artery in an end-to-side fashion using a running 6-0 Prolene suture line.  The anastomosis was pressurized and found to be hemostatic and then the graft was flushed.    At this point the vein was clamped and a longitudinal venotomy was created.  A bell was fashioned and the  distal end of the PTFE conduit and it was anastomosed to the vein in an end-to-side fashion using a running 5-0 Prolene suture line.  The anastomosis was flushed and irrigated prior to completion and once complete it was pressurized and found to be hemostatic and then outflow was opened and there was excellent flow through the graft.  Topical hemostatic was applied.    At this point, hemostasis was achieved.  The subcutaneous tissues were reapproximated with interrupted 3-0 Vicryl sutures.  The skin was reapproximated with a running subcuticular 4-0 Stratafix suture.  Skin glue was then applied to complete the closure.  Patient tolerated the procedure well.  All counts were correct.  Patient was extubated and sent to recovery in stable condition.    Tone Hartman MD  General and Vascular Surgery  [facility]  [phone]

## 2022-08-08 ENCOUNTER — HOSPITAL ENCOUNTER (INPATIENT)
Facility: MEDICAL CENTER | Age: 47
LOS: 27 days | DRG: 291 | End: 2022-09-04
Attending: EMERGENCY MEDICINE | Admitting: HOSPITALIST
Payer: MEDICARE

## 2022-08-08 ENCOUNTER — APPOINTMENT (OUTPATIENT)
Dept: RADIOLOGY | Facility: MEDICAL CENTER | Age: 47
DRG: 291 | End: 2022-08-08
Attending: EMERGENCY MEDICINE
Payer: MEDICARE

## 2022-08-08 DIAGNOSIS — E11.22 TYPE 2 DIABETES MELLITUS WITH CHRONIC KIDNEY DISEASE ON CHRONIC DIALYSIS, UNSPECIFIED WHETHER LONG TERM INSULIN USE (HCC): Chronic | ICD-10-CM

## 2022-08-08 DIAGNOSIS — M79.605 LOWER EXTREMITY PAIN, BILATERAL: ICD-10-CM

## 2022-08-08 DIAGNOSIS — L29.9 PRURITUS: ICD-10-CM

## 2022-08-08 DIAGNOSIS — N18.6 TYPE 2 DIABETES MELLITUS WITH CHRONIC KIDNEY DISEASE ON CHRONIC DIALYSIS, UNSPECIFIED WHETHER LONG TERM INSULIN USE (HCC): Chronic | ICD-10-CM

## 2022-08-08 DIAGNOSIS — R20.2 PARESTHESIA: ICD-10-CM

## 2022-08-08 DIAGNOSIS — N18.6 STAGE 5 CHRONIC KIDNEY DISEASE ON CHRONIC DIALYSIS (HCC): ICD-10-CM

## 2022-08-08 DIAGNOSIS — F15.10 METHAMPHETAMINE ABUSE (HCC): Chronic | ICD-10-CM

## 2022-08-08 DIAGNOSIS — M54.6 MIDLINE THORACIC BACK PAIN, UNSPECIFIED CHRONICITY: ICD-10-CM

## 2022-08-08 DIAGNOSIS — M79.604 LOWER EXTREMITY PAIN, BILATERAL: ICD-10-CM

## 2022-08-08 DIAGNOSIS — R53.1 WEAKNESS: ICD-10-CM

## 2022-08-08 DIAGNOSIS — Z99.2 STAGE 5 CHRONIC KIDNEY DISEASE ON CHRONIC DIALYSIS (HCC): ICD-10-CM

## 2022-08-08 DIAGNOSIS — I10 PRIMARY HYPERTENSION: ICD-10-CM

## 2022-08-08 DIAGNOSIS — E87.5 HYPERKALEMIA: ICD-10-CM

## 2022-08-08 DIAGNOSIS — Z99.2 TYPE 2 DIABETES MELLITUS WITH CHRONIC KIDNEY DISEASE ON CHRONIC DIALYSIS, UNSPECIFIED WHETHER LONG TERM INSULIN USE (HCC): Chronic | ICD-10-CM

## 2022-08-08 PROBLEM — Z91.158 NONCOMPLIANCE OF PATIENT WITH RENAL DIALYSIS (HCC): Status: ACTIVE | Noted: 2022-08-08

## 2022-08-08 LAB
ALBUMIN SERPL BCP-MCNC: 3.4 G/DL (ref 3.2–4.9)
ALBUMIN SERPL BCP-MCNC: 3.4 G/DL (ref 3.2–4.9)
ALBUMIN/GLOB SERPL: 0.8 G/DL
ALP SERPL-CCNC: 122 U/L (ref 30–99)
ALT SERPL-CCNC: 8 U/L (ref 2–50)
ANION GAP SERPL CALC-SCNC: 25 MMOL/L (ref 7–16)
AST SERPL-CCNC: 13 U/L (ref 12–45)
BASOPHILS # BLD AUTO: 0.3 % (ref 0–1.8)
BASOPHILS # BLD: 0.03 K/UL (ref 0–0.12)
BILIRUB SERPL-MCNC: 0.6 MG/DL (ref 0.1–1.5)
BUN SERPL-MCNC: 61 MG/DL (ref 8–22)
BUN SERPL-MCNC: 93 MG/DL (ref 8–22)
CALCIUM SERPL-MCNC: 6.3 MG/DL (ref 8.5–10.5)
CALCIUM SERPL-MCNC: 7.4 MG/DL (ref 8.5–10.5)
CHLORIDE SERPL-SCNC: 102 MMOL/L (ref 96–112)
CHLORIDE SERPL-SCNC: 99 MMOL/L (ref 96–112)
CO2 SERPL-SCNC: 10 MMOL/L (ref 20–33)
CO2 SERPL-SCNC: 16 MMOL/L (ref 20–33)
CREAT SERPL-MCNC: 11.52 MG/DL (ref 0.5–1.4)
CREAT SERPL-MCNC: 16.78 MG/DL (ref 0.5–1.4)
EKG IMPRESSION: NORMAL
EOSINOPHIL # BLD AUTO: 0.06 K/UL (ref 0–0.51)
EOSINOPHIL NFR BLD: 0.6 % (ref 0–6.9)
ERYTHROCYTE [DISTWIDTH] IN BLOOD BY AUTOMATED COUNT: 58.8 FL (ref 35.9–50)
ERYTHROCYTE [DISTWIDTH] IN BLOOD BY AUTOMATED COUNT: 61.6 FL (ref 35.9–50)
GFR SERPLBLD CREATININE-BSD FMLA CKD-EPI: 3 ML/MIN/1.73 M 2
GFR SERPLBLD CREATININE-BSD FMLA CKD-EPI: 5 ML/MIN/1.73 M 2
GLOBULIN SER CALC-MCNC: 4.1 G/DL (ref 1.9–3.5)
GLUCOSE SERPL-MCNC: 116 MG/DL (ref 65–99)
GLUCOSE SERPL-MCNC: 156 MG/DL (ref 65–99)
HCT VFR BLD AUTO: 29 % (ref 42–52)
HCT VFR BLD AUTO: 30.8 % (ref 42–52)
HGB BLD-MCNC: 10.3 G/DL (ref 14–18)
HGB BLD-MCNC: 9.3 G/DL (ref 14–18)
HGB RETIC QN AUTO: 33.5 PG/CELL (ref 29–35)
IMM GRANULOCYTES # BLD AUTO: 0.05 K/UL (ref 0–0.11)
IMM GRANULOCYTES NFR BLD AUTO: 0.5 % (ref 0–0.9)
IMM RETICS NFR: 12.5 % (ref 9.3–17.4)
IRON SATN MFR SERPL: 32 % (ref 15–55)
IRON SERPL-MCNC: 54 UG/DL (ref 50–180)
LYMPHOCYTES # BLD AUTO: 1.25 K/UL (ref 1–4.8)
LYMPHOCYTES NFR BLD: 13.5 % (ref 22–41)
MCH RBC QN AUTO: 28.9 PG (ref 27–33)
MCH RBC QN AUTO: 29.1 PG (ref 27–33)
MCHC RBC AUTO-ENTMCNC: 32.1 G/DL (ref 33.7–35.3)
MCHC RBC AUTO-ENTMCNC: 33.4 G/DL (ref 33.7–35.3)
MCV RBC AUTO: 87 FL (ref 81.4–97.8)
MCV RBC AUTO: 90.1 FL (ref 81.4–97.8)
MONOCYTES # BLD AUTO: 0.88 K/UL (ref 0–0.85)
MONOCYTES NFR BLD AUTO: 9.5 % (ref 0–13.4)
NEUTROPHILS # BLD AUTO: 7.01 K/UL (ref 1.82–7.42)
NEUTROPHILS NFR BLD: 75.6 % (ref 44–72)
NRBC # BLD AUTO: 0 K/UL
NRBC BLD-RTO: 0 /100 WBC
PHOSPHATE SERPL-MCNC: 7.8 MG/DL (ref 2.5–4.5)
PLATELET # BLD AUTO: 177 K/UL (ref 164–446)
PLATELET # BLD AUTO: 188 K/UL (ref 164–446)
PMV BLD AUTO: 10 FL (ref 9–12.9)
PMV BLD AUTO: 10.6 FL (ref 9–12.9)
POTASSIUM SERPL-SCNC: 4.4 MMOL/L (ref 3.6–5.5)
POTASSIUM SERPL-SCNC: 6.1 MMOL/L (ref 3.6–5.5)
PROT SERPL-MCNC: 7.5 G/DL (ref 6–8.2)
PTH-INTACT SERPL-MCNC: 510 PG/ML (ref 14–72)
RBC # BLD AUTO: 3.22 M/UL (ref 4.7–6.1)
RBC # BLD AUTO: 3.54 M/UL (ref 4.7–6.1)
RETICS # AUTO: 0.04 M/UL (ref 0.04–0.06)
RETICS/RBC NFR: 1.2 % (ref 0.8–2.1)
SODIUM SERPL-SCNC: 136 MMOL/L (ref 135–145)
SODIUM SERPL-SCNC: 137 MMOL/L (ref 135–145)
TIBC SERPL-MCNC: 167 UG/DL (ref 250–450)
UIBC SERPL-MCNC: 113 UG/DL (ref 110–370)
WBC # BLD AUTO: 8 K/UL (ref 4.8–10.8)
WBC # BLD AUTO: 9.3 K/UL (ref 4.8–10.8)

## 2022-08-08 PROCEDURE — 80053 COMPREHEN METABOLIC PANEL: CPT

## 2022-08-08 PROCEDURE — 83540 ASSAY OF IRON: CPT

## 2022-08-08 PROCEDURE — 83550 IRON BINDING TEST: CPT

## 2022-08-08 PROCEDURE — 700111 HCHG RX REV CODE 636 W/ 250 OVERRIDE (IP): Performed by: STUDENT IN AN ORGANIZED HEALTH CARE EDUCATION/TRAINING PROGRAM

## 2022-08-08 PROCEDURE — 93005 ELECTROCARDIOGRAM TRACING: CPT | Performed by: EMERGENCY MEDICINE

## 2022-08-08 PROCEDURE — 99223 1ST HOSP IP/OBS HIGH 75: CPT | Mod: AI | Performed by: HOSPITALIST

## 2022-08-08 PROCEDURE — A9270 NON-COVERED ITEM OR SERVICE: HCPCS | Performed by: STUDENT IN AN ORGANIZED HEALTH CARE EDUCATION/TRAINING PROGRAM

## 2022-08-08 PROCEDURE — 36415 COLL VENOUS BLD VENIPUNCTURE: CPT

## 2022-08-08 PROCEDURE — 99285 EMERGENCY DEPT VISIT HI MDM: CPT

## 2022-08-08 PROCEDURE — 700102 HCHG RX REV CODE 250 W/ 637 OVERRIDE(OP): Performed by: STUDENT IN AN ORGANIZED HEALTH CARE EDUCATION/TRAINING PROGRAM

## 2022-08-08 PROCEDURE — 85025 COMPLETE CBC W/AUTO DIFF WBC: CPT

## 2022-08-08 PROCEDURE — 71045 X-RAY EXAM CHEST 1 VIEW: CPT

## 2022-08-08 PROCEDURE — 770001 HCHG ROOM/CARE - MED/SURG/GYN PRIV*

## 2022-08-08 PROCEDURE — 85046 RETICYTE/HGB CONCENTRATE: CPT

## 2022-08-08 PROCEDURE — 5A1D70Z PERFORMANCE OF URINARY FILTRATION, INTERMITTENT, LESS THAN 6 HOURS PER DAY: ICD-10-PCS | Performed by: STUDENT IN AN ORGANIZED HEALTH CARE EDUCATION/TRAINING PROGRAM

## 2022-08-08 PROCEDURE — 700102 HCHG RX REV CODE 250 W/ 637 OVERRIDE(OP): Performed by: HOSPITALIST

## 2022-08-08 PROCEDURE — 83970 ASSAY OF PARATHORMONE: CPT

## 2022-08-08 PROCEDURE — 80069 RENAL FUNCTION PANEL: CPT

## 2022-08-08 PROCEDURE — 82962 GLUCOSE BLOOD TEST: CPT

## 2022-08-08 PROCEDURE — 90935 HEMODIALYSIS ONE EVALUATION: CPT

## 2022-08-08 PROCEDURE — 700111 HCHG RX REV CODE 636 W/ 250 OVERRIDE (IP): Performed by: HOSPITALIST

## 2022-08-08 PROCEDURE — 85027 COMPLETE CBC AUTOMATED: CPT

## 2022-08-08 PROCEDURE — A9270 NON-COVERED ITEM OR SERVICE: HCPCS | Performed by: HOSPITALIST

## 2022-08-08 RX ORDER — ONDANSETRON 4 MG/1
4 TABLET, ORALLY DISINTEGRATING ORAL EVERY 4 HOURS PRN
Status: DISCONTINUED | OUTPATIENT
Start: 2022-08-08 | End: 2022-08-08

## 2022-08-08 RX ORDER — LABETALOL HYDROCHLORIDE 5 MG/ML
10 INJECTION, SOLUTION INTRAVENOUS EVERY 4 HOURS PRN
Status: DISCONTINUED | OUTPATIENT
Start: 2022-08-08 | End: 2022-08-09

## 2022-08-08 RX ORDER — PROCHLORPERAZINE EDISYLATE 5 MG/ML
5-10 INJECTION INTRAMUSCULAR; INTRAVENOUS EVERY 4 HOURS PRN
Status: DISCONTINUED | OUTPATIENT
Start: 2022-08-08 | End: 2022-09-04 | Stop reason: HOSPADM

## 2022-08-08 RX ORDER — BISACODYL 10 MG
10 SUPPOSITORY, RECTAL RECTAL
Status: DISCONTINUED | OUTPATIENT
Start: 2022-08-08 | End: 2022-08-16

## 2022-08-08 RX ORDER — PROMETHAZINE HYDROCHLORIDE 25 MG/1
12.5-25 SUPPOSITORY RECTAL EVERY 4 HOURS PRN
Status: DISCONTINUED | OUTPATIENT
Start: 2022-08-08 | End: 2022-09-04 | Stop reason: HOSPADM

## 2022-08-08 RX ORDER — CALCIUM ACETATE 667 MG/1
667 TABLET ORAL
Status: DISCONTINUED | OUTPATIENT
Start: 2022-08-08 | End: 2022-08-09

## 2022-08-08 RX ORDER — POLYETHYLENE GLYCOL 3350 17 G/17G
1 POWDER, FOR SOLUTION ORAL
Status: DISCONTINUED | OUTPATIENT
Start: 2022-08-08 | End: 2022-08-16

## 2022-08-08 RX ORDER — OXYCODONE HYDROCHLORIDE 5 MG/1
2.5 TABLET ORAL
Status: DISCONTINUED | OUTPATIENT
Start: 2022-08-08 | End: 2022-08-15

## 2022-08-08 RX ORDER — HEPARIN SODIUM 1000 [USP'U]/ML
3100 INJECTION, SOLUTION INTRAVENOUS; SUBCUTANEOUS PRN
Status: DISCONTINUED | OUTPATIENT
Start: 2022-08-08 | End: 2022-09-04 | Stop reason: HOSPADM

## 2022-08-08 RX ORDER — HEPARIN SODIUM 5000 [USP'U]/ML
5000 INJECTION, SOLUTION INTRAVENOUS; SUBCUTANEOUS EVERY 8 HOURS
Status: DISCONTINUED | OUTPATIENT
Start: 2022-08-08 | End: 2022-09-04 | Stop reason: HOSPADM

## 2022-08-08 RX ORDER — OXYCODONE HYDROCHLORIDE 5 MG/1
5 TABLET ORAL
Status: DISCONTINUED | OUTPATIENT
Start: 2022-08-08 | End: 2022-08-15

## 2022-08-08 RX ORDER — HYDROMORPHONE HYDROCHLORIDE 1 MG/ML
0.25 INJECTION, SOLUTION INTRAMUSCULAR; INTRAVENOUS; SUBCUTANEOUS
Status: DISCONTINUED | OUTPATIENT
Start: 2022-08-08 | End: 2022-08-09

## 2022-08-08 RX ORDER — PROMETHAZINE HYDROCHLORIDE 25 MG/1
12.5-25 TABLET ORAL EVERY 4 HOURS PRN
Status: DISCONTINUED | OUTPATIENT
Start: 2022-08-08 | End: 2022-09-04 | Stop reason: HOSPADM

## 2022-08-08 RX ORDER — VITAMIN B COMPLEX
1000 TABLET ORAL DAILY
Status: DISCONTINUED | OUTPATIENT
Start: 2022-08-08 | End: 2022-09-04 | Stop reason: HOSPADM

## 2022-08-08 RX ORDER — AMOXICILLIN 250 MG
2 CAPSULE ORAL 2 TIMES DAILY
Status: DISCONTINUED | OUTPATIENT
Start: 2022-08-08 | End: 2022-08-16

## 2022-08-08 RX ORDER — ACETAMINOPHEN 325 MG/1
650 TABLET ORAL EVERY 6 HOURS PRN
Status: DISCONTINUED | OUTPATIENT
Start: 2022-08-08 | End: 2022-09-02

## 2022-08-08 RX ORDER — ONDANSETRON 2 MG/ML
4 INJECTION INTRAMUSCULAR; INTRAVENOUS EVERY 4 HOURS PRN
Status: DISCONTINUED | OUTPATIENT
Start: 2022-08-08 | End: 2022-08-08

## 2022-08-08 RX ADMIN — HEPARIN SODIUM 3100 UNITS: 1000 INJECTION, SOLUTION INTRAVENOUS; SUBCUTANEOUS at 19:32

## 2022-08-08 RX ADMIN — HEPARIN SODIUM 5000 UNITS: 5000 INJECTION, SOLUTION INTRAVENOUS; SUBCUTANEOUS at 22:12

## 2022-08-08 RX ADMIN — Medication 667 MG: at 20:03

## 2022-08-08 RX ADMIN — OXYCODONE 5 MG: 5 TABLET ORAL at 22:12

## 2022-08-08 RX ADMIN — Medication 1000 UNITS: at 20:03

## 2022-08-08 ASSESSMENT — ENCOUNTER SYMPTOMS
FEVER: 0
VOMITING: 1
CONSTIPATION: 0
SHORTNESS OF BREATH: 0
HEADACHES: 0
WHEEZING: 0
NAUSEA: 1
COUGH: 0
CHILLS: 0
DIARRHEA: 1

## 2022-08-08 ASSESSMENT — PAIN DESCRIPTION - PAIN TYPE: TYPE: ACUTE PAIN

## 2022-08-08 ASSESSMENT — FIBROSIS 4 INDEX: FIB4 SCORE: 1.07

## 2022-08-08 NOTE — H&P
"Hospital Medicine History & Physical Note    Date of Service  8/8/2022    Primary Care Physician  Pcp Pt States None    Consultants  SN nephrologist Dr. Hamilton    Code Status  Full code per patient    Chief Complaint  Chief Complaint   Patient presents with   • Weakness     Pt has missed dialysis for 1 month due to traveling. Has been feeling increasingly weak and states \"there's toxins coming out of my pores\". +N/V/D.        History of Presenting Illness  46 y.o. male who presented 8/8/2022 with missing dialysis.  Patient has missed dialysis for the past 3 weeks.  This was because he traveled to [place] for work and did not know where to go to get dialysis.  In addition, patient does not have a dialysis chair here in [place].  Thus, patient has been experiencing progressive weakness and now feels that there are toxins coming out of his course.  He also has been having nausea vomiting and diarrhea.  He came to the ER and was found to have electrolyte abnormalities including hyperkalemia as well as elevated creatinine BUN and low calcium.  Nephrology was consulted and patient will require multiple sessions of dialysis given his numerous missed sessions.    I discussed the plan of care with patient and ER physician .    Review of Systems  Review of Systems   Constitutional: Negative for chills and fever.   Respiratory: Negative for cough, shortness of breath and wheezing.    Cardiovascular: Positive for leg swelling. Negative for chest pain.   Gastrointestinal: Positive for diarrhea (1 watery today), nausea and vomiting. Negative for constipation.   Genitourinary: Negative for dysuria.        Makes urine    Neurological: Negative for headaches.     all other systems reviewed and are negative    Past Medical History   has a past medical history of Diabetes and Hypertension.    Surgical History   has a past surgical history that includes av fistula creation (Left, 2/25/2022) and thrombectomy (Left, 2/25/2022).    Family " "patient says history  family history is not on file.  Both of his parents were healthy and had no medical problems at all.  Social History   reports that he has been smoking. He has been smoking about 0.50 packs per day. He has never used smokeless tobacco. He reports current alcohol use. He reports that he does not use drugs.    Allergies  No Known Allergies    Medications  No current facility-administered medications on file prior to encounter.     No current outpatient medications on file prior to encounter.       Physical Exam  Weight/BMI: Body mass index is 35.49 kg/m².  BP (!) 153/99   Pulse 93   Temp 36.1 °C (97 °F) (Temporal)   Resp 16   Ht 1.753 m (5' 9\")   Wt 109 kg (240 lb 4.8 oz)   SpO2 98%    Vitals:    08/08/22 1200 08/08/22 1219   BP: (!) 153/99    Pulse: 93    Resp: 16    Temp: 36.1 °C (97 °F)    TempSrc: Temporal    SpO2: 98%    Weight:  109 kg (240 lb 4.8 oz)   Height:  1.753 m (5' 9\")    Oxygen Therapy:  Pulse Oximetry: 98 %, O2 (LPM): 0  Physical Exam  Constitutional:       General: He is not in acute distress.     Appearance: He is well-developed. He is ill-appearing. He is not diaphoretic.   HENT:      Head: Normocephalic and atraumatic.      Right Ear: External ear normal.      Left Ear: External ear normal.      Mouth/Throat:      Pharynx: No oropharyngeal exudate or posterior oropharyngeal erythema.   Eyes:      General:         Right eye: No discharge.         Left eye: No discharge.      Extraocular Movements: Extraocular movements intact.   Cardiovascular:      Rate and Rhythm: Normal rate.      Heart sounds:     No gallop.   Pulmonary:      Effort: No respiratory distress.      Breath sounds: No wheezing.   Abdominal:      General: There is no distension.      Tenderness: There is no abdominal tenderness. There is no guarding.   Musculoskeletal:      Right lower leg: Edema present.      Left lower leg: Edema present.   Skin:     General: Skin is warm.   Neurological:      Mental " Status: He is alert. Mental status is at baseline.      Motor: No weakness.   Psychiatric:      Comments: Unable to fully assess         Laboratory:   Objective   Recent Results (from the past 24 hour(s))   CBC WITH DIFFERENTIAL    Collection Time: 08/08/22 12:31 PM   Result Value Ref Range    WBC 9.3 4.8 - 10.8 K/uL    RBC 3.22 (L) 4.70 - 6.10 M/uL    Hemoglobin 9.3 (L) 14.0 - 18.0 g/dL    Hematocrit 29.0 (L) 42.0 - 52.0 %    MCV 90.1 81.4 - 97.8 fL    MCH 28.9 27.0 - 33.0 pg    MCHC 32.1 (L) 33.7 - 35.3 g/dL    RDW 61.6 (H) 35.9 - 50.0 fL    Platelet Count 177 164 - 446 K/uL    MPV 10.0 9.0 - 12.9 fL    Neutrophils-Polys 75.60 (H) 44.00 - 72.00 %    Lymphocytes 13.50 (L) 22.00 - 41.00 %    Monocytes 9.50 0.00 - 13.40 %    Eosinophils 0.60 0.00 - 6.90 %    Basophils 0.30 0.00 - 1.80 %    Immature Granulocytes 0.50 0.00 - 0.90 %    Nucleated RBC 0.00 /100 WBC    Neutrophils (Absolute) 7.01 1.82 - 7.42 K/uL    Lymphs (Absolute) 1.25 1.00 - 4.80 K/uL    Monos (Absolute) 0.88 (H) 0.00 - 0.85 K/uL    Eos (Absolute) 0.06 0.00 - 0.51 K/uL    Baso (Absolute) 0.03 0.00 - 0.12 K/uL    Immature Granulocytes (abs) 0.05 0.00 - 0.11 K/uL    NRBC (Absolute) 0.00 K/uL   Comp Metabolic Panel    Collection Time: 08/08/22 12:31 PM   Result Value Ref Range    Sodium 137 135 - 145 mmol/L    Potassium 6.1 (H) 3.6 - 5.5 mmol/L    Chloride 102 96 - 112 mmol/L    Co2 10 (L) 20 - 33 mmol/L    Anion Gap 25.0 (H) 7.0 - 16.0    Glucose 156 (H) 65 - 99 mg/dL    Bun 93 (H) 8 - 22 mg/dL    Creatinine 16.78 (HH) 0.50 - 1.40 mg/dL    Calcium 6.3 (LL) 8.5 - 10.5 mg/dL    AST(SGOT) 13 12 - 45 U/L    ALT(SGPT) 8 2 - 50 U/L    Alkaline Phosphatase 122 (H) 30 - 99 U/L    Total Bilirubin 0.6 0.1 - 1.5 mg/dL    Albumin 3.4 3.2 - 4.9 g/dL    Total Protein 7.5 6.0 - 8.2 g/dL    Globulin 4.1 (H) 1.9 - 3.5 g/dL    A-G Ratio 0.8 g/dL   ESTIMATED GFR    Collection Time: 08/08/22 12:31 PM   Result Value Ref Range    GFR (CKD-EPI) 3 (A) >60 mL/min/1.73 m 2   EKG     Collection Time: 22  3:55 PM   Result Value Ref Range    Report       [facility] Emergency Dept.    Test Date:  2022  Pt Name:    CONY MORAN                Department: ER  MRN:        [id number]                      Room:        26  Gender:     Male                         Technician: [id number]  :        1975                   Requested By:BRO MORA  Order #:    [id number]                    Reading MD: BRO MORA MD    Measurements  Intervals                                Axis  Rate:       86                           P:          46  MT:         180                          QRS:        -11  QRSD:       109                          T:          72  QT:         431  QTc:        516    Interpretive Statements  Sinus rhythm  Borderline low voltage, extremity leads  Prolonged QT interval  Compared to ECG 2022 08:11:19  T-wave abnormality no longer present  Electronically Signed On 2022 16:04:27 PDT by BRO MORA MD         (click the triangle to expand results)    Imaging:  DX-CHEST-PORTABLE (1 VIEW)    (Results Pending)     Assessment/Plan:  I anticipate this patient will require at least two midnights for appropriate medical management, necessitating inpatient admission because Given the number of dialysis patient has missed, he will need multiple repeated rounds of dialysis here in the hospital prior to being able to discharge to the community and given new dialysis chair which will take greater than 48 hours.    * Noncompliance of patient with renal dialysis (HCC)- (present on admission)  Assessment & Plan  Due to traveling to [place]  Also does not have an outpatient dialysis chair here in [place]  Coordinator is working on getting patient a chair  Will need multiple courses of dialysis in the hospital given that he has missed multiple weeks.    Bilateral leg pain- (present on admission)  Assessment & Plan  Chest pain likely related to  edema as well as anterior leg pain  Pain control    End stage renal disease (HCC)- (present on admission)  Assessment & Plan  SN nephrologist Dr. Hamilton consulted  Started cholecalciferol and calcium acetate  Iron panel and phosphorus pending  Dialysis coordinator working on an outpatient chair.  Hopefully patient will have this by Friday    Hyperkalemia- (present on admission)  Assessment & Plan  Due to missing dialysis   now status post initial dialysis  Repeat potassium level pending        Hypertension- (present on admission)  Assessment & Plan  History of uncontrolled hypertension  Likely due to missing dialysis  Added labetalol as needed      VTE prophylaxis:  sc heparin

## 2022-08-08 NOTE — ED TRIAGE NOTES
"Chief Complaint   Patient presents with   • Weakness     Pt has missed dialysis for 1 month due to traveling. Has been feeling increasingly weak and states \"there's toxins coming out of my pores\". +N/V/D.     Pt says he has ran out of all medication 2 weeks ago for his hypertension and diabetes. FSBS 143.    BP (!) 153/99   Pulse 93   Temp 36.1 °C (97 °F) (Temporal)   Resp 16   Ht 1.753 m (5' 9\")   Wt 109 kg (240 lb 4.8 oz)   SpO2 98%   BMI 35.49 kg/m²     "

## 2022-08-08 NOTE — ED PROVIDER NOTES
"ED Provider Note    Scribed for Palomo Waldron M.D. by Nikko Bettencourt. 8/8/2022  3:43 PM    Primary care provider: Pcp Pt States None  Means of arrival: Walk in  History obtained from: Patient   History limited by: None    CHIEF COMPLAINT  Chief Complaint   Patient presents with   • Weakness     Pt has missed dialysis for 1 month due to traveling. Has been feeling increasingly weak and states \"there's toxins coming out of my pores\". +N/V/D.       HPI  Ambrose Watkins is a 46 y.o. male who presents to the Emergency Department for evaluation of weakness onset 1 month ago. Patient reports he has missed dialysis for 2 weeks because he has been traveling. He presents associated symptoms of fatigue, shortness of breath, and itching but denies diarrhea. No alleviating or exacerbating factors noted at this time. Patient has additional history of diabetes and hypertension.     REVIEW OF SYSTEMS  Pertinent positives include fatigue, weakness, shortness of breath, and itching. Pertinent negatives include no diarrhea.  All other systems reviewed and negative.    PAST MEDICAL HISTORY   has a past medical history of Diabetes and Hypertension.    SURGICAL HISTORY   has a past surgical history that includes av fistula creation (Left, 2/25/2022) and thrombectomy (Left, 2/25/2022).    SOCIAL HISTORY  Social History     Tobacco Use   • Smoking status: Current Every Day Smoker     Packs/day: 0.50   • Smokeless tobacco: Never Used   Substance Use Topics   • Alcohol use: Yes     Comment: occ   • Drug use: No      Social History     Substance and Sexual Activity   Drug Use No       FAMILY HISTORY  History reviewed. No pertinent family history.    CURRENT MEDICATIONS  Home Medications     Reviewed by Elsa Clifton (Pharmacy Tech) on 08/08/22 at 1634  Med List Status: Complete   Medication Last Dose Status        Patient Ric Taking any Medications                       ALLERGIES  No Known Allergies    PHYSICAL EXAM  VITAL " "SIGNS: BP (!) 153/99   Pulse 93   Temp 36.1 °C (97 °F) (Temporal)   Resp 16   Ht 1.753 m (5' 9\")   Wt 109 kg (240 lb 4.8 oz)   SpO2 98%   BMI 35.49 kg/m²   Constitutional: Disheveled   HENT: Normocephalic, Atraumatic, Bilateral external ears normal, Oropharynx moist, No oral exudates.   Eyes: PERRLA, EOMI, Conjunctiva normal, No discharge.   Neck: No tenderness, Supple, No stridor.   Lymphatic: No lymphadenopathy noted.   Cardiovascular: Normal heart rate, Normal rhythm.   Thorax & Lungs: Crackles through bilateral lung fields   Abdomen: Soft, No tenderness, No masses, No pulsatile masses.   Skin: Warm, Dry, No erythema, No rash.   Extremities:, No edema No cyanosis.   Musculoskeletal: Diffuse anasarca all the way up to the groin  Neurologic: Seems sleepy but will follow commands and answer questions appropriately.   Psychiatric: Affect normal, Judgment normal, Mood normal.       LABS  Results for orders placed or performed during the hospital encounter of 08/08/22   CBC WITH DIFFERENTIAL   Result Value Ref Range    WBC 9.3 4.8 - 10.8 K/uL    RBC 3.22 (L) 4.70 - 6.10 M/uL    Hemoglobin 9.3 (L) 14.0 - 18.0 g/dL    Hematocrit 29.0 (L) 42.0 - 52.0 %    MCV 90.1 81.4 - 97.8 fL    MCH 28.9 27.0 - 33.0 pg    MCHC 32.1 (L) 33.7 - 35.3 g/dL    RDW 61.6 (H) 35.9 - 50.0 fL    Platelet Count 177 164 - 446 K/uL    MPV 10.0 9.0 - 12.9 fL    Neutrophils-Polys 75.60 (H) 44.00 - 72.00 %    Lymphocytes 13.50 (L) 22.00 - 41.00 %    Monocytes 9.50 0.00 - 13.40 %    Eosinophils 0.60 0.00 - 6.90 %    Basophils 0.30 0.00 - 1.80 %    Immature Granulocytes 0.50 0.00 - 0.90 %    Nucleated RBC 0.00 /100 WBC    Neutrophils (Absolute) 7.01 1.82 - 7.42 K/uL    Lymphs (Absolute) 1.25 1.00 - 4.80 K/uL    Monos (Absolute) 0.88 (H) 0.00 - 0.85 K/uL    Eos (Absolute) 0.06 0.00 - 0.51 K/uL    Baso (Absolute) 0.03 0.00 - 0.12 K/uL    Immature Granulocytes (abs) 0.05 0.00 - 0.11 K/uL    NRBC (Absolute) 0.00 K/uL   Comp Metabolic Panel   Result " Value Ref Range    Sodium 137 135 - 145 mmol/L    Potassium 6.1 (H) 3.6 - 5.5 mmol/L    Chloride 102 96 - 112 mmol/L    Co2 10 (L) 20 - 33 mmol/L    Anion Gap 25.0 (H) 7.0 - 16.0    Glucose 156 (H) 65 - 99 mg/dL    Bun 93 (H) 8 - 22 mg/dL    Creatinine 16.78 (HH) 0.50 - 1.40 mg/dL    Calcium 6.3 (LL) 8.5 - 10.5 mg/dL    AST(SGOT) 13 12 - 45 U/L    ALT(SGPT) 8 2 - 50 U/L    Alkaline Phosphatase 122 (H) 30 - 99 U/L    Total Bilirubin 0.6 0.1 - 1.5 mg/dL    Albumin 3.4 3.2 - 4.9 g/dL    Total Protein 7.5 6.0 - 8.2 g/dL    Globulin 4.1 (H) 1.9 - 3.5 g/dL    A-G Ratio 0.8 g/dL   ESTIMATED GFR   Result Value Ref Range    GFR (CKD-EPI) 3 (A) >60 mL/min/1.73 m 2   RETICULOCYTES COUNT   Result Value Ref Range    Reticulocyte Count 1.2 0.8 - 2.1 %    Retic, Absolute 0.04 0.04 - 0.06 M/uL    Imm. Reticulocyte Fraction 12.5 9.3 - 17.4 %    Retic Hgb Equivalent 33.5 29.0 - 35.0 pg/cell   IRON/TOTAL IRON BIND   Result Value Ref Range    Iron 54 50 - 180 ug/dL    Total Iron Binding 167 (L) 250 - 450 ug/dL    Unsat Iron Binding 113 110 - 370 ug/dL    % Saturation 32 15 - 55 %   EKG   Result Value Ref Range    Report       [facility] Emergency Dept.    Test Date:  2022  Pt Name:    CONY MORAN                Department: ER  MRN:        [id number]                      Room:        26  Gender:     Male                         Technician: [id number]  :        1975                   Requested By:BRO MORA  Order #:    [id number]                    Reading MD: BRO MORA MD    Measurements  Intervals                                Axis  Rate:       86                           P:          46  GA:         180                          QRS:        -11  QRSD:       109                          T:          72  QT:         431  QTc:        516    Interpretive Statements  Sinus rhythm  Borderline low voltage, extremity leads  Prolonged QT interval  Compared to ECG 2022 08:11:19  T-wave  abnormality no longer present  Electronically Signed On 8-8-2022 16:04:27 PDT by BRO MORA MD          RADIOLOGY  DX-CHEST-PORTABLE (1 VIEW)   Final Result      1.  Mildly enlarged cardiac silhouette with vascular congestion.        The radiologist's interpretation of all radiological studies have been reviewed by me.      COURSE & MEDICAL DECISION MAKING  Pertinent Labs & Imaging studies reviewed. (See chart for details)    I reviewed the patient's medical records which showed history of medical non compliance, end stage renal disease, diabetes, and hypertension.    3:43 PM - Patient seen and examined at bedside.I informed the patient of my plan to run diagnostic studies to evaluate their symptoms including lab work. Patient verbalizes understanding and support with my plan of care. Patient will be treated with calcium acetate 667 mg and Vitamin D3 1,000 units. Ordered DX-chest, Phosphorus PTH intact, GFR, CBC w/diff, CMP to evaluate his symptoms. The differential diagnoses include but are not limited to: hyperkalemia vs kidney disease vs medical non compliance.     4:14 PM - Paged Hospitalist.    4:20 PM I discussed the patient's case and the above findings with Dr. Hawkins (Hospitalist) who will assess the patient for hospitalization.     Decision Making:  Patient with weakness, the patient has not been compliant with his dialysis for the last 2 to 3 weeks.  The patient has diffuse anasarca in his lower extremities, crackles in bilateral lung bases, patient has hyperkalemia, nephrology was consulted, they will establish dialysis, discussed the case with hospitalist for hospitalization.    HTN/IDDM FOLLOW UP:  The patient is referred to a primary physician for blood pressure management, diabetic screening, and for all other preventive health concerns    DISPOSITION:  Patient will be hospitalized by Dr. Hawkins in guarded condition.    FINAL IMPRESSION  1. Weakness    2. Stage 5 chronic kidney disease on chronic  dialysis (HCC)    3. Hyperkalemia       Nikko SIMON (Scribe), am scribing for, and in the presence of, Palomo Waldron M.D..    Electronically signed by: Nikko Bettencourt (Scribe), 8/8/2022. C    IPalomo M.D. personally performed the services described in this documentation, as scribed by Nikko Bettencourt in my presence, and it is both accurate and complete.    The note accurately reflects work and decisions made by me.  Palomo Waldron M.D.  8/8/2022  8:05 PM

## 2022-08-08 NOTE — CONSULTS
"[facility] -  CONSULTATION NOTE               Author: Lena Hamilton M.D. Date & Time: 8/8/2022  3:42 PM       REASON FOR CONSULTATION:   Inpatient hemodialysis management.    CHIEF COMPLAINT:   \"weakness\"    HISTORY OF PRESENT ILLNESS:    45 yo M with End Stage Renal Disease on hemodialysis at [facility] every Tues/Thurs/Sat, last HD at [facility] in 4/2022, then traveled and lost HD chair. Other PMH diabetes mellitus and Hypertension who presented to the emergency department on 8/8/22 for weakness. He missed 1 months of HD due to traveling. He was found to be hypertensive 153/99, afebrile. Labs with K of 6.1, BUN 93, bicarb 10.   No F/C/N/V/CP/SOB.  No melena, hematochezia, hematemesis.  No HA, visual changes, or abdominal pain.    REVIEW OF SYSTEMS:    10 point ROS was performed and is as per HPI or otherwise negative.    PAST MEDICAL HISTORY:   Past Medical History:   Diagnosis Date   • Diabetes    • Hypertension        PAST SURGICAL HISTORY:   Past Surgical History:   Procedure Laterality Date   • AV FISTULA CREATION Left 2/25/2022    Procedure: CREATION, AV FISTULA-UPPER EXTREMITY GRAFT, AND FISTULOGRAM;  Surgeon: Tone Hartman M.D.;  Location: SURGERY [facility];  Service: Vascular   • THROMBECTOMY Left 2/25/2022    Procedure: THROMBECTOMY;  Surgeon: Tone Hartman M.D.;  Location: SURGERY [facility];  Service: Vascular       FAMILY HISTORY:   History reviewed. No pertinent family history.    SOCIAL HISTORY:   Social History     Tobacco Use   Smoking Status Current Every Day Smoker   • Packs/day: 0.50   Smokeless Tobacco Never Used     Social History     Substance and Sexual Activity   Alcohol Use Yes    Comment: occ     Social History     Substance and Sexual Activity   Drug Use No       HOME MEDICATIONS:   Reviewed and documented in chart.    LABORATORY STUDIES:   Recent Labs     08/08/22  1231   SODIUM 137   POTASSIUM 6.1*   CHLORIDE 102   CO2 10*   GLUCOSE 156*   BUN " "93*   CREATININE 16.78*   CALCIUM 6.3*       ALLERGIES:  Patient has no known allergies.    VS:  BP (!) 153/99   Pulse 93   Temp 36.1 °C (97 °F) (Temporal)   Resp 16   Ht 1.753 m (5' 9\")   Wt 109 kg (240 lb 4.8 oz)   SpO2 98%   BMI 35.49 kg/m²     Physical Exam  Vitals and nursing note reviewed.   Constitutional:       Appearance: He is ill-appearing.   HENT:      Head: Normocephalic and atraumatic.   Eyes:      General: No scleral icterus.  Cardiovascular:      Rate and Rhythm: Normal rate and regular rhythm.      Heart sounds: No murmur heard.    No gallop.   Pulmonary:      Effort: Pulmonary effort is normal. No respiratory distress.      Breath sounds: No wheezing or rales.   Abdominal:      General: Abdomen is flat. Bowel sounds are normal. There is no distension.      Tenderness: There is no abdominal tenderness. There is no guarding.   Musculoskeletal:         General: No deformity.      Right lower leg: Edema present.      Left lower leg: Edema (L>R) present.      Comments: R IJ TDC       Skin:     Findings: Bruising and rash present.   Neurological:      Mental Status: He is alert and oriented to person, place, and time. Mental status is at baseline.   Psychiatric:         Behavior: Behavior normal.            FLUID BALANCE:  No intake/output data recorded.    IMAGING:  All imaging reviewed from admission to present day    IMPRESSION:  # ESRD  - Etiology likely 2/2 DM/HTN  - via [facility] TDC  - lost his HD chair at [facility] and currently has no OP HD clinic  # Thrombosed Left AVF  - US 2/23 no flow  - s/p OR 2/25 AVG Brachio-axillary Creation, thrombectomy and fistulogram   # HTN, labile control   - Goal BP < 140/90  # Anemia of CKD  - Goal Hgb 10-11  Below the goal   # CKD-MBD  - Ca 6.3  - PO4 pending   # Hyperkalemia  - due to missed hd   - hd today   # weakness  # h/o Methamphetamine use   # Homelessness  # DM   - A1C 6.5  # Severe metabolic acidosis    PLAN:  - HD today , has to be admitted " to the hospital since has no OP HD chair  - Continue iHD qMWF  - Challenge UF as able   - restart cholecalciferol   - restart calcium acetate daily  - phos level   - iron panel   - Transfuse PRN Hgb <7   - No dietary protein restrictions  - Dose all meds per ESRD/iHD  - weakness workup per primary team   - Low Na/fluid restricted renal diet  - Follow labs  - AVG use per Vasc Surg recs; currently using RIJ TDC, last note 3/1 not yet cleared for use     Thank you for the consultation!

## 2022-08-08 NOTE — ED NOTES
Med rec updated and complete. Allergies reviewed.  Pt is not currently taking any medications . Confirmed name and date of birth        Home pharmacy [facility] [phone]

## 2022-08-08 NOTE — ED NOTES
Pt back to [facility] in WC>  Seen by Dr. Hamilton, nephrologist.  Ok for pt have dialysis here in [facility] oer ER CN.    Pt on monitor, call light provided.  IV started.

## 2022-08-09 PROBLEM — E87.20 METABOLIC ACIDOSIS, NORMAL ANION GAP (NAG): Status: ACTIVE | Noted: 2022-08-09

## 2022-08-09 PROBLEM — E87.29 HIGH ANION GAP METABOLIC ACIDOSIS: Status: ACTIVE | Noted: 2022-08-09

## 2022-08-09 PROBLEM — M79.604 BILATERAL LEG PAIN: Status: RESOLVED | Noted: 2022-08-08 | Resolved: 2022-08-09

## 2022-08-09 PROBLEM — E87.20 METABOLIC ACIDOSIS, NORMAL ANION GAP (NAG): Status: RESOLVED | Noted: 2022-08-09 | Resolved: 2022-08-09

## 2022-08-09 PROBLEM — M79.605 BILATERAL LEG PAIN: Status: RESOLVED | Noted: 2022-08-08 | Resolved: 2022-08-09

## 2022-08-09 LAB — GLUCOSE BLD STRIP.AUTO-MCNC: 143 MG/DL (ref 65–99)

## 2022-08-09 PROCEDURE — 700102 HCHG RX REV CODE 250 W/ 637 OVERRIDE(OP): Performed by: STUDENT IN AN ORGANIZED HEALTH CARE EDUCATION/TRAINING PROGRAM

## 2022-08-09 PROCEDURE — 700102 HCHG RX REV CODE 250 W/ 637 OVERRIDE(OP): Performed by: HOSPITALIST

## 2022-08-09 PROCEDURE — 86480 TB TEST CELL IMMUN MEASURE: CPT

## 2022-08-09 PROCEDURE — 700102 HCHG RX REV CODE 250 W/ 637 OVERRIDE(OP)

## 2022-08-09 PROCEDURE — 700111 HCHG RX REV CODE 636 W/ 250 OVERRIDE (IP): Performed by: HOSPITALIST

## 2022-08-09 PROCEDURE — A9270 NON-COVERED ITEM OR SERVICE: HCPCS | Performed by: HOSPITALIST

## 2022-08-09 PROCEDURE — 99232 SBSQ HOSP IP/OBS MODERATE 35: CPT | Mod: GC | Performed by: INTERNAL MEDICINE

## 2022-08-09 PROCEDURE — A9270 NON-COVERED ITEM OR SERVICE: HCPCS

## 2022-08-09 PROCEDURE — 770001 HCHG ROOM/CARE - MED/SURG/GYN PRIV*

## 2022-08-09 PROCEDURE — 80074 ACUTE HEPATITIS PANEL: CPT

## 2022-08-09 PROCEDURE — 36415 COLL VENOUS BLD VENIPUNCTURE: CPT

## 2022-08-09 PROCEDURE — A9270 NON-COVERED ITEM OR SERVICE: HCPCS | Performed by: STUDENT IN AN ORGANIZED HEALTH CARE EDUCATION/TRAINING PROGRAM

## 2022-08-09 RX ORDER — HYDRALAZINE HYDROCHLORIDE 10 MG/1
10 TABLET, FILM COATED ORAL EVERY 8 HOURS PRN
Status: DISCONTINUED | OUTPATIENT
Start: 2022-08-09 | End: 2022-09-04 | Stop reason: HOSPADM

## 2022-08-09 RX ORDER — CALCIUM ACETATE 667 MG/1
2001 TABLET ORAL
Status: DISCONTINUED | OUTPATIENT
Start: 2022-08-09 | End: 2022-08-09

## 2022-08-09 RX ORDER — CALCIUM ACETATE 667 MG/1
1334 TABLET ORAL
Status: DISCONTINUED | OUTPATIENT
Start: 2022-08-09 | End: 2022-09-04 | Stop reason: HOSPADM

## 2022-08-09 RX ORDER — CALCIUM ACETATE 667 MG/1
667 TABLET ORAL
Status: DISCONTINUED | OUTPATIENT
Start: 2022-08-09 | End: 2022-08-09

## 2022-08-09 RX ORDER — CALCIUM ACETATE 667 MG/1
2001 TABLET ORAL
Status: DISCONTINUED | OUTPATIENT
Start: 2022-08-10 | End: 2022-08-09

## 2022-08-09 RX ADMIN — HEPARIN SODIUM 5000 UNITS: 5000 INJECTION, SOLUTION INTRAVENOUS; SUBCUTANEOUS at 05:37

## 2022-08-09 RX ADMIN — HEPARIN SODIUM 5000 UNITS: 5000 INJECTION, SOLUTION INTRAVENOUS; SUBCUTANEOUS at 21:37

## 2022-08-09 RX ADMIN — Medication 667 MG: at 05:38

## 2022-08-09 RX ADMIN — Medication 1334 MG: at 16:11

## 2022-08-09 RX ADMIN — Medication 1000 UNITS: at 05:38

## 2022-08-09 RX ADMIN — HEPARIN SODIUM 5000 UNITS: 5000 INJECTION, SOLUTION INTRAVENOUS; SUBCUTANEOUS at 16:11

## 2022-08-09 RX ADMIN — OXYCODONE 5 MG: 5 TABLET ORAL at 21:37

## 2022-08-09 ASSESSMENT — LIFESTYLE VARIABLES
TOTAL SCORE: 0
DOES PATIENT WANT TO STOP DRINKING: NO
CONSUMPTION TOTAL: NEGATIVE
HOW MANY TIMES IN THE PAST YEAR HAVE YOU HAD 5 OR MORE DRINKS IN A DAY: 0
ALCOHOL_USE: NO
EVER HAD A DRINK FIRST THING IN THE MORNING TO STEADY YOUR NERVES TO GET RID OF A HANGOVER: NO
SUBSTANCE_ABUSE: 0
TOTAL SCORE: 0
TOTAL SCORE: 0
AVERAGE NUMBER OF DAYS PER WEEK YOU HAVE A DRINK CONTAINING ALCOHOL: 0
EVER FELT BAD OR GUILTY ABOUT YOUR DRINKING: NO
HAVE YOU EVER FELT YOU SHOULD CUT DOWN ON YOUR DRINKING: NO
HAVE PEOPLE ANNOYED YOU BY CRITICIZING YOUR DRINKING: NO
ON A TYPICAL DAY WHEN YOU DRINK ALCOHOL HOW MANY DRINKS DO YOU HAVE: 0

## 2022-08-09 ASSESSMENT — PATIENT HEALTH QUESTIONNAIRE - PHQ9
6. FEELING BAD ABOUT YOURSELF - OR THAT YOU ARE A FAILURE OR HAVE LET YOURSELF OR YOUR FAMILY DOWN: SEVERAL DAYS
4. FEELING TIRED OR HAVING LITTLE ENERGY: NEARLY EVERY DAY
9. THOUGHTS THAT YOU WOULD BE BETTER OFF DEAD, OR OF HURTING YOURSELF: NOT AT ALL
7. TROUBLE CONCENTRATING ON THINGS, SUCH AS READING THE NEWSPAPER OR WATCHING TELEVISION: SEVERAL DAYS
3. TROUBLE FALLING OR STAYING ASLEEP OR SLEEPING TOO MUCH: MORE THAN HALF THE DAYS
SUM OF ALL RESPONSES TO PHQ9 QUESTIONS 1 AND 2: 2
2. FEELING DOWN, DEPRESSED, IRRITABLE, OR HOPELESS: NOT AT ALL
8. MOVING OR SPEAKING SO SLOWLY THAT OTHER PEOPLE COULD HAVE NOTICED. OR THE OPPOSITE, BEING SO FIGETY OR RESTLESS THAT YOU HAVE BEEN MOVING AROUND A LOT MORE THAN USUAL: NOT AT ALL
5. POOR APPETITE OR OVEREATING: NOT AT ALL
1. LITTLE INTEREST OR PLEASURE IN DOING THINGS: NEARLY EVERY DAY
3. TROUBLE FALLING OR STAYING ASLEEP OR SLEEPING TOO MUCH: NEARLY EVERY DAY
7. TROUBLE CONCENTRATING ON THINGS, SUCH AS READING THE NEWSPAPER OR WATCHING TELEVISION: NEARLY EVERY DAY
9. THOUGHTS THAT YOU WOULD BE BETTER OFF DEAD, OR OF HURTING YOURSELF: NOT AT ALL
SUM OF ALL RESPONSES TO PHQ9 QUESTIONS 1 AND 2: 3
1. LITTLE INTEREST OR PLEASURE IN DOING THINGS: MORE THAN HALF THE DAYS
5. POOR APPETITE OR OVEREATING: NEARLY EVERY DAY
8. MOVING OR SPEAKING SO SLOWLY THAT OTHER PEOPLE COULD HAVE NOTICED. OR THE OPPOSITE, BEING SO FIGETY OR RESTLESS THAT YOU HAVE BEEN MOVING AROUND A LOT MORE THAN USUAL: MORE THAN HALF THE DAYS
4. FEELING TIRED OR HAVING LITTLE ENERGY: MORE THAN HALF THE DAYS

## 2022-08-09 ASSESSMENT — ENCOUNTER SYMPTOMS
PALPITATIONS: 0
WHEEZING: 0
NAUSEA: 0
DEPRESSION: 0
PHOTOPHOBIA: 0
COUGH: 1
BLURRED VISION: 0
HEMOPTYSIS: 0
HEADACHES: 0
BRUISES/BLEEDS EASILY: 0
CONSTIPATION: 0
MYALGIAS: 0
CHILLS: 0
DIZZINESS: 0
DIARRHEA: 1
HEARTBURN: 0
VOMITING: 0
SHORTNESS OF BREATH: 0
ABDOMINAL PAIN: 0
ORTHOPNEA: 0
FEVER: 0
DOUBLE VISION: 0
SPUTUM PRODUCTION: 0

## 2022-08-09 ASSESSMENT — COGNITIVE AND FUNCTIONAL STATUS - GENERAL
DAILY ACTIVITIY SCORE: 23
CLIMB 3 TO 5 STEPS WITH RAILING: A LITTLE
DRESSING REGULAR LOWER BODY CLOTHING: A LITTLE
SUGGESTED CMS G CODE MODIFIER DAILY ACTIVITY: CI
MOBILITY SCORE: 23
SUGGESTED CMS G CODE MODIFIER MOBILITY: CI

## 2022-08-09 ASSESSMENT — PAIN DESCRIPTION - PAIN TYPE
TYPE: ACUTE PAIN
TYPE: ACUTE PAIN

## 2022-08-09 NOTE — ASSESSMENT & PLAN NOTE
High anion gap acidosis on admission likely due to uremia.  Resolved    -Patient dialyzed TThS  -Will continue to monitor

## 2022-08-09 NOTE — PROGRESS NOTES
Urgent HD treatment ordered by Dr CANDACE Hamilton started at 1630 and ended at 1930 with net UF of 3 Liters as ordered. Hypertensive pre, intra and post treatment and primary RN was aware and notified. Dressing changed post treatment, no s/s of infection noted. See flow sheet for details.

## 2022-08-09 NOTE — PROGRESS NOTES
[facility] Daily Progress Note    Date of Service  8/9/2022    [facility] Team: R  Purple Team   Attending: Nino Reyes M.d.  Senior Resident: Dr. Pina  Intern:  Dr. Wiggins  Contact Number: [phone]    Chief Complaint  Ambrose Watkins is a 46 y.o. male admitted 8/8/2022 with progressive weakness with associated nausea, vomiting, and diarrhea    Hospital Course  45 yo male with PMH of diabetes,CHF, HTN, and ESRD on HD who presented 8/8/2022 for missing dialysis for past 3 weeks.  This was because he traveled to [place] for leisure and did not know where to go to get dialysis.  In addition, patient does not have a dialysis chair here in [place].  He states he receives dialysis in [place].  He has been experiencing progressive weakness and associated nausea, vomiting, and diarrhea.  He came to the ER and was found to have electrolyte abnormalities including hyperkalemia, elevated creatinine BUN, and low calcium.  Nephrology consulted and patient will require multiple sessions of dialysis given numerous missed sessions.      Interval Problem Update  Patient is feeling better on 8/9.  He received HD on 8/8 evening.  Patient does not have weakness or nausea/vomiting anymore, however still complains of diarrhea with leg swelling present as well.  He denies any cardiorespiratory symptoms or abdominal pain.  Patient will follow HD on MWF schedule.    I have discussed this patient's plan of care and discharge plan at IDT rounds today with Case Management, Nursing, Nursing leadership, and other members of the IDT team.    Consultants/Specialty  nephrology    Code Status  Full Code    Disposition  Patient is not medically cleared for discharge.   Anticipate discharge to to home with close outpatient follow-up.  I have placed the appropriate orders for post-discharge needs.    Review of Systems  Review of Systems   Constitutional: Negative for chills and fever.   Respiratory: Negative for shortness of  breath.    Cardiovascular: Positive for leg swelling. Negative for chest pain and palpitations.   Gastrointestinal: Positive for diarrhea. Negative for nausea and vomiting.        Physical Exam  Temp:  [36.3 °C (97.3 °F)-36.6 °C (97.9 °F)] 36.6 °C (97.8 °F)  Pulse:  [] 100  Resp:  [15-22] 17  BP: (140-184)/() 158/106  SpO2:  [92 %-97 %] 94 %    Physical Exam  Constitutional:       General: He is not in acute distress.     Appearance: He is obese.   HENT:      Head: Normocephalic and atraumatic.   Eyes:      Extraocular Movements: Extraocular movements intact.      Conjunctiva/sclera: Conjunctivae normal.      Pupils: Pupils are equal, round, and reactive to light.   Cardiovascular:      Rate and Rhythm: Regular rhythm. Tachycardia present.      Comments: Nonfunctional LUE AV fistula.  Tunneled dialysis catheter intact  Pulmonary:      Effort: Pulmonary effort is normal.      Breath sounds: Normal breath sounds.   Abdominal:      General: Abdomen is flat. Bowel sounds are normal.      Tenderness: There is no abdominal tenderness.   Musculoskeletal:      Right lower leg: Edema present.      Left lower leg: Edema present.   Neurological:      General: No focal deficit present.      Mental Status: He is alert and oriented to person, place, and time.   Psychiatric:         Mood and Affect: Mood normal.         Behavior: Behavior normal.         Fluids    Intake/Output Summary (Last 24 hours) at 8/9/2022 1605  Last data filed at 8/9/2022 1400  Gross per 24 hour   Intake 360 ml   Output --   Net 360 ml       Laboratory  Recent Labs     08/08/22  1231 08/08/22 2136   WBC 9.3 8.0   RBC 3.22* 3.54*   HEMOGLOBIN 9.3* 10.3*   HEMATOCRIT 29.0* 30.8*   MCV 90.1 87.0   MCH 28.9 29.1   MCHC 32.1* 33.4*   RDW 61.6* 58.8*   PLATELETCT 177 188   MPV 10.0 10.6     Recent Labs     08/08/22  1231 08/08/22 2136   SODIUM 137 136   POTASSIUM 6.1* 4.4   CHLORIDE 102 99   CO2 10* 16*   GLUCOSE 156* 116*   BUN 93* 61*    CREATININE 16.78* 11.52*   CALCIUM 6.3* 7.4*                   Imaging  DX-CHEST-PORTABLE (1 VIEW)   Final Result      1.  Mildly enlarged cardiac silhouette with vascular congestion.           Assessment/Plan  Problem Representation:    * Noncompliance of patient with renal dialysis (HCC)- (present on admission)  Assessment & Plan  Has not had dialysis in the past 3 weeks, due to traveling to [place].  Obtains HD in [place], however does not have an outpatient dialysis chair in [place]. Coordinator is working on getting patient a chair    -Will need multiple courses of dialysis in the hospital given that he has missed multiple weeks  -Signs/symptoms and labs/vitals slowly normalizing  -We will continue MWF HD    High anion gap metabolic acidosis- (present on admission)  Assessment & Plan  High anion gap acidosis on admission likely due to uremia. HD MWF will likely assist in correcting HAGMA    -BUN now decreased to 61  -Will continue to monitor    End stage renal disease (HCC)- (present on admission)  Assessment & Plan  Patient receiving HD in [place], has not received HD in 3 weeks and since admission.  Patient has also had multiple thrombectomies/revisions for AV fistula, currently nonfunctioning.  Patient has tunneled dialysis catheter in place and functioning.  Patient not adherent to HD or outpatient medications.  Nephrology following    -Currently on cholecalciferol  -Receiving HD on MWF  -Currently on renal diet with restricted sodium and fluid intake  -Increased PhosLo to 3 times daily before meals    Hyperkalemia- (present on admission)  Assessment & Plan   Initial potassium at 6.1. Likely due to missing dialysis    -Patient receiving dialysis MWF  -Recent labs demonstrate normalization  -We will continue to follow with serial labs    Hypertension- (present on admission)  Assessment & Plan  History of uncontrolled hypertension. Likely due to missing dialysis nonadherence to outpatient BP  medications    -Switched labetalol as needed to hydralazine as needed due to history of CHF  -BP has gradually been normalizing, will continue to monitor       VTE prophylaxis: heparin ppx    I have performed a physical exam and reviewed and updated ROS and Plan today (8/9/2022). In review of yesterday's note (8/8/2022), there are no changes except as documented above.

## 2022-08-09 NOTE — NON-PROVIDER
"Daily Progress Note:     Date of Service: 8/9/2022  Primary Team: [facility] IM Purple Team   Attending: Nino Reyes M.D.   Senior Resident:  Lung  Intern: Dr. Wiggins  Contact:  [phone]    ID:   Ambrose is a 46 y.o. male with PMH significant for DM, ESRD, HFrEF 30%, HTN, and 0.5 pack a day smoker for 20yrs who presented 8/8/2022 with severe weakness due to missing dialysis for 3weeks after traveling to [place] for unknown reasons causing him to lose his HD chair @ [facility] ([facility] last seen on 4/2022. Pt reports N/V and was found to be hyperkalemic, hypocalcemic, with elevated Cr and BUN. Pt also ran out of DM medication 2 weeks ago although he reports taking a CCB, lisinopril, a \"water pill and diabetic pill\". Pt has been living in his [facility] and has a hx of methamphetamine use but denies use in the last yr. He hasn't had an appetite in the last yr but when he does eat he reports eating fruits and veggies      Interval Events:  On 8/9 Mr. Watkins reports a dry cough, decreased appetite, and tingling in his elbow and thigh. He also expresses desires to be with [facility] for dialysis. Scars are noted on his face and scalp and he admits to picking at them. Non-functioning, stenosed, AV fistula on left arm noted. Tunnel dialysis catheter used.      Consultants/Specialty:  Nephrology    Review of Systems:    Review of Systems   Constitutional: Negative for chills and fever.   HENT: Negative for ear pain, hearing loss and tinnitus.    Eyes: Negative for blurred vision, double vision and photophobia.   Respiratory: Positive for cough. Negative for hemoptysis, sputum production, shortness of breath and wheezing.    Cardiovascular: Negative for chest pain, palpitations and orthopnea.   Gastrointestinal: Positive for diarrhea (Pt reports \"the runs\"). Negative for abdominal pain, constipation and heartburn.   Genitourinary: Negative for dysuria and urgency.   Musculoskeletal: Negative for joint pain and myalgias. " "  Skin: Positive for rash.   Neurological: Negative for dizziness and headaches.   Endo/Heme/Allergies: Does not bruise/bleed easily.   Psychiatric/Behavioral: Negative for depression, substance abuse and suicidal ideas.       Objective Data:   Physical Exam:   Vitals:   Temp:  [36.3 °C (97.3 °F)-36.6 °C (97.9 °F)] 36.3 °C (97.3 °F)  Pulse:  [] 102  Resp:  [15-22] 18  BP: (140-184)/() 140/78  SpO2:  [92 %-97 %] 92 %  Physical Exam  Vitals reviewed.   Constitutional:       Appearance: Normal appearance.   HENT:      Head: Normocephalic and atraumatic.   Eyes:      Extraocular Movements: Extraocular movements intact.      Pupils: Pupils are equal, round, and reactive to light.   Cardiovascular:      Rate and Rhythm: Normal rate and regular rhythm.      Pulses: Normal pulses.      Heart sounds: Normal heart sounds. No murmur heard.    No gallop.   Pulmonary:      Effort: Pulmonary effort is normal. No respiratory distress.      Breath sounds: Normal breath sounds.   Abdominal:      General: There is no distension.      Palpations: Abdomen is soft. There is no mass.   Musculoskeletal:         General: Swelling present.      Comments: 1+ pitting edema, lower extremities,  Non-functioning, stenosed, AV fistula on left arm noted.    Skin:     General: Skin is warm and dry.      Capillary Refill: Capillary refill takes less than 2 seconds.      Findings: Lesion (head and face) present. No rash.   Neurological:      Mental Status: He is alert and oriented to person, place, and time.      Comments: Patient stares out of the window and doesn't make much eye contact, denies depression, states he's just a \"mellow sofía\"   Psychiatric:         Mood and Affect: Mood normal.         Behavior: Behavior normal.           Labs:   Recent Labs     08/08/22  1231 08/08/22  2136   WBC 9.3 8.0   RBC 3.22* 3.54*   HEMOGLOBIN 9.3* 10.3*   HEMATOCRIT 29.0* 30.8*   MCV 90.1 87.0   MCH 28.9 29.1   RDW 61.6* 58.8*   PLATELETCT 177 188 "   MPV 10.0 10.6   NEUTSPOLYS 75.60*  --    LYMPHOCYTES 13.50*  --    MONOCYTES 9.50  --    EOSINOPHILS 0.60  --    BASOPHILS 0.30  --      Recent Labs     08/08/22  1231 08/08/22  2136   SODIUM 137 136   POTASSIUM 6.1* 4.4   CHLORIDE 102 99   CO2 10* 16*   GLUCOSE 156* 116*   BUN 93* 61*       Imaging:   DX-CHEST-PORTABLE (1 VIEW)   Final Result      1.  Mildly enlarged cardiac silhouette with vascular congestion.          Problem Representation:     47 yo male with ESRD, HFrEF 30%, DM2, HTN, and hx of smoking 0.5/day for 20ys presents with weakness after being non-compliant with dialysis for 3weeks while in [place]. He has lost his HD chair at [facility]. Pt is hyperkalemic  With elevated BUN 61, and Cr 11.52. Patients labs and symptoms can be attributed to missing dialysis.       Assessment/Plan:    Noncompliance of patient with renal dialysis (HCC)- (present on admission)  Assessment & Plan  Due to traveling to [place]  Lost HD chair at [facility]  Coordinator is working on getting patient a chair  Will need multiple courses of dialysis in the hospital given that he has missed multiple weeks.  -1st dialysis on 8/8 from [id number]    High anion gap metabolic acidosis- (present on admission)  Assessment & Plan  Anion gap acidosis on admission likely due to uremia.  Dialysis will help correct.    Continue to monitor.    Bilateral leg pain- (present on admission)  Assessment & Plan  Chest pain likely related to edema as well as anterior leg pain  Pain control    End stage renal disease (HCC)- (present on admission)  Due to DM and HTN  Assessment & Plan  [facility] nephrologist Dr. Hamilton consulted  Started cholecalciferol and calcium acetate, and vit D3  Phos 7.8 and iron at 54, TIBC low at 167  Dialysis coordinator working on an outpatient chair.  Hopefully patient will have this by Friday    Hyperkalemia- (present on admission)  Assessment & Plan  Due to missing dialysis and lisinopril can contribute as  "well  -RESOLVEd    Hypertension- (present on admission)  Assessment & Plan  History of uncontrolled hypertension  Likely due to missing dialysis  Watching HTN that is urgent right now, using dialysis as a \"diuretic\" to treat HTN. Lisinopril not given due to hyperkalemia and ESRD  -hydralazine in case of HTN emergency PRN Q8HR      "

## 2022-08-09 NOTE — PROGRESS NOTES
"2020: Received report from SHASHI Harley. Pt being transported up to floor.     2045: Pt arrived to floor, oriented to room, A&Ox4, BP elevated at 173/100, but otherwise VSS. Pt complains of 8/10 to the BLE. Contacted Mikael Hawkins regarding labwork, blood pressure concerns, and pain. Received orders for additional labwork, PRN antihypertensive, and pain medication. Pain medication administered. Pt refuses bed alarm, education provided.     2300: Pt became very agitated and started yelling that staff \"lost his things.\" Discussed with pt that all the items in his room are what he was sent up with from ED. Pt became very verbally aggressive with staff and body language towards charge RN became hostile. Contacted security, security up to floor to assess situation.    2315: After security came up, pt still aggressive but seems to have calmed down. Pt states he wants to leave AMA. Pt continues to state he won't leave until we \"find his things.\" Contacted Abigail Goff. She states he is okay to leave at this time. Will assess situation further to determine if  necessary.     0035: Discussed POC with patient and asked if pt was going to decide to stay at the hospital or leave. Pt states he will stay. Notified on-call about aggressive behavior and she is aware of situation as well as pt deciding to stay.   "

## 2022-08-09 NOTE — ASSESSMENT & PLAN NOTE
Anterior leg pain noted on admission and patient given oxycodone for pain. Most likely related to edema due to missed dialysis sessions. CPK and lactic acid wnl. Ambulatory.   -Bilateral DVT US negative  -Continue with diuresis for edema   -acetaminophen for pain control

## 2022-08-09 NOTE — DISCHARGE PLANNING
Outpatient Dialysis Placement Notification     Received notification for outpatient dialysis placement from Dr. Hamilton     Met/Spoke with patient and confirmed demographics and insurance information.      Provided patient with dialysis education materials and list of HD centers, referral initiated to:     [facility]   [address] [place] [address]       Pending medical and financial clearance.     Xitlaly Reyes - Dialysis Coordinator   Ph#: [phone]  [facility]

## 2022-08-09 NOTE — ASSESSMENT & PLAN NOTE
Has not had dialysis in the past 3 weeks, due to traveling to [place]. Obtained HD in [place], however does not have an outpatient dialysis chair in [place] and last year in [place].     -Signs/symptoms and labs/vitals slowly normalizing  -Nephrology following, on TThS dialysis and as needed  -Suspician for RIJ infection - Vancomycin started 8/22,8/24 switch to Doxycycline 8/26 -8/30 given negative BCX  -Lasix 80mg IV BID, Metolazone 2.5 mg BID 30 minutes before Lasix with strict I/O's   -CTM fluid status

## 2022-08-09 NOTE — HOSPITAL COURSE
47 yo male with PMH of diabetes,HFrEF, HTN, and ESRD on HD who presented 8/8/2022 for missing dialysis for past 3 weeks due to travelling to [place] for leisure and did not know where to go to get dialysis. In addition, patient did not have a dialysis chair here in [place].  He states he receives dialysis in [place].  He has been experiencing progressive weakness and associated nausea, vomiting, and diarrhea.  He came to the ER and was found to have electrolyte abnormalities including hyperkalemia, elevated creatinine BUN, and low calcium.  Nephrology consulted and stated that patient will require multiple sessions of dialysis given numerous missed sessions. Currently following [facility] schedule for HD, with vitals/labs gradually improving.   8/22 MRSA growth from armpit or RIJ swab - started on Vancomycin 8/22 - 8/24 attempt to use left AVM for dialysis  8/24 BCX obtained - No growth 48 hrs. Switch to Doxycycline 8/24 - 8/30 8/26 Lasix 80mg PO due to fluid overload  8/27 Urinating well with Lasix, will continue. US AVG - normal flow, stenosis prox biceps, min mural thrombus graft wall at mid biceps w/no flow disturbance, outflow axillary vein widely patent. Nephrology consulting Vascular surgery for evaluation to bring up AVG.  8/28 Bilateral lower extremity pain and edema. Continue diuresis with Lasix 80mg IV. Strict I/O's.  8/29 Bilateral lower extremity pain and edema. Continue diuresis with Lasix and Metolazone   9/2 No bowel movements for over a week, trial methyl natrexone

## 2022-08-09 NOTE — ASSESSMENT & PLAN NOTE
Initial potassium at 6.1. Likely due to missing dialysis, now WNL.     -Recent labs demonstrate normalization of potassium with dialysis  -We will continue to follow with serial labs

## 2022-08-09 NOTE — PROGRESS NOTES
".  [facility] -  PROGRESS NOTE               Author: EMMA Dial Date & Time: 8/9/2022  11:41 AM     HPI:  47 yo M with End Stage Renal Disease on hemodialysis at [facility] every Tues/Thurs/Sat, last HD at [place] in 4/2022, then traveled and lost HD chair. Other PMH diabetes mellitus and Hypertension who presented to the emergency department on 8/8/22 for weakness. He missed 1 months of HD due to traveling. He was found to be hypertensive 153/99, afebrile. Labs with K of 6.1, BUN 93, bicarb 10.   No F/C/N/V/CP/SOB.  No melena, hematochezia, hematemesis.  No HA, visual changes, or abdominal pain.    DAILY NEPHROLOGY SUMMARY:  8/9: 3L net UF. Resting in bed. No complaints. No acute distress. Security called last night, patient aggressive towards staff regarding personal items. Bps improved this am.     REVIEW OF SYSTEMS:    10 point ROS reviewed and is as per HPI/daily summary or otherwise negative    PMH/PSH/SH/FH: Reviewed and unchanged since admission note  CURRENT MEDICATIONS: Reviewed from admission to present day    VS:  BP (!) 140/78   Pulse (!) 102   Temp 36.3 °C (97.3 °F) (Temporal)   Resp 18   Ht 1.753 m (5' 9\")   Wt 109 kg (240 lb 4.8 oz)   SpO2 92%   BMI 35.49 kg/m²   Physical Exam  Constitutional:       Appearance: He is ill-appearing.   HENT:      Head: Normocephalic and atraumatic.      Nose: Nose normal. No congestion or rhinorrhea.      Mouth/Throat:      Mouth: Mucous membranes are moist.      Pharynx: No oropharyngeal exudate or posterior oropharyngeal erythema.   Eyes:      Extraocular Movements: Extraocular movements intact.      Conjunctiva/sclera: Conjunctivae normal.      Pupils: Pupils are equal, round, and reactive to light.   Cardiovascular:      Rate and Rhythm: Normal rate and regular rhythm.      Pulses: Normal pulses.      Heart sounds: Normal heart sounds. No murmur heard.    No friction rub. No gallop.      Comments: RIJ " TDC  Pulmonary:      Effort: Pulmonary effort is normal. No respiratory distress.      Breath sounds: Normal breath sounds.   Abdominal:      General: Bowel sounds are normal. There is distension.   Musculoskeletal:         General: Swelling present. Normal range of motion.      Cervical back: Normal range of motion and neck supple.   Skin:     General: Skin is warm and dry.   Neurological:      Mental Status: He is alert and oriented to person, place, and time. Mental status is at baseline.   Psychiatric:         Mood and Affect: Mood normal.         Behavior: Behavior normal.         Fluids:  No intake/output data recorded.    LABS:  Recent Labs     08/08/22  1231 08/08/22  2136   SODIUM 137 136   POTASSIUM 6.1* 4.4   CHLORIDE 102 99   CO2 10* 16*   GLUCOSE 156* 116*   BUN 93* 61*   CREATININE 16.78* 11.52*   CALCIUM 6.3* 7.4*       IMPRESSION:  # ESRD  - Etiology likely 2/2 DM/HTN  - via RIJ TDC  - lost his HD chair at [facility] and currently has no OP HD clinic  # Thrombosed Left AVF  - US 2/23 no flow  - s/p OR 2/25 AVG Brachio-axillary Creation, thrombectomy and fistulogram   # HTN, labile control   - Goal BP < 140/90  # Anemia of CKD  - Goal Hgb 10-11  - At goal   - % sat 32  # CKD-MBD  - Ca 6.3  - PO4 7.8  -   # Hyperkalemia, resolved  - due to missed hd   - hd 8/8  # weakness  # h/o Methamphetamine use   # Homelessness  # DM   - A1C 6.5  # Severe metabolic acidosis     PLAN:  - No iHD today (TUES)  - Continue iHD qMWF  - Challenge UF as able   - Restart cholecalciferol   - Increase calcium acetate TID w/meals  - Transfuse PRN Hgb <7   - No dietary protein restrictions  - Dose all meds per ESRD/iHD  - weakness workup per primary team   - Low Na/fluid restricted renal diet  - Follow labs  - AVG use per Vasc Surg recs; currently using RIJ TDC, last note 3/1 not yet cleared for use

## 2022-08-09 NOTE — ASSESSMENT & PLAN NOTE
History of uncontrolled hypertension. Likely due to missing dialysis and nonadherence to outpatient BP medications    -Hydralazine as needed due to history of CHF  -BP well controlled with Carvedilol 25mg twice daily

## 2022-08-09 NOTE — DISCHARGE PLANNING
Outpatient Dialysis Update Note       Referral to [facility] [place] denied per Medical Director.     Referral rerouted to [facility] and denied per Medical Director.     Referral now rerouted to:     [facility] [place] [id number] [facility] [place] [address]       Xitlaly Reyes   Dialysis Coordinator / [facility]   [phone]

## 2022-08-09 NOTE — PROGRESS NOTES
4 Eyes Skin Assessment Completed by SHASHI Gage and SHASHI Whitfield.    Head Scab and Redness  Ears WDL  Nose Redness and Scab  Mouth Redness  Neck Scab  Breast/Chest WDL  Shoulder Blades WDL  Spine WDL  (R) Arm/Elbow/Hand Redness, Scab and Scar  (L) Arm/Elbow/Hand Redness, Scab and Scar  Abdomen WDL  Groin WDL  Scrotum/Coccyx/Buttocks WDL  (R) Leg Redness, Scar and Scab  (L) Leg Redness, Scar and Scab  (R) Heel/Foot/Toe Redness, Scar and Scab  (L) Heel/Foot/Toe Redness, Scar and Scab          Devices In Places N/A      Interventions In Place Pillows and Pressure Redistribution Mattress    Possible Skin Injury No    Pictures Uploaded Into Epic N/A  Wound Consult Placed N/A  RN Wound Prevention Protocol Ordered No

## 2022-08-09 NOTE — CARE PLAN
The patient is Watcher - Medium risk of patient condition declining or worsening    Shift Goals  Clinical Goals: Pt agitation will improve  Patient Goals: Be left alone  Family Goals: POLINA    Progress made toward(s) clinical / shift goals:  Pt agitation decreased enough to allow RN to complete admission profile for pt. Pt was restless throughout the night frequently walking around the room.     Problem: Knowledge Deficit - Standard  Goal: Patient and family/care givers will demonstrate understanding of plan of care, disease process/condition, diagnostic tests and medications  Outcome: Progressing       Patient is not progressing towards the following goals:    Problem: Psychosocial  Goal: Patient's ability to identify and develop effective coping behaviors will improve  Outcome: Not Progressing  Goal: Patient's ability to identify and utilize available support systems will improve  Outcome: Not Progressing

## 2022-08-09 NOTE — ASSESSMENT & PLAN NOTE
Patient received HD in [place], did not have HD for 3 weeks prior to admission.  Patient also had multiple thrombectomies/revisions for AV fistula.  Patient has tunneled dialysis catheter in place and functioning. Patient not adherent to HD or outpatient medications.  Nephrology following. Per pharmacy ([facility]) pt has not picked up any medications. Prescribed medications were 01/2022 - Trazodone 50mg QHS, Calcitriol 0.25mg OD, Furosemide 80mg OD, Ca-acetate 667-270 TID, Lisinopril 20mg OD. 12/31/2021 amplodipine 5mg, glipizide ER 5mg, carvedilol 25 BID    -Currently on cholecalciferol   -Per nephrology note, AVG use per Vasc Surg recs; currently using [facility] TDC, last note 3/1 not yet cleared for use. Due to MRSA growth from skin culture, next dialysis will attempt to use AVG.   -Dialysis schedule TThSat and as needed  -Cannot be new patient for dialysis on Sat. Discharge pending dialysis chair confirmation next week.  -Discussed with Dr. Hartman that AVM can be evaluated outpatient

## 2022-08-10 LAB
ALBUMIN SERPL BCP-MCNC: 3.3 G/DL (ref 3.2–4.9)
ALBUMIN/GLOB SERPL: 0.8 G/DL
ALP SERPL-CCNC: 116 U/L (ref 30–99)
ALT SERPL-CCNC: 8 U/L (ref 2–50)
ANION GAP SERPL CALC-SCNC: 20 MMOL/L (ref 7–16)
AST SERPL-CCNC: 14 U/L (ref 12–45)
BASOPHILS # BLD AUTO: 0.3 % (ref 0–1.8)
BASOPHILS # BLD: 0.03 K/UL (ref 0–0.12)
BILIRUB SERPL-MCNC: 0.5 MG/DL (ref 0.1–1.5)
BUN SERPL-MCNC: 70 MG/DL (ref 8–22)
CALCIUM SERPL-MCNC: 7.1 MG/DL (ref 8.5–10.5)
CHLORIDE SERPL-SCNC: 99 MMOL/L (ref 96–112)
CO2 SERPL-SCNC: 15 MMOL/L (ref 20–33)
CREAT SERPL-MCNC: 13.09 MG/DL (ref 0.5–1.4)
EOSINOPHIL # BLD AUTO: 0.09 K/UL (ref 0–0.51)
EOSINOPHIL NFR BLD: 1 % (ref 0–6.9)
ERYTHROCYTE [DISTWIDTH] IN BLOOD BY AUTOMATED COUNT: 60.7 FL (ref 35.9–50)
GFR SERPLBLD CREATININE-BSD FMLA CKD-EPI: 4 ML/MIN/1.73 M 2
GLOBULIN SER CALC-MCNC: 4 G/DL (ref 1.9–3.5)
GLUCOSE SERPL-MCNC: 139 MG/DL (ref 65–99)
HCT VFR BLD AUTO: 29.6 % (ref 42–52)
HGB BLD-MCNC: 9.7 G/DL (ref 14–18)
IMM GRANULOCYTES # BLD AUTO: 0.04 K/UL (ref 0–0.11)
IMM GRANULOCYTES NFR BLD AUTO: 0.4 % (ref 0–0.9)
LYMPHOCYTES # BLD AUTO: 1.66 K/UL (ref 1–4.8)
LYMPHOCYTES NFR BLD: 18.5 % (ref 22–41)
MAGNESIUM SERPL-MCNC: 1.9 MG/DL (ref 1.5–2.5)
MCH RBC QN AUTO: 29.6 PG (ref 27–33)
MCHC RBC AUTO-ENTMCNC: 32.8 G/DL (ref 33.7–35.3)
MCV RBC AUTO: 90.2 FL (ref 81.4–97.8)
MONOCYTES # BLD AUTO: 1.06 K/UL (ref 0–0.85)
MONOCYTES NFR BLD AUTO: 11.8 % (ref 0–13.4)
NEUTROPHILS # BLD AUTO: 6.08 K/UL (ref 1.82–7.42)
NEUTROPHILS NFR BLD: 68 % (ref 44–72)
NRBC # BLD AUTO: 0 K/UL
NRBC BLD-RTO: 0 /100 WBC
PHOSPHATE SERPL-MCNC: 8.9 MG/DL (ref 2.5–4.5)
PLATELET # BLD AUTO: 181 K/UL (ref 164–446)
PMV BLD AUTO: 9.9 FL (ref 9–12.9)
POTASSIUM SERPL-SCNC: 4.9 MMOL/L (ref 3.6–5.5)
PROT SERPL-MCNC: 7.3 G/DL (ref 6–8.2)
RBC # BLD AUTO: 3.28 M/UL (ref 4.7–6.1)
SODIUM SERPL-SCNC: 134 MMOL/L (ref 135–145)
WBC # BLD AUTO: 9 K/UL (ref 4.8–10.8)

## 2022-08-10 PROCEDURE — 83735 ASSAY OF MAGNESIUM: CPT

## 2022-08-10 PROCEDURE — A9270 NON-COVERED ITEM OR SERVICE: HCPCS | Performed by: HOSPITALIST

## 2022-08-10 PROCEDURE — 85025 COMPLETE CBC W/AUTO DIFF WBC: CPT

## 2022-08-10 PROCEDURE — 700102 HCHG RX REV CODE 250 W/ 637 OVERRIDE(OP): Performed by: HOSPITALIST

## 2022-08-10 PROCEDURE — 770001 HCHG ROOM/CARE - MED/SURG/GYN PRIV*

## 2022-08-10 PROCEDURE — 700102 HCHG RX REV CODE 250 W/ 637 OVERRIDE(OP)

## 2022-08-10 PROCEDURE — A9270 NON-COVERED ITEM OR SERVICE: HCPCS | Performed by: STUDENT IN AN ORGANIZED HEALTH CARE EDUCATION/TRAINING PROGRAM

## 2022-08-10 PROCEDURE — 700111 HCHG RX REV CODE 636 W/ 250 OVERRIDE (IP): Performed by: HOSPITALIST

## 2022-08-10 PROCEDURE — 700102 HCHG RX REV CODE 250 W/ 637 OVERRIDE(OP): Performed by: STUDENT IN AN ORGANIZED HEALTH CARE EDUCATION/TRAINING PROGRAM

## 2022-08-10 PROCEDURE — 90935 HEMODIALYSIS ONE EVALUATION: CPT

## 2022-08-10 PROCEDURE — 84100 ASSAY OF PHOSPHORUS: CPT

## 2022-08-10 PROCEDURE — 700102 HCHG RX REV CODE 250 W/ 637 OVERRIDE(OP): Performed by: NURSE PRACTITIONER

## 2022-08-10 PROCEDURE — 99232 SBSQ HOSP IP/OBS MODERATE 35: CPT | Mod: GC | Performed by: INTERNAL MEDICINE

## 2022-08-10 PROCEDURE — A9270 NON-COVERED ITEM OR SERVICE: HCPCS | Performed by: NURSE PRACTITIONER

## 2022-08-10 PROCEDURE — 700111 HCHG RX REV CODE 636 W/ 250 OVERRIDE (IP)

## 2022-08-10 PROCEDURE — 80053 COMPREHEN METABOLIC PANEL: CPT

## 2022-08-10 PROCEDURE — A9270 NON-COVERED ITEM OR SERVICE: HCPCS

## 2022-08-10 PROCEDURE — 36415 COLL VENOUS BLD VENIPUNCTURE: CPT

## 2022-08-10 RX ORDER — HEPARIN SODIUM 1000 [USP'U]/ML
INJECTION, SOLUTION INTRAVENOUS; SUBCUTANEOUS
Status: COMPLETED
Start: 2022-08-10 | End: 2022-08-10

## 2022-08-10 RX ORDER — CARVEDILOL 3.12 MG/1
3.12 TABLET ORAL 2 TIMES DAILY WITH MEALS
Status: DISCONTINUED | OUTPATIENT
Start: 2022-08-10 | End: 2022-08-12

## 2022-08-10 RX ADMIN — HEPARIN SODIUM 3100 UNITS: 1000 INJECTION, SOLUTION INTRAVENOUS; SUBCUTANEOUS at 11:40

## 2022-08-10 RX ADMIN — CARVEDILOL 3.12 MG: 3.12 TABLET, FILM COATED ORAL at 18:09

## 2022-08-10 RX ADMIN — Medication 1000 UNITS: at 05:18

## 2022-08-10 RX ADMIN — OXYCODONE 5 MG: 5 TABLET ORAL at 05:18

## 2022-08-10 RX ADMIN — HEPARIN SODIUM 5000 UNITS: 5000 INJECTION, SOLUTION INTRAVENOUS; SUBCUTANEOUS at 05:18

## 2022-08-10 RX ADMIN — HEPARIN SODIUM 5000 UNITS: 5000 INJECTION, SOLUTION INTRAVENOUS; SUBCUTANEOUS at 13:55

## 2022-08-10 RX ADMIN — Medication 1334 MG: at 13:54

## 2022-08-10 RX ADMIN — HEPARIN SODIUM 5000 UNITS: 5000 INJECTION, SOLUTION INTRAVENOUS; SUBCUTANEOUS at 22:10

## 2022-08-10 RX ADMIN — Medication 1334 MG: at 18:08

## 2022-08-10 RX ADMIN — HYDRALAZINE HYDROCHLORIDE 10 MG: 10 TABLET, FILM COATED ORAL at 16:34

## 2022-08-10 RX ADMIN — OXYCODONE 5 MG: 5 TABLET ORAL at 22:10

## 2022-08-10 ASSESSMENT — PAIN DESCRIPTION - PAIN TYPE
TYPE: ACUTE PAIN

## 2022-08-10 ASSESSMENT — ENCOUNTER SYMPTOMS
DIARRHEA: 0
CONSTIPATION: 0
HEMOPTYSIS: 0
DIZZINESS: 0
ORTHOPNEA: 0
PALPITATIONS: 0
CHILLS: 0
HEARTBURN: 0
SHORTNESS OF BREATH: 0
BLURRED VISION: 0
VOMITING: 0
COUGH: 0
SPUTUM PRODUCTION: 0
DOUBLE VISION: 0
DEPRESSION: 0
BRUISES/BLEEDS EASILY: 0
WHEEZING: 0
PHOTOPHOBIA: 0
NAUSEA: 0
FEVER: 0
ABDOMINAL PAIN: 0
MYALGIAS: 1
HEADACHES: 0

## 2022-08-10 ASSESSMENT — LIFESTYLE VARIABLES: SUBSTANCE_ABUSE: 0

## 2022-08-10 NOTE — CARE PLAN
The patient is Watcher - Medium risk of patient condition declining or worsening    Shift Goals  Clinical Goals: manage agitation  Patient Goals: safety, rest  Family Goals: POLINA    Progress made toward(s) clinical / shift goals:  pt was not agitated all shift    Patient is not progressing towards the following goal: elevated BP and HR       Problem: Knowledge Deficit - Standard  Goal: Patient and family/care givers will demonstrate understanding of plan of care, disease process/condition, diagnostic tests and medications  8/9/2022 1734 by Isacc Marcos R.N.  Outcome: Progressing  8/9/2022 1733 by Isacc Marcos R.N.  Outcome: Progressing     Problem: Pain - Standard  Goal: Alleviation of pain or a reduction in pain to the patient’s comfort goal  8/9/2022 1734 by Isacc Marcos R.N.  Outcome: Progressing  8/9/2022 1733 by Isacc Marcos R.N.  Outcome: Progressing     Problem: Provide Safe Environment  Goal: Suicide environmental safety, protocols, policies, and practices will be implemented  8/9/2022 1734 by Isacc Marcos R.N.  Outcome: Progressing  8/9/2022 1733 by Isacc Marcos R.N.  Outcome: Progressing     Problem: Psychosocial  Goal: Patient's ability to identify and develop effective coping behaviors will improve  8/9/2022 1734 by Isacc Marcos R.N.  Outcome: Progressing  8/9/2022 1733 by Isacc Marcos R.N.  Outcome: Progressing  Goal: Patient's ability to identify and utilize available support systems will improve  8/9/2022 1734 by sIacc Marcos R.N.  Outcome: Progressing  8/9/2022 1733 by Isacc Marcos R.N.  Outcome: Progressing     Problem: Fall Risk  Goal: Patient will remain free from falls  8/9/2022 1734 by Isacc Marcos R.N.  Outcome: Progressing  8/9/2022 1733 by Isacc Marcos R.N.  Outcome: Progressing

## 2022-08-10 NOTE — CARE PLAN
The patient is Watcher - Medium risk of patient condition declining or worsening    Shift Goals  Clinical Goals: Patient will remain free from injury  Patient Goals: Rest  Family Goals: POLINA    Progress made toward(s) clinical / shift goals:  Pt remained free from injury overnight and was able to rest comfortably in bed throughout the night. Pain medication administered once this shift.     Problem: Knowledge Deficit - Standard  Goal: Patient and family/care givers will demonstrate understanding of plan of care, disease process/condition, diagnostic tests and medications  Outcome: Progressing

## 2022-08-10 NOTE — PROGRESS NOTES
[facility] Services Progress Note    Hemodialysis treatment ordered today per Lena Crabtree x 3 hours. Treatment initiated at 0840 and ended at 1140.    Pt was really sleepy pre HD tx, arouses easily to voice and touch, not in distress, SBP : 160's.    Pt tolerated treatment, dozing on and off during HD, SBP : 150-160's, no discomfort reported.; see e-flow sheets for details.    Net UF 3,000 mL    Post tx, CVC flushed with saline then locked with heparin 1000 units/mL per designated amount in each wing then clamped and capped. Aspirate heparin prior to next CVC use.    Report given to Primary RN.

## 2022-08-10 NOTE — PROGRESS NOTES
".  [facility] -  PROGRESS NOTE               Author: EMMA Dial Date & Time: 8/10/2022  11:12 AM     HPI:  45 yo M with End Stage Renal Disease on hemodialysis at [facility] every Tues/Thurs/Sat, last HD at [place] in 4/2022, then traveled and lost HD chair. Other PMH diabetes mellitus and Hypertension who presented to the emergency department on 8/8/22 for weakness. He missed 1 months of HD due to traveling. He was found to be hypertensive 153/99, afebrile. Labs with K of 6.1, BUN 93, bicarb 10.   No F/C/N/V/CP/SOB.  No melena, hematochezia, hematemesis.  No HA, visual changes, or abdominal pain.    DAILY NEPHROLOGY SUMMARY:  8/9: 3L net UF. Resting in bed. No complaints. No acute distress. Security called last night, patient aggressive towards staff regarding personal items. Bps improved this am.   8/10: Seen on HD this am. Lethargic. No complaints.     REVIEW OF SYSTEMS:    10 point ROS reviewed and is as per HPI/daily summary or otherwise negative    PMH/PSH/SH/FH: Reviewed and unchanged since admission note  CURRENT MEDICATIONS: Reviewed from admission to present day    VS:  /77   Pulse 97   Temp 36.2 °C (97.1 °F) (Temporal)   Resp 18   Ht 1.753 m (5' 9\")   Wt 109 kg (240 lb 4.8 oz)   SpO2 91%   BMI 35.49 kg/m²   Physical Exam  Constitutional:       Appearance: He is ill-appearing.   HENT:      Head: Normocephalic and atraumatic.      Nose: Nose normal. No congestion or rhinorrhea.      Mouth/Throat:      Mouth: Mucous membranes are moist.      Pharynx: No oropharyngeal exudate or posterior oropharyngeal erythema.   Eyes:      Extraocular Movements: Extraocular movements intact.      Conjunctiva/sclera: Conjunctivae normal.      Pupils: Pupils are equal, round, and reactive to light.   Cardiovascular:      Rate and Rhythm: Normal rate and regular rhythm.      Pulses: Normal pulses.      Heart sounds: Normal heart sounds. No murmur heard.    No friction " rub. No gallop.      Comments: [facility] TDC  LAVF   Pulmonary:      Effort: Pulmonary effort is normal. No respiratory distress.      Breath sounds: Normal breath sounds.   Abdominal:      General: Bowel sounds are normal. There is distension.   Musculoskeletal:         General: Swelling present. Normal range of motion.      Cervical back: Normal range of motion and neck supple.   Skin:     General: Skin is warm and dry.   Neurological:      Mental Status: He is alert and oriented to person, place, and time. Mental status is at baseline.   Psychiatric:         Mood and Affect: Mood normal.         Behavior: Behavior normal.       Fluids:  In: 840 [P.O.:840]  Out: -     LABS:  Recent Labs     08/08/22  1231 08/08/22  2136 08/10/22  0446   SODIUM 137 136 134*   POTASSIUM 6.1* 4.4 4.9   CHLORIDE 102 99 99   CO2 10* 16* 15*   GLUCOSE 156* 116* 139*   BUN 93* 61* 70*   CREATININE 16.78* 11.52* 13.09*   CALCIUM 6.3* 7.4* 7.1*         IMPRESSION:  # ESRD  - Etiology likely 2/2 DM/HTN  - via [facility] TDC  - lost his HD chair at [facility] and currently has no OP HD clinic  # Thrombosed Left AVF  - US 2/23 no flow  - s/p OR 2/25 AVG Brachio-axillary Creation, thrombectomy and fistulogram   # HTN, labile control   - Goal BP < 140/90  # Anemia of CKD  - Goal Hgb 10-11  - At goal   - % sat 32  # CKD-MBD  - Ca 6.3 -> Cca 7.66  - PO4 7.8 ->  8.9  -   # Hyperkalemia, resolved  - due to missed hd   - hd 8/8  # weakness  # h/o Methamphetamine use   # Homelessness  # DM   - A1C 6.5  # Severe metabolic acidosis     PLAN:  - iHD today (WED)  - Continue iHD qMWF  - Challenge UF as able   - Restart cholecalciferol   - Increase calcium acetate TID w/meals  - Start Carvedilol  - Transfuse PRN Hgb <7   - No dietary protein restrictions  - Dose all meds per ESRD/iHD  - weakness workup per primary team   - Low Na/fluid restricted renal diet  - Follow labs  - AVG use per Vasc Surg recs; currently using [employer] TDC, last note 3/1 not yet  cleared for use

## 2022-08-10 NOTE — PROGRESS NOTES
[facility] Daily Progress Note    Date of Service  8/10/2022    [facility] Team: R  Purple Team   Attending: Nino Reyes M.d.  Senior Resident: Dr. Pina  Intern:  Dr. Wiggins  Contact Number: [phone]    Chief Complaint  Ambrose Watkins is a 46 y.o. male admitted 8/8/2022 with progressive weakness with associated nausea, vomiting, and diarrhea    Hospital Course  45 yo male with PMH of diabetes,HFrE, HTN, and ESRD on HD who presented 8/8/2022 for missing dialysis for past 3 weeks.  This was because he traveled to [place] for leisure and did not know where to go to get dialysis.  In addition, patient does not have a dialysis chair here in [place].  He states he receives dialysis in [place].  He has been experiencing progressive weakness and associated nausea, vomiting, and diarrhea.  He came to the ER and was found to have electrolyte abnormalities including hyperkalemia, elevated creatinine BUN, and low calcium.  Nephrology consulted and patient will require multiple sessions of dialysis given numerous missed sessions.  Currently following MWF schedule for HD, with vitals/labs gradually improving      Interval Problem Update  Patient seen in the morning on 8/10, stating he is feeling about the same as yesterday.  He has not had a bowel movement since his diarrhea 2 days.  Although he stated he produced urine he has not urinated for the past 2 days.  She still has associated leg swelling and pain bilaterally, however not concerning for DVT as patient has had this issue chronically in prior visits, and swelling is symmetrical with patient having history of HF also being fluid overloaded during this visit.  Patient also restarted on carvedilol per nephrology recommendation.    I have discussed this patient's plan of care and discharge plan at IDT rounds today with Case Management, Nursing, Nursing leadership, and other members of the IDT team.    Consultants/Specialty  nephrology    Code  Status  Full Code    Disposition  Patient is not medically cleared for discharge.   Anticipate discharge to to home with close outpatient follow-up.  I have placed the appropriate orders for post-discharge needs.    Review of Systems  Review of Systems   Constitutional:  Negative for chills and fever.   Respiratory:  Negative for shortness of breath.    Cardiovascular:  Positive for leg swelling. Negative for chest pain and palpitations.   Gastrointestinal:  Negative for nausea and vomiting.      Physical Exam  Temp:  [36.2 °C (97.1 °F)-36.6 °C (97.8 °F)] 36.2 °C (97.1 °F)  Pulse:  [] 97  Resp:  [16-18] 18  BP: (137-159)/() 137/77  SpO2:  [91 %-99 %] 91 %    Physical Exam  Constitutional:       General: He is not in acute distress.     Appearance: He is obese.   HENT:      Head: Normocephalic and atraumatic.   Eyes:      Extraocular Movements: Extraocular movements intact.      Conjunctiva/sclera: Conjunctivae normal.      Pupils: Pupils are equal, round, and reactive to light.   Cardiovascular:      Rate and Rhythm: Regular rhythm. Tachycardia present.      Comments: Nonfunctional LUE AV fistula.  Tunneled dialysis catheter intact  Pulmonary:      Effort: Pulmonary effort is normal.      Breath sounds: Normal breath sounds.   Abdominal:      General: Abdomen is flat. Bowel sounds are normal.      Tenderness: There is no abdominal tenderness.   Musculoskeletal:      Right upper leg: Tenderness present.      Left upper leg: Tenderness present.      Right lower leg: Tenderness present. Edema present.      Left lower leg: Tenderness present. Edema present.   Neurological:      General: No focal deficit present.      Mental Status: He is alert and oriented to person, place, and time.   Psychiatric:         Mood and Affect: Mood normal.         Behavior: Behavior normal.       Fluids    Intake/Output Summary (Last 24 hours) at 8/10/2022 1254  Last data filed at 8/9/2022 2036  Gross per 24 hour   Intake 600 ml    Output --   Net 600 ml       Laboratory  Recent Labs     08/08/22  1231 08/08/22 2136 08/10/22  0446   WBC 9.3 8.0 9.0   RBC 3.22* 3.54* 3.28*   HEMOGLOBIN 9.3* 10.3* 9.7*   HEMATOCRIT 29.0* 30.8* 29.6*   MCV 90.1 87.0 90.2   MCH 28.9 29.1 29.6   MCHC 32.1* 33.4* 32.8*   RDW 61.6* 58.8* 60.7*   PLATELETCT 177 188 181   MPV 10.0 10.6 9.9     Recent Labs     08/08/22  1231 08/08/22  2136 08/10/22  0446   SODIUM 137 136 134*   POTASSIUM 6.1* 4.4 4.9   CHLORIDE 102 99 99   CO2 10* 16* 15*   GLUCOSE 156* 116* 139*   BUN 93* 61* 70*   CREATININE 16.78* 11.52* 13.09*   CALCIUM 6.3* 7.4* 7.1*                   Imaging  DX-CHEST-PORTABLE (1 VIEW)   Final Result      1.  Mildly enlarged cardiac silhouette with vascular congestion.           Assessment/Plan  Problem Representation:    * Noncompliance of patient with renal dialysis (HCC)- (present on admission)  Assessment & Plan  Has not had dialysis in the past 3 weeks, due to traveling to [place].  Obtains HD in [place], however does not have an outpatient dialysis chair in [place]. Coordinator is working on getting patient a chair    -Will need multiple courses of dialysis in the hospital given that he has missed multiple weeks  -Signs/symptoms and labs/vitals slowly normalizing  -We will continue MWF HD    High anion gap metabolic acidosis- (present on admission)  Assessment & Plan  High anion gap acidosis on admission likely due to uremia. HD MWF will likely assist in correcting HAGMA    -Current BUN at 70  -Patient will be dialyzed MWF  -Will continue to monitor    End stage renal disease (HCC)- (present on admission)  Assessment & Plan  Patient receiving HD in [place], has not received HD in 3 weeks and since admission.  Patient has also had multiple thrombectomies/revisions for AV fistula, currently nonfunctioning.  Patient has tunneled dialysis catheter in place and functioning.  Patient not adherent to HD or outpatient medications.  Nephrology  following    -Currently on cholecalciferol and PhosLo  -Receiving HD on MWF  -Currently on renal diet with restricted sodium and fluid intake    Hyperkalemia- (present on admission)  Assessment & Plan   Initial potassium at 6.1. Likely due to missing dialysis    -Patient receiving dialysis MWF  -Recent labs demonstrate normalization of potassium  -We will continue to follow with serial labs    Hypertension- (present on admission)  Assessment & Plan  History of uncontrolled hypertension. Likely due to missing dialysis nonadherence to outpatient BP medications    -Switched labetalol as needed to hydralazine as needed due to history of CHF  -Carvedilol restarted  -BP has gradually been normalizing, will continue to monitor       VTE prophylaxis: heparin ppx    I have performed a physical exam and reviewed and updated ROS and Plan today (8/10/2022). In review of yesterday's note (8/9/2022), there are no changes except as documented above.

## 2022-08-10 NOTE — NON-PROVIDER
"Daily Progress Note:     Date of Service: 8/10/2022  Primary Team: [facility] IM Purple Team   Attending: Nino Reyes M.D.   Senior Resident:  Lung  Intern: Dr. Wiggins  Contact:  [phone]    ID:   Ambrose is a 46 y.o. male with PMH significant for DM, ESRD, HFrEF 30%, HTN, and 0.5 pack a day smoker for 20yrs who presented 8/8/2022 with severe weakness due to missing dialysis for 3weeks after traveling to [place] for unknown reasons causing him to lose his HD chair @ [facility] ([facility]) last seen on 4/2022. Pt reports N/V and was found to be hyperkalemic, hypocalcemic, with elevated Cr and BUN. Pt also ran out of DM medication 2 weeks ago although he reports taking a CCB, lisinopril, a \"water pill and diabetic pill\". Pt has been living in his [facility] and has a hx of methamphetamine use but denies use in the last yr. He hasn't had an appetite in the last yr but when he does eat he reports eating fruits and veggies      Interval Events:    On 8/10 Mr. Watkins reports that he has 9/10 severe pain in his legs and thighs but refuses pain medications out of fear of addiction. He has not had a bowel movement or urine. HA has subsided from yesterday. No acute changes.    Consultants/Specialty:  Nephrology    Review of Systems:    Review of Systems   Constitutional:  Negative for chills and fever.   HENT:  Negative for ear pain, hearing loss and tinnitus.    Eyes:  Negative for blurred vision, double vision and photophobia.   Respiratory:  Negative for cough, hemoptysis, sputum production, shortness of breath and wheezing.    Cardiovascular:  Positive for leg swelling. Negative for chest pain, palpitations and orthopnea.   Gastrointestinal:  Negative for abdominal pain, constipation, diarrhea (Pt reports \"the runs\") and heartburn.   Genitourinary:  Negative for dysuria and urgency.   Musculoskeletal:  Positive for myalgias. Negative for joint pain.   Skin:  Positive for rash.   Neurological:  Negative for dizziness and " "headaches.   Endo/Heme/Allergies:  Does not bruise/bleed easily.   Psychiatric/Behavioral:  Negative for depression, substance abuse and suicidal ideas.      Objective Data:   Physical Exam:   Vitals:   Temp:  [36.2 °C (97.1 °F)-36.6 °C (97.8 °F)] 36.2 °C (97.2 °F)  Pulse:  [89-99] 99  Resp:  [16-18] 18  BP: (137-159)/() 157/100  SpO2:  [91 %-99 %] 97 %  Physical Exam  Vitals reviewed.   Constitutional:       Appearance: Normal appearance.   HENT:      Head: Normocephalic and atraumatic.   Eyes:      Extraocular Movements: Extraocular movements intact.      Pupils: Pupils are equal, round, and reactive to light.   Cardiovascular:      Rate and Rhythm: Normal rate and regular rhythm.      Pulses: Normal pulses.      Heart sounds: Normal heart sounds. No murmur heard.    No gallop.   Pulmonary:      Effort: Pulmonary effort is normal. No respiratory distress.      Breath sounds: Normal breath sounds.   Abdominal:      General: There is no distension.      Palpations: Abdomen is soft. There is no mass.   Musculoskeletal:         General: Swelling present.      Comments: 1+ pitting edema, lower extremities,  Non-functioning, stenosed, AV fistula on left arm noted.    Skin:     General: Skin is warm and dry.      Capillary Refill: Capillary refill takes less than 2 seconds.      Findings: Lesion (head and face) present. No rash.   Neurological:      Mental Status: He is alert and oriented to person, place, and time.      Sensory: Sensory deficit present.      Comments: Patient stares out of the window and doesn't make much eye contact, denies depression, states he's just a \"mellow sofía\"    Decreased sensation in feet, Left foot has less sensation than right   Psychiatric:         Mood and Affect: Mood normal.         Behavior: Behavior normal.         Labs:   Recent Labs     08/08/22  1231 08/08/22  2136 08/10/22  0446   WBC 9.3 8.0 9.0   RBC 3.22* 3.54* 3.28*   HEMOGLOBIN 9.3* 10.3* 9.7*   HEMATOCRIT 29.0* 30.8* " 29.6*   MCV 90.1 87.0 90.2   MCH 28.9 29.1 29.6   RDW 61.6* 58.8* 60.7*   PLATELETCT 177 188 181   MPV 10.0 10.6 9.9   NEUTSPOLYS 75.60*  --  68.00   LYMPHOCYTES 13.50*  --  18.50*   MONOCYTES 9.50  --  11.80   EOSINOPHILS 0.60  --  1.00   BASOPHILS 0.30  --  0.30       Recent Labs     08/08/22  1231 08/08/22  2136 08/10/22  0446   SODIUM 137 136 134*   POTASSIUM 6.1* 4.4 4.9   CHLORIDE 102 99 99   CO2 10* 16* 15*   GLUCOSE 156* 116* 139*   BUN 93* 61* 70*         Imaging:   DX-CHEST-PORTABLE (1 VIEW)   Final Result      1.  Mildly enlarged cardiac silhouette with vascular congestion.          Problem Representation:     47 yo male with ESRD, HFrEF 30%, DM2, HTN, and hx of smoking 0.5/day for 20ys presents with weakness after being non-compliant with dialysis for 3weeks while in [place]. He has lost his HD chair at [facility]. Pt is hyperkalemic  With elevated BUN 61, and Cr 11.52. Patients labs and symptoms can be attributed to missing dialysis.       Assessment/Plan:    Noncompliance of patient with renal dialysis (HCC)- (present on admission)  Assessment & Plan  Due to traveling to [place]  Lost HD chair at [facility]  Coordinator is working on getting patient a chair  Will need multiple courses of dialysis in the hospital given that he has missed multiple weeks.  -1st dialysis on 8/8 from [id number]  -2nd dialysis 8/10 from 9535-7691    High anion gap metabolic acidosis- (present on admission)  Assessment & Plan  Anion gap acidosis on admission likely due to uremia. Dialysis will help correct.    Continue to monitor.    Bilateral leg pain- (present on admission)  Assessment & Plan  Chest pain likely related to edema as well as anterior leg pain  Pain control    End stage renal disease (HCC)- (present on admission)  Due to DM and HTN  Assessment & Plan  [facility] nephrologist Dr. Hamilton consulted  Started cholecalciferol and calcium acetate, and vit D3  Phos 7.8 and iron at 54, TIBC low at 167  Dialysis  "coordinator working on an outpatient chair.  Hopefully patient will have this by Friday    Hyperkalemia- (present on admission)  Assessment & Plan  Due to missing dialysis and lisinopril can contribute as well  -RESOLVEd    Hypertension- (present on admission)  Assessment & Plan  History of uncontrolled hypertension  Likely due to missing dialysis  Watching HTN that is urgent right now, using dialysis as a \"diuretic\" to treat HTN. Lisinopril not given due to hyperkalemia and ESRD  -hydralazine in case of HTN emergency PRN Q8HR  -8/10 hydralazine given due to HTN      "

## 2022-08-10 NOTE — PROGRESS NOTES
Assumed care of pt and received bedside report. Pt is A&O x 4, reports pain as 7/10 but declines pain medications at this time, bedside table and personal items within reach, bed locked in low position, refused bed alarm, no additional needs at this time.

## 2022-08-11 LAB
ALBUMIN SERPL BCP-MCNC: 2.9 G/DL (ref 3.2–4.9)
ALBUMIN SERPL BCP-MCNC: 3 G/DL (ref 3.2–4.9)
ALBUMIN/GLOB SERPL: 0.8 G/DL
ALP SERPL-CCNC: 102 U/L (ref 30–99)
ALT SERPL-CCNC: 5 U/L (ref 2–50)
ANION GAP SERPL CALC-SCNC: 18 MMOL/L (ref 7–16)
AST SERPL-CCNC: 9 U/L (ref 12–45)
BASOPHILS # BLD AUTO: 0.3 % (ref 0–1.8)
BASOPHILS # BLD: 0.02 K/UL (ref 0–0.12)
BILIRUB SERPL-MCNC: 0.5 MG/DL (ref 0.1–1.5)
BUN SERPL-MCNC: 46 MG/DL (ref 8–22)
BUN SERPL-MCNC: 49 MG/DL (ref 8–22)
CALCIUM SERPL-MCNC: 7 MG/DL (ref 8.5–10.5)
CALCIUM SERPL-MCNC: 7.1 MG/DL (ref 8.5–10.5)
CHLORIDE SERPL-SCNC: 97 MMOL/L (ref 96–112)
CHLORIDE SERPL-SCNC: 97 MMOL/L (ref 96–112)
CO2 SERPL-SCNC: 19 MMOL/L (ref 20–33)
CO2 SERPL-SCNC: 20 MMOL/L (ref 20–33)
CREAT SERPL-MCNC: 10.13 MG/DL (ref 0.5–1.4)
CREAT SERPL-MCNC: 9.68 MG/DL (ref 0.5–1.4)
EOSINOPHIL # BLD AUTO: 0.03 K/UL (ref 0–0.51)
EOSINOPHIL NFR BLD: 0.4 % (ref 0–6.9)
ERYTHROCYTE [DISTWIDTH] IN BLOOD BY AUTOMATED COUNT: 60 FL (ref 35.9–50)
GAMMA INTERFERON BACKGROUND BLD IA-ACNC: 0.04 IU/ML
GFR SERPLBLD CREATININE-BSD FMLA CKD-EPI: 6 ML/MIN/1.73 M 2
GFR SERPLBLD CREATININE-BSD FMLA CKD-EPI: 6 ML/MIN/1.73 M 2
GLOBULIN SER CALC-MCNC: 3.7 G/DL (ref 1.9–3.5)
GLUCOSE SERPL-MCNC: 158 MG/DL (ref 65–99)
GLUCOSE SERPL-MCNC: 165 MG/DL (ref 65–99)
HAV IGM SERPL QL IA: NORMAL
HBV CORE IGM SER QL: NORMAL
HBV SURFACE AG SER QL: NORMAL
HCT VFR BLD AUTO: 26.8 % (ref 42–52)
HCV AB SER QL: NORMAL
HGB BLD-MCNC: 8.7 G/DL (ref 14–18)
IMM GRANULOCYTES # BLD AUTO: 0.05 K/UL (ref 0–0.11)
IMM GRANULOCYTES NFR BLD AUTO: 0.6 % (ref 0–0.9)
LYMPHOCYTES # BLD AUTO: 1.02 K/UL (ref 1–4.8)
LYMPHOCYTES NFR BLD: 13 % (ref 22–41)
M TB IFN-G BLD-IMP: NEGATIVE
M TB IFN-G CD4+ BCKGRND COR BLD-ACNC: 0 IU/ML
MAGNESIUM SERPL-MCNC: 1.8 MG/DL (ref 1.5–2.5)
MCH RBC QN AUTO: 29.1 PG (ref 27–33)
MCHC RBC AUTO-ENTMCNC: 32.5 G/DL (ref 33.7–35.3)
MCV RBC AUTO: 89.6 FL (ref 81.4–97.8)
MITOGEN IGNF BCKGRD COR BLD-ACNC: >10 IU/ML
MONOCYTES # BLD AUTO: 0.94 K/UL (ref 0–0.85)
MONOCYTES NFR BLD AUTO: 12 % (ref 0–13.4)
NEUTROPHILS # BLD AUTO: 5.8 K/UL (ref 1.82–7.42)
NEUTROPHILS NFR BLD: 73.7 % (ref 44–72)
NRBC # BLD AUTO: 0 K/UL
NRBC BLD-RTO: 0 /100 WBC
PHOSPHATE SERPL-MCNC: 6.9 MG/DL (ref 2.5–4.5)
PHOSPHATE SERPL-MCNC: 7.2 MG/DL (ref 2.5–4.5)
PLATELET # BLD AUTO: 185 K/UL (ref 164–446)
PMV BLD AUTO: 10.2 FL (ref 9–12.9)
POTASSIUM SERPL-SCNC: 4.1 MMOL/L (ref 3.6–5.5)
POTASSIUM SERPL-SCNC: 4.2 MMOL/L (ref 3.6–5.5)
PROT SERPL-MCNC: 6.6 G/DL (ref 6–8.2)
QFT TB2 - NIL TBQ2: 0 IU/ML
RBC # BLD AUTO: 2.99 M/UL (ref 4.7–6.1)
SODIUM SERPL-SCNC: 135 MMOL/L (ref 135–145)
SODIUM SERPL-SCNC: 135 MMOL/L (ref 135–145)
WBC # BLD AUTO: 7.9 K/UL (ref 4.8–10.8)

## 2022-08-11 PROCEDURE — A9270 NON-COVERED ITEM OR SERVICE: HCPCS

## 2022-08-11 PROCEDURE — 700102 HCHG RX REV CODE 250 W/ 637 OVERRIDE(OP): Performed by: STUDENT IN AN ORGANIZED HEALTH CARE EDUCATION/TRAINING PROGRAM

## 2022-08-11 PROCEDURE — 80069 RENAL FUNCTION PANEL: CPT

## 2022-08-11 PROCEDURE — A9270 NON-COVERED ITEM OR SERVICE: HCPCS | Performed by: NURSE PRACTITIONER

## 2022-08-11 PROCEDURE — 84100 ASSAY OF PHOSPHORUS: CPT

## 2022-08-11 PROCEDURE — 700102 HCHG RX REV CODE 250 W/ 637 OVERRIDE(OP): Performed by: NURSE PRACTITIONER

## 2022-08-11 PROCEDURE — 85025 COMPLETE CBC W/AUTO DIFF WBC: CPT

## 2022-08-11 PROCEDURE — 36415 COLL VENOUS BLD VENIPUNCTURE: CPT

## 2022-08-11 PROCEDURE — 83735 ASSAY OF MAGNESIUM: CPT

## 2022-08-11 PROCEDURE — 99232 SBSQ HOSP IP/OBS MODERATE 35: CPT | Mod: GC | Performed by: INTERNAL MEDICINE

## 2022-08-11 PROCEDURE — A9270 NON-COVERED ITEM OR SERVICE: HCPCS | Performed by: STUDENT IN AN ORGANIZED HEALTH CARE EDUCATION/TRAINING PROGRAM

## 2022-08-11 PROCEDURE — 700102 HCHG RX REV CODE 250 W/ 637 OVERRIDE(OP)

## 2022-08-11 PROCEDURE — 770001 HCHG ROOM/CARE - MED/SURG/GYN PRIV*

## 2022-08-11 PROCEDURE — 80053 COMPREHEN METABOLIC PANEL: CPT

## 2022-08-11 PROCEDURE — 700111 HCHG RX REV CODE 636 W/ 250 OVERRIDE (IP): Performed by: HOSPITALIST

## 2022-08-11 RX ADMIN — Medication 1334 MG: at 14:12

## 2022-08-11 RX ADMIN — HEPARIN SODIUM 5000 UNITS: 5000 INJECTION, SOLUTION INTRAVENOUS; SUBCUTANEOUS at 14:12

## 2022-08-11 RX ADMIN — CARVEDILOL 3.12 MG: 3.12 TABLET, FILM COATED ORAL at 09:50

## 2022-08-11 RX ADMIN — HEPARIN SODIUM 5000 UNITS: 5000 INJECTION, SOLUTION INTRAVENOUS; SUBCUTANEOUS at 06:04

## 2022-08-11 RX ADMIN — Medication 1334 MG: at 18:31

## 2022-08-11 RX ADMIN — Medication 1334 MG: at 06:05

## 2022-08-11 RX ADMIN — Medication 1000 UNITS: at 06:04

## 2022-08-11 RX ADMIN — CARVEDILOL 3.12 MG: 3.12 TABLET, FILM COATED ORAL at 18:31

## 2022-08-11 ASSESSMENT — ENCOUNTER SYMPTOMS
NAUSEA: 0
DIARRHEA: 1
PALPITATIONS: 0
CHILLS: 0
FEVER: 0
SHORTNESS OF BREATH: 0
VOMITING: 0

## 2022-08-11 ASSESSMENT — PAIN DESCRIPTION - PAIN TYPE: TYPE: ACUTE PAIN

## 2022-08-11 ASSESSMENT — FIBROSIS 4 INDEX: FIB4 SCORE: 0.96

## 2022-08-11 NOTE — CARE PLAN
The patient is Stable - Low risk of patient condition declining or worsening    Shift Goals  Clinical Goals: BP mgmt, complete HD MWF  Patient Goals: Comfort, rest  Family Goals: POLINA    Progress made toward(s) clinical / shift goals: Complete HD MWF, labs improving.    Patient is not progressing towards the following goals: Non-compliant at times, elevated BP.    Problem: Knowledge Deficit - Standard  Goal: Patient and family/care givers will demonstrate understanding of plan of care, disease process/condition, diagnostic tests and medications  Outcome: Progressing     Problem: Pain - Standard  Goal: Alleviation of pain or a reduction in pain to the patient’s comfort goal  Outcome: Progressing     Problem: Provide Safe Environment  Goal: Suicide environmental safety, protocols, policies, and practices will be implemented  Outcome: Progressing     Problem: Psychosocial  Goal: Patient's ability to identify and develop effective coping behaviors will improve  Outcome: Progressing  Goal: Patient's ability to identify and utilize available support systems will improve  Outcome: Progressing     Problem: Fall Risk  Goal: Patient will remain free from falls  Outcome: Progressing

## 2022-08-11 NOTE — PROGRESS NOTES
"[facility] Internal Medicine Daily Progress Note    Date of Service  8/11/2022    [employer] Team: R  Purple Team   Attending: Nino Reyes M.d.  Senior Resident: Dr. Pina  Intern:  Dr. Wiggins  Contact Number: [phone]    Chief Complaint  Ambrose Watkins is a 46 y.o. male admitted 8/8/2022 with progressive weakness with associated nausea, vomiting, and diarrhea    Hospital Course  45 yo male with PMH of diabetes,HFrE, HTN, and ESRD on HD who presented 8/8/2022 for missing dialysis for past 3 weeks.  This was because he traveled to [place] for leisure and did not know where to go to get dialysis.  In addition, patient does not have a dialysis chair here in [place].  He states he receives dialysis in [place].  He has been experiencing progressive weakness and associated nausea, vomiting, and diarrhea.  He came to the ER and was found to have electrolyte abnormalities including hyperkalemia, elevated creatinine BUN, and low calcium.  Nephrology consulted and patient will require multiple sessions of dialysis given numerous missed sessions.  Currently following MWF schedule for HD, with vitals/labs gradually improving. Patient also restarted on carvedilol per nephrology recommendation.      Interval Problem Update  Patient seen on 8/11 morning, states he is feeling \"fine\".  He had no acute events overnight.  Patient denies any nausea/vomiting, however still feels slightly weak and also having diarrhea.  He also still not produced urine since admission, however states that he normally does produce a bit of volume.  He denies any cardiorespiratory symptoms or abdominal pain.  Patient received dialysis on 8/10 with improvement in labs and vitals.    I have discussed this patient's plan of care and discharge plan at IDT rounds today with Case Management, Nursing, Nursing leadership, and other members of the IDT team.    Consultants/Specialty  nephrology    Code Status  Full Code    Disposition  Patient is not " medically cleared for discharge.   Anticipate discharge to to home with close outpatient follow-up.  I have placed the appropriate orders for post-discharge needs.    Review of Systems  Review of Systems   Constitutional:  Negative for chills and fever.   Respiratory:  Negative for shortness of breath.    Cardiovascular:  Positive for leg swelling. Negative for chest pain and palpitations.   Gastrointestinal:  Positive for diarrhea. Negative for nausea and vomiting.      Physical Exam  Temp:  [36.1 °C (97 °F)-36.8 °C (98.3 °F)] 36.8 °C (98.3 °F)  Pulse:  [] 90  Resp:  [16-18] 16  BP: (139-168)/() 139/80  SpO2:  [95 %-97 %] 95 %    Physical Exam  Constitutional:       General: He is not in acute distress.     Appearance: He is obese.   HENT:      Head: Normocephalic and atraumatic.   Eyes:      Extraocular Movements: Extraocular movements intact.      Conjunctiva/sclera: Conjunctivae normal.      Pupils: Pupils are equal, round, and reactive to light.   Cardiovascular:      Rate and Rhythm: Regular rhythm. Tachycardia present.      Comments: Nonfunctional LUE AV fistula.  Tunneled dialysis catheter intact  Pulmonary:      Effort: Pulmonary effort is normal.      Breath sounds: Normal breath sounds.   Abdominal:      General: Abdomen is flat. Bowel sounds are normal.      Tenderness: There is no abdominal tenderness.   Musculoskeletal:      Right upper leg: Tenderness present.      Left upper leg: Tenderness present.      Right lower leg: Tenderness present. Edema present.      Left lower leg: Tenderness present. Edema present.   Neurological:      General: No focal deficit present.      Mental Status: He is alert and oriented to person, place, and time.   Psychiatric:         Mood and Affect: Mood normal.         Behavior: Behavior normal.       Fluids    Intake/Output Summary (Last 24 hours) at 8/11/2022 1211  Last data filed at 8/10/2022 2000  Gross per 24 hour   Intake 240 ml   Output --   Net 240 ml        Laboratory  Recent Labs     08/08/22  2136 08/10/22  0446 08/11/22  0259   WBC 8.0 9.0 7.9   RBC 3.54* 3.28* 2.99*   HEMOGLOBIN 10.3* 9.7* 8.7*   HEMATOCRIT 30.8* 29.6* 26.8*   MCV 87.0 90.2 89.6   MCH 29.1 29.6 29.1   MCHC 33.4* 32.8* 32.5*   RDW 58.8* 60.7* 60.0*   PLATELETCT 188 181 185   MPV 10.6 9.9 10.2     Recent Labs     08/08/22  2136 08/10/22  0446 08/11/22  0259   SODIUM 136 134* 135   POTASSIUM 4.4 4.9 4.1   CHLORIDE 99 99 97   CO2 16* 15* 20   GLUCOSE 116* 139* 165*   BUN 61* 70* 46*   CREATININE 11.52* 13.09* 9.68*   CALCIUM 7.4* 7.1* 7.0*                   Imaging  DX-CHEST-PORTABLE (1 VIEW)   Final Result      1.  Mildly enlarged cardiac silhouette with vascular congestion.           Assessment/Plan  Problem Representation:    * Noncompliance of patient with renal dialysis (HCC)- (present on admission)  Assessment & Plan  Has not had dialysis in the past 3 weeks, due to traveling to [place].  Obtained HD in [place], however does not have an outpatient dialysis chair in [place] and last year in [place]. Coordinator is working on getting patient a chair    -Will need multiple courses of dialysis in the hospital given that he has missed multiple weeks  -Signs/symptoms and labs/vitals slowly normalizing  -We will continue MWF HD    High anion gap metabolic acidosis- (present on admission)  Assessment & Plan  High anion gap acidosis on admission likely due to uremia. HD MWF will likely assist in correcting HAGMA    -Current BUN at 46  -Patient will be dialyzed MWF  -Will continue to monitor    End stage renal disease (HCC)- (present on admission)  Assessment & Plan  Patient receiving HD in [place], has not received HD in 3 weeks and since admission.  Patient has also had multiple thrombectomies/revisions for AV fistula, currently nonfunctioning.  Patient has tunneled dialysis catheter in place and functioning.  Patient not adherent to HD or outpatient medications.  Nephrology  following    -Currently on cholecalciferol and PhosLo  -Receiving HD on MWF  -Currently on renal diet with restricted sodium and fluid intake  -Labs and vitals slowly improving    Hyperkalemia- (present on admission)  Assessment & Plan   Initial potassium at 6.1. Likely due to missing dialysis    -Patient receiving dialysis MWF  -Recent labs demonstrate normalization of potassium  -We will continue to follow with serial labs    Hypertension- (present on admission)  Assessment & Plan  History of uncontrolled hypertension. Likely due to missing dialysis nonadherence to outpatient BP medications    -Switched labetalol as needed to hydralazine as needed due to history of CHF  -Continue carvedilol  -BP has gradually been normalizing, will continue to monitor       VTE prophylaxis: heparin ppx    I have performed a physical exam and reviewed and updated ROS and Plan today (8/11/2022). In review of yesterday's note (8/10/2022), there are no changes except as documented above.

## 2022-08-11 NOTE — CARE PLAN
The patient is Watcher - Medium risk of patient condition declining or worsening    Shift Goals  Clinical Goals: pain mgmt, safety, vs mgmt  Patient Goals: rest  Family Goals: POLINA    Progress made toward(s) clinical / shift goals:  improved affect, decreased agitation    Patient is not progressing towards the following goals: decrease of BP     Problem: Knowledge Deficit - Standard  Goal: Patient and family/care givers will demonstrate understanding of plan of care, disease process/condition, diagnostic tests and medications  8/10/2022 1720 by Isacc Marcos R.N.  Outcome: Progressing  8/10/2022 1719 by Isacc Marcos R.N.  Outcome: Progressing     Problem: Pain - Standard  Goal: Alleviation of pain or a reduction in pain to the patient’s comfort goal  8/10/2022 1720 by Isacc Marcos R.N.  Outcome: Progressing  8/10/2022 1719 by Isacc Marcos R.N.  Outcome: Progressing     Problem: Provide Safe Environment  Goal: Suicide environmental safety, protocols, policies, and practices will be implemented  8/10/2022 1720 by Isacc Marcos R.N.  Outcome: Progressing  8/10/2022 1719 by Isacc Marcos R.N.  Outcome: Progressing     Problem: Psychosocial  Goal: Patient's ability to identify and develop effective coping behaviors will improve  8/10/2022 1720 by Isacc Marcos R.N.  Outcome: Progressing  8/10/2022 1719 by Isacc Marcos R.N.  Outcome: Progressing  Goal: Patient's ability to identify and utilize available support systems will improve  8/10/2022 1720 by Isacc Marcos R.N.  Outcome: Progressing  8/10/2022 1719 by Isacc Marcos R.N.  Outcome: Progressing     Problem: Fall Risk  Goal: Patient will remain free from falls  8/10/2022 1720 by Isacc Marcos R.N.  Outcome: Progressing  8/10/2022 1719 by Isacc Marcos R.N.  Outcome: Progressing

## 2022-08-11 NOTE — CARE PLAN
The patient is Watcher - Medium risk of patient condition declining or worsening    Shift Goals  Clinical Goals: Pain management  Patient Goals: Rest, comfort  Family Goals: POLINA    Progress made toward(s) clinical / shift goals:  Pt pain was managed with pain medication administered once this shift. Pt was able to rest comfortably throughout most of the night.     Problem: Knowledge Deficit - Standard  Goal: Patient and family/care givers will demonstrate understanding of plan of care, disease process/condition, diagnostic tests and medications  Outcome: Progressing

## 2022-08-11 NOTE — PROGRESS NOTES
Assumed care of pt and received bedside report. Pt is A&O x 4, reports pain as 8/10 to the BLE but declines pain medication at this time, bedside table and personal items within reach, bed locked in low position, no additional needs at this time.

## 2022-08-11 NOTE — PROGRESS NOTES
".  [facility] -  PROGRESS NOTE               Author: EMMA Garcia Date & Time: 8/11/2022  12:25 PM     HPI:  47 yo M with End Stage Renal Disease on hemodialysis at [facility] every Tues/Thurs/Sat, last HD at [place] in 4/2022, then traveled and lost HD chair. Other PMH diabetes mellitus and Hypertension who presented to the emergency department on 8/8/22 for weakness. He missed 1 months of HD due to traveling. He was found to be hypertensive 153/99, afebrile. Labs with K of 6.1, BUN 93, bicarb 10.   No F/C/N/V/CP/SOB.  No melena, hematochezia, hematemesis.  No HA, visual changes, or abdominal pain.    DAILY NEPHROLOGY SUMMARY:  8/9: 3L net UF. Resting in bed. No complaints. No acute distress. Security called last night, patient aggressive towards staff regarding personal items. Bps improved this am.   8/10: Seen on HD this am. Lethargic. No complaints.   8/11:  Resting comfortably no complaints.  VSS.    REVIEW OF SYSTEMS:    10 point ROS reviewed and is as per HPI/daily summary or otherwise negative    PMH/PSH/SH/FH: Reviewed and unchanged since admission note  CURRENT MEDICATIONS: Reviewed from admission to present day    VS:  /80   Pulse 90   Temp 36.8 °C (98.3 °F) (Temporal)   Resp 16   Ht 1.753 m (5' 9\")   Wt 99.6 kg (219 lb 9.3 oz)   SpO2 95%   BMI 32.43 kg/m²   Physical Exam  Constitutional:       Appearance: He is ill-appearing.   HENT:      Head: Normocephalic and atraumatic.      Nose: Nose normal. No congestion or rhinorrhea.      Mouth/Throat:      Mouth: Mucous membranes are moist.      Pharynx: No oropharyngeal exudate or posterior oropharyngeal erythema.   Eyes:      Extraocular Movements: Extraocular movements intact.      Conjunctiva/sclera: Conjunctivae normal.      Pupils: Pupils are equal, round, and reactive to light.   Cardiovascular:      Rate and Rhythm: Normal rate and regular rhythm.      Pulses: Normal pulses.      Heart " sounds: Normal heart sounds. No murmur heard.    No friction rub. No gallop.      Comments: [facility] TDC  LAVF   Pulmonary:      Effort: Pulmonary effort is normal. No respiratory distress.      Breath sounds: Normal breath sounds.   Abdominal:      General: Bowel sounds are normal. There is distension.   Musculoskeletal:         General: Swelling present. Normal range of motion.      Cervical back: Normal range of motion and neck supple.   Skin:     General: Skin is warm and dry.   Neurological:      Mental Status: He is alert and oriented to person, place, and time. Mental status is at baseline.   Psychiatric:         Mood and Affect: Mood normal.         Behavior: Behavior normal.       Fluids:  In: 740 [P.O.:240; Dialysis:500]  Out: 3500     LABS:  Recent Labs     08/08/22  2136 08/10/22  0446 08/11/22  0259   SODIUM 136 134* 135   POTASSIUM 4.4 4.9 4.1   CHLORIDE 99 99 97   CO2 16* 15* 20   GLUCOSE 116* 139* 165*   BUN 61* 70* 46*   CREATININE 11.52* 13.09* 9.68*   CALCIUM 7.4* 7.1* 7.0*         IMPRESSION:  # ESRD  - Etiology likely 2/2 DM/HTN  - via [facility] TDC  - lost his HD chair at [facility] and currently has no OP HD clinic  # Thrombosed Left AVF  - US 2/23 no flow  - s/p OR 2/25 AVG Brachio-axillary Creation, thrombectomy and fistulogram   # HTN, labile control   - Goal BP < 140/90  # Anemia of CKD  - Goal Hgb 10-11  - At goal   - % sat 32  # CKD-MBD  - Ca 6.3 -> Cca 7.66-> Cca 7.9 today  - PO4 7.8 ->  8.9->6.9  -   # Hyperkalemia, resolved  - due to missed hd   - hd 8/8, 8/10  # weakness  # h/o Methamphetamine use   # Homelessness  # DM   - A1C 6.5  # Severe metabolic acidosis     PLAN:  - iHD tomorrow FRI  - Continue iHD qMWF  - Challenge UF as able   - Continue cholecalciferol   - Increased calcium acetate TID w/meals 8/10  - Started Carvedilol 8/10  - Transfuse PRN Hgb <7   - No dietary protein restrictions  - Dose all meds per ESRD/iHD  - weakness workup per primary team   - Low Na/fluid  restricted renal diet  - Follow labs  - AVG use per Vasc Surg recs; currently using [facility] TDC, last note 3/1 not yet cleared for use

## 2022-08-12 LAB
ALBUMIN SERPL BCP-MCNC: 3.3 G/DL (ref 3.2–4.9)
ALBUMIN SERPL BCP-MCNC: 3.9 G/DL (ref 3.2–4.9)
ALBUMIN/GLOB SERPL: 0.8 G/DL
ALP SERPL-CCNC: 118 U/L (ref 30–99)
ALT SERPL-CCNC: 7 U/L (ref 2–50)
ANION GAP SERPL CALC-SCNC: 21 MMOL/L (ref 7–16)
AST SERPL-CCNC: 11 U/L (ref 12–45)
BASOPHILS # BLD AUTO: 0.3 % (ref 0–1.8)
BASOPHILS # BLD: 0.03 K/UL (ref 0–0.12)
BILIRUB SERPL-MCNC: 0.5 MG/DL (ref 0.1–1.5)
BUN SERPL-MCNC: 34 MG/DL (ref 8–22)
BUN SERPL-MCNC: 53 MG/DL (ref 8–22)
CALCIUM SERPL-MCNC: 7.3 MG/DL (ref 8.5–10.5)
CALCIUM SERPL-MCNC: 8.6 MG/DL (ref 8.5–10.5)
CHLORIDE SERPL-SCNC: 97 MMOL/L (ref 96–112)
CHLORIDE SERPL-SCNC: 97 MMOL/L (ref 96–112)
CO2 SERPL-SCNC: 17 MMOL/L (ref 20–33)
CO2 SERPL-SCNC: 22 MMOL/L (ref 20–33)
CREAT SERPL-MCNC: 6.91 MG/DL (ref 0.5–1.4)
CREAT SERPL-MCNC: 9.59 MG/DL (ref 0.5–1.4)
EOSINOPHIL # BLD AUTO: 0.06 K/UL (ref 0–0.51)
EOSINOPHIL NFR BLD: 0.7 % (ref 0–6.9)
ERYTHROCYTE [DISTWIDTH] IN BLOOD BY AUTOMATED COUNT: 59.4 FL (ref 35.9–50)
GFR SERPLBLD CREATININE-BSD FMLA CKD-EPI: 6 ML/MIN/1.73 M 2
GFR SERPLBLD CREATININE-BSD FMLA CKD-EPI: 9 ML/MIN/1.73 M 2
GLOBULIN SER CALC-MCNC: 4.2 G/DL (ref 1.9–3.5)
GLUCOSE SERPL-MCNC: 168 MG/DL (ref 65–99)
GLUCOSE SERPL-MCNC: 172 MG/DL (ref 65–99)
HCT VFR BLD AUTO: 27.2 % (ref 42–52)
HGB BLD-MCNC: 8.8 G/DL (ref 14–18)
IMM GRANULOCYTES # BLD AUTO: 0.05 K/UL (ref 0–0.11)
IMM GRANULOCYTES NFR BLD AUTO: 0.6 % (ref 0–0.9)
LYMPHOCYTES # BLD AUTO: 1.2 K/UL (ref 1–4.8)
LYMPHOCYTES NFR BLD: 13.7 % (ref 22–41)
MAGNESIUM SERPL-MCNC: 1.9 MG/DL (ref 1.5–2.5)
MCH RBC QN AUTO: 29 PG (ref 27–33)
MCHC RBC AUTO-ENTMCNC: 32.4 G/DL (ref 33.7–35.3)
MCV RBC AUTO: 89.8 FL (ref 81.4–97.8)
MONOCYTES # BLD AUTO: 0.92 K/UL (ref 0–0.85)
MONOCYTES NFR BLD AUTO: 10.5 % (ref 0–13.4)
NEUTROPHILS # BLD AUTO: 6.52 K/UL (ref 1.82–7.42)
NEUTROPHILS NFR BLD: 74.2 % (ref 44–72)
NRBC # BLD AUTO: 0 K/UL
NRBC BLD-RTO: 0 /100 WBC
PHOSPHATE SERPL-MCNC: 4.6 MG/DL (ref 2.5–4.5)
PHOSPHATE SERPL-MCNC: 6.7 MG/DL (ref 2.5–4.5)
PLATELET # BLD AUTO: 195 K/UL (ref 164–446)
PMV BLD AUTO: 10.1 FL (ref 9–12.9)
POTASSIUM SERPL-SCNC: 4.2 MMOL/L (ref 3.6–5.5)
POTASSIUM SERPL-SCNC: 4.4 MMOL/L (ref 3.6–5.5)
PROT SERPL-MCNC: 7.5 G/DL (ref 6–8.2)
RBC # BLD AUTO: 3.03 M/UL (ref 4.7–6.1)
SODIUM SERPL-SCNC: 135 MMOL/L (ref 135–145)
SODIUM SERPL-SCNC: 135 MMOL/L (ref 135–145)
WBC # BLD AUTO: 8.8 K/UL (ref 4.8–10.8)

## 2022-08-12 PROCEDURE — A9270 NON-COVERED ITEM OR SERVICE: HCPCS | Performed by: STUDENT IN AN ORGANIZED HEALTH CARE EDUCATION/TRAINING PROGRAM

## 2022-08-12 PROCEDURE — 83735 ASSAY OF MAGNESIUM: CPT

## 2022-08-12 PROCEDURE — A9270 NON-COVERED ITEM OR SERVICE: HCPCS

## 2022-08-12 PROCEDURE — A9270 NON-COVERED ITEM OR SERVICE: HCPCS | Performed by: NURSE PRACTITIONER

## 2022-08-12 PROCEDURE — 85025 COMPLETE CBC W/AUTO DIFF WBC: CPT

## 2022-08-12 PROCEDURE — 700102 HCHG RX REV CODE 250 W/ 637 OVERRIDE(OP): Performed by: HOSPITALIST

## 2022-08-12 PROCEDURE — A9270 NON-COVERED ITEM OR SERVICE: HCPCS | Performed by: HOSPITALIST

## 2022-08-12 PROCEDURE — 84100 ASSAY OF PHOSPHORUS: CPT

## 2022-08-12 PROCEDURE — 90935 HEMODIALYSIS ONE EVALUATION: CPT

## 2022-08-12 PROCEDURE — 36415 COLL VENOUS BLD VENIPUNCTURE: CPT

## 2022-08-12 PROCEDURE — 700111 HCHG RX REV CODE 636 W/ 250 OVERRIDE (IP)

## 2022-08-12 PROCEDURE — 700102 HCHG RX REV CODE 250 W/ 637 OVERRIDE(OP)

## 2022-08-12 PROCEDURE — 770001 HCHG ROOM/CARE - MED/SURG/GYN PRIV*

## 2022-08-12 PROCEDURE — 700102 HCHG RX REV CODE 250 W/ 637 OVERRIDE(OP): Performed by: NURSE PRACTITIONER

## 2022-08-12 PROCEDURE — 700111 HCHG RX REV CODE 636 W/ 250 OVERRIDE (IP): Performed by: HOSPITALIST

## 2022-08-12 PROCEDURE — 700102 HCHG RX REV CODE 250 W/ 637 OVERRIDE(OP): Performed by: STUDENT IN AN ORGANIZED HEALTH CARE EDUCATION/TRAINING PROGRAM

## 2022-08-12 PROCEDURE — 80053 COMPREHEN METABOLIC PANEL: CPT

## 2022-08-12 PROCEDURE — 80069 RENAL FUNCTION PANEL: CPT

## 2022-08-12 PROCEDURE — 99232 SBSQ HOSP IP/OBS MODERATE 35: CPT | Mod: GC | Performed by: INTERNAL MEDICINE

## 2022-08-12 RX ORDER — HEPARIN SODIUM 1000 [USP'U]/ML
INJECTION, SOLUTION INTRAVENOUS; SUBCUTANEOUS
Status: COMPLETED
Start: 2022-08-12 | End: 2022-08-12

## 2022-08-12 RX ORDER — CARVEDILOL 6.25 MG/1
6.25 TABLET ORAL 2 TIMES DAILY WITH MEALS
Status: DISCONTINUED | OUTPATIENT
Start: 2022-08-12 | End: 2022-08-14

## 2022-08-12 RX ADMIN — OXYCODONE 5 MG: 5 TABLET ORAL at 20:44

## 2022-08-12 RX ADMIN — CARVEDILOL 3.12 MG: 3.12 TABLET, FILM COATED ORAL at 08:23

## 2022-08-12 RX ADMIN — CARVEDILOL 6.25 MG: 6.25 TABLET, FILM COATED ORAL at 17:15

## 2022-08-12 RX ADMIN — HEPARIN SODIUM 5000 UNITS: 5000 INJECTION, SOLUTION INTRAVENOUS; SUBCUTANEOUS at 05:20

## 2022-08-12 RX ADMIN — HEPARIN SODIUM 3100 UNITS: 1000 INJECTION, SOLUTION INTRAVENOUS; SUBCUTANEOUS at 13:25

## 2022-08-12 RX ADMIN — HEPARIN SODIUM 5000 UNITS: 5000 INJECTION, SOLUTION INTRAVENOUS; SUBCUTANEOUS at 20:29

## 2022-08-12 RX ADMIN — HEPARIN SODIUM 5000 UNITS: 5000 INJECTION, SOLUTION INTRAVENOUS; SUBCUTANEOUS at 14:59

## 2022-08-12 RX ADMIN — OXYCODONE 5 MG: 5 TABLET ORAL at 08:23

## 2022-08-12 RX ADMIN — Medication 1000 UNITS: at 05:20

## 2022-08-12 RX ADMIN — Medication 1334 MG: at 17:15

## 2022-08-12 RX ADMIN — OXYCODONE 5 MG: 5 TABLET ORAL at 15:00

## 2022-08-12 RX ADMIN — Medication 1334 MG: at 06:13

## 2022-08-12 RX ADMIN — Medication 1334 MG: at 15:00

## 2022-08-12 ASSESSMENT — ENCOUNTER SYMPTOMS
DIARRHEA: 1
SHORTNESS OF BREATH: 0
VOMITING: 0
CHILLS: 0
PALPITATIONS: 0
NAUSEA: 0
FEVER: 0

## 2022-08-12 ASSESSMENT — PAIN DESCRIPTION - PAIN TYPE: TYPE: ACUTE PAIN

## 2022-08-12 NOTE — PROGRESS NOTES
[facility] Internal Medicine Daily Progress Note    Date of Service  8/12/2022    [employer] Team: R  Purple Team   Attending: Nino Reyes M.d.  Senior Resident: Dr. Pina  Intern:  Dr. Wiggins  Contact Number: [phone]    Chief Complaint  Ambrose Watkins is a 46 y.o. male admitted 8/8/2022 with progressive weakness with associated nausea, vomiting, and diarrhea    Hospital Course  47 yo male with PMH of diabetes,HFrE, HTN, and ESRD on HD who presented 8/8/2022 for missing dialysis for past 3 weeks.  This was because he traveled to [place] for leisure and did not know where to go to get dialysis.  In addition, patient does not have a dialysis chair here in [place].  He states he receives dialysis in [place].  He has been experiencing progressive weakness and associated nausea, vomiting, and diarrhea.  He came to the ER and was found to have electrolyte abnormalities including hyperkalemia, elevated creatinine BUN, and low calcium.  Nephrology consulted and patient will require multiple sessions of dialysis given numerous missed sessions.  Currently following MWF schedule for HD, with vitals/labs gradually improving. Patient also restarted on carvedilol per nephrology recommendation.      Interval Problem Update  Seen on 8/12 morning, still having diarrhea.  He also developed lateral abdominal wall tenderness, however cannot lay on his left side.  He denies any cardiorespiratory symptoms or abdominal pain.  Patient is also produced urine since admission, although stating that he still produces little amounts.  Vitals and labs are still slowly normalizing.  Patient cleared for discharge pending finding new chair for dialysis in [place].    I have discussed this patient's plan of care and discharge plan at IDT rounds today with Case Management, Nursing, Nursing leadership, and other members of the IDT team.    Consultants/Specialty  nephrology    Code Status  Full Code    Disposition  Patient is not medically cleared  for discharge.   Anticipate discharge to to home with close outpatient follow-up.  I have placed the appropriate orders for post-discharge needs.    Review of Systems  Review of Systems   Constitutional:  Negative for chills and fever.   Respiratory:  Negative for shortness of breath.    Cardiovascular:  Positive for leg swelling. Negative for chest pain and palpitations.   Gastrointestinal:  Positive for diarrhea. Negative for nausea and vomiting.      Physical Exam  Temp:  [36.4 °C (97.5 °F)-36.8 °C (98.3 °F)] 36.4 °C (97.5 °F)  Pulse:  [87-91] 89  Resp:  [17-18] 18  BP: (148-149)/() 149/89  SpO2:  [95 %-97 %] 95 %    Physical Exam  Constitutional:       General: He is not in acute distress.     Appearance: He is obese.   HENT:      Head: Normocephalic and atraumatic.   Eyes:      Extraocular Movements: Extraocular movements intact.      Conjunctiva/sclera: Conjunctivae normal.      Pupils: Pupils are equal, round, and reactive to light.   Cardiovascular:      Rate and Rhythm: Regular rhythm. Tachycardia present.      Comments: Nonfunctional LUE AV fistula.  Tunneled dialysis catheter intact  Pulmonary:      Effort: Pulmonary effort is normal.      Breath sounds: Normal breath sounds.   Abdominal:      General: Abdomen is flat. Bowel sounds are normal.      Tenderness: There is abdominal tenderness (Right lateral abdominal wall).   Musculoskeletal:      Right upper leg: Tenderness present.      Left upper leg: Tenderness present.      Right lower leg: Tenderness present. Edema present.      Left lower leg: Tenderness present. Edema present.   Neurological:      General: No focal deficit present.      Mental Status: He is alert and oriented to person, place, and time.   Psychiatric:         Mood and Affect: Mood normal.         Behavior: Behavior normal.       Fluids    Intake/Output Summary (Last 24 hours) at 8/12/2022 1353  Last data filed at 8/11/2022 1926  Gross per 24 hour   Intake 240 ml   Output --    Net 240 ml       Laboratory  Recent Labs     08/10/22  0446 08/11/22  0259 08/12/22  0219   WBC 9.0 7.9 8.8   RBC 3.28* 2.99* 3.03*   HEMOGLOBIN 9.7* 8.7* 8.8*   HEMATOCRIT 29.6* 26.8* 27.2*   MCV 90.2 89.6 89.8   MCH 29.6 29.1 29.0   MCHC 32.8* 32.5* 32.4*   RDW 60.7* 60.0* 59.4*   PLATELETCT 181 185 195   MPV 9.9 10.2 10.1     Recent Labs     08/11/22  0200 08/11/22  0259 08/12/22  0219   SODIUM 135 135 135   POTASSIUM 4.2 4.1 4.4   CHLORIDE 97 97 97   CO2 19* 20 17*   GLUCOSE 158* 165* 168*   BUN 49* 46* 53*   CREATININE 10.13* 9.68* 9.59*   CALCIUM 7.1* 7.0* 7.3*                   Imaging  DX-CHEST-PORTABLE (1 VIEW)   Final Result      1.  Mildly enlarged cardiac silhouette with vascular congestion.           Assessment/Plan  Problem Representation:    * Noncompliance of patient with renal dialysis (HCC)- (present on admission)  Assessment & Plan  Has not had dialysis in the past 3 weeks, due to traveling to [place].  Obtained HD in [place], however does not have an outpatient dialysis chair in [place] and last year in [place]. Coordinator is working on getting patient a chair    -Signs/symptoms and labs/vitals slowly normalizing  -Currently on MWF HD  -Patient medically cleared for discharge pending finding dialysis chair in [place]    High anion gap metabolic acidosis- (present on admission)  Assessment & Plan  High anion gap acidosis on admission likely due to uremia. HD MWF will likely assist in correcting HAGMA    -Current BUN at 53  -Patient dialyzed MW  -Will continue to monitor    End stage renal disease (HCC)- (present on admission)  Assessment & Plan  Patient receiving HD in [place], has not received HD in 3 weeks and since admission.  Patient has also had multiple thrombectomies/revisions for AV fistula, currently nonfunctioning.  Patient has tunneled dialysis catheter in place and functioning.  Patient not adherent to HD or outpatient medications.  Nephrology following    -Currently on  cholecalciferol and PhosLo  -Receiving HD on MWF  -Currently on renal diet with restricted sodium and fluid intake  -Labs and vitals slowly improving    Hyperkalemia- (present on admission)  Assessment & Plan   Initial potassium at 6.1. Likely due to missing dialysis    -Patient receiving dialysis MWF  -Recent labs demonstrate normalization of potassium  -We will continue to follow with serial labs    Hypertension- (present on admission)  Assessment & Plan  History of uncontrolled hypertension. Likely due to missing dialysis nonadherence to outpatient BP medications    -Switched labetalol as needed to hydralazine as needed due to history of CHF  -Continue carvedilol  -BP has gradually been normalizing, will continue to monitor       VTE prophylaxis: heparin ppx    I have performed a physical exam and reviewed and updated ROS and Plan today (8/12/2022). In review of yesterday's note (8/11/2022), there are no changes except as documented above.

## 2022-08-12 NOTE — CARE PLAN
The patient is Watcher - Medium risk of patient condition declining or worsening    Shift Goals  Clinical Goals: BP mgmt, complete HD MWF  Patient Goals: Comfort, rest  Family Goals: POLINA    Progress made toward(s) clinical / shift goals:  yes    Patient is not progressing towards the following goals:    Problem: Knowledge Deficit - Standard  Goal: Patient and family/care givers will demonstrate understanding of plan of care, disease process/condition, diagnostic tests and medications  Outcome: Progressing  Note: Education provided onp oc and medications       Problem: Psychosocial  Goal: Patient's ability to identify and develop effective coping behaviors will improve  Outcome: Progressing     Problem: Fall Risk  Goal: Patient will remain free from falls  Outcome: Progressing  Note: No falls. Safety maintaine.d

## 2022-08-12 NOTE — PROGRESS NOTES
".  [facility] -  PROGRESS NOTE               Author: EMMA Dial Date & Time: 8/12/2022  10:45 AM     HPI:  45 yo M with End Stage Renal Disease on hemodialysis at [facility] every Tues/Thurs/Sat, last HD at [place] in 4/2022, then traveled and lost HD chair. Other PMH diabetes mellitus and Hypertension who presented to the emergency department on 8/8/22 for weakness. He missed 1 months of HD due to traveling. He was found to be hypertensive 153/99, afebrile. Labs with K of 6.1, BUN 93, bicarb 10.   No F/C/N/V/CP/SOB.  No melena, hematochezia, hematemesis.  No HA, visual changes, or abdominal pain.    DAILY NEPHROLOGY SUMMARY:  8/9: 3L net UF. Resting in bed. No complaints. No acute distress. Security called last night, patient aggressive towards staff regarding personal items. Bps improved this am.   8/10: Seen on HD this am. Lethargic. No complaints.   8/11:  Resting comfortably no complaints.  VSS.  8/12: Ambulating in room indep. Denies CP/SOB, on RA. Reports he is hungry and wants breakfast before anything else. Bps elevated.     REVIEW OF SYSTEMS:    10 point ROS reviewed and is as per HPI/daily summary or otherwise negative    PMH/PSH/SH/FH: Reviewed and unchanged since admission note  CURRENT MEDICATIONS: Reviewed from admission to present day    VS:  BP (!) 149/89   Pulse 89   Temp 36.4 °C (97.5 °F) (Temporal)   Resp 18   Ht 1.753 m (5' 9\")   Wt 99.6 kg (219 lb 9.3 oz)   SpO2 95%   BMI 32.43 kg/m²   Physical Exam  Constitutional:       Appearance: He is ill-appearing.   HENT:      Head: Normocephalic and atraumatic.      Nose: Nose normal. No congestion or rhinorrhea.      Mouth/Throat:      Mouth: Mucous membranes are moist.      Pharynx: No oropharyngeal exudate or posterior oropharyngeal erythema.   Eyes:      Extraocular Movements: Extraocular movements intact.      Conjunctiva/sclera: Conjunctivae normal.      Pupils: Pupils are equal, round, and " reactive to light.   Cardiovascular:      Rate and Rhythm: Normal rate and regular rhythm.      Pulses: Normal pulses.      Heart sounds: Normal heart sounds. No murmur heard.    No friction rub. No gallop.      Comments: [facility]  LAVF +Bruit/+Thrill  Pulmonary:      Effort: Pulmonary effort is normal. No respiratory distress.      Breath sounds: Normal breath sounds.   Abdominal:      General: Bowel sounds are normal. There is distension.   Musculoskeletal:         General: Swelling present. Normal range of motion.      Cervical back: Normal range of motion and neck supple.   Skin:     General: Skin is warm and dry.      Findings: Lesion present.      Comments: Scattered lesions   Neurological:      Mental Status: He is alert and oriented to person, place, and time. Mental status is at baseline.   Psychiatric:         Mood and Affect: Mood normal.         Behavior: Behavior normal.       Fluids:  In: 240 [P.O.:240]  Out: -     LABS:  Recent Labs     08/11/22  0200 08/11/22  0259 08/12/22  0219   SODIUM 135 135 135   POTASSIUM 4.2 4.1 4.4   CHLORIDE 97 97 97   CO2 19* 20 17*   GLUCOSE 158* 165* 168*   BUN 49* 46* 53*   CREATININE 10.13* 9.68* 9.59*   CALCIUM 7.1* 7.0* 7.3*         IMPRESSION:  # ESRD  - Etiology likely 2/2 DM/HTN  - via [facility]  - lost his HD chair at [facility] and currently has no OP HD clinic  # Thrombosed Left AVF  - US 2/23 no flow  - s/p OR 2/25 AVG Brachio-axillary Creation, thrombectomy and fistulogram   # HTN, labile control   - Goal BP < 140/90  # Anemia of CKD  - Goal Hgb 10-11  - At goal   - % sat 32  # CKD-MBD  - Ca 6.3 -> Cca 7.66-> Cca 7.9 today  - PO4 7.8 ->  8.9->6.9  -   # Hyperkalemia, resolved  - due to missed hd   - hd 8/8, 8/10  # weakness  # h/o Methamphetamine use   # Homelessness  # DM   - A1C 6.5  # Severe metabolic acidosis     PLAN:  - iHD today FRI  - Continue iHD qMWF  - Challenge UF as able   - Continue cholecalciferol   - Increased calcium acetate TID  w/meals 8/10  - Increased Carvedilol 8/12  - Transfuse PRN Hgb <7   - No dietary protein restrictions  - Dose all meds per ESRD/iHD  - weakness workup per primary team   - Low Na/fluid restricted renal diet  - Follow labs  - AVG use per Vasc Surg recs; currently using RIJ TDC, last note 3/1 not yet cleared for use

## 2022-08-12 NOTE — DISCHARGE PLANNING
LSW received voicemail from dialysis coordinator. Pt has been accepted for seat at [facility] for T, Th, Sa dialysis. Earliest start date they can facilitate is 8/18/2022. They cannot take him earlier.     LSW send Voalte message to Dr. Wiggins to update on dialysis chair.

## 2022-08-12 NOTE — DISCHARGE PLANNING
Outpatient Dialysis Placement Confirmation:      Patient has been placed and confirmed at:    [facility]   [address] [place] [address]  [place] [address]    # [phone]    Schedule: Tuesday, Thursday, Saturday  Time: 6:00 AM    Patient can start on 08/18 patient cannot be started earlier due to capacity per facility admissions rep. Patient to arrive by 5:30 AM on first day.     Notification voicemail left with DILIP Rabago to relay outpatient dialysis placement confirmation.   Follow up message to Dr. Hamilton to notify of placement.   Provided patient dialysis schedule welcome letter.     Xitlaly Reyes   Dialysis Coordinator / [facility]   [phone]

## 2022-08-12 NOTE — PROGRESS NOTES
Hemodialysis ordered by Dr. Hamilton. Treatment started at 1028 and ended at 1328. Pt stable, vss, no c/o post tx. Net UF 2.888 L. Report to ELO Vasquez RN.

## 2022-08-13 LAB
ALBUMIN SERPL BCP-MCNC: 2.9 G/DL (ref 3.2–4.9)
ALBUMIN/GLOB SERPL: 0.7 G/DL
ALP SERPL-CCNC: 120 U/L (ref 30–99)
ALT SERPL-CCNC: 7 U/L (ref 2–50)
ANION GAP SERPL CALC-SCNC: 14 MMOL/L (ref 7–16)
AST SERPL-CCNC: 12 U/L (ref 12–45)
BASOPHILS # BLD AUTO: 0.4 % (ref 0–1.8)
BASOPHILS # BLD: 0.03 K/UL (ref 0–0.12)
BILIRUB SERPL-MCNC: 0.5 MG/DL (ref 0.1–1.5)
BUN SERPL-MCNC: 46 MG/DL (ref 8–22)
CALCIUM SERPL-MCNC: 7.6 MG/DL (ref 8.5–10.5)
CHLORIDE SERPL-SCNC: 98 MMOL/L (ref 96–112)
CO2 SERPL-SCNC: 21 MMOL/L (ref 20–33)
CREAT SERPL-MCNC: 7.58 MG/DL (ref 0.5–1.4)
EOSINOPHIL # BLD AUTO: 0.06 K/UL (ref 0–0.51)
EOSINOPHIL NFR BLD: 0.8 % (ref 0–6.9)
ERYTHROCYTE [DISTWIDTH] IN BLOOD BY AUTOMATED COUNT: 59.5 FL (ref 35.9–50)
GFR SERPLBLD CREATININE-BSD FMLA CKD-EPI: 8 ML/MIN/1.73 M 2
GLOBULIN SER CALC-MCNC: 4 G/DL (ref 1.9–3.5)
GLUCOSE SERPL-MCNC: 192 MG/DL (ref 65–99)
HCT VFR BLD AUTO: 25.6 % (ref 42–52)
HGB BLD-MCNC: 8.3 G/DL (ref 14–18)
IMM GRANULOCYTES # BLD AUTO: 0.04 K/UL (ref 0–0.11)
IMM GRANULOCYTES NFR BLD AUTO: 0.5 % (ref 0–0.9)
LYMPHOCYTES # BLD AUTO: 1.19 K/UL (ref 1–4.8)
LYMPHOCYTES NFR BLD: 15.5 % (ref 22–41)
MAGNESIUM SERPL-MCNC: 1.7 MG/DL (ref 1.5–2.5)
MCH RBC QN AUTO: 29.4 PG (ref 27–33)
MCHC RBC AUTO-ENTMCNC: 32.4 G/DL (ref 33.7–35.3)
MCV RBC AUTO: 90.8 FL (ref 81.4–97.8)
MONOCYTES # BLD AUTO: 0.82 K/UL (ref 0–0.85)
MONOCYTES NFR BLD AUTO: 10.7 % (ref 0–13.4)
NEUTROPHILS # BLD AUTO: 5.55 K/UL (ref 1.82–7.42)
NEUTROPHILS NFR BLD: 72.1 % (ref 44–72)
NRBC # BLD AUTO: 0 K/UL
NRBC BLD-RTO: 0 /100 WBC
PHOSPHATE SERPL-MCNC: 5.5 MG/DL (ref 2.5–4.5)
PLATELET # BLD AUTO: 186 K/UL (ref 164–446)
PMV BLD AUTO: 10 FL (ref 9–12.9)
POTASSIUM SERPL-SCNC: 4.5 MMOL/L (ref 3.6–5.5)
PROT SERPL-MCNC: 6.9 G/DL (ref 6–8.2)
RBC # BLD AUTO: 2.82 M/UL (ref 4.7–6.1)
SODIUM SERPL-SCNC: 133 MMOL/L (ref 135–145)
WBC # BLD AUTO: 7.7 K/UL (ref 4.8–10.8)

## 2022-08-13 PROCEDURE — 99232 SBSQ HOSP IP/OBS MODERATE 35: CPT | Mod: GC | Performed by: INTERNAL MEDICINE

## 2022-08-13 PROCEDURE — A9270 NON-COVERED ITEM OR SERVICE: HCPCS

## 2022-08-13 PROCEDURE — 700102 HCHG RX REV CODE 250 W/ 637 OVERRIDE(OP): Performed by: HOSPITALIST

## 2022-08-13 PROCEDURE — A9270 NON-COVERED ITEM OR SERVICE: HCPCS | Performed by: HOSPITALIST

## 2022-08-13 PROCEDURE — 84100 ASSAY OF PHOSPHORUS: CPT

## 2022-08-13 PROCEDURE — 700102 HCHG RX REV CODE 250 W/ 637 OVERRIDE(OP): Performed by: NURSE PRACTITIONER

## 2022-08-13 PROCEDURE — A9270 NON-COVERED ITEM OR SERVICE: HCPCS | Performed by: STUDENT IN AN ORGANIZED HEALTH CARE EDUCATION/TRAINING PROGRAM

## 2022-08-13 PROCEDURE — 700102 HCHG RX REV CODE 250 W/ 637 OVERRIDE(OP): Performed by: STUDENT IN AN ORGANIZED HEALTH CARE EDUCATION/TRAINING PROGRAM

## 2022-08-13 PROCEDURE — 83735 ASSAY OF MAGNESIUM: CPT

## 2022-08-13 PROCEDURE — A9270 NON-COVERED ITEM OR SERVICE: HCPCS | Performed by: NURSE PRACTITIONER

## 2022-08-13 PROCEDURE — 700111 HCHG RX REV CODE 636 W/ 250 OVERRIDE (IP): Performed by: HOSPITALIST

## 2022-08-13 PROCEDURE — 80053 COMPREHEN METABOLIC PANEL: CPT

## 2022-08-13 PROCEDURE — 700102 HCHG RX REV CODE 250 W/ 637 OVERRIDE(OP)

## 2022-08-13 PROCEDURE — 770001 HCHG ROOM/CARE - MED/SURG/GYN PRIV*

## 2022-08-13 PROCEDURE — 85025 COMPLETE CBC W/AUTO DIFF WBC: CPT

## 2022-08-13 RX ORDER — DIPHENHYDRAMINE HCL 25 MG
25 TABLET ORAL EVERY 6 HOURS PRN
Status: DISCONTINUED | OUTPATIENT
Start: 2022-08-13 | End: 2022-08-14

## 2022-08-13 RX ADMIN — CARVEDILOL 6.25 MG: 6.25 TABLET, FILM COATED ORAL at 17:54

## 2022-08-13 RX ADMIN — CALAMINE AND PRAMOXINE HYDROCHLORIDE: 80; 10 LOTION TOPICAL at 08:45

## 2022-08-13 RX ADMIN — Medication 1334 MG: at 08:45

## 2022-08-13 RX ADMIN — Medication 1334 MG: at 17:54

## 2022-08-13 RX ADMIN — OXYCODONE 5 MG: 5 TABLET ORAL at 17:54

## 2022-08-13 RX ADMIN — Medication 1000 UNITS: at 05:08

## 2022-08-13 RX ADMIN — HEPARIN SODIUM 5000 UNITS: 5000 INJECTION, SOLUTION INTRAVENOUS; SUBCUTANEOUS at 13:59

## 2022-08-13 RX ADMIN — HEPARIN SODIUM 5000 UNITS: 5000 INJECTION, SOLUTION INTRAVENOUS; SUBCUTANEOUS at 20:34

## 2022-08-13 RX ADMIN — HEPARIN SODIUM 5000 UNITS: 5000 INJECTION, SOLUTION INTRAVENOUS; SUBCUTANEOUS at 05:08

## 2022-08-13 RX ADMIN — Medication 1334 MG: at 14:03

## 2022-08-13 RX ADMIN — CARVEDILOL 6.25 MG: 6.25 TABLET, FILM COATED ORAL at 08:45

## 2022-08-13 ASSESSMENT — PAIN DESCRIPTION - PAIN TYPE: TYPE: ACUTE PAIN

## 2022-08-13 ASSESSMENT — ENCOUNTER SYMPTOMS
PALPITATIONS: 0
CHILLS: 0
SHORTNESS OF BREATH: 0
NAUSEA: 0
VOMITING: 0
FEVER: 0

## 2022-08-13 NOTE — PROGRESS NOTES
Pt c/o itchiness every where.  No rash noted but scratch moody t/o body.  Dr. Canada notified.  He said that he will order lotion.

## 2022-08-13 NOTE — CARE PLAN
The patient is Stable - Low risk of patient condition declining or worsening    Shift Goals  Clinical Goals: Bp management/Dialysis  Patient Goals: Rest  Family Goals: donis    Progress made toward(s) clinical / shift goals:  Pt pain managed with PRN medication administration. Pt free from falls, able to rest comfortably through the night w/ call light in reach.      Problem: Pain - Standard  Goal: Alleviation of pain or a reduction in pain to the patient’s comfort goal  Outcome: Progressing     Problem: Provide Safe Environment  Goal: Suicide environmental safety, protocols, policies, and practices will be implemented  Outcome: Progressing     Problem: Fall Risk  Goal: Patient will remain free from falls  Outcome: Progressing

## 2022-08-13 NOTE — PROGRESS NOTES
".  [facility] -  PROGRESS NOTE               Author: EMMA Garcia Date & Time: 8/13/2022  11:36 AM     HPI:  47 yo M with End Stage Renal Disease on hemodialysis at [facility] every Tues/Thurs/Sat, last HD at [place] in 4/2022, then traveled and lost HD chair. Other PMH diabetes mellitus and Hypertension who presented to the emergency department on 8/8/22 for weakness. He missed 1 months of HD due to traveling. He was found to be hypertensive 153/99, afebrile. Labs with K of 6.1, BUN 93, bicarb 10.   No F/C/N/V/CP/SOB.  No melena, hematochezia, hematemesis.  No HA, visual changes, or abdominal pain.    DAILY NEPHROLOGY SUMMARY:  8/9: 3L net UF. Resting in bed. No complaints. No acute distress. Security called last night, patient aggressive towards staff regarding personal items. Bps improved this am.   8/10: Seen on HD this am. Lethargic. No complaints.   8/11:  Resting comfortably no complaints.  VSS.  8/12: Ambulating in room indep. Denies CP/SOB, on RA. Reports he is hungry and wants breakfast before anything else. Bps elevated.   8/13: VSS RA no complaints.  Ambulating.     REVIEW OF SYSTEMS:    10 point ROS reviewed and is as per HPI/daily summary or otherwise negative    PMH/PSH/SH/FH: Reviewed and unchanged since admission note  CURRENT MEDICATIONS: Reviewed from admission to present day    VS:  BP (!) 155/98 Comment: nurse aware  Pulse 91   Temp 36.8 °C (98.2 °F) (Temporal)   Resp 18   Ht 1.753 m (5' 9\")   Wt 99.6 kg (219 lb 9.3 oz)   SpO2 91%   BMI 32.43 kg/m²   Physical Exam  Constitutional:       Appearance: He is ill-appearing.   HENT:      Head: Normocephalic and atraumatic.      Nose: Nose normal. No congestion or rhinorrhea.      Mouth/Throat:      Mouth: Mucous membranes are moist.      Pharynx: No oropharyngeal exudate or posterior oropharyngeal erythema.   Eyes:      Extraocular Movements: Extraocular movements intact.      " Conjunctiva/sclera: Conjunctivae normal.      Pupils: Pupils are equal, round, and reactive to light.   Cardiovascular:      Rate and Rhythm: Normal rate and regular rhythm.      Pulses: Normal pulses.      Heart sounds: Normal heart sounds. No murmur heard.    No friction rub. No gallop.      Comments: [facility]  LAVF +Bruit/+Thrill  Pulmonary:      Effort: Pulmonary effort is normal. No respiratory distress.      Breath sounds: Normal breath sounds.   Abdominal:      General: Bowel sounds are normal.   Musculoskeletal:         General: Swelling present. Normal range of motion.      Cervical back: Normal range of motion and neck supple.   Skin:     General: Skin is warm and dry.      Findings: Lesion present.      Comments: Scattered lesions   Neurological:      Mental Status: He is alert and oriented to person, place, and time. Mental status is at baseline.   Psychiatric:         Mood and Affect: Mood normal.         Behavior: Behavior normal.       Fluids:  In: 740 [P.O.:240; Dialysis:500]  Out: 3388     LABS:  Recent Labs     08/12/22  0219 08/12/22  1457 08/13/22  0703   SODIUM 135 135 133*   POTASSIUM 4.4 4.2 4.5   CHLORIDE 97 97 98   CO2 17* 22 21   GLUCOSE 168* 172* 192*   BUN 53* 34* 46*   CREATININE 9.59* 6.91* 7.58*   CALCIUM 7.3* 8.6 7.6*         IMPRESSION:  # ESRD  - Etiology likely 2/2 DM/HTN  - via [facility]  - lost his HD chair at [facility] ->plan for [facility]  # Thrombosed Left AVF  - US 2/23 no flow  - s/p OR 2/25 AVG Brachio-axillary Creation, thrombectomy and fistulogram   # HTN, labile control   - Goal BP < 140/90  # Anemia of CKD  - Goal Hgb 10-11  - At goal   - % sat 32  # CKD-MBD  - Ca 6.3 -> Cca 7.66-> Cca 8.5 today  - PO4 7.8 ->  8.9->6.9 -> 5.5  -   # Hyperkalemia, resolved  - due to missed hd   - hd 8/8, 8/10  # weakness  # h/o Methamphetamine use   # Homelessness  # DM   - A1C 6.5  # Severe metabolic acidosis     PLAN:  - iHD next on MON  - Continue iHD qMWF  -  Challenge UF as able   - Continue cholecalciferol   - Increased calcium acetate TID w/meals 8/10  - Increased Carvedilol 8/12  - Transfuse PRN Hgb <7   - No dietary protein restrictions  - Dose all meds per ESRD/iHD  - weakness workup per primary team   - Low Na/fluid restricted renal diet  - Follow labs  - AVG use per Vasc Surg recs; currently using RIJ TDC, last note 3/1 not yet cleared for use    Interpolation Flap Text: A decision was made to reconstruct the defect utilizing an interpolation axial flap and a staged reconstruction.  A telfa template was made of the defect.  This telfa template was then used to outline the interpolation flap.  The donor area for the pedicle flap was then injected with anesthesia.  The flap was excised through the skin and subcutaneous tissue down to the layer of the underlying musculature.  The interpolation flap was carefully excised within this deep plane to maintain its blood supply.  The edges of the donor site were undermined.   The donor site was closed in a primary fashion.  The pedicle was then rotated into position and sutured.  Once the tube was sutured into place, adequate blood supply was confirmed with blanching and refill.  The pedicle was then wrapped with xeroform gauze and dressed appropriately with a telfa and gauze bandage to ensure continued blood supply and protect the attached pedicle.

## 2022-08-13 NOTE — PROGRESS NOTES
[facility] Internal Medicine Daily Progress Note    Date of Service  8/13/2022    [facility] Team: [facility]  Purple Team   Attending: Nino Reyes M.d.  Senior Resident: Dr. Pina  Intern:  Dr. Wiggins  Contact Number: [phone]    Chief Complaint  Ambrose Watkins is a 46 y.o. male admitted 8/8/2022 with progressive weakness with associated nausea, vomiting, and diarrhea    Hospital Course  45 yo male with PMH of diabetes,HFrE, HTN, and ESRD on HD who presented 8/8/2022 for missing dialysis for past 3 weeks.  This was because he traveled to [place] for leisure and did not know where to go to get dialysis.  In addition, patient does not have a dialysis chair here in [place].  He states he receives dialysis in [place].  He has been experiencing progressive weakness and associated nausea, vomiting, and diarrhea.  He came to the ER and was found to have electrolyte abnormalities including hyperkalemia, elevated creatinine BUN, and low calcium.  Nephrology consulted and patient will require multiple sessions of dialysis given numerous missed sessions.  Currently following MWF schedule for HD, with vitals/labs gradually improving. Patient also restarted on carvedilol per nephrology recommendation.      Interval Problem Update  Patient had no concerns this morning, he understands that he has to remain in the hospital until Tuesday for dialysis.  Accepted to [facility] for dialysis chair, but not available until 8/18, must remain in hospital until dialysis on Tuesday 8/16.    I have discussed this patient's plan of care and discharge plan at IDT rounds today with Case Management, Nursing, Nursing leadership, and other members of the IDT team.    Consultants/Specialty  nephrology    Code Status  Full Code    Disposition  Patient is not medically cleared for discharge. (Due to dialysis chair being unavailable until 8/18)  Anticipate discharge to to home with close outpatient follow-up.  I have placed the appropriate orders for  post-discharge needs.    Review of Systems  Review of Systems   Constitutional:  Negative for chills and fever.   Respiratory:  Negative for shortness of breath.    Cardiovascular:  Positive for leg swelling. Negative for chest pain and palpitations.   Gastrointestinal:  Negative for nausea and vomiting.      Physical Exam  Temp:  [36.3 °C (97.4 °F)-36.8 °C (98.2 °F)] 36.8 °C (98.2 °F)  Pulse:  [90-94] 91  Resp:  [18] 18  BP: (140-155)/(86-98) 155/98  SpO2:  [91 %-95 %] 91 %    Physical Exam  Constitutional:       General: He is not in acute distress.     Appearance: He is obese.   HENT:      Head: Normocephalic and atraumatic.   Eyes:      Extraocular Movements: Extraocular movements intact.      Conjunctiva/sclera: Conjunctivae normal.      Pupils: Pupils are equal, round, and reactive to light.   Cardiovascular:      Rate and Rhythm: Regular rhythm. Tachycardia present.      Comments: Nonfunctional LUE AV fistula.  Tunneled dialysis catheter intact  Pulmonary:      Effort: Pulmonary effort is normal.      Breath sounds: Normal breath sounds.   Abdominal:      General: Abdomen is flat.      Palpations: Abdomen is soft.   Musculoskeletal:      Right lower leg: Edema present.      Left lower leg: Edema present.   Neurological:      General: No focal deficit present.      Mental Status: He is alert and oriented to person, place, and time.   Psychiatric:         Mood and Affect: Mood normal.         Behavior: Behavior normal.       Fluids    Intake/Output Summary (Last 24 hours) at 8/13/2022 1615  Last data filed at 8/13/2022 0900  Gross per 24 hour   Intake 460 ml   Output --   Net 460 ml       Laboratory  Recent Labs     08/11/22  0259 08/12/22  0219 08/13/22  0703   WBC 7.9 8.8 7.7   RBC 2.99* 3.03* 2.82*   HEMOGLOBIN 8.7* 8.8* 8.3*   HEMATOCRIT 26.8* 27.2* 25.6*   MCV 89.6 89.8 90.8   MCH 29.1 29.0 29.4   MCHC 32.5* 32.4* 32.4*   RDW 60.0* 59.4* 59.5*   PLATELETCT 185 195 186   MPV 10.2 10.1 10.0     Recent Labs      08/12/22  0219 08/12/22  1457 08/13/22  0703   SODIUM 135 135 133*   POTASSIUM 4.4 4.2 4.5   CHLORIDE 97 97 98   CO2 17* 22 21   GLUCOSE 168* 172* 192*   BUN 53* 34* 46*   CREATININE 9.59* 6.91* 7.58*   CALCIUM 7.3* 8.6 7.6*                   Imaging  DX-CHEST-PORTABLE (1 VIEW)   Final Result      1.  Mildly enlarged cardiac silhouette with vascular congestion.           Assessment/Plan  Problem Representation:    * Noncompliance of patient with renal dialysis (HCC)- (present on admission)  Assessment & Plan  Has not had dialysis in the past 3 weeks, due to traveling to [place].  Obtained HD in [place], however does not have an outpatient dialysis chair in [place] and last year in [place]. Dialysis chair available next Thurs 8/18/22.    -Signs/symptoms and labs/vitals slowly normalizing  -Nephrology following, on TThS dialysis  -Patient medically cleared for discharge pending finding dialysis chair in [place], must remain o    High anion gap metabolic acidosis- (present on admission)  Assessment & Plan  RESOLVED   High anion gap acidosis on admission likely due to uremia.   -Current BUN at 53  -Patient dialyzed TThS  -Will continue to monitor    End stage renal disease (HCC)- (present on admission)  Assessment & Plan  Patient receiving HD in [place], has not received HD in 3 weeks and since admission.  Patient has also had multiple thrombectomies/revisions for AV fistula, currently nonfunctioning.  Patient has tunneled dialysis catheter in place and functioning.  Patient not adherent to HD or outpatient medications.  Nephrology following    -Currently on cholecalciferol and PhosLo  -Receiving HD on TThS  -Currently on renal diet with restricted sodium and fluid intake  -Labs and vitals slowly improving    Hyperkalemia- (present on admission)  Assessment & Plan  RESOLVED   Initial potassium at 6.1. Likely due to missing dialysis, now WNL    -Recent labs demonstrate normalization of potassium  -We will  continue to follow with serial labs    Hypertension- (present on admission)  Assessment & Plan  History of uncontrolled hypertension. Likely due to missing dialysis nonadherence to outpatient BP medications    -Switched labetalol as needed to hydralazine as needed due to history of CHF  -Continue carvedilol  -BP has gradually been normalizing, will continue to monitor       VTE prophylaxis: heparin ppx    I have performed a physical exam and reviewed and updated ROS and Plan today (8/13/2022). In review of yesterday's note (8/12/2022), there are no changes except as documented above.

## 2022-08-13 NOTE — PROGRESS NOTES
"Pt was sleeping.  Woke pt up to apply Caladryl lotion but pt said \"not now\".  Refused despite education.    "

## 2022-08-14 LAB
ALBUMIN SERPL BCP-MCNC: 3 G/DL (ref 3.2–4.9)
ALBUMIN/GLOB SERPL: 0.7 G/DL
ALP SERPL-CCNC: 119 U/L (ref 30–99)
ALT SERPL-CCNC: 7 U/L (ref 2–50)
ANION GAP SERPL CALC-SCNC: 16 MMOL/L (ref 7–16)
AST SERPL-CCNC: 7 U/L (ref 12–45)
BILIRUB SERPL-MCNC: 0.5 MG/DL (ref 0.1–1.5)
BUN SERPL-MCNC: 54 MG/DL (ref 8–22)
CALCIUM SERPL-MCNC: 8 MG/DL (ref 8.5–10.5)
CHLORIDE SERPL-SCNC: 94 MMOL/L (ref 96–112)
CO2 SERPL-SCNC: 20 MMOL/L (ref 20–33)
CREAT SERPL-MCNC: 8.38 MG/DL (ref 0.5–1.4)
FERRITIN SERPL-MCNC: 879 NG/ML (ref 22–322)
GFR SERPLBLD CREATININE-BSD FMLA CKD-EPI: 7 ML/MIN/1.73 M 2
GLOBULIN SER CALC-MCNC: 4.2 G/DL (ref 1.9–3.5)
GLUCOSE SERPL-MCNC: 137 MG/DL (ref 65–99)
PHOSPHATE SERPL-MCNC: 5.8 MG/DL (ref 2.5–4.5)
POTASSIUM SERPL-SCNC: 4.5 MMOL/L (ref 3.6–5.5)
PROT SERPL-MCNC: 7.2 G/DL (ref 6–8.2)
SODIUM SERPL-SCNC: 130 MMOL/L (ref 135–145)

## 2022-08-14 PROCEDURE — A9270 NON-COVERED ITEM OR SERVICE: HCPCS | Performed by: STUDENT IN AN ORGANIZED HEALTH CARE EDUCATION/TRAINING PROGRAM

## 2022-08-14 PROCEDURE — 700102 HCHG RX REV CODE 250 W/ 637 OVERRIDE(OP): Performed by: STUDENT IN AN ORGANIZED HEALTH CARE EDUCATION/TRAINING PROGRAM

## 2022-08-14 PROCEDURE — 99232 SBSQ HOSP IP/OBS MODERATE 35: CPT | Mod: GC | Performed by: INTERNAL MEDICINE

## 2022-08-14 PROCEDURE — 700102 HCHG RX REV CODE 250 W/ 637 OVERRIDE(OP): Performed by: NURSE PRACTITIONER

## 2022-08-14 PROCEDURE — A9270 NON-COVERED ITEM OR SERVICE: HCPCS | Performed by: NURSE PRACTITIONER

## 2022-08-14 PROCEDURE — A9270 NON-COVERED ITEM OR SERVICE: HCPCS | Performed by: HOSPITALIST

## 2022-08-14 PROCEDURE — A9270 NON-COVERED ITEM OR SERVICE: HCPCS

## 2022-08-14 PROCEDURE — 80053 COMPREHEN METABOLIC PANEL: CPT

## 2022-08-14 PROCEDURE — 3E0234Z INTRODUCTION OF SERUM, TOXOID AND VACCINE INTO MUSCLE, PERCUTANEOUS APPROACH: ICD-10-PCS | Performed by: INTERNAL MEDICINE

## 2022-08-14 PROCEDURE — 700102 HCHG RX REV CODE 250 W/ 637 OVERRIDE(OP)

## 2022-08-14 PROCEDURE — 770001 HCHG ROOM/CARE - MED/SURG/GYN PRIV*

## 2022-08-14 PROCEDURE — 700102 HCHG RX REV CODE 250 W/ 637 OVERRIDE(OP): Performed by: HOSPITALIST

## 2022-08-14 PROCEDURE — 84100 ASSAY OF PHOSPHORUS: CPT

## 2022-08-14 PROCEDURE — 700111 HCHG RX REV CODE 636 W/ 250 OVERRIDE (IP): Performed by: HOSPITALIST

## 2022-08-14 PROCEDURE — 82728 ASSAY OF FERRITIN: CPT

## 2022-08-14 PROCEDURE — 36415 COLL VENOUS BLD VENIPUNCTURE: CPT

## 2022-08-14 RX ORDER — CARVEDILOL 25 MG/1
25 TABLET ORAL 2 TIMES DAILY WITH MEALS
Status: DISCONTINUED | OUTPATIENT
Start: 2022-08-14 | End: 2022-09-04 | Stop reason: HOSPADM

## 2022-08-14 RX ORDER — DIPHENHYDRAMINE HYDROCHLORIDE, ZINC ACETATE 2; .1 G/100G; G/100G
CREAM TOPICAL 3 TIMES DAILY PRN
Status: DISCONTINUED | OUTPATIENT
Start: 2022-08-14 | End: 2022-08-31

## 2022-08-14 RX ORDER — CARVEDILOL 12.5 MG/1
12.5 TABLET ORAL 2 TIMES DAILY WITH MEALS
Status: DISCONTINUED | OUTPATIENT
Start: 2022-08-14 | End: 2022-08-14

## 2022-08-14 RX ADMIN — CARVEDILOL 25 MG: 25 TABLET, FILM COATED ORAL at 17:53

## 2022-08-14 RX ADMIN — HEPARIN SODIUM 5000 UNITS: 5000 INJECTION, SOLUTION INTRAVENOUS; SUBCUTANEOUS at 14:47

## 2022-08-14 RX ADMIN — Medication 1000 UNITS: at 05:48

## 2022-08-14 RX ADMIN — CARVEDILOL 6.25 MG: 6.25 TABLET, FILM COATED ORAL at 08:33

## 2022-08-14 RX ADMIN — Medication 1334 MG: at 14:47

## 2022-08-14 RX ADMIN — DIPHENHYDRAMINE HYDROCHLORIDE, ZINC ACETATE: 2; .1 CREAM TOPICAL at 08:33

## 2022-08-14 RX ADMIN — Medication 1334 MG: at 17:53

## 2022-08-14 RX ADMIN — Medication 1334 MG: at 08:33

## 2022-08-14 RX ADMIN — HEPARIN SODIUM 5000 UNITS: 5000 INJECTION, SOLUTION INTRAVENOUS; SUBCUTANEOUS at 05:48

## 2022-08-14 RX ADMIN — HEPARIN SODIUM 5000 UNITS: 5000 INJECTION, SOLUTION INTRAVENOUS; SUBCUTANEOUS at 21:53

## 2022-08-14 RX ADMIN — OXYCODONE 5 MG: 5 TABLET ORAL at 08:40

## 2022-08-14 ASSESSMENT — ENCOUNTER SYMPTOMS
VOMITING: 0
CHILLS: 0
FEVER: 0
SHORTNESS OF BREATH: 0
NAUSEA: 0
PALPITATIONS: 0

## 2022-08-14 NOTE — PROGRESS NOTES
Pt A&O x4, irritable. C/o of bilateral leg pain but declined prn pain medication. Pt resting throughout night with no additional needs.

## 2022-08-14 NOTE — PROGRESS NOTES
[facility] Internal Medicine Daily Progress Note    Date of Service  8/14/2022    [facility] Team: R IM Purple Team   Attending: Nino Reyes M.d.  Senior Resident: Dr. Pina  Intern:  Dr. Wiggins  Contact Number: [phone]    Chief Complaint  Ambrose Watkins is a 46 y.o. male admitted 8/8/2022 with progressive weakness with associated nausea, vomiting, and diarrhea    Hospital Course  45 yo male with PMH of diabetes,HFrE, HTN, and ESRD on HD who presented 8/8/2022 for missing dialysis for past 3 weeks.  This was because he traveled to [place] for leisure and did not know where to go to get dialysis.  In addition, patient does not have a dialysis chair here in [place].  He states he receives dialysis in [place].  He has been experiencing progressive weakness and associated nausea, vomiting, and diarrhea.  He came to the ER and was found to have electrolyte abnormalities including hyperkalemia, elevated creatinine BUN, and low calcium.  Nephrology consulted and patient will require multiple sessions of dialysis given numerous missed sessions.  Currently following [facility] schedule for HD, with vitals/labs gradually improving. Patient also restarted on carvedilol per nephrology recommendation. Accepted to [facility] for dialysis chair, but not available until 8/18, must remain in hospital until dialysis on Tuesday 8/16.      Interval Problem Update  Patient seen 8/14 morning, and is feeling more bloated.  He is also still having diarrhea 3-4 times a day.  He dated that. He urinated a minimal amount 2 days ago.  Patient denies cardiorespiratory symptoms, abdominal pain, and nausea/vomiting.    I have discussed this patient's plan of care and discharge plan at IDT rounds today with Case Management, Nursing, Nursing leadership, and other members of the IDT team.    Consultants/Specialty  nephrology    Code Status  Full Code    Disposition  Patient is not medically cleared for discharge. (Due to dialysis chair being unavailable  until 8/18)  Anticipate discharge to to home with close outpatient follow-up.  I have placed the appropriate orders for post-discharge needs.    Review of Systems  Review of Systems   Constitutional:  Negative for chills and fever.   Respiratory:  Negative for shortness of breath.    Cardiovascular:  Positive for leg swelling. Negative for chest pain and palpitations.   Gastrointestinal:  Negative for nausea and vomiting.      Physical Exam  Temp:  [36.6 °C (97.9 °F)-36.9 °C (98.4 °F)] 36.9 °C (98.4 °F)  Pulse:  [90-93] 93  Resp:  [17-20] 20  BP: (147-156)/(86-94) 156/91  SpO2:  [95 %-98 %] 95 %    Physical Exam  Constitutional:       General: He is not in acute distress.     Appearance: He is obese.   HENT:      Head: Normocephalic and atraumatic.   Eyes:      Extraocular Movements: Extraocular movements intact.      Conjunctiva/sclera: Conjunctivae normal.      Pupils: Pupils are equal, round, and reactive to light.   Cardiovascular:      Rate and Rhythm: Regular rhythm. Tachycardia present.      Comments: Nonfunctional LUE AV fistula.  Tunneled dialysis catheter intact  Pulmonary:      Effort: Pulmonary effort is normal.      Breath sounds: Normal breath sounds.   Abdominal:      General: Abdomen is flat. There is no distension.      Palpations: Abdomen is soft.      Tenderness: There is no abdominal tenderness.   Musculoskeletal:         General: Tenderness (Bilateral thighs) present.      Right lower leg: Edema present.      Left lower leg: Edema present.   Neurological:      General: No focal deficit present.      Mental Status: He is alert and oriented to person, place, and time.   Psychiatric:         Mood and Affect: Mood normal.         Behavior: Behavior normal.       Fluids    Intake/Output Summary (Last 24 hours) at 8/14/2022 1307  Last data filed at 8/14/2022 0600  Gross per 24 hour   Intake 290 ml   Output --   Net 290 ml       Laboratory  Recent Labs     08/12/22  0219 08/13/22  0703   WBC 8.8 7.7    RBC 3.03* 2.82*   HEMOGLOBIN 8.8* 8.3*   HEMATOCRIT 27.2* 25.6*   MCV 89.8 90.8   MCH 29.0 29.4   MCHC 32.4* 32.4*   RDW 59.4* 59.5*   PLATELETCT 195 186   MPV 10.1 10.0     Recent Labs     08/12/22  1457 08/13/22  0703 08/14/22  0644   SODIUM 135 133* 130*   POTASSIUM 4.2 4.5 4.5   CHLORIDE 97 98 94*   CO2 22 21 20   GLUCOSE 172* 192* 137*   BUN 34* 46* 54*   CREATININE 6.91* 7.58* 8.38*   CALCIUM 8.6 7.6* 8.0*                   Imaging  DX-CHEST-PORTABLE (1 VIEW)   Final Result      1.  Mildly enlarged cardiac silhouette with vascular congestion.           Assessment/Plan  Problem Representation:    * Noncompliance of patient with renal dialysis (HCC)- (present on admission)  Assessment & Plan  Has not had dialysis in the past 3 weeks, due to traveling to [place].  Obtained HD in [place], however does not have an outpatient dialysis chair in [place] and last year in [place]. Dialysis chair available next Thurs 8/18/22.    -Signs/symptoms and labs/vitals slowly normalizing  -Nephrology following, on TThS dialysis  -Patient medically cleared for discharge, however will need dialysis on 8/12 as dialysis chair not available till 8/18    High anion gap metabolic acidosis- (present on admission)  Assessment & Plan  High anion gap acidosis on admission likely due to uremia.  Resolved    -Current BUN at 54  -Patient dialyzed TThS  -Will continue to monitor    End stage renal disease (HCC)- (present on admission)  Assessment & Plan  Patient receiving HD in [place], has not received HD in 3 weeks and since admission.  Patient has also had multiple thrombectomies/revisions for AV fistula, currently nonfunctioning.  Patient has tunneled dialysis catheter in place and functioning.  Patient not adherent to HD or outpatient medications.  Nephrology following    -Currently on cholecalciferol and PhosLo  -Receiving HD on TThS  -Currently on renal diet with restricted sodium and fluid intake  -Labs and vitals slowly  improving    Hyperkalemia- (present on admission)  Assessment & Plan   Initial potassium at 6.1. Likely due to missing dialysis, now WNL. Resolved    -Recent labs demonstrate normalization of potassium  -We will continue to follow with serial labs    Hypertension- (present on admission)  Assessment & Plan  History of uncontrolled hypertension. Likely due to missing dialysis nonadherence to outpatient BP medications    -Switched labetalol as needed to hydralazine as needed due to history of CHF  -Carvedilol increased to 12.5 mg twice daily  -Continue to monitor BP and adjust medications as needed       VTE prophylaxis: heparin ppx    I have performed a physical exam and reviewed and updated ROS and Plan today (8/14/2022). In review of yesterday's note (8/13/2022), there are no changes except as documented above.

## 2022-08-14 NOTE — PROGRESS NOTES
".  [facility] -  PROGRESS NOTE               Author: EMMA Dial Date & Time: 8/14/2022  1:02 PM     HPI:  47 yo M with End Stage Renal Disease on hemodialysis at [facility] every Tues/Thurs/Sat, last HD at [place] in 4/2022, then traveled and lost HD chair. Other PMH diabetes mellitus and Hypertension who presented to the emergency department on 8/8/22 for weakness. He missed 1 months of HD due to traveling. He was found to be hypertensive 153/99, afebrile. Labs with K of 6.1, BUN 93, bicarb 10.   No F/C/N/V/CP/SOB.  No melena, hematochezia, hematemesis.  No HA, visual changes, or abdominal pain.    DAILY NEPHROLOGY SUMMARY:  8/9: 3L net UF. Resting in bed. No complaints. No acute distress. Security called last night, patient aggressive towards staff regarding personal items. Bps improved this am.   8/10: Seen on HD this am. Lethargic. No complaints.   8/11:  Resting comfortably no complaints.  VSS.  8/12: Ambulating in room indep. Denies CP/SOB, on RA. Reports he is hungry and wants breakfast before anything else. Bps elevated.   8/13: VSS RA no complaints.  Ambulating.   8/14: Sleeping in bed. Dismissive. Does not want extra HD today. BP elevated.     REVIEW OF SYSTEMS:    10 point ROS reviewed and is as per HPI/daily summary or otherwise negative    PMH/PSH/SH/FH: Reviewed and unchanged since admission note  CURRENT MEDICATIONS: Reviewed from admission to present day    VS:  BP (!) 156/91   Pulse 93   Temp 36.9 °C (98.4 °F) (Temporal)   Resp 20   Ht 1.753 m (5' 9\")   Wt 99.6 kg (219 lb 9.3 oz)   SpO2 95%   BMI 32.43 kg/m²   Physical Exam  Constitutional:       Appearance: He is ill-appearing.   HENT:      Head: Normocephalic and atraumatic.      Nose: Nose normal. No congestion or rhinorrhea.      Mouth/Throat:      Mouth: Mucous membranes are moist.      Pharynx: No oropharyngeal exudate or posterior oropharyngeal erythema.   Eyes:      Extraocular Movements: " Extraocular movements intact.      Conjunctiva/sclera: Conjunctivae normal.      Pupils: Pupils are equal, round, and reactive to light.   Cardiovascular:      Rate and Rhythm: Normal rate and regular rhythm.      Pulses: Normal pulses.      Heart sounds: Normal heart sounds. No murmur heard.    No friction rub. No gallop.      Comments: [facility]  LAVF +Bruit/+Thrill  Pulmonary:      Effort: Pulmonary effort is normal. No respiratory distress.      Breath sounds: Normal breath sounds.   Abdominal:      General: Bowel sounds are normal.   Musculoskeletal:         General: Swelling present. Normal range of motion.      Cervical back: Normal range of motion and neck supple.   Skin:     General: Skin is warm and dry.      Findings: Lesion present.      Comments: Scattered lesions   Neurological:      Mental Status: He is alert and oriented to person, place, and time. Mental status is at baseline.   Psychiatric:         Mood and Affect: Mood normal.         Behavior: Behavior normal.       Fluids:  In: 510 [P.O.:510]  Out: -     LABS:  Recent Labs     08/12/22  1457 08/13/22  0703 08/14/22  0644   SODIUM 135 133* 130*   POTASSIUM 4.2 4.5 4.5   CHLORIDE 97 98 94*   CO2 22 21 20   GLUCOSE 172* 192* 137*   BUN 34* 46* 54*   CREATININE 6.91* 7.58* 8.38*   CALCIUM 8.6 7.6* 8.0*         IMPRESSION:  # ESRD  - Etiology likely 2/2 DM/HTN  - via [facility]  - lost his HD chair at [facility] ->plan for [facility]  # Thrombosed Left AVF  - US 2/23 no flow  - s/p OR 2/25 AVG Brachio-axillary Creation, thrombectomy and fistulogram   # HTN, labile control   - Goal BP < 140/90  # Anemia of CKD  - Goal Hgb 10-11  - At goal   - % sat 32  - Ferritin 879  # CKD-MBD  - Ca 6.3 -> Cca 7.66-> Cca 8.5 today  - PO4 7.8 ->  8.9->6.9 -> 5.5  -   # Hyperkalemia, resolved  - due to missed hd   - hd 8/8, 8/10  # weakness  # h/o Methamphetamine use   # Homelessness  # DM   - A1C 6.5  # Severe metabolic acidosis     PLAN:  - iHD next on  MON, then transition to outpatient qTTS  - Challenge UF as able   - Continue cholecalciferol   - Increased calcium acetate TID w/meals 8/10  - Increased Carvedilol 8/14  - Transfuse PRN Hgb <7   - JACKIE with HD  - No dietary protein restrictions  - Dose all meds per ESRD/iHD  - weakness workup per primary team   - Low Na/fluid restricted renal diet  - Follow labs  - AVG use per Vasc Surg recs; currently using RIJ TDC, last note 3/1 not yet cleared for use   - Discharge planning underway. Accepted at [facility]. First day 8/18. If medically cleared may discharge after HD on 8/16

## 2022-08-14 NOTE — PROGRESS NOTES
[facility] Progress Note:      Patient scheduled for extra dialysis  today per Dr. Hamilton.     Primary Rika RN  spoke to patient and patient refused.     Dr. Murguia notified.

## 2022-08-14 NOTE — CARE PLAN
The patient is Stable - Low risk of patient condition declining or worsening    Shift Goals  Clinical Goals: Pain management/rest  Patient Goals: Rest, left alone  Family Goals: donis    Progress made toward(s) clinical / shift goals:  Pt assessed for pain, declined medication and stated pain at acceptable level. Pt resting comfortably with needs met throughout the night.       Problem: Pain - Standard  Goal: Alleviation of pain or a reduction in pain to the patient’s comfort goal  Outcome: Progressing     Problem: Provide Safe Environment  Goal: Suicide environmental safety, protocols, policies, and practices will be implemented  Outcome: Progressing     Problem: Fall Risk  Goal: Patient will remain free from falls  Outcome: Progressing

## 2022-08-15 ENCOUNTER — APPOINTMENT (OUTPATIENT)
Dept: RADIOLOGY | Facility: MEDICAL CENTER | Age: 47
DRG: 291 | End: 2022-08-15
Payer: MEDICARE

## 2022-08-15 PROBLEM — K56.7 ILEUS (HCC): Status: ACTIVE | Noted: 2022-08-15

## 2022-08-15 LAB
ALBUMIN SERPL BCP-MCNC: 2.8 G/DL (ref 3.2–4.9)
ALBUMIN/GLOB SERPL: 0.7 G/DL
ALP SERPL-CCNC: 117 U/L (ref 30–99)
ALT SERPL-CCNC: 6 U/L (ref 2–50)
ANION GAP SERPL CALC-SCNC: 17 MMOL/L (ref 7–16)
AST SERPL-CCNC: 8 U/L (ref 12–45)
BASOPHILS # BLD AUTO: 0.4 % (ref 0–1.8)
BASOPHILS # BLD: 0.03 K/UL (ref 0–0.12)
BILIRUB SERPL-MCNC: 0.5 MG/DL (ref 0.1–1.5)
BUN SERPL-MCNC: 65 MG/DL (ref 8–22)
CALCIUM SERPL-MCNC: 8.1 MG/DL (ref 8.5–10.5)
CHLORIDE SERPL-SCNC: 95 MMOL/L (ref 96–112)
CO2 SERPL-SCNC: 18 MMOL/L (ref 20–33)
CREAT SERPL-MCNC: 8.74 MG/DL (ref 0.5–1.4)
EOSINOPHIL # BLD AUTO: 0.05 K/UL (ref 0–0.51)
EOSINOPHIL NFR BLD: 0.6 % (ref 0–6.9)
ERYTHROCYTE [DISTWIDTH] IN BLOOD BY AUTOMATED COUNT: 58.4 FL (ref 35.9–50)
GFR SERPLBLD CREATININE-BSD FMLA CKD-EPI: 7 ML/MIN/1.73 M 2
GLOBULIN SER CALC-MCNC: 4.1 G/DL (ref 1.9–3.5)
GLUCOSE SERPL-MCNC: 121 MG/DL (ref 65–99)
HCT VFR BLD AUTO: 24.2 % (ref 42–52)
HGB BLD-MCNC: 7.7 G/DL (ref 14–18)
IMM GRANULOCYTES # BLD AUTO: 0.04 K/UL (ref 0–0.11)
IMM GRANULOCYTES NFR BLD AUTO: 0.5 % (ref 0–0.9)
LYMPHOCYTES # BLD AUTO: 1.39 K/UL (ref 1–4.8)
LYMPHOCYTES NFR BLD: 17.6 % (ref 22–41)
MAGNESIUM SERPL-MCNC: 1.7 MG/DL (ref 1.5–2.5)
MCH RBC QN AUTO: 28.8 PG (ref 27–33)
MCHC RBC AUTO-ENTMCNC: 31.8 G/DL (ref 33.7–35.3)
MCV RBC AUTO: 90.6 FL (ref 81.4–97.8)
MONOCYTES # BLD AUTO: 1.04 K/UL (ref 0–0.85)
MONOCYTES NFR BLD AUTO: 13.1 % (ref 0–13.4)
NEUTROPHILS # BLD AUTO: 5.36 K/UL (ref 1.82–7.42)
NEUTROPHILS NFR BLD: 67.8 % (ref 44–72)
NRBC # BLD AUTO: 0 K/UL
NRBC BLD-RTO: 0 /100 WBC
PHOSPHATE SERPL-MCNC: 6.2 MG/DL (ref 2.5–4.5)
PLATELET # BLD AUTO: 192 K/UL (ref 164–446)
PMV BLD AUTO: 10 FL (ref 9–12.9)
POTASSIUM SERPL-SCNC: 4.9 MMOL/L (ref 3.6–5.5)
PROT SERPL-MCNC: 6.9 G/DL (ref 6–8.2)
RBC # BLD AUTO: 2.67 M/UL (ref 4.7–6.1)
SODIUM SERPL-SCNC: 130 MMOL/L (ref 135–145)
WBC # BLD AUTO: 7.9 K/UL (ref 4.8–10.8)

## 2022-08-15 PROCEDURE — A9270 NON-COVERED ITEM OR SERVICE: HCPCS | Performed by: STUDENT IN AN ORGANIZED HEALTH CARE EDUCATION/TRAINING PROGRAM

## 2022-08-15 PROCEDURE — 74022 RADEX COMPL AQT ABD SERIES: CPT

## 2022-08-15 PROCEDURE — A9270 NON-COVERED ITEM OR SERVICE: HCPCS

## 2022-08-15 PROCEDURE — 99233 SBSQ HOSP IP/OBS HIGH 50: CPT | Mod: GC | Performed by: INTERNAL MEDICINE

## 2022-08-15 PROCEDURE — 83735 ASSAY OF MAGNESIUM: CPT

## 2022-08-15 PROCEDURE — 700101 HCHG RX REV CODE 250: Performed by: STUDENT IN AN ORGANIZED HEALTH CARE EDUCATION/TRAINING PROGRAM

## 2022-08-15 PROCEDURE — 76700 US EXAM ABDOM COMPLETE: CPT

## 2022-08-15 PROCEDURE — 80053 COMPREHEN METABOLIC PANEL: CPT

## 2022-08-15 PROCEDURE — 36415 COLL VENOUS BLD VENIPUNCTURE: CPT

## 2022-08-15 PROCEDURE — 700102 HCHG RX REV CODE 250 W/ 637 OVERRIDE(OP): Performed by: NURSE PRACTITIONER

## 2022-08-15 PROCEDURE — 85025 COMPLETE CBC W/AUTO DIFF WBC: CPT

## 2022-08-15 PROCEDURE — 700111 HCHG RX REV CODE 636 W/ 250 OVERRIDE (IP): Performed by: HOSPITALIST

## 2022-08-15 PROCEDURE — 84100 ASSAY OF PHOSPHORUS: CPT

## 2022-08-15 PROCEDURE — 770001 HCHG ROOM/CARE - MED/SURG/GYN PRIV*

## 2022-08-15 PROCEDURE — 700102 HCHG RX REV CODE 250 W/ 637 OVERRIDE(OP): Performed by: HOSPITALIST

## 2022-08-15 PROCEDURE — A9270 NON-COVERED ITEM OR SERVICE: HCPCS | Performed by: HOSPITALIST

## 2022-08-15 PROCEDURE — A9270 NON-COVERED ITEM OR SERVICE: HCPCS | Performed by: NURSE PRACTITIONER

## 2022-08-15 PROCEDURE — 700102 HCHG RX REV CODE 250 W/ 637 OVERRIDE(OP)

## 2022-08-15 PROCEDURE — 700102 HCHG RX REV CODE 250 W/ 637 OVERRIDE(OP): Performed by: STUDENT IN AN ORGANIZED HEALTH CARE EDUCATION/TRAINING PROGRAM

## 2022-08-15 RX ORDER — POLYETHYLENE GLYCOL 3350 17 G/17G
1 POWDER, FOR SOLUTION ORAL ONCE
Status: DISCONTINUED | OUTPATIENT
Start: 2022-08-15 | End: 2022-08-16

## 2022-08-15 RX ORDER — BISACODYL 10 MG
10 SUPPOSITORY, RECTAL RECTAL ONCE
Status: ACTIVE | OUTPATIENT
Start: 2022-08-15 | End: 2022-08-16

## 2022-08-15 RX ORDER — LIDOCAINE 50 MG/G
1 PATCH TOPICAL EVERY 24 HOURS
Status: DISCONTINUED | OUTPATIENT
Start: 2022-08-15 | End: 2022-09-03

## 2022-08-15 RX ADMIN — Medication 1334 MG: at 13:21

## 2022-08-15 RX ADMIN — Medication 1000 UNITS: at 06:02

## 2022-08-15 RX ADMIN — OXYCODONE 5 MG: 5 TABLET ORAL at 06:02

## 2022-08-15 RX ADMIN — HEPARIN SODIUM 5000 UNITS: 5000 INJECTION, SOLUTION INTRAVENOUS; SUBCUTANEOUS at 13:51

## 2022-08-15 RX ADMIN — Medication 1334 MG: at 17:13

## 2022-08-15 RX ADMIN — HEPARIN SODIUM 5000 UNITS: 5000 INJECTION, SOLUTION INTRAVENOUS; SUBCUTANEOUS at 06:02

## 2022-08-15 RX ADMIN — Medication 1334 MG: at 08:31

## 2022-08-15 RX ADMIN — CARVEDILOL 25 MG: 25 TABLET, FILM COATED ORAL at 17:13

## 2022-08-15 RX ADMIN — LIDOCAINE 1 PATCH: 50 PATCH CUTANEOUS at 14:59

## 2022-08-15 ASSESSMENT — ENCOUNTER SYMPTOMS
DIZZINESS: 0
NAUSEA: 0
CHILLS: 0
VOMITING: 0
HEADACHES: 1
DIAPHORESIS: 0
FEVER: 0
CONSTIPATION: 0
DIARRHEA: 1
SHORTNESS OF BREATH: 1
ABDOMINAL PAIN: 1

## 2022-08-15 NOTE — CARE PLAN
The patient is Stable - Low risk of patient condition declining or worsening    Shift Goals  Clinical Goals: Patient will be compliant with plan of care    Progress made toward(s) clinical / shift goals:   Patient continues to refuse assessments. Patient refused dialysis today due to abdominal pain. Patient agreeable to further abdominal assessments including x-ray and ultrasound.       Problem: Pain - Standard  Goal: Alleviation of pain or a reduction in pain to the patient’s comfort goal  Outcome: Progressing     Problem: Fall Risk  Goal: Patient will remain free from falls  Outcome: Progressing

## 2022-08-15 NOTE — CARE PLAN
The patient is Stable - Low risk of patient condition declining or worsening    Shift Goals  Clinical Goals: HD  Patient Goals: to be left alone  Family Goals: donis      Problem: Pain - Standard  Goal: Alleviation of pain or a reduction in pain to the patient’s comfort goal  Outcome: Progressing     Problem: Provide Safe Environment  Goal: Suicide environmental safety, protocols, policies, and practices will be implemented  Outcome: Progressing       Progress made toward(s) clinical / shift goals:  Patient refusing vitals, minimal assessment, states comfort, showered self.    Patient is not progressing towards the following goals:

## 2022-08-15 NOTE — PROGRESS NOTES
[facility] Progress Note      Extra HD treatment ordered by Dr. Pina today. This dialysis RN called primary RN to ask pt if he was ok with coming to HD. Per primary RN, pt refused to dialyze today. Neph MD notified of refusal.

## 2022-08-15 NOTE — PROGRESS NOTES
Assumed care of patient.  Alert and oriented, denies pain.  Irritable and refusing vitals and cares.  Tolerating ordered diet well.  All needs addressed at this moment.  Call light within reach, bed locked and in low position, bed alarm on, fall education and precautions in place.

## 2022-08-15 NOTE — PROGRESS NOTES
Assumed care of patient this shift. Patient is alert, refusing to answer most questions, refusing complete assessment at vital signs this morning. This RN received call from Dialysis RN regarding patients previous refusals. Patient notified this morning that Dialysis would like to schedule his for this morning which patient continued to refused, update Resident via Volte message. Patient is able to make his needs known and call light is within reach.

## 2022-08-15 NOTE — PROGRESS NOTES
".  [facility] -  PROGRESS NOTE               Author: Serafin Pina M.D. Date & Time: 8/15/2022  12:59 PM     HPI:  45 yo M with End Stage Renal Disease on hemodialysis at [facility] every Tues/Thurs/Sat, last HD at [place] in 4/2022, then traveled and lost HD chair. Other PMH diabetes mellitus and Hypertension who presented to the emergency department on 8/8/22 for weakness. He missed 1 months of HD due to traveling. He was found to be hypertensive 153/99, afebrile. Labs with K of 6.1, BUN 93, bicarb 10.   No F/C/N/V/CP/SOB.  No melena, hematochezia, hematemesis.  No HA, visual changes, or abdominal pain.    DAILY NEPHROLOGY SUMMARY:  8/9: 3L net UF. Resting in bed. No complaints. No acute distress. Security called last night, patient aggressive towards staff regarding personal items. Bps improved this am.   8/10: Seen on HD this am. Lethargic. No complaints.   8/11:  Resting comfortably no complaints.  VSS.  8/12: Ambulating in room indep. Denies CP/SOB, on RA. Reports he is hungry and wants breakfast before anything else. Bps elevated.   8/13: VSS RA no complaints.  Ambulating.   8/14: Sleeping in bed. Dismissive. Does not want extra HD today. BP elevated.   8/15: Risks of no dialysis explained to patient, refused treatment today.  Complaining of leg pain and swelling.    REVIEW OF SYSTEMS:    10 point ROS reviewed and is as per HPI/daily summary or otherwise negative    PMH/PSH/SH/FH: Reviewed and unchanged since admission note  CURRENT MEDICATIONS: Reviewed from admission to present day    VS:  /86   Pulse 83   Temp 36.8 °C (98.3 °F) (Temporal)   Resp 17   Ht 1.753 m (5' 9\")   Wt 99.6 kg (219 lb 9.3 oz)   SpO2 98%   BMI 32.43 kg/m²   Physical Exam  Constitutional:       Appearance: He is ill-appearing.   HENT:      Head: Normocephalic and atraumatic.      Nose: Nose normal. No congestion or rhinorrhea.      Mouth/Throat:      Mouth: Mucous membranes are moist.      " Pharynx: No oropharyngeal exudate or posterior oropharyngeal erythema.   Eyes:      Extraocular Movements: Extraocular movements intact.      Conjunctiva/sclera: Conjunctivae normal.      Pupils: Pupils are equal, round, and reactive to light.   Cardiovascular:      Rate and Rhythm: Normal rate and regular rhythm.      Pulses: Normal pulses.      Heart sounds: Normal heart sounds. No murmur heard.    No friction rub. No gallop.      Comments: [facility]  LAVF +Bruit/+Thrill  Pulmonary:      Effort: Pulmonary effort is normal. No respiratory distress.      Breath sounds: Normal breath sounds.   Abdominal:      General: Bowel sounds are normal.   Musculoskeletal:         General: Swelling present. Normal range of motion.      Cervical back: Normal range of motion and neck supple.   Skin:     General: Skin is warm and dry.      Findings: Lesion present.      Comments: Scattered lesions   Neurological:      Mental Status: He is alert and oriented to person, place, and time. Mental status is at baseline.   Psychiatric:         Mood and Affect: Mood normal.         Behavior: Behavior normal.       Fluids:  In: 300 [P.O.:300]  Out: -     LABS:  Recent Labs     08/12/22  1457 08/13/22  0703 08/14/22  0644   SODIUM 135 133* 130*   POTASSIUM 4.2 4.5 4.5   CHLORIDE 97 98 94*   CO2 22 21 20   GLUCOSE 172* 192* 137*   BUN 34* 46* 54*   CREATININE 6.91* 7.58* 8.38*   CALCIUM 8.6 7.6* 8.0*         IMPRESSION:  # ESRD  - Etiology likely 2/2 DM/HTN  - via [facility]  - lost his HD chair at [facility] ->plan for [facility]  # Thrombosed Left AVF  - US 2/23 no flow  - s/p OR 2/25 AVG Brachio-axillary Creation, thrombectomy and fistulogram   # HTN, labile control   - Goal BP < 140/90  # Anemia of CKD  - Goal Hgb 10-11  - At goal   - % sat 32  - Ferritin 879  # CKD-MBD  - Ca 6.3 -> Cca 7.66-> Cca 8.5 today  - PO4 7.8 ->  8.9->6.9 -> 5.5  -   # Hyperkalemia, resolved  - due to missed hd   - hd 8/8, 8/10  # weakness  # h/o  Methamphetamine use   # Homelessness  # DM   - A1C 6.5  # Severe metabolic acidosis     PLAN:  - iHD qTTS, refused today  - Challenge UF as able   - Continue cholecalciferol   - Increased calcium acetate TID w/meals 8/10  - Increased Carvedilol 8/14  - Transfuse PRN Hgb <7   - JACKIE with HD  - No dietary protein restrictions  - Dose all meds per ESRD/iHD  - weakness workup per primary team   - Low Na/fluid restricted renal diet  - Follow labs  - AVG use per Vasc Surg recs; currently using RIJ TDC, last note 3/1 not yet cleared for use   - Discharge planning underway. Accepted at [facility]. First day 8/18. If medically cleared may discharge after HD on 8/16

## 2022-08-16 LAB
ALBUMIN SERPL BCP-MCNC: 2.8 G/DL (ref 3.2–4.9)
ALBUMIN/GLOB SERPL: 0.7 G/DL
ALP SERPL-CCNC: 111 U/L (ref 30–99)
ALT SERPL-CCNC: 7 U/L (ref 2–50)
ANION GAP SERPL CALC-SCNC: 17 MMOL/L (ref 7–16)
AST SERPL-CCNC: 7 U/L (ref 12–45)
BILIRUB SERPL-MCNC: 0.5 MG/DL (ref 0.1–1.5)
BUN SERPL-MCNC: 66 MG/DL (ref 8–22)
CALCIUM SERPL-MCNC: 8 MG/DL (ref 8.5–10.5)
CHLORIDE SERPL-SCNC: 95 MMOL/L (ref 96–112)
CK SERPL-CCNC: 51 U/L (ref 0–154)
CO2 SERPL-SCNC: 18 MMOL/L (ref 20–33)
CREAT SERPL-MCNC: 10.18 MG/DL (ref 0.5–1.4)
ERYTHROCYTE [DISTWIDTH] IN BLOOD BY AUTOMATED COUNT: 58 FL (ref 35.9–50)
GFR SERPLBLD CREATININE-BSD FMLA CKD-EPI: 6 ML/MIN/1.73 M 2
GLOBULIN SER CALC-MCNC: 3.9 G/DL (ref 1.9–3.5)
GLUCOSE SERPL-MCNC: 127 MG/DL (ref 65–99)
HCT VFR BLD AUTO: 24.8 % (ref 42–52)
HGB BLD-MCNC: 8.1 G/DL (ref 14–18)
LACTATE SERPL-SCNC: 1.2 MMOL/L (ref 0.5–2)
MCH RBC QN AUTO: 29.5 PG (ref 27–33)
MCHC RBC AUTO-ENTMCNC: 32.7 G/DL (ref 33.7–35.3)
MCV RBC AUTO: 90.2 FL (ref 81.4–97.8)
PLATELET # BLD AUTO: 182 K/UL (ref 164–446)
PMV BLD AUTO: 9.6 FL (ref 9–12.9)
POTASSIUM SERPL-SCNC: 5.3 MMOL/L (ref 3.6–5.5)
PROT SERPL-MCNC: 6.7 G/DL (ref 6–8.2)
RBC # BLD AUTO: 2.75 M/UL (ref 4.7–6.1)
SODIUM SERPL-SCNC: 130 MMOL/L (ref 135–145)
WBC # BLD AUTO: 8.8 K/UL (ref 4.8–10.8)

## 2022-08-16 PROCEDURE — 99232 SBSQ HOSP IP/OBS MODERATE 35: CPT | Mod: GC | Performed by: HOSPITALIST

## 2022-08-16 PROCEDURE — 85027 COMPLETE CBC AUTOMATED: CPT

## 2022-08-16 PROCEDURE — 770001 HCHG ROOM/CARE - MED/SURG/GYN PRIV*

## 2022-08-16 PROCEDURE — 700101 HCHG RX REV CODE 250: Performed by: STUDENT IN AN ORGANIZED HEALTH CARE EDUCATION/TRAINING PROGRAM

## 2022-08-16 PROCEDURE — 90935 HEMODIALYSIS ONE EVALUATION: CPT

## 2022-08-16 PROCEDURE — 82550 ASSAY OF CK (CPK): CPT

## 2022-08-16 PROCEDURE — 700111 HCHG RX REV CODE 636 W/ 250 OVERRIDE (IP): Performed by: HOSPITALIST

## 2022-08-16 PROCEDURE — A9270 NON-COVERED ITEM OR SERVICE: HCPCS | Performed by: HOSPITALIST

## 2022-08-16 PROCEDURE — 700102 HCHG RX REV CODE 250 W/ 637 OVERRIDE(OP)

## 2022-08-16 PROCEDURE — A9270 NON-COVERED ITEM OR SERVICE: HCPCS

## 2022-08-16 PROCEDURE — A9270 NON-COVERED ITEM OR SERVICE: HCPCS | Performed by: NURSE PRACTITIONER

## 2022-08-16 PROCEDURE — A9270 NON-COVERED ITEM OR SERVICE: HCPCS | Performed by: STUDENT IN AN ORGANIZED HEALTH CARE EDUCATION/TRAINING PROGRAM

## 2022-08-16 PROCEDURE — 80053 COMPREHEN METABOLIC PANEL: CPT

## 2022-08-16 PROCEDURE — 700102 HCHG RX REV CODE 250 W/ 637 OVERRIDE(OP): Performed by: HOSPITALIST

## 2022-08-16 PROCEDURE — 36415 COLL VENOUS BLD VENIPUNCTURE: CPT

## 2022-08-16 PROCEDURE — 700111 HCHG RX REV CODE 636 W/ 250 OVERRIDE (IP): Performed by: NURSE PRACTITIONER

## 2022-08-16 PROCEDURE — 700102 HCHG RX REV CODE 250 W/ 637 OVERRIDE(OP): Performed by: STUDENT IN AN ORGANIZED HEALTH CARE EDUCATION/TRAINING PROGRAM

## 2022-08-16 PROCEDURE — 700111 HCHG RX REV CODE 636 W/ 250 OVERRIDE (IP)

## 2022-08-16 PROCEDURE — 700102 HCHG RX REV CODE 250 W/ 637 OVERRIDE(OP): Performed by: NURSE PRACTITIONER

## 2022-08-16 PROCEDURE — 83605 ASSAY OF LACTIC ACID: CPT

## 2022-08-16 RX ORDER — OXYCODONE HYDROCHLORIDE 5 MG/1
5 TABLET ORAL EVERY 6 HOURS PRN
Status: DISCONTINUED | OUTPATIENT
Start: 2022-08-16 | End: 2022-08-21

## 2022-08-16 RX ORDER — POLYETHYLENE GLYCOL 3350 17 G/17G
1 POWDER, FOR SOLUTION ORAL
Status: DISCONTINUED | OUTPATIENT
Start: 2022-08-16 | End: 2022-08-30

## 2022-08-16 RX ORDER — BISACODYL 10 MG
10 SUPPOSITORY, RECTAL RECTAL
Status: DISCONTINUED | OUTPATIENT
Start: 2022-08-16 | End: 2022-08-30

## 2022-08-16 RX ORDER — AMOXICILLIN 250 MG
2 CAPSULE ORAL 2 TIMES DAILY PRN
Status: DISCONTINUED | OUTPATIENT
Start: 2022-08-16 | End: 2022-08-30

## 2022-08-16 RX ADMIN — LIDOCAINE 1 PATCH: 50 PATCH CUTANEOUS at 16:08

## 2022-08-16 RX ADMIN — OXYCODONE 5 MG: 5 TABLET ORAL at 18:13

## 2022-08-16 RX ADMIN — CARVEDILOL 25 MG: 25 TABLET, FILM COATED ORAL at 18:14

## 2022-08-16 RX ADMIN — EPOETIN ALFA-EPBX 5000 UNITS: 3000 INJECTION, SOLUTION INTRAVENOUS; SUBCUTANEOUS at 12:44

## 2022-08-16 RX ADMIN — Medication 1334 MG: at 18:13

## 2022-08-16 RX ADMIN — OXYCODONE 5 MG: 5 TABLET ORAL at 09:59

## 2022-08-16 RX ADMIN — HEPARIN SODIUM 5000 UNITS: 5000 INJECTION, SOLUTION INTRAVENOUS; SUBCUTANEOUS at 20:57

## 2022-08-16 RX ADMIN — EPOETIN ALFA-EPBX: 3000 INJECTION, SOLUTION INTRAVENOUS; SUBCUTANEOUS at 12:44

## 2022-08-16 RX ADMIN — HEPARIN SODIUM 3100 UNITS: 1000 INJECTION, SOLUTION INTRAVENOUS; SUBCUTANEOUS at 15:14

## 2022-08-16 RX ADMIN — EPOETIN ALFA-EPBX: 2000 INJECTION, SOLUTION INTRAVENOUS; SUBCUTANEOUS at 12:44

## 2022-08-16 ASSESSMENT — PAIN DESCRIPTION - PAIN TYPE
TYPE: CHRONIC PAIN
TYPE: CHRONIC PAIN

## 2022-08-16 NOTE — CARE PLAN
The patient is Stable - Low risk of patient condition declining or worsening    Shift Goals: comfort  Clinical Goals: pt will be able to rest and sleep comfortably  Patient Goals:   Family Goals: donis    Progress made toward(s) clinical / shift goals:      Patient is not progressing towards the following goals:

## 2022-08-16 NOTE — PROGRESS NOTES
Daily Progress Note:     Date of Service: 8/15/2022  Primary Team: [facility] IM Purple Team   Attending: Nino Reyes M.D.   Senior Resident: Dr. Clement  Intern: Dr. Adorno  Contact:  [phone]    Chief Complaint  Ambrose Watkins is a 46 y.o. male admitted 8/8/2022 with progressive weakness with associated nausea, vomiting, and diarrhea     Hospital Course  47 yo male with PMH of diabetes,HFrE, HTN, and ESRD on HD who presented 8/8/2022 for missing dialysis for past 3 weeks.  This was because he traveled to [place] for leisure and did not know where to go to get dialysis.  In addition, patient does not have a dialysis chair here in [place].  He states he receives dialysis in [place].  He has been experiencing progressive weakness and associated nausea, vomiting, and diarrhea.  He came to the ER and was found to have electrolyte abnormalities including hyperkalemia, elevated creatinine BUN, and low calcium.  Nephrology consulted and patient will require multiple sessions of dialysis given numerous missed sessions.  Currently following [facility] schedule for HD, with vitals/labs gradually improving. Patient also restarted on carvedilol per nephrology recommendation. Accepted to [facility] for dialysis chair, but not available until 8/18, must remain in hospital until dialysis on Tuesday 8/16.        Interval Problem Update  Patient seen 8/15, per his nurse he refused dialysis this am and yesterday, refused labs and vital signs. Pt states he has worsening abdominal pain as reason for refusing dialysis today. He has been able to pass gas and intake fluids and oral medications without nausea or vomiting. States he feels short of breath and has dry heaving but denies nausea and does not want anti-emetics. He has had 8 watery bowel movements today and has been having watery bowel movements for sometime He does not remember the last time he had a solid bowel movement. Abdominal xray showed an ileus with distal  obstruction cannot be excluded. Ultrasound of the abdomen showed small to moderate ascites. Patient was given Miralax with clear liquids and suppository as watery stool and ileus can be due to constipation given patient has been taking oxycodone for the past week for pain control of his legs.      I have discussed this patient's plan of care and discharge plan at IDT rounds today with Case Management, Nursing, Nursing leadership, and other members of the IDT team.     Consultants/Specialty  nephrology     Code Status  Full Code     Disposition  Patient is not medically cleared for discharge. (Due to dialysis chair being unavailable until 8/18)Vo  Anticipate discharge to to home with close outpatient follow-up.  I have placed the appropriate orders for post-discharge needs.    Review of Systems:    Review of Systems   Constitutional:  Positive for malaise/fatigue. Negative for chills, diaphoresis and fever.   Respiratory:  Positive for shortness of breath.    Cardiovascular:  Positive for leg swelling. Negative for chest pain.   Gastrointestinal:  Positive for abdominal pain and diarrhea. Negative for constipation, nausea and vomiting.        Watery stool, no formed stools unable to give timeframe   Genitourinary:  Negative for dysuria.   Musculoskeletal:         Bilateral leg pain - right thigh pain with light touch    Neurological:  Positive for headaches. Negative for dizziness.     Objective Data:   Physical Exam:   Vitals:   Temp:  [36.4 °C (97.5 °F)-37.1 °C (98.7 °F)] 36.4 °C (97.5 °F)  Pulse:  [77-85] 77  Resp:  [17-19] 18  BP: (139-160)/(76-86) 152/76  SpO2:  [96 %-99 %] 99 %    Physical Exam  Constitutional:       General: He is not in acute distress.     Appearance: He is ill-appearing. He is not toxic-appearing.   HENT:      Head: Normocephalic and atraumatic.      Right Ear: External ear normal.      Left Ear: External ear normal.      Nose: Nose normal.      Mouth/Throat:      Mouth: Mucous membranes are  moist.   Eyes:      Extraocular Movements: Extraocular movements intact.      Conjunctiva/sclera: Conjunctivae normal.      Comments: Pupils equal   Cardiovascular:      Rate and Rhythm: Normal rate and regular rhythm.      Pulses: Normal pulses.      Heart sounds: Normal heart sounds. No murmur heard.     Comments: Right internal jugular tunneled catheter   LUE AV fistula not in use  Pulmonary:      Effort: Pulmonary effort is normal. No respiratory distress.      Breath sounds: Normal breath sounds. No wheezing or rales.   Abdominal:      General: There is distension.      Tenderness: There is abdominal tenderness. There is no guarding or rebound.      Comments: Decreased bowel sounds especially in LUQ   Musculoskeletal:         General: Swelling present.      Cervical back: Normal range of motion.      Right lower leg: Edema present.      Left lower leg: Edema present.   Skin:     General: Skin is warm and dry.      Comments: Excoriations of upper extremities, lower extremities, scalp, chest/abdomen   Neurological:      General: No focal deficit present.      Mental Status: He is alert and oriented to person, place, and time.   Psychiatric:         Thought Content: Thought content normal.      Comments: Upset refusing labs, vital checks, dialysis         Labs:   HEMATOLOGY/ ONCOLOGY/ID:            Recent Labs     08/13/22  0703 08/15/22  1335   WBC 7.7 7.9   RBC 2.82* 2.67*   HEMOGLOBIN 8.3* 7.7*   HEMATOCRIT 25.6* 24.2*   MCV 90.8 90.6   MCH 29.4 28.8   RDW 59.5* 58.4*   PLATELETCT 186 192   MPV 10.0 10.0   NEUTSPOLYS 72.10* 67.80   LYMPHOCYTES 15.50* 17.60*   MONOCYTES 10.70 13.10   EOSINOPHILS 0.80 0.60   BASOPHILS 0.40 0.40     Lab Results   Component Value Date    FERRITIN 879.0 (H) 08/14/2022    FERRITIN 665.0 (H) 02/22/2022    IRON 54 08/08/2022    IRON 8 (L) 02/22/2022    TOTIRONBC 167 (L) 08/08/2022    TOTIRONBC 178 (L) 02/22/2022       RENAL:        Estimated GFR/CRCL = Estimated Creatinine Clearance:  12.6 mL/min (A) (by C-G formula based on SCr of 8.74 mg/dL (HH)).  Recent Labs     08/13/22  0703 08/14/22  0644 08/15/22  1335   SODIUM 133* 130* 130*   POTASSIUM 4.5 4.5 4.9   CHLORIDE 98 94* 95*   CO2 21 20 18*   GLUCOSE 192* 137* 121*   BUN 46* 54* 65*   CREATININE 7.58* 8.38* 8.74*   CALCIUM 7.6* 8.0* 8.1*   MAGNESIUM 1.7  --  1.7   PHOSPHORUS 5.5* 5.8* 6.2*   ALBUMIN 2.9* 3.0* 2.8*       GASTROINTESTINAL/ HEPATIC:          Recent Labs     08/13/22  0703 08/14/22  0644 08/15/22  1335   ALTSGPT 7 7 6   ASTSGOT 12 7* 8*   ALKPHOSPHAT 120* 119* 117*   TBILIRUBIN 0.5 0.5 0.5   ALBUMIN 2.9* 3.0* 2.8*   GLOBULIN 4.0* 4.2* 4.1*     No results found for: AMMONIA    ENDOCRINE:              Recent Labs     08/13/22  0703 08/14/22  0644 08/15/22  1335   GLUCOSE 192* 137* 121*     Lab Results   Component Value Date    HBA1C 6.5 (H) 02/22/2022    HBA1C 7.2 (H) 05/19/2020      Imaging:   US-ABDOMEN COMPLETE SURVEY   Final Result      1.  Mildly increased hepatic echotexture is suggestive of fatty infiltration.      2.  Small-to-moderate amount of ascites.      3.  Cholelithiasis         DX-ABDOMEN COMPLETE WITH AP OR PA CXR   Final Result      Fairly diffuse bowel distention with scattered air-fluid levels. Findings are likely related to ileus. More distal obstruction cannot be excluded.      DX-CHEST-PORTABLE (1 VIEW)   Final Result      1.  Mildly enlarged cardiac silhouette with vascular congestion.         Problem Representation:     * Noncompliance of patient with renal dialysis (HCC)- (present on admission)  Assessment & Plan  Has not had dialysis in the past 3 weeks, due to traveling to [place].  Obtained HD in [place], however does not have an outpatient dialysis chair in [place] and last year in [place]. Dialysis chair available Thurs 8/18/22.    -Signs/symptoms and labs/vitals slowly normalizing  -Nephrology following, on [facility] dialysis  -Need dialysis tomorrow 8/16, dialysis chair not available till 8/18.  Due to abdominal pain and ileus will need to monitor.    Ileus (HCC)  Assessment & Plan  Patients states he has been having watery stools multiple times a day for many days. He is unable to recount when his last solid stool was. Abdomen has become more distended and pain has increased. Abdominal xray with ileus. Pt has been taking oxycodone for leg pain since 8/8.     -Discontinue oxycodone  -Miralax with clear liquid and suppository  -Will continue to monitor with serial abdominal exam and for more form in bowel movement  -Consideration for CT abdomen with oral contrast if no improvement       High anion gap metabolic acidosis- (present on admission)  Assessment & Plan  High anion gap acidosis on admission likely due to uremia.  Resolved    -Current BUN at 65  -Patient dialyzed [facility]  -Will continue to monitor    End stage renal disease (HCC)- (present on admission)  Assessment & Plan  Patient receiving HD in [place], has not received HD in 3 weeks and since admission.  Patient has also had multiple thrombectomies/revisions for AV fistula, currently nonfunctioning.  Patient has tunneled dialysis catheter in place and functioning.  Patient not adherent to HD or outpatient medications.  Nephrology following    -Currently on cholecalciferol   -Receiving HD on [facility]  -Currently on renal diet with restricted sodium and fluid intake  -Discussed with patient concerning refusal of labs and vitals, CTM    Hyperkalemia- (present on admission)  Assessment & Plan   Initial potassium at 6.1. Likely due to missing dialysis, now WNL. Resolved    -Recent labs demonstrate normalization of potassium  -We will continue to follow with serial labs    Hypertension- (present on admission)  Assessment & Plan  History of uncontrolled hypertension. Likely due to missing dialysis and nonadherence to outpatient BP medications    -Hydralazine as needed due to history of CHF  -Carvedilol 25mg twice daily  -Continue to monitor BP and adjust  medications as needed

## 2022-08-16 NOTE — PROGRESS NOTES
"Pt is asking medicine for pain. Pt seen screaming in the room and asking for medication. Due medications offered and PRN Tylenol for pain. Pt yelled at this nurse \"Tylenol doesn't do anything on. That's shit. Just give me ice and get the fuck out of here\". Pt was told that he is inappropriate and this RN can call the doctor to ask something for pain. Pt yelled again \"give me ice and leave me alone\"   Ice was given and told pt to be nice sometime. Pt left sitting up in the bed.  "

## 2022-08-16 NOTE — PROGRESS NOTES
Alert and oriented x4. Offered fluids and snacks. Safety precautions in placed. Bed in lowest position. Upper side rails up. Treaded socks on. Reinforced the use of call light when needing assistance.     Refused bed alarm, safety education provided. Still pt refused. Reinforced the use of call light when needing assistance

## 2022-08-16 NOTE — ASSESSMENT & PLAN NOTE
Patients states he has been having watery stools multiple times a day for many days. He is unable to recount when his last solid stool was. Abdomen has become more distended and pain has increased. Abdominal xray with bowel distention with air-fluid levels. Pt has been taking oxycodone for leg pain since 8/8.     -Will continue to monitor with serial abdominal exam  -CT abdo 8/22 with marked ascites, continue with dialysis   -Significant improvement in abdominal distention 8/28   -No bowel movements for past week; failed senna/miralax/lactulose refuse suppository, trial methyl natrexone   -9/3 states no bowel movements, KUB with small amount of stool and nonobstructive bowel gas pattern

## 2022-08-16 NOTE — PROGRESS NOTES
"Assumed care of patient this shift. Patient is alert, refusing to answer most questions, refusing complete assessment. Patient complaining of severe pain to right leg requesting Lidocaine patch, educated patient that Lidocaine patch will be available this afternoon as it needs to be off for 12 hours, offered ice pack. Patient continues to be verbally aggressive stating \"I might just leave, I haven't decided yet\" Patient is able to make his needs known and call light is within reach.  "

## 2022-08-16 NOTE — PROGRESS NOTES
".  [facility] -  PROGRESS NOTE               Author: Serafin Pina M.D. Date & Time: 8/16/2022  12:07 PM     HPI:  47 yo M with End Stage Renal Disease on hemodialysis at [facility] every Tues/Thurs/Sat, last HD at [place] in 4/2022, then traveled and lost HD chair. Other PMH diabetes mellitus and Hypertension who presented to the emergency department on 8/8/22 for weakness. He missed 1 months of HD due to traveling. He was found to be hypertensive 153/99, afebrile. Labs with K of 6.1, BUN 93, bicarb 10.   No F/C/N/V/CP/SOB.  No melena, hematochezia, hematemesis.  No HA, visual changes, or abdominal pain.    DAILY NEPHROLOGY SUMMARY:  8/9: 3L net UF. Resting in bed. No complaints. No acute distress. Security called last night, patient aggressive towards staff regarding personal items. Bps improved this am.   8/10: Seen on HD this am. Lethargic. No complaints.   8/11:  Resting comfortably no complaints.  VSS.  8/12: Ambulating in room indep. Denies CP/SOB, on RA. Reports he is hungry and wants breakfast before anything else. Bps elevated.   8/13: VSS RA no complaints.  Ambulating.   8/14: Sleeping in bed. Dismissive. Does not want extra HD today. BP elevated.   8/15: Risks of no dialysis explained to patient, refused treatment today.  Complaining of leg pain and swelling.  8/16: Seen on dialysis at bedside.  Complaining of thigh pain.     REVIEW OF SYSTEMS:    10 point ROS reviewed and is as per HPI/daily summary or otherwise negative    PMH/PSH/SH/FH: Reviewed and unchanged since admission note  CURRENT MEDICATIONS: Reviewed from admission to present day    VS:  /84   Pulse 81   Temp 37.4 °C (99.3 °F) (Temporal)   Resp 19   Ht 1.753 m (5' 9\")   Wt 99.6 kg (219 lb 9.3 oz)   SpO2 100%   BMI 32.43 kg/m²   Physical Exam  Constitutional:       Appearance: He is ill-appearing.   HENT:      Head: Normocephalic and atraumatic.      Nose: Nose normal. No congestion or rhinorrhea.      " Mouth/Throat:      Mouth: Mucous membranes are moist.      Pharynx: No oropharyngeal exudate or posterior oropharyngeal erythema.   Eyes:      Extraocular Movements: Extraocular movements intact.      Conjunctiva/sclera: Conjunctivae normal.      Pupils: Pupils are equal, round, and reactive to light.   Cardiovascular:      Rate and Rhythm: Normal rate and regular rhythm.      Pulses: Normal pulses.      Heart sounds: Normal heart sounds. No murmur heard.    No friction rub. No gallop.      Comments: [facility]  LAVF +Bruit/+Thrill  Pulmonary:      Effort: Pulmonary effort is normal. No respiratory distress.      Breath sounds: Normal breath sounds.   Abdominal:      General: Bowel sounds are normal.   Musculoskeletal:         General: Swelling present. Normal range of motion.      Cervical back: Normal range of motion and neck supple.   Skin:     General: Skin is warm and dry.      Findings: Lesion present.      Comments: Scattered lesions   Neurological:      Mental Status: He is alert and oriented to person, place, and time. Mental status is at baseline.   Psychiatric:         Mood and Affect: Mood normal.         Behavior: Behavior normal.       Fluids:  In: 250 [P.O.:250]  Out: -     LABS:  Recent Labs     08/14/22  0644 08/15/22  1335 08/16/22  0701   SODIUM 130* 130* 130*   POTASSIUM 4.5 4.9 5.3   CHLORIDE 94* 95* 95*   CO2 20 18* 18*   GLUCOSE 137* 121* 127*   BUN 54* 65* 66*   CREATININE 8.38* 8.74* 10.18*   CALCIUM 8.0* 8.1* 8.0*         IMPRESSION:  # ESRD  - Etiology likely 2/2 DM/HTN  - via [facility]  - lost [place] [facility] [place]plan for [facility]  # Thrombosed Left AVF  - US 2/23 no flow  - s/p OR 2/25 AVG Brachio-axillary Creation, thrombectomy and fistulogram   # HTN, labile control   - Goal BP < 140/90  # Anemia of CKD  - Goal Hgb 10-11  - At goal   - % sat 32  - Ferritin 879  # CKD-MBD  - Ca 6.3 -> Cca 7.66-> Cca 8.5 today  - PO4 7.8 ->  8.9->6.9 -> 5.5  -   # Hyperkalemia,  resolved  - due to missed hd   - hd 8/8, 8/10  # weakness  # h/o Methamphetamine use   # Homelessness  # DM   - A1C 6.5  # Severe metabolic acidosis     PLAN:  - iHD qTTS, HD today  - Challenge UF as able   - Continue cholecalciferol   - Increased calcium acetate TID w/meals 8/10  - Transfuse PRN Hgb <7   - JACKIE with HD  - No dietary protein restrictions  - Dose all meds per ESRD/iHD  - weakness workup per primary team   - Low Na/fluid restricted renal diet  - Follow labs  - AVG use per Vasc Surg recs; currently using RIJ TDC, last note 3/1 not yet cleared for use   - Discharge planning underway. Accepted at [facility]. First day 8/18. If medically cleared may discharge after HD on 8/16

## 2022-08-17 ENCOUNTER — APPOINTMENT (OUTPATIENT)
Dept: RADIOLOGY | Facility: MEDICAL CENTER | Age: 47
DRG: 291 | End: 2022-08-17
Attending: STUDENT IN AN ORGANIZED HEALTH CARE EDUCATION/TRAINING PROGRAM
Payer: MEDICARE

## 2022-08-17 PROBLEM — R93.3: Status: ACTIVE | Noted: 2022-08-15

## 2022-08-17 LAB
ALBUMIN SERPL BCP-MCNC: 2.8 G/DL (ref 3.2–4.9)
ALBUMIN/GLOB SERPL: 0.7 G/DL
ALP SERPL-CCNC: 131 U/L (ref 30–99)
ALT SERPL-CCNC: 9 U/L (ref 2–50)
ANION GAP SERPL CALC-SCNC: 13 MMOL/L (ref 7–16)
AST SERPL-CCNC: 12 U/L (ref 12–45)
BILIRUB SERPL-MCNC: 0.4 MG/DL (ref 0.1–1.5)
BUN SERPL-MCNC: 54 MG/DL (ref 8–22)
CALCIUM SERPL-MCNC: 7.8 MG/DL (ref 8.5–10.5)
CHLORIDE SERPL-SCNC: 97 MMOL/L (ref 96–112)
CO2 SERPL-SCNC: 22 MMOL/L (ref 20–33)
CREAT SERPL-MCNC: 8 MG/DL (ref 0.5–1.4)
ERYTHROCYTE [DISTWIDTH] IN BLOOD BY AUTOMATED COUNT: 58.9 FL (ref 35.9–50)
GFR SERPLBLD CREATININE-BSD FMLA CKD-EPI: 8 ML/MIN/1.73 M 2
GLOBULIN SER CALC-MCNC: 4.1 G/DL (ref 1.9–3.5)
GLUCOSE SERPL-MCNC: 151 MG/DL (ref 65–99)
HCT VFR BLD AUTO: 23.1 % (ref 42–52)
HGB BLD-MCNC: 7.5 G/DL (ref 14–18)
MCH RBC QN AUTO: 29.5 PG (ref 27–33)
MCHC RBC AUTO-ENTMCNC: 32.5 G/DL (ref 33.7–35.3)
MCV RBC AUTO: 90.9 FL (ref 81.4–97.8)
PLATELET # BLD AUTO: 173 K/UL (ref 164–446)
PMV BLD AUTO: 9.3 FL (ref 9–12.9)
POTASSIUM SERPL-SCNC: 4.9 MMOL/L (ref 3.6–5.5)
PROT SERPL-MCNC: 6.9 G/DL (ref 6–8.2)
RBC # BLD AUTO: 2.54 M/UL (ref 4.7–6.1)
SODIUM SERPL-SCNC: 132 MMOL/L (ref 135–145)
WBC # BLD AUTO: 7.8 K/UL (ref 4.8–10.8)

## 2022-08-17 PROCEDURE — 93970 EXTREMITY STUDY: CPT

## 2022-08-17 PROCEDURE — 700111 HCHG RX REV CODE 636 W/ 250 OVERRIDE (IP): Performed by: HOSPITALIST

## 2022-08-17 PROCEDURE — 700102 HCHG RX REV CODE 250 W/ 637 OVERRIDE(OP): Performed by: STUDENT IN AN ORGANIZED HEALTH CARE EDUCATION/TRAINING PROGRAM

## 2022-08-17 PROCEDURE — A9270 NON-COVERED ITEM OR SERVICE: HCPCS

## 2022-08-17 PROCEDURE — 700101 HCHG RX REV CODE 250: Performed by: STUDENT IN AN ORGANIZED HEALTH CARE EDUCATION/TRAINING PROGRAM

## 2022-08-17 PROCEDURE — 770001 HCHG ROOM/CARE - MED/SURG/GYN PRIV*

## 2022-08-17 PROCEDURE — A9270 NON-COVERED ITEM OR SERVICE: HCPCS | Performed by: STUDENT IN AN ORGANIZED HEALTH CARE EDUCATION/TRAINING PROGRAM

## 2022-08-17 PROCEDURE — A9270 NON-COVERED ITEM OR SERVICE: HCPCS | Performed by: NURSE PRACTITIONER

## 2022-08-17 PROCEDURE — 80053 COMPREHEN METABOLIC PANEL: CPT

## 2022-08-17 PROCEDURE — 99232 SBSQ HOSP IP/OBS MODERATE 35: CPT | Mod: GC | Performed by: HOSPITALIST

## 2022-08-17 PROCEDURE — 36415 COLL VENOUS BLD VENIPUNCTURE: CPT

## 2022-08-17 PROCEDURE — 700102 HCHG RX REV CODE 250 W/ 637 OVERRIDE(OP)

## 2022-08-17 PROCEDURE — 85027 COMPLETE CBC AUTOMATED: CPT

## 2022-08-17 PROCEDURE — 700102 HCHG RX REV CODE 250 W/ 637 OVERRIDE(OP): Performed by: NURSE PRACTITIONER

## 2022-08-17 RX ADMIN — HEPARIN SODIUM 5000 UNITS: 5000 INJECTION, SOLUTION INTRAVENOUS; SUBCUTANEOUS at 12:51

## 2022-08-17 RX ADMIN — OXYCODONE 5 MG: 5 TABLET ORAL at 23:44

## 2022-08-17 RX ADMIN — CARVEDILOL 25 MG: 25 TABLET, FILM COATED ORAL at 09:50

## 2022-08-17 RX ADMIN — HEPARIN SODIUM 5000 UNITS: 5000 INJECTION, SOLUTION INTRAVENOUS; SUBCUTANEOUS at 21:17

## 2022-08-17 RX ADMIN — OXYCODONE 5 MG: 5 TABLET ORAL at 17:34

## 2022-08-17 RX ADMIN — CARVEDILOL 25 MG: 25 TABLET, FILM COATED ORAL at 17:30

## 2022-08-17 RX ADMIN — Medication 1334 MG: at 09:47

## 2022-08-17 RX ADMIN — Medication 1334 MG: at 12:51

## 2022-08-17 RX ADMIN — Medication 1000 UNITS: at 09:51

## 2022-08-17 RX ADMIN — Medication 1334 MG: at 17:28

## 2022-08-17 RX ADMIN — OXYCODONE 5 MG: 5 TABLET ORAL at 12:50

## 2022-08-17 RX ADMIN — LIDOCAINE 1 PATCH: 50 PATCH CUTANEOUS at 12:57

## 2022-08-17 ASSESSMENT — ENCOUNTER SYMPTOMS
FEVER: 0
ABDOMINAL PAIN: 1
WEAKNESS: 1
CONSTIPATION: 0
CHILLS: 0
PALPITATIONS: 0
DIZZINESS: 0
COUGH: 0
HEADACHES: 0
DIARRHEA: 1
DIAPHORESIS: 0
SHORTNESS OF BREATH: 0
VOMITING: 0
NAUSEA: 0

## 2022-08-17 ASSESSMENT — PAIN DESCRIPTION - PAIN TYPE
TYPE: CHRONIC PAIN

## 2022-08-17 NOTE — PROGRESS NOTES
[facility] Internal Medicine Daily Progress Note    Date of Service  8/16/2022    [facility] Team: [facility] IM Purple Team   Attending: Christophe Nuñez M.d.  Senior Resident: Dr. Clement  Intern:  Dr. Adorno  Contact Number: [phone]    Chief Complaint  Ambrose Watkins is a 46 y.o. male admitted 8/8/2022 with progressive weakness with associated nausea, vomiting, and diarrhea    Hospital Course  47 yo male with PMH of diabetes,HFrE, HTN, and ESRD on HD who presented 8/8/2022 for missing dialysis for past 3 weeks.  This was because he traveled to [place] for leisure and did not know where to go to get dialysis.  In addition, patient does not have a dialysis chair here in [place].  He states he receives dialysis in [place].  He has been experiencing progressive weakness and associated nausea, vomiting, and diarrhea.  He came to the ER and was found to have electrolyte abnormalities including hyperkalemia, elevated creatinine BUN, and low calcium.  Nephrology consulted and patient will require multiple sessions of dialysis given numerous missed sessions.  Currently following [facility] schedule for HD, with vitals/labs gradually improving. Patient also restarted on carvedilol per nephrology recommendation. Accepted to [facility] for dialysis chair, but not available until 8/18. Patient had worsening abdominal pain which lead to refusal of dialysis. Abdominal xray showed an ileus with distal obstruction cannot be excluded. Ultrasound of the abdomen showed small to moderate ascites.    Interval Problem Update  This AM pt refused Miralax and suppository yesterday, states he still had watery stool with some formation this am. Abdominal pain and bloating has improved since yesterday, but bilateral thigh pain has worsened once off of oxycodone. Discussed the risks of denying dialysis and pt amenable to going to dialysis once leg pain was better controlled. This evening, pt appeared more comfortable in bed after dialysis but states he  still has bilateral thigh pain that has not changed. He had only received one dose of oxycodone. Discussed the possibility of an bilateral lower extremity ultrasound for further evaluation, pt states he may be able to tolerate the examination. Still having watery stools and was able to ambulate to bathroom.    I have discussed this patient's plan of care and discharge plan at IDT rounds today with Case Management, Nursing, Nursing leadership, and other members of the IDT team.    Consultants/Specialty  nephrology    Code Status  Full Code    Disposition  Patient is not medically cleared for discharge.   Anticipate discharge to to home with close outpatient follow-up.  I have placed the appropriate orders for post-discharge needs.    Review of Systems  Constitutional:  Positive for malaise/fatigue. Negative for chills, diaphoresis and fever.   Respiratory:  Positive for shortness of breath.    Cardiovascular:  Positive for leg swelling. Negative for chest pain.   Gastrointestinal:  Positive for abdominal pain and diarrhea. Negative for constipation, nausea and vomiting.        Watery stool   Genitourinary:  Negative for dysuria.   Musculoskeletal:         Bilateral leg pain - right thigh pain with light touch    Neurological:  Positive for headaches. Negative for dizziness.     Physical Exam  Temp:  [37 °C (98.6 °F)-37.4 °C (99.3 °F)] 37 °C (98.6 °F)  Pulse:  [81] 81  Resp:  [17-19] 17  BP: (132-139)/(84-85) 139/85  SpO2:  [97 %-100 %] 97 %    Physical Exam  Constitutional:       General: He is not in acute distress.     Appearance: He is ill-appearing. He is not toxic-appearing.   HENT:      Head: Normocephalic and atraumatic.      Right Ear: External ear normal.      Left Ear: External ear normal.      Nose: Nose normal.      Mouth/Throat:      Mouth: Mucous membranes are moist.   Eyes:      Extraocular Movements: Extraocular movements intact.      Conjunctiva/sclera: Conjunctivae normal.      Comments: Pupils  equal   Cardiovascular:      Rate and Rhythm: Normal rate and regular rhythm.      Pulses: Normal pulses.      Heart sounds: Normal heart sounds. No murmur heard.     Comments: Right internal jugular tunneled catheter   LUE AV fistula not in use  Pulmonary:      Effort: Pulmonary effort is normal. No respiratory distress.      Breath sounds: Normal breath sounds. No wheezing or rales.   Abdominal:      General: There is distension. Improvement in distention compared to yesterday      Tenderness: There is abdominal tenderness. There is no guarding or rebound.      Comments: Bowel sounds present and equal throughout the abdomen.  Musculoskeletal:         General: Swelling present.      Cervical back: Normal range of motion.      Right lower leg: Edema present.      Left lower leg: Edema present.      Comments: Bilateral inner thighs tender with light touch and tightness. Right appears more swollen than left thigh.  Skin:     General: Skin is warm and dry.      Comments: Excoriations of upper extremities, lower extremities, scalp, chest/abdomen   Neurological:      General: No focal deficit present.      Mental Status: He is alert and oriented to person, place, and time.   Psychiatric:         Thought Content: Thought content normal.       Fluids    Intake/Output Summary (Last 24 hours) at 8/16/2022 2027  Last data filed at 8/16/2022 1511  Gross per 24 hour   Intake 860 ml   Output 2500 ml   Net -1640 ml       Laboratory  Recent Labs     08/15/22  1335 08/16/22  0701   WBC 7.9 8.8   RBC 2.67* 2.75*   HEMOGLOBIN 7.7* 8.1*   HEMATOCRIT 24.2* 24.8*   MCV 90.6 90.2   MCH 28.8 29.5   MCHC 31.8* 32.7*   RDW 58.4* 58.0*   PLATELETCT 192 182   MPV 10.0 9.6     Recent Labs     08/14/22  0644 08/15/22  1335 08/16/22  0701   SODIUM 130* 130* 130*   POTASSIUM 4.5 4.9 5.3   CHLORIDE 94* 95* 95*   CO2 20 18* 18*   GLUCOSE 137* 121* 127*   BUN 54* 65* 66*   CREATININE 8.38* 8.74* 10.18*   CALCIUM 8.0* 8.1* 8.0*                    Imaging  US-ABDOMEN COMPLETE SURVEY   Final Result      1.  Mildly increased hepatic echotexture is suggestive of fatty infiltration.      2.  Small-to-moderate amount of ascites.      3.  Cholelithiasis         DX-ABDOMEN COMPLETE WITH AP OR PA CXR   Final Result      Fairly diffuse bowel distention with scattered air-fluid levels. Findings are likely related to ileus. More distal obstruction cannot be excluded.      DX-CHEST-PORTABLE (1 VIEW)   Final Result      1.  Mildly enlarged cardiac silhouette with vascular congestion.           Assessment/Plan  Problem Representation:    * Noncompliance of patient with renal dialysis (HCC)- (present on admission)  Assessment & Plan  Has not had dialysis in the past 3 weeks, due to traveling to [place].  Obtained HD in [place], however does not have an outpatient dialysis chair in [place] and last year in [place]. Dialysis chair available Thurs 8/18/22.    -Signs/symptoms and labs/vitals slowly normalizing  -Nephrology following, on [facility] dialysis  -Completed dialysis 8/17    Ileus (HCC)  Assessment & Plan  Patients states he has been having watery stools multiple times a day for many days. He is unable to recount when his last solid stool was. Abdomen has become more distended and pain has increased. Abdominal xray with ileus. Pt has been taking oxycodone for leg pain since 8/8.     -Improvement today per patient and on physical exam  -Will continue to monitor with serial abdominal exam and for more form in bowel movement  -Consideration for CT abdomen with oral contrast if no improvement       High anion gap metabolic acidosis- (present on admission)  Assessment & Plan  High anion gap acidosis on admission likely due to uremia.  Resolved    -Patient dialyzed TThS  -Will continue to monitor    Lower extremity pain, bilateral  Assessment & Plan  Anterior leg pain noted on admission and patient given oxycodone for pain. Most likely related to edema due to missed  dialysis sessions. CPK and lactic acid wnl.  -Oxycodone for pain control  -Continue to monitor bilateral medial thigh pain with consideration for ultrasound if patient is amenable    End stage renal disease (HCC)- (present on admission)  Assessment & Plan  Patient receiving HD in [place], has not received HD in 3 weeks and since admission.  Patient has also had multiple thrombectomies/revisions for AV fistula, currently nonfunctioning.  Patient has tunneled dialysis catheter in place and functioning.  Patient not adherent to HD or outpatient medications.  Nephrology following    -Currently on cholecalciferol   -Receiving HD on [facility]  -Clear liquid diet changed to regular diet as abd pain improving  -Discussed with patient concerning refusal of dialysis, pt went to dialysis today 8/16  -Per nephrology note, AVG use per Vasc Surg recs; currently using [facility] TDC, last note 3/1 not yet cleared for use   -Per pharmacy ([facility]) pt has not picked up any medications. Prescribed medications were 01/2022 - Trazodone 50mg QHS, Calcitriol 0.25mg OD, Furosemide 80mg OD, Ca-acetate 667-270 TID, Lisinopril 20mg OD. 12/31/2021 amplodipine 5mg, glipizide ER 5mg, carvedilol 25 BID    Hyperkalemia- (present on admission)  Assessment & Plan   Initial potassium at 6.1. Likely due to missing dialysis, now WNL. Resolved    -Recent labs demonstrate normalization of potassium  -We will continue to follow with serial labs    Hypertension- (present on admission)  Assessment & Plan  History of uncontrolled hypertension. Likely due to missing dialysis and nonadherence to outpatient BP medications    -Hydralazine as needed due to history of CHF  -Carvedilol 25mg twice daily  -Continue to monitor BP and adjust medications as needed       VTE prophylaxis: heparin ppx    I have performed a physical exam and reviewed and updated ROS and Plan today (8/16/2022). In review of yesterday's note (8/15/2022), there are no changes  except as documented above.

## 2022-08-17 NOTE — PROGRESS NOTES
Assumed care of patient  AAOx4, irritable  RA  Up ad da  +bowel sounds, hypoactive. Abdomen distended/semi-hard. Per patient- +watery stool.   Pain in abdomen/legs  Call light within reach

## 2022-08-17 NOTE — PROGRESS NOTES
".  [facility] -  PROGRESS NOTE               Author: Serafin Pina M.D. Date & Time: 8/17/2022  1:19 PM     HPI:  45 yo M with End Stage Renal Disease on hemodialysis at [facility] every Tues/Thurs/Sat, last HD at [facility] in 4/2022, then traveled and lost HD chair. Other PMH diabetes mellitus and Hypertension who presented to the emergency department on 8/8/22 for weakness. He missed 1 months of HD due to traveling. He was found to be hypertensive 153/99, afebrile. Labs with K of 6.1, BUN 93, bicarb 10.   No F/C/N/V/CP/SOB.  No melena, hematochezia, hematemesis.  No HA, visual changes, or abdominal pain.    DAILY NEPHROLOGY SUMMARY:  8/9: 3L net UF. Resting in bed. No complaints. No acute distress. Security called last night, patient aggressive towards staff regarding personal items. Bps improved this am.   8/10: Seen on HD this am. Lethargic. No complaints.   8/11:  Resting comfortably no complaints.  VSS.  8/12: Ambulating in room indep. Denies CP/SOB, on RA. Reports he is hungry and wants breakfast before anything else. Bps elevated.   8/13: VSS RA no complaints.  Ambulating.   8/14: Sleeping in bed. Dismissive. Does not want extra HD today. BP elevated.   8/15: Risks of no dialysis explained to patient, refused treatment today.  Complaining of leg pain and swelling.  8/16: Seen on dialysis at bedside.  Complaining of thigh pain.   8/17: Denies pain or SOB.  Resting.    REVIEW OF SYSTEMS:    10 point ROS reviewed and is as per HPI/daily summary or otherwise negative    PMH/PSH/SH/FH: Reviewed and unchanged since admission note  CURRENT MEDICATIONS: Reviewed from admission to present day    VS:  /74   Pulse 79   Temp 37.4 °C (99.4 °F) (Temporal)   Resp 18   Ht 1.753 m (5' 9\")   Wt 99.6 kg (219 lb 9.3 oz)   SpO2 97%   BMI 32.43 kg/m²   Physical Exam  Constitutional:       Appearance: He is ill-appearing.   HENT:      Head: Normocephalic and atraumatic.      Nose: Nose " normal. No congestion or rhinorrhea.      Mouth/Throat:      Mouth: Mucous membranes are moist.      Pharynx: No oropharyngeal exudate or posterior oropharyngeal erythema.   Eyes:      Extraocular Movements: Extraocular movements intact.      Conjunctiva/sclera: Conjunctivae normal.      Pupils: Pupils are equal, round, and reactive to light.   Cardiovascular:      Rate and Rhythm: Normal rate and regular rhythm.      Pulses: Normal pulses.      Heart sounds: Normal heart sounds. No murmur heard.    No friction rub. No gallop.      Comments: [facility] TDC  LAVF +Bruit/+Thrill  Pulmonary:      Effort: Pulmonary effort is normal. No respiratory distress.      Breath sounds: Normal breath sounds.   Abdominal:      General: Bowel sounds are normal.   Musculoskeletal:         General: Swelling present. Normal range of motion.      Cervical back: Normal range of motion and neck supple.   Skin:     General: Skin is warm and dry.      Findings: Lesion present.      Comments: Scattered lesions   Neurological:      Mental Status: He is alert and oriented to person, place, and time. Mental status is at baseline.   Psychiatric:         Mood and Affect: Mood normal.         Behavior: Behavior normal.       Fluids:  In: 860 [P.O.:360; Dialysis:500]  Out: 2500     LABS:  Recent Labs     08/15/22  1335 08/16/22  0701 08/17/22  0716   SODIUM 130* 130* 132*   POTASSIUM 4.9 5.3 4.9   CHLORIDE 95* 95* 97   CO2 18* 18* 22   GLUCOSE 121* 127* 151*   BUN 65* 66* 54*   CREATININE 8.74* 10.18* 8.00*   CALCIUM 8.1* 8.0* 7.8*         IMPRESSION:  # ESRD  - Etiology likely 2/2 DM/HTN  - via [facility]  - lost his HD chair at [facility] ->plan for [facility]  # Thrombosed Left AVF  - US 2/23 no flow  - s/p OR 2/25 AVG Brachio-axillary Creation, thrombectomy and fistulogram   # HTN, labile control   - Goal BP < 140/90  # Anemia of CKD  - Goal Hgb 10-11  - At goal   - % sat 32  - Ferritin 879  # CKD-MBD  - Ca 6.3 -> Cca 7.66-> Cca 8.5 today  -  PO4 7.8 ->  8.9->6.9 -> 5.5  -   # Hyperkalemia, resolved  - due to missed hd   - hd 8/8, 8/10  # weakness  # h/o Methamphetamine use   # Homelessness  # DM   - A1C 6.5  # Severe metabolic acidosis     PLAN:  - iHD qTTS  - Challenge UF as able   - Continue cholecalciferol   - calcium acetate with meals  - Transfuse PRN Hgb <7   - JACKIE with HD  - No dietary protein restrictions  - Dose all meds per ESRD/iHD  - weakness workup per primary team   - Low Na/fluid restricted renal diet  - Follow labs  - AVG use per Vasc Surg recs; currently using [facility] TDC, last note 3/1 not yet cleared for use   - Discharge planning underway. Accepted at [facility]. First day 8/18. If medically cleared may discharge after HD on 8/16

## 2022-08-17 NOTE — CARE PLAN
The patient is Watcher - Medium risk of patient condition declining or worsening    Shift Goals  Clinical Goals: compliance  Patient Goals: rest, be left alone  Family Goals: donis    Progress made toward(s) clinical / shift goals:  yes    Patient is not progressing towards the following goals:      Problem: Knowledge Deficit - Standard  Goal: Patient and family/care givers will demonstrate understanding of plan of care, disease process/condition, diagnostic tests and medications  Outcome: Not Progressing  Note: Not receptive to education      Problem: Provide Safe Environment  Goal: Suicide environmental safety, protocols, policies, and practices will be implemented  Outcome: Progressing     Problem: Fall Risk  Goal: Patient will remain free from falls  Outcome: Progressing

## 2022-08-18 LAB
ALBUMIN SERPL BCP-MCNC: 2.6 G/DL (ref 3.2–4.9)
ALBUMIN/GLOB SERPL: 0.6 G/DL
ALP SERPL-CCNC: 134 U/L (ref 30–99)
ALT SERPL-CCNC: 7 U/L (ref 2–50)
ANION GAP SERPL CALC-SCNC: 19 MMOL/L (ref 7–16)
AST SERPL-CCNC: 11 U/L (ref 12–45)
BASOPHILS # BLD AUTO: 0.2 % (ref 0–1.8)
BASOPHILS # BLD: 0.02 K/UL (ref 0–0.12)
BILIRUB SERPL-MCNC: 0.5 MG/DL (ref 0.1–1.5)
BUN SERPL-MCNC: 63 MG/DL (ref 8–22)
CALCIUM SERPL-MCNC: 8.1 MG/DL (ref 8.5–10.5)
CHLORIDE SERPL-SCNC: 96 MMOL/L (ref 96–112)
CO2 SERPL-SCNC: 16 MMOL/L (ref 20–33)
CREAT SERPL-MCNC: 9.05 MG/DL (ref 0.5–1.4)
EOSINOPHIL # BLD AUTO: 0.05 K/UL (ref 0–0.51)
EOSINOPHIL NFR BLD: 0.6 % (ref 0–6.9)
ERYTHROCYTE [DISTWIDTH] IN BLOOD BY AUTOMATED COUNT: 58.4 FL (ref 35.9–50)
GFR SERPLBLD CREATININE-BSD FMLA CKD-EPI: 7 ML/MIN/1.73 M 2
GLOBULIN SER CALC-MCNC: 4.5 G/DL (ref 1.9–3.5)
GLUCOSE SERPL-MCNC: 144 MG/DL (ref 65–99)
HCT VFR BLD AUTO: 25.4 % (ref 42–52)
HGB BLD-MCNC: 8.1 G/DL (ref 14–18)
IMM GRANULOCYTES # BLD AUTO: 0.04 K/UL (ref 0–0.11)
IMM GRANULOCYTES NFR BLD AUTO: 0.5 % (ref 0–0.9)
LYMPHOCYTES # BLD AUTO: 1.08 K/UL (ref 1–4.8)
LYMPHOCYTES NFR BLD: 13.1 % (ref 22–41)
MCH RBC QN AUTO: 29.2 PG (ref 27–33)
MCHC RBC AUTO-ENTMCNC: 31.9 G/DL (ref 33.7–35.3)
MCV RBC AUTO: 91.7 FL (ref 81.4–97.8)
MONOCYTES # BLD AUTO: 1.14 K/UL (ref 0–0.85)
MONOCYTES NFR BLD AUTO: 13.9 % (ref 0–13.4)
NEUTROPHILS # BLD AUTO: 5.89 K/UL (ref 1.82–7.42)
NEUTROPHILS NFR BLD: 71.7 % (ref 44–72)
NRBC # BLD AUTO: 0 K/UL
NRBC BLD-RTO: 0 /100 WBC
PLATELET # BLD AUTO: 214 K/UL (ref 164–446)
PMV BLD AUTO: 9.8 FL (ref 9–12.9)
POTASSIUM SERPL-SCNC: 5.5 MMOL/L (ref 3.6–5.5)
PROT SERPL-MCNC: 7.1 G/DL (ref 6–8.2)
RBC # BLD AUTO: 2.77 M/UL (ref 4.7–6.1)
SODIUM SERPL-SCNC: 131 MMOL/L (ref 135–145)
WBC # BLD AUTO: 8.2 K/UL (ref 4.8–10.8)

## 2022-08-18 PROCEDURE — 99232 SBSQ HOSP IP/OBS MODERATE 35: CPT | Mod: GC | Performed by: HOSPITALIST

## 2022-08-18 PROCEDURE — A9270 NON-COVERED ITEM OR SERVICE: HCPCS | Performed by: STUDENT IN AN ORGANIZED HEALTH CARE EDUCATION/TRAINING PROGRAM

## 2022-08-18 PROCEDURE — 85025 COMPLETE CBC W/AUTO DIFF WBC: CPT

## 2022-08-18 PROCEDURE — 700102 HCHG RX REV CODE 250 W/ 637 OVERRIDE(OP): Performed by: STUDENT IN AN ORGANIZED HEALTH CARE EDUCATION/TRAINING PROGRAM

## 2022-08-18 PROCEDURE — 700111 HCHG RX REV CODE 636 W/ 250 OVERRIDE (IP)

## 2022-08-18 PROCEDURE — 80053 COMPREHEN METABOLIC PANEL: CPT

## 2022-08-18 PROCEDURE — 36415 COLL VENOUS BLD VENIPUNCTURE: CPT

## 2022-08-18 PROCEDURE — 700102 HCHG RX REV CODE 250 W/ 637 OVERRIDE(OP)

## 2022-08-18 PROCEDURE — 770001 HCHG ROOM/CARE - MED/SURG/GYN PRIV*

## 2022-08-18 PROCEDURE — 700111 HCHG RX REV CODE 636 W/ 250 OVERRIDE (IP): Performed by: NURSE PRACTITIONER

## 2022-08-18 PROCEDURE — A9270 NON-COVERED ITEM OR SERVICE: HCPCS

## 2022-08-18 PROCEDURE — 90935 HEMODIALYSIS ONE EVALUATION: CPT

## 2022-08-18 PROCEDURE — 700111 HCHG RX REV CODE 636 W/ 250 OVERRIDE (IP): Performed by: HOSPITALIST

## 2022-08-18 PROCEDURE — 700101 HCHG RX REV CODE 250: Performed by: STUDENT IN AN ORGANIZED HEALTH CARE EDUCATION/TRAINING PROGRAM

## 2022-08-18 RX ORDER — HEPARIN SODIUM 1000 [USP'U]/ML
INJECTION, SOLUTION INTRAVENOUS; SUBCUTANEOUS
Status: COMPLETED
Start: 2022-08-18 | End: 2022-08-18

## 2022-08-18 RX ADMIN — LIDOCAINE 1 PATCH: 50 PATCH CUTANEOUS at 14:12

## 2022-08-18 RX ADMIN — Medication 1334 MG: at 09:49

## 2022-08-18 RX ADMIN — HEPARIN SODIUM 3100 UNITS: 1000 INJECTION, SOLUTION INTRAVENOUS; SUBCUTANEOUS at 19:39

## 2022-08-18 RX ADMIN — Medication 1334 MG: at 21:10

## 2022-08-18 RX ADMIN — Medication 1334 MG: at 14:11

## 2022-08-18 RX ADMIN — OXYCODONE 5 MG: 5 TABLET ORAL at 09:50

## 2022-08-18 RX ADMIN — HEPARIN SODIUM 5000 UNITS: 5000 INJECTION, SOLUTION INTRAVENOUS; SUBCUTANEOUS at 14:12

## 2022-08-18 RX ADMIN — HEPARIN SODIUM 5000 UNITS: 5000 INJECTION, SOLUTION INTRAVENOUS; SUBCUTANEOUS at 21:11

## 2022-08-18 RX ADMIN — OXYCODONE 5 MG: 5 TABLET ORAL at 21:08

## 2022-08-18 RX ADMIN — HYDRALAZINE HYDROCHLORIDE 10 MG: 10 TABLET, FILM COATED ORAL at 21:07

## 2022-08-18 RX ADMIN — EPOETIN ALFA-EPBX 5000 UNITS: 3000 INJECTION, SOLUTION INTRAVENOUS; SUBCUTANEOUS at 19:39

## 2022-08-18 ASSESSMENT — PAIN DESCRIPTION - PAIN TYPE: TYPE: CHRONIC PAIN

## 2022-08-18 NOTE — PROGRESS NOTES
Received report from NOC RN, pt care assumed. VS stable on RA. No signs of acute distress. Call light within reach. Bed-alarm off. Pt is sleeping and upset for waking him up. Pt refusing full assessment this morning.

## 2022-08-18 NOTE — CARE PLAN
The patient is Stable - Low risk of patient condition declining or worsening    Shift Goals  Clinical Goals: safety  Patient Goals: rest, to be left alone  Family Goals: POLINA    Progress made toward(s) clinical / shift goals:  refuses the bed alarm despite education, up self with steady gait    Patient is not progressing towards the following goals:

## 2022-08-18 NOTE — PROGRESS NOTES
[facility] Internal Medicine Daily Progress Note    Date of Service  8/17/2022    [facility] Team: R IM Purple Team   Attending: Christophe Nuñez M.d.  Senior Resident: Dr. Clement  Intern:  Dr. Adorno  Contact Number: [phone]    Chief Complaint  Ambrose Watkins is a 46 y.o. male admitted 8/8/2022 with progressive weakness with associated nausea, vomiting, and diarrhea    Hospital Course  45 yo male with PMH of diabetes,HFrEF, HTN, and ESRD on HD who presented 8/8/2022 for missing dialysis for past 3 weeks due to travelling to [place] for leisure and did not know where to go to get dialysis. In addition, patient did not have a dialysis chair here in [place].  He states he receives dialysis in [place].  He has been experiencing progressive weakness and associated nausea, vomiting, and diarrhea.  He came to the ER and was found to have electrolyte abnormalities including hyperkalemia, elevated creatinine BUN, and low calcium.  Nephrology consulted and stated that patient will require multiple sessions of dialysis given numerous missed sessions. Currently following [facility] schedule for HD, with vitals/labs gradually improving. Patient also restarted on carvedilol per nephrology recommendation. Accepted to [facility] for dialysis chair, available on 8/18. Patient had worsening abdominal pain which lead to refusal of dialysis. Abdominal xray showed bowel distention with air-fluid levels. Ultrasound of the abdomen showed small to moderate ascites. Patient noted to have bilateral thigh pain (R>L), with DVT US negative.    Interval Problem Update  No acute events overnight, patient denies abdominal pain but notes ongoing bilateral thigh pain. He does not note improvement of leg pain with oxycodone. Explained to patient that these symptoms he is experiencing is likely due to volume overload and that it will likely take multiple dialysis sessions before there is some relief in symptoms. DVT US of bilateral lower extremities  done and negative. Patient states that he is feels too weak for discharge at this time and is willing to undergo dialysis tomorrow.    I have discussed this patient's plan of care and discharge plan at IDT rounds today with Case Management, Nursing, Nursing leadership, and other members of the IDT team.    Consultants/Specialty  nephrology    Code Status  Full Code    Disposition  Patient is not medically cleared for discharge.   Anticipate discharge to to home with close outpatient follow-up.  I have placed the appropriate orders for post-discharge needs.    Review of Systems  Review of Systems   Constitutional:  Positive for malaise/fatigue. Negative for chills, diaphoresis and fever.   Respiratory:  Negative for cough and shortness of breath.    Cardiovascular:  Positive for leg swelling. Negative for chest pain and palpitations.   Gastrointestinal:  Positive for abdominal pain and diarrhea. Negative for constipation, nausea and vomiting.        Watery stool, no formed stools unable to give timeframe   Genitourinary:         Reports minimal urine production   Musculoskeletal:         Bilateral leg pain - right thigh pain with light touch    Neurological:  Positive for weakness. Negative for dizziness and headaches.      Physical Exam  Temp:  [36.7 °C (98 °F)-37.4 °C (99.4 °F)] 37.1 °C (98.8 °F)  Pulse:  [76-82] 76  Resp:  [17-18] 18  BP: (121-130)/(67-79) 121/67  SpO2:  [95 %-97 %] 97 %    Physical Exam  Constitutional:       General: He is not in acute distress.     Appearance: He is ill-appearing. He is not toxic-appearing.   HENT:      Head: Normocephalic and atraumatic.      Right Ear: External ear normal.      Left Ear: External ear normal.      Nose: Nose normal.      Mouth/Throat:      Mouth: Mucous membranes are moist.   Eyes:      Extraocular Movements: Extraocular movements intact.      Conjunctiva/sclera: Conjunctivae normal.   Cardiovascular:      Rate and Rhythm: Normal rate and regular rhythm.       Pulses: Normal pulses.      Heart sounds: Normal heart sounds. No murmur heard.     Comments: Right internal jugular tunneled catheter   LUE AV fistula not in use  Pulmonary:      Effort: Pulmonary effort is normal. No respiratory distress.      Breath sounds: Normal breath sounds. No wheezing or rales.   Abdominal:      General: There is distension.      Tenderness: There is abdominal tenderness (minimal). There is no guarding or rebound.   Musculoskeletal:         General: Swelling present.      Cervical back: Normal range of motion.      Right lower leg: Edema present.      Left lower leg: Edema present.   Skin:     General: Skin is warm and dry.      Comments: Excoriations of upper extremities, lower extremities, scalp, chest/abdomen   Neurological:      General: No focal deficit present.      Mental Status: He is alert and oriented to person, place, and time.   Psychiatric:         Thought Content: Thought content normal.       Fluids    Intake/Output Summary (Last 24 hours) at 8/17/2022 1837  Last data filed at 8/17/2022 1000  Gross per 24 hour   Intake 720 ml   Output --   Net 720 ml       Laboratory  Recent Labs     08/15/22  1335 08/16/22  0701 08/17/22  0716   WBC 7.9 8.8 7.8   RBC 2.67* 2.75* 2.54*   HEMOGLOBIN 7.7* 8.1* 7.5*   HEMATOCRIT 24.2* 24.8* 23.1*   MCV 90.6 90.2 90.9   MCH 28.8 29.5 29.5   MCHC 31.8* 32.7* 32.5*   RDW 58.4* 58.0* 58.9*   PLATELETCT 192 182 173   MPV 10.0 9.6 9.3     Recent Labs     08/15/22  1335 08/16/22  0701 08/17/22  0716   SODIUM 130* 130* 132*   POTASSIUM 4.9 5.3 4.9   CHLORIDE 95* 95* 97   CO2 18* 18* 22   GLUCOSE 121* 127* 151*   BUN 65* 66* 54*   CREATININE 8.74* 10.18* 8.00*   CALCIUM 8.1* 8.0* 7.8*                   Imaging  US-EXTREMITY VENOUS LOWER BILAT   Final Result      US-ABDOMEN COMPLETE SURVEY   Final Result      1.  Mildly increased hepatic echotexture is suggestive of fatty infiltration.      2.  Small-to-moderate amount of ascites.      3.  Cholelithiasis          DX-ABDOMEN COMPLETE WITH AP OR PA CXR   Final Result      Fairly diffuse bowel distention with scattered air-fluid levels. Findings are likely related to ileus. More distal obstruction cannot be excluded.      DX-CHEST-PORTABLE (1 VIEW)   Final Result      1.  Mildly enlarged cardiac silhouette with vascular congestion.           Assessment/Plan  Problem Representation:    * Noncompliance of patient with renal dialysis (HCC)- (present on admission)  Assessment & Plan  Has not had dialysis in the past 3 weeks, due to traveling to [place].  Obtained HD in [place], however does not have an outpatient dialysis chair in [place] and last year in [place]. Dialysis chair available Thurs 8/18/22.    -Signs/symptoms and labs/vitals slowly normalizing  -Nephrology following, on [facility] dialysis  -Completed dialysis 8/16, for dialysis on 8/18    Multiple air fluid levels of small intestine determined by X-ray  Assessment & Plan  Patients states he has been having watery stools multiple times a day for many days. He is unable to recount when his last solid stool was. Abdomen has become more distended and pain has increased. Abdominal xray with bowel distention with air-fluid levels. Pt has been taking oxycodone for leg pain since 8/8.     -Improvement today per patient and on physical exam  -Will continue to monitor with serial abdominal exam and for more form in bowel movement  -Consideration for CT abdomen with oral contrast if with worsening pain      High anion gap metabolic acidosis- (present on admission)  Assessment & Plan  High anion gap acidosis on admission likely due to uremia.  Resolved    -Patient dialyzed [facility]  -Will continue to monitor    Lower extremity pain, bilateral  Assessment & Plan  Anterior leg pain noted on admission and patient given oxycodone for pain. Most likely related to edema due to missed dialysis sessions. CPK and lactic acid wnl.  -Oxycodone for pain control  -Bilateral DVT US  negative    End stage renal disease (HCC)- (present on admission)  Assessment & Plan  Patient receiving HD in [place], has not received HD in 3 weeks and since admission.  Patient has also had multiple thrombectomies/revisions for AV fistula, currently nonfunctioning.  Patient has tunneled dialysis catheter in place and functioning.  Patient not adherent to HD or outpatient medications.  Nephrology following    -Currently on cholecalciferol   -Receiving HD on [facility]  -Clear liquid diet changed to regular diet as abd pain improving  -Discussed with patient concerning refusal of dialysis, pt went to dialysis today 8/16  -Per nephrology note, AVG use per Vasc Surg recs; currently using RIJ TDC, last note 3/1 not yet cleared for use   -Per pharmacy ([facility]) pt has not picked up any medications. Prescribed medications were 01/2022 - Trazodone 50mg QHS, Calcitriol 0.25mg OD, Furosemide 80mg OD, Ca-acetate 667-270 TID, Lisinopril 20mg OD. 12/31/2021 amplodipine 5mg, glipizide ER 5mg, carvedilol 25 BID    Hyperkalemia- (present on admission)  Assessment & Plan   Initial potassium at 6.1. Likely due to missing dialysis, now WNL. Resolved    -Recent labs demonstrate normalization of potassium  -We will continue to follow with serial labs    Hypertension- (present on admission)  Assessment & Plan  History of uncontrolled hypertension. Likely due to missing dialysis and nonadherence to outpatient BP medications    -Hydralazine as needed due to history of CHF  -Carvedilol 25mg twice daily  -Continue to monitor BP and adjust medications as needed       VTE prophylaxis: heparin ppx    I have performed a physical exam and reviewed and updated ROS and Plan today (8/17/2022). In review of yesterday's note (8/16/2022), there are no changes except as documented above.

## 2022-08-18 NOTE — CARE PLAN
The patient is Watcher - Medium risk of patient condition declining or worsening    Shift Goals  Clinical Goals: safety  Patient Goals: rest, to be left alone  Family Goals: POLINA    Progress made toward(s) clinical / shift goals:  appropriate behavior, less aggressive    Patient is not progressing towards the following goals: edema mgmt, skin integrity       Problem: Knowledge Deficit - Standard  Goal: Patient and family/care givers will demonstrate understanding of plan of care, disease process/condition, diagnostic tests and medications  Outcome: Progressing     Problem: Pain - Standard  Goal: Alleviation of pain or a reduction in pain to the patient’s comfort goal  Outcome: Progressing     Problem: Provide Safe Environment  Goal: Suicide environmental safety, protocols, policies, and practices will be implemented  Outcome: Progressing     Problem: Psychosocial  Goal: Patient's ability to identify and develop effective coping behaviors will improve  Outcome: Progressing  Goal: Patient's ability to identify and utilize available support systems will improve  Outcome: Progressing     Problem: Fall Risk  Goal: Patient will remain free from falls  Outcome: Progressing

## 2022-08-18 NOTE — PROGRESS NOTES
Report received at bedside from NOC RN, pt care assumed. Pt aaox4, pt notes new onset open ulcers on body that are extremely itchy - pt takes hot showers to alleviate the discomfort and itching . Patient resting comfortably in the chair; with abdomen as well as bilateral extremities notably edematous. POC discussed with pt and verbalizes no questions. Pt c/o of 8/10 pain at his lower right extremity. Pt denies any additional needs at this time. Bed in lowest position, pt educated on fall risk and verbalized understanding, call light within reach.

## 2022-08-18 NOTE — PROGRESS NOTES
[facility] Internal Medicine Daily Progress Note    Date of Service  8/18/2022    [facility] Team: [facility] IM Purple Team   Attending: Christophe Nuñez M.d.  Senior Resident: Dr. Clement  Intern:  Dr. Adorno  Contact Number: [phone]    Chief Complaint  Ambrose Watkins is a 46 y.o. male admitted 8/8/2022 with with progressive weakness with associated nausea, vomiting, and diarrhea    Hospital Course  47 yo male with PMH of diabetes,HFrEF, HTN, and ESRD on HD who presented 8/8/2022 for missing dialysis for past 3 weeks due to travelling to [place] for leisure and did not know where to go to get dialysis. In addition, patient did not have a dialysis chair here in [place].  He states he receives dialysis in [place].  He has been experiencing progressive weakness and associated nausea, vomiting, and diarrhea.  He came to the ER and was found to have electrolyte abnormalities including hyperkalemia, elevated creatinine BUN, and low calcium.  Nephrology consulted and stated that patient will require multiple sessions of dialysis given numerous missed sessions. Currently following [facility] schedule for HD, with vitals/labs gradually improving. Patient also restarted on carvedilol per nephrology recommendation. Accepted to [facility] for dialysis chair, available on 8/18. Patient had worsening abdominal pain which lead to refusal of dialysis. Abdominal xray showed bowel distention with air-fluid levels. Ultrasound of the abdomen showed small to moderate ascites. Patient noted to have bilateral thigh pain (R>L), with DVT US negative.    Interval Problem Update  This am, patient denies abdominal pain today. Ongoing bilateral inner thigh pain still present but left leg has improved. Patient agreeable to going to dialysis today. Pt states he has a boil in his left armpit that has been itchy for the past two days. Recommendation made for warm compress to help reduce the boil. Pt states he has been having the other bumps and itchy skin  for the past month. Bowel movement watery with some brown formation.  Pt awaiting dialysis this evening.    I have discussed this patient's plan of care and discharge plan at IDT rounds today with Case Management, Nursing, Nursing leadership, and other members of the IDT team.    Consultants/Specialty  nephrology    Code Status  Full Code    Disposition  Patient is not medically cleared for discharge.   Anticipate discharge to to home with close outpatient follow-up.  I have placed the appropriate orders for post-discharge needs.    Review of Systems  Constitutional:  Positive for malaise/fatigue. Negative for chills, diaphoresis and fever.   Respiratory:  Negative for cough and shortness of breath.    Cardiovascular:  Positive for leg swelling. Negative for chest pain and palpitations.   Gastrointestinal:  Positive for abdominal pain and diarrhea. Negative for constipation, nausea and vomiting.        Watery stool some brown formation  Genitourinary:         Reports minimal urine production   Musculoskeletal:         Bilateral leg pain - right thigh pain with light touch    Neurological:  Positive for weakness. Negative for dizziness and headaches.      Physical Exam  Temp:  [36.7 °C (98.1 °F)-37.1 °C (98.7 °F)] 36.7 °C (98.1 °F)  Pulse:  [72-80] 80  Resp:  [16-18] 18  BP: (109-129)/(65-75) 109/65  SpO2:  [91 %-98 %] 92 %    Physical Exam  Constitutional:       General: He is not in acute distress.     Appearance: He is ill-appearing. He is not toxic-appearing.   HENT:      Head: Normocephalic and atraumatic.      Right Ear: External ear normal.      Left Ear: External ear normal.      Nose: Nose normal.      Mouth/Throat:      Mouth: Mucous membranes are moist.   Eyes:      Extraocular Movements: Extraocular movements intact.      Conjunctiva/sclera: Conjunctivae normal.   Cardiovascular:      Rate and Rhythm: Normal rate and regular rhythm.      Pulses: Normal pulses.      Heart sounds: Normal heart sounds. No  murmur heard.     Comments: Right internal jugular tunneled catheter   LUE AV fistula not in use  Pulmonary:      Effort: Pulmonary effort is normal. No respiratory distress.      Breath sounds: Normal breath sounds. No wheezing or rales.   Abdominal:      General: There is distension.      Tenderness: There is abdominal tenderness (minimal). There is no guarding or rebound.   Musculoskeletal:         General: Swelling present.      Cervical back: Normal range of motion.      Right lower leg: Edema present.      Left lower leg: Edema present.   Skin:     General: Skin is warm and dry.      Comments: Excoriations of upper extremities, lower extremities, scalp, chest/abdomen. Right underarm boil erythematous/tender.   Neurological:      General: No focal deficit present.      Mental Status: He is alert and oriented to person, place, and time.   Psychiatric:         Thought Content: Thought content normal    Fluids    Intake/Output Summary (Last 24 hours) at 8/18/2022 1815  Last data filed at 8/18/2022 1516  Gross per 24 hour   Intake 360 ml   Output --   Net 360 ml       Laboratory  Recent Labs     08/16/22  0701 08/17/22  0716 08/18/22  1409   WBC 8.8 7.8 8.2   RBC 2.75* 2.54* 2.77*   HEMOGLOBIN 8.1* 7.5* 8.1*   HEMATOCRIT 24.8* 23.1* 25.4*   MCV 90.2 90.9 91.7   MCH 29.5 29.5 29.2   MCHC 32.7* 32.5* 31.9*   RDW 58.0* 58.9* 58.4*   PLATELETCT 182 173 214   MPV 9.6 9.3 9.8     Recent Labs     08/16/22  0701 08/17/22  0716 08/18/22  1409   SODIUM 130* 132* 131*   POTASSIUM 5.3 4.9 5.5   CHLORIDE 95* 97 96   CO2 18* 22 16*   GLUCOSE 127* 151* 144*   BUN 66* 54* 63*   CREATININE 10.18* 8.00* 9.05*   CALCIUM 8.0* 7.8* 8.1*                   Imaging  US-EXTREMITY VENOUS LOWER BILAT   Final Result      US-ABDOMEN COMPLETE SURVEY   Final Result      1.  Mildly increased hepatic echotexture is suggestive of fatty infiltration.      2.  Small-to-moderate amount of ascites.      3.  Cholelithiasis         DX-ABDOMEN COMPLETE  WITH AP OR PA CXR   Final Result      Fairly diffuse bowel distention with scattered air-fluid levels. Findings are likely related to ileus. More distal obstruction cannot be excluded.      DX-CHEST-PORTABLE (1 VIEW)   Final Result      1.  Mildly enlarged cardiac silhouette with vascular congestion.           Assessment/Plan  Problem Representation:    * Noncompliance of patient with renal dialysis (HCC)- (present on admission)  Assessment & Plan  Has not had dialysis in the past 3 weeks, due to traveling to [place].  Obtained HD in [place], however does not have an outpatient dialysis chair in [place] and last year in [place]. Dialysis chair available Thurs 8/18/22.    -Signs/symptoms and labs/vitals slowly normalizing  -Nephrology following, on [facility] dialysis  -Completed dialysis 8/16, for dialysis on 8/18    Multiple air fluid levels of small intestine determined by X-ray  Assessment & Plan  Patients states he has been having watery stools multiple times a day for many days. He is unable to recount when his last solid stool was. Abdomen has become more distended and pain has increased. Abdominal xray with bowel distention with air-fluid levels. Pt has been taking oxycodone for leg pain since 8/8.     -Improvement today per patient and on physical exam  -Will continue to monitor with serial abdominal exam and for more form in bowel movement  -Consideration for CT abdomen with oral contrast if with worsening pain      High anion gap metabolic acidosis- (present on admission)  Assessment & Plan  High anion gap acidosis on admission likely due to uremia.  Resolved    -Patient dialyzed [facility]  -Will continue to monitor    Lower extremity pain, bilateral  Assessment & Plan  Anterior leg pain noted on admission and patient given oxycodone for pain. Most likely related to edema due to missed dialysis sessions. CPK and lactic acid wnl.  -Oxycodone for pain control  -Bilateral DVT US negative    End stage renal  disease (HCC)- (present on admission)  Assessment & Plan  Patient receiving HD in [place], has not received HD in 3 weeks and since admission.  Patient has also had multiple thrombectomies/revisions for AV fistula, currently nonfunctioning.  Patient has tunneled dialysis catheter in place and functioning.  Patient not adherent to HD or outpatient medications.  Nephrology following    -Currently on cholecalciferol   -Receiving HD on [facility]  -Clear liquid diet changed to regular diet as abd pain improving  -Discussed with patient concerning refusal of dialysis, pt went to dialysis 8/16, going today 8/18  -Per nephrology note, AVG use per Vasc Surg recs; currently using RIJ TDC, last note 3/1 not yet cleared for use   -Per pharmacy ([facility][1]) pt has not picked up any medications. Prescribed medications were 01/2022 - Trazodone 50mg QHS, Calcitriol 0.25mg OD, Furosemide 80mg OD, Ca-acetate 667-270 TID, Lisinopril 20mg OD. 12/31/2021 amplodipine 5mg, glipizide ER 5mg, carvedilol 25 BID    Hyperkalemia- (present on admission)  Assessment & Plan   Initial potassium at 6.1. Likely due to missing dialysis, now WNL. Resolved    -Recent labs demonstrate normalization of potassium  -We will continue to follow with serial labs    Hypertension- (present on admission)  Assessment & Plan  History of uncontrolled hypertension. Likely due to missing dialysis and nonadherence to outpatient BP medications    -Hydralazine as needed due to history of CHF  -Carvedilol 25mg twice daily  -Continue to monitor BP and adjust medications as needed       VTE prophylaxis: heparin ppx    I have performed a physical exam and reviewed and updated ROS and Plan today (8/18/2022). In review of yesterday's note (8/17/2022), there are no changes except as documented above.

## 2022-08-18 NOTE — PROGRESS NOTES
".  [facility] -  PROGRESS NOTE               Author: Corwin Paulson N.P. Date & Time: 8/18/2022  10:51 AM     HPI:  47 yo M with End Stage Renal Disease on hemodialysis at [facility] every Tues/Thurs/Sat, last HD at [place] in 4/2022, then traveled and lost HD chair. Other PMH diabetes mellitus and Hypertension who presented to the emergency department on 8/8/22 for weakness. He missed 1 months of HD due to traveling. He was found to be hypertensive 153/99, afebrile. Labs with K of 6.1, BUN 93, bicarb 10.   No F/C/N/V/CP/SOB.  No melena, hematochezia, hematemesis.  No HA, visual changes, or abdominal pain.    DAILY NEPHROLOGY SUMMARY:  8/9: 3L net UF. Resting in bed. No complaints. No acute distress. Security called last night, patient aggressive towards staff regarding personal items. Bps improved this am.   8/10: Seen on HD this am. Lethargic. No complaints.   8/11:  Resting comfortably no complaints.  VSS.  8/12: Ambulating in room indep. Denies CP/SOB, on RA. Reports he is hungry and wants breakfast before anything else. Bps elevated.   8/13: VSS RA no complaints.  Ambulating.   8/14: Sleeping in bed. Dismissive. Does not want extra HD today. BP elevated.   8/15: Risks of no dialysis explained to patient, refused treatment today.  Complaining of leg pain and swelling.  8/16: Seen on dialysis at bedside.  Complaining of thigh pain.   8/17: Denies pain or SOB.  Resting.  8/18: Pt sitting up in chair. For HD today. Denies CP,SOB,N/V/D. No overnight events. No  labs for review. VSS. No complaints.     REVIEW OF SYSTEMS:    10 point ROS reviewed and is as per HPI/daily summary or otherwise negative    PMH/PSH/SH/FH: Reviewed and unchanged since admission note  CURRENT MEDICATIONS: Reviewed from admission to present day    VS:  /67   Pulse 77   Temp 36.7 °C (98.1 °F) (Temporal)   Resp 16   Ht 1.753 m (5' 9\")   Wt 99.6 kg (219 lb 9.3 oz)   SpO2 91%   BMI 32.43 kg/m² "   Physical Exam  Vitals and nursing note reviewed.   Constitutional:       General: He is not in acute distress.     Appearance: He is ill-appearing.   HENT:      Head: Normocephalic and atraumatic.      Nose: Nose normal. No congestion or rhinorrhea.      Mouth/Throat:      Mouth: Mucous membranes are moist.      Pharynx: No oropharyngeal exudate or posterior oropharyngeal erythema.   Eyes:      General: No scleral icterus.     Extraocular Movements: Extraocular movements intact.      Conjunctiva/sclera: Conjunctivae normal.      Pupils: Pupils are equal, round, and reactive to light.   Cardiovascular:      Rate and Rhythm: Normal rate and regular rhythm.      Pulses: Normal pulses.      Heart sounds: Normal heart sounds. No murmur heard.    No friction rub. No gallop.      Comments: [facility]  LAVF +Bruit/+Thrill  Pulmonary:      Effort: Pulmonary effort is normal. No respiratory distress.      Breath sounds: Normal breath sounds. No wheezing or rales.   Abdominal:      General: Bowel sounds are normal.   Musculoskeletal:         General: Swelling present. Normal range of motion.      Cervical back: Normal range of motion and neck supple.   Skin:     General: Skin is warm and dry.      Findings: Lesion present.      Comments: Scattered lesions   Neurological:      Mental Status: He is alert and oriented to person, place, and time. Mental status is at baseline.   Psychiatric:         Mood and Affect: Mood normal.         Behavior: Behavior normal.       Fluids:  In: 720 [P.O.:720]  Out: -     LABS:  Recent Labs     08/15/22  1335 08/16/22  0701 08/17/22  0716   SODIUM 130* 130* 132*   POTASSIUM 4.9 5.3 4.9   CHLORIDE 95* 95* 97   CO2 18* 18* 22   GLUCOSE 121* 127* 151*   BUN 65* 66* 54*   CREATININE 8.74* 10.18* 8.00*   CALCIUM 8.1* 8.0* 7.8*         IMPRESSION:  # ESRD  - Etiology likely 2/2 DM/HTN  - HD qTTHS via [facility]  - lost his HD chair at [facility] ->plan for [facility]  # Thrombosed Left AVF  -  US 2/23 no flow  - s/p OR 2/25 AVG Brachio-axillary Creation, thrombectomy and fistulogram   # HTN, At goal   - Goal BP < 140/90  # Anemia of CKD  - Goal Hgb 10-11  - At goal   - % sat 32  - Ferritin 879  # CKD-MBD  - Ca 6.3 -> Cca 7.66-> Cca 8.5   - PO4 7.8 ->  8.9->6.9 -> 5.5  -   # Hyperkalemia, resolved  - due to missed hd   - hd 8/8, 8/10  # weakness  # h/o Methamphetamine use   # Homelessness  # DM   - A1C 6.5  # Severe metabolic acidosis     PLAN:  - iHD qTTS, iHD today ( [facility])   - Challenge UF as able   - Continue cholecalciferol   -  Continue calcium acetate with meals. Hold if pt is NPO  - Transfuse PRN Hgb <7   - JACKIE with HD  - No dietary protein restrictions  - Dose all meds per ESRD/iHD  - Weakness workup per primary team   - Low Na/fluid restricted renal diet  - Follow labs  - AVG use per Vasc Surg recs; currently using RIJ TDC, last note 3/1 not yet cleared for use.  - Discharge planning underway. Accepted at [facility]. First day 8/18.   -  Pt okay to discharge from a Nephrology standpoint once medically cleared and to follow up outpatient at HD clinic.

## 2022-08-18 NOTE — PROGRESS NOTES
"Alert but irritable and short tempered. Refuses the bed alarm despite education. C/o pain to right inner thigh; medicated as requested. Bilat lower legs with edema and prefers to stay in the chair rather than in bed elevating legs    Bowel tones +, abdomen distended and firm \"its always like that\". States 2 loose BM's today  "

## 2022-08-18 NOTE — DISCHARGE PLANNING
Case Management Discharge Planning    Admission Date: 8/8/2022  GMLOS: 3.1  ALOS: 10    6-Clicks ADL Score: 23  6-Clicks Mobility Score: 23      Anticipated Discharge Dispo: Discharge Disposition: Discharged to home/self care (01)    DME Needed: No    Action(s) Taken: DC Assessment Complete (See below)    Escalations Completed: None    Medically Clear: To be determined by MD today.    Next Steps: Chart review complete. OP dialysis chair confirmed. CM spoke with dialysis coordinator this am to let her know patient is not discharged and will not need OP dialysis today as planned by center. No other dc planning needs identified at this time.    Barriers to Discharge: Medical clearance

## 2022-08-18 NOTE — HOSPITAL COURSE
45 yo male with PMH of diabetes,HFrEF, HTN, and ESRD on HD who presented 8/8/2022 for missing dialysis for past 3 weeks due to travelling to [place] for leisure and did not know where to go to get dialysis. In addition, patient did not have a dialysis chair here in [place].  He states he receives dialysis in [place].  He has been experiencing progressive weakness and associated nausea, vomiting, and diarrhea.  He came to the ER and was found to have electrolyte abnormalities including hyperkalemia, elevated creatinine BUN, and low calcium.  Nephrology consulted and stated that patient will require multiple sessions of dialysis given numerous missed sessions. Currently following [facility] schedule for HD, with vitals/labs gradually improving. Patient also restarted on carvedilol per nephrology recommendation. Accepted to [facility] for dialysis chair, available on 8/18. Patient had worsening abdominal pain which lead to refusal of dialysis. Abdominal xray showed bowel distention with air-fluid levels. Ultrasound of the abdomen showed small to moderate ascites. Patient noted to have bilateral thigh pain (R>L), with DVT US negative.

## 2022-08-19 PROBLEM — E87.29 HIGH ANION GAP METABOLIC ACIDOSIS: Status: RESOLVED | Noted: 2022-08-09 | Resolved: 2022-08-19

## 2022-08-19 PROBLEM — E87.5 HYPERKALEMIA: Status: RESOLVED | Noted: 2022-02-22 | Resolved: 2022-08-19

## 2022-08-19 LAB
ALBUMIN SERPL BCP-MCNC: 2.7 G/DL (ref 3.2–4.9)
ALBUMIN/GLOB SERPL: 0.6 G/DL
ALP SERPL-CCNC: 143 U/L (ref 30–99)
ALT SERPL-CCNC: 8 U/L (ref 2–50)
ANION GAP SERPL CALC-SCNC: 16 MMOL/L (ref 7–16)
AST SERPL-CCNC: 11 U/L (ref 12–45)
BILIRUB SERPL-MCNC: 0.4 MG/DL (ref 0.1–1.5)
BUN SERPL-MCNC: 49 MG/DL (ref 8–22)
CALCIUM SERPL-MCNC: 8 MG/DL (ref 8.5–10.5)
CHLORIDE SERPL-SCNC: 94 MMOL/L (ref 96–112)
CO2 SERPL-SCNC: 21 MMOL/L (ref 20–33)
CREAT SERPL-MCNC: 7.58 MG/DL (ref 0.5–1.4)
GFR SERPLBLD CREATININE-BSD FMLA CKD-EPI: 8 ML/MIN/1.73 M 2
GLOBULIN SER CALC-MCNC: 4.2 G/DL (ref 1.9–3.5)
GLUCOSE SERPL-MCNC: 160 MG/DL (ref 65–99)
POTASSIUM SERPL-SCNC: 4.5 MMOL/L (ref 3.6–5.5)
PROT SERPL-MCNC: 6.9 G/DL (ref 6–8.2)
SODIUM SERPL-SCNC: 131 MMOL/L (ref 135–145)

## 2022-08-19 PROCEDURE — A9270 NON-COVERED ITEM OR SERVICE: HCPCS | Performed by: HOSPITALIST

## 2022-08-19 PROCEDURE — 700102 HCHG RX REV CODE 250 W/ 637 OVERRIDE(OP)

## 2022-08-19 PROCEDURE — 700111 HCHG RX REV CODE 636 W/ 250 OVERRIDE (IP): Performed by: HOSPITALIST

## 2022-08-19 PROCEDURE — 700102 HCHG RX REV CODE 250 W/ 637 OVERRIDE(OP): Performed by: STUDENT IN AN ORGANIZED HEALTH CARE EDUCATION/TRAINING PROGRAM

## 2022-08-19 PROCEDURE — A9270 NON-COVERED ITEM OR SERVICE: HCPCS

## 2022-08-19 PROCEDURE — A9270 NON-COVERED ITEM OR SERVICE: HCPCS | Performed by: STUDENT IN AN ORGANIZED HEALTH CARE EDUCATION/TRAINING PROGRAM

## 2022-08-19 PROCEDURE — 80053 COMPREHEN METABOLIC PANEL: CPT

## 2022-08-19 PROCEDURE — 99232 SBSQ HOSP IP/OBS MODERATE 35: CPT | Mod: GC | Performed by: HOSPITALIST

## 2022-08-19 PROCEDURE — 700102 HCHG RX REV CODE 250 W/ 637 OVERRIDE(OP): Performed by: HOSPITALIST

## 2022-08-19 PROCEDURE — 36415 COLL VENOUS BLD VENIPUNCTURE: CPT

## 2022-08-19 PROCEDURE — 700102 HCHG RX REV CODE 250 W/ 637 OVERRIDE(OP): Performed by: NURSE PRACTITIONER

## 2022-08-19 PROCEDURE — 770001 HCHG ROOM/CARE - MED/SURG/GYN PRIV*

## 2022-08-19 PROCEDURE — A9270 NON-COVERED ITEM OR SERVICE: HCPCS | Performed by: NURSE PRACTITIONER

## 2022-08-19 RX ORDER — TRIAMCINOLONE ACETONIDE 1 MG/G
OINTMENT TOPICAL 2 TIMES DAILY
Status: DISPENSED | OUTPATIENT
Start: 2022-08-19 | End: 2022-08-29

## 2022-08-19 RX ADMIN — TRIAMCINOLONE ACETONIDE: 1 OINTMENT TOPICAL at 18:16

## 2022-08-19 RX ADMIN — Medication 1334 MG: at 08:39

## 2022-08-19 RX ADMIN — Medication 1334 MG: at 13:04

## 2022-08-19 RX ADMIN — Medication 1334 MG: at 18:15

## 2022-08-19 RX ADMIN — Medication 1000 UNITS: at 04:26

## 2022-08-19 RX ADMIN — CARVEDILOL 25 MG: 25 TABLET, FILM COATED ORAL at 08:39

## 2022-08-19 RX ADMIN — CARVEDILOL 25 MG: 25 TABLET, FILM COATED ORAL at 18:15

## 2022-08-19 RX ADMIN — OXYCODONE 5 MG: 5 TABLET ORAL at 10:50

## 2022-08-19 RX ADMIN — OXYCODONE 5 MG: 5 TABLET ORAL at 04:49

## 2022-08-19 RX ADMIN — OXYCODONE 5 MG: 5 TABLET ORAL at 18:25

## 2022-08-19 ASSESSMENT — PAIN SCALES - WONG BAKER: WONGBAKER_NUMERICALRESPONSE: DOESN'T HURT AT ALL

## 2022-08-19 NOTE — PROGRESS NOTES
Hemodialysis ordered by Dr. Pina. Treatment started at 1723 and ended at 1933 d/t pt had accident and wants off tx 50 mins to go cleaning himself up and have shower. AMA signed. Dr. Pina notified and aware. Pt stable, vss, no c/o post tx. Net UF 2.043 L. Report to BRENDA Collins RN.

## 2022-08-19 NOTE — PROGRESS NOTES
[facility] Internal Medicine Daily Progress Note    Date of Service  8/19/2022    [facility] Team: R IM Purple Team   Attending: Christophe Nuñez M.d.  Senior Resident: Dr. Clement  Intern:  Dr. Adorno  Contact Number: [phone]    Chief Complaint  Ambrose Watkins is a 46 y.o. male admitted 8/8/2022 with progressive weakness with associated nausea, vomiting, and diarrhea    Hospital Course  45 yo male with PMH of diabetes,HFrEF, HTN, and ESRD on HD who presented 8/8/2022 for missing dialysis for past 3 weeks due to travelling to [place] for leisure and did not know where to go to get dialysis. In addition, patient did not have a dialysis chair here in [place].  He states he receives dialysis in [place].  He has been experiencing progressive weakness and associated nausea, vomiting, and diarrhea.  He came to the ER and was found to have electrolyte abnormalities including hyperkalemia, elevated creatinine BUN, and low calcium.  Nephrology consulted and stated that patient will require multiple sessions of dialysis given numerous missed sessions. Currently following [facility] schedule for HD, with vitals/labs gradually improving. Patient also restarted on carvedilol per nephrology recommendation. Patient had worsening abdominal pain which lead to refusal of dialysis. Abdominal xray showed bowel distention with air-fluid levels. Ultrasound of the abdomen showed small to moderate ascites. Abdominal pain has resolved. Patient noted to have bilateral thigh pain (R>L), with DVT US negative, suspected fluid overload causing discomfort. Bilateral thigh pain still present post dialysis sessions with improvement of left thigh pain and patient is ambulatory. Outpatient dialysis chair available 8/23.     Interval Problem Update  Dialysis chair available 8/23, patient dialysis schedule TThSat and will stay for dialysis tomorrow. Pt states he has an itchy rash that has been present for the past year. Kenolog cream provided. No  abdominal pain today.     I have discussed this patient's plan of care and discharge plan at IDT rounds today with Case Management, Nursing, Nursing leadership, and other members of the IDT team.    Consultants/Specialty  nephrology    Code Status  Full Code    Disposition  Patient is not medically cleared for discharge.   Anticipate discharge to to home with close outpatient follow-up.  I have placed the appropriate orders for post-discharge needs.    Review of Systems  Constitutional:  Positive for malaise/fatigue. Negative for chills, diaphoresis and fever.   Respiratory:  Negative for cough and shortness of breath.    Cardiovascular:  Positive for leg swelling. Negative for chest pain and palpitations.   Gastrointestinal:  Positive for abdominal pain and diarrhea. Negative for constipation, nausea and vomiting.        Watery stool some brown formation  Genitourinary:         Reports minimal urine production   Musculoskeletal:         Bilateral leg pain - right thigh pain with light touch    Neurological:  Positive for weakness. Negative for dizziness and headaches.      Physical Exam  Temp:  [35.6 °C (96 °F)-37.1 °C (98.7 °F)] 37.1 °C (98.7 °F)  Pulse:  [77-92] 86  Resp:  [18-20] 18  BP: (129-174)/(74-95) 129/83  SpO2:  [94 %-97 %] 97 %    Physical Exam  Constitutional:       General: He is not in acute distress.     Appearance: He is ill-appearing. He is not toxic-appearing.   HENT:      Head: Normocephalic and atraumatic.      Right Ear: External ear normal.      Left Ear: External ear normal.      Nose: Nose normal.      Mouth/Throat:      Mouth: Mucous membranes are moist.   Eyes:      Extraocular Movements: Extraocular movements intact.      Conjunctiva/sclera: Conjunctivae normal.   Cardiovascular:      Rate and Rhythm: Normal rate and regular rhythm.      Pulses: Normal pulses.      Heart sounds: Normal heart sounds. No murmur heard.     Comments: Right internal jugular tunneled catheter   LUE AV fistula  not in use  Pulmonary:      Effort: Pulmonary effort is normal. No respiratory distress.      Breath sounds: Normal breath sounds. No wheezing or rales.   Abdominal:      General: There is distension.      Tenderness: There is abdominal tenderness (minimal). There is no guarding or rebound.   Musculoskeletal:         General: Swelling present.      Cervical back: Normal range of motion.      Right lower leg: Edema present.      Left lower leg: Edema present.   Skin:     General: Skin is warm and dry.      Comments: Excoriations of upper extremities, lower extremities, scalp, chest/abdomen. Right underarm boil erythematous/tender.   Neurological:      General: No focal deficit present.      Mental Status: He is alert and oriented to person, place, and time.   Psychiatric:         Thought Content: Thought content normal    Fluids    Intake/Output Summary (Last 24 hours) at 8/19/2022 1709  Last data filed at 8/18/2022 1933  Gross per 24 hour   Intake 500 ml   Output 2543 ml   Net -2043 ml       Laboratory  Recent Labs     08/17/22  0716 08/18/22  1409   WBC 7.8 8.2   RBC 2.54* 2.77*   HEMOGLOBIN 7.5* 8.1*   HEMATOCRIT 23.1* 25.4*   MCV 90.9 91.7   MCH 29.5 29.2   MCHC 32.5* 31.9*   RDW 58.9* 58.4*   PLATELETCT 173 214   MPV 9.3 9.8     Recent Labs     08/17/22  0716 08/18/22  1409 08/19/22  0754   SODIUM 132* 131* 131*   POTASSIUM 4.9 5.5 4.5   CHLORIDE 97 96 94*   CO2 22 16* 21   GLUCOSE 151* 144* 160*   BUN 54* 63* 49*   CREATININE 8.00* 9.05* 7.58*   CALCIUM 7.8* 8.1* 8.0*                   Imaging  US-EXTREMITY VENOUS LOWER BILAT   Final Result      US-ABDOMEN COMPLETE SURVEY   Final Result      1.  Mildly increased hepatic echotexture is suggestive of fatty infiltration.      2.  Small-to-moderate amount of ascites.      3.  Cholelithiasis         DX-ABDOMEN COMPLETE WITH AP OR PA CXR   Final Result      Fairly diffuse bowel distention with scattered air-fluid levels. Findings are likely related to ileus. More  distal obstruction cannot be excluded.      DX-CHEST-PORTABLE (1 VIEW)   Final Result      1.  Mildly enlarged cardiac silhouette with vascular congestion.           Assessment/Plan  Problem Representation:    * Noncompliance of patient with renal dialysis (HCC)- (present on admission)  Assessment & Plan  Has not had dialysis in the past 3 weeks, due to traveling to [place].  Obtained HD in [place], however does not have an outpatient dialysis chair in [place] and last year in [place]. Dialysis chair available 8/23.    -Signs/symptoms and labs/vitals slowly normalizing  -Nephrology following, on [facility] dialysis  -Completed dialysis 8/16, 8/18-stop early due to pt having bm   -Dialysis chair available 8/23.    Multiple air fluid levels of small intestine determined by X-ray  Assessment & Plan  Patients states he has been having watery stools multiple times a day for many days. He is unable to recount when his last solid stool was. Abdomen has become more distended and pain has increased. Abdominal xray with bowel distention with air-fluid levels. Pt has been taking oxycodone for leg pain since 8/8.     -Will continue to monitor with serial abdominal exam and for more form in bowel movement  -Abdominal pain has improved past few days, still distended      High anion gap metabolic acidosis- (present on admission)  Assessment & Plan  High anion gap acidosis on admission likely due to uremia.  Resolved    -Patient dialyzed TThS  -Will continue to monitor    Lower extremity pain, bilateral  Assessment & Plan  Anterior leg pain noted on admission and patient given oxycodone for pain. Most likely related to edema due to missed dialysis sessions. CPK and lactic acid wnl.  -Oxycodone for pain control  -Bilateral DVT US negative    End stage renal disease (HCC)- (present on admission)  Assessment & Plan  Patient receiving HD in [place], has not received HD in 3 weeks and since admission.  Patient has also had  multiple thrombectomies/revisions for AV fistula, currently nonfunctioning.  Patient has tunneled dialysis catheter in place and functioning.  Patient not adherent to HD or outpatient medications.  Nephrology following    -Currently on cholecalciferol   -Receiving HD on [facility]  -Per nephrology note, AVG use per Vasc Surg recs; currently using RIJ TDC, last note 3/1 not yet cleared for use   -Per pharmacy ([facility]) pt has not picked up any medications. Prescribed medications were 01/2022 - Trazodone 50mg QHS, Calcitriol 0.25mg OD, Furosemide 80mg OD, Ca-acetate 667-270 TID, Lisinopril 20mg OD. 12/31/2021 amplodipine 5mg, glipizide ER 5mg, carvedilol 25 BID  -Dialysis chair available 8/23, [facility]  Schedule will stay for dialysis tomorrow    Hypertension- (present on admission)  Assessment & Plan  History of uncontrolled hypertension. Likely due to missing dialysis and nonadherence to outpatient BP medications    -Hydralazine as needed due to history of CHF  -BP well controlled with Carvedilol 25mg twice daily       VTE prophylaxis: heparin ppx    I have performed a physical exam and reviewed and updated ROS and Plan today (8/19/2022). In review of yesterday's note (8/18/2022), there are no changes except as documented above.

## 2022-08-19 NOTE — CARE PLAN
The patient is Stable - Low risk of patient condition declining or worsening    Shift Goals  Clinical Goals: safety  Patient Goals: rest, to be left alone  Family Goals: POLINA    Progress made toward(s) clinical / shift goals:    Problem: Knowledge Deficit - Standard  Goal: Patient and family/care givers will demonstrate understanding of plan of care, disease process/condition, diagnostic tests and medications  Outcome: Progressing     Problem: Pain - Standard  Goal: Alleviation of pain or a reduction in pain to the patient’s comfort goal  Outcome: Progressing     Problem: Provide Safe Environment  Goal: Suicide environmental safety, protocols, policies, and practices will be implemented  Outcome: Progressing       Patient is not progressing towards the following goals:

## 2022-08-19 NOTE — DISCHARGE SUMMARY
"[facility] Discharge Summary    Attending: Christophe Nuñez M.d.  Senior Resident: Dr. Clement  Intern:  Dr. Adorno  Contact Number: [phone]    CHIEF COMPLAINT ON ADMISSION  Chief Complaint   Patient presents with    Weakness     Pt has missed dialysis for 1 month due to traveling. Has been feeling increasingly weak and states \"there's toxins coming out of my pores\". +N/V/D.       Reason for Admission  Progressive weakness, nausea, vomiting and diarrhea due to missed dialysis    Admission Date  8/8/2022    CODE STATUS  Full Code    HPI & HOSPITAL COURSE  45 yo male with PMH of diabetes,HFrEF, HTN, and ESRD on HD who presented 8/8/2022 for missing dialysis for past 3 weeks due to travelling to [place] for leisure and did not know where to go to get dialysis. In addition, patient did not have a dialysis chair here in [place].  He states he receives dialysis in [place].  He has been experiencing progressive weakness and associated nausea, vomiting, and diarrhea.  He came to the ER and was found to have electrolyte abnormalities including hyperkalemia, elevated creatinine BUN, and low calcium.  Nephrology consulted and stated that patient will require multiple sessions of dialysis given numerous missed sessions. Currently following [facility] schedule for HD, with vitals/labs gradually improving.   On 8/22 MRSA growth from armpit or RIJ swab -vancomycin was administered 8/22 - 8/24 attempt to use left AVM for dialysis. 8/24 BCX obtained - No growth 48 hrs. antibiotic regimen was switched to Doxycycline 8/24 - 8/30  On 8/26 patient was diuresed with Lasix 80mg PO due to fluid overload  US AVG 8/27 demonstrated normal flow, stenosis prox biceps, min mural thrombus graft wall at mid biceps w/no flow disturbance, outflow axillary vein widely patent. Nephrology consulting Vascular surgery for evaluation of AVF repair.  From 8/28 onwards patient complaining of lower extremity pain and edema and was continued " with diuresis with Lasix and metolazone.  He further complained of 1 week without bowel movements.  KUB was remarkable for minimal stool burden and nursing had witnessed a bowel movement per day for several days.      * Noncompliance of patient with renal dialysis (HCC)  Previously treated with HD in [place], however does not have an outpatient dialysis chair. Traveled to [place] and missed 1 month of dialysis, lost chair and does not have a dialysis chair in [place]. Accepted at [facility] for continued HD on Tuesday, Thursday, Saturday.    End stage renal disease (HCC)  Patient receiving HD in [place], has not received HD 3 weeks prior to admission. Patient has also had multiple thrombectomies/revisions for AV fistula, currently nonfunctioning.  Patient has R IJ tunneled dialysis catheter in place and functioning. Patient not adherent to HD or outpatient medications. Nephrology consulted and hemodialysis done while inpatient. Per nephrology note, AVG use per Vascular surgery recommendations, not yet cleared for use. For HD at [facility]. Discharged with cholecalciferol and calcium acetate three times a day with meals.   Per pharmacy ([facility]) pt has not picked up any medications. Prescribed medications were 01/2022 - Trazodone 50mg QHS, Calcitriol 0.25mg OD, Furosemide 80mg OD, Ca-acetate 667-270 TID, Lisinopril 20mg OD. 12/31/2021 amplodipine 5mg, glipizide ER 5mg, carvedilol 25 BID    Hypertension  History of uncontrolled hypertension. Likely due to missing dialysis and nonadherence to outpatient BP medications. On carvedilol 25mg twice daily while inpatient. Discharged with carvedilol.    Lower extremity pain, bilateral  Anterior leg pain noted on admission and patient given oxycodone for pain.  CPK and lactic acid wnl. Bilateral DVT US negative. Most likely related to edema due to missed dialysis sessions.    Multiple air fluid levels of small intestine determined by  X-ray  Patients reported watery stools multiple times a day for many days. He is unable to recount when his last solid stool was. Abdomen has become more distended and pain has increased. Abdominal xray with bowel distention with air-fluid levels. Pt has been taking oxycodone for leg pain since 8/8 and provided with bowel regimen. Serial abdominal exams benign with resolution of abdominal pain.    High anion gap metabolic acidosis, RESOLVED  High anion gap acidosis on admission likely due to uremia, resolved with dialysis.    Hyperkalemia, RESOLVED   Initial potassium at 6.1. Likely due to missing dialysis. Labs monitored with normalization of potassium with dialysis.    Therefore, he is discharged in fair and stable condition to home with close outpatient follow-up.    The patient met 2-midnight criteria for an inpatient stay at the time of discharge.    Discharge Date  9/4/2022    Physical Exam on Day of Discharge  General: Older than stated age appearing man laying in bed in no acute distress  HEENT: NC/AT.  PERRLA, EOMI.  External ear normal.  Nares patent, nonedematous nonerythematous.  Moist mucous membranes. Good dentition.  Trachea midline.   Neck: No JVP.  Carotid bruit could not be appreciated.  Nontender, nonnodular thyroid.  No anterior or posterior cervical, occipital, supraclavicular lymphadenopathy  Cardiovascular: PMI<2 cm at fifth costal space at midclavicular line.  No heaves appreciated.  No thrills.  RRR W/O M, R, G.  Pulmonary: Normal work of breathing.  Resonant to percussion.  CTAB, no wheezes, crackles, rhonchi heard in 10 lung fields  Abdomen: Flat, soft, nondistended.  Normoactive bowel sounds.  Nontender to percussion or palpation.  No hepatosplenomegaly noted.  No fluid wave  Musculoskeletal: Normal tone and bulk.  Full ROM.  Skin: Warm and dry.  No rashes.  Left upper extremity AV fistula patent, minimal surrounding edema.  Neuro: Alert and oriented X4.  CN II-XII checked and intact.  5  out of 5 strength throughout.  DTR 2+ throughout.  FTN, HTS intact.  Sensation intact . negative Romberg.  Lymphatic: No edema or lymphadenopathy noted.  Psychiatric: Good mood congruent affect.  Thought form linear, content appropriate      FOLLOW UP ITEMS POST DISCHARGE  Continue dialysis with [facility] in [place]  Establish with primary care provider to address chronic medical conditions    DISCHARGE DIAGNOSES  Principal Problem:    Noncompliance of patient with renal dialysis (HCC) POA: Yes  Active Problems:    Hypertension POA: Yes    Hyperkalemia POA: Yes    End stage renal disease (HCC) (Chronic) POA: Yes    Lower extremity pain, bilateral POA: Unknown    High anion gap metabolic acidosis POA: Yes    Multiple air fluid levels of small intestine determined by X-ray POA: Clinically Undetermined  Resolved Problems:    * No resolved hospital problems. *      FOLLOW UP  Referral to PCP to establish care placed  Continue to follow-up with nephrology  Dialysis as stated above    MEDICATIONS ON DISCHARGE     Medication List        START taking these medications        Instructions   Acetaminophen Extra Strength 500 MG Tabs  Generic drug: acetaminophen   Take 2 Tablets by mouth every 8 hours as needed for Mild Pain.  Dose: 1,000 mg     calcium acetate 667 MG Tabs tablet  Commonly known as: PHOS-LO   Take 2 Tablets by mouth 3 times a day before meals for 30 days.  Dose: 1,334 mg     carvedilol 25 MG Tabs  Commonly known as: COREG   Take 1 Tablet by mouth 2 times a day with meals for 30 days.  Dose: 25 mg     diphenhydrAMINE-zinc acetate cream  Commonly known as: BENADRYL ITCH   Apply 1 Each topically 3 times a day as needed (itchiness) for up to 30 days.  Dose: 1 Each     epoetin 3000 UNIT/ML SOLN 3,000 Units, epoetin 2000 UNIT/ML SOLN 2,000 Units   Inject 5,000 Units under the skin every Monday, Wednesday, and Friday for 30 days.  Dose: 5,000 Units     furosemide 80 MG Tabs  Commonly known as: LASIX   Take 1 Tablet by  mouth every day for 30 days.  Dose: 80 mg     lactulose 10 GM/15ML Soln   Take 45 mL by mouth 3 times a day for 30 days.  (Take 45 mL by mouth 3 times a day for 30 days.)  Dose: 45 mL     metOLazone 2.5 MG Tabs  Commonly known as: ZAROXOLYN   Take 1 Tablet by mouth 2 times a day.  Dose: 2.5 mg     vitamin D 1000 Unit Tabs  Commonly known as: Cholecalciferol   Take 1 Tablet by mouth every day for 30 days.  Dose: 1,000 Units              Allergies  No Known Allergies    DIET  Orders Placed This Encounter   Procedures    Diet Order Diet: Regular     Standing Status:   Standing     Number of Occurrences:   1     Order Specific Question:   Diet:     Answer:   Regular [1]       ACTIVITY  As tolerated.  Weight bearing as tolerated    CONSULTATIONS  Nephrology    PROCEDURES  Dialysis TThF throughout hospitalization    LABORATORY  Lab Results   Component Value Date    SODIUM 131 (L) 08/19/2022    POTASSIUM 4.5 08/19/2022    CHLORIDE 94 (L) 08/19/2022    CO2 21 08/19/2022    GLUCOSE 160 (H) 08/19/2022    BUN 49 (H) 08/19/2022    CREATININE 7.58 (HH) 08/19/2022        Lab Results   Component Value Date    WBC 8.2 08/18/2022    HEMOGLOBIN 8.1 (L) 08/18/2022    HEMATOCRIT 25.4 (L) 08/18/2022    PLATELETCT 214 08/18/2022        Total time of the discharge process exceeds 35 minutes.

## 2022-08-19 NOTE — WOUND TEAM
Wound team in to see pt for tissue changes at the upper middle back, R armpit, forehead.  Pt has  PTW around C7, covered with thin optifoam to protect.  Armpit lesion without an open wound at this point, orders written to watch for changes.  Forehead small dry crust, left WM to allow to slough over time.  Pt with PTWs scratches on L forearm, ordered TAC.  Nursing orders written.  Please re consult wound team with any new concerns.

## 2022-08-19 NOTE — PROGRESS NOTES
.  [facility] -  PROGRESS NOTE               Author: DAVID Patel. Date & Time: 8/19/2022  12:59 PM     HPI:  45 yo M with End Stage Renal Disease on hemodialysis at [facility] every Tues/Thurs/Sat, last HD at [facility] in 4/2022, then traveled and lost HD chair. Other PMH diabetes mellitus and Hypertension who presented to the emergency department on 8/8/22 for weakness. He missed 1 months of HD due to traveling. He was found to be hypertensive 153/99, afebrile. Labs with K of 6.1, BUN 93, bicarb 10.   No F/C/N/V/CP/SOB.  No melena, hematochezia, hematemesis.  No HA, visual changes, or abdominal pain.    DAILY NEPHROLOGY SUMMARY:  8/9: 3L net UF. Resting in bed. No complaints. No acute distress. Security called last night, patient aggressive towards staff regarding personal items. Bps improved this am.   8/10: Seen on HD this am. Lethargic. No complaints.   8/11:  Resting comfortably no complaints.  VSS.  8/12: Ambulating in room indep. Denies CP/SOB, on RA. Reports he is hungry and wants breakfast before anything else. Bps elevated.   8/13: VSS RA no complaints.  Ambulating.   8/14: Sleeping in bed. Dismissive. Does not want extra HD today. BP elevated.   8/15: Risks of no dialysis explained to patient, refused treatment today.  Complaining of leg pain and swelling.  8/16: Seen on dialysis at bedside.  Complaining of thigh pain.   8/17: Denies pain or SOB.  Resting.  8/18: Pt sitting up in chair. For HD today. Denies CP,SOB,N/V/D. No overnight events. No  labs for review. VSS. No complaints.   8/19: HD yest, UF 2 L. Pt came off HD treatment 50' early as he had an accident during treatment.Pt C/O itching. Still with LLE.     REVIEW OF SYSTEMS:    10 point ROS reviewed and is as per HPI/daily summary or otherwise negative    PMH/PSH/SH/FH: Reviewed and unchanged since admission note  CURRENT MEDICATIONS: Reviewed from admission to present day    VS:  /83   Pulse 86    "Temp 37.1 °C (98.7 °F) (Temporal)   Resp 18   Ht 1.753 m (5' 9\")   Wt 99.6 kg (219 lb 9.3 oz)   SpO2 97%   BMI 32.43 kg/m²   Physical Exam  Vitals and nursing note reviewed.   Constitutional:       General: He is not in acute distress.     Appearance: He is ill-appearing.   HENT:      Head: Normocephalic and atraumatic.      Nose: Nose normal. No congestion or rhinorrhea.      Mouth/Throat:      Mouth: Mucous membranes are moist.      Pharynx: No oropharyngeal exudate or posterior oropharyngeal erythema.   Eyes:      General: No scleral icterus.     Extraocular Movements: Extraocular movements intact.      Conjunctiva/sclera: Conjunctivae normal.      Pupils: Pupils are equal, round, and reactive to light.   Cardiovascular:      Rate and Rhythm: Normal rate and regular rhythm.      Pulses: Normal pulses.      Heart sounds: Normal heart sounds. No murmur heard.    No friction rub. No gallop.      Comments: RIJ TDC  LAVF +Bruit/+Thrill  Pulmonary:      Effort: Pulmonary effort is normal. No respiratory distress.      Breath sounds: Normal breath sounds. No wheezing or rales.   Abdominal:      General: Bowel sounds are normal.   Musculoskeletal:         General: Swelling present. Normal range of motion.      Cervical back: Normal range of motion and neck supple.   Skin:     General: Skin is warm and dry.      Findings: Lesion present.      Comments: Scattered lesions   Neurological:      Mental Status: He is alert and oriented to person, place, and time. Mental status is at baseline.   Psychiatric:         Mood and Affect: Mood normal.         Behavior: Behavior normal.       Fluids:  In: 860 [P.O.:360; Dialysis:500]  Out: 2543     LABS:  Recent Labs     08/17/22  0716 08/18/22  1409 08/19/22  0754   SODIUM 132* 131* 131*   POTASSIUM 4.9 5.5 4.5   CHLORIDE 97 96 94*   CO2 22 16* 21   GLUCOSE 151* 144* 160*   BUN 54* 63* 49*   CREATININE 8.00* 9.05* 7.58*   CALCIUM 7.8* 8.1* 8.0*         IMPRESSION:  # ESRD  - " Etiology likely 2/2 DM/HTN  - HD qTTHS via RIJ TDC  - [facility]  # Thrombosed Left AVF  - US 2/23 no flow  - s/p OR 2/25 AVG Brachio-axillary Creation, thrombectomy and fistulogram   # HTN, At goal   - Goal BP < 140/90  # Anemia of CKD  - Goal Hgb 10-11  - At goal   - % sat 32  - Ferritin 879  # CKD-MBD  - Ca 6.3 -> Cca 7.66-> Cca 8.5   - PO4 7.8 ->  8.9->6.9 -> 5.5  -   - On cholecalciferol and calcium acetate   # Hyperkalemia, resolved  - due to missed hd   # Weakness  # h/o Methamphetamine use   # Homelessness  # DM   - A1C 6.5  # Severe metabolic acidosis     PLAN:  - No need for additional iHD today  - Continue iHD qTTS    - Challenge UF as able    - Continue calcium acetate with meals. Hold if pt is NPO  - Transfuse PRN Hgb <7.0   - JACKIE with HD  - No dietary protein restrictions  - Dose all meds per ESRD/iHD  - Weakness workup per primary team   - Low Na/fluid restricted renal diet  - Follow labs  - AVG use per Vasc Surg recs; currently using RIJ TDC, last note 3/1 not yet cleared for use.  - Discharge planning underway. Accepted at [facility] QTTS. First day 8/18.   - Pt okay to discharge from a Nephrology standpoint once medically cleared and to follow up outpatient at HD clinic.   Thank you.

## 2022-08-19 NOTE — DISCHARGE PLANNING
Case Management Discharge Planning    Admission Date: 8/8/2022  GMLOS: 3.1  ALOS: 11    6-Clicks ADL Score: 23  6-Clicks Mobility Score: 23      Anticipated Discharge Dispo: Discharge Disposition: Discharged to home/self care (01)    DME Needed: No    Action(s) Taken: Updated Provider/Nurse on Discharge Plan    Escalations Completed: None    Medically Clear: No    Next Steps: Dialysis coordinator Xitlaly Reyes communicated that patient's OP dialysis center is unable to start treatment on patient until 8/23 d/t staffing issues. Voalte sent to resident Farzaneh Adorno to make aware.    Barriers to Discharge: Medical clearance

## 2022-08-20 LAB
ALBUMIN SERPL BCP-MCNC: 2.6 G/DL (ref 3.2–4.9)
BUN SERPL-MCNC: 40 MG/DL (ref 8–22)
CALCIUM SERPL-MCNC: 8.2 MG/DL (ref 8.5–10.5)
CHLORIDE SERPL-SCNC: 100 MMOL/L (ref 96–112)
CO2 SERPL-SCNC: 23 MMOL/L (ref 20–33)
CREAT SERPL-MCNC: 6.16 MG/DL (ref 0.5–1.4)
ERYTHROCYTE [DISTWIDTH] IN BLOOD BY AUTOMATED COUNT: 58.2 FL (ref 35.9–50)
GFR SERPLBLD CREATININE-BSD FMLA CKD-EPI: 11 ML/MIN/1.73 M 2
GLUCOSE SERPL-MCNC: 203 MG/DL (ref 65–99)
GRAM STN SPEC: NORMAL
HCT VFR BLD AUTO: 24.5 % (ref 42–52)
HGB BLD-MCNC: 7.8 G/DL (ref 14–18)
MCH RBC QN AUTO: 29.1 PG (ref 27–33)
MCHC RBC AUTO-ENTMCNC: 31.8 G/DL (ref 33.7–35.3)
MCV RBC AUTO: 91.4 FL (ref 81.4–97.8)
PHOSPHATE SERPL-MCNC: 4.3 MG/DL (ref 2.5–4.5)
PLATELET # BLD AUTO: 240 K/UL (ref 164–446)
PMV BLD AUTO: 9.7 FL (ref 9–12.9)
POTASSIUM SERPL-SCNC: 4.5 MMOL/L (ref 3.6–5.5)
RBC # BLD AUTO: 2.68 M/UL (ref 4.7–6.1)
SIGNIFICANT IND 70042: NORMAL
SITE SITE: NORMAL
SODIUM SERPL-SCNC: 137 MMOL/L (ref 135–145)
SOURCE SOURCE: NORMAL
WBC # BLD AUTO: 7.8 K/UL (ref 4.8–10.8)

## 2022-08-20 PROCEDURE — A9270 NON-COVERED ITEM OR SERVICE: HCPCS | Performed by: STUDENT IN AN ORGANIZED HEALTH CARE EDUCATION/TRAINING PROGRAM

## 2022-08-20 PROCEDURE — A9270 NON-COVERED ITEM OR SERVICE: HCPCS | Performed by: NURSE PRACTITIONER

## 2022-08-20 PROCEDURE — 80069 RENAL FUNCTION PANEL: CPT

## 2022-08-20 PROCEDURE — 99232 SBSQ HOSP IP/OBS MODERATE 35: CPT | Mod: GC | Performed by: HOSPITALIST

## 2022-08-20 PROCEDURE — 85027 COMPLETE CBC AUTOMATED: CPT

## 2022-08-20 PROCEDURE — 87070 CULTURE OTHR SPECIMN AEROBIC: CPT

## 2022-08-20 PROCEDURE — 36415 COLL VENOUS BLD VENIPUNCTURE: CPT

## 2022-08-20 PROCEDURE — 90935 HEMODIALYSIS ONE EVALUATION: CPT

## 2022-08-20 PROCEDURE — 700111 HCHG RX REV CODE 636 W/ 250 OVERRIDE (IP)

## 2022-08-20 PROCEDURE — 700102 HCHG RX REV CODE 250 W/ 637 OVERRIDE(OP): Performed by: STUDENT IN AN ORGANIZED HEALTH CARE EDUCATION/TRAINING PROGRAM

## 2022-08-20 PROCEDURE — 87147 CULTURE TYPE IMMUNOLOGIC: CPT

## 2022-08-20 PROCEDURE — 87186 SC STD MICRODIL/AGAR DIL: CPT

## 2022-08-20 PROCEDURE — 770001 HCHG ROOM/CARE - MED/SURG/GYN PRIV*

## 2022-08-20 PROCEDURE — 700102 HCHG RX REV CODE 250 W/ 637 OVERRIDE(OP)

## 2022-08-20 PROCEDURE — 700101 HCHG RX REV CODE 250: Performed by: STUDENT IN AN ORGANIZED HEALTH CARE EDUCATION/TRAINING PROGRAM

## 2022-08-20 PROCEDURE — 700102 HCHG RX REV CODE 250 W/ 637 OVERRIDE(OP): Performed by: NURSE PRACTITIONER

## 2022-08-20 PROCEDURE — 700111 HCHG RX REV CODE 636 W/ 250 OVERRIDE (IP): Performed by: NURSE PRACTITIONER

## 2022-08-20 PROCEDURE — 87205 SMEAR GRAM STAIN: CPT

## 2022-08-20 PROCEDURE — 87077 CULTURE AEROBIC IDENTIFY: CPT

## 2022-08-20 PROCEDURE — A9270 NON-COVERED ITEM OR SERVICE: HCPCS

## 2022-08-20 RX ORDER — HEPARIN SODIUM 1000 [USP'U]/ML
INJECTION, SOLUTION INTRAVENOUS; SUBCUTANEOUS
Status: COMPLETED
Start: 2022-08-20 | End: 2022-08-20

## 2022-08-20 RX ADMIN — Medication 1334 MG: at 17:10

## 2022-08-20 RX ADMIN — LIDOCAINE 1 PATCH: 50 PATCH CUTANEOUS at 13:36

## 2022-08-20 RX ADMIN — Medication 1000 UNITS: at 05:17

## 2022-08-20 RX ADMIN — OXYCODONE 5 MG: 5 TABLET ORAL at 21:35

## 2022-08-20 RX ADMIN — HEPARIN SODIUM 3100 UNITS: 1000 INJECTION, SOLUTION INTRAVENOUS; SUBCUTANEOUS at 12:18

## 2022-08-20 RX ADMIN — Medication 1334 MG: at 13:34

## 2022-08-20 RX ADMIN — EPOETIN ALFA-EPBX 5000 UNITS: 3000 INJECTION, SOLUTION INTRAVENOUS; SUBCUTANEOUS at 10:12

## 2022-08-20 RX ADMIN — CARVEDILOL 25 MG: 25 TABLET, FILM COATED ORAL at 17:10

## 2022-08-20 RX ADMIN — OXYCODONE 5 MG: 5 TABLET ORAL at 13:34

## 2022-08-20 RX ADMIN — OXYCODONE 5 MG: 5 TABLET ORAL at 01:47

## 2022-08-20 ASSESSMENT — PAIN SCALES - WONG BAKER
WONGBAKER_NUMERICALRESPONSE: DOESN'T HURT AT ALL
WONGBAKER_NUMERICALRESPONSE: DOESN'T HURT AT ALL

## 2022-08-20 ASSESSMENT — PAIN DESCRIPTION - PAIN TYPE: TYPE: ACUTE PAIN

## 2022-08-20 NOTE — PROGRESS NOTES
[facility] Services Progress Note     HD treatment ordered by Dr Pina x 3 hours.  Treatment Start time: 0912          End time: 1212       Net UF 3000 ml    Patient tolerated treatment well. VS stable all through out.     Pt HD catheter exit site and and sutures with redness and pus drainage. Nephrologist aware. Culture sent per order.    All blood was returned. CVC R chest locked with heparin (Red limb-1.5 ml; Blue limb-1.6 ml). CVC Dressing and end cap changed.  See flow sheet for details.     Report given to JEAN Zamora RN.

## 2022-08-20 NOTE — PROGRESS NOTES
[facility] -  PROGRESS NOTE               Author: EMMA Beck Date & Time: 8/20/2022  9:32 AM     HPI:  47 yo M with End Stage Renal Disease on hemodialysis at [facility] every Tues/Thurs/Sat, last HD at [facility] in 4/2022, then traveled and lost HD chair. Other PMH diabetes mellitus and Hypertension who presented to the emergency department on 8/8/22 for weakness. He missed 1 months of HD due to traveling. He was found to be hypertensive 153/99, afebrile. Labs with K of 6.1, BUN 93, bicarb 10.   No F/C/N/V/CP/SOB.  No melena, hematochezia, hematemesis.  No HA, visual changes, or abdominal pain.    DAILY NEPHROLOGY SUMMARY:  8/9: 3L net UF. Resting in bed. No complaints. No acute distress. Security called last night, patient aggressive towards staff regarding personal items. Bps improved this am.   8/10: Seen on HD this am. Lethargic. No complaints.   8/11:  Resting comfortably no complaints.  VSS.  8/12: Ambulating in room indep. Denies CP/SOB, on RA. Reports he is hungry and wants breakfast before anything else. Bps elevated.   8/13: VSS RA no complaints.  Ambulating.   8/14: Sleeping in bed. Dismissive. Does not want extra HD today. BP elevated.   8/15: Risks of no dialysis explained to patient, refused treatment today.  Complaining of leg pain and swelling.  8/16: Seen on dialysis at bedside.  Complaining of thigh pain.   8/17: Denies pain or SOB.  Resting.  8/18: Pt sitting up in chair. For HD today. Denies CP,SOB,N/V/D. No overnight events. No  labs for review. VSS. No complaints.   8/19: HD yest, UF 2 L. Pt came off HD treatment 50' early as he had an accident during treatment.Pt C/O itching. Still with LLE.   8/20: Pt seen on HD, minimally engages in conversation, VSS on RA    REVIEW OF SYSTEMS:    10 point ROS reviewed and is as per HPI/daily summary or otherwise negative    PMH/PSH/SH/FH: Reviewed and unchanged since admission note  CURRENT MEDICATIONS: Reviewed  "from admission to present day    VS:  /73   Pulse 79   Temp 36.3 °C (97.3 °F) (Temporal)   Resp 18   Ht 1.753 m (5' 9\")   Wt 99.6 kg (219 lb 9.3 oz)   SpO2 93%   BMI 32.43 kg/m²   Physical Exam  Vitals and nursing note reviewed.   Constitutional:       General: He is not in acute distress.     Appearance: He is ill-appearing.   HENT:      Head: Normocephalic and atraumatic.      Nose: Nose normal. No congestion or rhinorrhea.      Mouth/Throat:      Mouth: Mucous membranes are moist.      Pharynx: No oropharyngeal exudate or posterior oropharyngeal erythema.   Eyes:      General: No scleral icterus.     Extraocular Movements: Extraocular movements intact.      Conjunctiva/sclera: Conjunctivae normal.      Pupils: Pupils are equal, round, and reactive to light.   Cardiovascular:      Rate and Rhythm: Normal rate and regular rhythm.      Pulses: Normal pulses.      Heart sounds: Normal heart sounds. No murmur heard.    No friction rub. No gallop.      Comments: [facility] TD  LAVF +T/B  Pulmonary:      Effort: Pulmonary effort is normal. No respiratory distress.      Breath sounds: Normal breath sounds. No wheezing or rales.   Abdominal:      General: Bowel sounds are normal.   Musculoskeletal:         General: Swelling present. Normal range of motion.      Cervical back: Normal range of motion and neck supple.   Skin:     General: Skin is warm and dry.      Findings: Lesion present.      Comments: Scattered lesions/excoriation   Neurological:      Mental Status: He is alert and oriented to person, place, and time. Mental status is at baseline.   Psychiatric:         Mood and Affect: Mood normal.         Behavior: Behavior normal.       Fluids:  No intake/output data recorded.    LABS:  Recent Labs     08/18/22  1409 08/19/22  0754   SODIUM 131* 131*   POTASSIUM 5.5 4.5   CHLORIDE 96 94*   CO2 16* 21   GLUCOSE 144* 160*   BUN 63* 49*   CREATININE 9.05* 7.58*   CALCIUM 8.1* 8.0*         IMPRESSION:  # ESRD  - " Etiology likely 2/2 DM/HTN  - HD qTTHS via RIJ TDC  - Lost his HD chair at [facility] ->[facility] for [facility]  # Thrombosed Left AVF  - US 2/23 no flow  - s/p OR 2/25 AVG Brachio-axillary Creation, thrombectomy and fistulogram   # HTN, fair control   - Goal BP < 140/90  # Anemia of CKD, below target  - Goal Hgb 10-11   - % sat 32  - Ferritin 879  - JACKIE with HD   # CKD-MBD  - Ca 8.1 with low albumin  - PO4 6.2  -   - On cholecalciferol and calcium acetate   # Hyperkalemia, corrected  - Due to missed HD  # Weakness  # h/o Methamphetamine use   # Homelessness  # DM   - A1C 6.5  # Severe metabolic acidosis     PLAN:  - Continue iHD qTTS, treatment due today (SAT)  - Challenge UF as able    - Continue calcium acetate with meals. Hold if pt is NPO  - Transfuse PRN Hgb <7.0   - JACKIE with HD to goal Hgb 10-11  - No dietary protein restrictions  - Dose all meds per ESRD/iHD  - Weakness workup per primary team   - Low Na/fluid restricted renal diet  - Follow labs  - AVG use per Vasc Surg recs; currently using RIJ TDC, last note 3/1 not yet cleared for use.  - [facility] underway[facility]  - Pt okay to discharge from a Nephrology standpoint once medically cleared and with secured OP HD chair    Thank you,

## 2022-08-20 NOTE — CARE PLAN
The patient is Stable - Low risk of patient condition declining or worsening    Shift Goals  Clinical Goals: comfort  Patient Goals: comfort/rest/solitude  Family Goals: POLINA    Progress made toward(s) clinical / shift goals:    Problem: Knowledge Deficit - Standard  Goal: Patient and family/care givers will demonstrate understanding of plan of care, disease process/condition, diagnostic tests and medications  Outcome: Progressing     Problem: Pain - Standard  Goal: Alleviation of pain or a reduction in pain to the patient’s comfort goal  Outcome: Progressing     Problem: Provide Safe Environment  Goal: Suicide environmental safety, protocols, policies, and practices will be implemented  Outcome: Progressing     Problem: Psychosocial  Goal: Patient's ability to identify and develop effective coping behaviors will improve  Outcome: Progressing  Goal: Patient's ability to identify and utilize available support systems will improve  Outcome: Progressing     Problem: Fall Risk  Goal: Patient will remain free from falls  Outcome: Progressing       Patient is not progressing towards the following goals:

## 2022-08-21 PROBLEM — E87.29 HIGH ANION GAP METABOLIC ACIDOSIS: Status: RESOLVED | Noted: 2022-08-09 | Resolved: 2022-08-21

## 2022-08-21 LAB
ANION GAP SERPL CALC-SCNC: 14 MMOL/L (ref 7–16)
BUN SERPL-MCNC: 45 MG/DL (ref 8–22)
CALCIUM SERPL-MCNC: 8.2 MG/DL (ref 8.5–10.5)
CHLORIDE SERPL-SCNC: 99 MMOL/L (ref 96–112)
CO2 SERPL-SCNC: 22 MMOL/L (ref 20–33)
CREAT SERPL-MCNC: 6.38 MG/DL (ref 0.5–1.4)
ERYTHROCYTE [DISTWIDTH] IN BLOOD BY AUTOMATED COUNT: 57.6 FL (ref 35.9–50)
GFR SERPLBLD CREATININE-BSD FMLA CKD-EPI: 10 ML/MIN/1.73 M 2
GLUCOSE SERPL-MCNC: 156 MG/DL (ref 65–99)
HCT VFR BLD AUTO: 24.1 % (ref 42–52)
HGB BLD-MCNC: 7.8 G/DL (ref 14–18)
MCH RBC QN AUTO: 29.7 PG (ref 27–33)
MCHC RBC AUTO-ENTMCNC: 32.4 G/DL (ref 33.7–35.3)
MCV RBC AUTO: 91.6 FL (ref 81.4–97.8)
PLATELET # BLD AUTO: 268 K/UL (ref 164–446)
PMV BLD AUTO: 9.4 FL (ref 9–12.9)
POTASSIUM SERPL-SCNC: 4.6 MMOL/L (ref 3.6–5.5)
RBC # BLD AUTO: 2.63 M/UL (ref 4.7–6.1)
SODIUM SERPL-SCNC: 135 MMOL/L (ref 135–145)
WBC # BLD AUTO: 9.1 K/UL (ref 4.8–10.8)

## 2022-08-21 PROCEDURE — A9270 NON-COVERED ITEM OR SERVICE: HCPCS | Performed by: STUDENT IN AN ORGANIZED HEALTH CARE EDUCATION/TRAINING PROGRAM

## 2022-08-21 PROCEDURE — 700102 HCHG RX REV CODE 250 W/ 637 OVERRIDE(OP): Performed by: STUDENT IN AN ORGANIZED HEALTH CARE EDUCATION/TRAINING PROGRAM

## 2022-08-21 PROCEDURE — A9270 NON-COVERED ITEM OR SERVICE: HCPCS

## 2022-08-21 PROCEDURE — 80048 BASIC METABOLIC PNL TOTAL CA: CPT

## 2022-08-21 PROCEDURE — 36415 COLL VENOUS BLD VENIPUNCTURE: CPT

## 2022-08-21 PROCEDURE — 700102 HCHG RX REV CODE 250 W/ 637 OVERRIDE(OP)

## 2022-08-21 PROCEDURE — 85027 COMPLETE CBC AUTOMATED: CPT

## 2022-08-21 PROCEDURE — 700101 HCHG RX REV CODE 250: Performed by: STUDENT IN AN ORGANIZED HEALTH CARE EDUCATION/TRAINING PROGRAM

## 2022-08-21 PROCEDURE — 770001 HCHG ROOM/CARE - MED/SURG/GYN PRIV*

## 2022-08-21 PROCEDURE — 700102 HCHG RX REV CODE 250 W/ 637 OVERRIDE(OP): Performed by: NURSE PRACTITIONER

## 2022-08-21 PROCEDURE — 99232 SBSQ HOSP IP/OBS MODERATE 35: CPT | Mod: GC | Performed by: HOSPITALIST

## 2022-08-21 PROCEDURE — A9270 NON-COVERED ITEM OR SERVICE: HCPCS | Performed by: NURSE PRACTITIONER

## 2022-08-21 RX ORDER — AMOXICILLIN 250 MG
1 CAPSULE ORAL DAILY
Status: DISCONTINUED | OUTPATIENT
Start: 2022-08-21 | End: 2022-08-31

## 2022-08-21 RX ORDER — OXYCODONE HYDROCHLORIDE 5 MG/1
5 TABLET ORAL EVERY 8 HOURS PRN
Status: DISCONTINUED | OUTPATIENT
Start: 2022-08-21 | End: 2022-09-02

## 2022-08-21 RX ORDER — DIPHENHYDRAMINE HCL 25 MG
25 TABLET ORAL EVERY 6 HOURS PRN
Status: DISCONTINUED | OUTPATIENT
Start: 2022-08-21 | End: 2022-09-02

## 2022-08-21 RX ADMIN — OXYCODONE 5 MG: 5 TABLET ORAL at 05:39

## 2022-08-21 RX ADMIN — CARVEDILOL 25 MG: 25 TABLET, FILM COATED ORAL at 17:50

## 2022-08-21 RX ADMIN — Medication 1334 MG: at 17:50

## 2022-08-21 RX ADMIN — Medication 1334 MG: at 10:38

## 2022-08-21 RX ADMIN — OXYCODONE 5 MG: 5 TABLET ORAL at 14:27

## 2022-08-21 RX ADMIN — CARVEDILOL 25 MG: 25 TABLET, FILM COATED ORAL at 08:07

## 2022-08-21 RX ADMIN — DOCUSATE SODIUM 50 MG AND SENNOSIDES 8.6 MG 1 TABLET: 8.6; 5 TABLET, FILM COATED ORAL at 14:27

## 2022-08-21 RX ADMIN — Medication 1334 MG: at 14:23

## 2022-08-21 RX ADMIN — OXYCODONE 5 MG: 5 TABLET ORAL at 23:08

## 2022-08-21 RX ADMIN — LIDOCAINE 1 PATCH: 50 PATCH CUTANEOUS at 14:23

## 2022-08-21 ASSESSMENT — PAIN DESCRIPTION - PAIN TYPE: TYPE: ACUTE PAIN

## 2022-08-21 NOTE — CARE PLAN
The patient is Stable - Low risk of patient condition declining or worsening    Shift Goals  Clinical Goals: safety, comfort, HD today  Patient Goals: comfort, rest  Family Goals: POLINA    Progress made toward(s) clinical / shift goals:    Problem: Knowledge Deficit - Standard  Goal: Patient and family/care givers will demonstrate understanding of plan of care, disease process/condition, diagnostic tests and medications  Outcome: Progressing     Problem: Pain - Standard  Goal: Alleviation of pain or a reduction in pain to the patient’s comfort goal  Outcome: Progressing     Problem: Provide Safe Environment  Goal: Suicide environmental safety, protocols, policies, and practices will be implemented  Outcome: Progressing       Patient is not progressing towards the following goals:

## 2022-08-21 NOTE — CARE PLAN
The patient is Stable - Low risk of patient condition declining or worsening    Shift Goals  Clinical Goals: pain management, comfort, safety  Patient Goals: pain control, rest  Family Goals: POLINA    Progress made toward(s) clinical / shift goals:  Patient remain safe and comfortable throughout the shift. Patient's c/o pain is addressed with PRN pain med.     Patient is not progressing towards the following goals:      Problem: Knowledge Deficit - Standard  Goal: Patient and family/care givers will demonstrate understanding of plan of care, disease process/condition, diagnostic tests and medications  Outcome: Progressing     Problem: Pain - Standard  Goal: Alleviation of pain or a reduction in pain to the patient’s comfort goal  Outcome: Progressing     Problem: Provide Safe Environment  Goal: Suicide environmental safety, protocols, policies, and practices will be implemented  Outcome: Progressing     Problem: Psychosocial  Goal: Patient's ability to identify and develop effective coping behaviors will improve  Outcome: Progressing  Goal: Patient's ability to identify and utilize available support systems will improve  Outcome: Progressing     Problem: Fall Risk  Goal: Patient will remain free from falls  Outcome: Progressing

## 2022-08-21 NOTE — PROGRESS NOTES
[facility] Internal Medicine Daily Progress Note    Date of Service  8/20/2022    [facility] Team: [facility] Purple Team   Attending: Christophe Nuñez M.d.  Senior Resident: Dr. Clement  Intern:  Dr. Adorno  Contact Number: [phone]    Chief Complaint  Ambrose Watkins is a 46 y.o. male admitted 8/8/2022 with progressive weakness with associated nausea, vomiting, and diarrhea    Hospital Course  47 yo male with PMH of diabetes,HFrEF, HTN, and ESRD on HD who presented 8/8/2022 for missing dialysis for past 3 weeks due to travelling to [place] for leisure and did not know where to go to get dialysis. In addition, patient did not have a dialysis chair here in [place].  He states he receives dialysis in [place].  He has been experiencing progressive weakness and associated nausea, vomiting, and diarrhea.  He came to the ER and was found to have electrolyte abnormalities including hyperkalemia, elevated creatinine BUN, and low calcium.  Nephrology consulted and stated that patient will require multiple sessions of dialysis given numerous missed sessions. Currently following TThS schedule for HD, with vitals/labs gradually improving. Outpatient dialysis chair available 8/23.     Interval Problem Update  Dialysis chair available 8/23, patient dialysis schedule TThSat. Completed dialysis today, pt states not feeling well still with bilateral thigh pain and itchiness. Amenable to trying creams for itchiness. Denies CP, shortness of breath, abd pain, n/v.    I have discussed this patient's plan of care and discharge plan at IDT rounds today with Case Management, Nursing, Nursing leadership, and other members of the IDT team.    Consultants/Specialty  nephrology    Code Status  Full Code    Disposition  Patient is not medically cleared for discharge.   Anticipate discharge to to home with close outpatient follow-up.  I have placed the appropriate orders for post-discharge needs.    Review of Systems  Constitutional:  Positive  for malaise/fatigue. Negative for chills, diaphoresis and fever.   Respiratory:  Negative for cough and shortness of breath.    Cardiovascular:  Positive for leg swelling. Negative for chest pain and palpitations.   Gastrointestinal:  Positive for abdominal pain and diarrhea. Negative for constipation, nausea and vomiting.        Watery stool some brown formation  Genitourinary:         Reports minimal urine production   Musculoskeletal:         Bilateral leg pain - right thigh pain with light touch    Neurological:  Positive for weakness. Negative for dizziness and headaches.      Physical Exam  Temp:  [36 °C (96.8 °F)-37.3 °C (99.1 °F)] 36.8 °C (98.2 °F)  Pulse:  [77-86] 86  Resp:  [16-20] 20  BP: (128-154)/(69-87) 128/69  SpO2:  [91 %-99 %] 99 %    Physical Exam  Constitutional:       General: He is not in acute distress.     Appearance: He is ill-appearing. He is not toxic-appearing.   HENT:      Head: Normocephalic and atraumatic.      Right Ear: External ear normal.      Left Ear: External ear normal.      Nose: Nose normal.      Mouth/Throat:      Mouth: Mucous membranes are moist.   Eyes:      Extraocular Movements: Extraocular movements intact.      Conjunctiva/sclera: Conjunctivae normal.   Cardiovascular:      Rate and Rhythm: Normal rate and regular rhythm.      Pulses: Normal pulses.      Heart sounds: Normal heart sounds. No murmur heard.     Comments: Right internal jugular tunneled catheter   LUE AV fistula not in use  Pulmonary:      Effort: Pulmonary effort is normal. No respiratory distress.      Breath sounds: Normal breath sounds. No wheezing or rales.   Abdominal:      General: There is distension.      Tenderness: There is abdominal tenderness (minimal). There is no guarding or rebound.   Musculoskeletal:         General: Swelling present.      Cervical back: Normal range of motion.      Right lower leg: Edema present.      Left lower leg: Edema present.   Skin:     General: Skin is warm and  dry.      Comments: Excoriations of upper extremities, lower extremities, scalp, chest/abdomen. Right underarm boil erythematous/tender.   Neurological:      General: No focal deficit present.      Mental Status: He is alert and oriented to person, place, and time.   Psychiatric:         Thought Content: Thought content normal    Fluids    Intake/Output Summary (Last 24 hours) at 8/20/2022 1945  Last data filed at 8/20/2022 1400  Gross per 24 hour   Intake 860 ml   Output 3500 ml   Net -2640 ml       Laboratory  Recent Labs     08/18/22  1409 08/20/22  1842   WBC 8.2 7.8   RBC 2.77* 2.68*   HEMOGLOBIN 8.1* 7.8*   HEMATOCRIT 25.4* 24.5*   MCV 91.7 91.4   MCH 29.2 29.1   MCHC 31.9* 31.8*   RDW 58.4* 58.2*   PLATELETCT 214 240   MPV 9.8 9.7     Recent Labs     08/18/22  1409 08/19/22  0754 08/20/22  1842   SODIUM 131* 131* 137   POTASSIUM 5.5 4.5 4.5   CHLORIDE 96 94* 100   CO2 16* 21 23   GLUCOSE 144* 160* 203*   BUN 63* 49* 40*   CREATININE 9.05* 7.58* 6.16*   CALCIUM 8.1* 8.0* 8.2*                   Imaging  US-EXTREMITY VENOUS LOWER BILAT   Final Result      US-ABDOMEN COMPLETE SURVEY   Final Result      1.  Mildly increased hepatic echotexture is suggestive of fatty infiltration.      2.  Small-to-moderate amount of ascites.      3.  Cholelithiasis         DX-ABDOMEN COMPLETE WITH AP OR PA CXR   Final Result      Fairly diffuse bowel distention with scattered air-fluid levels. Findings are likely related to ileus. More distal obstruction cannot be excluded.      DX-CHEST-PORTABLE (1 VIEW)   Final Result      1.  Mildly enlarged cardiac silhouette with vascular congestion.           Assessment/Plan  Problem Representation:    * Noncompliance of patient with renal dialysis (HCC)- (present on admission)  Assessment & Plan  Has not had dialysis in the past 3 weeks, due to traveling to [place].  Obtained HD in [place], however does not have an outpatient dialysis chair in [place] and last year in [place].  Dialysis chair available 8/23.    -Signs/symptoms and labs/vitals slowly normalizing  -Nephrology following, on TThS dialysis  -Completed dialysis 8/16, 8/18-stop early due to pt having bm, 8/20  -Dialysis chair available 8/23.    Multiple air fluid levels of small intestine determined by X-ray  Assessment & Plan  Patients states he has been having watery stools multiple times a day for many days. He is unable to recount when his last solid stool was. Abdomen has become more distended and pain has increased. Abdominal xray with bowel distention with air-fluid levels. Pt has been taking oxycodone for leg pain since 8/8.     -Will continue to monitor with serial abdominal exam and for more form in bowel movement  -Abdominal pain has improved past few days, still distended      High anion gap metabolic acidosis- (present on admission)  Assessment & Plan  High anion gap acidosis on admission likely due to uremia.  Resolved    -Patient dialyzed TThS  -Will continue to monitor    Lower extremity pain, bilateral  Assessment & Plan  Anterior leg pain noted on admission and patient given oxycodone for pain. Most likely related to edema due to missed dialysis sessions. CPK and lactic acid wnl. Ambulatory.   -Oxycodone for pain control  -Bilateral DVT US negative    End stage renal disease (HCC)- (present on admission)  Assessment & Plan  Patient receiving HD in [place], has not received HD in 3 weeks and since admission.  Patient has also had multiple thrombectomies/revisions for AV fistula, currently nonfunctioning.  Patient has tunneled dialysis catheter in place and functioning.  Patient not adherent to HD or outpatient medications.  Nephrology following    -Currently on cholecalciferol   -Receiving HD on TThS  -Per nephrology note, AVG use per Vasc Surg recs; currently using RIJ TDC, last note 3/1 not yet cleared for use   -Per pharmacy ([facility]) pt has not picked up any medications. Prescribed  medications were 01/2022 - Trazodone 50mg QHS, Calcitriol 0.25mg OD, Furosemide 80mg OD, Ca-acetate 667-270 TID, Lisinopril 20mg OD. 12/31/2021 amplodipine 5mg, glipizide ER 5mg, carvedilol 25 BID  -Dialysis chair available 8/23, TTHSat; completed dialysis 8/20    Hypertension- (present on admission)  Assessment & Plan  History of uncontrolled hypertension. Likely due to missing dialysis and nonadherence to outpatient BP medications    -Hydralazine as needed due to history of CHF  -BP well controlled with Carvedilol 25mg twice daily       VTE prophylaxis: heparin ppx    I have performed a physical exam and reviewed and updated ROS and Plan today (8/20/2022). In review of yesterday's note (8/19/2022), there are no changes except as documented above.

## 2022-08-21 NOTE — PROGRESS NOTES
[facility] -  PROGRESS NOTE               Author: EMMA Beck Date & Time: 8/21/2022  8:34 AM     HPI:  47 yo M with End Stage Renal Disease on hemodialysis at [facility] every Tues/Thurs/Sat, last HD at [place] in 4/2022, then traveled and lost HD chair. Other PMH diabetes mellitus and Hypertension who presented to the emergency department on 8/8/22 for weakness. He missed 1 months of HD due to traveling. He was found to be hypertensive 153/99, afebrile. Labs with K of 6.1, BUN 93, bicarb 10.   No F/C/N/V/CP/SOB.  No melena, hematochezia, hematemesis.  No HA, visual changes, or abdominal pain.    DAILY NEPHROLOGY SUMMARY:  8/9: 3L net UF. Resting in bed. No complaints. No acute distress. Security called last night, patient aggressive towards staff regarding personal items. Bps improved this am.   8/10: Seen on HD this am. Lethargic. No complaints.   8/11:  Resting comfortably no complaints.  VSS.  8/12: Ambulating in room indep. Denies CP/SOB, on RA. Reports he is hungry and wants breakfast before anything else. Bps elevated.   8/13: VSS RA no complaints.  Ambulating.   8/14: Sleeping in bed. Dismissive. Does not want extra HD today. BP elevated.   8/15: Risks of no dialysis explained to patient, refused treatment today.  Complaining of leg pain and swelling.  8/16: Seen on dialysis at bedside.  Complaining of thigh pain.   8/17: Denies pain or SOB.  Resting.  8/18: Pt sitting up in chair. For HD today. Denies CP,SOB,N/V/D. No overnight events. No  labs for review. VSS. No complaints.   8/19: HD yest, UF 2 L. Pt came off HD treatment 50' early as he had an accident during treatment.Pt C/O itching. Still with LLE.   8/20: Pt seen on HD, minimally engages in conversation, VSS on RA  8/21: Pt sitting up EOB, says his legs and forehead hurt, iHD yesterday with 3L net UF, declines need for additional UF today, lab reviewed, VSS on RA    REVIEW OF SYSTEMS:    10 point ROS  "reviewed and is as per HPI/daily summary or otherwise negative    PMH/PSH/SH/FH: Reviewed and unchanged since admission note  CURRENT MEDICATIONS: Reviewed from admission to present day    VS:  /76   Pulse 83   Temp 36.9 °C (98.5 °F) (Temporal)   Resp 18   Ht 1.753 m (5' 9\")   Wt 99.6 kg (219 lb 9.3 oz)   SpO2 92%   BMI 32.43 kg/m²   Physical Exam  Vitals and nursing note reviewed.   Constitutional:       General: He is not in acute distress.     Appearance: He is ill-appearing.   HENT:      Head: Normocephalic and atraumatic.      Nose: Nose normal. No congestion or rhinorrhea.      Mouth/Throat:      Mouth: Mucous membranes are moist.      Pharynx: No oropharyngeal exudate or posterior oropharyngeal erythema.   Eyes:      General: No scleral icterus.     Extraocular Movements: Extraocular movements intact.      Conjunctiva/sclera: Conjunctivae normal.      Pupils: Pupils are equal, round, and reactive to light.   Cardiovascular:      Rate and Rhythm: Normal rate and regular rhythm.      Pulses: Normal pulses.      Heart sounds: Normal heart sounds. No murmur heard.    No friction rub. No gallop.      Comments: RIJ TDC  LAVF +T/B  Pulmonary:      Effort: Pulmonary effort is normal. No respiratory distress.      Breath sounds: Normal breath sounds. No wheezing or rales.   Abdominal:      General: Bowel sounds are normal.   Musculoskeletal:         General: Swelling present. Normal range of motion.      Cervical back: Normal range of motion and neck supple.   Skin:     General: Skin is warm and dry.      Findings: Erythema (LEs/forehead) and lesion present.      Comments: Scattered lesions/excoriation   Neurological:      Mental Status: He is alert and oriented to person, place, and time. Mental status is at baseline.   Psychiatric:         Mood and Affect: Mood normal.         Behavior: Behavior normal.       Fluids:  In: 1460 [P.O.:960; Dialysis:500]  Out: 3500     LABS:  Recent Labs     08/19/22  0754 " 08/20/22  1842 08/21/22  0614   SODIUM 131* 137 135   POTASSIUM 4.5 4.5 4.6   CHLORIDE 94* 100 99   CO2 21 23 22   GLUCOSE 160* 203* 156*   BUN 49* 40* 45*   CREATININE 7.58* 6.16* 6.38*   CALCIUM 8.0* 8.2* 8.2*         IMPRESSION:  # ESRD  - Etiology likely 2/2 DM/HTN  - HD qTTHS via RIJ TDC  - Lost his HD chair at [facility] ->plan for [facility]  # Thrombosed Left AVF  - US 2/23 no flow  - s/p OR 2/25 AVG Brachio-axillary Creation, thrombectomy and fistulogram   # HTN, fair control   - Goal BP < 140/90  # Anemia of CKD, below target  - Goal Hgb 10-11   - % sat 32  - Ferritin 879  - JACKIE with HD   # CKD-MBD  - Ca 8.1 with low albumin  - PO4 6.2  -   - On cholecalciferol and calcium acetate   # Hyperkalemia, corrected  - Due to missed HD  # Weakness  # h/o Methamphetamine use   # Homelessness  # DM   - A1C 6.5  # Severe metabolic acidosis, corrected      PLAN:  - Continue iHD qTTS, next treatment due TUES  - Daily eval for additional treatment   - Challenge UF as able    - Continue calcium acetate with meals. Hold if pt is NPO.  - Transfuse PRN Hgb <7.0   - JACKIE with HD to goal Hgb 10-11  - No dietary protein restrictions  - Dose all meds per ESRD/iHD  - Weakness workup per primary team   - Low Na/fluid restricted renal diet  - Follow labs  - AVG use per Vasc Surg recs; currently using [facility] TDC, last note 3/1 not yet cleared for use.  - Discharge planning underway. Accepted at [facility] QTTS. First day 8/23.  - Pt okay to discharge from a Nephrology standpoint once medically cleared and with secured OP HD chair.    Thank you,

## 2022-08-21 NOTE — CARE PLAN
The patient is Stable - Low risk of patient condition declining or worsening    Shift Goals  Clinical Goals: safety, comfort, HD today  Patient Goals: comfort, rest  Family Goals: POLINA    Progress made toward(s) clinical / shift goals:  Patient remain safe and comfortable throughout the whole shift. Patient's pain is addressed with PRN pain meds. HD done today.    Patient is not progressing towards the following goals:      Problem: Knowledge Deficit - Standard  Goal: Patient and family/care givers will demonstrate understanding of plan of care, disease process/condition, diagnostic tests and medications  Outcome: Progressing     Problem: Pain - Standard  Goal: Alleviation of pain or a reduction in pain to the patient’s comfort goal  Outcome: Progressing     Problem: Psychosocial  Goal: Patient's ability to identify and develop effective coping behaviors will improve  Outcome: Progressing  Goal: Patient's ability to identify and utilize available support systems will improve  Outcome: Progressing     Problem: Fall Risk  Goal: Patient will remain free from falls  Outcome: Progressing

## 2022-08-22 ENCOUNTER — APPOINTMENT (OUTPATIENT)
Dept: RADIOLOGY | Facility: MEDICAL CENTER | Age: 47
DRG: 291 | End: 2022-08-22
Attending: STUDENT IN AN ORGANIZED HEALTH CARE EDUCATION/TRAINING PROGRAM
Payer: MEDICARE

## 2022-08-22 LAB
BACTERIA WND AEROBE CULT: ABNORMAL
BACTERIA WND AEROBE CULT: ABNORMAL
GRAM STN SPEC: ABNORMAL
SIGNIFICANT IND 70042: ABNORMAL
SITE SITE: ABNORMAL
SOURCE SOURCE: ABNORMAL

## 2022-08-22 PROCEDURE — 700101 HCHG RX REV CODE 250: Performed by: STUDENT IN AN ORGANIZED HEALTH CARE EDUCATION/TRAINING PROGRAM

## 2022-08-22 PROCEDURE — 700102 HCHG RX REV CODE 250 W/ 637 OVERRIDE(OP): Performed by: NURSE PRACTITIONER

## 2022-08-22 PROCEDURE — 700102 HCHG RX REV CODE 250 W/ 637 OVERRIDE(OP)

## 2022-08-22 PROCEDURE — 74176 CT ABD & PELVIS W/O CONTRAST: CPT | Mod: MG

## 2022-08-22 PROCEDURE — A9270 NON-COVERED ITEM OR SERVICE: HCPCS

## 2022-08-22 PROCEDURE — 700102 HCHG RX REV CODE 250 W/ 637 OVERRIDE(OP): Performed by: STUDENT IN AN ORGANIZED HEALTH CARE EDUCATION/TRAINING PROGRAM

## 2022-08-22 PROCEDURE — A9270 NON-COVERED ITEM OR SERVICE: HCPCS | Performed by: NURSE PRACTITIONER

## 2022-08-22 PROCEDURE — 700105 HCHG RX REV CODE 258: Performed by: HOSPITALIST

## 2022-08-22 PROCEDURE — 770001 HCHG ROOM/CARE - MED/SURG/GYN PRIV*

## 2022-08-22 PROCEDURE — 700111 HCHG RX REV CODE 636 W/ 250 OVERRIDE (IP): Performed by: HOSPITALIST

## 2022-08-22 PROCEDURE — 87040 BLOOD CULTURE FOR BACTERIA: CPT

## 2022-08-22 PROCEDURE — A9270 NON-COVERED ITEM OR SERVICE: HCPCS | Performed by: STUDENT IN AN ORGANIZED HEALTH CARE EDUCATION/TRAINING PROGRAM

## 2022-08-22 PROCEDURE — 99233 SBSQ HOSP IP/OBS HIGH 50: CPT | Mod: GC | Performed by: HOSPITALIST

## 2022-08-22 PROCEDURE — 36415 COLL VENOUS BLD VENIPUNCTURE: CPT

## 2022-08-22 RX ADMIN — CARVEDILOL 25 MG: 25 TABLET, FILM COATED ORAL at 16:50

## 2022-08-22 RX ADMIN — Medication 1334 MG: at 16:50

## 2022-08-22 RX ADMIN — Medication 1334 MG: at 07:44

## 2022-08-22 RX ADMIN — Medication 1000 UNITS: at 05:44

## 2022-08-22 RX ADMIN — VANCOMYCIN HYDROCHLORIDE 2500 MG: 500 INJECTION, POWDER, LYOPHILIZED, FOR SOLUTION INTRAVENOUS at 21:45

## 2022-08-22 RX ADMIN — OXYCODONE 5 MG: 5 TABLET ORAL at 16:50

## 2022-08-22 RX ADMIN — OXYCODONE 5 MG: 5 TABLET ORAL at 07:45

## 2022-08-22 RX ADMIN — CARVEDILOL 25 MG: 25 TABLET, FILM COATED ORAL at 07:44

## 2022-08-22 RX ADMIN — Medication 1334 MG: at 20:05

## 2022-08-22 RX ADMIN — DOCUSATE SODIUM 50 MG AND SENNOSIDES 8.6 MG 1 TABLET: 8.6; 5 TABLET, FILM COATED ORAL at 05:44

## 2022-08-22 RX ADMIN — HEPARIN SODIUM 5000 UNITS: 5000 INJECTION, SOLUTION INTRAVENOUS; SUBCUTANEOUS at 16:51

## 2022-08-22 RX ADMIN — LIDOCAINE 1 PATCH: 50 PATCH CUTANEOUS at 16:49

## 2022-08-22 ASSESSMENT — PAIN DESCRIPTION - PAIN TYPE: TYPE: ACUTE PAIN

## 2022-08-22 ASSESSMENT — PAIN SCALES - WONG BAKER: WONGBAKER_NUMERICALRESPONSE: DOESN'T HURT AT ALL

## 2022-08-22 NOTE — DISCHARGE PLANNING
Case Management Discharge Planning    Admission Date: 8/8/2022  GMLOS: 3.1  ALOS: 14    6-Clicks ADL Score: 23  6-Clicks Mobility Score: 23      Anticipated Discharge Dispo: Discharge Disposition: Discharged to home/self care (01)    DME Needed: No    Action(s) Taken: Updated Provider/Nurse on Discharge Plan, DC Assessment Complete (See below), and OTHER    Escalations Completed: None    Medically Clear: Yes    Next Steps: Rec'd call from Dialysis coordinator Xitlaly Reyes to confirm pt has a dialysis appt at [facility] tomorrow at 6:00AM. Voalte texted [facility] MDs and rev'd with DEBORAH Benitez in rounds, pt to dc today and states he is agreeable to going to his appt in the AM.     Barriers to Discharge: None    Is the patient up for discharge tomorrow: No      Addendum at 12:10- Rev'd with [facility], unfortunately pt is not discharging today, or not likely the whole week. Pt has an infected dialysis line, will need cultures and likely IV ABX started. MD to review with Nephrology to see if the line needs to be replaced.  Notified Negin, Dialysis liaison, to cancel his outpt dialysis at [facility] for the rest of the week.

## 2022-08-22 NOTE — CARE PLAN
The patient is Stable - Low risk of patient condition declining or worsening    Shift Goals  Clinical Goals: pain management, comfort, safety  Patient Goals: pain control, rest  Family Goals: POLINA    Progress made toward(s) clinical / shift goals: Patient not making progress in being compliant with plan of care. Needs coaching and motivation. Continues to appear agitated.     Patient is not progressing towards the following goals:      Problem: Knowledge Deficit - Standard  Goal: Patient and family/care givers will demonstrate understanding of plan of care, disease process/condition, diagnostic tests and medications  Outcome: Not Progressing     Problem: Psychosocial  Goal: Patient's ability to identify and develop effective coping behaviors will improve  Outcome: Not Progressing

## 2022-08-22 NOTE — CARE PLAN
The patient is Stable - Low risk of patient condition declining or worsening    Shift Goals  Clinical Goals: pain management, comfort, safety  Patient Goals: pain control, rest  Family Goals: POLINA    Progress made toward(s) clinical / shift goals:    Problem: Knowledge Deficit - Standard  Goal: Patient and family/care givers will demonstrate understanding of plan of care, disease process/condition, diagnostic tests and medications  Outcome: Progressing     Problem: Pain - Standard  Goal: Alleviation of pain or a reduction in pain to the patient’s comfort goal  Outcome: Progressing     Problem: Provide Safe Environment  Goal: Suicide environmental safety, protocols, policies, and practices will be implemented  Outcome: Progressing       Patient is not progressing towards the following goals:

## 2022-08-22 NOTE — PROGRESS NOTES
[facility] -  PROGRESS NOTE               Author: LORENZO Garcia. Date & Time: 8/22/2022  12:19 PM     HPI:  45 yo M with End Stage Renal Disease on hemodialysis at [facility] every Tues/Thurs/Sat, last HD at [facility] in 4/2022, then traveled and lost HD chair. Other PMH diabetes mellitus and Hypertension who presented to the emergency department on 8/8/22 for weakness. He missed 1 months of HD due to traveling. He was found to be hypertensive 153/99, afebrile. Labs with K of 6.1, BUN 93, bicarb 10.   No F/C/N/V/CP/SOB.  No melena, hematochezia, hematemesis.  No HA, visual changes, or abdominal pain.    DAILY NEPHROLOGY SUMMARY:  8/9: 3L net UF. Resting in bed. No complaints. No acute distress. Security called last night, patient aggressive towards staff regarding personal items. Bps improved this am.   8/10: Seen on HD this am. Lethargic. No complaints.   8/11:  Resting comfortably no complaints.  VSS.  8/12: Ambulating in room indep. Denies CP/SOB, on RA. Reports he is hungry and wants breakfast before anything else. Bps elevated.   8/13: VSS RA no complaints.  Ambulating.   8/14: Sleeping in bed. Dismissive. Does not want extra HD today. BP elevated.   8/15: Risks of no dialysis explained to patient, refused treatment today.  Complaining of leg pain and swelling.  8/16: Seen on dialysis at bedside.  Complaining of thigh pain.   8/17: Denies pain or SOB.  Resting.  8/18: Pt sitting up in chair. For HD today. Denies CP,SOB,N/V/D. No overnight events. No  labs for review. VSS. No complaints.   8/19: HD yest, UF 2 L. Pt came off HD treatment 50' early as he had an accident during treatment.Pt C/O itching. Still with LLE.   8/20: Pt seen on HD, minimally engages in conversation, VSS on RA  8/21: Pt sitting up EOB, says his legs and forehead hurt, iHD yesterday with 3L net UF, declines need for additional UF today, lab reviewed, VSS on RA  8/22: VSS RA.  No new labs.   "Does complaint of leg pain bilat, worsening edema.  No SOB. Declines additional dialysis tx.  Swab from line site +MRSA.  Blood cultures pending collection.  Afebrile.      REVIEW OF SYSTEMS:    10 point ROS reviewed and is as per HPI/daily summary or otherwise negative    PMH/PSH/SH/FH: Reviewed and unchanged since admission note  CURRENT MEDICATIONS: Reviewed from admission to present day    VS:  /80   Pulse 77   Temp 36.9 °C (98.5 °F) (Temporal)   Resp 15   Ht 1.753 m (5' 9\")   Wt 99.6 kg (219 lb 9.3 oz)   SpO2 97%   BMI 32.43 kg/m²   Physical Exam  Vitals and nursing note reviewed.   Constitutional:       General: He is not in acute distress.     Appearance: He is ill-appearing.   HENT:      Head: Normocephalic and atraumatic.      Nose: Nose normal. No congestion or rhinorrhea.      Mouth/Throat:      Mouth: Mucous membranes are moist.      Pharynx: No oropharyngeal exudate or posterior oropharyngeal erythema.   Eyes:      General: No scleral icterus.     Extraocular Movements: Extraocular movements intact.      Conjunctiva/sclera: Conjunctivae normal.      Pupils: Pupils are equal, round, and reactive to light.   Cardiovascular:      Rate and Rhythm: Normal rate and regular rhythm.      Pulses: Normal pulses.      Heart sounds: Normal heart sounds. No murmur heard.    No friction rub. No gallop.      Comments: RIJ TDC  LAVG +T/B    +bilat LE edema 1+  Pulmonary:      Effort: Pulmonary effort is normal. No respiratory distress.      Breath sounds: Normal breath sounds. No wheezing or rales.   Abdominal:      General: Bowel sounds are normal.   Musculoskeletal:         General: Swelling present. Normal range of motion.      Cervical back: Normal range of motion and neck supple.   Skin:     General: Skin is warm and dry.      Findings: Erythema (LEs/forehead) and lesion present.      Comments: Scattered lesions/excoriation   Neurological:      Mental Status: He is alert and oriented to person, place, " and time. Mental status is at baseline.   Psychiatric:         Mood and Affect: Mood normal.         Behavior: Behavior normal.       Fluids:  No intake/output data recorded.    LABS:  Recent Labs     08/20/22  1842 08/21/22  0614   SODIUM 137 135   POTASSIUM 4.5 4.6   CHLORIDE 100 99   CO2 23 22   GLUCOSE 203* 156*   BUN 40* 45*   CREATININE 6.16* 6.38*   CALCIUM 8.2* 8.2*         IMPRESSION:  # ESRD  - Etiology likely 2/2 DM/HTN  - HD qTTHS via [facility] TD  - Lost his HD chair at [facility] ->plan for [facility]  # Thrombosed Left AVF  - US 2/23 no flow  - s/p OR 2/25 AVG Brachio-axillary Creation, thrombectomy and fistulogram   # HTN, fair control   - Goal BP < 140/90  # Anemia of CKD, below target  - Goal Hgb 10-11   - % sat 32  - Ferritin 879  - JACKIE with HD   # CKD-MBD  - Ca 8.1 with low albumin  - PO4 6.2  -   - On cholecalciferol and calcium acetate   # Hyperkalemia, corrected  - Due to missed HD  # Weakness  # h/o Methamphetamine use   # Homelessness  # DM   - A1C 6.5  # Severe metabolic acidosis, corrected      PLAN:  - Continue iHD qTTS, next treatment due TUES  - Daily eval for additional treatment   - Challenge UF as able    - Continue calcium acetate with meals. Hold if pt is NPO.  - Transfuse PRN Hgb <7.0   - JACKIE with HD to goal Hgb 10-11  - No dietary protein restrictions  - Dose all meds per ESRD/iHD  - Weakness workup per primary team   - Low Na/fluid restricted renal diet  - Follow labs  - AVG ok to try to use per phone call to vascular surgeon   - Obtain blood cultures, recommend starting vancomycin once drawn while awaiting results.   - Discharge planning underway. Accepted at [facility]

## 2022-08-22 NOTE — PROGRESS NOTES
Patient A&Ox4, ambulating without assistance, gait steady, does not appear in distress. Patient denies any new concerns or complaints. Drainage observed near stitching of IJ. Provider ordered imaging and cultures to be drawn. Patient continues to refuse to cooperate with care plan. Educated patient on importance of CHG baths, refused and stated he will just shower on his own. Patient conducted shower and hygiene activities. Patient needed encouragement and education on going down for imaging procedures. Imaging completed.

## 2022-08-23 LAB
ALBUMIN SERPL BCP-MCNC: 2.9 G/DL (ref 3.2–4.9)
BUN SERPL-MCNC: 64 MG/DL (ref 8–22)
CALCIUM SERPL-MCNC: 8.2 MG/DL (ref 8.5–10.5)
CHLORIDE SERPL-SCNC: 98 MMOL/L (ref 96–112)
CO2 SERPL-SCNC: 20 MMOL/L (ref 20–33)
CREAT SERPL-MCNC: 8.39 MG/DL (ref 0.5–1.4)
ERYTHROCYTE [DISTWIDTH] IN BLOOD BY AUTOMATED COUNT: 56.1 FL (ref 35.9–50)
GFR SERPLBLD CREATININE-BSD FMLA CKD-EPI: 7 ML/MIN/1.73 M 2
GLUCOSE SERPL-MCNC: 162 MG/DL (ref 65–99)
HCT VFR BLD AUTO: 23.1 % (ref 42–52)
HGB BLD-MCNC: 7.4 G/DL (ref 14–18)
MCH RBC QN AUTO: 28.9 PG (ref 27–33)
MCHC RBC AUTO-ENTMCNC: 32 G/DL (ref 33.7–35.3)
MCV RBC AUTO: 90.2 FL (ref 81.4–97.8)
PHOSPHATE SERPL-MCNC: 5.3 MG/DL (ref 2.5–4.5)
PLATELET # BLD AUTO: 279 K/UL (ref 164–446)
PMV BLD AUTO: 9.4 FL (ref 9–12.9)
POTASSIUM SERPL-SCNC: 5.6 MMOL/L (ref 3.6–5.5)
RBC # BLD AUTO: 2.56 M/UL (ref 4.7–6.1)
SCCMEC + MECA PNL NOSE NAA+PROBE: POSITIVE
SODIUM SERPL-SCNC: 134 MMOL/L (ref 135–145)
WBC # BLD AUTO: 9.3 K/UL (ref 4.8–10.8)

## 2022-08-23 PROCEDURE — 700111 HCHG RX REV CODE 636 W/ 250 OVERRIDE (IP): Performed by: NURSE PRACTITIONER

## 2022-08-23 PROCEDURE — A9270 NON-COVERED ITEM OR SERVICE: HCPCS | Performed by: STUDENT IN AN ORGANIZED HEALTH CARE EDUCATION/TRAINING PROGRAM

## 2022-08-23 PROCEDURE — 99232 SBSQ HOSP IP/OBS MODERATE 35: CPT | Mod: GC | Performed by: HOSPITALIST

## 2022-08-23 PROCEDURE — 700101 HCHG RX REV CODE 250

## 2022-08-23 PROCEDURE — 90935 HEMODIALYSIS ONE EVALUATION: CPT

## 2022-08-23 PROCEDURE — 700101 HCHG RX REV CODE 250: Performed by: STUDENT IN AN ORGANIZED HEALTH CARE EDUCATION/TRAINING PROGRAM

## 2022-08-23 PROCEDURE — A9270 NON-COVERED ITEM OR SERVICE: HCPCS

## 2022-08-23 PROCEDURE — 700111 HCHG RX REV CODE 636 W/ 250 OVERRIDE (IP): Performed by: HOSPITALIST

## 2022-08-23 PROCEDURE — 700102 HCHG RX REV CODE 250 W/ 637 OVERRIDE(OP)

## 2022-08-23 PROCEDURE — 85027 COMPLETE CBC AUTOMATED: CPT

## 2022-08-23 PROCEDURE — 770001 HCHG ROOM/CARE - MED/SURG/GYN PRIV*

## 2022-08-23 PROCEDURE — 80069 RENAL FUNCTION PANEL: CPT

## 2022-08-23 PROCEDURE — 87641 MR-STAPH DNA AMP PROBE: CPT

## 2022-08-23 PROCEDURE — 700102 HCHG RX REV CODE 250 W/ 637 OVERRIDE(OP): Performed by: STUDENT IN AN ORGANIZED HEALTH CARE EDUCATION/TRAINING PROGRAM

## 2022-08-23 PROCEDURE — 700111 HCHG RX REV CODE 636 W/ 250 OVERRIDE (IP)

## 2022-08-23 RX ORDER — HEPARIN SODIUM 1000 [USP'U]/ML
1600 INJECTION, SOLUTION INTRAVENOUS; SUBCUTANEOUS
Status: COMPLETED | OUTPATIENT
Start: 2022-08-23 | End: 2022-08-23

## 2022-08-23 RX ORDER — HEPARIN SODIUM 1000 [USP'U]/ML
INJECTION, SOLUTION INTRAVENOUS; SUBCUTANEOUS
Status: COMPLETED
Start: 2022-08-23 | End: 2022-08-23

## 2022-08-23 RX ORDER — LIDOCAINE HYDROCHLORIDE 10 MG/ML
INJECTION, SOLUTION INFILTRATION; PERINEURAL
Status: COMPLETED
Start: 2022-08-23 | End: 2022-08-23

## 2022-08-23 RX ADMIN — Medication 1000 UNITS: at 05:49

## 2022-08-23 RX ADMIN — Medication 1334 MG: at 17:00

## 2022-08-23 RX ADMIN — HEPARIN SODIUM 1600 UNITS: 1000 INJECTION, SOLUTION INTRAVENOUS; SUBCUTANEOUS at 15:00

## 2022-08-23 RX ADMIN — EPOETIN ALFA-EPBX 5000 UNITS: 3000 INJECTION, SOLUTION INTRAVENOUS; SUBCUTANEOUS at 13:53

## 2022-08-23 RX ADMIN — OXYCODONE 5 MG: 5 TABLET ORAL at 17:01

## 2022-08-23 RX ADMIN — OXYCODONE 5 MG: 5 TABLET ORAL at 03:02

## 2022-08-23 RX ADMIN — Medication 1 ML: at 13:05

## 2022-08-23 RX ADMIN — LIDOCAINE HYDROCHLORIDE 1 ML: 10 INJECTION, SOLUTION INFILTRATION; PERINEURAL at 13:05

## 2022-08-23 RX ADMIN — HEPARIN SODIUM 5000 UNITS: 5000 INJECTION, SOLUTION INTRAVENOUS; SUBCUTANEOUS at 14:00

## 2022-08-23 RX ADMIN — LIDOCAINE 1 PATCH: 50 PATCH CUTANEOUS at 14:15

## 2022-08-23 RX ADMIN — TRIAMCINOLONE ACETONIDE: 1 OINTMENT TOPICAL at 18:00

## 2022-08-23 ASSESSMENT — PAIN DESCRIPTION - PAIN TYPE
TYPE: ACUTE PAIN

## 2022-08-23 NOTE — PROGRESS NOTES
[facility] Services Progress Note         HD today x 3 hours per Dr. Palacio. Tx initiated at 1305 and ended at 1457    Pt A/O x 4, no SOB, no bleeding, denies chest pain, but with +1 bipedal edema. With RTDC intact; (+) pus near the exit site, and LUE AVG (+) bruit and thrill. Ordered to use AVG today for the HD tx.     NET UF: 1348 ml    Acid bath was replaced from 3K to 2K at 1415. Chem panel result was released and pt's K level is at 5.6mmol/L. Dr Palacio was informed.     14:57: Early termination was done, with remaining time of 1 hr and 19 mins, d/t the swelling of the Venous site and pain in the LUE. Dr. Palacio was informed. Blood was tried to return in the arterial cannulation, in slow BFR, but pt c/o pain and arm swelling. Blood was then aseptically returned to the venous port of the CVC. Heparin lock was aspirated prior. Venous port was flushed with NS, locked with heparin, and capped. HD needles were removed, gauze was applied, and arm was held for 10 mins.Verified no bleeding post treatment. Bruit and thrill present post dialysis. Instructions given to Primary RN that if bleeding occurs on the AVF site, change dressing and hold the site with pressure.     Report given to RAFAEL Larose RN

## 2022-08-23 NOTE — PROGRESS NOTES
Pt alert/oriented x4, medicated for RLE pain per MAR. HD cath dressing changed. Tolerated IV Vanco, no adverse rxn noted. Call light within reach, personal belongings available, bed in lowest position, treaded socks on, and hourly rounding in place.

## 2022-08-23 NOTE — PROGRESS NOTES
[facility] Progress Note   Attempted to do HD now. Patient was upset to be awaken early for dialysis. Would like to eat breakfast and don't want to rush despite explaining he can finish breakfast while setting up machine.   Primary Rn informed, patient still refused and would prefer HD after.   We will try to do HD later in the day.

## 2022-08-23 NOTE — PROGRESS NOTES
"Pharmacy Vancomycin Kinetics Note for 8/22/2022     46 y.o. male on Vancomycin day # 1     Vancomycin Indication (Two level/Trough based Dosing): Bacteremia (goal trough 15-20)    Provider specified end date: 09/05/22    Active Antibiotics (From admission, onward)      Ordered     Ordering Provider       Mon Aug 22, 2022  5:23 PM    08/22/22 1723  vancomycin (VANCOCIN) 2,500 mg in  mL IVPB  (vancomycin (VANCOCIN) IV (LD + Maintenance))  ONCE        Note to Pharmacy: Per P&T Kinetics Protocol    Christophe Nuñez M.D.       Mon Aug 22, 2022  5:18 PM    08/22/22 1718  MD Alert...Vancomycin per Pharmacy  PHARMACY TO DOSE        Question:  Indication(s) for vancomycin?  Answer:  Other (comments)  Comment:  MRSA concern for line infection vs bacteremia    Farzaneh Adorno M.D.          Dosing Weight: 99.6 kg (219 lb 9.3 oz)    Admission History: Admitted on 8/8/2022 for Noncompliance of patient with renal dialysis ([facility]) [Z91.15]  Pertinent history: Concern for infection after recent dialysis attempt. ESRD HD. Antimicrobials initiated.    Allergies:   Patient has no known allergies.     Pertinent cultures to date:   Results       Procedure Component Value Units Date/Time    BLOOD CULTURE [[id number]] Collected: 08/22/22 1539    Order Status: Sent Specimen: Blood from Peripheral Updated: 08/22/22 1614    Narrative:      Contact  Per Hospital Policy: Only change Specimen Src: to \"Line\" if  specified by physician order.    BLOOD CULTURE [[id number]] Collected: 08/22/22 1539    Order Status: Sent Specimen: Blood from Peripheral Updated: 08/22/22 1614    Narrative:      Contact  Per Hospital Policy: Only change Specimen Src: to \"Line\" if  specified by physician order.    CULTURE WOUND W/ GRAM STAIN [[id number]]     Order Status: Sent Specimen: Wound from Abscess     CULTURE WOUND W/ GRAM STAIN [[id number]]     Order Status: Canceled Specimen: Wound from Abscess     CULTURE WOUND W/ GRAM STAIN [[id number]]  (Abnormal)  " "(Susceptibility) Collected: 22 1230    Order Status: Completed Specimen: Wound from Incision Updated: 22 1018     Significant Indicator POS     Source WND     Site ARMPIT     Culture Result -     Gram Stain Result Few WBCs.  Few Gram positive cocci.       Culture Result Methicillin Resistant Staphylococcus aureus  Light growth      Narrative:      Collected By: [id number] KENDELL NAOMI F.  HD catheter exit site on R chest CVC  Collected By: [id number] KENDELL NAOMI PATRICIA.    GRAM STAIN [[id number]] Collected: 22 1230    Order Status: Completed Specimen: Wound Updated: 22 1602     Significant Indicator .     Source WND     Site ARMPIT     Gram Stain Result Few WBCs.  Few Gram positive cocci.      Narrative:      Collected By: [id number] Nazara TechnologiesGLO GOMEZ.  HD catheter exit site on R chest CVC  Collected By: [id number] KENDELL NAOMI PATRICIA.          Labs:   Estimated Creatinine Clearance: 17.2 mL/min (A) (by C-G formula based on SCr of 6.38 mg/dL (HH)).    Recent Labs     22  1842 22  0614   WBC 7.8 9.1     Recent Labs     22  1842 22  0614   BUN 40* 45*   CREATININE 6.16* 6.38*   ALBUMIN 2.6*  --      /72   Pulse 78   Temp 36.4 °C (97.6 °F) (Temporal)   Resp 16   Ht 1.753 m (5' 9\")   Wt 99.6 kg (219 lb 9.3 oz)   SpO2 97%  Temp (24hrs), Av.8 °C (98.3 °F), Min:36.4 °C (97.6 °F), Max:37.1 °C (98.8 °F)    List concerns for Vancomycin clearance:   ESRD    Pharmacokinetics:  N/A    A/P:     -  Vancomycin dose: 2500 mg iv once (~25 mg/kg/dose)    -  Next vancomycin level(s):    -22 Vr @ 0300    -  Comments: ESRD HD with unclear timetable. Case added to AMS list for review. Plan for random vancomycin level with AM labs 22 to assess further dose requirement. Pharmacy will continue to follow.    Adonis Lovett, PharmD  "

## 2022-08-23 NOTE — PROGRESS NOTES
[facility] -  PROGRESS NOTE               Author: DAVID Patel. Date & Time: 8/23/2022  10:25 AM     HPI:  47 yo M with End Stage Renal Disease on hemodialysis at [facility] every Tues/Thurs/Sat, last HD at [place] in 4/2022, then traveled and lost HD chair. Other PMH diabetes mellitus and Hypertension who presented to the emergency department on 8/8/22 for weakness. He missed 1 months of HD due to traveling. He was found to be hypertensive 153/99, afebrile. Labs with K of 6.1, BUN 93, bicarb 10.   No F/C/N/V/CP/SOB.  No melena, hematochezia, hematemesis.  No HA, visual changes, or abdominal pain.    DAILY NEPHROLOGY SUMMARY:  8/9: 3L net UF. Resting in bed. No complaints. No acute distress. Security called last night, patient aggressive towards staff regarding personal items. Bps improved this am.   8/10: Seen on HD this am. Lethargic. No complaints.   8/11:  Resting comfortably no complaints.  VSS.  8/12: Ambulating in room indep. Denies CP/SOB, on RA. Reports he is hungry and wants breakfast before anything else. Bps elevated.   8/13: VSS RA no complaints.  Ambulating.   8/14: Sleeping in bed. Dismissive. Does not want extra HD today. BP elevated.   8/15: Risks of no dialysis explained to patient, refused treatment today.  Complaining of leg pain and swelling.  8/16: Seen on dialysis at bedside.  Complaining of thigh pain.   8/17: Denies pain or SOB.  Resting.  8/18: Pt sitting up in chair. For HD today. Denies CP,SOB,N/V/D. No overnight events. No  labs for review. VSS. No complaints.   8/19: HD yest, UF 2 L. Pt came off HD treatment 50' early as he had an accident during treatment.Pt C/O itching. Still with LLE.   8/20: Pt seen on HD, minimally engages in conversation, VSS on RA  8/21: Pt sitting up EOB, says his legs and forehead hurt, iHD yesterday with 3L net UF, declines need for additional UF today, lab reviewed, VSS on RA  8/22: VSS RA.  No new labs.  Does  "complaint of leg pain bilat, worsening edema.  No SOB. Declines additional dialysis tx.  Swab from line site +MRSA.  Blood cultures pending collection.  Afebrile.    8/23:Pt refused HD this am \"maybe later\". He is agreeable to do dialysis now.    REVIEW OF SYSTEMS:    10 point ROS reviewed and is as per HPI/daily summary or otherwise negative    PMH/PSH/SH/FH: Reviewed and unchanged since admission note  CURRENT MEDICATIONS: Reviewed from admission to present day    VS:  /78   Pulse 74   Temp 36.4 °C (97.5 °F)   Resp 17   Ht 1.753 m (5' 9\")   Wt 99.6 kg (219 lb 9.3 oz)   SpO2 96%   BMI 32.43 kg/m²   Physical Exam  Vitals and nursing note reviewed.   Constitutional:       General: He is not in acute distress.     Appearance: He is ill-appearing.   HENT:      Head: Normocephalic and atraumatic.      Nose: Nose normal. No congestion or rhinorrhea.      Mouth/Throat:      Mouth: Mucous membranes are moist.      Pharynx: No oropharyngeal exudate or posterior oropharyngeal erythema.   Eyes:      General: No scleral icterus.     Extraocular Movements: Extraocular movements intact.      Conjunctiva/sclera: Conjunctivae normal.      Pupils: Pupils are equal, round, and reactive to light.   Cardiovascular:      Rate and Rhythm: Normal rate and regular rhythm.      Pulses: Normal pulses.      Heart sounds: Normal heart sounds. No murmur heard.    No friction rub. No gallop.      Comments: RIJ TDC  LAVG +T/B    +bilat LE edema 1+  Pulmonary:      Effort: Pulmonary effort is normal. No respiratory distress.      Breath sounds: Normal breath sounds. No wheezing or rales.   Abdominal:      General: Bowel sounds are normal.   Musculoskeletal:         General: Swelling present. Normal range of motion.      Cervical back: Normal range of motion and neck supple.   Skin:     General: Skin is warm and dry.      Findings: Erythema (LEs/forehead) and lesion present.      Comments: Scattered lesions/excoriation   Neurological: "      Mental Status: He is alert and oriented to person, place, and time. Mental status is at baseline.   Psychiatric:         Mood and Affect: Mood normal.         Behavior: Behavior normal.       Fluids:  In: 1000 [P.O.:1000]  Out: -     LABS:  Recent Labs     08/20/22  1842 08/21/22  0614   SODIUM 137 135   POTASSIUM 4.5 4.6   CHLORIDE 100 99   CO2 23 22   GLUCOSE 203* 156*   BUN 40* 45*   CREATININE 6.16* 6.38*   CALCIUM 8.2* 8.2*         IMPRESSION:  # ESRD  - Etiology likely 2/2 DM/HTN  - HD qTTHS via [place] TD  - Lost his HD chair at [facility] -> plan for [facility]  # Thrombosed Left AVF  - US 2/23 no flow  - s/p OR 2/25 AVG Brachio-axillary Creation, thrombectomy and fistulogram   # HTN, fair control   - Goal BP < 140/90  # Anemia of CKD, below target  - Goal Hgb 10-11   - % sat 32  - Ferritin 879  - JACKIE with HD   # CKD-MBD  - Ca 8.1 with low albumin  - PO4 6.2  -   - On cholecalciferol and calcium acetate   # Hyperkalemia, corrected  - Due to missed HD  - Manage with HD and low K+ diet  # Weakness  # h/o Methamphetamine use   # Homelessness  # DM   - A1C 6.5  # Severe metabolic acidosis, corrected      PLAN:  - Dialysis today, will attempt using AVG (ok per vascular)  - Needs PC removed following dialysis today  - Continue iHD qTTS  - Daily eval for additional treatment   - Challenge UF as able    - Continue calcium acetate with meals. Hold if pt is NPO.  - Transfuse PRN Hgb <7.0   - JACKIE with HD to goal Hgb 10-11  - No dietary protein restrictions  - Dose all meds per ESRD/iHD  - Weakness workup per primary team   - Low Na/fluid restricted renal diet  - Obtain blood cultures, recommend starting vancomycin once drawn while awaiting results.   - Discharge planning underway. Accepted at [facility]   - Labs with further recommendations to follow  Thank you,

## 2022-08-23 NOTE — PROGRESS NOTES
[facility] Internal Medicine Daily Progress Note    Date of Service  8/23/2022    [facility] Team: [facility] IM Purple Team   Attending: Christophe Nuñez M.d.  Senior Resident: Dr. Clement  Intern:  Dr. Adorno  Contact Number: [phone]    Chief Complaint  Ambrose Watkins is a 46 y.o. male admitted 8/8/2022 with progressive weakness with associated nausea, vomiting, and diarrhea    Hospital Course  47 yo male with PMH of diabetes,HFrEF, HTN, and ESRD on HD who presented 8/8/2022 for missing dialysis for past 3 weeks due to travelling to [place] for leisure and did not know where to go to get dialysis. In addition, patient did not have a dialysis chair here in [place].  He states he receives dialysis in [place].  He has been experiencing progressive weakness and associated nausea, vomiting, and diarrhea.  He came to the ER and was found to have electrolyte abnormalities including hyperkalemia, elevated creatinine BUN, and low calcium.  Nephrology consulted and stated that patient will require multiple sessions of dialysis given numerous missed sessions. Currently following [facility] schedule for HD, with vitals/labs gradually improving.   8/22 MRSA growth from armpit or RIJ swab - started on Vancomycin 8/22 -, attempt to use left AVM for dialysis, BCX obtained - No growth 8/23    Interval Problem Update  BCX - no growth today 8/23, collecting RIJ culture. Dialysis will attempt to use AVG.  Pt states he still has pain in thighs and abdominal pain of lower abdomen. Abdomen distended with positive fluid wave. Encouraged patient to go for dialysis and let nursing know when he is showering to protect the RIJ site. No bm. No chest pain, no shortness of breath, no fever, no chills.    I have discussed this patient's plan of care and discharge plan at IDT rounds today with Case Management, Nursing, Nursing leadership, and other members of the IDT team.    Consultants/Specialty  nephrology    Code Status  Full  Code    Disposition  Patient is not medically cleared for discharge.   Anticipate discharge to to home with close outpatient follow-up.  I have placed the appropriate orders for post-discharge needs.    Review of Systems  Constitutional:  Positive for malaise/fatigue. Negative for chills, diaphoresis and fever.  Right IJ stitching site itchiness. Armpit and forehead boils and itchiness.  Respiratory:  Negative for cough and shortness of breath.    Cardiovascular:  Positive for leg swelling. Negative for chest pain and palpitations.   Gastrointestinal:  Positive for abdominal pain and diarrhea. Negative for constipation, diarrhea, nausea and vomiting.    Genitourinary:         Reports minimal urine production   Musculoskeletal:         Bilateral leg pain - right thigh pain with light touch    Neurological:  Positive for weakness. Negative for dizziness and headaches.      Physical Exam  Temp:  [36.4 °C (97.5 °F)-37.2 °C (99 °F)] 36.4 °C (97.5 °F)  Pulse:  [71-78] 74  Resp:  [16-18] 17  BP: (123-129)/(69-78) 123/78  SpO2:  [96 %-98 %] 96 %    Physical Exam  Constitutional:       General: He is not in acute distress.     Appearance: He is ill-appearing. He is not toxic-appearing.   HENT:      Head: Normocephalic and atraumatic.      Right Ear: External ear normal.      Left Ear: External ear normal.      Nose: Nose normal.      Mouth/Throat:      Mouth: Mucous membranes are moist.   Eyes:      Extraocular Movements: Extraocular movements intact.      Conjunctiva/sclera: Conjunctivae normal.   Cardiovascular:      Rate and Rhythm: Normal rate and regular rhythm.      Pulses: Normal pulses.      Heart sounds: Normal heart sounds. No murmur heard.     Comments: Right internal jugular tunneled catheter with foul smelling green pus noted at left incision site and erythematous skin.  LUE AV fistula not in use  Pulmonary:      Effort: Pulmonary effort is normal. No respiratory distress.      Breath sounds: Normal breath  sounds. No wheezing or rales.   Abdominal:      General: There is distension.      Tenderness: Diffuse abdominal tenderness. Decreased bowel sounds. Positive fluid wave. There is no guarding or rebound.   Musculoskeletal:         General: Swelling present.      Cervical back: Normal range of motion.      Right lower leg: Edema present.      Left lower leg: Edema present.   Skin:     General: Skin is warm and dry.      Comments: Excoriations of upper extremities, lower extremities, scalp, chest/abdomen. Right underarm boils erythematous/tender. Forehead boil tender/erythematous with scab.  Neurological:      General: No focal deficit present.      Mental Status: He is alert and oriented to person, place, and time.   Psychiatric:         Thought Content: Thought content normal    Fluids    Intake/Output Summary (Last 24 hours) at 8/23/2022 1206  Last data filed at 8/23/2022 1029  Gross per 24 hour   Intake 880 ml   Output --   Net 880 ml       Laboratory  Recent Labs     08/20/22  1842 08/21/22  0614   WBC 7.8 9.1   RBC 2.68* 2.63*   HEMOGLOBIN 7.8* 7.8*   HEMATOCRIT 24.5* 24.1*   MCV 91.4 91.6   MCH 29.1 29.7   MCHC 31.8* 32.4*   RDW 58.2* 57.6*   PLATELETCT 240 268   MPV 9.7 9.4     Recent Labs     08/20/22  1842 08/21/22  0614   SODIUM 137 135   POTASSIUM 4.5 4.6   CHLORIDE 100 99   CO2 23 22   GLUCOSE 203* 156*   BUN 40* 45*   CREATININE 6.16* 6.38*   CALCIUM 8.2* 8.2*                   Imaging  CT-ABDOMEN-PELVIS W/O   Final Result      1.  Micronodular appearance of the liver consistent with hepatocellular disease.      2.  Large amount of ascites throughout the abdomen and pelvis.      3.  Adynamic ileus without evidence of bowel obstruction.      4.  Anasarca.      5.  Bilateral spondylolysis of the pars interarticularis at the L5-S1 level.      US-EXTREMITY VENOUS LOWER BILAT   Final Result      US-ABDOMEN COMPLETE SURVEY   Final Result      1.  Mildly increased hepatic echotexture is suggestive of fatty  infiltration.      2.  Small-to-moderate amount of ascites.      3.  Cholelithiasis         DX-ABDOMEN COMPLETE WITH AP OR PA CXR   Final Result      Fairly diffuse bowel distention with scattered air-fluid levels. Findings are likely related to ileus. More distal obstruction cannot be excluded.      DX-CHEST-PORTABLE (1 VIEW)   Final Result      1.  Mildly enlarged cardiac silhouette with vascular congestion.           Assessment/Plan  Problem Representation:    * Noncompliance of patient with renal dialysis (HCC)- (present on admission)  Assessment & Plan  Has not had dialysis in the past 3 weeks, due to traveling to [place].  Obtained HD in [place], however does not have an outpatient dialysis chair in [place] and last year in [place]. Dialysis chair available 8/23.    -Signs/symptoms and labs/vitals slowly normalizing  -Nephrology following, on hS dialysis  -Completed dialysis 8/16, 8/18-stop early due to pt having bm, 8/20, 8/21  -Dialysis chair available 8/23.  -Suspician for RIJ infection -follow bcx and RIJ cx, Vancomycin started 8/22     Multiple air fluid levels of small intestine determined by X-ray  Assessment & Plan  Patients states he has been having watery stools multiple times a day for many days. He is unable to recount when his last solid stool was. Abdomen has become more distended and pain has increased. Abdominal xray with bowel distention with air-fluid levels. Pt has been taking oxycodone for leg pain since 8/8.     -Will continue to monitor with serial abdominal exam   -CT abdo 8/22 with marked ascites, continue with dialysis       Lower extremity pain, bilateral  Assessment & Plan  Anterior leg pain noted on admission and patient given oxycodone for pain. Most likely related to edema due to missed dialysis sessions. CPK and lactic acid wnl. Ambulatory.   -Oxycodone for pain control  -Bilateral DVT US negative    End stage renal disease (HCC)- (present on admission)  Assessment &  Plan  Patient received HD in [place], has not received HD in 3 weeks prior to admission.  Patient also had multiple thrombectomies/revisions for AV fistula.  Patient has tunneled dialysis catheter in place and functioning. Patient not adherent to HD or outpatient medications.  Nephrology following. Per pharmacy ([facility]) pt has not picked up any medications. Prescribed medications were 01/2022 - Trazodone 50mg QHS, Calcitriol 0.25mg OD, Furosemide 80mg OD, Ca-acetate 667-270 TID, Lisinopril 20mg OD. 12/31/2021 amplodipine 5mg, glipizide ER 5mg, carvedilol 25 BID    -Currently on cholecalciferol   -Per nephrology note, AVG use per Vasc Surg recs; currently using [facility] TDC, last note 3/1 not yet cleared for use. Due to MRSA growth from skin culture, next dialysis will attempt to use AVG.   -Dialysis schedule TThSat      Hypertension- (present on admission)  Assessment & Plan  History of uncontrolled hypertension. Likely due to missing dialysis and nonadherence to outpatient BP medications    -Hydralazine as needed due to history of CHF  -BP well controlled with Carvedilol 25mg twice daily       VTE prophylaxis: heparin ppx    I have performed a physical exam and reviewed and updated ROS and Plan today (8/23/2022). In review of yesterday's note (8/22/2022), there are no changes except as documented above.

## 2022-08-23 NOTE — PROGRESS NOTES
[facility] Internal Medicine Daily Progress Note    Date of Service  8/22/2022    [facility] Team: [facility] IM Purple Team   Attending: Christophe Nuñez M.d.  Senior Resident: Dr. Clement  Intern:  Dr. Adorno  Contact Number: [phone]    Chief Complaint  Ambrose Watkins is a 46 y.o. male admitted 8/8/2022 with progressive weakness with associated nausea, vomiting, and diarrhea    Hospital Course  45 yo male with PMH of diabetes,HFrEF, HTN, and ESRD on HD who presented 8/8/2022 for missing dialysis for past 3 weeks due to travelling to [place] for leisure and did not know where to go to get dialysis. In addition, patient did not have a dialysis chair here in [place].  He states he receives dialysis in [place].  He has been experiencing progressive weakness and associated nausea, vomiting, and diarrhea.  He came to the ER and was found to have electrolyte abnormalities including hyperkalemia, elevated creatinine BUN, and low calcium.  Nephrology consulted and stated that patient will require multiple sessions of dialysis given numerous missed sessions. Currently following [facility] schedule for HD, with vitals/labs gradually improving. Outpatient dialysis chair available 8/23.     Interval Problem Update  Unclear weather armpit or IJ culture came back positive for MRSA. Green, foul smelling drainage observed near stitching of IJ. Consulted with nephrology to begin vancomycin and blood culture collected for evaluation of bacteremia. Dialysis will attempt to use AVG tomorrow.  Pt states he still has pain in thighs but increased abdominal pain today. CT Abdo with large amount of ascites, adynamic ileus w/o bowel obstruction. No bm past 2 days, prior BM were watery and yellow. No chest pain, no shortness of breath, no fever, no chills.    I have discussed this patient's plan of care and discharge plan at IDT rounds today with Case Management, Nursing, Nursing leadership, and other members of the IDT  team.    Consultants/Specialty  nephrology    Code Status  Full Code    Disposition  Patient is not medically cleared for discharge.   Anticipate discharge to to home with close outpatient follow-up.  I have placed the appropriate orders for post-discharge needs.    Review of Systems  Constitutional:  Positive for malaise/fatigue. Negative for chills, diaphoresis and fever.  Right IJ stitching site itchiness. Armpit and forehead boils and itchiness.  Respiratory:  Negative for cough and shortness of breath.    Cardiovascular:  Positive for leg swelling. Negative for chest pain and palpitations.   Gastrointestinal:  Positive for abdominal pain and diarrhea. Negative for constipation, nausea and vomiting.        Watery stool   Genitourinary:         Reports minimal urine production   Musculoskeletal:         Bilateral leg pain - right thigh pain with light touch    Neurological:  Positive for weakness. Negative for dizziness and headaches.      Physical Exam  Temp:  [36.4 °C (97.6 °F)-36.9 °C (98.5 °F)] 36.4 °C (97.6 °F)  Pulse:  [77-78] 78  Resp:  [15-16] 16  BP: (129-133)/(72-80) 129/72    Physical Exam  Constitutional:       General: He is not in acute distress.     Appearance: He is ill-appearing. He is not toxic-appearing.   HENT:      Head: Normocephalic and atraumatic.      Right Ear: External ear normal.      Left Ear: External ear normal.      Nose: Nose normal.      Mouth/Throat:      Mouth: Mucous membranes are moist.   Eyes:      Extraocular Movements: Extraocular movements intact.      Conjunctiva/sclera: Conjunctivae normal.   Cardiovascular:      Rate and Rhythm: Normal rate and regular rhythm.      Pulses: Normal pulses.      Heart sounds: Normal heart sounds. No murmur heard.     Comments: Right internal jugular tunneled catheter with foul smelling green pus noted at left incision site and erythematous skin.  LUE AV fistula not in use  Pulmonary:      Effort: Pulmonary effort is normal. No respiratory  distress.      Breath sounds: Normal breath sounds. No wheezing or rales.   Abdominal:      General: There is distension.      Tenderness: Diffuse abdominal tenderness. Decreased bowel sounds. There is no guarding or rebound.   Musculoskeletal:         General: Swelling present.      Cervical back: Normal range of motion.      Right lower leg: Edema present.      Left lower leg: Edema present.   Skin:     General: Skin is warm and dry.      Comments: Excoriations of upper extremities, lower extremities, scalp, chest/abdomen. Right underarm boil erythematous/tender. Forehead boil tender/erythematous with scab.  Neurological:      General: No focal deficit present.      Mental Status: He is alert and oriented to person, place, and time.   Psychiatric:         Thought Content: Thought content normal    Fluids    Intake/Output Summary (Last 24 hours) at 8/22/2022 2048  Last data filed at 8/22/2022 1559  Gross per 24 hour   Intake 760 ml   Output --   Net 760 ml       Laboratory  Recent Labs     08/20/22  1842 08/21/22  0614   WBC 7.8 9.1   RBC 2.68* 2.63*   HEMOGLOBIN 7.8* 7.8*   HEMATOCRIT 24.5* 24.1*   MCV 91.4 91.6   MCH 29.1 29.7   MCHC 31.8* 32.4*   RDW 58.2* 57.6*   PLATELETCT 240 268   MPV 9.7 9.4     Recent Labs     08/20/22 1842 08/21/22  0614   SODIUM 137 135   POTASSIUM 4.5 4.6   CHLORIDE 100 99   CO2 23 22   GLUCOSE 203* 156*   BUN 40* 45*   CREATININE 6.16* 6.38*   CALCIUM 8.2* 8.2*                   Imaging  CT-ABDOMEN-PELVIS W/O   Final Result      1.  Micronodular appearance of the liver consistent with hepatocellular disease.      2.  Large amount of ascites throughout the abdomen and pelvis.      3.  Adynamic ileus without evidence of bowel obstruction.      4.  Anasarca.      5.  Bilateral spondylolysis of the pars interarticularis at the L5-S1 level.      US-EXTREMITY VENOUS LOWER BILAT   Final Result      US-ABDOMEN COMPLETE SURVEY   Final Result      1.  Mildly increased hepatic echotexture is  suggestive of fatty infiltration.      2.  Small-to-moderate amount of ascites.      3.  Cholelithiasis         DX-ABDOMEN COMPLETE WITH AP OR PA CXR   Final Result      Fairly diffuse bowel distention with scattered air-fluid levels. Findings are likely related to ileus. More distal obstruction cannot be excluded.      DX-CHEST-PORTABLE (1 VIEW)   Final Result      1.  Mildly enlarged cardiac silhouette with vascular congestion.           Assessment/Plan  Problem Representation:    * Noncompliance of patient with renal dialysis (HCC)- (present on admission)  Assessment & Plan  Has not had dialysis in the past 3 weeks, due to traveling to [place].  Obtained HD in [place], however does not have an outpatient dialysis chair in [place] and last year in [place]. Dialysis chair available 8/23.    -Signs/symptoms and labs/vitals slowly normalizing  -Nephrology following, on TThS dialysis  -Completed dialysis 8/16, 8/18-stop early due to pt having bm, 8/20, 8/21  -Dialysis chair available 8/23.  -Suspician for RIJ infection -follow bcx and RIJ cx, Vancomycin started 8/22     Multiple air fluid levels of small intestine determined by X-ray  Assessment & Plan  Patients states he has been having watery stools multiple times a day for many days. He is unable to recount when his last solid stool was. Abdomen has become more distended and pain has increased. Abdominal xray with bowel distention with air-fluid levels. Pt has been taking oxycodone for leg pain since 8/8.     -Will continue to monitor with serial abdominal exam   -CT abdo 8/22 with marked ascites, continue with dialysis       Lower extremity pain, bilateral  Assessment & Plan  Anterior leg pain noted on admission and patient given oxycodone for pain. Most likely related to edema due to missed dialysis sessions. CPK and lactic acid wnl. Ambulatory.   -Oxycodone for pain control  -Bilateral DVT US negative    End stage renal disease (HCC)- (present on  admission)  Assessment & Plan  Patient receiving HD in [place], has not received HD in 3 weeks and since admission.  Patient has also had multiple thrombectomies/revisions for AV fistula, currently nonfunctioning.  Patient has tunneled dialysis catheter in place and functioning.  Patient not adherent to HD or outpatient medications.  Nephrology following    -Currently on cholecalciferol   -Receiving HD on TThS  -Per nephrology note, AVG use per Vasc Surg recs; currently using RIJ TDC, last note 3/1 not yet cleared for use   -Per pharmacy ([facility]) pt has not picked up any medications. Prescribed medications were 01/2022 - Trazodone 50mg QHS, Calcitriol 0.25mg OD, Furosemide 80mg OD, Ca-acetate 667-270 TID, Lisinopril 20mg OD. 12/31/2021 amplodipine 5mg, glipizide ER 5mg, carvedilol 25 BID  -Dialysis chair available 8/23, TTHSat      Hypertension- (present on admission)  Assessment & Plan  History of uncontrolled hypertension. Likely due to missing dialysis and nonadherence to outpatient BP medications    -Hydralazine as needed due to history of CHF  -BP well controlled with Carvedilol 25mg twice daily       VTE prophylaxis: heparin ppx    I have performed a physical exam and reviewed and updated ROS and Plan today (8/22/2022). In review of yesterday's note (8/21/2022), there are no changes except as documented above.

## 2022-08-23 NOTE — CARE PLAN
The patient is Stable - Low risk of patient condition declining or worsening    Shift Goals  Clinical Goals: pain management  Patient Goals: pain control, rest  Family Goals: POLINA    Progress made toward(s) clinical / shift goals:  pt medicated with oxycodone for RLE pain.       Problem: Pain - Standard  Goal: Alleviation of pain or a reduction in pain to the patient’s comfort goal  Outcome: Progressing     Problem: Knowledge Deficit - Standard  Goal: Patient and family/care givers will demonstrate understanding of plan of care, disease process/condition, diagnostic tests and medications  Outcome: Progressing     Problem: Fall Risk  Goal: Patient will remain free from falls  Outcome: Progressing

## 2022-08-24 LAB — VANCOMYCIN SERPL-MCNC: 22 UG/ML

## 2022-08-24 PROCEDURE — A9270 NON-COVERED ITEM OR SERVICE: HCPCS | Performed by: NURSE PRACTITIONER

## 2022-08-24 PROCEDURE — 700111 HCHG RX REV CODE 636 W/ 250 OVERRIDE (IP): Performed by: HOSPITALIST

## 2022-08-24 PROCEDURE — 700102 HCHG RX REV CODE 250 W/ 637 OVERRIDE(OP): Performed by: NURSE PRACTITIONER

## 2022-08-24 PROCEDURE — 700105 HCHG RX REV CODE 258: Performed by: HOSPITALIST

## 2022-08-24 PROCEDURE — 700102 HCHG RX REV CODE 250 W/ 637 OVERRIDE(OP)

## 2022-08-24 PROCEDURE — A9270 NON-COVERED ITEM OR SERVICE: HCPCS

## 2022-08-24 PROCEDURE — 80202 ASSAY OF VANCOMYCIN: CPT

## 2022-08-24 PROCEDURE — 99232 SBSQ HOSP IP/OBS MODERATE 35: CPT | Mod: GC | Performed by: HOSPITALIST

## 2022-08-24 PROCEDURE — 770001 HCHG ROOM/CARE - MED/SURG/GYN PRIV*

## 2022-08-24 PROCEDURE — 36415 COLL VENOUS BLD VENIPUNCTURE: CPT

## 2022-08-24 RX ORDER — DOXYCYCLINE 100 MG/1
100 TABLET ORAL EVERY 12 HOURS
Status: DISCONTINUED | OUTPATIENT
Start: 2022-08-24 | End: 2022-08-24

## 2022-08-24 RX ORDER — DOXYCYCLINE 100 MG/1
100 TABLET ORAL EVERY 12 HOURS
Status: COMPLETED | OUTPATIENT
Start: 2022-08-26 | End: 2022-08-30

## 2022-08-24 RX ADMIN — Medication 1334 MG: at 12:56

## 2022-08-24 RX ADMIN — VANCOMYCIN HYDROCHLORIDE 750 MG: 500 INJECTION, POWDER, LYOPHILIZED, FOR SOLUTION INTRAVENOUS at 09:03

## 2022-08-24 RX ADMIN — TRIAMCINOLONE ACETONIDE: 1 OINTMENT TOPICAL at 17:07

## 2022-08-24 RX ADMIN — CARVEDILOL 25 MG: 25 TABLET, FILM COATED ORAL at 08:19

## 2022-08-24 RX ADMIN — OXYCODONE 5 MG: 5 TABLET ORAL at 08:21

## 2022-08-24 RX ADMIN — CARVEDILOL 25 MG: 25 TABLET, FILM COATED ORAL at 17:07

## 2022-08-24 RX ADMIN — Medication 1334 MG: at 08:19

## 2022-08-24 RX ADMIN — OXYCODONE 5 MG: 5 TABLET ORAL at 18:38

## 2022-08-24 RX ADMIN — HEPARIN SODIUM 5000 UNITS: 5000 INJECTION, SOLUTION INTRAVENOUS; SUBCUTANEOUS at 20:57

## 2022-08-24 RX ADMIN — OXYCODONE 5 MG: 5 TABLET ORAL at 01:20

## 2022-08-24 RX ADMIN — Medication 1334 MG: at 17:07

## 2022-08-24 ASSESSMENT — PAIN DESCRIPTION - PAIN TYPE
TYPE: ACUTE PAIN

## 2022-08-24 NOTE — CARE PLAN
The patient is Stable - Low risk of patient condition declining or worsening    Shift Goals  Clinical Goals: Pain management  Patient Goals: Pain control, rest  Family Goals: POLINA    Progress made toward(s) clinical / shift goals: Pt A&O x4, given pain medication for leg pain. Pt resting in bed with no additional needs at this time, call light within reach.     Problem: Pain - Standard  Goal: Alleviation of pain or a reduction in pain to the patient’s comfort goal  Outcome: Progressing     Problem: Provide Safe Environment  Goal: Suicide environmental safety, protocols, policies, and practices will be implemented  Outcome: Progressing     Problem: Fall Risk  Goal: Patient will remain free from falls  Outcome: Progressing

## 2022-08-24 NOTE — PROGRESS NOTES
Patient alert and oriented x4 sitting up in bed during assessment. Patient education regarding medications, plan of care and safety. Patient appeared irritable and gave short answers to questioning. Patient stated he only wanted a couple of his medications and refused several others. No did not appear to be in any distress.

## 2022-08-24 NOTE — PROGRESS NOTES
[facility] -  PROGRESS NOTE               Author: EMMA Dial Date & Time: 8/24/2022  10:44 AM     HPI:  45 yo M with End Stage Renal Disease on hemodialysis at [facility] every Tues/Thurs/Sat, last HD at [place] in 4/2022, then traveled and lost HD chair. Other PMH diabetes mellitus and Hypertension who presented to the emergency department on 8/8/22 for weakness. He missed 1 months of HD due to traveling. He was found to be hypertensive 153/99, afebrile. Labs with K of 6.1, BUN 93, bicarb 10.   No F/C/N/V/CP/SOB.  No melena, hematochezia, hematemesis.  No HA, visual changes, or abdominal pain.    DAILY NEPHROLOGY SUMMARY:  8/9: 3L net UF. Resting in bed. No complaints. No acute distress. Security called last night, patient aggressive towards staff regarding personal items. Bps improved this am.   8/10: Seen on HD this am. Lethargic. No complaints.   8/11:  Resting comfortably no complaints.  VSS.  8/12: Ambulating in room indep. Denies CP/SOB, on RA. Reports he is hungry and wants breakfast before anything else. Bps elevated.   8/13: VSS RA no complaints.  Ambulating.   8/14: Sleeping in bed. Dismissive. Does not want extra HD today. BP elevated.   8/15: Risks of no dialysis explained to patient, refused treatment today.  Complaining of leg pain and swelling.  8/16: Seen on dialysis at bedside.  Complaining of thigh pain.   8/17: Denies pain or SOB.  Resting.  8/18: Pt sitting up in chair. For HD today. Denies CP,SOB,N/V/D. No overnight events. No  labs for review. VSS. No complaints.   8/19: HD yest, UF 2 L. Pt came off HD treatment 50' early as he had an accident during treatment.Pt C/O itching. Still with LLE.   8/20: Pt seen on HD, minimally engages in conversation, VSS on RA  8/21: Pt sitting up EOB, says his legs and forehead hurt, iHD yesterday with 3L net UF, declines need for additional UF today, lab reviewed, VSS on RA  8/22: VSS RA.  No new labs.  Does  "complaint of leg pain bilat, worsening edema.  No SOB. Declines additional dialysis tx.  Swab from line site +MRSA.  Blood cultures pending collection.  Afebrile.    8/23:Pt refused HD this am \"maybe later\". He is agreeable to do dialysis now.  8/24: 1348 mL Net UF. Resting in bed. C/o fatigue. Reports tenderness at TDC site. Denies fever or chills, Denies CP/SOB. No new labs this am.     REVIEW OF SYSTEMS:    10 point ROS reviewed and is as per HPI/daily summary or otherwise negative    PMH/PSH/SH/FH: Reviewed and unchanged since admission note  CURRENT MEDICATIONS: Reviewed from admission to present day    VS:  /81   Pulse 75   Temp 36.6 °C (97.9 °F) (Temporal)   Resp 16   Ht 1.753 m (5' 9\")   Wt 99.6 kg (219 lb 9.3 oz)   SpO2 94%   BMI 32.43 kg/m²   Physical Exam  Vitals and nursing note reviewed.   Constitutional:       General: He is not in acute distress.     Appearance: He is ill-appearing.   HENT:      Head: Normocephalic and atraumatic.      Nose: Nose normal. No congestion or rhinorrhea.      Mouth/Throat:      Mouth: Mucous membranes are moist.      Pharynx: No oropharyngeal exudate or posterior oropharyngeal erythema.   Eyes:      General: No scleral icterus.     Extraocular Movements: Extraocular movements intact.      Conjunctiva/sclera: Conjunctivae normal.      Pupils: Pupils are equal, round, and reactive to light.   Cardiovascular:      Rate and Rhythm: Normal rate and regular rhythm.      Pulses: Normal pulses.      Heart sounds: Normal heart sounds. No murmur heard.    No friction rub. No gallop.      Comments: RIJ TDC  LAVG +T/B    +bilat LE edema 1+  Pulmonary:      Effort: Pulmonary effort is normal. No respiratory distress.      Breath sounds: Normal breath sounds. No wheezing or rales.   Abdominal:      General: Bowel sounds are normal.   Musculoskeletal:         General: Swelling present. Normal range of motion.      Cervical back: Normal range of motion and neck supple. "   Skin:     General: Skin is warm and dry.      Findings: Erythema (LEs/forehead) and lesion present.      Comments: Scattered lesions/excoriation   Neurological:      Mental Status: He is alert and oriented to person, place, and time. Mental status is at baseline.   Psychiatric:         Mood and Affect: Mood normal.         Behavior: Behavior normal.       Fluids:  In: 980 [P.O.:480; Dialysis:500]  Out: 1848     LABS:  Recent Labs     08/23/22  1255   SODIUM 134*   POTASSIUM 5.6*   CHLORIDE 98   CO2 20   GLUCOSE 162*   BUN 64*   CREATININE 8.39*   CALCIUM 8.2*         IMPRESSION:  # ESRD  - Etiology likely 2/2 DM/HTN  - HD qTTHS via [facility]  - Lost his HD chair at [facility] -> plan for [facility]  # Thrombosed Left AVF  - US 2/23 no flow  - s/p OR 2/25 AVG Brachio-axillary Creation, thrombectomy and fistulogram   # HTN, fair control   - Goal BP < 140/90  # Anemia of CKD, below target  - Goal Hgb 10-11   - % sat 32  - Ferritin 879  - JACKIE with HD   # CKD-MBD  - Ca 8.1 with low albumin  - PO4 6.2  -   - On cholecalciferol and calcium acetate   # Hyperkalemia, corrected  - Due to missed HD  - Manage with HD and low K+ diet  # Weakness  # h/o Methamphetamine use   # Homelessness  # DM   - A1C 6.5  # Severe metabolic acidosis, corrected   # Wound Cx (+) MRSA  - ABX per primary services  - On Vanco     PLAN:  - Next iHD tomorrow (THURS), will attempt using AVG (ok per vascular)  - Keep PC in for now  - Continue iHD qTTS  - Daily eval for additional treatment   - Challenge UF as able    - Continue calcium acetate with meals. Hold if pt is NPO.  - Transfuse PRN Hgb <7.0   - JACKIE with HD to goal Hgb 10-11  - No dietary protein restrictions  - Dose all meds per ESRD/iHD  - Weakness workup per primary team   - Low Na/fluid restricted renal diet  - Obtain blood cultures, recommend starting vancomycin once drawn while awaiting results.   - Discharge planning underway. Accepted at [facility]   - Labs with  further recommendations to follow  Thank you,

## 2022-08-24 NOTE — PROGRESS NOTES
Pt A&O x4, medicated for leg pain per MAR. Pt refused heparin administration. Pt resting in bed, call light and belongings within reach, with no additional needs at this time.

## 2022-08-25 ENCOUNTER — APPOINTMENT (OUTPATIENT)
Dept: RADIOLOGY | Facility: MEDICAL CENTER | Age: 47
DRG: 291 | End: 2022-08-25
Attending: INTERNAL MEDICINE
Payer: MEDICARE

## 2022-08-25 LAB
ALBUMIN SERPL BCP-MCNC: 3 G/DL (ref 3.2–4.9)
BUN SERPL-MCNC: 58 MG/DL (ref 8–22)
CALCIUM SERPL-MCNC: 8.2 MG/DL (ref 8.5–10.5)
CHLORIDE SERPL-SCNC: 95 MMOL/L (ref 96–112)
CO2 SERPL-SCNC: 25 MMOL/L (ref 20–33)
CREAT SERPL-MCNC: 6.89 MG/DL (ref 0.5–1.4)
ERYTHROCYTE [DISTWIDTH] IN BLOOD BY AUTOMATED COUNT: 56 FL (ref 35.9–50)
GFR SERPLBLD CREATININE-BSD FMLA CKD-EPI: 9 ML/MIN/1.73 M 2
GLUCOSE SERPL-MCNC: 144 MG/DL (ref 65–99)
HBV SURFACE AB SERPL IA-ACNC: 21.4 MIU/ML (ref 0–10)
HCT VFR BLD AUTO: 22.4 % (ref 42–52)
HGB BLD-MCNC: 7.2 G/DL (ref 14–18)
MCH RBC QN AUTO: 29 PG (ref 27–33)
MCHC RBC AUTO-ENTMCNC: 32.1 G/DL (ref 33.7–35.3)
MCV RBC AUTO: 90.3 FL (ref 81.4–97.8)
PHOSPHATE SERPL-MCNC: 4.5 MG/DL (ref 2.5–4.5)
PLATELET # BLD AUTO: 294 K/UL (ref 164–446)
PMV BLD AUTO: 9.5 FL (ref 9–12.9)
POTASSIUM SERPL-SCNC: 5.2 MMOL/L (ref 3.6–5.5)
RBC # BLD AUTO: 2.48 M/UL (ref 4.7–6.1)
SODIUM SERPL-SCNC: 133 MMOL/L (ref 135–145)
WBC # BLD AUTO: 6.8 K/UL (ref 4.8–10.8)

## 2022-08-25 PROCEDURE — 700102 HCHG RX REV CODE 250 W/ 637 OVERRIDE(OP)

## 2022-08-25 PROCEDURE — 700102 HCHG RX REV CODE 250 W/ 637 OVERRIDE(OP): Performed by: STUDENT IN AN ORGANIZED HEALTH CARE EDUCATION/TRAINING PROGRAM

## 2022-08-25 PROCEDURE — 85027 COMPLETE CBC AUTOMATED: CPT

## 2022-08-25 PROCEDURE — A9270 NON-COVERED ITEM OR SERVICE: HCPCS | Performed by: NURSE PRACTITIONER

## 2022-08-25 PROCEDURE — 700102 HCHG RX REV CODE 250 W/ 637 OVERRIDE(OP): Performed by: INTERNAL MEDICINE

## 2022-08-25 PROCEDURE — A9270 NON-COVERED ITEM OR SERVICE: HCPCS

## 2022-08-25 PROCEDURE — 99232 SBSQ HOSP IP/OBS MODERATE 35: CPT | Mod: GC | Performed by: HOSPITALIST

## 2022-08-25 PROCEDURE — 80069 RENAL FUNCTION PANEL: CPT

## 2022-08-25 PROCEDURE — 700102 HCHG RX REV CODE 250 W/ 637 OVERRIDE(OP): Performed by: NURSE PRACTITIONER

## 2022-08-25 PROCEDURE — 770001 HCHG ROOM/CARE - MED/SURG/GYN PRIV*

## 2022-08-25 PROCEDURE — 90935 HEMODIALYSIS ONE EVALUATION: CPT

## 2022-08-25 PROCEDURE — 700101 HCHG RX REV CODE 250: Performed by: STUDENT IN AN ORGANIZED HEALTH CARE EDUCATION/TRAINING PROGRAM

## 2022-08-25 PROCEDURE — A9270 NON-COVERED ITEM OR SERVICE: HCPCS | Performed by: STUDENT IN AN ORGANIZED HEALTH CARE EDUCATION/TRAINING PROGRAM

## 2022-08-25 PROCEDURE — A9270 NON-COVERED ITEM OR SERVICE: HCPCS | Performed by: INTERNAL MEDICINE

## 2022-08-25 PROCEDURE — 700111 HCHG RX REV CODE 636 W/ 250 OVERRIDE (IP): Performed by: HOSPITALIST

## 2022-08-25 PROCEDURE — 86706 HEP B SURFACE ANTIBODY: CPT

## 2022-08-25 PROCEDURE — 700111 HCHG RX REV CODE 636 W/ 250 OVERRIDE (IP): Performed by: NURSE PRACTITIONER

## 2022-08-25 PROCEDURE — 700111 HCHG RX REV CODE 636 W/ 250 OVERRIDE (IP): Performed by: STUDENT IN AN ORGANIZED HEALTH CARE EDUCATION/TRAINING PROGRAM

## 2022-08-25 RX ORDER — FUROSEMIDE 40 MG/1
80 TABLET ORAL
Status: DISCONTINUED | OUTPATIENT
Start: 2022-08-25 | End: 2022-08-27

## 2022-08-25 RX ADMIN — PATIROMER 8.4 G: 8.4 POWDER, FOR SUSPENSION ORAL at 13:19

## 2022-08-25 RX ADMIN — OXYCODONE 5 MG: 5 TABLET ORAL at 02:56

## 2022-08-25 RX ADMIN — OXYCODONE 5 MG: 5 TABLET ORAL at 15:18

## 2022-08-25 RX ADMIN — Medication 1000 UNITS: at 08:50

## 2022-08-25 RX ADMIN — EPOETIN ALFA-EPBX 5000 UNITS: 3000 INJECTION, SOLUTION INTRAVENOUS; SUBCUTANEOUS at 11:45

## 2022-08-25 RX ADMIN — TRIAMCINOLONE ACETONIDE: 1 OINTMENT TOPICAL at 17:12

## 2022-08-25 RX ADMIN — Medication 1334 MG: at 13:19

## 2022-08-25 RX ADMIN — LIDOCAINE 1 PATCH: 50 PATCH CUTANEOUS at 13:23

## 2022-08-25 RX ADMIN — HEPARIN SODIUM 5000 UNITS: 5000 INJECTION, SOLUTION INTRAVENOUS; SUBCUTANEOUS at 13:19

## 2022-08-25 RX ADMIN — FUROSEMIDE 80 MG: 40 TABLET ORAL at 13:22

## 2022-08-25 RX ADMIN — Medication 1334 MG: at 17:12

## 2022-08-25 RX ADMIN — OXYCODONE 5 MG: 5 TABLET ORAL at 23:28

## 2022-08-25 RX ADMIN — Medication 1334 MG: at 08:50

## 2022-08-25 RX ADMIN — HEPARIN SODIUM 5000 UNITS: 5000 INJECTION, SOLUTION INTRAVENOUS; SUBCUTANEOUS at 20:09

## 2022-08-25 RX ADMIN — LIDOCAINE 1 PATCH: 50 PATCH CUTANEOUS at 02:58

## 2022-08-25 RX ADMIN — HEPARIN SODIUM 3100 UNITS: 1000 INJECTION, SOLUTION INTRAVENOUS; SUBCUTANEOUS at 14:05

## 2022-08-25 RX ADMIN — CARVEDILOL 25 MG: 25 TABLET, FILM COATED ORAL at 17:12

## 2022-08-25 ASSESSMENT — PAIN DESCRIPTION - PAIN TYPE: TYPE: ACUTE PAIN

## 2022-08-25 NOTE — PROGRESS NOTES
[facility] -  PROGRESS NOTE               Author: LORENZO Garcia. Date & Time: 8/25/2022  9:53 AM     HPI:  47 yo M with End Stage Renal Disease on hemodialysis at [facility] every Tues/Thurs/Sat, last HD at [place] in 4/2022, then traveled and lost HD chair. Other PMH diabetes mellitus and Hypertension who presented to the emergency department on 8/8/22 for weakness. He missed 1 months of HD due to traveling. He was found to be hypertensive 153/99, afebrile. Labs with K of 6.1, BUN 93, bicarb 10.   No F/C/N/V/CP/SOB.  No melena, hematochezia, hematemesis.  No HA, visual changes, or abdominal pain.    DAILY NEPHROLOGY SUMMARY:  8/9: 3L net UF. Resting in bed. No complaints. No acute distress. Security called last night, patient aggressive towards staff regarding personal items. Bps improved this am.   8/10: Seen on HD this am. Lethargic. No complaints.   8/11:  Resting comfortably no complaints.  VSS.  8/12: Ambulating in room indep. Denies CP/SOB, on RA. Reports he is hungry and wants breakfast before anything else. Bps elevated.   8/13: VSS RA no complaints.  Ambulating.   8/14: Sleeping in bed. Dismissive. Does not want extra HD today. BP elevated.   8/15: Risks of no dialysis explained to patient, refused treatment today.  Complaining of leg pain and swelling.  8/16: Seen on dialysis at bedside.  Complaining of thigh pain.   8/17: Denies pain or SOB.  Resting.  8/18: Pt sitting up in chair. For HD today. Denies CP,SOB,N/V/D. No overnight events. No  labs for review. VSS. No complaints.   8/19: HD yest, UF 2 L. Pt came off HD treatment 50' early as he had an accident during treatment.Pt C/O itching. Still with LLE.   8/20: Pt seen on HD, minimally engages in conversation, VSS on RA  8/21: Pt sitting up EOB, says his legs and forehead hurt, iHD yesterday with 3L net UF, declines need for additional UF today, lab reviewed, VSS on RA  8/22: VSS RA.  No new labs.   "Does complaint of leg pain bilat, worsening edema.  No SOB. Declines additional dialysis tx.  Swab from line site +MRSA.  Blood cultures pending collection.  Afebrile.    8/23:Pt refused HD this am \"maybe later\". He is agreeable to do dialysis now.  8/24: 1348 mL Net UF. Resting in bed. C/o fatigue. Reports tenderness at TDC site. Denies fever or chills, Denies CP/SOB. No new labs this am.   8/25: declined iHD yesterday, planning for today.  No new labs. AVG US still pending.  VSS on RA.  Agreeable to HD today.  Would prefer not to use AVG due to pain.     REVIEW OF SYSTEMS:    10 point ROS reviewed and is as per HPI/daily summary or otherwise negative    PMH/PSH/SH/FH: Reviewed and unchanged since admission note  CURRENT MEDICATIONS: Reviewed from admission to present day    VS:  /79   Pulse 76   Temp 36.7 °C (98 °F) (Temporal)   Resp 20   Ht 1.753 m (5' 9\")   Wt 99.6 kg (219 lb 9.3 oz)   SpO2 96%   BMI 32.43 kg/m²   Physical Exam  Vitals and nursing note reviewed.   Constitutional:       General: He is not in acute distress.     Appearance: He is ill-appearing.   HENT:      Head: Normocephalic and atraumatic.      Nose: Nose normal. No congestion or rhinorrhea.      Mouth/Throat:      Mouth: Mucous membranes are moist.      Pharynx: No oropharyngeal exudate or posterior oropharyngeal erythema.   Eyes:      General: No scleral icterus.     Extraocular Movements: Extraocular movements intact.      Conjunctiva/sclera: Conjunctivae normal.      Pupils: Pupils are equal, round, and reactive to light.   Cardiovascular:      Rate and Rhythm: Normal rate and regular rhythm.      Pulses: Normal pulses.      Heart sounds: Normal heart sounds. No murmur heard.    No friction rub. No gallop.      Comments: RIJ TDC  LAVG +T/B    +bilat LE edema 1+  Pulmonary:      Effort: Pulmonary effort is normal. No respiratory distress.      Breath sounds: Normal breath sounds. No wheezing or rales.   Abdominal:      General: " Bowel sounds are normal.   Musculoskeletal:         General: Swelling present. Normal range of motion.      Cervical back: Normal range of motion and neck supple.   Skin:     General: Skin is warm and dry.      Findings: Erythema (LEs/forehead) and lesion present.      Comments: Scattered lesions/excoriation   Neurological:      Mental Status: He is alert and oriented to person, place, and time. Mental status is at baseline.   Psychiatric:         Mood and Affect: Mood normal.         Behavior: Behavior normal.       Fluids:  In: 500 [P.O.:500]  Out: -     LABS:  Recent Labs     08/23/22  1255   SODIUM 134*   POTASSIUM 5.6*   CHLORIDE 98   CO2 20   GLUCOSE 162*   BUN 64*   CREATININE 8.39*   CALCIUM 8.2*         IMPRESSION:  # ESRD  - Etiology likely 2/2 DM/HTN  - HD qTTHS via [facility]  - Lost his HD chair at [facility] -> [facility]  # Thrombosed Left AVF  - US 2/23 no flow  - s/p OR 2/25 AVG Brachio-axillary Creation, thrombectomy and fistulogram   - AVG US pending  # HTN, fair control   - Goal BP < 140/90  # Anemia of CKD, below target  - Goal Hgb 10-11   - % sat 32  - Ferritin 879  - JACKIE with HD   # CKD-MBD  - Ca 8.1 with low albumin  - PO4 6.2  -   - On cholecalciferol and calcium acetate   # Hyperkalemia, corrected  - Due to missed HD  - Manage with HD and low K+ diet  - On Veltessa fir now as missed iHD  # Weakness  # h/o Methamphetamine use   # Homelessness  # DM   - A1C 6.5  # Severe metabolic acidosis, corrected   # Wound Cx (+) MRSA  - ABX per primary services  - On Vanco  - Blood cultures 8/22 negative so far     PLAN:  - Next iHD today (THURS), will attempt using AVG (ok per vascular) when pt allows  - Continue iHD qTTS  - Daily eval for additional treatment   - Challenge UF as able    - CMP in AM  - Continue renally dosed vanco for now until blood cultures finalized  - Keep permcath in place for now, recommend IR to remove retained suture  - Follow AVG US result  - Continue  calcium acetate with meals. Hold if pt is NPO.  - Transfuse PRN Hgb <7.0   - JACKIE with HD to goal Hgb 10-11  - No dietary protein restrictions  - Dose all meds per ESRD/iHD  - Weakness workup per primary team   - Low Na/fluid restricted renal diet  - Discharge planning underway. Accepted at [facility]   - Labs with further recommendations to follow  Thank you,

## 2022-08-25 NOTE — PROGRESS NOTES
Pt A&O x4 ambulating w/out assistance. Pt wounds assessed and documented. Pt c/o thigh pain but denies available PRN medication. Pt resting in bed with call light and belongings in reach. No additional needs at this time.

## 2022-08-25 NOTE — CARE PLAN
The patient is Stable - Low risk of patient condition declining or worsening    Shift Goals  Clinical Goals: Pain control  Patient Goals: pain control, rest  Family Goals: POLINA    Progress made toward(s) clinical / shift goals: Pt wounds assessed and documented. Pt c/o of pain but denies available PRN pain medication.    Problem: Knowledge Deficit - Standard  Goal: Patient and family/care givers will demonstrate understanding of plan of care, disease process/condition, diagnostic tests and medications  Outcome: Progressing     Problem: Pain - Standard  Goal: Alleviation of pain or a reduction in pain to the patient’s comfort goal  Outcome: Progressing     Problem: Provide Safe Environment  Goal: Suicide environmental safety, protocols, policies, and practices will be implemented  Outcome: Progressing     Problem: Fall Risk  Goal: Patient will remain free from falls  Outcome: Progressing

## 2022-08-25 NOTE — PROGRESS NOTES
HD treatment today using tunneled CVC.Patient refused to use his AVG with the claim that it is still sore from last time's use.Dr Palacio notified and ok with it.Treatment tolerated well.Patient slept mostly.Net UF removed 3500 ml.CVC locked with heparin.Report given to primary Rn.

## 2022-08-25 NOTE — PROGRESS NOTES
[facility] Internal Medicine Daily Progress Note    Date of Service  8/25/2022    [facility] Team: R  Purple Team   Attending: Christophe Nuñez M.d.  Senior Resident: Dr. Clement  Intern:  Dr. Adorno  Contact Number: [phone]    Chief Complaint  Ambrose Watkins is a 46 y.o. male admitted 8/8/2022 with progressive weakness with associated nausea, vomiting, and diarrhea    Hospital Course  47 yo male with PMH of diabetes,HFrEF, HTN, and ESRD on HD who presented 8/8/2022 for missing dialysis for past 3 weeks due to travelling to [place] for leisure and did not know where to go to get dialysis. In addition, patient did not have a dialysis chair here in [place].  He states he receives dialysis in [place].  He has been experiencing progressive weakness and associated nausea, vomiting, and diarrhea.  He came to the ER and was found to have electrolyte abnormalities including hyperkalemia, elevated creatinine BUN, and low calcium.  Nephrology consulted and stated that patient will require multiple sessions of dialysis given numerous missed sessions. Currently following [facility] schedule for HD, with vitals/labs gradually improving.   8/22 MRSA growth from armpit or RIJ swab - started on Vancomycin 8/22 - 8/24 attempt to use left AVM for dialysis  8/24 BCX obtained - No growth 48 hrs. Switch to Doxycycline 8/24 -    Interval Problem Update  Pt with abdominal discomfort, wanting to have bm this am. States he has not passed stool for 3 days but denies constipation. Still itchy but does not want medication. Going for dialysis today. Overall, volume overloaded and given Lasix.    I have discussed this patient's plan of care and discharge plan at IDT rounds today with Case Management, Nursing, Nursing leadership, and other members of the IDT team.    Consultants/Specialty  nephrology    Code Status  Full Code    Disposition  Patient is not medically cleared for discharge.   Anticipate discharge to to home with close outpatient  follow-up.  I have placed the appropriate orders for post-discharge needs.    Review of Systems  Constitutional:  Positive for malaise/fatigue. Negative for chills, diaphoresis and fever.  Right IJ stitching removed. Armpit and forehead boils and itchiness.  Respiratory:  Negative for cough and shortness of breath.    Cardiovascular:  Positive for leg swelling. Negative for chest pain and palpitations.   Gastrointestinal:  Positive for abdominal pain and constipation. Negative for diarrhea, nausea and vomiting.    Genitourinary:         Reports minimal urine production   Musculoskeletal:         Bilateral leg pain - right thigh pain with light touch    Neurological:  Positive for weakness. Negative for dizziness and headaches.      Physical Exam  Temp:  [36.7 °C (98 °F)] 36.7 °C (98 °F)  Pulse:  [74-76] 76  Resp:  [18-20] 20  BP: (116-127)/(68-79) 127/79  SpO2:  [95 %-96 %] 96 %    Physical Exam  Constitutional:       General: He is not in acute distress.     Appearance: He is ill-appearing. He is not toxic-appearing.   HENT:      Head: Normocephalic and atraumatic.      Right Ear: External ear normal.      Left Ear: External ear normal.      Nose: Nose normal.      Mouth/Throat:      Mouth: Mucous membranes are moist.   Eyes:      Extraocular Movements: Extraocular movements intact.      Conjunctiva/sclera: Conjunctivae normal.   Cardiovascular:      Rate and Rhythm: Normal rate and regular rhythm.      Pulses: Normal pulses.      Heart sounds: Normal heart sounds. No murmur heard.     Comments: Right internal jugular tunneled catheter with pus at suture sites (sutures removed) and erythematous skin.  LUE AV fistula - area surrounding edematous   Pulmonary:      Effort: Pulmonary effort is normal. No respiratory distress.      Breath sounds: Normal breath sounds. No wheezing or rales.   Abdominal:      General: There is distension.      Tenderness: Diffuse abdominal tenderness. Decreased bowel sounds. Positive  fluid wave. There is no guarding or rebound.   Musculoskeletal:         General: Swelling present.      Cervical back: Normal range of motion.      Right lower leg: Edema present.      Left lower leg: Edema present.   Skin:     General: Skin is warm and dry.      Comments: Excoriations of upper extremities, lower extremities, scalp, chest/abdomen. Right underarm boils erythematous/tender. Forehead boil tender/erythematous with scab.  Neurological:      General: No focal deficit present.      Mental Status: He is alert and oriented to person, place, and time.   Psychiatric:         Thought Content: Thought content normal    Fluids    Intake/Output Summary (Last 24 hours) at 8/25/2022 1932  Last data filed at 8/25/2022 1405  Gross per 24 hour   Intake 850 ml   Output 3500 ml   Net -2650 ml       Laboratory  Recent Labs     08/23/22  1255 08/25/22  1137   WBC 9.3 6.8   RBC 2.56* 2.48*   HEMOGLOBIN 7.4* 7.2*   HEMATOCRIT 23.1* 22.4*   MCV 90.2 90.3   MCH 28.9 29.0   MCHC 32.0* 32.1*   RDW 56.1* 56.0*   PLATELETCT 279 294   MPV 9.4 9.5     Recent Labs     08/23/22  1255 08/25/22  1137   SODIUM 134* 133*   POTASSIUM 5.6* 5.2   CHLORIDE 98 95*   CO2 20 25   GLUCOSE 162* 144*   BUN 64* 58*   CREATININE 8.39* 6.89*   CALCIUM 8.2* 8.2*                   Imaging  CT-ABDOMEN-PELVIS W/O   Final Result      1.  Micronodular appearance of the liver consistent with hepatocellular disease.      2.  Large amount of ascites throughout the abdomen and pelvis.      3.  Adynamic ileus without evidence of bowel obstruction.      4.  Anasarca.      5.  Bilateral spondylolysis of the pars interarticularis at the L5-S1 level.      US-EXTREMITY VENOUS LOWER BILAT   Final Result      US-ABDOMEN COMPLETE SURVEY   Final Result      1.  Mildly increased hepatic echotexture is suggestive of fatty infiltration.      2.  Small-to-moderate amount of ascites.      3.  Cholelithiasis         DX-ABDOMEN COMPLETE WITH AP OR PA CXR   Final Result       Fairly diffuse bowel distention with scattered air-fluid levels. Findings are likely related to ileus. More distal obstruction cannot be excluded.      DX-CHEST-PORTABLE (1 VIEW)   Final Result      1.  Mildly enlarged cardiac silhouette with vascular congestion.      US-HEMODIALYSIS GRAFT DUPLEX COMP UPPER EXTREMITY    (Results Pending)        Assessment/Plan  Problem Representation:    * Noncompliance of patient with renal dialysis (HCC)- (present on admission)  Assessment & Plan  Has not had dialysis in the past 3 weeks, due to traveling to [place].  Obtained HD in [place], however does not have an outpatient dialysis chair in [place] and last year in [place]. Dialysis chair available 8/23.    -Signs/symptoms and labs/vitals slowly normalizing  -Nephrology following, on TThS dialysis and as needed  -Completed dialysis 8/16, 8/18-stop early due to pt having bm, 8/20, 8/21, 8/23, 8/25  -Suspician for RIJ infection -follow bcx and RIJ cx, Vancomycin started 8/22 - 8/24 switch to Doxycycline 8/24 - given negative BCX    Multiple air fluid levels of small intestine determined by X-ray  Assessment & Plan  Patients states he has been having watery stools multiple times a day for many days. He is unable to recount when his last solid stool was. Abdomen has become more distended and pain has increased. Abdominal xray with bowel distention with air-fluid levels. Pt has been taking oxycodone for leg pain since 8/8.     -Will continue to monitor with serial abdominal exam   -CT abdo 8/22 with marked ascites, continue with dialysis       Lower extremity pain, bilateral  Assessment & Plan  Anterior leg pain noted on admission and patient given oxycodone for pain. Most likely related to edema due to missed dialysis sessions. CPK and lactic acid wnl. Ambulatory.   -Oxycodone for pain control  -Bilateral DVT US negative    End stage renal disease (HCC)- (present on admission)  Assessment & Plan  Patient received HD in  [place], has not received HD in 3 weeks prior to admission.  Patient also had multiple thrombectomies/revisions for AV fistula.  Patient has tunneled dialysis catheter in place and functioning. Patient not adherent to HD or outpatient medications.  Nephrology following. Per pharmacy ([facility]) pt has not picked up any medications. Prescribed medications were 01/2022 - Trazodone 50mg QHS, Calcitriol 0.25mg OD, Furosemide 80mg OD, Ca-acetate 667-270 TID, Lisinopril 20mg OD. 12/31/2021 amplodipine 5mg, glipizide ER 5mg, carvedilol 25 BID    -Currently on cholecalciferol   -Per nephrology note, AVG use per Vasc Surg recs; currently using [facility] TDC, last note 3/1 not yet cleared for use. Due to MRSA growth from skin culture, next dialysis will attempt to use AVG.   -Dialysis schedule TThSat and as needed      Hypertension- (present on admission)  Assessment & Plan  History of uncontrolled hypertension. Likely due to missing dialysis and nonadherence to outpatient BP medications    -Hydralazine as needed due to history of CHF  -BP well controlled with Carvedilol 25mg twice daily       VTE prophylaxis: heparin ppx    I have performed a physical exam and reviewed and updated ROS and Plan today (8/25/2022). In review of yesterday's note (8/24/2022), there are no changes except as documented above.

## 2022-08-25 NOTE — PROGRESS NOTES
[facility] [place] Internal Medicine Daily Progress Note    Date of Service  8/24/2022    [facility] Team: [facility] IM Purple Team   Attending: Christophe Nuñez M.d.  Senior Resident: Dr. Clement  Intern:  Dr. Adorno  Contact Number: [phone]    Chief Complaint  Ambrose Watkins is a 46 y.o. male admitted 8/8/2022 with progressive weakness with associated nausea, vomiting, and diarrhea    Hospital Course  47 yo male with PMH of diabetes,HFrEF, HTN, and ESRD on HD who presented 8/8/2022 for missing dialysis for past 3 weeks due to travelling to [place] for leisure and did not know where to go to get dialysis. In addition, patient did not have a dialysis chair here in [place].  He states he receives dialysis in [place].  He has been experiencing progressive weakness and associated nausea, vomiting, and diarrhea.  He came to the ER and was found to have electrolyte abnormalities including hyperkalemia, elevated creatinine BUN, and low calcium.  Nephrology consulted and stated that patient will require multiple sessions of dialysis given numerous missed sessions. Currently following [facility] schedule for HD, with vitals/labs gradually improving.   8/22 MRSA growth from armpit or RIJ swab - started on Vancomycin 8/22 - 8/24 attempt to use left AVM for dialysis  8/24 BCX obtained - No growth 48 hrs. Switch to Doxycycline 8/24 -    Interval Problem Update  BCX - no growth today 8/24  Infected sutures removed today from RIJ and abx switch to Doxycyline given no BCX growth. Patient states he has abdominal discomfort and has stopped eating cheese. He still picks at his skin. No bm. No chest pain, no shortness of breath, no fever, no chills.    I have discussed this patient's plan of care and discharge plan at IDT rounds today with Case Management, Nursing, Nursing leadership, and other members of the IDT team.    Consultants/Specialty  nephrology    Code Status  Full Code    Disposition  Patient is not medically cleared for discharge.    Anticipate discharge to to home with close outpatient follow-up.  I have placed the appropriate orders for post-discharge needs.    Review of Systems  Constitutional:  Positive for malaise/fatigue. Negative for chills, diaphoresis and fever.  Right IJ stitching removed. Armpit and forehead boils and itchiness.  Respiratory:  Negative for cough and shortness of breath.    Cardiovascular:  Positive for leg swelling. Negative for chest pain and palpitations.   Gastrointestinal:  Positive for abdominal pain and constipation. Negative for diarrhea, nausea and vomiting.    Genitourinary:         Reports minimal urine production   Musculoskeletal:         Bilateral leg pain - right thigh pain with light touch    Neurological:  Positive for weakness. Negative for dizziness and headaches.      Physical Exam  Temp:  [36.6 °C (97.9 °F)-37.2 °C (99 °F)] 36.7 °C (98.1 °F)  Pulse:  [72-79] 72  Resp:  [16-18] 17  BP: (122-135)/(75-81) 128/75  SpO2:  [94 %-97 %] 96 %    Physical Exam  Constitutional:       General: He is not in acute distress.     Appearance: He is ill-appearing. He is not toxic-appearing.   HENT:      Head: Normocephalic and atraumatic.      Right Ear: External ear normal.      Left Ear: External ear normal.      Nose: Nose normal.      Mouth/Throat:      Mouth: Mucous membranes are moist.   Eyes:      Extraocular Movements: Extraocular movements intact.      Conjunctiva/sclera: Conjunctivae normal.   Cardiovascular:      Rate and Rhythm: Normal rate and regular rhythm.      Pulses: Normal pulses.      Heart sounds: Normal heart sounds. No murmur heard.     Comments: Right internal jugular tunneled catheter with pus at suture sites and erythematous skin.  LUE AV fistula - area surrounding edematous   Pulmonary:      Effort: Pulmonary effort is normal. No respiratory distress.      Breath sounds: Normal breath sounds. No wheezing or rales.   Abdominal:      General: There is distension.      Tenderness: Diffuse  abdominal tenderness. Decreased bowel sounds. Positive fluid wave. There is no guarding or rebound.   Musculoskeletal:         General: Swelling present.      Cervical back: Normal range of motion.      Right lower leg: Edema present.      Left lower leg: Edema present.   Skin:     General: Skin is warm and dry.      Comments: Excoriations of upper extremities, lower extremities, scalp, chest/abdomen. Right underarm boils erythematous/tender. Forehead boil tender/erythematous with scab.  Neurological:      General: No focal deficit present.      Mental Status: He is alert and oriented to person, place, and time.   Psychiatric:         Thought Content: Thought content normal    Fluids    Intake/Output Summary (Last 24 hours) at 8/24/2022 1910  Last data filed at 8/24/2022 1513  Gross per 24 hour   Intake 740 ml   Output --   Net 740 ml       Laboratory  Recent Labs     08/23/22  1255   WBC 9.3   RBC 2.56*   HEMOGLOBIN 7.4*   HEMATOCRIT 23.1*   MCV 90.2   MCH 28.9   MCHC 32.0*   RDW 56.1*   PLATELETCT 279   MPV 9.4     Recent Labs     08/23/22  1255   SODIUM 134*   POTASSIUM 5.6*   CHLORIDE 98   CO2 20   GLUCOSE 162*   BUN 64*   CREATININE 8.39*   CALCIUM 8.2*                   Imaging  CT-ABDOMEN-PELVIS W/O   Final Result      1.  Micronodular appearance of the liver consistent with hepatocellular disease.      2.  Large amount of ascites throughout the abdomen and pelvis.      3.  Adynamic ileus without evidence of bowel obstruction.      4.  Anasarca.      5.  Bilateral spondylolysis of the pars interarticularis at the L5-S1 level.      US-EXTREMITY VENOUS LOWER BILAT   Final Result      US-ABDOMEN COMPLETE SURVEY   Final Result      1.  Mildly increased hepatic echotexture is suggestive of fatty infiltration.      2.  Small-to-moderate amount of ascites.      3.  Cholelithiasis         DX-ABDOMEN COMPLETE WITH AP OR PA CXR   Final Result      Fairly diffuse bowel distention with scattered air-fluid levels.  Findings are likely related to ileus. More distal obstruction cannot be excluded.      DX-CHEST-PORTABLE (1 VIEW)   Final Result      1.  Mildly enlarged cardiac silhouette with vascular congestion.      US-HEMODIALYSIS GRAFT DUPLEX COMP UPPER EXTREMITY    (Results Pending)        Assessment/Plan  Problem Representation:    * Noncompliance of patient with renal dialysis (HCC)- (present on admission)  Assessment & Plan  Has not had dialysis in the past 3 weeks, due to traveling to [place].  Obtained HD in [place], however does not have an outpatient dialysis chair in [place] and last year in [place]. Dialysis chair available 8/23.    -Signs/symptoms and labs/vitals slowly normalizing  -Nephrology following, on TThS dialysis and as needed  -Completed dialysis 8/16, 8/18-stop early due to pt having bm, 8/20, 8/21, 8/23  -Suspician for RIJ infection -follow bcx and RIJ cx, Vancomycin started 8/22 - 8/24 switch to Doxycycline 8/24 - given negative BCX    Multiple air fluid levels of small intestine determined by X-ray  Assessment & Plan  Patients states he has been having watery stools multiple times a day for many days. He is unable to recount when his last solid stool was. Abdomen has become more distended and pain has increased. Abdominal xray with bowel distention with air-fluid levels. Pt has been taking oxycodone for leg pain since 8/8.     -Will continue to monitor with serial abdominal exam   -CT abdo 8/22 with marked ascites, continue with dialysis       Lower extremity pain, bilateral  Assessment & Plan  Anterior leg pain noted on admission and patient given oxycodone for pain. Most likely related to edema due to missed dialysis sessions. CPK and lactic acid wnl. Ambulatory.   -Oxycodone for pain control  -Bilateral DVT US negative    End stage renal disease (HCC)- (present on admission)  Assessment & Plan  Patient received HD in [place], has not received HD in 3 weeks prior to admission.  Patient  also had multiple thrombectomies/revisions for AV fistula.  Patient has tunneled dialysis catheter in place and functioning. Patient not adherent to HD or outpatient medications.  Nephrology following. Per pharmacy ([facility]) pt has not picked up any medications. Prescribed medications were 01/2022 - Trazodone 50mg QHS, Calcitriol 0.25mg OD, Furosemide 80mg OD, Ca-acetate 667-270 TID, Lisinopril 20mg OD. 12/31/2021 amplodipine 5mg, glipizide ER 5mg, carvedilol 25 BID    -Currently on cholecalciferol   -Per nephrology note, AVG use per Vasc Surg recs; currently using [facility] TDC, last note 3/1 not yet cleared for use. Due to MRSA growth from skin culture, next dialysis will attempt to use AVG.   -Dialysis schedule TThSat and as needed      Hypertension- (present on admission)  Assessment & Plan  History of uncontrolled hypertension. Likely due to missing dialysis and nonadherence to outpatient BP medications    -Hydralazine as needed due to history of CHF  -BP well controlled with Carvedilol 25mg twice daily       VTE prophylaxis: heparin ppx    I have performed a physical exam and reviewed and updated ROS and Plan today (8/24/2022). In review of yesterday's note (8/23/2022), there are no changes except as documented above.

## 2022-08-26 ENCOUNTER — APPOINTMENT (OUTPATIENT)
Dept: RADIOLOGY | Facility: MEDICAL CENTER | Age: 47
DRG: 291 | End: 2022-08-26
Attending: INTERNAL MEDICINE
Payer: MEDICARE

## 2022-08-26 LAB
ALBUMIN SERPL BCP-MCNC: 3.2 G/DL (ref 3.2–4.9)
BUN SERPL-MCNC: 48 MG/DL (ref 8–22)
CALCIUM SERPL-MCNC: 8.5 MG/DL (ref 8.5–10.5)
CHLORIDE SERPL-SCNC: 94 MMOL/L (ref 96–112)
CO2 SERPL-SCNC: 25 MMOL/L (ref 20–33)
CREAT SERPL-MCNC: 6.75 MG/DL (ref 0.5–1.4)
GFR SERPLBLD CREATININE-BSD FMLA CKD-EPI: 10 ML/MIN/1.73 M 2
GLUCOSE SERPL-MCNC: 180 MG/DL (ref 65–99)
PHOSPHATE SERPL-MCNC: 4.6 MG/DL (ref 2.5–4.5)
POTASSIUM SERPL-SCNC: 5 MMOL/L (ref 3.6–5.5)
SODIUM SERPL-SCNC: 133 MMOL/L (ref 135–145)

## 2022-08-26 PROCEDURE — 700102 HCHG RX REV CODE 250 W/ 637 OVERRIDE(OP): Performed by: STUDENT IN AN ORGANIZED HEALTH CARE EDUCATION/TRAINING PROGRAM

## 2022-08-26 PROCEDURE — 36415 COLL VENOUS BLD VENIPUNCTURE: CPT

## 2022-08-26 PROCEDURE — A9270 NON-COVERED ITEM OR SERVICE: HCPCS

## 2022-08-26 PROCEDURE — 700102 HCHG RX REV CODE 250 W/ 637 OVERRIDE(OP): Performed by: HOSPITALIST

## 2022-08-26 PROCEDURE — A9270 NON-COVERED ITEM OR SERVICE: HCPCS | Performed by: HOSPITALIST

## 2022-08-26 PROCEDURE — 700102 HCHG RX REV CODE 250 W/ 637 OVERRIDE(OP)

## 2022-08-26 PROCEDURE — 700101 HCHG RX REV CODE 250: Performed by: STUDENT IN AN ORGANIZED HEALTH CARE EDUCATION/TRAINING PROGRAM

## 2022-08-26 PROCEDURE — A9270 NON-COVERED ITEM OR SERVICE: HCPCS | Performed by: INTERNAL MEDICINE

## 2022-08-26 PROCEDURE — 700111 HCHG RX REV CODE 636 W/ 250 OVERRIDE (IP): Performed by: HOSPITALIST

## 2022-08-26 PROCEDURE — 770001 HCHG ROOM/CARE - MED/SURG/GYN PRIV*

## 2022-08-26 PROCEDURE — A9270 NON-COVERED ITEM OR SERVICE: HCPCS | Performed by: STUDENT IN AN ORGANIZED HEALTH CARE EDUCATION/TRAINING PROGRAM

## 2022-08-26 PROCEDURE — 700102 HCHG RX REV CODE 250 W/ 637 OVERRIDE(OP): Performed by: INTERNAL MEDICINE

## 2022-08-26 PROCEDURE — 80069 RENAL FUNCTION PANEL: CPT

## 2022-08-26 PROCEDURE — 99232 SBSQ HOSP IP/OBS MODERATE 35: CPT | Mod: GC | Performed by: HOSPITALIST

## 2022-08-26 PROCEDURE — 700102 HCHG RX REV CODE 250 W/ 637 OVERRIDE(OP): Performed by: NURSE PRACTITIONER

## 2022-08-26 PROCEDURE — A9270 NON-COVERED ITEM OR SERVICE: HCPCS | Performed by: NURSE PRACTITIONER

## 2022-08-26 RX ADMIN — Medication 1334 MG: at 16:12

## 2022-08-26 RX ADMIN — DOXYCYCLINE 100 MG: 100 TABLET, FILM COATED ORAL at 20:00

## 2022-08-26 RX ADMIN — Medication 1334 MG: at 14:05

## 2022-08-26 RX ADMIN — LIDOCAINE 1 PATCH: 50 PATCH CUTANEOUS at 14:06

## 2022-08-26 RX ADMIN — TRIAMCINOLONE ACETONIDE: 1 OINTMENT TOPICAL at 07:59

## 2022-08-26 RX ADMIN — PATIROMER 8.4 G: 8.4 POWDER, FOR SUSPENSION ORAL at 16:13

## 2022-08-26 RX ADMIN — HEPARIN SODIUM 5000 UNITS: 5000 INJECTION, SOLUTION INTRAVENOUS; SUBCUTANEOUS at 16:12

## 2022-08-26 RX ADMIN — FUROSEMIDE 80 MG: 40 TABLET ORAL at 07:59

## 2022-08-26 RX ADMIN — OXYCODONE 5 MG: 5 TABLET ORAL at 20:28

## 2022-08-26 RX ADMIN — CARVEDILOL 25 MG: 25 TABLET, FILM COATED ORAL at 16:13

## 2022-08-26 RX ADMIN — DOXYCYCLINE 100 MG: 100 TABLET, FILM COATED ORAL at 08:02

## 2022-08-26 RX ADMIN — CARVEDILOL 25 MG: 25 TABLET, FILM COATED ORAL at 07:58

## 2022-08-26 RX ADMIN — OXYCODONE 5 MG: 5 TABLET ORAL at 07:58

## 2022-08-26 RX ADMIN — Medication 1000 UNITS: at 07:59

## 2022-08-26 RX ADMIN — HEPARIN SODIUM 5000 UNITS: 5000 INJECTION, SOLUTION INTRAVENOUS; SUBCUTANEOUS at 07:59

## 2022-08-26 RX ADMIN — Medication 1334 MG: at 07:59

## 2022-08-26 ASSESSMENT — PAIN SCALES - WONG BAKER: WONGBAKER_NUMERICALRESPONSE: DOESN'T HURT AT ALL

## 2022-08-26 NOTE — PROGRESS NOTES
[facility] Internal Medicine Daily Progress Note    Date of Service  8/26/2022    [facility] Team: R  Purple Team   Attending: Christophe Nuñez M.d.  Senior Resident: Dr. Clement  Intern:  Dr. Adorno  Contact Number: [phone]    Chief Complaint  Ambrose Watkins is a 46 y.o. male admitted 8/8/2022 with progressive weakness with associated nausea, vomiting, and diarrhea due to missed dialysis    Hospital Course  47 yo male with PMH of diabetes,HFrEF, HTN, and ESRD on HD who presented 8/8/2022 for missing dialysis for past 3 weeks due to travelling to [place] for leisure and did not know where to go to get dialysis. In addition, patient did not have a dialysis chair here in [place].  He states he receives dialysis in [place].  He has been experiencing progressive weakness and associated nausea, vomiting, and diarrhea.  He came to the ER and was found to have electrolyte abnormalities including hyperkalemia, elevated creatinine BUN, and low calcium.  Nephrology consulted and stated that patient will require multiple sessions of dialysis given numerous missed sessions. Currently following [facility] schedule for HD, with vitals/labs gradually improving.   8/22 MRSA growth from armpit or RIJ swab - started on Vancomycin 8/22 - 8/24 attempt to use left AVM for dialysis  8/24 BCX obtained - No growth 48 hrs. Switch to Doxycycline 8/24 -    Interval Problem Update  No acute events overnight. Patient reports that ongoing abdominal discomfort and thigh pain, unchanged from previous days. He states that he still makes urine but reports that he has not urinated after being given Lasix.  -Patient reports still producing urine, can consider metolazone prior to another dose of furosemide tomorrow. I&O's to be measured to assess for oliguria/anuria.    I have discussed this patient's plan of care and discharge plan at IDT rounds today with Case Management, Nursing, Nursing leadership, and other members of the IDT  "team.    Consultants/Specialty  nephrology    Code Status  Full Code    Disposition  Patient is not medically cleared for discharge.   Anticipate discharge to to home with close outpatient follow-up.  I have placed the appropriate orders for post-discharge needs.    Review of Systems  Constitutional:  Positive for malaise/fatigue. Negative for chills, diaphoresis and fever.  Right IJ stitching removed. Armpit and forehead boils and itchiness.  Respiratory:  Negative for cough and shortness of breath.    Cardiovascular:  Positive for leg swelling. Negative for chest pain and palpitations.   Gastrointestinal:  Positive for abdominal pain and constipation. Negative for diarrhea, nausea and vomiting.    Genitourinary:         Reports minimal urine production   Musculoskeletal:         Bilateral leg pain - right thigh pain with light touch    Neurological:  Positive for weakness. Negative for dizziness and headaches.      /78   Pulse 80   Temp 36.7 °C (98 °F) (Temporal)   Resp 16   Ht 1.753 m (5' 9\")   Wt 99.6 kg (219 lb 9.3 oz)   SpO2 94%   BMI 32.43 kg/m²     Physical Exam  Constitutional:       General: He is not in acute distress.     Appearance: He is ill-appearing. He is not toxic-appearing.   HENT:      Head: Normocephalic and atraumatic.      Right Ear: External ear normal.      Left Ear: External ear normal.      Nose: Nose normal.      Mouth/Throat:      Mouth: Mucous membranes are moist.   Eyes:      Extraocular Movements: Extraocular movements intact.      Conjunctiva/sclera: Conjunctivae normal.   Cardiovascular:      Rate and Rhythm: Normal rate and regular rhythm.      Pulses: Normal pulses.      Heart sounds: Normal heart sounds. No murmur heard.     Comments: Right internal jugular tunneled catheter with pus at suture sites (sutures removed) and erythematous skin.  LUE AV fistula - area surrounding edematous   Pulmonary:      Effort: Pulmonary effort is normal. No respiratory distress.      " Breath sounds: Normal breath sounds. No wheezing or rales.   Abdominal:      General: There is distension.      Tenderness: Diffuse abdominal tenderness. Decreased bowel sounds. Positive fluid wave. There is no guarding or rebound.   Musculoskeletal:         General: Swelling present.      Cervical back: Normal range of motion.      Right lower leg: Edema present.      Left lower leg: Edema present.   Skin:     General: Skin is warm and dry.      Comments: Excoriations of upper extremities, lower extremities, scalp, chest/abdomen. Right underarm boils erythematous/tender. Forehead boil tender/erythematous with scab.  Neurological:      General: No focal deficit present.      Mental Status: He is alert and oriented to person, place, and time.   Psychiatric:         Thought Content: Thought content normal    Fluids    Intake/Output Summary (Last 24 hours) at 8/26/2022 1531  Last data filed at 8/26/2022 1000  Gross per 24 hour   Intake 650 ml   Output --   Net 650 ml       Laboratory  Recent Labs     08/25/22  1137   WBC 6.8   RBC 2.48*   HEMOGLOBIN 7.2*   HEMATOCRIT 22.4*   MCV 90.3   MCH 29.0   MCHC 32.1*   RDW 56.0*   PLATELETCT 294   MPV 9.5     Recent Labs     08/25/22  1137   SODIUM 133*   POTASSIUM 5.2   CHLORIDE 95*   CO2 25   GLUCOSE 144*   BUN 58*   CREATININE 6.89*   CALCIUM 8.2*                   Imaging  CT-ABDOMEN-PELVIS W/O   Final Result      1.  Micronodular appearance of the liver consistent with hepatocellular disease.      2.  Large amount of ascites throughout the abdomen and pelvis.      3.  Adynamic ileus without evidence of bowel obstruction.      4.  Anasarca.      5.  Bilateral spondylolysis of the pars interarticularis at the L5-S1 level.      US-EXTREMITY VENOUS LOWER BILAT   Final Result      US-ABDOMEN COMPLETE SURVEY   Final Result      1.  Mildly increased hepatic echotexture is suggestive of fatty infiltration.      2.  Small-to-moderate amount of ascites.      3.  Cholelithiasis          DX-ABDOMEN COMPLETE WITH AP OR PA CXR   Final Result      Fairly diffuse bowel distention with scattered air-fluid levels. Findings are likely related to ileus. More distal obstruction cannot be excluded.      DX-CHEST-PORTABLE (1 VIEW)   Final Result      1.  Mildly enlarged cardiac silhouette with vascular congestion.      US-HEMODIALYSIS GRAFT DUPLEX COMP UPPER EXTREMITY    (Results Pending)        Assessment/Plan  Problem Representation:    * Noncompliance of patient with renal dialysis (HCC)- (present on admission)  Assessment & Plan  Has not had dialysis in the past 3 weeks, due to traveling to [place].  Obtained HD in [place], however does not have an outpatient dialysis chair in [place] and last year in [place]. Dialysis chair available 8/23.    -Signs/symptoms and labs/vitals slowly normalizing  -Nephrology following, on TThS dialysis and as needed  -Completed dialysis 8/16, 8/18-stop early due to pt having bm, 8/20, 8/21, 8/23, 8/25  -Suspician for RIJ infection -follow bcx and RIJ cx, Vancomycin started 8/22 - 8/24 switch to Doxycycline 8/24 - given negative BCX    Multiple air fluid levels of small intestine determined by X-ray  Assessment & Plan  Patients states he has been having watery stools multiple times a day for many days. He is unable to recount when his last solid stool was. Abdomen has become more distended and pain has increased. Abdominal xray with bowel distention with air-fluid levels. Pt has been taking oxycodone for leg pain since 8/8.     -Will continue to monitor with serial abdominal exam   -CT abdo 8/22 with marked ascites, continue with dialysis       Lower extremity pain, bilateral  Assessment & Plan  Anterior leg pain noted on admission and patient given oxycodone for pain. Most likely related to edema due to missed dialysis sessions. CPK and lactic acid wnl. Ambulatory.   -Oxycodone for pain control  -Bilateral DVT US negative    End stage renal disease (HCC)- (present  on admission)  Assessment & Plan  Patient received HD in [place], has not received HD in 3 weeks prior to admission.  Patient also had multiple thrombectomies/revisions for AV fistula.  Patient has tunneled dialysis catheter in place and functioning. Patient not adherent to HD or outpatient medications.  Nephrology following. Per pharmacy ([facility]) pt has not picked up any medications. Prescribed medications were 01/2022 - Trazodone 50mg QHS, Calcitriol 0.25mg OD, Furosemide 80mg OD, Ca-acetate 667-270 TID, Lisinopril 20mg OD. 12/31/2021 amplodipine 5mg, glipizide ER 5mg, carvedilol 25 BID    -Currently on cholecalciferol   -Per nephrology note, AVG use per Vasc Surg recs; currently using [facility] TDC, last note 3/1 not yet cleared for use. Due to MRSA growth from skin culture, next dialysis will attempt to use AVG.   -Dialysis schedule TThSat and as needed      Hypertension- (present on admission)  Assessment & Plan  History of uncontrolled hypertension. Likely due to missing dialysis and nonadherence to outpatient BP medications    -Hydralazine as needed due to history of CHF  -BP well controlled with Carvedilol 25mg twice daily         VTE prophylaxis: heparin ppx    I have performed a physical exam and reviewed and updated ROS and Plan today (8/26/2022). In review of yesterday's note (8/25/2022), there are no changes except as documented above.

## 2022-08-26 NOTE — CARE PLAN
The patient is Stable - Low risk of patient condition declining or worsening    Shift Goals  Clinical Goals: Pain control  Patient Goals: Pain control, rest  Family Goals: [place]    Progress made toward(s) clinical / shift goals: Pt received PRN pain medication per MAR for pain in right thigh. Pt given education on wound care and MRSA precautions.     Problem: Knowledge Deficit - Standard  Goal: Patient and family/care givers will demonstrate understanding of plan of care, disease process/condition, diagnostic tests and medications  Outcome: Progressing     Problem: Pain - Standard  Goal: Alleviation of pain or a reduction in pain to the patient’s comfort goal  Outcome: Progressing     Problem: Fall Risk  Goal: Patient will remain free from falls  Outcome: Progressing

## 2022-08-26 NOTE — PROGRESS NOTES
Pt is A&O x4, ambulating w/out assistance. Received PRN oxycodone for right thigh pain. Pt educated not to pick at wounds and scabs. Pt resting in bed with call light and belongings in reach. No additional needs at this time.

## 2022-08-26 NOTE — PROGRESS NOTES
[facility] -  PROGRESS NOTE               Author: EMMA Ramachandran Date & Time: 8/26/2022  10:31 AM     HPI:  45 yo M with End Stage Renal Disease on hemodialysis at [facility] every Tues/Thurs/Sat, last HD at [facility] in 4/2022, then traveled and lost HD chair. Other PMH diabetes mellitus and Hypertension who presented to the emergency department on 8/8/22 for weakness. He missed 1 months of HD due to traveling. He was found to be hypertensive 153/99, afebrile. Labs with K of 6.1, BUN 93, bicarb 10.   No F/C/N/V/CP/SOB.  No melena, hematochezia, hematemesis.  No HA, visual changes, or abdominal pain.    DAILY NEPHROLOGY SUMMARY:  8/9: 3L net UF. Resting in bed. No complaints. No acute distress. Security called last night, patient aggressive towards staff regarding personal items. Bps improved this am.   8/10: Seen on HD this am. Lethargic. No complaints.   8/11:  Resting comfortably no complaints.  VSS.  8/12: Ambulating in room indep. Denies CP/SOB, on RA. Reports he is hungry and wants breakfast before anything else. Bps elevated.   8/13: VSS RA no complaints.  Ambulating.   8/14: Sleeping in bed. Dismissive. Does not want extra HD today. BP elevated.   8/15: Risks of no dialysis explained to patient, refused treatment today.  Complaining of leg pain and swelling.  8/16: Seen on dialysis at bedside.  Complaining of thigh pain.   8/17: Denies pain or SOB.  Resting.  8/18: Pt sitting up in chair. For HD today. Denies CP,SOB,N/V/D. No overnight events. No  labs for review. VSS. No complaints.   8/19: HD yest, UF 2 L. Pt came off HD treatment 50' early as he had an accident during treatment.Pt C/O itching. Still with LLE.   8/20: Pt seen on HD, minimally engages in conversation, VSS on RA  8/21: Pt sitting up EOB, says his legs and forehead hurt, iHD yesterday with 3L net UF, declines need for additional UF today, lab reviewed, VSS on RA  8/22: VSS RA.  No new labs.  Does  "complaint of leg pain bilat, worsening edema.  No SOB. Declines additional dialysis tx.  Swab from line site +MRSA.  Blood cultures pending collection.  Afebrile.    8/23:Pt refused HD this am \"maybe later\". He is agreeable to do dialysis now.  8/24: 1348 mL Net UF. Resting in bed. C/o fatigue. Reports tenderness at TDC site. Denies fever or chills, Denies CP/SOB. No new labs this am.   8/25: declined iHD yesterday, planning for today.  No new labs. AVG US still pending.  VSS on RA.  Agreeable to HD today.  Would prefer not to use AVG due to pain.   8/26: sitting up in bed, continues with mild pain in AVF but improving, tolerated iHD yesterday with UF: 3.5L    REVIEW OF SYSTEMS:    10 point ROS reviewed and is as per HPI/daily summary or otherwise negative    PMH/PSH/SH/FH: Reviewed and unchanged since admission note  CURRENT MEDICATIONS: Reviewed from admission to present day    VS:  /78   Pulse 80   Temp 36.7 °C (98 °F) (Temporal)   Resp 16   Ht 1.753 m (5' 9\")   Wt 99.6 kg (219 lb 9.3 oz)   SpO2 94%   BMI 32.43 kg/m²   Physical Exam  Vitals and nursing note reviewed.   Constitutional:       General: He is not in acute distress.     Appearance: He is ill-appearing.   HENT:      Head: Normocephalic and atraumatic.      Nose: Nose normal. No congestion or rhinorrhea.      Mouth/Throat:      Mouth: Mucous membranes are moist.      Pharynx: No oropharyngeal exudate or posterior oropharyngeal erythema.   Eyes:      General: No scleral icterus.     Extraocular Movements: Extraocular movements intact.      Conjunctiva/sclera: Conjunctivae normal.      Pupils: Pupils are equal, round, and reactive to light.   Cardiovascular:      Rate and Rhythm: Normal rate and regular rhythm.      Pulses: Normal pulses.      Heart sounds: Normal heart sounds. No murmur heard.    No friction rub. No gallop.      Comments: [facility] TDC  LAVG +T/B    +bilat LE edema 1+  Pulmonary:      Effort: Pulmonary effort is normal. No " respiratory distress.      Breath sounds: Normal breath sounds. No wheezing or rales.   Abdominal:      General: Bowel sounds are normal.   Musculoskeletal:         General: Swelling present. Normal range of motion.      Cervical back: Normal range of motion and neck supple.   Skin:     General: Skin is warm and dry.      Findings: Erythema (LEs/forehead) and lesion present.      Comments: Scattered lesions/excoriation   Neurological:      Mental Status: He is alert and oriented to person, place, and time. Mental status is at baseline.   Psychiatric:         Mood and Affect: Mood normal.         Behavior: Behavior normal.       Fluids:  In: 1100 [P.O.:1100]  Out: 3500     LABS:  Recent Labs     08/23/22  1255 08/25/22  1137   SODIUM 134* 133*   POTASSIUM 5.6* 5.2   CHLORIDE 98 95*   CO2 20 25   GLUCOSE 162* 144*   BUN 64* 58*   CREATININE 8.39* 6.89*   CALCIUM 8.2* 8.2*         IMPRESSION:  # ESRD  - Etiology likely 2/2 DM/HTN  - HD qTTHS via [facility] TDC  - Lost his HD chair at [facility] -> plan for [facility]  # Thrombosed Left AVF  - US 2/23 no flow  - s/p OR 2/25 AVG Brachio-axillary Creation, thrombectomy and fistulogram   - AVG US pending  # HTN, fair control   - Goal BP < 140/90  # Anemia of CKD, below target  - Goal Hgb 10-11   - % sat 32  - Ferritin 879  - JACKIE with HD   # CKD-MBD  - Ca 8.1 with low albumin  - PO4 6.2  -   - On cholecalciferol and calcium acetate   # Hyperkalemia, corrected  - Due to missed HD  - Manage with HD and low K+ diet  - On Veltessa fir now as missed iHD  # Weakness  # h/o Methamphetamine use   # Homelessness  # DM   - A1C 6.5  # Severe metabolic acidosis, corrected   # Wound Cx (+) MRSA  - ABX per primary services  - On Vanco  - Blood cultures 8/22 negative so far     PLAN:  - Next iHD tomorrow(Sat), will attempt using AVG (ok per vascular) when pt allows  - Continue iHD qTTS  - Daily eval for additional treatment   - Challenge UF as able    - CMP in AM  - Keep  permcath in place for now, recommend IR to remove retained suture  - Follow AVG US result - pending   - Continue calcium acetate with meals. Hold if pt is NPO.  - Transfuse PRN Hgb <7.0   - JACKIE with HD to goal Hgb 10-11  - No dietary protein restrictions  - Dose all meds per ESRD/iHD  - Weakness workup per primary team   - Low Na/fluid restricted renal diet  - Discharge planning underway. Accepted at [facility]   - Labs with further recommendations to follow  Thank you,

## 2022-08-27 ENCOUNTER — APPOINTMENT (OUTPATIENT)
Dept: RADIOLOGY | Facility: MEDICAL CENTER | Age: 47
DRG: 291 | End: 2022-08-27
Attending: INTERNAL MEDICINE
Payer: MEDICARE

## 2022-08-27 LAB
ALBUMIN SERPL BCP-MCNC: 3.1 G/DL (ref 3.2–4.9)
ALBUMIN/GLOB SERPL: 0.7 G/DL
ALP SERPL-CCNC: 169 U/L (ref 30–99)
ALT SERPL-CCNC: 10 U/L (ref 2–50)
ANION GAP SERPL CALC-SCNC: 14 MMOL/L (ref 7–16)
AST SERPL-CCNC: 9 U/L (ref 12–45)
BACTERIA BLD CULT: NORMAL
BACTERIA BLD CULT: NORMAL
BASOPHILS # BLD AUTO: 0.5 % (ref 0–1.8)
BASOPHILS # BLD: 0.04 K/UL (ref 0–0.12)
BILIRUB SERPL-MCNC: 0.4 MG/DL (ref 0.1–1.5)
BUN SERPL-MCNC: 55 MG/DL (ref 8–22)
CALCIUM SERPL-MCNC: 8.4 MG/DL (ref 8.5–10.5)
CHLORIDE SERPL-SCNC: 93 MMOL/L (ref 96–112)
CO2 SERPL-SCNC: 25 MMOL/L (ref 20–33)
CREAT SERPL-MCNC: 7.37 MG/DL (ref 0.5–1.4)
EOSINOPHIL # BLD AUTO: 0.05 K/UL (ref 0–0.51)
EOSINOPHIL NFR BLD: 0.6 % (ref 0–6.9)
ERYTHROCYTE [DISTWIDTH] IN BLOOD BY AUTOMATED COUNT: 54.4 FL (ref 35.9–50)
GFR SERPLBLD CREATININE-BSD FMLA CKD-EPI: 9 ML/MIN/1.73 M 2
GLOBULIN SER CALC-MCNC: 4.4 G/DL (ref 1.9–3.5)
GLUCOSE SERPL-MCNC: 160 MG/DL (ref 65–99)
HCT VFR BLD AUTO: 22.7 % (ref 42–52)
HGB BLD-MCNC: 7.3 G/DL (ref 14–18)
IMM GRANULOCYTES # BLD AUTO: 0.05 K/UL (ref 0–0.11)
IMM GRANULOCYTES NFR BLD AUTO: 0.6 % (ref 0–0.9)
LYMPHOCYTES # BLD AUTO: 1.22 K/UL (ref 1–4.8)
LYMPHOCYTES NFR BLD: 15.8 % (ref 22–41)
MCH RBC QN AUTO: 28.7 PG (ref 27–33)
MCHC RBC AUTO-ENTMCNC: 32.2 G/DL (ref 33.7–35.3)
MCV RBC AUTO: 89.4 FL (ref 81.4–97.8)
MONOCYTES # BLD AUTO: 1.22 K/UL (ref 0–0.85)
MONOCYTES NFR BLD AUTO: 15.8 % (ref 0–13.4)
NEUTROPHILS # BLD AUTO: 5.14 K/UL (ref 1.82–7.42)
NEUTROPHILS NFR BLD: 66.7 % (ref 44–72)
NRBC # BLD AUTO: 0 K/UL
NRBC BLD-RTO: 0 /100 WBC
PHOSPHATE SERPL-MCNC: 5.2 MG/DL (ref 2.5–4.5)
PLATELET # BLD AUTO: 320 K/UL (ref 164–446)
PMV BLD AUTO: 8.9 FL (ref 9–12.9)
POTASSIUM SERPL-SCNC: 5.1 MMOL/L (ref 3.6–5.5)
PROT SERPL-MCNC: 7.5 G/DL (ref 6–8.2)
RBC # BLD AUTO: 2.54 M/UL (ref 4.7–6.1)
SIGNIFICANT IND 70042: NORMAL
SIGNIFICANT IND 70042: NORMAL
SITE SITE: NORMAL
SITE SITE: NORMAL
SODIUM SERPL-SCNC: 132 MMOL/L (ref 135–145)
SOURCE SOURCE: NORMAL
SOURCE SOURCE: NORMAL
WBC # BLD AUTO: 7.7 K/UL (ref 4.8–10.8)

## 2022-08-27 PROCEDURE — 700102 HCHG RX REV CODE 250 W/ 637 OVERRIDE(OP): Performed by: HOSPITALIST

## 2022-08-27 PROCEDURE — 84100 ASSAY OF PHOSPHORUS: CPT

## 2022-08-27 PROCEDURE — 99232 SBSQ HOSP IP/OBS MODERATE 35: CPT | Mod: GC | Performed by: HOSPITALIST

## 2022-08-27 PROCEDURE — A9270 NON-COVERED ITEM OR SERVICE: HCPCS | Performed by: HOSPITALIST

## 2022-08-27 PROCEDURE — 700102 HCHG RX REV CODE 250 W/ 637 OVERRIDE(OP)

## 2022-08-27 PROCEDURE — 700111 HCHG RX REV CODE 636 W/ 250 OVERRIDE (IP): Performed by: NURSE PRACTITIONER

## 2022-08-27 PROCEDURE — A9270 NON-COVERED ITEM OR SERVICE: HCPCS | Performed by: STUDENT IN AN ORGANIZED HEALTH CARE EDUCATION/TRAINING PROGRAM

## 2022-08-27 PROCEDURE — 770001 HCHG ROOM/CARE - MED/SURG/GYN PRIV*

## 2022-08-27 PROCEDURE — 85025 COMPLETE CBC W/AUTO DIFF WBC: CPT

## 2022-08-27 PROCEDURE — 700111 HCHG RX REV CODE 636 W/ 250 OVERRIDE (IP)

## 2022-08-27 PROCEDURE — 700111 HCHG RX REV CODE 636 W/ 250 OVERRIDE (IP): Performed by: HOSPITALIST

## 2022-08-27 PROCEDURE — A9270 NON-COVERED ITEM OR SERVICE: HCPCS | Performed by: INTERNAL MEDICINE

## 2022-08-27 PROCEDURE — 700102 HCHG RX REV CODE 250 W/ 637 OVERRIDE(OP): Performed by: NURSE PRACTITIONER

## 2022-08-27 PROCEDURE — 90935 HEMODIALYSIS ONE EVALUATION: CPT

## 2022-08-27 PROCEDURE — 80053 COMPREHEN METABOLIC PANEL: CPT

## 2022-08-27 PROCEDURE — A9270 NON-COVERED ITEM OR SERVICE: HCPCS

## 2022-08-27 PROCEDURE — 700102 HCHG RX REV CODE 250 W/ 637 OVERRIDE(OP): Performed by: STUDENT IN AN ORGANIZED HEALTH CARE EDUCATION/TRAINING PROGRAM

## 2022-08-27 PROCEDURE — 93990 DOPPLER FLOW TESTING: CPT

## 2022-08-27 PROCEDURE — 36415 COLL VENOUS BLD VENIPUNCTURE: CPT

## 2022-08-27 PROCEDURE — 700102 HCHG RX REV CODE 250 W/ 637 OVERRIDE(OP): Performed by: INTERNAL MEDICINE

## 2022-08-27 PROCEDURE — 700101 HCHG RX REV CODE 250: Performed by: STUDENT IN AN ORGANIZED HEALTH CARE EDUCATION/TRAINING PROGRAM

## 2022-08-27 PROCEDURE — A9270 NON-COVERED ITEM OR SERVICE: HCPCS | Performed by: NURSE PRACTITIONER

## 2022-08-27 RX ORDER — FUROSEMIDE 40 MG/1
80 TABLET ORAL
Status: DISCONTINUED | OUTPATIENT
Start: 2022-08-27 | End: 2022-08-28

## 2022-08-27 RX ORDER — HEPARIN SODIUM 1000 [USP'U]/ML
INJECTION, SOLUTION INTRAVENOUS; SUBCUTANEOUS
Status: COMPLETED
Start: 2022-08-27 | End: 2022-08-27

## 2022-08-27 RX ADMIN — HEPARIN SODIUM 5000 UNITS: 5000 INJECTION, SOLUTION INTRAVENOUS; SUBCUTANEOUS at 08:31

## 2022-08-27 RX ADMIN — HEPARIN SODIUM 3100 UNITS: 1000 INJECTION, SOLUTION INTRAVENOUS; SUBCUTANEOUS at 19:10

## 2022-08-27 RX ADMIN — DOCUSATE SODIUM 50 MG AND SENNOSIDES 8.6 MG 1 TABLET: 8.6; 5 TABLET, FILM COATED ORAL at 08:31

## 2022-08-27 RX ADMIN — PATIROMER 8.4 G: 8.4 POWDER, FOR SUSPENSION ORAL at 13:28

## 2022-08-27 RX ADMIN — CARVEDILOL 25 MG: 25 TABLET, FILM COATED ORAL at 20:35

## 2022-08-27 RX ADMIN — DOXYCYCLINE 100 MG: 100 TABLET, FILM COATED ORAL at 20:33

## 2022-08-27 RX ADMIN — Medication 1000 UNITS: at 08:31

## 2022-08-27 RX ADMIN — TRIAMCINOLONE ACETONIDE: 1 OINTMENT TOPICAL at 08:31

## 2022-08-27 RX ADMIN — DIPHENHYDRAMINE HYDROCHLORIDE 25 MG: 25 TABLET ORAL at 20:44

## 2022-08-27 RX ADMIN — TRIAMCINOLONE ACETONIDE: 1 OINTMENT TOPICAL at 20:35

## 2022-08-27 RX ADMIN — Medication 1334 MG: at 20:34

## 2022-08-27 RX ADMIN — OXYCODONE 5 MG: 5 TABLET ORAL at 06:05

## 2022-08-27 RX ADMIN — Medication 1334 MG: at 10:44

## 2022-08-27 RX ADMIN — DOXYCYCLINE 100 MG: 100 TABLET, FILM COATED ORAL at 08:31

## 2022-08-27 RX ADMIN — OXYCODONE 5 MG: 5 TABLET ORAL at 14:58

## 2022-08-27 RX ADMIN — EPOETIN ALFA-EPBX 5000 UNITS: 3000 INJECTION, SOLUTION INTRAVENOUS; SUBCUTANEOUS at 19:02

## 2022-08-27 RX ADMIN — LIDOCAINE 1 PATCH: 50 PATCH CUTANEOUS at 13:28

## 2022-08-27 RX ADMIN — FUROSEMIDE 80 MG: 40 TABLET ORAL at 10:43

## 2022-08-27 ASSESSMENT — PAIN DESCRIPTION - PAIN TYPE
TYPE: ACUTE PAIN
TYPE: ACUTE PAIN

## 2022-08-27 NOTE — PROGRESS NOTES
[facility] -  PROGRESS NOTE               Author: Denilson Hamilton M.D. Date & Time: 8/27/2022  11:36 AM     HPI:  45 yo M with End Stage Renal Disease on hemodialysis at [facility] every Tues/Thurs/Sat, last HD at [facility] in 4/2022, then traveled and lost HD chair. Other PMH diabetes mellitus and Hypertension who presented to the emergency department on 8/8/22 for weakness. He missed 1 months of HD due to traveling. He was found to be hypertensive 153/99, afebrile. Labs with K of 6.1, BUN 93, bicarb 10.   No F/C/N/V/CP/SOB.  No melena, hematochezia, hematemesis.  No HA, visual changes, or abdominal pain.    DAILY NEPHROLOGY SUMMARY:  8/09: 3L net UF. Resting in bed. No complaints. No acute distress. Security called last night, patient aggressive towards staff regarding personal items. Bps improved this am.   8/10: Seen on HD this am. Lethargic. No complaints.   8/11:  Resting comfortably no complaints.  VSS.  8/12: Ambulating in room indep. Denies CP/SOB, on RA. Reports he is hungry and wants breakfast before anything else. Bps elevated.   8/13: VSS RA no complaints.  Ambulating.   8/14: Sleeping in bed. Dismissive. Does not want extra HD today. BP elevated.   8/15: Risks of no dialysis explained to patient, refused treatment today.  Complaining of leg pain and swelling.  8/16: Seen on dialysis at bedside.  Complaining of thigh pain.   8/17: Denies pain or SOB.  Resting.  8/18: Pt sitting up in chair. For HD today. Denies CP,SOB,N/V/D. No overnight events. No  labs for review. VSS. No complaints.   8/19: HD yest, UF 2 L. Pt came off HD treatment 50' early as he had an accident during treatment.Pt C/O itching. Still with LLE.   8/20: Pt seen on HD, minimally engages in conversation, VSS on RA  8/21: Pt sitting up EOB, says his legs and forehead hurt, iHD yesterday with 3L net UF, declines need for additional UF today, lab reviewed, VSS on RA  8/22: VSS RA.  No new labs.  Does complaint  "of leg pain bilat, worsening edema.  No SOB. Declines additional dialysis tx.  Swab from line site +MRSA.  Blood cultures pending collection.  Afebrile.    8/23:Pt refused HD this am \"maybe later\". He is agreeable to do dialysis now.  8/24: 1348 mL Net UF. Resting in bed. C/o fatigue. Reports tenderness at TDC site. Denies fever or chills, Denies CP/SOB. No new labs this am.   8/25: declined iHD yesterday, planning for today.  No new labs. AVG US still pending.  VSS on RA.  Agreeable to HD today.  Would prefer not to use AVG due to pain.   8/26: sitting up in bed, continues with mild pain in AVF but improving, tolerated iHD yesterday with UF: 3.5L  8/27: NAEO, upset that they attempted his AVG couple days ago and 'failed'    REVIEW OF SYSTEMS:    10 point ROS reviewed and is as per HPI/daily summary or otherwise negative    PMH/PSH/SH/FH: Reviewed and unchanged since admission note  CURRENT MEDICATIONS: Reviewed from admission to present day    VS:  /72   Pulse 71   Temp 36.7 °C (98 °F) (Temporal)   Resp 16   Ht 1.753 m (5' 9\")   Wt 99.6 kg (219 lb 9.3 oz)   SpO2 90%   BMI 32.43 kg/m²   Physical Exam  Nursing note reviewed.   Constitutional:       Appearance: Normal appearance.   Eyes:      General: No scleral icterus.  Cardiovascular:      Comments: BLE edema  Pulmonary:      Effort: Pulmonary effort is normal.   Abdominal:      General: There is no distension.   Musculoskeletal:         General: No deformity.      Comments: LAVG (+thrill/bruit)   Skin:     Findings: Erythema (BLE and forehead) present.   Neurological:      Mental Status: He is alert.   Psychiatric:         Behavior: Behavior normal.     Fluids:  In: 800 [P.O.:800]  Out: -     LABS:  Recent Labs     08/25/22  1137 08/26/22  1505 08/27/22  0840   SODIUM 133* 133* 132*   POTASSIUM 5.2 5.0 5.1   CHLORIDE 95* 94* 93*   CO2 25 25 25   GLUCOSE 144* 180* 160*   BUN 58* 48* 55*   CREATININE 6.89* 6.75* 7.37*   CALCIUM 8.2* 8.5 8.4* "       IMPRESSION:  # ESRD  - Etiology likely 2/2 DM/HTN  - HD qTTHS via [facility] TDC  - Lost his HD chair at [facility] -> [facility] for [facility]  # Thrombosed Left AVF  - US 2/23 no flow  - s/p OR 2/25 AVG Brachio-axillary Creation, thrombectomy and fistulogram   - AVG US pending  - AVG is deep when RN's attempted and even though they were able  to cannulate he moves his arm and causes infiltration due to low pain threshold  # HTN, fair control   - Goal BP < 140/90  # Anemia of CKD, below target  - Goal Hgb 10-11   - % sat 32  - Ferritin 879  - JACKIE with HD   # CKD-MBD  - Ca 8.1 with low albumin  - PO4 6.2  -   - On cholecalciferol and calcium acetate   # Hyperkalemia, corrected  - Due to missed HD  - Manage with HD and low K+ diet  - On Veltessa fir now as missed iHD  # Weakness  # h/o Methamphetamine use   # Homelessness  # DM   - A1C 6.5  # Severe metabolic acidosis, corrected   # Wound Cx (+) MRSA  - ABX per primary services  - On Vanco  - Blood cultures 8/22 negative so far     PLAN:  - TTS iHD  - Challenge UF as able  - Keep permcath in place for now, recommend IR to remove retained suture  - Transfuse PRN Hgb <7.0   - JACKIE with HD to goal Hgb 10-11  - No dietary protein restrictions  - Dose all meds per ESRD/iHD  - Renal diet  - [facility] for OP HD  - Will ask Vascular to evaluate to see if he needs a surgery to bring AVG up or not

## 2022-08-27 NOTE — PROGRESS NOTES
[facility] Internal Medicine Daily Progress Note    Date of Service  8/27/2022    [facility] Team: R IM Purple Team   Attending: Christophe Nuñez M.d.  Senior Resident: Dr. Clement  Intern:  Dr. Adorno  Contact Number: [phone]    Chief Complaint  Ambrose Watkins is a 46 y.o. male admitted 8/8/2022 with progressive weakness with associated nausea, vomiting, and diarrhea due to missed dialysis    Hospital Course  47 yo male with PMH of diabetes,HFrEF, HTN, and ESRD on HD who presented 8/8/2022 for missing dialysis for past 3 weeks due to travelling to [place] for leisure and did not know where to go to get dialysis. In addition, patient did not have a dialysis chair here in [place].  He states he receives dialysis in [place].  He has been experiencing progressive weakness and associated nausea, vomiting, and diarrhea.  He came to the ER and was found to have electrolyte abnormalities including hyperkalemia, elevated creatinine BUN, and low calcium.  Nephrology consulted and stated that patient will require multiple sessions of dialysis given numerous missed sessions. Currently following TT schedule for HD, with vitals/labs gradually improving.   8/22 MRSA growth from armpit or RIJ swab - started on Vancomycin 8/22 - 8/24 attempt to use left AVM for dialysis  8/24 BCX obtained - No growth 48 hrs. Switch to Doxycycline 8/24 -  8/26 Lasix 80mg PO due to fluid overload  8/27 Urinating well with Lasix, will continue. US AVG - normal flow, stenosis prox biceps, min mural thrombus graft wall at mid biceps w/no flow disturbance, outflow axillary vein widely patent. Nephrology consulting Vascular surgery for evaluation to bring up AVG.    Interval Problem Update  No acute events overnight. Patient reports ongoing itchiness, thigh pain. Improvement in abdominal discomfort. Diuresing well with Lasix 80mg, urinating well this am with improvement in abdominal distention. Discussed need to allow collection of I/O's while on  "Lasix and usage of Benadryl for generalized itchiness to stop scratching. US AVG completed with with normal flow, stenosis prox biceps, min mural thrombus graft wall at mid biceps w/no flow disturbance, outflow axillary vein widely patent. Nephrology consulting Vascular surgery for evaluation to bring up AVG.    I have discussed this patient's plan of care and discharge plan at IDT rounds today with Case Management, Nursing, Nursing leadership, and other members of the IDT team.    Consultants/Specialty  nephrology    Code Status  Full Code    Disposition  Patient is not medically cleared for discharge.   Anticipate discharge to to home with close outpatient follow-up.  I have placed the appropriate orders for post-discharge needs.    Review of Systems  Constitutional:  Positive for malaise/fatigue. Negative for chills, diaphoresis and fever.  Right IJ stitching removed. Armpit and forehead boils and itchiness.  Respiratory:  Negative for cough and shortness of breath.    Cardiovascular:  Positive for leg swelling. Negative for chest pain and palpitations.   Gastrointestinal:  Positive for abdominal pain and constipation. Negative for diarrhea, nausea and vomiting.    Genitourinary:         Reports minimal urine production   Musculoskeletal:         Bilateral leg pain - right thigh pain with light touch    Neurological:  Positive for weakness. Negative for dizziness and headaches.      /72   Pulse 71   Temp 36.7 °C (98 °F) (Temporal)   Resp 16   Ht 1.753 m (5' 9\")   Wt 99.6 kg (219 lb 9.3 oz)   SpO2 90%   BMI 32.43 kg/m²     Physical Exam  Constitutional:       General: He is not in acute distress.     Appearance: He is ill-appearing. He is not toxic-appearing.   HENT:      Head: Normocephalic and atraumatic.      Right Ear: External ear normal.      Left Ear: External ear normal.      Nose: Nose normal.      Mouth/Throat:      Mouth: Mucous membranes are moist.   Eyes:      Extraocular Movements: " Extraocular movements intact.      Conjunctiva/sclera: Conjunctivae normal.   Cardiovascular:      Rate and Rhythm: Normal rate and regular rhythm.      Pulses: Normal pulses.      Heart sounds: Normal heart sounds. No murmur heard.     Comments: Right internal jugular tunneled catheter. Suture sites (sutures removed) skin less erythematous no more pus.  LUE AV fistula - surrounding area edema improved.   Pulmonary:      Effort: Pulmonary effort is normal. No respiratory distress.      Breath sounds: Normal breath sounds. No wheezing or rales.   Abdominal:      General: There is distension.      Tenderness: Improvement in diffuse abdominal tenderness, bowel sounds. Less distended. There is no guarding or rebound.   Musculoskeletal:         General: Swelling present.      Cervical back: Normal range of motion.      Right lower leg: Edema present.      Left lower leg: Edema present.   Skin:     General: Skin is warm and dry.      Comments: Excoriations of upper extremities, lower extremities, scalp, chest/abdomen. Right underarm boils erythematous/tender. Forehead boil tender/erythematous with scab.  Neurological:      General: No focal deficit present.      Mental Status: He is alert and oriented to person, place, and time.   Psychiatric:         Thought Content: Thought content normal    Fluids    Intake/Output Summary (Last 24 hours) at 8/27/2022 1208  Last data filed at 8/27/2022 1046  Gross per 24 hour   Intake 736 ml   Output --   Net 736 ml       Laboratory  Recent Labs     08/25/22  1137 08/27/22  0840   WBC 6.8 7.7   RBC 2.48* 2.54*   HEMOGLOBIN 7.2* 7.3*   HEMATOCRIT 22.4* 22.7*   MCV 90.3 89.4   MCH 29.0 28.7   MCHC 32.1* 32.2*   RDW 56.0* 54.4*   PLATELETCT 294 320   MPV 9.5 8.9*     Recent Labs     08/25/22  1137 08/26/22  1505 08/27/22  0840   SODIUM 133* 133* 132*   POTASSIUM 5.2 5.0 5.1   CHLORIDE 95* 94* 93*   CO2 25 25 25   GLUCOSE 144* 180* 160*   BUN 58* 48* 55*   CREATININE 6.89* 6.75* 7.37*    CALCIUM 8.2* 8.5 8.4*                   Imaging  US-HEMODIALYSIS GRAFT DUPLEX COMP UPPER EXTREMITY   Final Result      CT-ABDOMEN-PELVIS W/O   Final Result      1.  Micronodular appearance of the liver consistent with hepatocellular disease.      2.  Large amount of ascites throughout the abdomen and pelvis.      3.  Adynamic ileus without evidence of bowel obstruction.      4.  Anasarca.      5.  Bilateral spondylolysis of the pars interarticularis at the L5-S1 level.      US-EXTREMITY VENOUS LOWER BILAT   Final Result      US-ABDOMEN COMPLETE SURVEY   Final Result      1.  Mildly increased hepatic echotexture is suggestive of fatty infiltration.      2.  Small-to-moderate amount of ascites.      3.  Cholelithiasis         DX-ABDOMEN COMPLETE WITH AP OR PA CXR   Final Result      Fairly diffuse bowel distention with scattered air-fluid levels. Findings are likely related to ileus. More distal obstruction cannot be excluded.      DX-CHEST-PORTABLE (1 VIEW)   Final Result      1.  Mildly enlarged cardiac silhouette with vascular congestion.           Assessment/Plan  Problem Representation:    * Noncompliance of patient with renal dialysis (HCC)- (present on admission)  Assessment & Plan  Has not had dialysis in the past 3 weeks, due to traveling to [place].  Obtained HD in [place], however does not have an outpatient dialysis chair in [place] and last year in [place]. Dialysis chair available 8/23.    -Signs/symptoms and labs/vitals slowly normalizing  -Nephrology following, on TThS dialysis and as needed  -Completed dialysis 8/16, 8/18-stop early due to pt having bm, 8/20, 8/21, 8/23, 8/25  -Suspician for RIJ infection -follow bcx and RIJ cx, Vancomycin started 8/22 - 8/24 switch to Doxycycline 8/24 - given negative BCX  -Lasix 80mg daily with strict I/O's    Multiple air fluid levels of small intestine determined by X-ray  Assessment & Plan  Patients states he has been having watery stools multiple times  a day for many days. He is unable to recount when his last solid stool was. Abdomen has become more distended and pain has increased. Abdominal xray with bowel distention with air-fluid levels. Pt has been taking oxycodone for leg pain since 8/8.     -Will continue to monitor with serial abdominal exam   -CT abdo 8/22 with marked ascites, continue with dialysis         Lower extremity pain, bilateral  Assessment & Plan  Anterior leg pain noted on admission and patient given oxycodone for pain. Most likely related to edema due to missed dialysis sessions. CPK and lactic acid wnl. Ambulatory.   -Oxycodone for pain control  -Bilateral DVT US negative    End stage renal disease (HCC)- (present on admission)  Assessment & Plan  Patient received HD in [place], has not received HD in 3 weeks prior to admission.  Patient also had multiple thrombectomies/revisions for AV fistula.  Patient has tunneled dialysis catheter in place and functioning. Patient not adherent to HD or outpatient medications.  Nephrology following. Per pharmacy ([facility]) pt has not picked up any medications. Prescribed medications were 01/2022 - Trazodone 50mg QHS, Calcitriol 0.25mg OD, Furosemide 80mg OD, Ca-acetate 667-270 TID, Lisinopril 20mg OD. 12/31/2021 amplodipine 5mg, glipizide ER 5mg, carvedilol 25 BID    -Currently on cholecalciferol   -Per nephrology note, AVG use per Vasc Surg recs; currently using RIJ TDC, last note 3/1 not yet cleared for use. Due to MRSA growth from skin culture, next dialysis will attempt to use AVG.   -Dialysis schedule TThSat and as needed      Hypertension- (present on admission)  Assessment & Plan  History of uncontrolled hypertension. Likely due to missing dialysis and nonadherence to outpatient BP medications    -Hydralazine as needed due to history of CHF  -BP well controlled with Carvedilol 25mg twice daily       VTE prophylaxis: heparin ppx    I have performed a physical exam and  reviewed and updated ROS and Plan today (8/27/2022). In review of yesterday's note (8/26/2022), there are no changes except as documented above.

## 2022-08-27 NOTE — CARE PLAN
The patient is Stable - Low risk of patient condition declining or worsening    Shift Goals  Clinical Goals: pain management, monitor labs and vitals, infection prevention  Patient Goals: pain management  Family Goals: POLINA    Progress made toward(s) clinical / shift goals:    Problem: Knowledge Deficit - Standard  Goal: Patient and family/care givers will demonstrate understanding of plan of care, disease process/condition, diagnostic tests and medications  Outcome: Progressing     Problem: Pain - Standard  Goal: Alleviation of pain or a reduction in pain to the patient’s comfort goal  Outcome: Progressing     Problem: Provide Safe Environment  Goal: Suicide environmental safety, protocols, policies, and practices will be implemented  Outcome: Progressing       Patient is not progressing towards the following goals:

## 2022-08-28 LAB
ALBUMIN SERPL BCP-MCNC: 2.7 G/DL (ref 3.2–4.9)
ALBUMIN/GLOB SERPL: 0.6 G/DL
ALP SERPL-CCNC: 164 U/L (ref 30–99)
ALT SERPL-CCNC: 7 U/L (ref 2–50)
ANION GAP SERPL CALC-SCNC: 14 MMOL/L (ref 7–16)
AST SERPL-CCNC: 10 U/L (ref 12–45)
BILIRUB SERPL-MCNC: 0.4 MG/DL (ref 0.1–1.5)
BUN SERPL-MCNC: 41 MG/DL (ref 8–22)
CALCIUM SERPL-MCNC: 8.3 MG/DL (ref 8.5–10.5)
CHLORIDE SERPL-SCNC: 92 MMOL/L (ref 96–112)
CO2 SERPL-SCNC: 24 MMOL/L (ref 20–33)
CREAT SERPL-MCNC: 5.81 MG/DL (ref 0.5–1.4)
GFR SERPLBLD CREATININE-BSD FMLA CKD-EPI: 11 ML/MIN/1.73 M 2
GLOBULIN SER CALC-MCNC: 4.3 G/DL (ref 1.9–3.5)
GLUCOSE SERPL-MCNC: 142 MG/DL (ref 65–99)
POTASSIUM SERPL-SCNC: 4.6 MMOL/L (ref 3.6–5.5)
PROT SERPL-MCNC: 7 G/DL (ref 6–8.2)
SODIUM SERPL-SCNC: 130 MMOL/L (ref 135–145)

## 2022-08-28 PROCEDURE — A9270 NON-COVERED ITEM OR SERVICE: HCPCS | Performed by: INTERNAL MEDICINE

## 2022-08-28 PROCEDURE — 700102 HCHG RX REV CODE 250 W/ 637 OVERRIDE(OP)

## 2022-08-28 PROCEDURE — A9270 NON-COVERED ITEM OR SERVICE: HCPCS

## 2022-08-28 PROCEDURE — 700102 HCHG RX REV CODE 250 W/ 637 OVERRIDE(OP): Performed by: NURSE PRACTITIONER

## 2022-08-28 PROCEDURE — 36415 COLL VENOUS BLD VENIPUNCTURE: CPT

## 2022-08-28 PROCEDURE — A9270 NON-COVERED ITEM OR SERVICE: HCPCS | Performed by: NURSE PRACTITIONER

## 2022-08-28 PROCEDURE — A9270 NON-COVERED ITEM OR SERVICE: HCPCS | Performed by: STUDENT IN AN ORGANIZED HEALTH CARE EDUCATION/TRAINING PROGRAM

## 2022-08-28 PROCEDURE — 99233 SBSQ HOSP IP/OBS HIGH 50: CPT | Mod: GC | Performed by: HOSPITALIST

## 2022-08-28 PROCEDURE — 80053 COMPREHEN METABOLIC PANEL: CPT

## 2022-08-28 PROCEDURE — 700111 HCHG RX REV CODE 636 W/ 250 OVERRIDE (IP): Performed by: HOSPITALIST

## 2022-08-28 PROCEDURE — 700101 HCHG RX REV CODE 250: Performed by: STUDENT IN AN ORGANIZED HEALTH CARE EDUCATION/TRAINING PROGRAM

## 2022-08-28 PROCEDURE — 700102 HCHG RX REV CODE 250 W/ 637 OVERRIDE(OP): Performed by: HOSPITALIST

## 2022-08-28 PROCEDURE — 700102 HCHG RX REV CODE 250 W/ 637 OVERRIDE(OP): Performed by: STUDENT IN AN ORGANIZED HEALTH CARE EDUCATION/TRAINING PROGRAM

## 2022-08-28 PROCEDURE — 770001 HCHG ROOM/CARE - MED/SURG/GYN PRIV*

## 2022-08-28 PROCEDURE — A9270 NON-COVERED ITEM OR SERVICE: HCPCS | Performed by: HOSPITALIST

## 2022-08-28 PROCEDURE — 700102 HCHG RX REV CODE 250 W/ 637 OVERRIDE(OP): Performed by: INTERNAL MEDICINE

## 2022-08-28 PROCEDURE — 700111 HCHG RX REV CODE 636 W/ 250 OVERRIDE (IP)

## 2022-08-28 RX ORDER — FUROSEMIDE 10 MG/ML
80 INJECTION INTRAMUSCULAR; INTRAVENOUS
Status: DISCONTINUED | OUTPATIENT
Start: 2022-08-28 | End: 2022-08-29

## 2022-08-28 RX ADMIN — LIDOCAINE 1 PATCH: 50 PATCH CUTANEOUS at 14:49

## 2022-08-28 RX ADMIN — TRIAMCINOLONE ACETONIDE: 1 OINTMENT TOPICAL at 14:49

## 2022-08-28 RX ADMIN — OXYCODONE 5 MG: 5 TABLET ORAL at 08:53

## 2022-08-28 RX ADMIN — Medication 1334 MG: at 14:48

## 2022-08-28 RX ADMIN — OXYCODONE 5 MG: 5 TABLET ORAL at 00:49

## 2022-08-28 RX ADMIN — HEPARIN SODIUM 5000 UNITS: 5000 INJECTION, SOLUTION INTRAVENOUS; SUBCUTANEOUS at 17:47

## 2022-08-28 RX ADMIN — CARVEDILOL 25 MG: 25 TABLET, FILM COATED ORAL at 17:47

## 2022-08-28 RX ADMIN — FUROSEMIDE 80 MG: 40 TABLET ORAL at 07:36

## 2022-08-28 RX ADMIN — TRIAMCINOLONE ACETONIDE: 1 OINTMENT TOPICAL at 21:00

## 2022-08-28 RX ADMIN — Medication 1334 MG: at 17:46

## 2022-08-28 RX ADMIN — FUROSEMIDE 80 MG: 10 INJECTION, SOLUTION INTRAMUSCULAR; INTRAVENOUS at 11:43

## 2022-08-28 RX ADMIN — HEPARIN SODIUM 5000 UNITS: 5000 INJECTION, SOLUTION INTRAVENOUS; SUBCUTANEOUS at 08:53

## 2022-08-28 RX ADMIN — Medication 1334 MG: at 08:53

## 2022-08-28 RX ADMIN — CARVEDILOL 25 MG: 25 TABLET, FILM COATED ORAL at 07:36

## 2022-08-28 RX ADMIN — DOXYCYCLINE 100 MG: 100 TABLET, FILM COATED ORAL at 08:53

## 2022-08-28 RX ADMIN — DOXYCYCLINE 100 MG: 100 TABLET, FILM COATED ORAL at 19:59

## 2022-08-28 RX ADMIN — Medication 1000 UNITS: at 08:54

## 2022-08-28 RX ADMIN — OXYCODONE 5 MG: 5 TABLET ORAL at 19:59

## 2022-08-28 RX ADMIN — PATIROMER 8.4 G: 8.4 POWDER, FOR SUSPENSION ORAL at 11:44

## 2022-08-28 ASSESSMENT — PAIN DESCRIPTION - PAIN TYPE
TYPE: ACUTE PAIN

## 2022-08-28 NOTE — CARE PLAN
Problem: Pain - Standard  Goal: Alleviation of pain or a reduction in pain to the patient’s comfort goal  Outcome: Progressing     The patient is Stable - Low risk of patient condition declining or worsening    Shift Goals  Clinical Goals: sleep comfortably  Patient Goals: pain management  Family Goals: POLINA    Progress made toward(s) clinical / shift goals:  PRN medication given for itching. Patient able to reposition self.     Patient is not progressing towards the following goals:

## 2022-08-28 NOTE — CARE PLAN
The patient is Stable - Low risk of patient condition declining or worsening    Shift Goals  Clinical Goals: Pain management  Patient Goals: pain management  Family Goals: POLINA    Progress made toward(s) clinical / shift goals:  Patient reminded to call for help with mobilization. Patient utilizing lidocaine patches.     Patient is not progressing towards the following goals:      Problem: Knowledge Deficit - Standard  Goal: Patient and family/care givers will demonstrate understanding of plan of care, disease process/condition, diagnostic tests and medications  Outcome: Progressing     Problem: Pain - Standard  Goal: Alleviation of pain or a reduction in pain to the patient’s comfort goal  Outcome: Progressing     Problem: Provide Safe Environment  Goal: Suicide environmental safety, protocols, policies, and practices will be implemented  Outcome: Progressing     Problem: Fall Risk  Goal: Patient will remain free from falls  Outcome: Progressing

## 2022-08-28 NOTE — PROGRESS NOTES
[facility] Internal Medicine Daily Progress Note    Date of Service  8/28/2022    [facility] Team: R IM Purple Team   Attending: Christophe Nuñez M.d.  Senior Resident: Dr. Clement  Intern:  Dr. Adorno  Contact Number: [phone]    Chief Complaint  Ambrose Watkins is a 46 y.o. male admitted 8/8/2022 with progressive weakness with associated nausea, vomiting, and diarrhea due to missed dialysis    Hospital Course  45 yo male with PMH of diabetes,HFrEF, HTN, and ESRD on HD who presented 8/8/2022 for missing dialysis for past 3 weeks due to travelling to [place] for leisure and did not know where to go to get dialysis. In addition, patient did not have a dialysis chair here in [place].  He states he receives dialysis in [place].  He has been experiencing progressive weakness and associated nausea, vomiting, and diarrhea.  He came to the ER and was found to have electrolyte abnormalities including hyperkalemia, elevated creatinine BUN, and low calcium.  Nephrology consulted and stated that patient will require multiple sessions of dialysis given numerous missed sessions. Currently following [facility] schedule for HD, with vitals/labs gradually improving.   8/22 MRSA growth from armpit or RIJ swab - started on Vancomycin 8/22 - 8/24 attempt to use left AVM for dialysis  8/24 BCX obtained - No growth 48 hrs. Switch to Doxycycline 8/24 -  8/26 Lasix 80mg PO due to fluid overload  8/27 Urinating well with Lasix, will continue. US AVG - normal flow, stenosis prox biceps, min mural thrombus graft wall at mid biceps w/no flow disturbance, outflow axillary vein widely patent. Nephrology consulting Vascular surgery for evaluation to bring up AVG.  8/28 Bilateral lower extremity pain and edema. Continue diuresis with Lasix 80mg IV. Strict I/O's.    Interval Problem Update  No acute events overnight had dialysis yesterday 8/27 with UF Net 3000mL. Patient reports increasing thigh pain/hardness/edema bilaterally post dialysis. No  "abdominal discomfort, abdomen has significantly reduced in size. Urine since 4am at 280ml. Will continue to diurese with Lasix 80mg IV. No chest pain, no SOB.     I have discussed this patient's plan of care and discharge plan at IDT rounds today with Case Management, Nursing, Nursing leadership, and other members of the IDT team.    Consultants/Specialty  nephrology    Code Status  Full Code    Disposition  Patient is not medically cleared for discharge.   Anticipate discharge to to home with close outpatient follow-up.  I have placed the appropriate orders for post-discharge needs.    Review of Systems  Constitutional:  Positive for malaise/fatigue. Negative for chills, diaphoresis and fever.  Right IJ stitching removed. Armpit and forehead boils and itchiness.  Respiratory:  Negative for cough and shortness of breath.    Cardiovascular:  Positive for leg swelling. Negative for chest pain and palpitations.   Gastrointestinal:  Positive for abdominal pain and constipation. Negative for diarrhea, nausea and vomiting.    Genitourinary:         Reports minimal urine production   Musculoskeletal:         Bilateral leg pain - right thigh pain with light touch    Neurological:  Positive for weakness. Negative for dizziness and headaches.      /72   Pulse 79   Temp 37.7 °C (99.9 °F) (Temporal)   Resp 19   Ht 1.753 m (5' 9\")   Wt 99.6 kg (219 lb 9.3 oz)   SpO2 100%   BMI 32.43 kg/m²     Physical Exam  Constitutional:       General: He is not in acute distress.     Appearance: He is ill-appearing. He is not toxic-appearing.   HENT:      Head: Normocephalic and atraumatic.      Right Ear: External ear normal.      Left Ear: External ear normal.      Nose: Nose normal.      Mouth/Throat:      Mouth: Mucous membranes are moist.   Eyes:      Extraocular Movements: Extraocular movements intact.      Conjunctiva/sclera: Conjunctivae normal.   Cardiovascular:      Rate and Rhythm: Normal rate and regular rhythm.      " Pulses: Normal pulses.      Heart sounds: Normal heart sounds. No murmur heard.     Comments: Right internal jugular tunneled catheter. Suture sites (sutures removed) skin less erythematous no more pus.  LUE AV fistula - surrounding area edema improved.   Pulmonary:      Effort: Pulmonary effort is normal. No respiratory distress.      Breath sounds: Normal breath sounds. No wheezing or rales.   Abdominal:      General: There is distension. Significant improvement in abdominal distension, no fluid wave. Clear bowel sounds.      Tenderness: No tenderness. There is no guarding or rebound.   Musculoskeletal:         General: Swelling present.      Cervical back: Normal range of motion.      Right lower leg: Edema present.      Left lower leg: Edema present.      Bilateral extremities with increased non pitting edema. Tenderness to bilateral thighs   Skin:     General: Skin is warm and dry.      Comments: Excoriations of upper extremities, lower extremities, scalp, chest/abdomen. Right underarm boils erythematous/tender. Forehead boil tender/erythematous with scab.  Neurological:      General: No focal deficit present.      Mental Status: He is alert and oriented to person, place, and time.   Psychiatric:         Thought Content: Thought content normal    Fluids    Intake/Output Summary (Last 24 hours) at 8/28/2022 1249  Last data filed at 8/28/2022 1100  Gross per 24 hour   Intake 1480 ml   Output 3500 ml   Net -2020 ml       Laboratory  Recent Labs     08/27/22  0840   WBC 7.7   RBC 2.54*   HEMOGLOBIN 7.3*   HEMATOCRIT 22.7*   MCV 89.4   MCH 28.7   MCHC 32.2*   RDW 54.4*   PLATELETCT 320   MPV 8.9*     Recent Labs     08/26/22  1505 08/27/22  0840 08/28/22  0942   SODIUM 133* 132* 130*   POTASSIUM 5.0 5.1 4.6   CHLORIDE 94* 93* 92*   CO2 25 25 24   GLUCOSE 180* 160* 142*   BUN 48* 55* 41*   CREATININE 6.75* 7.37* 5.81*   CALCIUM 8.5 8.4* 8.3*                   Imaging  US-HEMODIALYSIS GRAFT DUPLEX COMP UPPER EXTREMITY    Final Result      CT-ABDOMEN-PELVIS W/O   Final Result      1.  Micronodular appearance of the liver consistent with hepatocellular disease.      2.  Large amount of ascites throughout the abdomen and pelvis.      3.  Adynamic ileus without evidence of bowel obstruction.      4.  Anasarca.      5.  Bilateral spondylolysis of the pars interarticularis at the L5-S1 level.      US-EXTREMITY VENOUS LOWER BILAT   Final Result      US-ABDOMEN COMPLETE SURVEY   Final Result      1.  Mildly increased hepatic echotexture is suggestive of fatty infiltration.      2.  Small-to-moderate amount of ascites.      3.  Cholelithiasis         DX-ABDOMEN COMPLETE WITH AP OR PA CXR   Final Result      Fairly diffuse bowel distention with scattered air-fluid levels. Findings are likely related to ileus. More distal obstruction cannot be excluded.      DX-CHEST-PORTABLE (1 VIEW)   Final Result      1.  Mildly enlarged cardiac silhouette with vascular congestion.           Assessment/Plan  Problem Representation:    * Noncompliance of patient with renal dialysis (HCC)- (present on admission)  Assessment & Plan  Has not had dialysis in the past 3 weeks, due to traveling to [place].  Obtained HD in [place], however does not have an outpatient dialysis chair in [place] and last year in [place]. Dialysis chair available 8/23.    -Signs/symptoms and labs/vitals slowly normalizing  -Nephrology following, on TThS dialysis and as needed  -Completed dialysis 8/16, 8/18-stop early due to pt having bm, 8/20, 8/21, 8/23, 8/25  -Suspician for RIJ infection -follow bcx and RIJ cx, Vancomycin started 8/22 - 8/24 switch to Doxycycline 8/24 - given negative BCX  -Lasix 80mg IV with strict I/O's   -CTM fluid status    Multiple air fluid levels of small intestine determined by X-ray  Assessment & Plan  Patients states he has been having watery stools multiple times a day for many days. He is unable to recount when his last solid stool was. Abdomen  has become more distended and pain has increased. Abdominal xray with bowel distention with air-fluid levels. Pt has been taking oxycodone for leg pain since 8/8.     -Will continue to monitor with serial abdominal exam   -CT abdo 8/22 with marked ascites, continue with dialysis         Lower extremity pain, bilateral  Assessment & Plan  Anterior leg pain noted on admission and patient given oxycodone for pain. Most likely related to edema due to missed dialysis sessions. CPK and lactic acid wnl. Ambulatory.   -Oxycodone for pain control  -Bilateral DVT US negative    End stage renal disease (HCC)- (present on admission)  Assessment & Plan  Patient received HD in [place], has not received HD in 3 weeks prior to admission.  Patient also had multiple thrombectomies/revisions for AV fistula.  Patient has tunneled dialysis catheter in place and functioning. Patient not adherent to HD or outpatient medications.  Nephrology following. Per pharmacy ([facility]) pt has not picked up any medications. Prescribed medications were 01/2022 - Trazodone 50mg QHS, Calcitriol 0.25mg OD, Furosemide 80mg OD, Ca-acetate 667-270 TID, Lisinopril 20mg OD. 12/31/2021 amplodipine 5mg, glipizide ER 5mg, carvedilol 25 BID    -Currently on cholecalciferol   -Per nephrology note, AVG use per Vasc Surg recs; currently using RIJ TDC, last note 3/1 not yet cleared for use. Due to MRSA growth from skin culture, next dialysis will attempt to use AVG.   -Dialysis schedule TThSat and as needed      Hypertension- (present on admission)  Assessment & Plan  History of uncontrolled hypertension. Likely due to missing dialysis and nonadherence to outpatient BP medications    -Hydralazine as needed due to history of CHF  -BP well controlled with Carvedilol 25mg twice daily       VTE prophylaxis: heparin ppx    I have performed a physical exam and reviewed and updated ROS and Plan today (8/28/2022). In review of yesterday's note  (8/27/2022), there are no changes except as documented above.

## 2022-08-28 NOTE — PROGRESS NOTES
[facility] Services Progress Note      HD today x 3 hours per Dr. Hamilton .   Initiated at 1608 and ended at 1908.       UF Net: 3000 mL      R chest catheter intact and patent with good flow during dialysis. No s/sx of infection, no redness nor bleeding noted on the CVC site. CVC dressing clean, dry and intact.   Blood returned and CVC port flushed with NS and Heparin 1000 units/mL used  to lock catheter given per designated amount. CVC port clamped, capped and labeled accordingly.     Dressing changed per protocol.        Report given to Primary BRENDA Jones RN.

## 2022-08-28 NOTE — PROGRESS NOTES
Received report from Kay/dialysis RN. Patient returned to unit from dialysis approximately 1930. Patient is A&O x 4, on room air. Medicated for pain per MAR. Needs attended to.

## 2022-08-28 NOTE — PROGRESS NOTES
[facility] -  PROGRESS NOTE               Author: Denilson Hamilton M.D. Date & Time: 8/28/2022  11:25 AM     HPI:  47 yo M with End Stage Renal Disease on hemodialysis at [facility] every Tues/Thurs/Sat, last HD at [place] in 4/2022, then traveled and lost HD chair. Other PMH diabetes mellitus and Hypertension who presented to the emergency department on 8/8/22 for weakness. He missed 1 months of HD due to traveling. He was found to be hypertensive 153/99, afebrile. Labs with K of 6.1, BUN 93, bicarb 10.   No F/C/N/V/CP/SOB.  No melena, hematochezia, hematemesis.  No HA, visual changes, or abdominal pain.    DAILY NEPHROLOGY SUMMARY:  8/09: 3L net UF. Resting in bed. No complaints. No acute distress. Security called last night, patient aggressive towards staff regarding personal items. Bps improved this am.   8/10: Seen on HD this am. Lethargic. No complaints.   8/11:  Resting comfortably no complaints.  VSS.  8/12: Ambulating in room indep. Denies CP/SOB, on RA. Reports he is hungry and wants breakfast before anything else. Bps elevated.   8/13: VSS RA no complaints.  Ambulating.   8/14: Sleeping in bed. Dismissive. Does not want extra HD today. BP elevated.   8/15: Risks of no dialysis explained to patient, refused treatment today.  Complaining of leg pain and swelling.  8/16: Seen on dialysis at bedside.  Complaining of thigh pain.   8/17: Denies pain or SOB.  Resting.  8/18: Pt sitting up in chair. For HD today. Denies CP,SOB,N/V/D. No overnight events. No  labs for review. VSS. No complaints.   8/19: HD yest, UF 2 L. Pt came off HD treatment 50' early as he had an accident during treatment.Pt C/O itching. Still with LLE.   8/20: Pt seen on HD, minimally engages in conversation, VSS on RA  8/21: Pt sitting up EOB, says his legs and forehead hurt, iHD yesterday with 3L net UF, declines need for additional UF today, lab reviewed, VSS on RA  8/22: VSS RA.  No new labs.  Does complaint  "of leg pain bilat, worsening edema.  No SOB. Declines additional dialysis tx.  Swab from line site +MRSA.  Blood cultures pending collection.  Afebrile.    8/23:Pt refused HD this am \"maybe later\". He is agreeable to do dialysis now.  8/24: 1348 mL Net UF. Resting in bed. C/o fatigue. Reports tenderness at TDC site. Denies fever or chills, Denies CP/SOB. No new labs this am.   8/25: declined iHD yesterday, planning for today.  No new labs. AVG US still pending.  VSS on RA.  Agreeable to HD today.  Would prefer not to use AVG due to pain.   8/26: sitting up in bed, continues with mild pain in AVF but improving, tolerated iHD yesterday with UF: 3.5L  8/27: NAEO, upset that they attempted his AVG couple days ago and 'failed'  8/28: NAEO, no complaints, tolerated HD well    REVIEW OF SYSTEMS:    10 point ROS reviewed and is as per HPI/daily summary or otherwise negative    PMH/PSH/SH/FH: Reviewed and unchanged since admission note  CURRENT MEDICATIONS: Reviewed from admission to present day    VS:  /72   Pulse 79   Temp 37.7 °C (99.9 °F) (Temporal)   Resp 19   Ht 1.753 m (5' 9\")   Wt 99.6 kg (219 lb 9.3 oz)   SpO2 100%   BMI 32.43 kg/m²   Physical Exam  Nursing note reviewed.   Constitutional:       Appearance: Normal appearance.   Eyes:      General: No scleral icterus.  Cardiovascular:      Comments: BLE edema  Pulmonary:      Effort: Pulmonary effort is normal.   Abdominal:      General: There is no distension.   Musculoskeletal:         General: No deformity.      Comments: LAVG (+thrill/bruit)   Skin:     Findings: Erythema (BLE and forehead) present.   Neurological:      Mental Status: He is alert.   Psychiatric:         Behavior: Behavior normal.     Fluids:  In: 1458 [P.O.:958; Dialysis:500]  Out: 3500     LABS:  Recent Labs     08/26/22  1505 08/27/22  0840 08/28/22  0942   SODIUM 133* 132* 130*   POTASSIUM 5.0 5.1 4.6   CHLORIDE 94* 93* 92*   CO2 25 25 24   GLUCOSE 180* 160* 142*   BUN 48* 55* 41* "   CREATININE 6.75* 7.37* 5.81*   CALCIUM 8.5 8.4* 8.3*       IMPRESSION:  # ESRD  - Etiology likely 2/2 DM/HTN  - HD qTTHS via [facility]  - Lost his HD chair at [facility] -> plan for [facility]  # Thrombosed Left AVF  - US 2/23 no flow  - s/p OR 2/25 AVG Brachio-axillary Creation, thrombectomy and fistulogram   - AVG US pending  - AVG is deep when RN's attempted and even though they were able  to cannulate he moves his arm and causes infiltration due to low pain threshold  # HTN, fair control   - Goal BP < 140/90  # Anemia of CKD, below target  - Goal Hgb 10-11   - % sat 32  - Ferritin 879  - JACKIE with HD   # CKD-MBD  - Ca 8.1 with low albumin  - PO4 6.2  -   - On cholecalciferol and calcium acetate   # Hyperkalemia, corrected  - Due to missed HD  - Manage with HD and low K+ diet  - On Veltessa fir now as missed iHD  # Weakness  # h/o Methamphetamine use   # Homelessness  # DM   - A1C 6.5  # Severe metabolic acidosis, corrected   # Wound Cx (+) MRSA  - ABX per primary services  - On Vanco  - Blood cultures 8/22 negative so far     PLAN:  - TTS iHD  - Challenge UF as able  - Use PermCath for now  - Dr. Bergeron from Vascular to evaluate his AVG and positioning etc to see if any additional procedures needed  - Transfuse PRN Hgb <7.0   - JACKIE with HD to goal Hgb 10-11  - No dietary protein restrictions  - Dose all meds per ESRD/iHD  - Renal diet  - [facility] for OP HD

## 2022-08-29 LAB
ALBUMIN SERPL BCP-MCNC: 2.7 G/DL (ref 3.2–4.9)
ALBUMIN/GLOB SERPL: 0.7 G/DL
ALP SERPL-CCNC: 153 U/L (ref 30–99)
ALT SERPL-CCNC: 6 U/L (ref 2–50)
ANION GAP SERPL CALC-SCNC: 13 MMOL/L (ref 7–16)
AST SERPL-CCNC: 9 U/L (ref 12–45)
BILIRUB SERPL-MCNC: 0.4 MG/DL (ref 0.1–1.5)
BUN SERPL-MCNC: 47 MG/DL (ref 8–22)
CALCIUM SERPL-MCNC: 8.3 MG/DL (ref 8.5–10.5)
CHLORIDE SERPL-SCNC: 93 MMOL/L (ref 96–112)
CO2 SERPL-SCNC: 26 MMOL/L (ref 20–33)
CREAT SERPL-MCNC: 6.59 MG/DL (ref 0.5–1.4)
GFR SERPLBLD CREATININE-BSD FMLA CKD-EPI: 10 ML/MIN/1.73 M 2
GLOBULIN SER CALC-MCNC: 4.1 G/DL (ref 1.9–3.5)
GLUCOSE SERPL-MCNC: 154 MG/DL (ref 65–99)
POTASSIUM SERPL-SCNC: 4.8 MMOL/L (ref 3.6–5.5)
PROT SERPL-MCNC: 6.8 G/DL (ref 6–8.2)
SODIUM SERPL-SCNC: 132 MMOL/L (ref 135–145)

## 2022-08-29 PROCEDURE — A9270 NON-COVERED ITEM OR SERVICE: HCPCS

## 2022-08-29 PROCEDURE — 700102 HCHG RX REV CODE 250 W/ 637 OVERRIDE(OP): Performed by: HOSPITALIST

## 2022-08-29 PROCEDURE — 700111 HCHG RX REV CODE 636 W/ 250 OVERRIDE (IP): Performed by: HOSPITALIST

## 2022-08-29 PROCEDURE — 700101 HCHG RX REV CODE 250: Performed by: STUDENT IN AN ORGANIZED HEALTH CARE EDUCATION/TRAINING PROGRAM

## 2022-08-29 PROCEDURE — A9270 NON-COVERED ITEM OR SERVICE: HCPCS | Performed by: NURSE PRACTITIONER

## 2022-08-29 PROCEDURE — 99232 SBSQ HOSP IP/OBS MODERATE 35: CPT | Mod: GC | Performed by: HOSPITALIST

## 2022-08-29 PROCEDURE — 700111 HCHG RX REV CODE 636 W/ 250 OVERRIDE (IP)

## 2022-08-29 PROCEDURE — 700102 HCHG RX REV CODE 250 W/ 637 OVERRIDE(OP): Performed by: NURSE PRACTITIONER

## 2022-08-29 PROCEDURE — A9270 NON-COVERED ITEM OR SERVICE: HCPCS | Performed by: INTERNAL MEDICINE

## 2022-08-29 PROCEDURE — 700102 HCHG RX REV CODE 250 W/ 637 OVERRIDE(OP): Performed by: INTERNAL MEDICINE

## 2022-08-29 PROCEDURE — 700102 HCHG RX REV CODE 250 W/ 637 OVERRIDE(OP)

## 2022-08-29 PROCEDURE — 700102 HCHG RX REV CODE 250 W/ 637 OVERRIDE(OP): Performed by: STUDENT IN AN ORGANIZED HEALTH CARE EDUCATION/TRAINING PROGRAM

## 2022-08-29 PROCEDURE — A9270 NON-COVERED ITEM OR SERVICE: HCPCS | Performed by: STUDENT IN AN ORGANIZED HEALTH CARE EDUCATION/TRAINING PROGRAM

## 2022-08-29 PROCEDURE — 36415 COLL VENOUS BLD VENIPUNCTURE: CPT

## 2022-08-29 PROCEDURE — A9270 NON-COVERED ITEM OR SERVICE: HCPCS | Performed by: HOSPITALIST

## 2022-08-29 PROCEDURE — 80053 COMPREHEN METABOLIC PANEL: CPT

## 2022-08-29 PROCEDURE — 770001 HCHG ROOM/CARE - MED/SURG/GYN PRIV*

## 2022-08-29 RX ORDER — METOLAZONE 2.5 MG/1
2.5 TABLET ORAL
Status: DISCONTINUED | OUTPATIENT
Start: 2022-08-29 | End: 2022-09-04 | Stop reason: HOSPADM

## 2022-08-29 RX ORDER — FUROSEMIDE 10 MG/ML
80 INJECTION INTRAMUSCULAR; INTRAVENOUS
Status: DISCONTINUED | OUTPATIENT
Start: 2022-08-29 | End: 2022-09-04 | Stop reason: HOSPADM

## 2022-08-29 RX ADMIN — DOXYCYCLINE 100 MG: 100 TABLET, FILM COATED ORAL at 20:18

## 2022-08-29 RX ADMIN — Medication 1334 MG: at 16:21

## 2022-08-29 RX ADMIN — Medication 1334 MG: at 14:23

## 2022-08-29 RX ADMIN — Medication 1000 UNITS: at 09:55

## 2022-08-29 RX ADMIN — CARVEDILOL 25 MG: 25 TABLET, FILM COATED ORAL at 07:29

## 2022-08-29 RX ADMIN — LIDOCAINE 1 PATCH: 50 PATCH CUTANEOUS at 14:23

## 2022-08-29 RX ADMIN — DIPHENHYDRAMINE HYDROCHLORIDE 25 MG: 25 TABLET ORAL at 09:55

## 2022-08-29 RX ADMIN — FUROSEMIDE 80 MG: 10 INJECTION, SOLUTION INTRAMUSCULAR; INTRAVENOUS at 07:28

## 2022-08-29 RX ADMIN — TRIAMCINOLONE ACETONIDE: 1 OINTMENT TOPICAL at 11:27

## 2022-08-29 RX ADMIN — METOLAZONE 2.5 MG: 2.5 TABLET ORAL at 16:21

## 2022-08-29 RX ADMIN — DOXYCYCLINE 100 MG: 100 TABLET, FILM COATED ORAL at 09:55

## 2022-08-29 RX ADMIN — OXYCODONE 5 MG: 5 TABLET ORAL at 20:15

## 2022-08-29 RX ADMIN — CARVEDILOL 25 MG: 25 TABLET, FILM COATED ORAL at 16:30

## 2022-08-29 RX ADMIN — OXYCODONE 5 MG: 5 TABLET ORAL at 07:29

## 2022-08-29 RX ADMIN — FUROSEMIDE 80 MG: 10 INJECTION, SOLUTION INTRAMUSCULAR; INTRAVENOUS at 16:21

## 2022-08-29 RX ADMIN — HEPARIN SODIUM 5000 UNITS: 5000 INJECTION, SOLUTION INTRAVENOUS; SUBCUTANEOUS at 09:56

## 2022-08-29 RX ADMIN — HEPARIN SODIUM 5000 UNITS: 5000 INJECTION, SOLUTION INTRAVENOUS; SUBCUTANEOUS at 16:21

## 2022-08-29 RX ADMIN — DIPHENHYDRAMINE HYDROCHLORIDE 25 MG: 25 TABLET ORAL at 16:30

## 2022-08-29 RX ADMIN — PATIROMER 8.4 G: 8.4 POWDER, FOR SUSPENSION ORAL at 11:27

## 2022-08-29 NOTE — PROGRESS NOTES
[facility] -  PROGRESS NOTE               Author: Denilson Hamilton M.D. Date & Time: 8/29/2022  10:55 AM     HPI:  47 yo M with End Stage Renal Disease on hemodialysis at [facility] every Tues/Thurs/Sat, last HD at [facility] in 4/2022, then traveled and lost HD chair. Other PMH diabetes mellitus and Hypertension who presented to the emergency department on 8/8/22 for weakness. He missed 1 months of HD due to traveling. He was found to be hypertensive 153/99, afebrile. Labs with K of 6.1, BUN 93, bicarb 10.   No F/C/N/V/CP/SOB.  No melena, hematochezia, hematemesis.  No HA, visual changes, or abdominal pain.    DAILY NEPHROLOGY SUMMARY:  8/09: 3L net UF. Resting in bed. No complaints. No acute distress. Security called last night, patient aggressive towards staff regarding personal items. Bps improved this am.   8/10: Seen on HD this am. Lethargic. No complaints.   8/11:  Resting comfortably no complaints.  VSS.  8/12: Ambulating in room indep. Denies CP/SOB, on RA. Reports he is hungry and wants breakfast before anything else. Bps elevated.   8/13: VSS RA no complaints.  Ambulating.   8/14: Sleeping in bed. Dismissive. Does not want extra HD today. BP elevated.   8/15: Risks of no dialysis explained to patient, refused treatment today.  Complaining of leg pain and swelling.  8/16: Seen on dialysis at bedside.  Complaining of thigh pain.   8/17: Denies pain or SOB.  Resting.  8/18: Pt sitting up in chair. For HD today. Denies CP,SOB,N/V/D. No overnight events. No  labs for review. VSS. No complaints.   8/19: HD yest, UF 2 L. Pt came off HD treatment 50' early as he had an accident during treatment.Pt C/O itching. Still with LLE.   8/20: Pt seen on HD, minimally engages in conversation, VSS on RA  8/21: Pt sitting up EOB, says his legs and forehead hurt, iHD yesterday with 3L net UF, declines need for additional UF today, lab reviewed, VSS on RA  8/22: VSS RA.  No new labs.  Does complaint  "of leg pain bilat, worsening edema.  No SOB. Declines additional dialysis tx.  Swab from line site +MRSA.  Blood cultures pending collection.  Afebrile.    8/23:Pt refused HD this am \"maybe later\". He is agreeable to do dialysis now.  8/24: 1348 mL Net UF. Resting in bed. C/o fatigue. Reports tenderness at TDC site. Denies fever or chills, Denies CP/SOB. No new labs this am.   8/25: declined iHD yesterday, planning for today.  No new labs. AVG US still pending.  VSS on RA.  Agreeable to HD today.  Would prefer not to use AVG due to pain.   8/26: sitting up in bed, continues with mild pain in AVF but improving, tolerated iHD yesterday with UF: 3.5L  8/27: EVINEJEAN MARIE, upset that they attempted his AVG couple days ago and 'failed'  8/28: NAEO, no complaints, tolerated HD well  8/29: EVINEO, complains of itching today all over, no other issues    REVIEW OF SYSTEMS:    10 point ROS reviewed and is as per HPI/daily summary or otherwise negative    PMH/PSH/SH/FH: Reviewed and unchanged since admission note  CURRENT MEDICATIONS: Reviewed from admission to present day    VS:  /72   Pulse 74   Temp 36.7 °C (98 °F) (Temporal)   Resp 16   Ht 1.753 m (5' 9\")   Wt 99.6 kg (219 lb 9.3 oz)   SpO2 94%   BMI 32.43 kg/m²   Physical Exam  Nursing note reviewed.   Constitutional:       Appearance: Normal appearance.   Eyes:      General: No scleral icterus.  Cardiovascular:      Comments: BLE edema  Pulmonary:      Effort: Pulmonary effort is normal.   Abdominal:      General: There is no distension.   Musculoskeletal:         General: No deformity.      Comments: LAVG (+thrill/bruit)   Skin:     Findings: Erythema (BLE and forehead) present.   Neurological:      Mental Status: He is alert.   Psychiatric:         Behavior: Behavior normal.     Fluids:  In: 698 [P.O.:698]  Out: -     LABS:  Recent Labs     08/27/22  0840 08/28/22  0942 08/29/22  0806   SODIUM 132* 130* 132*   POTASSIUM 5.1 4.6 4.8   CHLORIDE 93* 92* 93*   CO2 25 24 " 26   GLUCOSE 160* 142* 154*   BUN 55* 41* 47*   CREATININE 7.37* 5.81* 6.59*   CALCIUM 8.4* 8.3* 8.3*       IMPRESSION:  # ESRD  - Etiology likely 2/2 DM/HTN  - HD qTTHS via [facility]  - Lost his HD chair at [facility] -> plan for [facility]  # Thrombosed Left AVF  - US 2/23 no flow  - s/p OR 2/25 AVG Brachio-axillary Creation, thrombectomy and fistulogram   - AVG US pending  - AVG is deep when RN's attempted and even though they were able  to cannulate he moves his arm and causes infiltration due to low pain threshold  # HTN, fair control   - Goal BP < 140/90  # Anemia of CKD, below target  - Goal Hgb 10-11   - % sat 32  - Ferritin 879  - JACKIE with HD   # CKD-MBD  - Ca 8.1 with low albumin  - PO4 6.2  -   - On cholecalciferol and calcium acetate   # Hyperkalemia, corrected  - Due to missed HD  - Manage with HD and low K+ diet  - On Veltessa fir now as missed iHD  # Weakness  # h/o Methamphetamine use   # Homelessness  # DM   - A1C 6.5  # Severe metabolic acidosis, corrected   # Wound Cx (+) MRSA  - ABX per primary services  - On Vanco  - Blood cultures 8/22 negative so far     PLAN:  - TTS iHD  - Challenge UF as able  - Use PermCath for now  - Dr. Bergeron from Vascular to evaluate his AVG and positioning etc to see if any additional procedures needed  - Transfuse PRN Hgb <7.0   - JACKIE with HD to goal Hgb 10-11  - No dietary protein restrictions  - Dose all meds per ESRD/iHD  - Renal diet  - DVMATHEW [facility] for OP HD  - Hydroxyzine for itching if it persists

## 2022-08-29 NOTE — PROGRESS NOTES
Received report and assumed care at shift change. A&O x 4, irritable , patient non compliant with cares and medications. Medicated for pain per MAR. Needs attended to.

## 2022-08-29 NOTE — CARE PLAN
The patient is Stable - Low risk of patient condition declining or worsening    Shift Goals  Clinical Goals: Pain management  Patient Goals: pain management  Family Goals: POLINA    Progress made toward(s) clinical / shift goals:  Patient has remained free from falls. Utilizing PRN pain medications and lidocaine patch to bilateral inner thighs.    Problem: Knowledge Deficit - Standard  Goal: Patient and family/care givers will demonstrate understanding of plan of care, disease process/condition, diagnostic tests and medications  Outcome: Progressing     Problem: Pain - Standard  Goal: Alleviation of pain or a reduction in pain to the patient’s comfort goal  Outcome: Progressing     Problem: Fall Risk  Goal: Patient will remain free from falls  Outcome: Progressing     Patient is not progressing towards the following goals:

## 2022-08-29 NOTE — CARE PLAN
Problem: Pain - Standard  Goal: Alleviation of pain or a reduction in pain to the patient’s comfort goal  Outcome: Progressing     The patient is Stable - Low risk of patient condition declining or worsening    Shift Goals  Clinical Goals: pain management  Patient Goals: pain management  Family Goals: POLINA    Progress made toward(s) clinical / shift goals:  medicated for pain per MAR    Patient is not progressing towards the following goals:

## 2022-08-29 NOTE — PROGRESS NOTES
Patient is alert and oriented and can get agitated easily at times. Up independently/SBA. Having pain to inner right thigh with lidocaine patch in place. Received oral and IV lasix today to help diurese pt. Call light within reach.

## 2022-08-29 NOTE — DISCHARGE PLANNING
Case Management Discharge Planning    Admission Date: 8/8/2022  GMLOS: 3.1  ALOS: 21    6-Clicks ADL Score: 23  6-Clicks Mobility Score: 23      Anticipated Discharge Dispo: Discharge Disposition: Discharged to home/self care (01)    DME Needed: No    Action(s) Taken: Updated Provider/Nurse on Discharge Plan    Escalations Completed: None    Medically Clear: No    Next Steps: Patient discussed with MD, not medically cleared for DC at this date. IV diuresis continued. CM spoke with dialysis coordinator Zitlaly Reyes to give update to OP dialysis center.    Barriers to Discharge: Medical clearance    Is the patient up for discharge tomorrow: No

## 2022-08-29 NOTE — PROGRESS NOTES
[facility] Daily Progress Note    Date of Service  8/29/2022    [facility] Team: R IM Purple Team   Attending: Christophe Nuñez M.d.  Senior Resident: Dr. Clement  Intern:  Dr. Adorno  Contact Number: [phone]    Chief Complaint  Ambrose Watkins is a 46 y.o. male admitted 8/8/2022 with progressive weakness with associated nausea, vomiting, and diarrhea due to missed dialysis    Hospital Course  45 yo male with PMH of diabetes,HFrEF, HTN, and ESRD on HD who presented 8/8/2022 for missing dialysis for past 3 weeks due to travelling to [place] for leisure and did not know where to go to get dialysis. In addition, patient did not have a dialysis chair here in [place].  He states he receives dialysis in [place].  He has been experiencing progressive weakness and associated nausea, vomiting, and diarrhea.  He came to the ER and was found to have electrolyte abnormalities including hyperkalemia, elevated creatinine BUN, and low calcium.  Nephrology consulted and stated that patient will require multiple sessions of dialysis given numerous missed sessions. Currently following [facility] schedule for HD, with vitals/labs gradually improving.   8/22 MRSA growth from armpit or RIJ swab - started on Vancomycin 8/22 - 8/24 attempt to use left AVM for dialysis  8/24 BCX obtained - No growth 48 hrs. Switch to Doxycycline 8/24 -  8/26 Lasix 80mg PO due to fluid overload  8/27 Urinating well with Lasix, will continue. US AVG - normal flow, stenosis prox biceps, min mural thrombus graft wall at mid biceps w/no flow disturbance, outflow axillary vein widely patent. Nephrology consulting Vascular surgery for evaluation to bring up AVG.  8/28 Bilateral lower extremity pain and edema. Continue diuresis with Lasix 80mg IV. Strict I/O's.  8/29 Bilateral lower extremity pain and edema. Continue diuresis with Lasix and Metolazone     Interval Problem Update  No acute events overnight. Patient has been urinating into plastic  "urinal but has been dumping it out. This am 200ml in urinal, states he has urinated x3 approx same volume. Estimated urine output 600ml. Abdomen significantly reduced. Bilateral lower extremities still edematous with improvement, decreasing and less sensitivity on palpation. Pt still itching and willing to try Benadryl. No CP, no shortness of breath.     I have discussed this patient's plan of care and discharge plan at IDT rounds today with Case Management, Nursing, Nursing leadership, and other members of the IDT team.    Consultants/Specialty  nephrology    Code Status  Full Code    Disposition  Patient is not medically cleared for discharge.   Anticipate discharge to to home with close outpatient follow-up.  I have placed the appropriate orders for post-discharge needs.    Review of Systems  Constitutional:  Positive for malaise/fatigue. Negative for chills, diaphoresis and fever.  Right IJ stitching removed. Armpit and forehead boils and itchiness.  Respiratory:  Negative for cough and shortness of breath.    Cardiovascular:  Positive for leg swelling. Negative for chest pain and palpitations.   Gastrointestinal:  Positive for abdominal pain and constipation. Negative for diarrhea, nausea and vomiting.    Genitourinary:         Reports minimal urine production   Musculoskeletal:         Bilateral leg pain - right thigh pain with light touch    Neurological:  Positive for weakness. Negative for dizziness and headaches.      /72   Pulse 74   Temp 36.7 °C (98 °F) (Temporal)   Resp 16   Ht 1.753 m (5' 9\")   Wt 99.6 kg (219 lb 9.3 oz)   SpO2 94%   BMI 32.43 kg/m²     Physical Exam  Constitutional:       General: He is not in acute distress.     Appearance: He is ill-appearing. He is not toxic-appearing.   HENT:      Head: Normocephalic and atraumatic.      Right Ear: External ear normal.      Left Ear: External ear normal.      Nose: Nose normal.      Mouth/Throat:      Mouth: Mucous membranes are " moist.   Eyes:      Extraocular Movements: Extraocular movements intact.      Conjunctiva/sclera: Conjunctivae normal.   Cardiovascular:      Rate and Rhythm: Normal rate and regular rhythm.      Pulses: Normal pulses.      Heart sounds: Normal heart sounds. No murmur heard.     Comments: Right internal jugular tunneled catheter. Suture sites (sutures removed) skin less erythematous no more pus.  LUE AV fistula - surrounding area edema improved.   Pulmonary:      Effort: Pulmonary effort is normal. No respiratory distress.      Breath sounds: Normal breath sounds. No wheezing or rales.   Abdominal:      General: There is distension. Significant improvement in abdominal distension, no fluid wave. Clear bowel sounds.      Tenderness: No tenderness. There is no guarding or rebound.   Musculoskeletal:         General: Swelling present.      Cervical back: Normal range of motion.      Right lower leg: Edema present.      Left lower leg: Edema present.      Bilateral extremities with increased non pitting edema. Tenderness to bilateral thighs   Skin:     General: Skin is warm and dry.      Comments: Excoriations of upper extremities, lower extremities, scalp, chest/abdomen. Right underarm boils erythematous/tender. Forehead boil tender/erythematous with scab.  Neurological:      General: No focal deficit present.      Mental Status: He is alert and oriented to person, place, and time.   Psychiatric:         Thought Content: Thought content normal    Fluids    Intake/Output Summary (Last 24 hours) at 8/29/2022 1305  Last data filed at 8/29/2022 1136  Gross per 24 hour   Intake 740 ml   Output 175 ml   Net 565 ml       Laboratory  Recent Labs     08/27/22  0840   WBC 7.7   RBC 2.54*   HEMOGLOBIN 7.3*   HEMATOCRIT 22.7*   MCV 89.4   MCH 28.7   MCHC 32.2*   RDW 54.4*   PLATELETCT 320   MPV 8.9*     Recent Labs     08/27/22  0840 08/28/22  0942 08/29/22  0806   SODIUM 132* 130* 132*   POTASSIUM 5.1 4.6 4.8   CHLORIDE 93* 92*  93*   CO2 25 24 26   GLUCOSE 160* 142* 154*   BUN 55* 41* 47*   CREATININE 7.37* 5.81* 6.59*   CALCIUM 8.4* 8.3* 8.3*                   Imaging  US-HEMODIALYSIS GRAFT DUPLEX COMP UPPER EXTREMITY   Final Result      CT-ABDOMEN-PELVIS W/O   Final Result      1.  Micronodular appearance of the liver consistent with hepatocellular disease.      2.  Large amount of ascites throughout the abdomen and pelvis.      3.  Adynamic ileus without evidence of bowel obstruction.      4.  Anasarca.      5.  Bilateral spondylolysis of the pars interarticularis at the L5-S1 level.      US-EXTREMITY VENOUS LOWER BILAT   Final Result      US-ABDOMEN COMPLETE SURVEY   Final Result      1.  Mildly increased hepatic echotexture is suggestive of fatty infiltration.      2.  Small-to-moderate amount of ascites.      3.  Cholelithiasis         DX-ABDOMEN COMPLETE WITH AP OR PA CXR   Final Result      Fairly diffuse bowel distention with scattered air-fluid levels. Findings are likely related to ileus. More distal obstruction cannot be excluded.      DX-CHEST-PORTABLE (1 VIEW)   Final Result      1.  Mildly enlarged cardiac silhouette with vascular congestion.           Assessment/Plan  Problem Representation:    * Noncompliance of patient with renal dialysis (HCC)- (present on admission)  Assessment & Plan  Has not had dialysis in the past 3 weeks, due to traveling to [place]. Obtained HD in [place], however does not have an outpatient dialysis chair in [place] and last year in [place].     -Signs/symptoms and labs/vitals slowly normalizing  -Nephrology following, on TThS dialysis and as needed  -Suspician for RIJ infection - Vancomycin started 8/22 - 8/24 switch to Doxycycline 8/24 - given negative BCX  -Lasix 80mg IV BID, Metolazone 2.5 mg BID 30 minutes before Lasix with strict I/O's   -CTM fluid status    Multiple air fluid levels of small intestine determined by X-ray  Assessment & Plan  Patients states he has been having watery  stools multiple times a day for many days. He is unable to recount when his last solid stool was. Abdomen has become more distended and pain has increased. Abdominal xray with bowel distention with air-fluid levels. Pt has been taking oxycodone for leg pain since 8/8.     -Will continue to monitor with serial abdominal exam  -CT abdo 8/22 with marked ascites, continue with dialysis   -Significant improvement in abdominal distention 8/28       Lower extremity pain, bilateral  Assessment & Plan  Anterior leg pain noted on admission and patient given oxycodone for pain. Most likely related to edema due to missed dialysis sessions. CPK and lactic acid wnl. Ambulatory.   -Oxycodone for pain control  -Bilateral DVT US negative  -Continue with diuresis for edema     End stage renal disease (HCC)- (present on admission)  Assessment & Plan  Patient received HD in [place], has not received HD in 3 weeks prior to admission.  Patient also had multiple thrombectomies/revisions for AV fistula.  Patient has tunneled dialysis catheter in place and functioning. Patient not adherent to HD or outpatient medications.  Nephrology following. Per pharmacy ([facility]) pt has not picked up any medications. Prescribed medications were 01/2022 - Trazodone 50mg QHS, Calcitriol 0.25mg OD, Furosemide 80mg OD, Ca-acetate 667-270 TID, Lisinopril 20mg OD. 12/31/2021 amplodipine 5mg, glipizide ER 5mg, carvedilol 25 BID    -Currently on cholecalciferol   -Per nephrology note, AVG use per Vasc Surg recs; currently using [facility] TDC, last note 3/1 not yet cleared for use. Due to MRSA growth from skin culture, next dialysis will attempt to use AVG.   -Dialysis schedule TThSat and as needed      Hypertension- (present on admission)  Assessment & Plan  History of uncontrolled hypertension. Likely due to missing dialysis and nonadherence to outpatient BP medications    -Hydralazine as needed due to history of CHF  -BP well controlled  with Carvedilol 25mg twice daily       VTE prophylaxis: heparin ppx    I have performed a physical exam and reviewed and updated ROS and Plan today (8/29/2022). In review of yesterday's note (8/28/2022), there are no changes except as documented above.

## 2022-08-29 NOTE — DISCHARGE PLANNING
Hospital Care Management- Medical Social Work      LMSW met with pt at bedside.Pt reports that he has been living out of his Jeep for a long time, and is looking for housing resources. Pt has no PCP and has been out of medications for several weeks. He reports someone in [place], [place] had prescribed last medications, but pt plans to remain in [place], [place]. Pt does not have a cell phone but reports that he can be emailed (email in demographics). Pt is on disability and received about $900 monthly. Pt appears to be transient and non-compliant with healthcare.      left list of affordable housing resources, phone number for aging and disability services, and [facility] phone number.     Please assist with new PCP scheduling upon discharge for hospital follow up once discharge date is known.

## 2022-08-30 LAB
ALBUMIN SERPL BCP-MCNC: 2.7 G/DL (ref 3.2–4.9)
ALBUMIN/GLOB SERPL: 0.7 G/DL
ALP SERPL-CCNC: 146 U/L (ref 30–99)
ALT SERPL-CCNC: 6 U/L (ref 2–50)
ANION GAP SERPL CALC-SCNC: 14 MMOL/L (ref 7–16)
AST SERPL-CCNC: 8 U/L (ref 12–45)
BASOPHILS # BLD AUTO: 0.4 % (ref 0–1.8)
BASOPHILS # BLD: 0.03 K/UL (ref 0–0.12)
BILIRUB SERPL-MCNC: 0.4 MG/DL (ref 0.1–1.5)
BUN SERPL-MCNC: 58 MG/DL (ref 8–22)
CALCIUM SERPL-MCNC: 8.4 MG/DL (ref 8.5–10.5)
CHLORIDE SERPL-SCNC: 92 MMOL/L (ref 96–112)
CO2 SERPL-SCNC: 26 MMOL/L (ref 20–33)
CREAT SERPL-MCNC: 7.84 MG/DL (ref 0.5–1.4)
EOSINOPHIL # BLD AUTO: 0.06 K/UL (ref 0–0.51)
EOSINOPHIL NFR BLD: 0.8 % (ref 0–6.9)
ERYTHROCYTE [DISTWIDTH] IN BLOOD BY AUTOMATED COUNT: 52.9 FL (ref 35.9–50)
GFR SERPLBLD CREATININE-BSD FMLA CKD-EPI: 8 ML/MIN/1.73 M 2
GLOBULIN SER CALC-MCNC: 4.1 G/DL (ref 1.9–3.5)
GLUCOSE SERPL-MCNC: 133 MG/DL (ref 65–99)
HCT VFR BLD AUTO: 22.9 % (ref 42–52)
HGB BLD-MCNC: 7.4 G/DL (ref 14–18)
IMM GRANULOCYTES # BLD AUTO: 0.06 K/UL (ref 0–0.11)
IMM GRANULOCYTES NFR BLD AUTO: 0.8 % (ref 0–0.9)
LYMPHOCYTES # BLD AUTO: 1.44 K/UL (ref 1–4.8)
LYMPHOCYTES NFR BLD: 18.3 % (ref 22–41)
MAGNESIUM SERPL-MCNC: 1.6 MG/DL (ref 1.5–2.5)
MCH RBC QN AUTO: 28.8 PG (ref 27–33)
MCHC RBC AUTO-ENTMCNC: 32.3 G/DL (ref 33.7–35.3)
MCV RBC AUTO: 89.1 FL (ref 81.4–97.8)
MONOCYTES # BLD AUTO: 1.12 K/UL (ref 0–0.85)
MONOCYTES NFR BLD AUTO: 14.3 % (ref 0–13.4)
NEUTROPHILS # BLD AUTO: 5.14 K/UL (ref 1.82–7.42)
NEUTROPHILS NFR BLD: 65.4 % (ref 44–72)
NRBC # BLD AUTO: 0 K/UL
NRBC BLD-RTO: 0 /100 WBC
PHOSPHATE SERPL-MCNC: 5.6 MG/DL (ref 2.5–4.5)
PLATELET # BLD AUTO: 335 K/UL (ref 164–446)
PMV BLD AUTO: 9.2 FL (ref 9–12.9)
POTASSIUM SERPL-SCNC: 4.9 MMOL/L (ref 3.6–5.5)
PROT SERPL-MCNC: 6.8 G/DL (ref 6–8.2)
RBC # BLD AUTO: 2.57 M/UL (ref 4.7–6.1)
SODIUM SERPL-SCNC: 132 MMOL/L (ref 135–145)
WBC # BLD AUTO: 7.9 K/UL (ref 4.8–10.8)

## 2022-08-30 PROCEDURE — A9270 NON-COVERED ITEM OR SERVICE: HCPCS | Performed by: HOSPITALIST

## 2022-08-30 PROCEDURE — 700102 HCHG RX REV CODE 250 W/ 637 OVERRIDE(OP)

## 2022-08-30 PROCEDURE — A9270 NON-COVERED ITEM OR SERVICE: HCPCS

## 2022-08-30 PROCEDURE — 85025 COMPLETE CBC W/AUTO DIFF WBC: CPT

## 2022-08-30 PROCEDURE — 700101 HCHG RX REV CODE 250: Performed by: STUDENT IN AN ORGANIZED HEALTH CARE EDUCATION/TRAINING PROGRAM

## 2022-08-30 PROCEDURE — 83735 ASSAY OF MAGNESIUM: CPT

## 2022-08-30 PROCEDURE — 700111 HCHG RX REV CODE 636 W/ 250 OVERRIDE (IP): Performed by: STUDENT IN AN ORGANIZED HEALTH CARE EDUCATION/TRAINING PROGRAM

## 2022-08-30 PROCEDURE — 36415 COLL VENOUS BLD VENIPUNCTURE: CPT

## 2022-08-30 PROCEDURE — 700102 HCHG RX REV CODE 250 W/ 637 OVERRIDE(OP): Performed by: NURSE PRACTITIONER

## 2022-08-30 PROCEDURE — 90935 HEMODIALYSIS ONE EVALUATION: CPT

## 2022-08-30 PROCEDURE — 84100 ASSAY OF PHOSPHORUS: CPT

## 2022-08-30 PROCEDURE — 700102 HCHG RX REV CODE 250 W/ 637 OVERRIDE(OP): Performed by: INTERNAL MEDICINE

## 2022-08-30 PROCEDURE — 700111 HCHG RX REV CODE 636 W/ 250 OVERRIDE (IP): Performed by: HOSPITALIST

## 2022-08-30 PROCEDURE — A9270 NON-COVERED ITEM OR SERVICE: HCPCS | Performed by: STUDENT IN AN ORGANIZED HEALTH CARE EDUCATION/TRAINING PROGRAM

## 2022-08-30 PROCEDURE — A9270 NON-COVERED ITEM OR SERVICE: HCPCS | Performed by: INTERNAL MEDICINE

## 2022-08-30 PROCEDURE — 80053 COMPREHEN METABOLIC PANEL: CPT

## 2022-08-30 PROCEDURE — A9270 NON-COVERED ITEM OR SERVICE: HCPCS | Performed by: NURSE PRACTITIONER

## 2022-08-30 PROCEDURE — 700102 HCHG RX REV CODE 250 W/ 637 OVERRIDE(OP): Performed by: STUDENT IN AN ORGANIZED HEALTH CARE EDUCATION/TRAINING PROGRAM

## 2022-08-30 PROCEDURE — 770001 HCHG ROOM/CARE - MED/SURG/GYN PRIV*

## 2022-08-30 PROCEDURE — 700111 HCHG RX REV CODE 636 W/ 250 OVERRIDE (IP): Performed by: NURSE PRACTITIONER

## 2022-08-30 PROCEDURE — 99232 SBSQ HOSP IP/OBS MODERATE 35: CPT | Mod: GC | Performed by: INTERNAL MEDICINE

## 2022-08-30 PROCEDURE — 700102 HCHG RX REV CODE 250 W/ 637 OVERRIDE(OP): Performed by: HOSPITALIST

## 2022-08-30 PROCEDURE — 700111 HCHG RX REV CODE 636 W/ 250 OVERRIDE (IP)

## 2022-08-30 RX ORDER — AMOXICILLIN 250 MG
2 CAPSULE ORAL 2 TIMES DAILY PRN
Status: DISCONTINUED | OUTPATIENT
Start: 2022-08-30 | End: 2022-08-31

## 2022-08-30 RX ORDER — POLYETHYLENE GLYCOL 3350 17 G/17G
1 POWDER, FOR SOLUTION ORAL DAILY
Status: DISCONTINUED | OUTPATIENT
Start: 2022-08-30 | End: 2022-08-31

## 2022-08-30 RX ORDER — BISACODYL 10 MG
10 SUPPOSITORY, RECTAL RECTAL
Status: DISCONTINUED | OUTPATIENT
Start: 2022-08-30 | End: 2022-08-31

## 2022-08-30 RX ADMIN — Medication 1334 MG: at 08:57

## 2022-08-30 RX ADMIN — CARVEDILOL 25 MG: 25 TABLET, FILM COATED ORAL at 18:38

## 2022-08-30 RX ADMIN — DOXYCYCLINE 100 MG: 100 TABLET, FILM COATED ORAL at 19:56

## 2022-08-30 RX ADMIN — OXYCODONE 5 MG: 5 TABLET ORAL at 18:37

## 2022-08-30 RX ADMIN — OXYCODONE 5 MG: 5 TABLET ORAL at 08:57

## 2022-08-30 RX ADMIN — HEPARIN SODIUM 5000 UNITS: 5000 INJECTION, SOLUTION INTRAVENOUS; SUBCUTANEOUS at 18:38

## 2022-08-30 RX ADMIN — POLYETHYLENE GLYCOL 3350 1 PACKET: 17 POWDER, FOR SOLUTION ORAL at 18:38

## 2022-08-30 RX ADMIN — DOXYCYCLINE 100 MG: 100 TABLET, FILM COATED ORAL at 08:57

## 2022-08-30 RX ADMIN — Medication 1334 MG: at 18:39

## 2022-08-30 RX ADMIN — METOLAZONE 2.5 MG: 2.5 TABLET ORAL at 08:57

## 2022-08-30 RX ADMIN — FUROSEMIDE 80 MG: 10 INJECTION, SOLUTION INTRAMUSCULAR; INTRAVENOUS at 08:56

## 2022-08-30 RX ADMIN — PATIROMER 8.4 G: 8.4 POWDER, FOR SUSPENSION ORAL at 15:00

## 2022-08-30 RX ADMIN — METOLAZONE 2.5 MG: 2.5 TABLET ORAL at 18:38

## 2022-08-30 RX ADMIN — HEPARIN SODIUM 5000 UNITS: 5000 INJECTION, SOLUTION INTRAVENOUS; SUBCUTANEOUS at 08:56

## 2022-08-30 RX ADMIN — EPOETIN ALFA-EPBX 5000 UNITS: 3000 INJECTION, SOLUTION INTRAVENOUS; SUBCUTANEOUS at 14:10

## 2022-08-30 RX ADMIN — HEPARIN SODIUM 3100 UNITS: 1000 INJECTION, SOLUTION INTRAVENOUS; SUBCUTANEOUS at 14:13

## 2022-08-30 RX ADMIN — LIDOCAINE 1 PATCH: 50 PATCH CUTANEOUS at 15:00

## 2022-08-30 RX ADMIN — FUROSEMIDE 80 MG: 10 INJECTION, SOLUTION INTRAMUSCULAR; INTRAVENOUS at 18:38

## 2022-08-30 RX ADMIN — Medication 1000 UNITS: at 08:57

## 2022-08-30 ASSESSMENT — PAIN DESCRIPTION - PAIN TYPE
TYPE: ACUTE PAIN

## 2022-08-30 NOTE — PROGRESS NOTES
Patient alert and oriented x4. Up independently. Patient given benadryl x2 for skin being itchy. Kenolog cream applied as well. Pt had relief with warm shower as well. Patient educated on importance of keeping track of urine output. Paper and pen on bedside table for patient to keep track. Call light within reach.

## 2022-08-30 NOTE — PROGRESS NOTES
Alert x4, able to let his needs known; refuses the bed alarm despite education. C/o pain to legs; medicate as requested.   Educated to record his urine output for strict I&o's; paper and pen at bedside; understands  Notified writer that he removes his lidocaine patch in the morning so staff doesn't have to wake him- high irritable when woken up in the morning

## 2022-08-30 NOTE — PROGRESS NOTES
Received report from previous shift RN, assumed care of patient. Patient is A&Ox4, vital signs are stable, on room air. Patient reports 10/10 bilateral inner thigh pain at this time, no signs of distress or discomfort. Sitting up in bed. Call light and belongings within reach. Bed in lowest/locked position. Bed alarm refused. Hourly rounding in place.

## 2022-08-30 NOTE — PROGRESS NOTES
[facility] -  PROGRESS NOTE               Author: Denilson Hamilton M.D. Date & Time: 8/30/2022  8:20 AM     HPI:  45 yo M with End Stage Renal Disease on hemodialysis at [facility] every Tues/Thurs/Sat, last HD at [place] in 4/2022, then traveled and lost HD chair. Other PMH diabetes mellitus and Hypertension who presented to the emergency department on 8/8/22 for weakness. He missed 1 months of HD due to traveling. He was found to be hypertensive 153/99, afebrile. Labs with K of 6.1, BUN 93, bicarb 10.   No F/C/N/V/CP/SOB.  No melena, hematochezia, hematemesis.  No HA, visual changes, or abdominal pain.    DAILY NEPHROLOGY SUMMARY:  8/09: 3L net UF. Resting in bed. No complaints. No acute distress. Security called last night, patient aggressive towards staff regarding personal items. Bps improved this am.   8/10: Seen on HD this am. Lethargic. No complaints.   8/11:  Resting comfortably no complaints.  VSS.  8/12: Ambulating in room indep. Denies CP/SOB, on RA. Reports he is hungry and wants breakfast before anything else. Bps elevated.   8/13: VSS RA no complaints.  Ambulating.   8/14: Sleeping in bed. Dismissive. Does not want extra HD today. BP elevated.   8/15: Risks of no dialysis explained to patient, refused treatment today.  Complaining of leg pain and swelling.  8/16: Seen on dialysis at bedside.  Complaining of thigh pain.   8/17: Denies pain or SOB.  Resting.  8/18: Pt sitting up in chair. For HD today. Denies CP,SOB,N/V/D. No overnight events. No  labs for review. VSS. No complaints.   8/19: HD yest, UF 2 L. Pt came off HD treatment 50' early as he had an accident during treatment.Pt C/O itching. Still with LLE.   8/20: Pt seen on HD, minimally engages in conversation, VSS on RA  8/21: Pt sitting up EOB, says his legs and forehead hurt, iHD yesterday with 3L net UF, declines need for additional UF today, lab reviewed, VSS on RA  8/22: VSS RA.  No new labs.  Does complaint  "of leg pain bilat, worsening edema.  No SOB. Declines additional dialysis tx.  Swab from line site +MRSA.  Blood cultures pending collection.  Afebrile.    8/23:Pt refused HD this am \"maybe later\". He is agreeable to do dialysis now.  8/24: 1348 mL Net UF. Resting in bed. C/o fatigue. Reports tenderness at TDC site. Denies fever or chills, Denies CP/SOB. No new labs this am.   8/25: declined iHD yesterday, planning for today.  No new labs. AVG US still pending.  VSS on RA.  Agreeable to HD today.  Would prefer not to use AVG due to pain.   8/26: sitting up in bed, continues with mild pain in AVF but improving, tolerated iHD yesterday with UF: 3.5L  8/27: NAEO, upset that they attempted his AVG couple days ago and 'failed'  8/28: NAEO, no complaints, tolerated HD well  8/29: NAEO, complains of itching today all over, no other issues  8/30: NAEO, no complaints, still itching    REVIEW OF SYSTEMS:    10 point ROS reviewed and is as per HPI/daily summary or otherwise negative    PMH/PSH/SH/FH: Reviewed and unchanged since admission note  CURRENT MEDICATIONS: Reviewed from admission to present day    VS:  /79   Pulse 72   Temp 36.5 °C (97.7 °F) (Temporal)   Resp 18   Ht 1.753 m (5' 9\")   Wt 99.6 kg (219 lb 9.3 oz)   SpO2 91%   BMI 32.43 kg/m²   Physical Exam  Nursing note reviewed.   Constitutional:       Appearance: Normal appearance.   Eyes:      General: No scleral icterus.  Cardiovascular:      Comments: BLE edema  Pulmonary:      Effort: Pulmonary effort is normal.   Abdominal:      General: There is no distension.   Musculoskeletal:         General: No deformity.      Comments: LAVG (+thrill/bruit)   Skin:     Findings: Erythema (BLE and forehead) present.   Neurological:      Mental Status: He is alert.   Psychiatric:         Behavior: Behavior normal.     Fluids:  In: 800 [P.O.:800]  Out: 505     LABS:  Recent Labs     08/27/22  0840 08/28/22  0942 08/29/22  0806   SODIUM 132* 130* 132*   POTASSIUM " 5.1 4.6 4.8   CHLORIDE 93* 92* 93*   CO2 25 24 26   GLUCOSE 160* 142* 154*   BUN 55* 41* 47*   CREATININE 7.37* 5.81* 6.59*   CALCIUM 8.4* 8.3* 8.3*       IMPRESSION:  # ESRD  - Etiology likely 2/2 DM/HTN  - HD qTTHS via [place] TD  - Lost his HD chair at [facility] -> plan for [facility]  # Thrombosed Left AVF  - US 2/23 no flow  - s/p OR 2/25 AVG Brachio-axillary Creation, thrombectomy and fistulogram   - AVG US pending  - AVG is deep when RN's attempted and even though they were able  to cannulate he moves his arm and causes infiltration due to low pain threshold  # HTN, fair control   - Goal BP < 140/90  # Anemia of CKD, below target  - Goal Hgb 10-11   - % sat 32  - Ferritin 879  - JACKIE with HD   # CKD-MBD  - Ca 8.1 with low albumin  - PO4 6.2  -   - On cholecalciferol and calcium acetate   # Hyperkalemia, corrected  - Due to missed HD  - Manage with HD and low K+ diet  - On Veltessa fir now as missed iHD  # Weakness  # h/o Methamphetamine use   # Homelessness  # DM   - A1C 6.5  # Severe metabolic acidosis, corrected   # Wound Cx (+) MRSA  - ABX per primary services  - On Vanco  - Blood cultures 8/22 negative so far     PLAN:  - TTS iHD  - Challenge UF as able  - Use PermCath for now  - Dr. Hartman from Vascular to evaluate his AVG and positioning etc to see if any additional procedures needed  - Transfuse PRN Hgb <7.0   - JACKIE with HD to goal Hgb 10-11  - No dietary protein restrictions  - Dose all meds per ESRD/iHD  - Renal diet  - [facility] for OP HD when ready for DC

## 2022-08-30 NOTE — PROGRESS NOTES
[facility] Internal Medicine Daily Progress Note    Date of Service  8/30/2022    [facility] Team: R IM Purple Team   Attending: Nino Reyes M.d.  Senior Resident: Dr. Clement  Intern:  Dr. Adorno  Contact Number: [phone]    Chief Complaint  Ambrose Watkins is a 46 y.o. male admitted 8/8/2022 with progressive weakness with associated nausea, vomiting, and diarrhea due to missed dialysis    Hospital Course  45 yo male with PMH of diabetes,HFrEF, HTN, and ESRD on HD who presented 8/8/2022 for missing dialysis for past 3 weeks due to travelling to [place] for leisure and did not know where to go to get dialysis. In addition, patient did not have a dialysis chair here in [place].  He states he receives dialysis in [place].  He has been experiencing progressive weakness and associated nausea, vomiting, and diarrhea.  He came to the ER and was found to have electrolyte abnormalities including hyperkalemia, elevated creatinine BUN, and low calcium.  Nephrology consulted and stated that patient will require multiple sessions of dialysis given numerous missed sessions. Currently following TThS schedule for HD, with vitals/labs gradually improving.   8/22 MRSA growth from armpit or RIJ swab - started on Vancomycin 8/22 - 8/24 attempt to use left AVM for dialysis  8/24 BCX obtained - No growth 48 hrs. Switch to Doxycycline 8/24 -  8/26 Lasix 80mg PO due to fluid overload  8/27 Urinating well with Lasix, will continue. US AVG - normal flow, stenosis prox biceps, min mural thrombus graft wall at mid biceps w/no flow disturbance, outflow axillary vein widely patent. Nephrology consulting Vascular surgery for evaluation to bring up AVG.  8/28 Bilateral lower extremity pain and edema. Continue diuresis with Lasix 80mg IV. Strict I/O's.  8/29 Bilateral lower extremity pain and edema. Continue diuresis with Lasix and Metolazone     Interval Problem Update  No acute events overnight. Patient upset this am states being  "woken up by staff calling name repeatedly in the morning and does not want to keep track of I/O's himself. Documented output of 505 ml.  No CP, no shortness of breath.     I have discussed this patient's plan of care and discharge plan at IDT rounds today with Case Management, Nursing, Nursing leadership, and other members of the IDT team.    Consultants/Specialty  nephrology    Code Status  Full Code    Disposition  Patient is not medically cleared for discharge.   Anticipate discharge to to home with close outpatient follow-up.  I have placed the appropriate orders for post-discharge needs.    Review of Systems  Constitutional:  Positive for malaise/fatigue. Negative for chills, diaphoresis and fever.  Right IJ stitching removed. Armpit and forehead boils and itchiness.  Respiratory:  Negative for cough and shortness of breath.    Cardiovascular:  Positive for leg swelling. Negative for chest pain and palpitations.   Gastrointestinal:  Positive for abdominal pain and constipation. Negative for diarrhea, nausea and vomiting.    Genitourinary:         Reports minimal urine production   Musculoskeletal:         Bilateral leg pain - right thigh pain with light touch    Neurological:  Positive for weakness. Negative for dizziness and headaches.      /79   Pulse 72   Temp 36.5 °C (97.7 °F) (Temporal)   Resp 18   Ht 1.753 m (5' 9\")   Wt 99.6 kg (219 lb 9.3 oz)   SpO2 91%   BMI 32.43 kg/m²     Physical Exam  Constitutional:       General: He is not in acute distress.     Appearance: He is ill-appearing. He is not toxic-appearing.   HENT:      Head: Normocephalic and atraumatic.      Right Ear: External ear normal.      Left Ear: External ear normal.      Nose: Nose normal.      Mouth/Throat:      Mouth: Mucous membranes are moist.   Eyes:      Extraocular Movements: Extraocular movements intact.      Conjunctiva/sclera: Conjunctivae normal.   Cardiovascular:      Rate and Rhythm: Normal rate and regular " rhythm.      Pulses: Normal pulses.      Heart sounds: Normal heart sounds. No murmur heard.     Comments: Right internal jugular tunneled catheter with bandaging.  LUE AV fistula - surrounding area edema improved.   Pulmonary:      Effort: Pulmonary effort is normal. No respiratory distress.      Breath sounds: Normal breath sounds. No wheezing or rales.   Abdominal:      General: There is distension. Significant improvement in abdominal distension, no fluid wave. Clear bowel sounds.      Tenderness: No tenderness. There is no guarding or rebound.   Musculoskeletal:         General: Swelling present.      Cervical back: Normal range of motion.      Right lower leg: Edema present.      Left lower leg: Edema present.      Bilateral extremities with increased non pitting edema. Tenderness to bilateral thighs   Skin:     General: Skin is warm and dry.      Comments: Excoriations of upper extremities, lower extremities, scalp, chest/abdomen. Right underarm boils erythematous/tender. Forehead boil tender/erythematous with scab.  Neurological:      General: No focal deficit present.      Mental Status: He is alert and oriented to person, place, and time.   Psychiatric:         Thought Content: Thought content normal    Fluids    Intake/Output Summary (Last 24 hours) at 8/30/2022 1645  Last data filed at 8/30/2022 1455  Gross per 24 hour   Intake 500 ml   Output 3330 ml   Net -2830 ml       Laboratory  Recent Labs     08/30/22  0801   WBC 7.9   RBC 2.57*   HEMOGLOBIN 7.4*   HEMATOCRIT 22.9*   MCV 89.1   MCH 28.8   MCHC 32.3*   RDW 52.9*   PLATELETCT 335   MPV 9.2     Recent Labs     08/28/22  0942 08/29/22  0806 08/30/22  0801   SODIUM 130* 132* 132*   POTASSIUM 4.6 4.8 4.9   CHLORIDE 92* 93* 92*   CO2 24 26 26   GLUCOSE 142* 154* 133*   BUN 41* 47* 58*   CREATININE 5.81* 6.59* 7.84*   CALCIUM 8.3* 8.3* 8.4*                   Imaging  US-HEMODIALYSIS GRAFT DUPLEX COMP UPPER EXTREMITY   Final Result      CT-ABDOMEN-PELVIS  W/O   Final Result      1.  Micronodular appearance of the liver consistent with hepatocellular disease.      2.  Large amount of ascites throughout the abdomen and pelvis.      3.  Adynamic ileus without evidence of bowel obstruction.      4.  Anasarca.      5.  Bilateral spondylolysis of the pars interarticularis at the L5-S1 level.      US-EXTREMITY VENOUS LOWER BILAT   Final Result      US-ABDOMEN COMPLETE SURVEY   Final Result      1.  Mildly increased hepatic echotexture is suggestive of fatty infiltration.      2.  Small-to-moderate amount of ascites.      3.  Cholelithiasis         DX-ABDOMEN COMPLETE WITH AP OR PA CXR   Final Result      Fairly diffuse bowel distention with scattered air-fluid levels. Findings are likely related to ileus. More distal obstruction cannot be excluded.      DX-CHEST-PORTABLE (1 VIEW)   Final Result      1.  Mildly enlarged cardiac silhouette with vascular congestion.           Assessment/Plan  Problem Representation:    * Noncompliance of patient with renal dialysis (HCC)- (present on admission)  Assessment & Plan  Has not had dialysis in the past 3 weeks, due to traveling to [place]. Obtained HD in [place], however does not have an outpatient dialysis chair in [place] and last year in [place].     -Signs/symptoms and labs/vitals slowly normalizing  -Nephrology following, on TThS dialysis and as needed  -Suspician for RIJ infection - Vancomycin started 8/22 - 8/24 switch to Doxycycline 8/24 - given negative BCX  -Lasix 80mg IV BID, Metolazone 2.5 mg BID 30 minutes before Lasix with strict I/O's   -CTM fluid status    Multiple air fluid levels of small intestine determined by X-ray  Assessment & Plan  Patients states he has been having watery stools multiple times a day for many days. He is unable to recount when his last solid stool was. Abdomen has become more distended and pain has increased. Abdominal xray with bowel distention with air-fluid levels. Pt has been  taking oxycodone for leg pain since 8/8.     -Will continue to monitor with serial abdominal exam  -CT abdo 8/22 with marked ascites, continue with dialysis   -Significant improvement in abdominal distention 8/28       Lower extremity pain, bilateral  Assessment & Plan  Anterior leg pain noted on admission and patient given oxycodone for pain. Most likely related to edema due to missed dialysis sessions. CPK and lactic acid wnl. Ambulatory.   -Oxycodone for pain control  -Bilateral DVT US negative  -Continue with diuresis for edema     End stage renal disease (HCC)- (present on admission)  Assessment & Plan  Patient received HD in [place], has not received HD in 3 weeks prior to admission.  Patient also had multiple thrombectomies/revisions for AV fistula.  Patient has tunneled dialysis catheter in place and functioning. Patient not adherent to HD or outpatient medications.  Nephrology following. Per pharmacy ([facility]) pt has not picked up any medications. Prescribed medications were 01/2022 - Trazodone 50mg QHS, Calcitriol 0.25mg OD, Furosemide 80mg OD, Ca-acetate 667-270 TID, Lisinopril 20mg OD. 12/31/2021 amplodipine 5mg, glipizide ER 5mg, carvedilol 25 BID    -Currently on cholecalciferol   -Per nephrology note, AVG use per Vasc Surg recs; currently using [facility] TDC, last note 3/1 not yet cleared for use. Due to MRSA growth from skin culture, next dialysis will attempt to use AVG.   -Dialysis schedule TThSat and as needed      Hypertension- (present on admission)  Assessment & Plan  History of uncontrolled hypertension. Likely due to missing dialysis and nonadherence to outpatient BP medications    -Hydralazine as needed due to history of CHF  -BP well controlled with Carvedilol 25mg twice daily       VTE prophylaxis: heparin ppx    I have performed a physical exam and reviewed and updated ROS and Plan today (8/30/2022). In review of yesterday's note (8/29/2022), there are no changes  except as documented above.

## 2022-08-30 NOTE — CARE PLAN
The patient is Stable - Low risk of patient condition declining or worsening    Shift Goals  Clinical Goals: Compliance with plan of care  Patient Goals: Pain management  Family Goals: POLINA    Progress made toward(s) clinical / shift goals: Pt had dialysis done today.      Problem: Knowledge Deficit - Standard  Goal: Patient and family/care givers will demonstrate understanding of plan of care, disease process/condition, diagnostic tests and medications  Outcome: Progressing       Patient is not progressing towards the following goals:

## 2022-08-30 NOTE — PROGRESS NOTES
Called Dr JERRELL Hamilton for current HD orders prior to starting pt and waited to call back for orders. HD started at 1113 and ended at 1413 with net UF of 2.5 Liters as bp tolerated. Right IJ CVC dressing was changed, scab noted at access sie, no redness nor swelling. Reversed lines while on HD treatment for better flow. Notified Dr Hamilton. See flow sheet for details.

## 2022-08-31 PROCEDURE — 700102 HCHG RX REV CODE 250 W/ 637 OVERRIDE(OP)

## 2022-08-31 PROCEDURE — 99232 SBSQ HOSP IP/OBS MODERATE 35: CPT | Mod: GC | Performed by: INTERNAL MEDICINE

## 2022-08-31 PROCEDURE — A9270 NON-COVERED ITEM OR SERVICE: HCPCS | Performed by: STUDENT IN AN ORGANIZED HEALTH CARE EDUCATION/TRAINING PROGRAM

## 2022-08-31 PROCEDURE — A9270 NON-COVERED ITEM OR SERVICE: HCPCS | Performed by: NURSE PRACTITIONER

## 2022-08-31 PROCEDURE — 700111 HCHG RX REV CODE 636 W/ 250 OVERRIDE (IP): Performed by: HOSPITALIST

## 2022-08-31 PROCEDURE — 700102 HCHG RX REV CODE 250 W/ 637 OVERRIDE(OP): Performed by: STUDENT IN AN ORGANIZED HEALTH CARE EDUCATION/TRAINING PROGRAM

## 2022-08-31 PROCEDURE — 700102 HCHG RX REV CODE 250 W/ 637 OVERRIDE(OP): Performed by: NURSE PRACTITIONER

## 2022-08-31 PROCEDURE — 700111 HCHG RX REV CODE 636 W/ 250 OVERRIDE (IP)

## 2022-08-31 PROCEDURE — A9270 NON-COVERED ITEM OR SERVICE: HCPCS

## 2022-08-31 PROCEDURE — 700102 HCHG RX REV CODE 250 W/ 637 OVERRIDE(OP): Performed by: INTERNAL MEDICINE

## 2022-08-31 PROCEDURE — 770001 HCHG ROOM/CARE - MED/SURG/GYN PRIV*

## 2022-08-31 PROCEDURE — A9270 NON-COVERED ITEM OR SERVICE: HCPCS | Performed by: INTERNAL MEDICINE

## 2022-08-31 PROCEDURE — 700101 HCHG RX REV CODE 250: Performed by: STUDENT IN AN ORGANIZED HEALTH CARE EDUCATION/TRAINING PROGRAM

## 2022-08-31 RX ORDER — LACTULOSE 20 G/30ML
30 SOLUTION ORAL 3 TIMES DAILY
Status: DISCONTINUED | OUTPATIENT
Start: 2022-08-31 | End: 2022-09-01

## 2022-08-31 RX ORDER — LACTULOSE 20 G/30ML
30 SOLUTION ORAL 2 TIMES DAILY
Status: DISCONTINUED | OUTPATIENT
Start: 2022-08-31 | End: 2022-08-31

## 2022-08-31 RX ORDER — HYDROXYZINE 50 MG/1
25 TABLET, FILM COATED ORAL 4 TIMES DAILY PRN
Status: DISCONTINUED | OUTPATIENT
Start: 2022-08-31 | End: 2022-09-04 | Stop reason: HOSPADM

## 2022-08-31 RX ADMIN — POLYETHYLENE GLYCOL 3350 1 PACKET: 17 POWDER, FOR SOLUTION ORAL at 08:17

## 2022-08-31 RX ADMIN — LACTULOSE 30 ML: 20 SOLUTION ORAL at 18:01

## 2022-08-31 RX ADMIN — METOLAZONE 2.5 MG: 2.5 TABLET ORAL at 08:17

## 2022-08-31 RX ADMIN — Medication 1334 MG: at 17:59

## 2022-08-31 RX ADMIN — Medication 1334 MG: at 12:53

## 2022-08-31 RX ADMIN — PATIROMER 8.4 G: 8.4 POWDER, FOR SUSPENSION ORAL at 14:56

## 2022-08-31 RX ADMIN — Medication 1334 MG: at 08:09

## 2022-08-31 RX ADMIN — HEPARIN SODIUM 5000 UNITS: 5000 INJECTION, SOLUTION INTRAVENOUS; SUBCUTANEOUS at 08:16

## 2022-08-31 RX ADMIN — HEPARIN SODIUM 5000 UNITS: 5000 INJECTION, SOLUTION INTRAVENOUS; SUBCUTANEOUS at 17:59

## 2022-08-31 RX ADMIN — OXYCODONE 5 MG: 5 TABLET ORAL at 04:14

## 2022-08-31 RX ADMIN — CARVEDILOL 25 MG: 25 TABLET, FILM COATED ORAL at 17:59

## 2022-08-31 RX ADMIN — CARVEDILOL 25 MG: 25 TABLET, FILM COATED ORAL at 08:09

## 2022-08-31 RX ADMIN — METOLAZONE 2.5 MG: 2.5 TABLET ORAL at 17:59

## 2022-08-31 RX ADMIN — FUROSEMIDE 80 MG: 10 INJECTION, SOLUTION INTRAMUSCULAR; INTRAVENOUS at 08:16

## 2022-08-31 RX ADMIN — Medication 1000 UNITS: at 08:09

## 2022-08-31 RX ADMIN — DOCUSATE SODIUM 50 MG AND SENNOSIDES 8.6 MG 2 TABLET: 8.6; 5 TABLET, FILM COATED ORAL at 08:09

## 2022-08-31 RX ADMIN — LIDOCAINE 1 PATCH: 50 PATCH CUTANEOUS at 12:53

## 2022-08-31 RX ADMIN — FUROSEMIDE 80 MG: 10 INJECTION, SOLUTION INTRAMUSCULAR; INTRAVENOUS at 17:59

## 2022-08-31 RX ADMIN — OXYCODONE 5 MG: 5 TABLET ORAL at 12:52

## 2022-08-31 ASSESSMENT — PAIN DESCRIPTION - PAIN TYPE
TYPE: ACUTE PAIN

## 2022-08-31 NOTE — PROGRESS NOTES
Alert and able to let his needs known. Sitting in chair, legs with edema- offered to place him back into bed and elevate legs - declined.

## 2022-08-31 NOTE — CARE PLAN
The patient is Stable - Low risk of patient condition declining or worsening    Shift Goals  Clinical Goals: Pain management  Patient Goals: Pain management  Family Goals: POLINA    Progress made toward(s) clinical / shift goals: Pt medicated per MAR to maintain pain at tolerable levels throughout the shift.       Problem: Pain - Standard  Goal: Alleviation of pain or a reduction in pain to the patient’s comfort goal  Outcome: Progressing       Patient is not progressing towards the following goals:

## 2022-08-31 NOTE — PROGRESS NOTES
[facility] -  PROGRESS NOTE               Author: Denilson Hamilton M.D. Date & Time: 8/31/2022  12:06 PM     HPI:  45 yo M with End Stage Renal Disease on hemodialysis at [facility] every Tues/Thurs/Sat, last HD at [facility] in 4/2022, then traveled and lost HD chair. Other PMH diabetes mellitus and Hypertension who presented to the emergency department on 8/8/22 for weakness. He missed 1 months of HD due to traveling. He was found to be hypertensive 153/99, afebrile. Labs with K of 6.1, BUN 93, bicarb 10.   No F/C/N/V/CP/SOB.  No melena, hematochezia, hematemesis.  No HA, visual changes, or abdominal pain.    DAILY NEPHROLOGY SUMMARY:  8/09: 3L net UF. Resting in bed. No complaints. No acute distress. Security called last night, patient aggressive towards staff regarding personal items. Bps improved this am.   8/10: Seen on HD this am. Lethargic. No complaints.   8/11:  Resting comfortably no complaints.  VSS.  8/12: Ambulating in room indep. Denies CP/SOB, on RA. Reports he is hungry and wants breakfast before anything else. Bps elevated.   8/13: VSS RA no complaints.  Ambulating.   8/14: Sleeping in bed. Dismissive. Does not want extra HD today. BP elevated.   8/15: Risks of no dialysis explained to patient, refused treatment today.  Complaining of leg pain and swelling.  8/16: Seen on dialysis at bedside.  Complaining of thigh pain.   8/17: Denies pain or SOB.  Resting.  8/18: Pt sitting up in chair. For HD today. Denies CP,SOB,N/V/D. No overnight events. No  labs for review. VSS. No complaints.   8/19: HD yest, UF 2 L. Pt came off HD treatment 50' early as he had an accident during treatment.Pt C/O itching. Still with LLE.   8/20: Pt seen on HD, minimally engages in conversation, VSS on RA  8/21: Pt sitting up EOB, says his legs and forehead hurt, iHD yesterday with 3L net UF, declines need for additional UF today, lab reviewed, VSS on RA  8/22: VSS RA.  No new labs.  Does complaint  "of leg pain bilat, worsening edema.  No SOB. Declines additional dialysis tx.  Swab from line site +MRSA.  Blood cultures pending collection.  Afebrile.    8/23:Pt refused HD this am \"maybe later\". He is agreeable to do dialysis now.  8/24: 1348 mL Net UF. Resting in bed. C/o fatigue. Reports tenderness at TDC site. Denies fever or chills, Denies CP/SOB. No new labs this am.   8/25: declined iHD yesterday, planning for today.  No new labs. AVG US still pending.  VSS on RA.  Agreeable to HD today.  Would prefer not to use AVG due to pain.   8/26: sitting up in bed, continues with mild pain in AVF but improving, tolerated iHD yesterday with UF: 3.5L  8/27: NAEO, upset that they attempted his AVG couple days ago and 'failed'  8/28: NAEO, no complaints, tolerated HD well  8/29: NAEO, complains of itching today all over, no other issues  8/30: NAEO, no complaints, still itching  8/31: NAEO, +itching    REVIEW OF SYSTEMS:    10 point ROS reviewed and is as per HPI/daily summary or otherwise negative    PMH/PSH/SH/FH: Reviewed and unchanged since admission note  CURRENT MEDICATIONS: Reviewed from admission to present day    VS:  /79   Pulse 80   Temp 36.8 °C (98.3 °F) (Temporal)   Resp 19   Ht 1.753 m (5' 9\")   Wt 99.6 kg (219 lb 9.3 oz)   SpO2 95%   BMI 32.43 kg/m²   Physical Exam  Nursing note reviewed.   Constitutional:       Appearance: Normal appearance.   Eyes:      General: No scleral icterus.  Cardiovascular:      Comments: BLE edema  Pulmonary:      Effort: Pulmonary effort is normal.   Abdominal:      General: There is no distension.   Musculoskeletal:         General: No deformity.      Comments: LAVG (+thrill/bruit)   Skin:     Findings: Erythema (BLE and forehead) present.   Neurological:      Mental Status: He is alert.   Psychiatric:         Behavior: Behavior normal.     Fluids:  In: 1100 [P.O.:600; Dialysis:500]  Out: 3325     LABS:  Recent Labs     08/29/22  0806 08/30/22  0801   SODIUM 132* " 132*   POTASSIUM 4.8 4.9   CHLORIDE 93* 92*   CO2 26 26   GLUCOSE 154* 133*   BUN 47* 58*   CREATININE 6.59* 7.84*   CALCIUM 8.3* 8.4*       IMPRESSION:  # ESRD  - Etiology likely 2/2 DM/HTN  - HD qTTHS via [facility] TDC  - Lost his HD chair at [facility] -> plan for [facility]  # Thrombosed Left AVF  - US 2/23 no flow  - s/p OR 2/25 AVG Brachio-axillary Creation, thrombectomy and fistulogram   - AVG US pending  - AVG is deep when RN's attempted and even though they were able  to cannulate he moves his arm and causes infiltration due to low pain threshold  # HTN, fair control   - Goal BP < 140/90  # Anemia of CKD, below target  - Goal Hgb 10-11   - % sat 32  - Ferritin 879  - JACKIE with HD   # CKD-MBD  - Ca 8.1 with low albumin  - PO4 6.2  -   - On cholecalciferol and calcium acetate   # Hyperkalemia, corrected  - Due to missed HD  - Manage with HD and low K+ diet  - On Veltessa fir now as missed iHD  # Weakness  # h/o Methamphetamine use   # Homelessness  # DM   - A1C 6.5  # Severe metabolic acidosis, corrected   # Wound Cx (+) MRSA  - ABX per primary services  - On Vanco  - Blood cultures 8/22 negative so far     PLAN:  - TTS iHD  - Challenge UF as able  - Use PermCath for now  - Dr. Hartman from Vascular to evaluate his AVG and positioning etc to see if any additional procedures needed  - Hydroxyzine 25mg PRN  - Transfuse PRN Hgb <7.0   - JACKIE with HD to goal Hgb 10-11  - No dietary protein restrictions  - Dose all meds per ESRD/iHD  - Renal diet  - [facility] for OP HD when ready for DC

## 2022-08-31 NOTE — DISCHARGE PLANNING
Case Management Discharge Planning    Admission Date: 8/8/2022  GMLOS: 3.1  ALOS: 23    6-Clicks ADL Score: 23  6-Clicks Mobility Score: 23      Anticipated Discharge Dispo: Discharge Disposition: Discharged to home/self care (01)    DME Needed: No    Action(s) Taken: RN DILIP spoke with Negin dialysis coordinator to confirm patient still had dialysis chair. Negin confirmed patient has dialysis chair at [facility] Tuesday, Thursday, and Saturday. Per Negin the patient would have to start the patient tomorrow Thursday 9/1 at 0530 or patient would have to start Tuesday 9/6 as the dialysis center does not have the staff to admit over the weekend.     Patient has been living in his Jeep. Patient also has transport benefits listed with patient's Medicaid through [facility].     MD and bedside RN updated about dialysis start dates.     Escalations Completed: Provider and Speciality Provider    Medically Clear: Yes    Next Steps: Follow up for discharge plan.     Barriers to Discharge: Dialysis unable to start over weekend.     Is the patient up for discharge tomorrow: No

## 2022-08-31 NOTE — PROGRESS NOTES
Received report from previous shift RN, assumed care of patient. Patient is A&Ox4, vital signs are stable, on room air. Patient reports 7/10 bilateral leg pain at this time. Unable to medicate at this time. Sitting up in bed for breakfast. Call light and belongings within reach. Bed in lowest/locked position. Bed alarm refused. Hourly rounding in place.

## 2022-09-01 LAB
ALBUMIN SERPL BCP-MCNC: 2.9 G/DL (ref 3.2–4.9)
ALBUMIN/GLOB SERPL: 0.6 G/DL
ALP SERPL-CCNC: 171 U/L (ref 30–99)
ALT SERPL-CCNC: 9 U/L (ref 2–50)
ANION GAP SERPL CALC-SCNC: 12 MMOL/L (ref 7–16)
AST SERPL-CCNC: 11 U/L (ref 12–45)
BASOPHILS # BLD AUTO: 0.6 % (ref 0–1.8)
BASOPHILS # BLD: 0.05 K/UL (ref 0–0.12)
BILIRUB SERPL-MCNC: 0.5 MG/DL (ref 0.1–1.5)
BUN SERPL-MCNC: 29 MG/DL (ref 8–22)
CALCIUM SERPL-MCNC: 8.8 MG/DL (ref 8.5–10.5)
CHLORIDE SERPL-SCNC: 93 MMOL/L (ref 96–112)
CO2 SERPL-SCNC: 28 MMOL/L (ref 20–33)
CREAT SERPL-MCNC: 4.79 MG/DL (ref 0.5–1.4)
EOSINOPHIL # BLD AUTO: 0.04 K/UL (ref 0–0.51)
EOSINOPHIL NFR BLD: 0.5 % (ref 0–6.9)
ERYTHROCYTE [DISTWIDTH] IN BLOOD BY AUTOMATED COUNT: 52.1 FL (ref 35.9–50)
GFR SERPLBLD CREATININE-BSD FMLA CKD-EPI: 14 ML/MIN/1.73 M 2
GLOBULIN SER CALC-MCNC: 4.8 G/DL (ref 1.9–3.5)
GLUCOSE SERPL-MCNC: 107 MG/DL (ref 65–99)
HCT VFR BLD AUTO: 25.6 % (ref 42–52)
HGB BLD-MCNC: 8.1 G/DL (ref 14–18)
IMM GRANULOCYTES # BLD AUTO: 0.03 K/UL (ref 0–0.11)
IMM GRANULOCYTES NFR BLD AUTO: 0.4 % (ref 0–0.9)
LYMPHOCYTES # BLD AUTO: 1.45 K/UL (ref 1–4.8)
LYMPHOCYTES NFR BLD: 18.6 % (ref 22–41)
MAGNESIUM SERPL-MCNC: 1.7 MG/DL (ref 1.5–2.5)
MCH RBC QN AUTO: 28.3 PG (ref 27–33)
MCHC RBC AUTO-ENTMCNC: 31.6 G/DL (ref 33.7–35.3)
MCV RBC AUTO: 89.5 FL (ref 81.4–97.8)
MONOCYTES # BLD AUTO: 1.16 K/UL (ref 0–0.85)
MONOCYTES NFR BLD AUTO: 14.9 % (ref 0–13.4)
NEUTROPHILS # BLD AUTO: 5.06 K/UL (ref 1.82–7.42)
NEUTROPHILS NFR BLD: 65 % (ref 44–72)
NRBC # BLD AUTO: 0 K/UL
NRBC BLD-RTO: 0 /100 WBC
PHOSPHATE SERPL-MCNC: 3.3 MG/DL (ref 2.5–4.5)
PLATELET # BLD AUTO: 341 K/UL (ref 164–446)
PMV BLD AUTO: 9 FL (ref 9–12.9)
POTASSIUM SERPL-SCNC: 4.3 MMOL/L (ref 3.6–5.5)
PROT SERPL-MCNC: 7.7 G/DL (ref 6–8.2)
RBC # BLD AUTO: 2.86 M/UL (ref 4.7–6.1)
SODIUM SERPL-SCNC: 133 MMOL/L (ref 135–145)
WBC # BLD AUTO: 7.8 K/UL (ref 4.8–10.8)

## 2022-09-01 PROCEDURE — 700102 HCHG RX REV CODE 250 W/ 637 OVERRIDE(OP): Performed by: INTERNAL MEDICINE

## 2022-09-01 PROCEDURE — 700111 HCHG RX REV CODE 636 W/ 250 OVERRIDE (IP): Performed by: HOSPITALIST

## 2022-09-01 PROCEDURE — 99232 SBSQ HOSP IP/OBS MODERATE 35: CPT | Mod: GC | Performed by: INTERNAL MEDICINE

## 2022-09-01 PROCEDURE — A9270 NON-COVERED ITEM OR SERVICE: HCPCS | Performed by: STUDENT IN AN ORGANIZED HEALTH CARE EDUCATION/TRAINING PROGRAM

## 2022-09-01 PROCEDURE — 700102 HCHG RX REV CODE 250 W/ 637 OVERRIDE(OP)

## 2022-09-01 PROCEDURE — 700101 HCHG RX REV CODE 250: Performed by: STUDENT IN AN ORGANIZED HEALTH CARE EDUCATION/TRAINING PROGRAM

## 2022-09-01 PROCEDURE — A9270 NON-COVERED ITEM OR SERVICE: HCPCS | Performed by: NURSE PRACTITIONER

## 2022-09-01 PROCEDURE — 700102 HCHG RX REV CODE 250 W/ 637 OVERRIDE(OP): Performed by: NURSE PRACTITIONER

## 2022-09-01 PROCEDURE — A9270 NON-COVERED ITEM OR SERVICE: HCPCS | Performed by: INTERNAL MEDICINE

## 2022-09-01 PROCEDURE — A9270 NON-COVERED ITEM OR SERVICE: HCPCS

## 2022-09-01 PROCEDURE — 700102 HCHG RX REV CODE 250 W/ 637 OVERRIDE(OP): Performed by: STUDENT IN AN ORGANIZED HEALTH CARE EDUCATION/TRAINING PROGRAM

## 2022-09-01 PROCEDURE — 80053 COMPREHEN METABOLIC PANEL: CPT

## 2022-09-01 PROCEDURE — 90935 HEMODIALYSIS ONE EVALUATION: CPT

## 2022-09-01 PROCEDURE — 770001 HCHG ROOM/CARE - MED/SURG/GYN PRIV*

## 2022-09-01 PROCEDURE — 700111 HCHG RX REV CODE 636 W/ 250 OVERRIDE (IP)

## 2022-09-01 PROCEDURE — 84100 ASSAY OF PHOSPHORUS: CPT

## 2022-09-01 PROCEDURE — 700111 HCHG RX REV CODE 636 W/ 250 OVERRIDE (IP): Performed by: STUDENT IN AN ORGANIZED HEALTH CARE EDUCATION/TRAINING PROGRAM

## 2022-09-01 PROCEDURE — 36415 COLL VENOUS BLD VENIPUNCTURE: CPT

## 2022-09-01 PROCEDURE — 700111 HCHG RX REV CODE 636 W/ 250 OVERRIDE (IP): Performed by: NURSE PRACTITIONER

## 2022-09-01 PROCEDURE — 83735 ASSAY OF MAGNESIUM: CPT

## 2022-09-01 PROCEDURE — 85025 COMPLETE CBC W/AUTO DIFF WBC: CPT

## 2022-09-01 RX ORDER — OXYCODONE HYDROCHLORIDE 5 MG/1
5 TABLET ORAL ONCE
Status: COMPLETED | OUTPATIENT
Start: 2022-09-01 | End: 2022-09-01

## 2022-09-01 RX ORDER — LACTULOSE 20 G/30ML
45 SOLUTION ORAL 3 TIMES DAILY
Status: DISCONTINUED | OUTPATIENT
Start: 2022-09-01 | End: 2022-09-04 | Stop reason: HOSPADM

## 2022-09-01 RX ADMIN — Medication 1334 MG: at 11:50

## 2022-09-01 RX ADMIN — EPOETIN ALFA-EPBX 5000 UNITS: 3000 INJECTION, SOLUTION INTRAVENOUS; SUBCUTANEOUS at 07:30

## 2022-09-01 RX ADMIN — FUROSEMIDE 80 MG: 10 INJECTION, SOLUTION INTRAMUSCULAR; INTRAVENOUS at 09:34

## 2022-09-01 RX ADMIN — FUROSEMIDE 80 MG: 10 INJECTION, SOLUTION INTRAMUSCULAR; INTRAVENOUS at 17:24

## 2022-09-01 RX ADMIN — OXYCODONE 5 MG: 5 TABLET ORAL at 09:31

## 2022-09-01 RX ADMIN — LACTULOSE 30 ML: 20 SOLUTION ORAL at 11:50

## 2022-09-01 RX ADMIN — CARVEDILOL 25 MG: 25 TABLET, FILM COATED ORAL at 17:23

## 2022-09-01 RX ADMIN — LACTULOSE 30 ML: 20 SOLUTION ORAL at 09:32

## 2022-09-01 RX ADMIN — OXYCODONE 5 MG: 5 TABLET ORAL at 17:34

## 2022-09-01 RX ADMIN — METOLAZONE 2.5 MG: 2.5 TABLET ORAL at 09:30

## 2022-09-01 RX ADMIN — CARVEDILOL 25 MG: 25 TABLET, FILM COATED ORAL at 09:30

## 2022-09-01 RX ADMIN — METOLAZONE 2.5 MG: 2.5 TABLET ORAL at 17:23

## 2022-09-01 RX ADMIN — HEPARIN SODIUM 3100 UNITS: 1000 INJECTION, SOLUTION INTRAVENOUS; SUBCUTANEOUS at 09:00

## 2022-09-01 RX ADMIN — LACTULOSE 45 ML: 20 SOLUTION ORAL at 17:28

## 2022-09-01 RX ADMIN — HEPARIN SODIUM 5000 UNITS: 5000 INJECTION, SOLUTION INTRAVENOUS; SUBCUTANEOUS at 17:23

## 2022-09-01 RX ADMIN — Medication 1334 MG: at 09:30

## 2022-09-01 RX ADMIN — Medication 1334 MG: at 17:23

## 2022-09-01 RX ADMIN — OXYCODONE 5 MG: 5 TABLET ORAL at 00:37

## 2022-09-01 RX ADMIN — LIDOCAINE 1 PATCH: 50 PATCH CUTANEOUS at 14:53

## 2022-09-01 RX ADMIN — Medication 1000 UNITS: at 09:30

## 2022-09-01 RX ADMIN — OXYCODONE 5 MG: 5 TABLET ORAL at 21:00

## 2022-09-01 RX ADMIN — HEPARIN SODIUM 5000 UNITS: 5000 INJECTION, SOLUTION INTRAVENOUS; SUBCUTANEOUS at 09:32

## 2022-09-01 ASSESSMENT — PAIN DESCRIPTION - PAIN TYPE
TYPE: ACUTE PAIN

## 2022-09-01 NOTE — PROGRESS NOTES
"  [facility] -  PROGRESS NOTE               Author: Denilson Hamilton M.D. Date & Time: 9/1/2022  11:19 AM     HPI:  45 yo M with End Stage Renal Disease on hemodialysis at [facility] every Tues/Thurs/Sat, last HD at [facility] in 4/2022, then traveled and lost HD chair. Other PMH diabetes mellitus and Hypertension who presented to the emergency department on 8/8/22 for weakness. He missed 1 months of HD due to traveling. He was found to be hypertensive 153/99, afebrile. Labs with K of 6.1, BUN 93, bicarb 10.   No F/C/N/V/CP/SOB.  No melena, hematochezia, hematemesis.  No HA, visual changes, or abdominal pain.    DAILY NEPHROLOGY SUMMARY:  See note from 8/31 for prior summaries  9/01: NAEO, SOD, no complaints outside the usual    REVIEW OF SYSTEMS:    10 point ROS reviewed and is as per HPI/daily summary or otherwise negative    PMH/PSH/SH/FH: Reviewed and unchanged since admission note  CURRENT MEDICATIONS: Reviewed from admission to present day    VS:  BP (!) 150/84   Pulse 74   Temp 36.6 °C (97.9 °F) (Temporal)   Resp 19   Ht 1.753 m (5' 9\")   Wt 99.6 kg (219 lb 9.3 oz)   SpO2 98%   BMI 32.43 kg/m²   Physical Exam  Nursing note reviewed.   Constitutional:       Appearance: Normal appearance.   Eyes:      General: No scleral icterus.  Cardiovascular:      Comments: BLE edema  Pulmonary:      Effort: Pulmonary effort is normal.   Abdominal:      General: There is no distension.   Musculoskeletal:         General: No deformity.      Comments: LAVG (+thrill/bruit)   Skin:     Findings: Erythema (BLE and forehead) present.   Neurological:      Mental Status: He is alert.   Psychiatric:         Behavior: Behavior normal.     Fluids:  In: 740 [P.O.:740]  Out: 200     LABS:  Recent Labs     08/30/22  0801   SODIUM 132*   POTASSIUM 4.9   CHLORIDE 92*   CO2 26   GLUCOSE 133*   BUN 58*   CREATININE 7.84*   CALCIUM 8.4*       IMPRESSION:  # ESRD  - Etiology likely 2/2 DM/HTN  - HD qTTHS via RIJ " TDC  - Lost his HD chair at [facility] -> [facility] for [facility]  # Thrombosed Left AVF  - US 2/23 no flow  - s/p OR 2/25 AVG Brachio-axillary Creation, thrombectomy and fistulogram   - AVG US pending  - AVG is deep when RN's attempted and even though they were able  to cannulate he moves his arm and causes infiltration due to low pain threshold  # HTN, fair control   - Goal BP < 140/90  # Anemia of CKD, below target  - Goal Hgb 10-11   - % sat 32  - Ferritin 879  - JACKIE with HD   # CKD-MBD  - Ca 8.1 with low albumin  - PO4 6.2  -   - On cholecalciferol and calcium acetate   # Hyperkalemia, corrected  - Due to missed HD  - Manage with HD and low K+ diet  - On Veltessa fir now as missed iHD  # Weakness  # h/o Methamphetamine use   # Homelessness  # DM   - A1C 6.5  # Severe metabolic acidosis, corrected   # Wound Cx (+) MRSA  - ABX per primary services  - On Vanco  - Blood cultures 8/22 negative so far     PLAN:  - TTS iHD  - UF as tolerated  - Use PermCath for now  - AVG eval as OP via surgery or [facility]  - Hydroxyzine 25mg PRN  - Transfuse PRN Hgb <7.0   - JACKIE with HD to goal Hgb 10-11  - No dietary protein restrictions  - Dose all meds per ESRD/iHD  - Renal diet  - [facility] for OP HD when ready for DC

## 2022-09-01 NOTE — PROGRESS NOTES
Alert but irritable , refuses the bed alarm despite education. States he hasn't had a BM yet with lactulose but will follow up in the morning    Irritable with morning questioning of bowel movements and urination- stated NO but unsure of his honestly

## 2022-09-01 NOTE — PROGRESS NOTES
Received report from NOC RN, pt care assumed. VS stable on RA. No signs of acute distress. Pt is aaox4 and c/o bilateral leg pain. PRN pain medication given. Call light within reach. Pt refuses bed-alarm.

## 2022-09-01 NOTE — CARE PLAN
Problem: Bowel Elimination  Goal: Establish and maintain regular bowel function  Outcome: Not Progressing     Problem: Knowledge Deficit - Standard  Goal: Patient and family/care givers will demonstrate understanding of plan of care, disease process/condition, diagnostic tests and medications  Outcome: Progressing     Problem: Pain - Standard  Goal: Alleviation of pain or a reduction in pain to the patient’s comfort goal  9/1/2022 1629 by Molly Strong REduinNEduin  Outcome: Progressing  9/1/2022 1628 by Molly Strong R.NEduin  Outcome: Progressing     Problem: Fall Risk  Goal: Patient will remain free from falls  Outcome: Progressing   The patient is Stable - Low risk of patient condition declining or worsening    Shift Goals  Clinical Goals: pain management. discharge planning  Patient Goals: Pain management  Family Goals: POLINA    Progress made toward(s) clinical / shift goals:  pt has not had a bowel movement yet. Pt refusing suppository. Will attempt to see if pt is willing to try warm prune juice with butter.     Patient is not progressing towards the following goals:      Problem: Bowel Elimination  Goal: Establish and maintain regular bowel function  Outcome: Not Progressing

## 2022-09-01 NOTE — PROGRESS NOTES
[facility] Internal Medicine Daily Progress Note    Date of Service  8/31/2022    [facility] Team: R IM Purple Team   Attending: Nino Reyes M.d.  Senior Resident: Dr. Clement  Intern:  Dr. Adorno  Contact Number: [phone]    Chief Complaint  Ambrose Watkins is a 46 y.o. male admitted 8/8/2022 with progressive weakness with associated nausea, vomiting, and diarrhea due to missed dialysis    Hospital Course  45 yo male with PMH of diabetes,HFrEF, HTN, and ESRD on HD who presented 8/8/2022 for missing dialysis for past 3 weeks due to travelling to [place] for leisure and did not know where to go to get dialysis. In addition, patient did not have a dialysis chair here in [place].  He states he receives dialysis in [place].  He has been experiencing progressive weakness and associated nausea, vomiting, and diarrhea.  He came to the ER and was found to have electrolyte abnormalities including hyperkalemia, elevated creatinine BUN, and low calcium.  Nephrology consulted and stated that patient will require multiple sessions of dialysis given numerous missed sessions. Currently following TThS schedule for HD, with vitals/labs gradually improving.   8/22 MRSA growth from armpit or RIJ swab - started on Vancomycin 8/22 - 8/24 attempt to use left AVM for dialysis  8/24 BCX obtained - No growth 48 hrs. Switch to Doxycycline 8/24 -  8/26 Lasix 80mg PO due to fluid overload  8/27 Urinating well with Lasix, will continue. US AVG - normal flow, stenosis prox biceps, min mural thrombus graft wall at mid biceps w/no flow disturbance, outflow axillary vein widely patent. Nephrology consulting Vascular surgery for evaluation to bring up AVG.  8/28 Bilateral lower extremity pain and edema. Continue diuresis with Lasix 80mg IV. Strict I/O's.  8/29 Bilateral lower extremity pain and edema. Continue diuresis with Lasix and Metolazone     Interval Problem Update  No acute events overnight. Patient better mood today as was not  "woken up. Still endorses bilateral thigh pain. Dialysis scheduled for tomorrow.     Patient cleared for discharge from Nephrology standpoint. Dialysis scheduled for tomorrow (9/1), pt follows TTHSAT schedule and as he cannot be a new patient on SAT. Consideration for discharge with confirmed dialysis chair for next week. Discussed with Dr. Hartman, AVG can be evaluated outpatient.     I have discussed this patient's plan of care and discharge plan at IDT rounds today with Case Management, Nursing, Nursing leadership, and other members of the IDT team.    Consultants/Specialty  nephrology    Code Status  Full Code    Disposition  Patient is not medically cleared for discharge.   Anticipate discharge to to home with close outpatient follow-up.  I have placed the appropriate orders for post-discharge needs.    Review of Systems  Constitutional:  Positive for malaise/fatigue. Negative for chills, diaphoresis and fever.  Right IJ stitching removed. Armpit and forehead boils and itchiness.  Respiratory:  Negative for cough and shortness of breath.    Cardiovascular:  Positive for leg swelling. Negative for chest pain and palpitations.   Gastrointestinal:  Positive for abdominal pain and constipation. Negative for diarrhea, nausea and vomiting.    Genitourinary:         Reports minimal urine production   Musculoskeletal:         Bilateral leg pain - right thigh pain with light touch    Neurological:  Positive for weakness. Negative for dizziness and headaches.      /74   Pulse 72   Temp 36.4 °C (97.6 °F) (Temporal)   Resp 18   Ht 1.753 m (5' 9\")   Wt 99.6 kg (219 lb 9.3 oz)   SpO2 94%   BMI 32.43 kg/m²     Physical Exam  Constitutional:       General: He is not in acute distress.     Appearance: He is ill-appearing. He is not toxic-appearing.   HENT:      Head: Normocephalic and atraumatic.      Right Ear: External ear normal.      Left Ear: External ear normal.      Nose: Nose normal.      Mouth/Throat:      " Mouth: Mucous membranes are moist.   Eyes:      Extraocular Movements: Extraocular movements intact.      Conjunctiva/sclera: Conjunctivae normal.   Cardiovascular:      Rate and Rhythm: Normal rate and regular rhythm.      Pulses: Normal pulses.      Heart sounds: Normal heart sounds. No murmur heard.     Comments: Right internal jugular tunneled catheter with bandaging.  LUE AV fistula - surrounding area edema improved.   Pulmonary:      Effort: Pulmonary effort is normal. No respiratory distress.      Breath sounds: Normal breath sounds. No wheezing or rales.   Abdominal:      General: There is distension. Significant improvement in abdominal distension, no fluid wave. Clear bowel sounds.      Tenderness: No tenderness. There is no guarding or rebound.   Musculoskeletal:         General: Swelling present.      Cervical back: Normal range of motion.      Right lower leg: Edema present.      Left lower leg: Edema present.      Bilateral extremities with increased non pitting edema. Tenderness to bilateral thighs   Skin:     General: Skin is warm and dry.      Comments: Excoriations of upper extremities, lower extremities, scalp, chest/abdomen. Right underarm boils erythematous/tender. Forehead boil tender/erythematous with scab.  Neurological:      General: No focal deficit present.      Mental Status: He is alert and oriented to person, place, and time.   Psychiatric:         Thought Content: Thought content normal    Fluids    Intake/Output Summary (Last 24 hours) at 8/31/2022 1741  Last data filed at 8/31/2022 1100  Gross per 24 hour   Intake 840 ml   Output 525 ml   Net 315 ml       Laboratory  Recent Labs     08/30/22  0801   WBC 7.9   RBC 2.57*   HEMOGLOBIN 7.4*   HEMATOCRIT 22.9*   MCV 89.1   MCH 28.8   MCHC 32.3*   RDW 52.9*   PLATELETCT 335   MPV 9.2     Recent Labs     08/29/22  0806 08/30/22  0801   SODIUM 132* 132*   POTASSIUM 4.8 4.9   CHLORIDE 93* 92*   CO2 26 26   GLUCOSE 154* 133*   BUN 47* 58*    CREATININE 6.59* 7.84*   CALCIUM 8.3* 8.4*                   Imaging  US-HEMODIALYSIS GRAFT DUPLEX COMP UPPER EXTREMITY   Final Result      CT-ABDOMEN-PELVIS W/O   Final Result      1.  Micronodular appearance of the liver consistent with hepatocellular disease.      2.  Large amount of ascites throughout the abdomen and pelvis.      3.  Adynamic ileus without evidence of bowel obstruction.      4.  Anasarca.      5.  Bilateral spondylolysis of the pars interarticularis at the L5-S1 level.      US-EXTREMITY VENOUS LOWER BILAT   Final Result      US-ABDOMEN COMPLETE SURVEY   Final Result      1.  Mildly increased hepatic echotexture is suggestive of fatty infiltration.      2.  Small-to-moderate amount of ascites.      3.  Cholelithiasis         DX-ABDOMEN COMPLETE WITH AP OR PA CXR   Final Result      Fairly diffuse bowel distention with scattered air-fluid levels. Findings are likely related to ileus. More distal obstruction cannot be excluded.      DX-CHEST-PORTABLE (1 VIEW)   Final Result      1.  Mildly enlarged cardiac silhouette with vascular congestion.           Assessment/Plan  Problem Representation:    * Noncompliance of patient with renal dialysis (HCC)- (present on admission)  Assessment & Plan  Has not had dialysis in the past 3 weeks, due to traveling to [place]. Obtained HD in [place], however does not have an outpatient dialysis chair in [place] and last year in [place].     -Signs/symptoms and labs/vitals slowly normalizing  -Nephrology following, on TThS dialysis and as needed  -Suspician for RIJ infection - Vancomycin started 8/22 - 8/24 switch to Doxycycline 8/24 - given negative BCX  -Lasix 80mg IV BID, Metolazone 2.5 mg BID 30 minutes before Lasix with strict I/O's   -CTM fluid status    Multiple air fluid levels of small intestine determined by X-ray  Assessment & Plan  Patients states he has been having watery stools multiple times a day for many days. He is unable to recount when  his last solid stool was. Abdomen has become more distended and pain has increased. Abdominal xray with bowel distention with air-fluid levels. Pt has been taking oxycodone for leg pain since 8/8.     -Will continue to monitor with serial abdominal exam  -CT abdo 8/22 with marked ascites, continue with dialysis   -Significant improvement in abdominal distention 8/28       Lower extremity pain, bilateral  Assessment & Plan  Anterior leg pain noted on admission and patient given oxycodone for pain. Most likely related to edema due to missed dialysis sessions. CPK and lactic acid wnl. Ambulatory.   -Oxycodone for pain control  -Bilateral DVT US negative  -Continue with diuresis for edema     End stage renal disease (HCC)- (present on admission)  Assessment & Plan  Patient received HD in [place], has not received HD in 3 weeks prior to admission.  Patient also had multiple thrombectomies/revisions for AV fistula.  Patient has tunneled dialysis catheter in place and functioning. Patient not adherent to HD or outpatient medications.  Nephrology following. Per pharmacy ([facility]) pt has not picked up any medications. Prescribed medications were 01/2022 - Trazodone 50mg QHS, Calcitriol 0.25mg OD, Furosemide 80mg OD, Ca-acetate 667-270 TID, Lisinopril 20mg OD. 12/31/2021 amplodipine 5mg, glipizide ER 5mg, carvedilol 25 BID    -Currently on cholecalciferol   -Per nephrology note, AVG use per Vasc Surg recs; currently using RIJ TDC, last note 3/1 not yet cleared for use. Due to MRSA growth from skin culture, next dialysis will attempt to use AVG.   -Dialysis schedule TThSat and as needed  -Dialysis Thursday tomorrow 9/1, cannot be new patient for dialysis on Sat. Discharge pending dialysis chair confirmation next week.  -Discussed with Dr. Hartman that AVM can be evaluated outpatient       Hypertension- (present on admission)  Assessment & Plan  History of uncontrolled hypertension. Likely due to  missing dialysis and nonadherence to outpatient BP medications    -Hydralazine as needed due to history of CHF  -BP well controlled with Carvedilol 25mg twice daily       VTE prophylaxis: heparin ppx    I have performed a physical exam and reviewed and updated ROS and Plan today (8/31/2022). In review of yesterday's note (8/30/2022), there are no changes except as documented above.

## 2022-09-01 NOTE — PROGRESS NOTES
HD treatment today per routine order using tunneled CVC.Treatment tolerated well.Net UF removed 3800 ml.CVC dressing changed and locked with heparin.Report given to primary Rn.

## 2022-09-02 PROCEDURE — 700102 HCHG RX REV CODE 250 W/ 637 OVERRIDE(OP)

## 2022-09-02 PROCEDURE — 700102 HCHG RX REV CODE 250 W/ 637 OVERRIDE(OP): Performed by: HOSPITALIST

## 2022-09-02 PROCEDURE — 700111 HCHG RX REV CODE 636 W/ 250 OVERRIDE (IP): Performed by: HOSPITALIST

## 2022-09-02 PROCEDURE — A9270 NON-COVERED ITEM OR SERVICE: HCPCS | Performed by: STUDENT IN AN ORGANIZED HEALTH CARE EDUCATION/TRAINING PROGRAM

## 2022-09-02 PROCEDURE — 700101 HCHG RX REV CODE 250: Performed by: STUDENT IN AN ORGANIZED HEALTH CARE EDUCATION/TRAINING PROGRAM

## 2022-09-02 PROCEDURE — 99232 SBSQ HOSP IP/OBS MODERATE 35: CPT | Mod: GC | Performed by: INTERNAL MEDICINE

## 2022-09-02 PROCEDURE — 700102 HCHG RX REV CODE 250 W/ 637 OVERRIDE(OP): Performed by: STUDENT IN AN ORGANIZED HEALTH CARE EDUCATION/TRAINING PROGRAM

## 2022-09-02 PROCEDURE — A9270 NON-COVERED ITEM OR SERVICE: HCPCS

## 2022-09-02 PROCEDURE — A9270 NON-COVERED ITEM OR SERVICE: HCPCS | Performed by: NURSE PRACTITIONER

## 2022-09-02 PROCEDURE — 700111 HCHG RX REV CODE 636 W/ 250 OVERRIDE (IP)

## 2022-09-02 PROCEDURE — 770001 HCHG ROOM/CARE - MED/SURG/GYN PRIV*

## 2022-09-02 PROCEDURE — A9270 NON-COVERED ITEM OR SERVICE: HCPCS | Performed by: INTERNAL MEDICINE

## 2022-09-02 PROCEDURE — A9270 NON-COVERED ITEM OR SERVICE: HCPCS | Performed by: HOSPITALIST

## 2022-09-02 PROCEDURE — 700102 HCHG RX REV CODE 250 W/ 637 OVERRIDE(OP): Performed by: INTERNAL MEDICINE

## 2022-09-02 PROCEDURE — 700102 HCHG RX REV CODE 250 W/ 637 OVERRIDE(OP): Performed by: NURSE PRACTITIONER

## 2022-09-02 RX ORDER — POLYETHYLENE GLYCOL 3350 17 G/17G
1 POWDER, FOR SOLUTION ORAL DAILY
Status: DISCONTINUED | OUTPATIENT
Start: 2022-09-03 | End: 2022-09-02

## 2022-09-02 RX ORDER — CHOLECALCIFEROL (VITAMIN D3) 125 MCG
5 CAPSULE ORAL ONCE
Status: COMPLETED | OUTPATIENT
Start: 2022-09-02 | End: 2022-09-02

## 2022-09-02 RX ORDER — NALTREXONE HYDROCHLORIDE 50 MG/1
50 TABLET, FILM COATED ORAL DAILY
Status: DISCONTINUED | OUTPATIENT
Start: 2022-09-02 | End: 2022-09-02

## 2022-09-02 RX ORDER — POLYETHYLENE GLYCOL 3350 17 G/17G
1 POWDER, FOR SOLUTION ORAL DAILY
Status: DISCONTINUED | OUTPATIENT
Start: 2022-09-02 | End: 2022-09-04 | Stop reason: HOSPADM

## 2022-09-02 RX ORDER — DIPHENHYDRAMINE HYDROCHLORIDE, ZINC ACETATE 2; .1 G/100G; G/100G
CREAM TOPICAL 3 TIMES DAILY PRN
Status: DISCONTINUED | OUTPATIENT
Start: 2022-09-02 | End: 2022-09-04 | Stop reason: HOSPADM

## 2022-09-02 RX ORDER — ACETAMINOPHEN 500 MG
1000 TABLET ORAL EVERY 8 HOURS PRN
Status: DISCONTINUED | OUTPATIENT
Start: 2022-09-02 | End: 2022-09-04 | Stop reason: HOSPADM

## 2022-09-02 RX ADMIN — CARVEDILOL 25 MG: 25 TABLET, FILM COATED ORAL at 16:59

## 2022-09-02 RX ADMIN — ACETAMINOPHEN 1000 MG: 500 TABLET ORAL at 21:22

## 2022-09-02 RX ADMIN — Medication 1000 UNITS: at 08:43

## 2022-09-02 RX ADMIN — LACTULOSE 45 ML: 20 SOLUTION ORAL at 08:43

## 2022-09-02 RX ADMIN — Medication 5 MG: at 21:22

## 2022-09-02 RX ADMIN — METOLAZONE 2.5 MG: 2.5 TABLET ORAL at 16:59

## 2022-09-02 RX ADMIN — CARVEDILOL 25 MG: 25 TABLET, FILM COATED ORAL at 08:43

## 2022-09-02 RX ADMIN — HEPARIN SODIUM 5000 UNITS: 5000 INJECTION, SOLUTION INTRAVENOUS; SUBCUTANEOUS at 16:59

## 2022-09-02 RX ADMIN — LACTULOSE 45 ML: 20 SOLUTION ORAL at 16:58

## 2022-09-02 RX ADMIN — FUROSEMIDE 80 MG: 10 INJECTION, SOLUTION INTRAMUSCULAR; INTRAVENOUS at 08:43

## 2022-09-02 RX ADMIN — Medication 1334 MG: at 13:44

## 2022-09-02 RX ADMIN — METOLAZONE 2.5 MG: 2.5 TABLET ORAL at 08:43

## 2022-09-02 RX ADMIN — METHYLNALTREXONE BROMIDE 6 MG: 12 INJECTION, SOLUTION SUBCUTANEOUS at 13:44

## 2022-09-02 RX ADMIN — Medication 1334 MG: at 08:43

## 2022-09-02 RX ADMIN — LIDOCAINE 1 PATCH: 50 PATCH CUTANEOUS at 13:43

## 2022-09-02 RX ADMIN — HEPARIN SODIUM 5000 UNITS: 5000 INJECTION, SOLUTION INTRAVENOUS; SUBCUTANEOUS at 08:43

## 2022-09-02 RX ADMIN — LACTULOSE 45 ML: 20 SOLUTION ORAL at 13:43

## 2022-09-02 RX ADMIN — HEPARIN SODIUM 5000 UNITS: 5000 INJECTION, SOLUTION INTRAVENOUS; SUBCUTANEOUS at 01:00

## 2022-09-02 RX ADMIN — FUROSEMIDE 80 MG: 10 INJECTION, SOLUTION INTRAMUSCULAR; INTRAVENOUS at 16:59

## 2022-09-02 RX ADMIN — POLYETHYLENE GLYCOL 3350 1 PACKET: 17 POWDER, FOR SOLUTION ORAL at 20:25

## 2022-09-02 RX ADMIN — OXYCODONE 5 MG: 5 TABLET ORAL at 02:11

## 2022-09-02 RX ADMIN — Medication 1334 MG: at 16:59

## 2022-09-02 ASSESSMENT — PAIN DESCRIPTION - PAIN TYPE
TYPE: ACUTE PAIN
TYPE: ACUTE PAIN

## 2022-09-02 NOTE — PROGRESS NOTES
Assumed care of pt and received bedside report. Pt is A&O x 4, reports pain as 10/10 to BLE. Pt was not within time frame to get next pain medication so contacted on-call and received order for one time dose of 5mg oxycodone. Administered see MAR. Bedside table and personal items within reach, bed locked in low position, pt refuses bed alarm, no additional needs at this time.

## 2022-09-02 NOTE — PROGRESS NOTES
"  [facility] -  PROGRESS NOTE               Author: Denilson Hamilton M.D. Date & Time: 9/2/2022  10:12 AM     HPI:  47 yo M with End Stage Renal Disease on hemodialysis at [facility] every Tues/Thurs/Sat, last HD at [facility] in 4/2022, then traveled and lost HD chair. Other PMH diabetes mellitus and Hypertension who presented to the emergency department on 8/8/22 for weakness. He missed 1 months of HD due to traveling. He was found to be hypertensive 153/99, afebrile. Labs with K of 6.1, BUN 93, bicarb 10.   No F/C/N/V/CP/SOB.  No melena, hematochezia, hematemesis.  No HA, visual changes, or abdominal pain.    DAILY NEPHROLOGY SUMMARY:  See note from 8/31 for prior summaries  9/01: NAEO, SOD, no complaints outside the usual  9/02: NAEO, no complaints, sitting in chair    REVIEW OF SYSTEMS:    10 point ROS reviewed and is as per HPI/daily summary or otherwise negative    PMH/PSH/SH/FH: Reviewed and unchanged since admission note  CURRENT MEDICATIONS: Reviewed from admission to present day    VS:  /79   Pulse 79   Temp 37 °C (98.6 °F) (Temporal)   Resp 17   Ht 1.753 m (5' 9\")   Wt 99.6 kg (219 lb 9.3 oz)   SpO2 95%   BMI 32.43 kg/m²   Physical Exam  Nursing note reviewed.   Constitutional:       Appearance: Normal appearance.   Eyes:      General: No scleral icterus.  Cardiovascular:      Comments: BLE edema  Pulmonary:      Effort: Pulmonary effort is normal.   Abdominal:      General: There is no distension.   Musculoskeletal:         General: No deformity.      Comments: LAVG (+thrill/bruit)   Skin:     Findings: Erythema (BLE and forehead) present.   Neurological:      Mental Status: He is alert.   Psychiatric:         Behavior: Behavior normal.     Fluids:  In: 360 [P.O.:360]  Out: 3800     LABS:  Recent Labs     09/01/22  1000   SODIUM 133*   POTASSIUM 4.3   CHLORIDE 93*   CO2 28   GLUCOSE 107*   BUN 29*   CREATININE 4.79*   CALCIUM 8.8       IMPRESSION:  # ESRD  - " Etiology likely 2/2 DM/HTN  - HD qTTHS via [facility] TDC  - Lost his HD chair at [facility] -> plan for [facility]  # Thrombosed Left AVF  - US 2/23 no flow  - s/p OR 2/25 AVG Brachio-axillary Creation, thrombectomy and fistulogram   - AVG US pending  - AVG is deep when RN's attempted and even though they were able  to cannulate he moves his arm and causes infiltration due to low pain threshold  # HTN, fair control   - Goal BP < 140/90  # Anemia of CKD, below target  - Goal Hgb 10-11   - % sat 32  - Ferritin 879  - JACKIE with HD   # CKD-MBD  - Ca 8.1 with low albumin  - PO4 6.2  -   - On cholecalciferol and calcium acetate   # Hyperkalemia, corrected  - Due to missed HD  - Manage with HD and low K+ diet  - On Veltessa fir now as missed iHD  # Weakness  # h/o Methamphetamine use   # Homelessness  # DM   - A1C 6.5  # Severe metabolic acidosis, corrected   # Wound Cx (+) MRSA  - ABX per primary services  - On Vanco  - Blood cultures 8/22 negative so far     PLAN:  - TTS iHD  - UF as tolerated  - Use PermCath for now  - AVG eval as OP via [facility]  - Hydroxyzine 25mg PRN  - Transfuse PRN Hgb <7.0   - JACKIE with HD to goal Hgb 10-11  - No dietary protein restrictions  - Dose all meds per ESRD/iHD  - Renal diet  - [facility] for OP HD when ready for DC

## 2022-09-02 NOTE — DISCHARGE PLANNING
Case Management Discharge Planning    Admission Date: 8/8/2022  GMLOS: 3.1  ALOS: 25    6-Clicks ADL Score: 23  6-Clicks Mobility Score: 23      Anticipated Discharge Dispo: Discharge Disposition: Discharged to home/self care (01)    DME Needed: No    Action(s) Taken: Updated Provider/Nurse on Discharge Plan    Escalations Completed: None    Medically Clear: No    Next Steps: Patient discussed with MD, DC plan is home tomorrow after dialysis. CM spoke with dialysis coordinator Zitlaly Reyes who confirmed outpatient HD clinic is aware patient will be at [facility] for dialysis. No other DC planning needs identified.    Barriers to Discharge: Medical clearance    Is the patient up for discharge tomorrow: Yes    Is transport arranged for discharge disposition: No, patient has vehicle in hospital garage.

## 2022-09-02 NOTE — CARE PLAN
The patient is Stable - Low risk of patient condition declining or worsening    Shift Goals  Clinical Goals: Pain management  Patient Goals: Pain management  Family Goals: POLINA    Progress made toward(s) clinical / shift goals:  Managed pt's pain per orders overnight. Pain remains to be in the BLE.     Problem: Knowledge Deficit - Standard  Goal: Patient and family/care givers will demonstrate understanding of plan of care, disease process/condition, diagnostic tests and medications  Outcome: Progressing     Problem: Pain - Standard  Goal: Alleviation of pain or a reduction in pain to the patient’s comfort goal  Outcome: Progressing     Problem: Provide Safe Environment  Goal: Suicide environmental safety, protocols, policies, and practices will be implemented  Outcome: Progressing     Problem: Psychosocial  Goal: Patient's ability to identify and develop effective coping behaviors will improve  Outcome: Progressing  Goal: Patient's ability to identify and utilize available support systems will improve  Outcome: Progressing     Problem: Fall Risk  Goal: Patient will remain free from falls  Outcome: Progressing     Problem: Bowel Elimination  Goal: Establish and maintain regular bowel function  Outcome: Progressing

## 2022-09-02 NOTE — PROGRESS NOTES
[facility] Internal Medicine Daily Progress Note    Date of Service  9/1/2022    [employer] Team: R IM Purple Team   Attending: Nino Reyes M.d.  Senior Resident: Dr. Giles  Intern:  Dr. Adorno  Contact Number: [phone]    Chief Complaint  Ambrose Watkins is a 46 y.o. male admitted 8/8/2022 with progressive weakness with associated nausea, vomiting, and diarrhea due to missed dialysis    Hospital Course  45 yo male with PMH of diabetes,HFrEF, HTN, and ESRD on HD who presented 8/8/2022 for missing dialysis for past 3 weeks due to travelling to [place] for leisure and did not know where to go to get dialysis. In addition, patient did not have a dialysis chair here in [place].  He states he receives dialysis in [place].  He has been experiencing progressive weakness and associated nausea, vomiting, and diarrhea.  He came to the ER and was found to have electrolyte abnormalities including hyperkalemia, elevated creatinine BUN, and low calcium.  Nephrology consulted and stated that patient will require multiple sessions of dialysis given numerous missed sessions. Currently following [facility] schedule for HD, with vitals/labs gradually improving.   8/22 MRSA growth from armpit or RIJ swab - started on Vancomycin 8/22 - 8/24 attempt to use left AVM for dialysis  8/24 BCX obtained - No growth 48 hrs. Switch to Doxycycline 8/24 -  8/26 Lasix 80mg PO due to fluid overload  8/27 Urinating well with Lasix, will continue. US AVG - normal flow, stenosis prox biceps, min mural thrombus graft wall at mid biceps w/no flow disturbance, outflow axillary vein widely patent. Nephrology consulting Vascular surgery for evaluation to bring up AVG.  8/28 Bilateral lower extremity pain and edema. Continue diuresis with Lasix 80mg IV. Strict I/O's.  8/29 Bilateral lower extremity pain and edema. Continue diuresis with Lasix and Metolazone     Interval Problem Update  No acute events overnight. Dialysis this am. Pt still not having bowel  "movement, increase lactulose. Pt still endorses chronic bilateral thigh pain. No other complaints.    Patient cleared for discharge from Nephrology standpoint. Pt follows TTHSAT schedule and as he cannot be a new patient on SAT will need to stay for dialysis. Consideration for discharge with confirmed dialysis chair for next week. Discussed with Dr. Hartman, AVG can be evaluated outpatient.     I have discussed this patient's plan of care and discharge plan at IDT rounds today with Case Management, Nursing, Nursing leadership, and other members of the IDT team.    Consultants/Specialty  nephrology    Code Status  Full Code    Disposition  Patient is not medically cleared for discharge.   Anticipate discharge to to home with close outpatient follow-up.  I have placed the appropriate orders for post-discharge needs.    Review of Systems  Constitutional:  Positive for malaise/fatigue. Negative for chills, diaphoresis and fever.  Right IJ stitching removed. Armpit and forehead boils and itchiness.  Respiratory:  Negative for cough and shortness of breath.    Cardiovascular:  Positive for leg swelling. Negative for chest pain and palpitations.   Gastrointestinal:  Positive for abdominal pain and constipation. Negative for diarrhea, nausea and vomiting.    Genitourinary:         Reports minimal urine production   Musculoskeletal:         Bilateral leg pain - right thigh pain with light touch    Neurological:  Positive for weakness. Negative for dizziness and headaches.      BP (!) 145/92   Pulse 79   Temp 36.6 °C (97.9 °F) (Temporal)   Resp 18   Ht 1.753 m (5' 9\")   Wt 99.6 kg (219 lb 9.3 oz)   SpO2 91%   BMI 32.43 kg/m²     Physical Exam  Constitutional:       General: He is not in acute distress.     Appearance: He is ill-appearing. He is not toxic-appearing.   HENT:      Head: Normocephalic and atraumatic.      Right Ear: External ear normal.      Left Ear: External ear normal.      Nose: Nose normal.      " Mouth/Throat:      Mouth: Mucous membranes are moist.   Eyes:      Extraocular Movements: Extraocular movements intact.      Conjunctiva/sclera: Conjunctivae normal.   Cardiovascular:      Rate and Rhythm: Normal rate and regular rhythm.      Pulses: Normal pulses.      Heart sounds: Normal heart sounds. No murmur heard.     Comments: Right internal jugular tunneled catheter with bandaging.  LUE AV fistula - surrounding area edema improved.   Pulmonary:      Effort: Pulmonary effort is normal. No respiratory distress.      Breath sounds: Normal breath sounds. No wheezing or rales.   Abdominal:      General: There is distension. Significant improvement in abdominal distension, no fluid wave. Clear bowel sounds.      Tenderness: No tenderness. There is no guarding or rebound.   Musculoskeletal:         General: Swelling present.      Cervical back: Normal range of motion.      Right lower leg: Edema present.      Left lower leg: Edema present.      Bilateral extremities with increased non pitting edema. Tenderness to bilateral thighs   Skin:     General: Skin is warm and dry.      Comments: Excoriations of upper extremities, lower extremities, scalp, chest/abdomen. Right underarm boils erythematous/tender. Forehead boil tender/erythematous with scab.  Neurological:      General: No focal deficit present.      Mental Status: He is alert and oriented to person, place, and time.   Psychiatric:         Thought Content: Thought content normal    Fluids    Intake/Output Summary (Last 24 hours) at 9/1/2022 1906  Last data filed at 9/1/2022 0900  Gross per 24 hour   Intake 500 ml   Output 3800 ml   Net -3300 ml       Laboratory  Recent Labs     08/30/22  0801 09/01/22  1000   WBC 7.9 7.8   RBC 2.57* 2.86*   HEMOGLOBIN 7.4* 8.1*   HEMATOCRIT 22.9* 25.6*   MCV 89.1 89.5   MCH 28.8 28.3   MCHC 32.3* 31.6*   RDW 52.9* 52.1*   PLATELETCT 335 341   MPV 9.2 9.0     Recent Labs     08/30/22  0801 09/01/22  1000   SODIUM 132* 133*    POTASSIUM 4.9 4.3   CHLORIDE 92* 93*   CO2 26 28   GLUCOSE 133* 107*   BUN 58* 29*   CREATININE 7.84* 4.79*   CALCIUM 8.4* 8.8                   Imaging  US-HEMODIALYSIS GRAFT DUPLEX COMP UPPER EXTREMITY   Final Result      CT-ABDOMEN-PELVIS W/O   Final Result      1.  Micronodular appearance of the liver consistent with hepatocellular disease.      2.  Large amount of ascites throughout the abdomen and pelvis.      3.  Adynamic ileus without evidence of bowel obstruction.      4.  Anasarca.      5.  Bilateral spondylolysis of the pars interarticularis at the L5-S1 level.      US-EXTREMITY VENOUS LOWER BILAT   Final Result      US-ABDOMEN COMPLETE SURVEY   Final Result      1.  Mildly increased hepatic echotexture is suggestive of fatty infiltration.      2.  Small-to-moderate amount of ascites.      3.  Cholelithiasis         DX-ABDOMEN COMPLETE WITH AP OR PA CXR   Final Result      Fairly diffuse bowel distention with scattered air-fluid levels. Findings are likely related to ileus. More distal obstruction cannot be excluded.      DX-CHEST-PORTABLE (1 VIEW)   Final Result      1.  Mildly enlarged cardiac silhouette with vascular congestion.           Assessment/Plan  Problem Representation:    * Noncompliance of patient with renal dialysis (HCC)- (present on admission)  Assessment & Plan  Has not had dialysis in the past 3 weeks, due to traveling to [place]. Obtained HD in [place], however does not have an outpatient dialysis chair in [place] and last year in [place].     -Signs/symptoms and labs/vitals slowly normalizing  -Nephrology following, on TThS dialysis and as needed  -Suspician for RIJ infection - Vancomycin started 8/22 - 8/24 switch to Doxycycline 8/24 - given negative BCX  -Lasix 80mg IV BID, Metolazone 2.5 mg BID 30 minutes before Lasix with strict I/O's   -CTM fluid status    Multiple air fluid levels of small intestine determined by X-ray  Assessment & Plan  Patients states he has been  having watery stools multiple times a day for many days. He is unable to recount when his last solid stool was. Abdomen has become more distended and pain has increased. Abdominal xray with bowel distention with air-fluid levels. Pt has been taking oxycodone for leg pain since 8/8.     -Will continue to monitor with serial abdominal exam  -CT abdo 8/22 with marked ascites, continue with dialysis   -Significant improvement in abdominal distention 8/28       Lower extremity pain, bilateral  Assessment & Plan  Anterior leg pain noted on admission and patient given oxycodone for pain. Most likely related to edema due to missed dialysis sessions. CPK and lactic acid wnl. Ambulatory.   -Oxycodone for pain control  -Bilateral DVT US negative  -Continue with diuresis for edema     End stage renal disease (HCC)- (present on admission)  Assessment & Plan  Patient received HD in [place], has not received HD in 3 weeks prior to admission.  Patient also had multiple thrombectomies/revisions for AV fistula.  Patient has tunneled dialysis catheter in place and functioning. Patient not adherent to HD or outpatient medications.  Nephrology following. Per pharmacy ([facility]) pt has not picked up any medications. Prescribed medications were 01/2022 - Trazodone 50mg QHS, Calcitriol 0.25mg OD, Furosemide 80mg OD, Ca-acetate 667-270 TID, Lisinopril 20mg OD. 12/31/2021 amplodipine 5mg, glipizide ER 5mg, carvedilol 25 BID    -Currently on cholecalciferol   -Per nephrology note, AVG use per Vasc Surg recs; currently using [facility] TDC, last note 3/1 not yet cleared for use. Due to MRSA growth from skin culture, next dialysis will attempt to use AVG.   -Dialysis schedule TThSat and as needed  -Cannot be new patient for dialysis on Sat. Discharge pending dialysis chair confirmation next week.  -Discussed with Dr. Hartman that AVM can be evaluated outpatient       Hypertension- (present on admission)  Assessment &  Plan  History of uncontrolled hypertension. Likely due to missing dialysis and nonadherence to outpatient BP medications    -Hydralazine as needed due to history of CHF  -BP well controlled with Carvedilol 25mg twice daily       VTE prophylaxis: heparin ppx    I have performed a physical exam and reviewed and updated ROS and Plan today (9/1/2022). In review of yesterday's note (8/31/2022), there are no changes except as documented above.

## 2022-09-02 NOTE — PROGRESS NOTES
[facility] Internal Medicine Daily Progress Note    Date of Service  9/2/2022    [employer] Team: R IM Purple Team   Attending: Nino Reyes M.d.  Senior Resident: Dr. Giles  Intern:  Dr. Adorno  Contact Number: [phone]    Chief Complaint  Ambrose Watkins is a 46 y.o. male admitted 8/8/2022 with progressive weakness with associated nausea, vomiting, and diarrhea due to missed dialysis    Hospital Course  45 yo male with PMH of diabetes,HFrEF, HTN, and ESRD on HD who presented 8/8/2022 for missing dialysis for past 3 weeks due to travelling to [place] for leisure and did not know where to go to get dialysis. In addition, patient did not have a dialysis chair here in [place].  He states he receives dialysis in [place].  He has been experiencing progressive weakness and associated nausea, vomiting, and diarrhea.  He came to the ER and was found to have electrolyte abnormalities including hyperkalemia, elevated creatinine BUN, and low calcium.  Nephrology consulted and stated that patient will require multiple sessions of dialysis given numerous missed sessions. Currently following [facility] schedule for HD, with vitals/labs gradually improving.   8/22 MRSA growth from armpit or RIJ swab - started on Vancomycin 8/22 - 8/24 attempt to use left AVM for dialysis  8/24 BCX obtained - No growth 48 hrs. Switch to Doxycycline 8/24 - 8/30 8/26 Lasix 80mg PO due to fluid overload  8/27 Urinating well with Lasix, will continue. US AVG - normal flow, stenosis prox biceps, min mural thrombus graft wall at mid biceps w/no flow disturbance, outflow axillary vein widely patent. Nephrology consulting Vascular surgery for evaluation to bring up AVG.  8/28 Bilateral lower extremity pain and edema. Continue diuresis with Lasix 80mg IV. Strict I/O's.  8/29 Bilateral lower extremity pain and edema. Continue diuresis with Lasix and Metolazone   9/2 No bowel movements for over a week, trial methyl natrexone     Interval Problem  "Update  No acute events overnight, request for pain medication and was given oxy 5mg.  Pt still not having bowel movement after increased lactulose yesterday, trial methyl natrexone today. Pt still endorses chronic bilateral thigh pain. Bandaging over RIJ catheter had fallen off in the shower, biopatch with yellow discharge. No fever or chills, WBC wnl, BCX 8/22 no growth, Doxycycline 8/26-8/30. CTM for signs of infection.    Patient cleared for discharge from Nephrology standpoint. Pt follows TTHSAT schedule and as he cannot be a new patient on SAT will need to stay for dialysis. Consideration for discharge with confirmed dialysis chair for next week. Discussed with Dr. Hartman, AVG can be evaluated outpatient.     I have discussed this patient's plan of care and discharge plan at IDT rounds today with Case Management, Nursing, Nursing leadership, and other members of the IDT team.    Consultants/Specialty  nephrology    Code Status  Full Code    Disposition  Patient is not medically cleared for discharge.   Anticipate discharge to to home with close outpatient follow-up.  I have placed the appropriate orders for post-discharge needs.    Review of Systems  Constitutional:  Positive for malaise/fatigue. Negative for chills, diaphoresis and fever.  Right IJ stitching removed. Armpit and forehead boils and itchiness.  Respiratory:  Negative for cough and shortness of breath.    Cardiovascular:  Positive for leg swelling. Negative for chest pain and palpitations.   Gastrointestinal:  Positive for abdominal pain and constipation. Negative for diarrhea, nausea and vomiting.    Genitourinary:         Reports minimal urine production   Musculoskeletal:         Bilateral leg pain - right thigh pain with light touch    Neurological:  Positive for weakness. Negative for dizziness and headaches.      /79   Pulse 79   Temp 37 °C (98.6 °F) (Temporal)   Resp 17   Ht 1.753 m (5' 9\")   Wt 99.6 kg (219 lb 9.3 oz)   SpO2 " 95%   BMI 32.43 kg/m²     Physical Exam  Constitutional:       General: He is not in acute distress.     Appearance: He is ill-appearing. He is not toxic-appearing.   HENT:      Head: Normocephalic and atraumatic.      Right Ear: External ear normal.      Left Ear: External ear normal.      Nose: Nose normal.      Mouth/Throat:      Mouth: Mucous membranes are moist.   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      Conjunctiva/sclera: Conjunctivae normal.   Cardiovascular:      Rate and Rhythm: Normal rate and regular rhythm.      Pulses: Normal pulses.      Heart sounds: Normal heart sounds. No murmur heard.     Comments: Right internal jugular tunneled catheter with bandaging.  LUE AV fistula - surrounding area edema improved.   Pulmonary:      Effort: Pulmonary effort is normal. No respiratory distress.      Breath sounds: Normal breath sounds. No wheezing or rales.   Abdominal:      General: There is distension. Significant improvement in abdominal distension, no fluid wave. Diffuse decrease bowel sounds.     Tenderness: Slight lower abdominal tenderness. There is no guarding or rebound.   Musculoskeletal:         General: Swelling present.      Cervical back: Normal range of motion.      Right lower leg: Edema present.      Left lower leg: Edema present.      Bilateral extremities with increased non pitting edema. Tenderness to bilateral thighs   Skin:     General: Skin is warm and dry.      Comments: Excoriations of upper extremities, lower extremities, scalp, chest/abdomen. Right underarm boils erythematous/tender. Forehead boil tender/erythematous with scab.  Neurological:      General: No focal deficit present.      Mental Status: He is alert and oriented to person, place, and time.   Psychiatric:         Thought Content: Thought content normal    Fluids    Intake/Output Summary (Last 24 hours) at 9/2/2022  1250  Last data filed at 9/1/2022 1941  Gross per 24 hour   Intake 360 ml   Output --   Net 360 ml       Laboratory  Recent Labs     09/01/22  1000   WBC 7.8   RBC 2.86*   HEMOGLOBIN 8.1*   HEMATOCRIT 25.6*   MCV 89.5   MCH 28.3   MCHC 31.6*   RDW 52.1*   PLATELETCT 341   MPV 9.0     Recent Labs     09/01/22  1000   SODIUM 133*   POTASSIUM 4.3   CHLORIDE 93*   CO2 28   GLUCOSE 107*   BUN 29*   CREATININE 4.79*   CALCIUM 8.8                   Imaging  US-HEMODIALYSIS GRAFT DUPLEX COMP UPPER EXTREMITY   Final Result      CT-ABDOMEN-PELVIS W/O   Final Result      1.  Micronodular appearance of the liver consistent with hepatocellular disease.      2.  Large amount of ascites throughout the abdomen and pelvis.      3.  Adynamic ileus without evidence of bowel obstruction.      4.  Anasarca.      5.  Bilateral spondylolysis of the pars interarticularis at the L5-S1 level.      US-EXTREMITY VENOUS LOWER BILAT   Final Result      US-ABDOMEN COMPLETE SURVEY   Final Result      1.  Mildly increased hepatic echotexture is suggestive of fatty infiltration.      2.  Small-to-moderate amount of ascites.      3.  Cholelithiasis         DX-ABDOMEN COMPLETE WITH AP OR PA CXR   Final Result      Fairly diffuse bowel distention with scattered air-fluid levels. Findings are likely related to ileus. More distal obstruction cannot be excluded.      DX-CHEST-PORTABLE (1 VIEW)   Final Result      1.  Mildly enlarged cardiac silhouette with vascular congestion.           Assessment/Plan  Problem Representation:    * Noncompliance of patient with renal dialysis (HCC)- (present on admission)  Assessment & Plan  Has not had dialysis in the past 3 weeks, due to traveling to [place]. Obtained HD in [place], however does not have an outpatient dialysis chair in [place] and last year in [place].     -Signs/symptoms and labs/vitals slowly normalizing  -Nephrology following, on TThS dialysis and as needed  -Suspician for RIJ infection -  Vancomycin started 8/22,8/24 switch to Doxycycline 8/26 -8/30 given negative BCX  -Lasix 80mg IV BID, Metolazone 2.5 mg BID 30 minutes before Lasix with strict I/O's   -CTM fluid status    Multiple air fluid levels of small intestine determined by X-ray  Assessment & Plan  Patients states he has been having watery stools multiple times a day for many days. He is unable to recount when his last solid stool was. Abdomen has become more distended and pain has increased. Abdominal xray with bowel distention with air-fluid levels. Pt has been taking oxycodone for leg pain since 8/8.     -Will continue to monitor with serial abdominal exam  -CT abdo 8/22 with marked ascites, continue with dialysis   -Significant improvement in abdominal distention 8/28   -No bowel movements for past week; failed senna/miralax/lactulose refuse suppository, trial methyl natrexone       Lower extremity pain, bilateral  Assessment & Plan  Anterior leg pain noted on admission and patient given oxycodone for pain. Most likely related to edema due to missed dialysis sessions. CPK and lactic acid wnl. Ambulatory.   -Bilateral DVT US negative  -Continue with diuresis for edema   -acetaminophen for pain control    Constipation- (present on admission)  Assessment & Plan  No bowel movement for over a week after using senna docusate, miralax, and lactulose. Refuse suppository. Pt states he always had minimal watery bowel movements multiple times a day before hospitalization.  As patient has been getting oxycodone for his BLEs pain, will trial methylnatrexone for bowel movement.    End stage renal disease (HCC)- (present on admission)  Assessment & Plan  Patient received HD in [place], did not have HD for 3 weeks prior to admission.  Patient also had multiple thrombectomies/revisions for AV fistula.  Patient has tunneled dialysis catheter in place and functioning. Patient not adherent to HD or outpatient medications.  Nephrology following. Per  pharmacy ([facility]) pt has not picked up any medications. Prescribed medications were 01/2022 - Trazodone 50mg QHS, Calcitriol 0.25mg OD, Furosemide 80mg OD, Ca-acetate 667-270 TID, Lisinopril 20mg OD. 12/31/2021 amplodipine 5mg, glipizide ER 5mg, carvedilol 25 BID    -Currently on cholecalciferol   -Per nephrology note, AVG use per Vasc Surg recs; currently using [facility] TDC, last note 3/1 not yet cleared for use. Due to MRSA growth from skin culture, next dialysis will attempt to use AVG.   -Dialysis schedule TThSat and as needed  -Cannot be new patient for dialysis on Sat. Discharge pending dialysis chair confirmation next week.  -Discussed with Dr. Hartman that AVM can be evaluated outpatient       Hypertension- (present on admission)  Assessment & Plan  History of uncontrolled hypertension. Likely due to missing dialysis and nonadherence to outpatient BP medications    -Hydralazine as needed due to history of CHF  -BP well controlled with Carvedilol 25mg twice daily       VTE prophylaxis: heparin ppx    I have performed a physical exam and reviewed and updated ROS and Plan today (9/2/2022). In review of yesterday's note (9/1/2022), there are no changes except as documented above.

## 2022-09-02 NOTE — ASSESSMENT & PLAN NOTE
No bowel movement for over a week after using senna docusate, miralax, and lactulose. Refuse suppository. Pt states he always had minimal watery bowel movements multiple times a day before hospitalization.  As patient has been getting oxycodone for his BLEs pain, will trial methylnatrexone for bowel movement.

## 2022-09-03 ENCOUNTER — APPOINTMENT (OUTPATIENT)
Dept: RADIOLOGY | Facility: MEDICAL CENTER | Age: 47
DRG: 291 | End: 2022-09-03
Attending: STUDENT IN AN ORGANIZED HEALTH CARE EDUCATION/TRAINING PROGRAM
Payer: MEDICARE

## 2022-09-03 LAB
ALBUMIN SERPL BCP-MCNC: 2.9 G/DL (ref 3.2–4.9)
ALBUMIN/GLOB SERPL: 0.7 G/DL
ALP SERPL-CCNC: 154 U/L (ref 30–99)
ALT SERPL-CCNC: <5 U/L (ref 2–50)
ANION GAP SERPL CALC-SCNC: 11 MMOL/L (ref 7–16)
AST SERPL-CCNC: 7 U/L (ref 12–45)
BILIRUB SERPL-MCNC: 0.4 MG/DL (ref 0.1–1.5)
BUN SERPL-MCNC: 36 MG/DL (ref 8–22)
CALCIUM SERPL-MCNC: 8.4 MG/DL (ref 8.5–10.5)
CHLORIDE SERPL-SCNC: 94 MMOL/L (ref 96–112)
CO2 SERPL-SCNC: 27 MMOL/L (ref 20–33)
CREAT SERPL-MCNC: 5.31 MG/DL (ref 0.5–1.4)
ERYTHROCYTE [DISTWIDTH] IN BLOOD BY AUTOMATED COUNT: 51.8 FL (ref 35.9–50)
GFR SERPLBLD CREATININE-BSD FMLA CKD-EPI: 13 ML/MIN/1.73 M 2
GLOBULIN SER CALC-MCNC: 4.1 G/DL (ref 1.9–3.5)
GLUCOSE SERPL-MCNC: 130 MG/DL (ref 65–99)
HCT VFR BLD AUTO: 23.6 % (ref 42–52)
HGB BLD-MCNC: 7.7 G/DL (ref 14–18)
MCH RBC QN AUTO: 29.1 PG (ref 27–33)
MCHC RBC AUTO-ENTMCNC: 32.6 G/DL (ref 33.7–35.3)
MCV RBC AUTO: 89.1 FL (ref 81.4–97.8)
PLATELET # BLD AUTO: 296 K/UL (ref 164–446)
PMV BLD AUTO: 8.8 FL (ref 9–12.9)
POTASSIUM SERPL-SCNC: 4.3 MMOL/L (ref 3.6–5.5)
PROT SERPL-MCNC: 7 G/DL (ref 6–8.2)
RBC # BLD AUTO: 2.65 M/UL (ref 4.7–6.1)
SODIUM SERPL-SCNC: 132 MMOL/L (ref 135–145)
WBC # BLD AUTO: 7.5 K/UL (ref 4.8–10.8)

## 2022-09-03 PROCEDURE — 700101 HCHG RX REV CODE 250

## 2022-09-03 PROCEDURE — 80053 COMPREHEN METABOLIC PANEL: CPT

## 2022-09-03 PROCEDURE — A9270 NON-COVERED ITEM OR SERVICE: HCPCS

## 2022-09-03 PROCEDURE — 700111 HCHG RX REV CODE 636 W/ 250 OVERRIDE (IP): Performed by: NURSE PRACTITIONER

## 2022-09-03 PROCEDURE — 700102 HCHG RX REV CODE 250 W/ 637 OVERRIDE(OP)

## 2022-09-03 PROCEDURE — 700111 HCHG RX REV CODE 636 W/ 250 OVERRIDE (IP): Performed by: HOSPITALIST

## 2022-09-03 PROCEDURE — 74018 RADEX ABDOMEN 1 VIEW: CPT

## 2022-09-03 PROCEDURE — 700101 HCHG RX REV CODE 250: Performed by: STUDENT IN AN ORGANIZED HEALTH CARE EDUCATION/TRAINING PROGRAM

## 2022-09-03 PROCEDURE — 99232 SBSQ HOSP IP/OBS MODERATE 35: CPT | Mod: GC | Performed by: INTERNAL MEDICINE

## 2022-09-03 PROCEDURE — 90935 HEMODIALYSIS ONE EVALUATION: CPT

## 2022-09-03 PROCEDURE — A9270 NON-COVERED ITEM OR SERVICE: HCPCS | Performed by: STUDENT IN AN ORGANIZED HEALTH CARE EDUCATION/TRAINING PROGRAM

## 2022-09-03 PROCEDURE — 700102 HCHG RX REV CODE 250 W/ 637 OVERRIDE(OP): Performed by: STUDENT IN AN ORGANIZED HEALTH CARE EDUCATION/TRAINING PROGRAM

## 2022-09-03 PROCEDURE — 700111 HCHG RX REV CODE 636 W/ 250 OVERRIDE (IP)

## 2022-09-03 PROCEDURE — A9270 NON-COVERED ITEM OR SERVICE: HCPCS | Performed by: INTERNAL MEDICINE

## 2022-09-03 PROCEDURE — 700102 HCHG RX REV CODE 250 W/ 637 OVERRIDE(OP): Performed by: INTERNAL MEDICINE

## 2022-09-03 PROCEDURE — 700102 HCHG RX REV CODE 250 W/ 637 OVERRIDE(OP): Performed by: NURSE PRACTITIONER

## 2022-09-03 PROCEDURE — 770001 HCHG ROOM/CARE - MED/SURG/GYN PRIV*

## 2022-09-03 PROCEDURE — 85027 COMPLETE CBC AUTOMATED: CPT

## 2022-09-03 PROCEDURE — A9270 NON-COVERED ITEM OR SERVICE: HCPCS | Performed by: NURSE PRACTITIONER

## 2022-09-03 RX ORDER — CHOLECALCIFEROL (VITAMIN D3) 125 MCG
5 CAPSULE ORAL NIGHTLY
Status: DISCONTINUED | OUTPATIENT
Start: 2022-09-03 | End: 2022-09-04 | Stop reason: HOSPADM

## 2022-09-03 RX ORDER — LIDOCAINE 50 MG/G
2 PATCH TOPICAL EVERY 24 HOURS
Status: DISCONTINUED | OUTPATIENT
Start: 2022-09-03 | End: 2022-09-04 | Stop reason: HOSPADM

## 2022-09-03 RX ADMIN — Medication 1334 MG: at 15:27

## 2022-09-03 RX ADMIN — HEPARIN SODIUM 3100 UNITS: 1000 INJECTION, SOLUTION INTRAVENOUS; SUBCUTANEOUS at 11:31

## 2022-09-03 RX ADMIN — FUROSEMIDE 80 MG: 10 INJECTION, SOLUTION INTRAMUSCULAR; INTRAVENOUS at 18:51

## 2022-09-03 RX ADMIN — CARVEDILOL 25 MG: 25 TABLET, FILM COATED ORAL at 15:26

## 2022-09-03 RX ADMIN — ACETAMINOPHEN 1000 MG: 500 TABLET ORAL at 21:09

## 2022-09-03 RX ADMIN — LACTULOSE 45 ML: 20 SOLUTION ORAL at 12:00

## 2022-09-03 RX ADMIN — HEPARIN SODIUM 5000 UNITS: 5000 INJECTION, SOLUTION INTRAVENOUS; SUBCUTANEOUS at 00:26

## 2022-09-03 RX ADMIN — LIDOCAINE 1 PATCH: 50 PATCH CUTANEOUS at 15:27

## 2022-09-03 RX ADMIN — HEPARIN SODIUM 5000 UNITS: 5000 INJECTION, SOLUTION INTRAVENOUS; SUBCUTANEOUS at 15:27

## 2022-09-03 RX ADMIN — Medication 1000 UNITS: at 15:26

## 2022-09-03 RX ADMIN — METOLAZONE 2.5 MG: 2.5 TABLET ORAL at 18:51

## 2022-09-03 RX ADMIN — LIDOCAINE 2 PATCH: 50 PATCH TOPICAL at 21:09

## 2022-09-03 RX ADMIN — EPOETIN ALFA-EPBX 5000 UNITS: 3000 INJECTION, SOLUTION INTRAVENOUS; SUBCUTANEOUS at 11:38

## 2022-09-03 RX ADMIN — HYDROXYZINE HYDROCHLORIDE 25 MG: 50 TABLET, FILM COATED ORAL at 15:26

## 2022-09-03 RX ADMIN — Medication 1334 MG: at 18:51

## 2022-09-03 ASSESSMENT — PAIN DESCRIPTION - PAIN TYPE: TYPE: ACUTE PAIN

## 2022-09-03 NOTE — PROGRESS NOTES
Assumed care of pt and received bedside report. Pt is A&O x 4, reports pain as 10/10 to the BLE and refused tylenol. Pt requesting something to help him sleep and a pain medication, contacted on-call hospitalist. Bedside table and personal items within reach, bed locked in low position, refuses bed alarm, no additional needs at this time.

## 2022-09-03 NOTE — PROGRESS NOTES
"  [facility] -  PROGRESS NOTE               Author: Roxanne Murguia D.O. Date & Time: 9/3/2022  10:58 AM     HPI:  47 yo M with End Stage Renal Disease on hemodialysis at [facility] every Tues/Thurs/Sat, last HD at [place] in 4/2022, then traveled and lost HD chair. Other PMH diabetes mellitus and Hypertension who presented to the emergency department on 8/8/22 for weakness. He missed 1 months of HD due to traveling. He was found to be hypertensive 153/99, afebrile. Labs with K of 6.1, BUN 93, bicarb 10.   No F/C/N/V/CP/SOB.  No melena, hematochezia, hematemesis.  No HA, visual changes, or abdominal pain.    DAILY NEPHROLOGY SUMMARY:  See note from 8/31 for prior summaries  9/01: NAEO, SOD, no complaints outside the usual  9/02: NAEO, no complaints, sitting in chair  09/03 - c/o ongoing edema, seen on HD and tolerating well, stenosis and thrombus within AVG noted on 8/27 and patient notes ecchymoses at prior cannulation site, no new c/o, SBP 110s-140s    REVIEW OF SYSTEMS:    10 point ROS reviewed and is as per HPI/daily summary or otherwise negative    PMH/PSH/SH/FH: Reviewed and unchanged since admission note  CURRENT MEDICATIONS: Reviewed from admission to present day    VS:  BP (!) 142/85   Pulse 66   Temp 36.3 °C (97.4 °F) (Temporal)   Resp 18   Ht 1.753 m (5' 9\")   Wt 99.6 kg (219 lb 9.3 oz)   SpO2 100%   BMI 32.43 kg/m²   Physical Exam  Vitals and nursing note reviewed.   Constitutional:       General: He is not in acute distress.     Appearance: Normal appearance. He is not ill-appearing.   HENT:      Head: Normocephalic.      Right Ear: External ear normal.      Left Ear: External ear normal.      Nose: Nose normal.      Mouth/Throat:      Mouth: Mucous membranes are moist.      Pharynx: Oropharynx is clear.   Eyes:      General: No scleral icterus.     Extraocular Movements: Extraocular movements intact.      Conjunctiva/sclera: Conjunctivae normal.   Cardiovascular: "      Rate and Rhythm: Normal rate and regular rhythm.      Comments: BLE edema  Pulmonary:      Effort: Pulmonary effort is normal. No respiratory distress.      Breath sounds: No rales.   Abdominal:      General: There is no distension.      Palpations: Abdomen is soft.   Musculoskeletal:         General: No deformity.      Right lower leg: Edema present.      Left lower leg: Edema present.      Comments: LAVG (+thrill/bruit)   Skin:     General: Skin is warm and dry.      Coloration: Skin is not jaundiced.   Neurological:      General: No focal deficit present.      Mental Status: He is alert and oriented to person, place, and time.      Cranial Nerves: No cranial nerve deficit.   Psychiatric:         Behavior: Behavior normal.         Thought Content: Thought content normal.     Fluids:  In: 240 [P.O.:240]  Out: -     LABS:  Recent Labs     09/01/22  1000 09/03/22  0845   SODIUM 133* 132*   POTASSIUM 4.3 4.3   CHLORIDE 93* 94*   CO2 28 27   GLUCOSE 107* 130*   BUN 29* 36*   CREATININE 4.79* 5.31*   CALCIUM 8.8 8.4*       IMPRESSION:  # ESRD  - Etiology likely 2/2 DM/HTN  - HD qTTHS via [facility] TDC  - Lost his HD chair at [facility] -> plan for [employer]  # LFA AVG thrombosed  - US 2/23 no flow  - s/p OR 2/25 AVG Brachio-axillary Creation, thrombectomy and fistulogram   - AVG US pending  - AVG is deep when RN's attempted and even though they were able  to cannulate he moves his arm and causes infiltration due to low pain threshold   - stenosis and wall thrombus noted on US, TDC in use  # HTN, fair control   - Goal BP < 140/90  # Anemia of CKD, below target  - Goal Hgb 10-11   - % sat 32  - Ferritin 879  - JACKIE with HD   # CKD-MBD  - Ca corrects to wnl  - PO4 to goal with binder  -     # Hyperkalemia, corrected  - Due to missed HD  - Manage with HD and low K+ diet    # Weakness  # h/o Methamphetamine use   # Homelessness  # DM   - A1C 6.5  # Severe metabolic acidosis, corrected   # Wound Cx (+)  MRSA  - ABX per primary services  - On Vanco  - Blood cultures 8/22 negative so far     PLAN:  - TTS iHD  - UF as tolerated  - Use PermCath for now  - AVG eval as OP at   - Hydroxyzine 25mg PRN  - Transfuse PRN Hgb <7.0   - JACKIE with HD to goal Hgb 10-11  - No dietary protein restrictions  - Dose all meds per ESRD/iHD  - Renal diet, low salt diet, fluid restriction to 1.5L daily  - monitor UOP, unclear how well diuretics working  - DVA [place] for OP HD when ready for DC      Other management per primary service

## 2022-09-03 NOTE — DISCHARGE INSTRUCTIONS
Discharge Instructions    Discharged to home by car with self. Discharged via walking, hospital escort: Yes.  Special equipment needed: Not Applicable    Be sure to schedule a follow-up appointment with your primary care doctor or any specialists as instructed.     Discharge Plan:        I understand that a diet low in cholesterol, fat, and sodium is recommended for good health. Unless I have been given specific instructions below for another diet, I accept this instruction as my diet prescription.   Other diet: Renal     Special Instructions: None    -Is this patient being discharged with medication to prevent blood clots?  No    Is patient discharged on Warfarin / Coumadin?   No     Hemodialysis  Dialysis is a procedure that replaces some of the work that healthy kidneys do. During dialysis, wastes, salt, and extra water are removed from the blood, and the level of certain important minerals in the blood (such as potassium) is maintained. It is typically started when you have lost about 85-90% of your kidney function, or earlier if it may help relieve your symptoms.  Hemodialysis is a type of dialysis in which a machine called a dialyzer is used to filter the blood. Hemodialysis is usually performed by a health care provider at a hospital or dialysis center 3 times a week for 3-5 hours during each visit. It may also be performed on a different schedule at home with training.  Tell a health care provider about:  Any allergies you have.  All medicines you are taking, including vitamins, herbs, eye drops, creams, and over-the-counter medicines.  Any blood disorders you have.  Any medical conditions you have.  What are the risks?  Generally, this is a safe procedure. However, problems may occur, including:  Low blood pressure (hypotension).  Narrowing or ballooning of a blood vessel, or bleeding at the site where blood is removed from the body and returned to the body (vascular access).  An infection or blockage at  the vascular access site.  Allergic reaction to chemicals used to sterilize the dialyzer.  What happens before the procedure?              Before having hemodialysis for the first time, you will need surgery or a procedure to create a vascular access site. There are three types of vascular access:  Arteriovenous fistula. This type of access is created when an artery and a vein (usually in the arm) are connected by a surgical procedure. The arteriovenous fistula usually takes 1-6 months to develop after surgery.  Arteriovenous graft. This type of access is created when a tube is used to connect an artery and a vein in the arm. An arteriovenous graft can usually be used within 2-3 weeks of surgery.  A venous catheter. To create this type of access, a thin, flexible tube (catheter) is placed in a large vein in your neck, chest, or groin. A venous catheter can be used right away.  What happens during the procedure?    Your weight, blood pressure, pulse, and temperature will be measured.  The skin around your vascular access will be cleaned (sterilized) to get rid of germs.  Your vascular access will be connected to the dialyzer.  If your access is a fistula or graft, two needles will be inserted through the vascular access. The needles will be connected to a plastic tube that is connected to the dialyzer. They will be taped to your skin so that they do not move out of place.  If your access is a catheter, it will be connected to a plastic tube that is then connected to the dialyzer.  The dialysis machine will be turned on. This will cause your blood to flow to the dialyzer. The dialyzer will filter and clean your blood before returning it to your body. While the dialysis machine is working, your blood pressure and pulse will be checked several times.  Once dialysis is complete, your vascular access will be disconnected from the dialyzer. If your access is a fistula or graft, the needles will be removed and a bandage  (dressing) will be placed over the access.  Hemodialysis is performed while you are sitting or reclining. During the procedure, you may sleep, read, watch television, or perform any other tasks that can be done while you are in this position. If you experience side effects or any other discomfort during the procedure, let your health care provider know. He or she may be able to make adjustments to help you feel more comfortable.  The procedure may vary among health care providers and hospitals.  What happens after the procedure?  Your weight will be measured.  Your blood may be tested to see whether your treatments are removing enough wastes. This is usually done once a month.  You may experience or continue to experience side effects, including:  Dizziness.  Muscle cramps.  Nausea.  Headaches.  Allergic reaction to the chemicals used to sterilize the dialyzer.  Summary  Dialysis is a procedure that replaces some of the work that healthy kidneys do. During dialysis, wastes, salt, and extra water are removed from the blood, and the level of certain important minerals in the blood is maintained.  Hemodialysis is a type of dialysis in which a machine called a dialyzer is used to filter the blood. It is usually performed by a health care provider at a hospital or dialysis center 3 times a week for 3-5 hours during each visit.  Before having hemodialysis for the first time, you will need surgery or a procedure to create a vascular access.  During hemodialysis, your vascular access will be connected to the dialyzer. The dialyzer will filter and clean your blood before returning it to your body.  This information is not intended to replace advice given to you by your health care provider. Make sure you discuss any questions you have with your health care provider.  Document Released: 04/14/2014 Document Revised: 11/30/2018 Document Reviewed: 01/23/2018  Elsevier Patient Education © 2020 [employer]

## 2022-09-03 NOTE — CARE PLAN
The patient is Stable - Low risk of patient condition declining or worsening    Shift Goals  Clinical Goals: Pain management  Patient Goals: Pain management  Family Goals: POLINA    Progress made toward(s) clinical / shift goals:  Reached out to on-call regarding pt's pain. Received new orders and administered melatonin and tylenol to pt. Pain medication appears to have been effective as pt was able to rest throughout most of the night.     Problem: Knowledge Deficit - Standard  Goal: Patient and family/care givers will demonstrate understanding of plan of care, disease process/condition, diagnostic tests and medications  Outcome: Progressing     Problem: Pain - Standard  Goal: Alleviation of pain or a reduction in pain to the patient’s comfort goal  Outcome: Progressing     Problem: Provide Safe Environment  Goal: Suicide environmental safety, protocols, policies, and practices will be implemented  Outcome: Progressing     Problem: Psychosocial  Goal: Patient's ability to identify and develop effective coping behaviors will improve  Outcome: Progressing  Goal: Patient's ability to identify and utilize available support systems will improve  Outcome: Progressing     Problem: Fall Risk  Goal: Patient will remain free from falls  Outcome: Progressing     Problem: Bowel Elimination  Goal: Establish and maintain regular bowel function  Outcome: Progressing

## 2022-09-04 ENCOUNTER — PHARMACY VISIT (OUTPATIENT)
Dept: PHARMACY | Facility: MEDICAL CENTER | Age: 47
End: 2022-09-04
Payer: COMMERCIAL

## 2022-09-04 VITALS
BODY MASS INDEX: 32.52 KG/M2 | RESPIRATION RATE: 18 BRPM | SYSTOLIC BLOOD PRESSURE: 130 MMHG | DIASTOLIC BLOOD PRESSURE: 83 MMHG | HEIGHT: 69 IN | HEART RATE: 73 BPM | OXYGEN SATURATION: 96 % | TEMPERATURE: 97.8 F | WEIGHT: 219.58 LBS

## 2022-09-04 PROCEDURE — 700102 HCHG RX REV CODE 250 W/ 637 OVERRIDE(OP): Performed by: STUDENT IN AN ORGANIZED HEALTH CARE EDUCATION/TRAINING PROGRAM

## 2022-09-04 PROCEDURE — RXMED WILLOW AMBULATORY MEDICATION CHARGE: Performed by: STUDENT IN AN ORGANIZED HEALTH CARE EDUCATION/TRAINING PROGRAM

## 2022-09-04 PROCEDURE — A9270 NON-COVERED ITEM OR SERVICE: HCPCS | Performed by: INTERNAL MEDICINE

## 2022-09-04 PROCEDURE — A9270 NON-COVERED ITEM OR SERVICE: HCPCS

## 2022-09-04 PROCEDURE — 700102 HCHG RX REV CODE 250 W/ 637 OVERRIDE(OP)

## 2022-09-04 PROCEDURE — 700111 HCHG RX REV CODE 636 W/ 250 OVERRIDE (IP)

## 2022-09-04 PROCEDURE — A9270 NON-COVERED ITEM OR SERVICE: HCPCS | Performed by: STUDENT IN AN ORGANIZED HEALTH CARE EDUCATION/TRAINING PROGRAM

## 2022-09-04 PROCEDURE — 700102 HCHG RX REV CODE 250 W/ 637 OVERRIDE(OP): Performed by: INTERNAL MEDICINE

## 2022-09-04 PROCEDURE — A9270 NON-COVERED ITEM OR SERVICE: HCPCS | Performed by: NURSE PRACTITIONER

## 2022-09-04 PROCEDURE — 700102 HCHG RX REV CODE 250 W/ 637 OVERRIDE(OP): Performed by: NURSE PRACTITIONER

## 2022-09-04 PROCEDURE — 700111 HCHG RX REV CODE 636 W/ 250 OVERRIDE (IP): Performed by: HOSPITALIST

## 2022-09-04 PROCEDURE — 99238 HOSP IP/OBS DSCHRG MGMT 30/<: CPT | Mod: GC | Performed by: INTERNAL MEDICINE

## 2022-09-04 RX ORDER — DIPHENHYDRAMINE HYDROCHLORIDE, ZINC ACETATE 2; .1 G/100G; G/100G
1 CREAM TOPICAL 3 TIMES DAILY PRN
Qty: 1 EACH | Refills: 0 | Status: SHIPPED | OUTPATIENT
Start: 2022-09-04 | End: 2022-10-04

## 2022-09-04 RX ORDER — FUROSEMIDE 80 MG
80 TABLET ORAL DAILY
Qty: 30 TABLET | Refills: 0 | Status: SHIPPED | OUTPATIENT
Start: 2022-09-04 | End: 2022-10-04

## 2022-09-04 RX ORDER — CARVEDILOL 25 MG/1
25 TABLET ORAL 2 TIMES DAILY WITH MEALS
Qty: 60 TABLET | Refills: 0 | Status: SHIPPED | OUTPATIENT
Start: 2022-09-04 | End: 2022-10-04

## 2022-09-04 RX ORDER — METOLAZONE 2.5 MG/1
2.5 TABLET ORAL 2 TIMES DAILY
Qty: 30 TABLET | Refills: 0 | Status: SHIPPED | OUTPATIENT
Start: 2022-09-04 | End: 2023-02-22

## 2022-09-04 RX ORDER — CALCIUM ACETATE 667 MG/1
1334 TABLET ORAL
Qty: 180 TABLET | Refills: 0 | Status: SHIPPED | OUTPATIENT
Start: 2022-09-04 | End: 2022-10-04

## 2022-09-04 RX ORDER — ACETAMINOPHEN 500 MG
1000 TABLET ORAL EVERY 8 HOURS PRN
Qty: 30 TABLET | Refills: 0 | Status: SHIPPED | OUTPATIENT
Start: 2022-09-04 | End: 2023-02-22

## 2022-09-04 RX ORDER — LACTULOSE 20 G/30ML
45 SOLUTION ORAL 3 TIMES DAILY
Qty: 4050 ML | Refills: 0 | Status: SHIPPED | OUTPATIENT
Start: 2022-09-04 | End: 2022-10-04

## 2022-09-04 RX ADMIN — METOLAZONE 2.5 MG: 2.5 TABLET ORAL at 08:14

## 2022-09-04 RX ADMIN — Medication 1334 MG: at 08:13

## 2022-09-04 RX ADMIN — HEPARIN SODIUM 5000 UNITS: 5000 INJECTION, SOLUTION INTRAVENOUS; SUBCUTANEOUS at 00:11

## 2022-09-04 RX ADMIN — POLYETHYLENE GLYCOL 3350 1 PACKET: 17 POWDER, FOR SOLUTION ORAL at 04:13

## 2022-09-04 RX ADMIN — Medication 1000 UNITS: at 08:14

## 2022-09-04 RX ADMIN — Medication 5 MG: at 00:11

## 2022-09-04 RX ADMIN — CARVEDILOL 25 MG: 25 TABLET, FILM COATED ORAL at 08:13

## 2022-09-04 RX ADMIN — FUROSEMIDE 80 MG: 10 INJECTION, SOLUTION INTRAMUSCULAR; INTRAVENOUS at 08:14

## 2022-09-04 NOTE — PROGRESS NOTES
[facility] Internal Medicine Daily Progress Note    Date of Service  9/3/2022    [facility] Team: R IM Purple Team   Attending: Nino Reyes M.d.  Senior Resident: Dr. Giles  Intern:  Dr. Adorno  Contact Number: [phone]    Chief Complaint  Ambrose Watkins is a 46 y.o. male admitted 8/8/2022 with progressive weakness with associated nausea, vomiting, and diarrhea due to missed dialysis    Hospital Course  45 yo male with PMH of diabetes,HFrEF, HTN, and ESRD on HD who presented 8/8/2022 for missing dialysis for past 3 weeks due to travelling to [place] for leisure and did not know where to go to get dialysis. In addition, patient did not have a dialysis chair here in [place].  He states he receives dialysis in [place].  He has been experiencing progressive weakness and associated nausea, vomiting, and diarrhea.  He came to the ER and was found to have electrolyte abnormalities including hyperkalemia, elevated creatinine BUN, and low calcium.  Nephrology consulted and stated that patient will require multiple sessions of dialysis given numerous missed sessions. Currently following [facility] schedule for HD, with vitals/labs gradually improving.   8/22 MRSA growth from armpit or RIJ swab - started on Vancomycin 8/22 - 8/24 attempt to use left AVM for dialysis  8/24 BCX obtained - No growth 48 hrs. Switch to Doxycycline 8/24 - 8/30 8/26 Lasix 80mg PO due to fluid overload  8/27 Urinating well with Lasix, will continue. US AVG - normal flow, stenosis prox biceps, min mural thrombus graft wall at mid biceps w/no flow disturbance, outflow axillary vein widely patent. Nephrology consulting Vascular surgery for evaluation to bring up AVG.  8/28 Bilateral lower extremity pain and edema. Continue diuresis with Lasix 80mg IV. Strict I/O's.  8/29 Bilateral lower extremity pain and edema. Continue diuresis with Lasix and Metolazone   9/2 No bowel movements for over a week, trial methyl natrexone     Interval Problem  "Update  No acute events overnight.  This am, dialysis in the room. Pt states still has bilateral leg pain, diffuse lower abd pain and no bowel movement after methyl natrexone. KUB with small amount of stool and nonobstructive bowel gas pattern, anticipate discharge tomorrow. Overall skin boils have improved with abx treatment and RIJ cathether site w/no discharge.    Patient cleared for discharge from Nephrology standpoint. Pt follows TTHSAT schedule and as he cannot be a new patient on SAT will need to stay for dialysis. Consideration for discharge with confirmed dialysis chair for next week. Discussed with Dr. Hartman, AVG can be evaluated outpatient.     I have discussed this patient's plan of care and discharge plan at IDT rounds today with Case Management, Nursing, Nursing leadership, and other members of the IDT team.    Consultants/Specialty  nephrology    Code Status  Full Code    Disposition  Patient is not medically cleared for discharge.   Anticipate discharge to to home with close outpatient follow-up.  I have placed the appropriate orders for post-discharge needs.    Review of Systems  Constitutional:  Positive for malaise/fatigue. Negative for chills, diaphoresis and fever.  Right IJ stitching removed. Armpit and forehead boils.  Respiratory:  Negative for cough and shortness of breath.    Cardiovascular:  Positive for leg swelling. Negative for chest pain and palpitations.   Gastrointestinal:  Positive for abdominal pain and constipation. Negative for diarrhea, nausea and vomiting.    Genitourinary:         Reports minimal urine production   Musculoskeletal:         Bilateral leg pain - right thigh pain with light touch    Neurological:  Positive for weakness. Negative for dizziness and headaches.      /76   Pulse 73   Temp 36.8 °C (98.2 °F) (Temporal)   Resp 17   Ht 1.753 m (5' 9\")   Wt 99.6 kg (219 lb 9.3 oz)   SpO2 99%   BMI 32.43 kg/m²     Physical Exam  Constitutional:       General: " He is not in acute distress.     Appearance: He is ill-appearing. He is not toxic-appearing.   HENT:      Head: Normocephalic and atraumatic.      Right Ear: External ear normal.      Left Ear: External ear normal.      Nose: Nose normal.      Mouth/Throat:      Mouth: Mucous membranes are moist.   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      Conjunctiva/sclera: Conjunctivae normal.   Cardiovascular:      Rate and Rhythm: Normal rate and regular rhythm.      Pulses: Normal pulses.      Heart sounds: Normal heart sounds. No murmur heard.     Comments: Right internal jugular tunneled catheter with bandaging.  LUE AV fistula - surrounding area edema improved.   Pulmonary:      Effort: Pulmonary effort is normal. No respiratory distress.      Breath sounds: Normal breath sounds. No wheezing or rales.   Abdominal:      General: There is distension. Significant improvement in abdominal distension, no fluid wave. Normal/active bowel sounds.      Tenderness: Slight lower abdominal tenderness. There is no guarding or rebound.   Musculoskeletal:         General: Swelling present.      Cervical back: Normal range of motion.      Right lower leg: Edema present.      Left lower leg: Edema present.      Bilateral extremities with increased non pitting edema. Tenderness to bilateral thighs   Skin:     General: Skin is warm and dry.      Comments: Excoriations of upper extremities, lower extremities, scalp, chest/abdomen. Right underarm boils erythematous/tender. Forehead boil tender/erythematous with scab.  Neurological:      General: No focal deficit present.      Mental Status: He is alert and oriented to person, place, and time.   Psychiatric:         Thought Content: Thought content normal    Fluids    Intake/Output Summary (Last 24 hours) at 9/3/2022 2009  Last data filed at 9/3/2022 1400  Gross per 24 hour   Intake 1338 ml    Output 3500 ml   Net -2162 ml       Laboratory  Recent Labs     09/01/22  1000 09/03/22  0845   WBC 7.8 7.5   RBC 2.86* 2.65*   HEMOGLOBIN 8.1* 7.7*   HEMATOCRIT 25.6* 23.6*   MCV 89.5 89.1   MCH 28.3 29.1   MCHC 31.6* 32.6*   RDW 52.1* 51.8*   PLATELETCT 341 296   MPV 9.0 8.8*     Recent Labs     09/01/22  1000 09/03/22  0845   SODIUM 133* 132*   POTASSIUM 4.3 4.3   CHLORIDE 93* 94*   CO2 28 27   GLUCOSE 107* 130*   BUN 29* 36*   CREATININE 4.79* 5.31*   CALCIUM 8.8 8.4*                   Imaging  [id number] VIEW   Final Result      Nonobstructive bowel gas pattern. Small amount of stool throughout the colon.      US-HEMODIALYSIS GRAFT DUPLEX COMP UPPER EXTREMITY   Final Result      CT-ABDOMEN-PELVIS W/O   Final Result      1.  Micronodular appearance of the liver consistent with hepatocellular disease.      2.  Large amount of ascites throughout the abdomen and pelvis.      3.  Adynamic ileus without evidence of bowel obstruction.      4.  Anasarca.      5.  Bilateral spondylolysis of the pars interarticularis at the L5-S1 level.      US-EXTREMITY VENOUS LOWER BILAT   Final Result      US-ABDOMEN COMPLETE SURVEY   Final Result      1.  Mildly increased hepatic echotexture is suggestive of fatty infiltration.      2.  Small-to-moderate amount of ascites.      3.  Cholelithiasis         DX-ABDOMEN COMPLETE WITH AP OR PA CXR   Final Result      Fairly diffuse bowel distention with scattered air-fluid levels. Findings are likely related to ileus. More distal obstruction cannot be excluded.      DX-CHEST-PORTABLE (1 VIEW)   Final Result      1.  Mildly enlarged cardiac silhouette with vascular congestion.           Assessment/Plan  Problem Representation:    * Noncompliance of patient with renal dialysis (HCC)- (present on admission)  Assessment & Plan  Has not had dialysis in the past 3 weeks, due to traveling to [place]. Obtained HD in [place], however does not have an outpatient dialysis chair in [place] and  last year in [place].     -Signs/symptoms and labs/vitals slowly normalizing  -Nephrology following, on TThS dialysis and as needed  -Suspician for RIJ infection - Vancomycin started 8/22,8/24 switch to Doxycycline 8/26 -8/30 given negative BCX  -Lasix 80mg IV BID, Metolazone 2.5 mg BID 30 minutes before Lasix with strict I/O's   -CTM fluid status    Multiple air fluid levels of small intestine determined by X-ray  Assessment & Plan  Patients states he has been having watery stools multiple times a day for many days. He is unable to recount when his last solid stool was. Abdomen has become more distended and pain has increased. Abdominal xray with bowel distention with air-fluid levels. Pt has been taking oxycodone for leg pain since 8/8.     -Will continue to monitor with serial abdominal exam  -CT abdo 8/22 with marked ascites, continue with dialysis   -Significant improvement in abdominal distention 8/28   -No bowel movements for past week; failed senna/miralax/lactulose refuse suppository, trial methyl natrexone   -9/3 states no bowel movements, KUB with small amount of stool and nonobstructive bowel gas pattern      Lower extremity pain, bilateral  Assessment & Plan  Anterior leg pain noted on admission and patient given oxycodone for pain. Most likely related to edema due to missed dialysis sessions. CPK and lactic acid wnl. Ambulatory.   -Bilateral DVT US negative  -Continue with diuresis for edema   -acetaminophen for pain control    Constipation- (present on admission)  Assessment & Plan  No bowel movement for over a week after using senna docusate, miralax, and lactulose. Refuse suppository. Pt states he always had minimal watery bowel movements multiple times a day before hospitalization.  As patient has been getting oxycodone for his BLEs pain, will trial methylnatrexone for bowel movement.    End stage renal disease (HCC)- (present on admission)  Assessment & Plan  Patient received HD in [facility]  [place], did not have HD for 3 weeks prior to admission.  Patient also had multiple thrombectomies/revisions for AV fistula.  Patient has tunneled dialysis catheter in place and functioning. Patient not adherent to HD or outpatient medications.  Nephrology following. Per pharmacy ([facility]) pt has not picked up any medications. Prescribed medications were 01/2022 - Trazodone 50mg QHS, Calcitriol 0.25mg OD, Furosemide 80mg OD, Ca-acetate 667-270 TID, Lisinopril 20mg OD. 12/31/2021 amplodipine 5mg, glipizide ER 5mg, carvedilol 25 BID    -Currently on cholecalciferol   -Per nephrology note, AVG use per Vasc Surg recs; currently using RI TDC, last note 3/1 not yet cleared for use. Due to MRSA growth from skin culture, next dialysis will attempt to use AVG.   -Dialysis schedule TThSat and as needed  -Cannot be new patient for dialysis on Sat. Discharge pending dialysis chair confirmation next week.  -Discussed with Dr. Hartman that AVM can be evaluated outpatient       Hypertension- (present on admission)  Assessment & Plan  History of uncontrolled hypertension. Likely due to missing dialysis and nonadherence to outpatient BP medications    -Hydralazine as needed due to history of CHF  -BP well controlled with Carvedilol 25mg twice daily       VTE prophylaxis: heparin ppx    I have performed a physical exam and reviewed and updated ROS and Plan today (9/3/2022). In review of yesterday's note (9/2/2022), there are no changes except as documented above.

## 2022-09-04 NOTE — CARE PLAN
The patient is Stable - Low risk of patient condition declining or worsening    Shift Goals  Clinical Goals: Pain management  Patient Goals: Pain management and rest  Family Goals: POLINA    Progress made toward(s) clinical / shift goals:  Pt pain was managed this shift and pt was able to rest intermittently throughout night. On-call was contacted after pt requested medication to help him sleep. Melatonin administered per new order.     Problem: Knowledge Deficit - Standard  Goal: Patient and family/care givers will demonstrate understanding of plan of care, disease process/condition, diagnostic tests and medications  Outcome: Progressing     Problem: Pain - Standard  Goal: Alleviation of pain or a reduction in pain to the patient’s comfort goal  Outcome: Progressing     Problem: Provide Safe Environment  Goal: Suicide environmental safety, protocols, policies, and practices will be implemented  Outcome: Progressing     Problem: Psychosocial  Goal: Patient's ability to identify and develop effective coping behaviors will improve  Outcome: Progressing  Goal: Patient's ability to identify and utilize available support systems will improve  Outcome: Progressing     Problem: Fall Risk  Goal: Patient will remain free from falls  Outcome: Progressing     Problem: Bowel Elimination  Goal: Establish and maintain regular bowel function  Outcome: Progressing

## 2022-09-04 NOTE — PROGRESS NOTES
Assumed care of pt and received bedside report. Pt is A&O x 4, reports pain as 10/10 to the BLE, lidocaine patches and tylenol provided. Pt also requests medication to help him sleep, contacted on-call and received order for melatonin 5mg. Bedside table and personal items within reach, bed locked in low position, refuses bed alarm, education provided. Pt states he has still not had a BM, bowel sounds active. No additional needs at this time.

## 2022-09-10 ENCOUNTER — HOSPITAL ENCOUNTER (EMERGENCY)
Facility: MEDICAL CENTER | Age: 47
End: 2022-09-10
Payer: MEDICARE

## 2022-09-10 VITALS
SYSTOLIC BLOOD PRESSURE: 159 MMHG | HEART RATE: 77 BPM | DIASTOLIC BLOOD PRESSURE: 100 MMHG | TEMPERATURE: 97.6 F | BODY MASS INDEX: 30.04 KG/M2 | WEIGHT: 202.82 LBS | RESPIRATION RATE: 16 BRPM | HEIGHT: 69 IN | OXYGEN SATURATION: 99 %

## 2022-09-10 LAB
ALBUMIN SERPL BCP-MCNC: 3.3 G/DL (ref 3.2–4.9)
ALBUMIN/GLOB SERPL: 0.7 G/DL
ALP SERPL-CCNC: 125 U/L (ref 30–99)
ALT SERPL-CCNC: <5 U/L (ref 2–50)
ANION GAP SERPL CALC-SCNC: 15 MMOL/L (ref 7–16)
AST SERPL-CCNC: 8 U/L (ref 12–45)
BASOPHILS # BLD AUTO: 0.5 % (ref 0–1.8)
BASOPHILS # BLD: 0.04 K/UL (ref 0–0.12)
BILIRUB SERPL-MCNC: 0.6 MG/DL (ref 0.1–1.5)
BUN SERPL-MCNC: 40 MG/DL (ref 8–22)
CALCIUM SERPL-MCNC: 8 MG/DL (ref 8.5–10.5)
CHLORIDE SERPL-SCNC: 94 MMOL/L (ref 96–112)
CO2 SERPL-SCNC: 24 MMOL/L (ref 20–33)
CREAT SERPL-MCNC: 8.02 MG/DL (ref 0.5–1.4)
EOSINOPHIL # BLD AUTO: 0.06 K/UL (ref 0–0.51)
EOSINOPHIL NFR BLD: 0.7 % (ref 0–6.9)
ERYTHROCYTE [DISTWIDTH] IN BLOOD BY AUTOMATED COUNT: 53.3 FL (ref 35.9–50)
GFR SERPLBLD CREATININE-BSD FMLA CKD-EPI: 8 ML/MIN/1.73 M 2
GLOBULIN SER CALC-MCNC: 4.5 G/DL (ref 1.9–3.5)
GLUCOSE SERPL-MCNC: 183 MG/DL (ref 65–99)
HCT VFR BLD AUTO: 24.6 % (ref 42–52)
HGB BLD-MCNC: 7.7 G/DL (ref 14–18)
IMM GRANULOCYTES # BLD AUTO: 0.04 K/UL (ref 0–0.11)
IMM GRANULOCYTES NFR BLD AUTO: 0.5 % (ref 0–0.9)
LIPASE SERPL-CCNC: 29 U/L (ref 11–82)
LYMPHOCYTES # BLD AUTO: 1.38 K/UL (ref 1–4.8)
LYMPHOCYTES NFR BLD: 16.9 % (ref 22–41)
MCH RBC QN AUTO: 28 PG (ref 27–33)
MCHC RBC AUTO-ENTMCNC: 31.3 G/DL (ref 33.7–35.3)
MCV RBC AUTO: 89.5 FL (ref 81.4–97.8)
MONOCYTES # BLD AUTO: 1.03 K/UL (ref 0–0.85)
MONOCYTES NFR BLD AUTO: 12.6 % (ref 0–13.4)
NEUTROPHILS # BLD AUTO: 5.6 K/UL (ref 1.82–7.42)
NEUTROPHILS NFR BLD: 68.8 % (ref 44–72)
NRBC # BLD AUTO: 0 K/UL
NRBC BLD-RTO: 0 /100 WBC
PLATELET # BLD AUTO: 263 K/UL (ref 164–446)
PMV BLD AUTO: 9.1 FL (ref 9–12.9)
POTASSIUM SERPL-SCNC: 4.1 MMOL/L (ref 3.6–5.5)
PROT SERPL-MCNC: 7.8 G/DL (ref 6–8.2)
RBC # BLD AUTO: 2.75 M/UL (ref 4.7–6.1)
SODIUM SERPL-SCNC: 133 MMOL/L (ref 135–145)
WBC # BLD AUTO: 8.2 K/UL (ref 4.8–10.8)

## 2022-09-10 PROCEDURE — 83690 ASSAY OF LIPASE: CPT

## 2022-09-10 PROCEDURE — 85025 COMPLETE CBC W/AUTO DIFF WBC: CPT

## 2022-09-10 PROCEDURE — 80053 COMPREHEN METABOLIC PANEL: CPT

## 2022-09-10 PROCEDURE — 302449 STATCHG TRIAGE ONLY (STATISTIC)

## 2022-09-10 PROCEDURE — 93005 ELECTROCARDIOGRAM TRACING: CPT

## 2022-09-10 ASSESSMENT — FIBROSIS 4 INDEX: FIB4 SCORE: 0.49

## 2022-09-11 NOTE — ED TRIAGE NOTES
Ambrose Watkins  46 y.o.  male  Chief Complaint   Patient presents with    Fatigue     Dialysis pt, last was this morning, c/o L leg burning pain & bilateral leg swelling, increasing nausea, & generalized weakness. Looks pale. States all symptoms have been chronic x 1 month but worsening over the past week. Just discharged from [facility] on Sept 4th.        Patient educated on triage process, to alert staff if any changes in condition.    Labs ordered & EKG.

## 2022-09-11 NOTE — ED NOTES
Pt called for a vital re-check, he then let staff know that he was unhappy with the wait time in the lobby and that he wanted to leave and return in the morning. Security was called to push him out to his car in the parking garage.

## 2023-02-22 ENCOUNTER — HOSPITAL ENCOUNTER (INPATIENT)
Facility: MEDICAL CENTER | Age: 48
LOS: 22 days | DRG: 291 | End: 2023-03-16
Attending: EMERGENCY MEDICINE | Admitting: HOSPITALIST
Payer: MEDICARE

## 2023-02-22 DIAGNOSIS — E87.5 HYPERKALEMIA: ICD-10-CM

## 2023-02-22 DIAGNOSIS — L03.90 MRSA CELLULITIS: ICD-10-CM

## 2023-02-22 DIAGNOSIS — Z91.158 NONCOMPLIANCE OF PATIENT WITH RENAL DIALYSIS (HCC): ICD-10-CM

## 2023-02-22 DIAGNOSIS — R20.2 PARESTHESIA: ICD-10-CM

## 2023-02-22 DIAGNOSIS — Z65.3 IN POLICE CUSTODY: ICD-10-CM

## 2023-02-22 DIAGNOSIS — T14.8XXA OPEN WOUND: ICD-10-CM

## 2023-02-22 DIAGNOSIS — B95.62 MRSA CELLULITIS: ICD-10-CM

## 2023-02-22 DIAGNOSIS — S71.109D OPEN WOUND OF THIGH, UNSPECIFIED LATERALITY, SUBSEQUENT ENCOUNTER: ICD-10-CM

## 2023-02-22 DIAGNOSIS — Z99.2 ESRD NEEDING DIALYSIS (HCC): ICD-10-CM

## 2023-02-22 DIAGNOSIS — M79.604 LOWER EXTREMITY PAIN, BILATERAL: ICD-10-CM

## 2023-02-22 DIAGNOSIS — M79.605 LOWER EXTREMITY PAIN, BILATERAL: ICD-10-CM

## 2023-02-22 DIAGNOSIS — E83.59 CALCIPHYLAXIS: ICD-10-CM

## 2023-02-22 DIAGNOSIS — S71.102A OPEN WOUND OF LEFT THIGH, INITIAL ENCOUNTER: ICD-10-CM

## 2023-02-22 DIAGNOSIS — N18.6 ESRD NEEDING DIALYSIS (HCC): ICD-10-CM

## 2023-02-22 DIAGNOSIS — N18.5 TYPE 2 DIABETES MELLITUS WITH STAGE 5 CHRONIC KIDNEY DISEASE NOT ON CHRONIC DIALYSIS, WITHOUT LONG-TERM CURRENT USE OF INSULIN (HCC): ICD-10-CM

## 2023-02-22 DIAGNOSIS — E16.2 HYPOGLYCEMIA: ICD-10-CM

## 2023-02-22 DIAGNOSIS — S71.101A OPEN THIGH WOUND, RIGHT, INITIAL ENCOUNTER: ICD-10-CM

## 2023-02-22 DIAGNOSIS — A49.8 PSEUDOMONAS INFECTION: ICD-10-CM

## 2023-02-22 DIAGNOSIS — H40.89 OTHER GLAUCOMA OF RIGHT EYE: ICD-10-CM

## 2023-02-22 DIAGNOSIS — I10 PRIMARY HYPERTENSION: ICD-10-CM

## 2023-02-22 DIAGNOSIS — E87.20 METABOLIC ACIDOSIS: ICD-10-CM

## 2023-02-22 DIAGNOSIS — E11.22 TYPE 2 DIABETES MELLITUS WITH STAGE 5 CHRONIC KIDNEY DISEASE NOT ON CHRONIC DIALYSIS, WITHOUT LONG-TERM CURRENT USE OF INSULIN (HCC): ICD-10-CM

## 2023-02-22 PROBLEM — Z72.0 TOBACCO USE: Status: ACTIVE | Noted: 2023-02-22

## 2023-02-22 PROBLEM — S71.109A OPEN WOUND OF THIGH: Status: ACTIVE | Noted: 2023-02-22

## 2023-02-22 LAB
ALBUMIN SERPL BCP-MCNC: 3.3 G/DL (ref 3.2–4.9)
ALBUMIN/GLOB SERPL: 0.7 G/DL
ALP SERPL-CCNC: 174 U/L (ref 30–99)
ALT SERPL-CCNC: 38 U/L (ref 2–50)
ANION GAP SERPL CALC-SCNC: 24 MMOL/L (ref 7–16)
ANION GAP SERPL CALC-SCNC: 32 MMOL/L (ref 7–16)
ANION GAP SERPL CALC-SCNC: 32 MMOL/L (ref 7–16)
AST SERPL-CCNC: 53 U/L (ref 12–45)
BILIRUB SERPL-MCNC: 0.7 MG/DL (ref 0.1–1.5)
BLOOD CULTURE HOLD CXBCH: NORMAL
BUN SERPL-MCNC: 148 MG/DL (ref 8–22)
BUN SERPL-MCNC: 148 MG/DL (ref 8–22)
BUN SERPL-MCNC: 94 MG/DL (ref 8–22)
CALCIUM ALBUM COR SERPL-MCNC: 7.2 MG/DL (ref 8.5–10.5)
CALCIUM SERPL-MCNC: 6.6 MG/DL (ref 8.5–10.5)
CALCIUM SERPL-MCNC: 6.6 MG/DL (ref 8.5–10.5)
CALCIUM SERPL-MCNC: 7 MG/DL (ref 8.5–10.5)
CHLORIDE SERPL-SCNC: 98 MMOL/L (ref 96–112)
CO2 SERPL-SCNC: 17 MMOL/L (ref 20–33)
CO2 SERPL-SCNC: 7 MMOL/L (ref 20–33)
CO2 SERPL-SCNC: 7 MMOL/L (ref 20–33)
CREAT SERPL-MCNC: 13.04 MG/DL (ref 0.5–1.4)
CREAT SERPL-MCNC: 13.04 MG/DL (ref 0.5–1.4)
CREAT SERPL-MCNC: 8.14 MG/DL (ref 0.5–1.4)
EKG IMPRESSION: NORMAL
EKG IMPRESSION: NORMAL
ERYTHROCYTE [DISTWIDTH] IN BLOOD BY AUTOMATED COUNT: 61.3 FL (ref 35.9–50)
FERRITIN SERPL-MCNC: 2722 NG/ML (ref 22–322)
GFR SERPLBLD CREATININE-BSD FMLA CKD-EPI: 4 ML/MIN/1.73 M 2
GFR SERPLBLD CREATININE-BSD FMLA CKD-EPI: 8 ML/MIN/1.73 M 2
GLOBULIN SER CALC-MCNC: 4.7 G/DL (ref 1.9–3.5)
GLUCOSE BLD STRIP.AUTO-MCNC: 112 MG/DL (ref 65–99)
GLUCOSE BLD STRIP.AUTO-MCNC: 45 MG/DL (ref 65–99)
GLUCOSE BLD STRIP.AUTO-MCNC: 96 MG/DL (ref 65–99)
GLUCOSE BLD STRIP.AUTO-MCNC: 97 MG/DL (ref 65–99)
GLUCOSE SERPL-MCNC: 115 MG/DL (ref 65–99)
GLUCOSE SERPL-MCNC: 115 MG/DL (ref 65–99)
GLUCOSE SERPL-MCNC: 150 MG/DL (ref 65–99)
HCT VFR BLD AUTO: 30.9 % (ref 42–52)
HGB BLD-MCNC: 9.8 G/DL (ref 14–18)
HGB RETIC QN AUTO: 32.2 PG/CELL (ref 29–35)
IMM RETICS NFR: 40.4 % (ref 9.3–17.4)
IRON SATN MFR SERPL: 41 % (ref 15–55)
IRON SERPL-MCNC: 78 UG/DL (ref 50–180)
MCH RBC QN AUTO: 28.6 PG (ref 27–33)
MCHC RBC AUTO-ENTMCNC: 31.7 G/DL (ref 33.7–35.3)
MCV RBC AUTO: 90.1 FL (ref 81.4–97.8)
PLATELET # BLD AUTO: 167 K/UL (ref 164–446)
PMV BLD AUTO: 10 FL (ref 9–12.9)
POTASSIUM SERPL-SCNC: 4.8 MMOL/L (ref 3.6–5.5)
POTASSIUM SERPL-SCNC: 7.6 MMOL/L (ref 3.6–5.5)
POTASSIUM SERPL-SCNC: 7.6 MMOL/L (ref 3.6–5.5)
PROT SERPL-MCNC: 8 G/DL (ref 6–8.2)
RBC # BLD AUTO: 3.43 M/UL (ref 4.7–6.1)
RETICS # AUTO: 0.13 M/UL (ref 0.04–0.06)
RETICS/RBC NFR: 3.9 % (ref 0.8–2.1)
SODIUM SERPL-SCNC: 137 MMOL/L (ref 135–145)
SODIUM SERPL-SCNC: 137 MMOL/L (ref 135–145)
SODIUM SERPL-SCNC: 139 MMOL/L (ref 135–145)
TIBC SERPL-MCNC: 188 UG/DL (ref 250–450)
UIBC SERPL-MCNC: 110 UG/DL (ref 110–370)
WBC # BLD AUTO: 11.7 K/UL (ref 4.8–10.8)

## 2023-02-22 PROCEDURE — 99285 EMERGENCY DEPT VISIT HI MDM: CPT

## 2023-02-22 PROCEDURE — 700111 HCHG RX REV CODE 636 W/ 250 OVERRIDE (IP): Performed by: STUDENT IN AN ORGANIZED HEALTH CARE EDUCATION/TRAINING PROGRAM

## 2023-02-22 PROCEDURE — 770001 HCHG ROOM/CARE - MED/SURG/GYN PRIV*

## 2023-02-22 PROCEDURE — 80048 BASIC METABOLIC PNL TOTAL CA: CPT

## 2023-02-22 PROCEDURE — 96375 TX/PRO/DX INJ NEW DRUG ADDON: CPT

## 2023-02-22 PROCEDURE — 99223 1ST HOSP IP/OBS HIGH 75: CPT | Mod: AI | Performed by: HOSPITALIST

## 2023-02-22 PROCEDURE — 36415 COLL VENOUS BLD VENIPUNCTURE: CPT

## 2023-02-22 PROCEDURE — 700111 HCHG RX REV CODE 636 W/ 250 OVERRIDE (IP): Performed by: EMERGENCY MEDICINE

## 2023-02-22 PROCEDURE — 700105 HCHG RX REV CODE 258

## 2023-02-22 PROCEDURE — 93005 ELECTROCARDIOGRAM TRACING: CPT | Performed by: EMERGENCY MEDICINE

## 2023-02-22 PROCEDURE — 85046 RETICYTE/HGB CONCENTRATE: CPT

## 2023-02-22 PROCEDURE — 82728 ASSAY OF FERRITIN: CPT

## 2023-02-22 PROCEDURE — 83540 ASSAY OF IRON: CPT

## 2023-02-22 PROCEDURE — 700101 HCHG RX REV CODE 250: Performed by: HOSPITALIST

## 2023-02-22 PROCEDURE — 96374 THER/PROPH/DIAG INJ IV PUSH: CPT

## 2023-02-22 PROCEDURE — 82962 GLUCOSE BLOOD TEST: CPT | Mod: 91

## 2023-02-22 PROCEDURE — 700111 HCHG RX REV CODE 636 W/ 250 OVERRIDE (IP)

## 2023-02-22 PROCEDURE — 700102 HCHG RX REV CODE 250 W/ 637 OVERRIDE(OP): Performed by: HOSPITALIST

## 2023-02-22 PROCEDURE — 700111 HCHG RX REV CODE 636 W/ 250 OVERRIDE (IP): Performed by: HOSPITALIST

## 2023-02-22 PROCEDURE — 85027 COMPLETE CBC AUTOMATED: CPT

## 2023-02-22 PROCEDURE — A9270 NON-COVERED ITEM OR SERVICE: HCPCS | Performed by: HOSPITALIST

## 2023-02-22 PROCEDURE — 90935 HEMODIALYSIS ONE EVALUATION: CPT

## 2023-02-22 PROCEDURE — 5A1D70Z PERFORMANCE OF URINARY FILTRATION, INTERMITTENT, LESS THAN 6 HOURS PER DAY: ICD-10-PCS | Performed by: STUDENT IN AN ORGANIZED HEALTH CARE EDUCATION/TRAINING PROGRAM

## 2023-02-22 PROCEDURE — 83550 IRON BINDING TEST: CPT

## 2023-02-22 PROCEDURE — 80053 COMPREHEN METABOLIC PANEL: CPT

## 2023-02-22 RX ORDER — ONDANSETRON 4 MG/1
4 TABLET, ORALLY DISINTEGRATING ORAL EVERY 4 HOURS PRN
Status: DISCONTINUED | OUTPATIENT
Start: 2023-02-22 | End: 2023-03-16 | Stop reason: HOSPADM

## 2023-02-22 RX ORDER — HEPARIN SODIUM 5000 [USP'U]/ML
5000 INJECTION, SOLUTION INTRAVENOUS; SUBCUTANEOUS EVERY 8 HOURS
Status: DISCONTINUED | OUTPATIENT
Start: 2023-02-22 | End: 2023-03-16 | Stop reason: HOSPADM

## 2023-02-22 RX ORDER — LABETALOL HYDROCHLORIDE 5 MG/ML
10 INJECTION, SOLUTION INTRAVENOUS EVERY 4 HOURS PRN
Status: DISCONTINUED | OUTPATIENT
Start: 2023-02-22 | End: 2023-03-16 | Stop reason: HOSPADM

## 2023-02-22 RX ORDER — PROCHLORPERAZINE EDISYLATE 5 MG/ML
5-10 INJECTION INTRAMUSCULAR; INTRAVENOUS EVERY 4 HOURS PRN
Status: DISCONTINUED | OUTPATIENT
Start: 2023-02-22 | End: 2023-03-16 | Stop reason: HOSPADM

## 2023-02-22 RX ORDER — ASCORBIC ACID 500 MG
500 TABLET ORAL DAILY
Status: DISCONTINUED | OUTPATIENT
Start: 2023-02-22 | End: 2023-03-16 | Stop reason: HOSPADM

## 2023-02-22 RX ORDER — PROMETHAZINE HYDROCHLORIDE 25 MG/1
12.5-25 TABLET ORAL EVERY 4 HOURS PRN
Status: DISCONTINUED | OUTPATIENT
Start: 2023-02-22 | End: 2023-03-16 | Stop reason: HOSPADM

## 2023-02-22 RX ORDER — HEPARIN SODIUM 1000 [USP'U]/ML
3800 INJECTION, SOLUTION INTRAVENOUS; SUBCUTANEOUS
Status: DISCONTINUED | OUTPATIENT
Start: 2023-02-22 | End: 2023-03-16 | Stop reason: HOSPADM

## 2023-02-22 RX ORDER — ZINC SULFATE 50(220)MG
220 CAPSULE ORAL DAILY
Status: DISCONTINUED | OUTPATIENT
Start: 2023-02-22 | End: 2023-03-16 | Stop reason: HOSPADM

## 2023-02-22 RX ORDER — LIDOCAINE HYDROCHLORIDE 20 MG/ML
20 INJECTION, SOLUTION INFILTRATION; PERINEURAL
Status: DISCONTINUED | OUTPATIENT
Start: 2023-02-22 | End: 2023-03-16 | Stop reason: HOSPADM

## 2023-02-22 RX ORDER — ACETAMINOPHEN 325 MG/1
650 TABLET ORAL EVERY 6 HOURS PRN
Status: DISCONTINUED | OUTPATIENT
Start: 2023-02-22 | End: 2023-03-02

## 2023-02-22 RX ORDER — LIDOCAINE HYDROCHLORIDE 20 MG/ML
1 JELLY TOPICAL
Status: DISCONTINUED | OUTPATIENT
Start: 2023-02-22 | End: 2023-03-16 | Stop reason: HOSPADM

## 2023-02-22 RX ORDER — AMOXICILLIN 250 MG
2 CAPSULE ORAL 2 TIMES DAILY
Status: DISCONTINUED | OUTPATIENT
Start: 2023-02-22 | End: 2023-03-16 | Stop reason: HOSPADM

## 2023-02-22 RX ORDER — POLYETHYLENE GLYCOL 3350 17 G/17G
1 POWDER, FOR SOLUTION ORAL
Status: DISCONTINUED | OUTPATIENT
Start: 2023-02-22 | End: 2023-03-16 | Stop reason: HOSPADM

## 2023-02-22 RX ORDER — ONDANSETRON 2 MG/ML
4 INJECTION INTRAMUSCULAR; INTRAVENOUS EVERY 4 HOURS PRN
Status: DISCONTINUED | OUTPATIENT
Start: 2023-02-22 | End: 2023-03-16 | Stop reason: HOSPADM

## 2023-02-22 RX ORDER — HYDROMORPHONE HYDROCHLORIDE 1 MG/ML
0.5 INJECTION, SOLUTION INTRAMUSCULAR; INTRAVENOUS; SUBCUTANEOUS ONCE
Status: COMPLETED | OUTPATIENT
Start: 2023-02-22 | End: 2023-02-22

## 2023-02-22 RX ORDER — LIDOCAINE HYDROCHLORIDE 40 MG/ML
SOLUTION TOPICAL
Status: DISCONTINUED | OUTPATIENT
Start: 2023-02-22 | End: 2023-03-16 | Stop reason: HOSPADM

## 2023-02-22 RX ORDER — GABAPENTIN 100 MG/1
100 CAPSULE ORAL 3 TIMES DAILY
Status: DISCONTINUED | OUTPATIENT
Start: 2023-02-22 | End: 2023-02-28

## 2023-02-22 RX ORDER — BISACODYL 10 MG
10 SUPPOSITORY, RECTAL RECTAL
Status: DISCONTINUED | OUTPATIENT
Start: 2023-02-22 | End: 2023-03-16 | Stop reason: HOSPADM

## 2023-02-22 RX ORDER — CALCIUM CHLORIDE 100 MG/ML
1 INJECTION INTRAVENOUS; INTRAVENTRICULAR ONCE
Status: ACTIVE | OUTPATIENT
Start: 2023-02-22 | End: 2023-02-23

## 2023-02-22 RX ORDER — DEXTROSE MONOHYDRATE 100 MG/ML
INJECTION, SOLUTION INTRAVENOUS
Status: COMPLETED
Start: 2023-02-22 | End: 2023-02-22

## 2023-02-22 RX ORDER — OXYCODONE AND ACETAMINOPHEN 10; 325 MG/1; MG/1
1 TABLET ORAL EVERY 4 HOURS PRN
Status: DISCONTINUED | OUTPATIENT
Start: 2023-02-22 | End: 2023-03-05

## 2023-02-22 RX ORDER — PROMETHAZINE HYDROCHLORIDE 25 MG/1
12.5-25 SUPPOSITORY RECTAL EVERY 4 HOURS PRN
Status: DISCONTINUED | OUTPATIENT
Start: 2023-02-22 | End: 2023-03-16 | Stop reason: HOSPADM

## 2023-02-22 RX ADMIN — INSULIN HUMAN 2 UNITS: 100 INJECTION, SOLUTION PARENTERAL at 21:54

## 2023-02-22 RX ADMIN — LIDOCAINE HYDROCHLORIDE 50 ML: 40 SOLUTION TOPICAL at 19:58

## 2023-02-22 RX ADMIN — SODIUM BICARBONATE 50 MEQ: 84 INJECTION, SOLUTION INTRAVENOUS at 07:54

## 2023-02-22 RX ADMIN — DEXTROSE MONOHYDRATE 25 G: 100 INJECTION, SOLUTION INTRAVENOUS at 04:41

## 2023-02-22 RX ADMIN — HYDROMORPHONE HYDROCHLORIDE 0.5 MG: 1 INJECTION, SOLUTION INTRAMUSCULAR; INTRAVENOUS; SUBCUTANEOUS at 19:55

## 2023-02-22 RX ADMIN — GABAPENTIN 100 MG: 100 CAPSULE ORAL at 18:33

## 2023-02-22 RX ADMIN — HEPARIN SODIUM 3800 UNITS: 1000 INJECTION, SOLUTION INTRAVENOUS; SUBCUTANEOUS at 12:30

## 2023-02-22 RX ADMIN — OXYCODONE AND ACETAMINOPHEN 1 TABLET: 10; 325 TABLET ORAL at 15:10

## 2023-02-22 RX ADMIN — HEPARIN SODIUM 5000 UNITS: 5000 INJECTION, SOLUTION INTRAVENOUS; SUBCUTANEOUS at 21:45

## 2023-02-22 RX ADMIN — GABAPENTIN 100 MG: 100 CAPSULE ORAL at 12:47

## 2023-02-22 ASSESSMENT — ENCOUNTER SYMPTOMS
SPEECH CHANGE: 0
HEADACHES: 0
BACK PAIN: 0
NERVOUS/ANXIOUS: 0
DIARRHEA: 0
FEVER: 0
EYE DISCHARGE: 0
STRIDOR: 0
DIZZINESS: 0
COUGH: 0
ABDOMINAL PAIN: 0
MYALGIAS: 1
NAUSEA: 0
SHORTNESS OF BREATH: 0
PALPITATIONS: 0

## 2023-02-22 ASSESSMENT — COGNITIVE AND FUNCTIONAL STATUS - GENERAL
PERSONAL GROOMING: A LOT
STANDING UP FROM CHAIR USING ARMS: A LOT
DRESSING REGULAR LOWER BODY CLOTHING: A LOT
HELP NEEDED FOR BATHING: A LOT
MOBILITY SCORE: 12
CLIMB 3 TO 5 STEPS WITH RAILING: TOTAL
MOVING TO AND FROM BED TO CHAIR: A LOT
TOILETING: A LITTLE
SUGGESTED CMS G CODE MODIFIER MOBILITY: CL
SUGGESTED CMS G CODE MODIFIER DAILY ACTIVITY: CK
MOVING FROM LYING ON BACK TO SITTING ON SIDE OF FLAT BED: A LOT
DRESSING REGULAR UPPER BODY CLOTHING: A LOT
TURNING FROM BACK TO SIDE WHILE IN FLAT BAD: A LITTLE
WALKING IN HOSPITAL ROOM: A LOT
DAILY ACTIVITIY SCORE: 15

## 2023-02-22 ASSESSMENT — PATIENT HEALTH QUESTIONNAIRE - PHQ9
SUM OF ALL RESPONSES TO PHQ9 QUESTIONS 1 AND 2: 2
7. TROUBLE CONCENTRATING ON THINGS, SUCH AS READING THE NEWSPAPER OR WATCHING TELEVISION: NOT AT ALL
1. LITTLE INTEREST OR PLEASURE IN DOING THINGS: SEVERAL DAYS
2. FEELING DOWN, DEPRESSED, IRRITABLE, OR HOPELESS: SEVERAL DAYS
9. THOUGHTS THAT YOU WOULD BE BETTER OFF DEAD, OR OF HURTING YOURSELF: NOT AT ALL
8. MOVING OR SPEAKING SO SLOWLY THAT OTHER PEOPLE COULD HAVE NOTICED. OR THE OPPOSITE, BEING SO FIGETY OR RESTLESS THAT YOU HAVE BEEN MOVING AROUND A LOT MORE THAN USUAL: NOT AT ALL
6. FEELING BAD ABOUT YOURSELF - OR THAT YOU ARE A FAILURE OR HAVE LET YOURSELF OR YOUR FAMILY DOWN: SEVERAL DAYS
SUM OF ALL RESPONSES TO PHQ QUESTIONS 1-9: 4
5. POOR APPETITE OR OVEREATING: NOT AT ALL
4. FEELING TIRED OR HAVING LITTLE ENERGY: SEVERAL DAYS
3. TROUBLE FALLING OR STAYING ASLEEP OR SLEEPING TOO MUCH: NOT AT ALL

## 2023-02-22 ASSESSMENT — LIFESTYLE VARIABLES
SUBSTANCE_ABUSE: 1
TOTAL SCORE: 0
DOES PATIENT WANT TO STOP DRINKING: NO
AVERAGE NUMBER OF DAYS PER WEEK YOU HAVE A DRINK CONTAINING ALCOHOL: 0
ON A TYPICAL DAY WHEN YOU DRINK ALCOHOL HOW MANY DRINKS DO YOU HAVE: 0
TOTAL SCORE: 0
CONSUMPTION TOTAL: NEGATIVE
ALCOHOL_USE: NO
EVER HAD A DRINK FIRST THING IN THE MORNING TO STEADY YOUR NERVES TO GET RID OF A HANGOVER: NO
HAVE YOU EVER FELT YOU SHOULD CUT DOWN ON YOUR DRINKING: NO
HAVE PEOPLE ANNOYED YOU BY CRITICIZING YOUR DRINKING: NO
EVER FELT BAD OR GUILTY ABOUT YOUR DRINKING: NO
TOTAL SCORE: 0
HOW MANY TIMES IN THE PAST YEAR HAVE YOU HAD 5 OR MORE DRINKS IN A DAY: 0

## 2023-02-22 ASSESSMENT — PAIN DESCRIPTION - PAIN TYPE
TYPE: ACUTE PAIN;CHRONIC PAIN
TYPE: CHRONIC PAIN
TYPE: CHRONIC PAIN;ACUTE PAIN

## 2023-02-22 ASSESSMENT — FIBROSIS 4 INDEX
FIB4 SCORE: 0.65
FIB4 SCORE: 2.32

## 2023-02-22 NOTE — ED NOTES
Recheck bs post dextrose 112, pt. Upset that he has not got pain medication and that we wont let him use his meth or smoke at the hospital, pt. Still agitated, rpd at bedside, call light at bedside, 3 p's addressed, poc updated

## 2023-02-22 NOTE — ASSESSMENT & PLAN NOTE
Bilateral thigh wound noted with purulent green drainage  Wound culture growing MRSA, Citrobacter, Pseudomonas  Continue Zosyn and doxycycline through 3/20/2023 per ID recommendations with option of transitioning to quinolones on discharge  Continue wound care

## 2023-02-22 NOTE — PROGRESS NOTES
[facility]     Dialysis treatment started at 0920 and completed at 1220. Nephrologist Dr. Pina notified of treatment start.     NET UF: 2000 mL     Patient is A&Ox4, has reported severe pain on BLE, PCN aware but patient is refusing tylenol. VS within normal limits. Left chest tunneled CVC, dressing is CDI, dressing changed aseptically per protocol and no signs and symptoms of infection noted. CVC Lines are patent w/ good blood flow. Left FA AVG has bruit and thrill, access not in active use per patient. Lab results and orders reviewed prior to treatment start.     Patient completed and tolerated dialysis treatment well w/o complications. VS stayed within normal limits. Left chest CVC care done aseptically, lines flushed, Heparin lock in each lines, then clamped and capped. Access site labelled and dated.    1235 Report given to PCN Lori Deluna RN     See Dialysis flow sheet for details

## 2023-02-22 NOTE — ED NOTES
Repeat bs at 96, pt.attempting to eat a sand which at this time, no signs of distress, 3 p's addressed, call light at bedside, rpd at bedside, poc updated

## 2023-02-22 NOTE — ED TRIAGE NOTES
Chief Complaint   Patient presents with    Leg Pain     Pt states sores on bilateral legs and thighs, states infection        Pt bib resma from long-term for medical clearance on sores to bilateral thighs he states is from dialysis, labs drawn and Iv started, call light at bedside, rpd at bedside, waiting on medical clearance. States he used meth 48 hours ago

## 2023-02-22 NOTE — PROGRESS NOTES
Patient arrived to floor. Assumed care at 1300. Assessment complete, A&Ox 4  Admission record complete, Patient on monitor, Monitor room notified. Pt oriented to room, educated on call light/extension/phone system, RN and CNA extension numbers provided to pt, white board updated. Fall assessment complete, patient educated to call for assistance, pt verbalizes understanding. Pt denies pain or any additional needs at this time. Questions and concerns addressed. Call light, phone, and personal belongings within reach.

## 2023-02-22 NOTE — ED PROVIDER NOTES
ER Provider Note    Scribed for Marck Murphy M.d. by Saray Crowell. 2/22/2023  4:38 AM    Primary Care Provider: Pcp Pt States None    CHIEF COMPLAINT  Chief Complaint   Patient presents with    Leg Pain     Pt states sores on bilateral legs and thighs, states infection, pt with bs 58, missed diaylsis today, brought via [facility] from halfway     EXTERNAL RECORDS REVIEWED  Inpatient Notes   and Outpatient Notes The patient has a history of ESRD and is on dialysis. [facility] having [facility] dialysis for 1 month    HPI/ROS  LIMITATION TO HISTORY   Select: : None  OUTSIDE HISTORIAN(S):  none    Ambrose Watkins is a 47 y.o. male who presents to the ED complaining of leg pain. Patient has a large wound to his right thigh and multiple sores to his lower extremities. Patient has a history of ESRD and missed dialysis yesterday. Denies fevers. Patients blood glucose upon arrival was 58.    PAST MEDICAL HISTORY  Past Medical History:   Diagnosis Date    Diabetes     High anion gap metabolic acidosis 8/9/2022    Hyperkalemia 2/22/2022    Hypertension     Renal disease        SURGICAL HISTORY  Past Surgical History:   Procedure Laterality Date    AV FISTULA CREATION Left 2/25/2022    Procedure: CREATION, AV FISTULA-UPPER EXTREMITY GRAFT, AND FISTULOGRAM;  Surgeon: Tone Hartman M.D.;  Location: SURGERY [facility];  Service: Vascular    THROMBECTOMY Left 2/25/2022    Procedure: THROMBECTOMY;  Surgeon: Tone Hartman M.D.;  Location: SURGERY [facility];  Service: Vascular       FAMILY HISTORY  No family history on file.    SOCIAL HISTORY   reports that he has been smoking. He has been smoking an average of .25 packs per day. He has never used smokeless tobacco. He reports that he does not currently use alcohol. He reports that he does not use drugs.    CURRENT MEDICATIONS  Previous Medications    ACETAMINOPHEN (TYLENOL) 500 MG TAB    Take 2 Tablets by mouth every 8 hours as  "needed for Mild Pain.    METOLAZONE (ZAROXOLYN) 2.5 MG TAB    Take 1 Tablet by mouth 2 times a day.       ALLERGIES  Patient has no known allergies.    PHYSICAL EXAM  BP (!) 146/73   Pulse 71   Temp 35.8 °C (96.5 °F) (Oral)   Resp 18   Ht 1.803 m (5' 11\")   Wt 90 kg (198 lb 6.6 oz)   SpO2 95%   BMI 27.67 kg/m²   Nursing note and vitals reviewed.  Constitutional: Chronically ill appearing.  Somewhat uncomfortable appearing male.  HENT: Head is normocephalic and atraumatic. Oropharynx is clear and moist without exudate or erythema.   Eyes: Pupils are equal, round, and reactive to light. Conjunctiva are normal.   Cardiovascular: Normal rate and regular rhythm. No murmur heard. Normal radial pulses.  Pulmonary/Chest: Breath sounds normal. No wheezes or rales.   Abdominal: Soft and non-tender. No distention    Musculoskeletal: AV fistula left upper extremity palpable thrill. Large healing open wounds to medial aspect of bilateral thighs, foul smelling dressings removed but no significant erythema or purulent drainage from wounds  Neurological: Awake, alert and oriented to person, place, and time. No focal deficits noted.  Skin: Skin is warm and dry. No rash.   Psychiatric: Confrontational, angry, argumentative    DIAGNOSTIC STUDIES    Labs:   Results for orders placed or performed during the hospital encounter of 02/22/23   CBC WITHOUT DIFFERENTIAL   Result Value Ref Range    WBC 11.7 (H) 4.8 - 10.8 K/uL    RBC 3.43 (L) 4.70 - 6.10 M/uL    Hemoglobin 9.8 (L) 14.0 - 18.0 g/dL    Hematocrit 30.9 (L) 42.0 - 52.0 %    MCV 90.1 81.4 - 97.8 fL    MCH 28.6 27.0 - 33.0 pg    MCHC 31.7 (L) 33.7 - 35.3 g/dL    RDW 61.3 (H) 35.9 - 50.0 fL    Platelet Count 167 164 - 446 K/uL    MPV 10.0 9.0 - 12.9 fL   Basic Metabolic Panel   Result Value Ref Range    Sodium 137 135 - 145 mmol/L    Potassium 7.6 (HH) 3.6 - 5.5 mmol/L    Chloride 98 96 - 112 mmol/L    Co2 7 (LL) 20 - 33 mmol/L    Glucose 115 (H) 65 - 99 mg/dL    Bun 148 (H) 8 " - 22 mg/dL    Creatinine 13.04 (HH) 0.50 - 1.40 mg/dL    Calcium 6.6 (LL) 8.5 - 10.5 mg/dL    Anion Gap 32.0 (H) 7.0 - 16.0   ESTIMATED GFR   Result Value Ref Range    GFR (CKD-EPI) 4 (A) >60 mL/min/1.73 m 2   EKG   Result Value Ref Range    Report       [facility] Emergency Dept.    Test Date:  2023  Pt Name:    CONY MORAN                Department: ER  MRN:        [id number]                      Room:       RD 10  Gender:     Male                         Technician: ARLENE  :        1975                   Requested By:ANDREW KEEN  Order #:    [id number]                    Reading MD: ANDREW KEEN MD    Measurements  Intervals                                Axis  Rate:       71                           P:          43  PA:         30                           QRS:        223  QRSD:       165                          T:          60  QT:         543  QTc:        591    Interpretive Statements  Sinus rhythm  Short PA interval  Probable left atrial enlargement  Nonspecific intraventricular conduction delay  Compared to ECG 09/10/2022 20:29:14  Short PA interval now present  ST (T wave) deviation no longer present  Electronically Signed On 2023 7:36:32 PST by ANDREW KEEN MD     POCT glucose device results   Result Value Ref Range    POC Glucose, Blood 45 (L) 65 - 99 mg/dL   POCT glucose device results   Result Value Ref Range    POC Glucose, Blood 112 (H) 65 - 99 mg/dL   POCT glucose device results   Result Value Ref Range    POC Glucose, Blood 97 65 - 99 mg/dL       EKG:   I have independently interpreted this EKG as above     COURSE & MEDICAL DECISION MAKING     ED Observation Status?  No      INITIAL ASSESSMENT, COURSE AND PLAN  4:40 AM - Patient seen and examined at bedside. Patient has chronic healing wounds to bilateral thighs without evidence of secondary bacterial infection. Discussed plan of care, including labs to evaluate his electrolytes given his missed  dialysis treatments. Patient agrees to the plan of care. We will redress his wounds. The patient will be medicated with dextrose 10% 25 g. Ordered for CBC w/o diff, BMP and EKG to evaluate his symptoms.     6:59 AM - Reviewed patients labs which shows a potassium of 7.6. Informed him he will need dialysis. Patti nephrology. Patient has been intermitnenetly hypoglycemic therefore we will not administer insulin at this time. He will be treated wtih bicarb. Patient has prior calciphylaxis so i am hesitant to administer calcium.    7:36 AM - Ordered CMP. Patti hospitalist for admission.    7:51 AM - I discussed the patient's case and the above findings with Dr. Grey (Hospitalist) who will consult the patient for admission. Patient care will be transferred at this time.    7:55 AM I discussed the patient's case and the above findings with Dr. Pina (nephrology) who will arrange for emergent dialysis. He recommends treatment with calcium and bicarb which have been ordered.     Care Narrative: [facility] on his legs.  Wounds here actually seem to be healing well.  Definitely open chronic wounds but I do not see massiel evidence of infection.  Patient has severe hyperkalemia.  Treated with calcium and bicarb in the emergency department.  I have also given glucose as he has had intermittent hypoglycemia.  However due to the intermittent hypoglycemia I did not administer insulin as a portion of the hyperkalemia protocol.  As I did not want to make his hypoglycemia worse.  I have arranged for emergent dialysis.    CRITICAL CARE  I provided critical care services, which included medication orders, frequent reevaluations of the patient's condition and response to treatment, ordering and reviewing test results, and discussing the case with various consultants.  The critical care time associated with the care of the patient was 35 minutes. Review chart for  interventions. This time is exclusive of any other billable procedures.      DISPOSITION AND DISCUSSIONS  I have discussed management of the patient with the following physicians and CECILIA's:  Dr. Grey (Hospitalist), Dr. Pina (nephrology)    Discussion of management with other [facility] or appropriate source(s):  none      Barriers to care at this time, including but not limited to:  none .     DISPOSITION:  Patient will be hospitalized by Dr. Grey in guarded condition.    FINAL DIANGOSIS  1. Hyperkalemia    2. ESRD needing dialysis (HCC)    3. Noncompliance of patient with renal dialysis (HCC)    4. Open wound of left thigh, initial encounter    5. Open thigh wound, right, initial encounter    6. Calciphylaxis    7. Metabolic acidosis    8. Hypoglycemia    9. In police custody          Saray SIMON (Scribkalli), am scribing for, and in the presence of, Marck Murphy M.D..    Electronically signed by: Saray Crowell (Bruceibkalli), 2/22/2023    IMarck M.D. personally performed the services described in this documentation, as scribed by Saray Crowell in my presence, and it is both accurate and complete.   The note accurately reflects work and decisions made by me.  Marck Murphy M.D.  2/22/2023  11:17 AM

## 2023-02-22 NOTE — CONSULTS
"[facility] -  CONSULTATION NOTE               Author: Serafin Pina M.D. Date & Time: 2/22/2023  8:32 AM       REASON FOR CONSULTATION:   Inpatient hemodialysis management.    CHIEF COMPLAINT:   \"Leg Pain\"    HISTORY OF PRESENT ILLNESS:    Patient is a 47 year old male with a PMHx of ESRD on HD qTTS, meth user and history of calciphylaxis here for leg pain.  States he's still using meth.  Last HD session was about 4-5 days ago.  Here potassium was 7.6 and bicarb of 7.  Nephrology consulted for maintenance dialysis.  Currently complaining of leg pain.  History of nonadherence.    REVIEW OF SYSTEMS:    10 point ROS was performed and is as per HPI or otherwise negative.    PAST MEDICAL HISTORY:   Past Medical History:   Diagnosis Date    Diabetes     High anion gap metabolic acidosis 8/9/2022    Hyperkalemia 2/22/2022    Hypertension     Renal disease        PAST SURGICAL HISTORY:   Past Surgical History:   Procedure Laterality Date    AV FISTULA CREATION Left 2/25/2022    Procedure: CREATION, AV FISTULA-UPPER EXTREMITY GRAFT, AND FISTULOGRAM;  Surgeon: Tone Hartman M.D.;  Location: SURGERY [facility];  Service: Vascular    THROMBECTOMY Left 2/25/2022    Procedure: THROMBECTOMY;  Surgeon: Tone Hartman M.D.;  Location: SURGERY [facility];  Service: Vascular       FAMILY HISTORY:   No family history on file.    SOCIAL HISTORY:   Social History     Tobacco Use   Smoking Status Every Day    Packs/day: 0.25    Types: Cigarettes   Smokeless Tobacco Never     Social History     Substance and Sexual Activity   Alcohol Use Not Currently     Social History     Substance and Sexual Activity   Drug Use No       HOME MEDICATIONS:   Reviewed and documented in chart.    LABORATORY STUDIES:   Recent Labs     02/22/23  0600   SODIUM 137  137   POTASSIUM 7.6*  7.6*   CHLORIDE 98  98   CO2 7*  7*   GLUCOSE 115*  115*   *  148*   CREATININE 13.04*  13.04*   CALCIUM 6.6*  6.6* " "      ALLERGIES:  Patient has no known allergies.    VS:  BP (!) 152/88   Pulse 70   Temp 35.8 °C (96.5 °F) (Oral)   Resp 18   Ht 1.803 m (5' 11\")   Wt 90 kg (198 lb 6.6 oz)   SpO2 92%   BMI 27.67 kg/m²     Physical Exam  Constitutional:       Appearance: He is ill-appearing.   HENT:      Head: Normocephalic.      Right Ear: External ear normal.      Left Ear: External ear normal.      Nose: Nose normal.      Mouth/Throat:      Mouth: Mucous membranes are moist.   Eyes:      Conjunctiva/sclera: Conjunctivae normal.   Pulmonary:      Effort: Pulmonary effort is normal.   Abdominal:      Palpations: Abdomen is soft.   Musculoskeletal:         General: Swelling present.      Cervical back: Normal range of motion.      Comments: Thigh leg wound wrapped   Skin:     General: Skin is warm.   Neurological:      Mental Status: Mental status is at baseline.   Psychiatric:         Mood and Affect: Mood normal.          FLUID BALANCE:  In: 250 [I.V.:250]  Out: -     IMAGING:  All imaging reviewed from admission to present day    IMPRESSION:  # ESRD  - Etiology likely 2/2 DM/HTN  - HD qTTHS via [facility] TDC  - Lost his HD chair at [facility] previously  # Hyperkalemia   - State HD   # LFA AVG thrombosed  - US 2/23 no flow  - s/p OR 2/25 AVG Brachio-axillary Creation, thrombectomy and fistulogram   - stenosis and wall thrombus noted on US, TDC in use  # HTN, fair control   - Goal BP < 140/90  # Anemia of CKD, below target  - Iron panel  # CKD-MBD     # Hyperkalemia, corrected  - Due to missed HD  - Manage with HD and low K+ diet  # Calciphylaxis  - Per reports from patient  - Will do 4 days of dialysis a week   # Weakness  # h/o Methamphetamine use   # Homelessness  # DM   # Severe metabolic acidosis, c  # Wound     PLAN:  - Stat HD today  - UF as tolerated  - Use PermCath for now  - AVG eval as OP at   - Transfuse PRN Hgb <7.0   - JACKIE with HD to goal Hgb 10-11  - No dietary protein restrictions  - Dose all meds per " ESRD/iHD  - Renal diet, low salt diet, fluid restriction to 1.5L daily  -  support for placement    Thank you for the consultation!

## 2023-02-22 NOTE — ED NOTES
from Lab called with critical result of K+, C02, creatine and calcium at 7.6, 7, 13, 6.6. Critical lab result read back to lab.   Dr. iglesias notified of critical lab result at 0702.  Critical lab result read back by Dr. iglesias.

## 2023-02-22 NOTE — ASSESSMENT & PLAN NOTE
HD per Nephrology - [facility]    Case management for discharge planning - outpatient Dialysis arrangement  So far has been declined from every dialysis center because of behavior, noncompliance

## 2023-02-22 NOTE — ED NOTES
Pharmacy Medication Reconciliation    ~Medication Reconciliation updated and complete per patient at bedside  ~Allergies reviewed           ~Patient reports NO Prescription or OTC medications

## 2023-02-22 NOTE — ED NOTES
T8 Rn notified pt ready for transport. Dialysis reported 2L removed. Left chest catheter dressing changed and heparin locked. Left FA fistula has a thrill but dialysis RN did not access.

## 2023-02-22 NOTE — H&P
"Hospital Medicine History & Physical Note    Date of Service  2/22/2023    Primary Care Physician  Pcp Pt States None    Consultants  nephrology    Specialist Names: Dr Pina    Code Status  Full Code    Chief Complaint  Chief Complaint   Patient presents with    Leg Pain     Pt states sores on bilateral legs and thighs, states infection, pt with bs 58, missed diaylsis today, brought via [facility] from longterm       History of Presenting Illness  Ambrose Watkins is a homeless 47 y.o. male with diabetes since 2007, ESRD on HD for past 2-3 years, methamphetamine abuse, tobacco use, HTN, DLD, medical noncompliance who presented 2/22/2023 with evaluation by police prior to being incarcerated.  He was found in ER to be volume overloaded and have a K:7.6.  He states having his last dialysis 4 days ago in [facility].  He has chronic pain in his thighs from chronic bilateral thigh wounds  and per pt, \"calciphylaxis.\"  In ER he was consulted by nephrology and emergent dialysis is planned.  The patient keeps pulling off his telemetry leads despite being educated on why we recommend them.        I discussed the plan of care with patient and bedside RN.    Review of Systems  Review of Systems   Constitutional:  Positive for malaise/fatigue. Negative for fever.   Eyes:  Negative for discharge.   Respiratory:  Negative for cough, shortness of breath and stridor.    Cardiovascular:  Negative for chest pain, palpitations and leg swelling.   Gastrointestinal:  Negative for abdominal pain, diarrhea and nausea.   Musculoskeletal:  Positive for myalgias (bilateral thighs). Negative for back pain and joint pain.   Skin:  Negative for rash.   Neurological:  Negative for dizziness, speech change and headaches.   Psychiatric/Behavioral:  Positive for substance abuse. The patient is not nervous/anxious.      Past Medical History   has a past medical history of Diabetes, High anion gap metabolic acidosis (8/9/2022), Hyperkalemia " "(2022), Hypertension, and Renal disease.    Surgical History   has a past surgical history that includes av fistula creation (Left, 2022) and thrombectomy (Left, 2022).     Family History  Parents . Mom had cancer, father he isn't sure about \"I didn't know him\"   Family history reviewed with patient. There is no family history that is pertinent to the chief complaint.     Social History   reports that he has been smoking. He has been smoking an average of .25 packs per day. He has never used smokeless tobacco. He reports that he does not currently use alcohol. He reports active use of methamphetamines. Homeless.  States no children.      Allergies  No Known Allergies    Medications  None       Physical Exam  Temp:  [35.8 °C (96.5 °F)] 35.8 °C (96.5 °F)  Pulse:  [70-85] 70  Resp:  [18] 18  BP: (131-159)/(72-90) 152/88  SpO2:  [92 %-95 %] 92 %  Blood Pressure: (!) 152/88   Temperature: 35.8 °C (96.5 °F)   Pulse: 70   Respiration: 18   Pulse Oximetry: 92 %       Physical Exam  Vitals reviewed.   Constitutional:       Appearance: Normal appearance. He is overweight. He is not diaphoretic.      Comments: dishevelled   HENT:      Head: Normocephalic and atraumatic.      Nose: Nose normal.      Mouth/Throat:      Mouth: Mucous membranes are moist.      Pharynx: No oropharyngeal exudate.   Eyes:      General: No scleral icterus.        Right eye: No discharge.         Left eye: No discharge.      Extraocular Movements: Extraocular movements intact.      Conjunctiva/sclera: Conjunctivae normal.   Cardiovascular:      Rate and Rhythm: Normal rate and regular rhythm.      Pulses:           Radial pulses are 2+ on the right side.      Heart sounds: No murmur heard.     Comments: Failed left arm AV graft/fistula.    Pulmonary:      Effort: Pulmonary effort is normal. No respiratory distress.      Breath sounds: Examination of the right-lower field reveals rhonchi. Examination of the left-lower field " "reveals rhonchi. Decreased breath sounds and rhonchi present. No wheezing or rales.   Abdominal:      General: Bowel sounds are normal. There is no distension.      Palpations: Abdomen is soft.   Musculoskeletal:         General: No swelling or tenderness.      Cervical back: Neck supple. No tenderness. No muscular tenderness.      Right lower leg: Edema present.      Left lower leg: Edema present.   Lymphadenopathy:      Cervical: No cervical adenopathy.   Skin:     Coloration: Skin is not jaundiced or pale.      Findings: Lesion present.      Comments: Bilateral wounds to mid anterior thighs with granulation tissue and no surrounding erythema nor induration nor drainage.  A left wound with black suture remaining but patient refused to have me remove it \"it was a leaking blood vessel!\"   Neurological:      General: No focal deficit present.      Mental Status: He is alert and oriented to person, place, and time. Mental status is at baseline.      Cranial Nerves: No cranial nerve deficit.   Psychiatric:         Mood and Affect: Mood normal.         Behavior: Behavior normal.       Laboratory:  Recent Labs     02/22/23  0432   WBC 11.7*   RBC 3.43*   HEMOGLOBIN 9.8*   HEMATOCRIT 30.9*   MCV 90.1   MCH 28.6   MCHC 31.7*   RDW 61.3*   PLATELETCT 167   MPV 10.0     Recent Labs     02/22/23  0600   SODIUM 137  137   POTASSIUM 7.6*  7.6*   CHLORIDE 98  98   CO2 7*  7*   GLUCOSE 115*  115*   *  148*   CREATININE 13.04*  13.04*   CALCIUM 6.6*  6.6*     Recent Labs     02/22/23  0600   ALTSGPT 38   ASTSGOT 53*   ALKPHOSPHAT 174*   TBILIRUBIN 0.7   GLUCOSE 115*  115*         No results for input(s): NTPROBNP in the last 72 hours.      No results for input(s): TROPONINT in the last 72 hours.    Imaging:  No orders to display           Assessment/Plan:  Justification for Admission Status  I anticipate this patient will require at least two midnights for appropriate medical management, necessitating inpatient " "admission because need of treatment with hyperkalemia and volume overload from lack of hemodialysis.    Patient will need a Telemetry bed on MEDICAL service .  The need is secondary to treatment of volume overload.    Open wound of thigh  Assessment & Plan  Per patient, \"calciphalaxis\" and his last I+D of the wounds were \"6 months ago\"  Wound care to assess.  Add ascorbic acid and zinc supplements  No sign of active infection.    Tobacco use  Assessment & Plan  Cessation advised  Patient uninterested in stopping smoking.  Nicotine patch offered    Noncompliance of patient with renal dialysis ([facility])- (present on admission)  Assessment & Plan  Patient wants FULL code.  Addressed need of medical follow up.  Difficulty as hx of homelessness. And active drug abuse.    Anemia- (present on admission)  Assessment & Plan  Of chronic renal failure  EPO per nephrology  Monitor CBC    Hypocalcemia- (present on admission)  Assessment & Plan  Monitor labs  2/22 Ca:6.6, Alb:3.3    Methamphetamine abuse ([facility])- (present on admission)  Assessment & Plan  Active use.    End stage renal disease ([facility])- (present on admission)  Assessment & Plan  Noncompliance concern.  He states last hemodialysis was 4 days ago in [facility].  Will need emergent dialysis  Nephrology consulting  Follow up on labs    Hyperkalemia- (present on admission)  Assessment & Plan  K:7.6  EKG reviewed  On telemetry but keeps ripping it off despite being told we need to monitor his heart in the face of severe hyperkalemia.  Emergent hemodialysis  Was not given insulin due to hypoglycemia noted in ER  Monitor after dialysis labs    Hypertension- (present on admission)  Assessment & Plan  States not being on any medications  Monitor vitals.    DM (diabetes mellitus) ([facility])- (present on admission)  Assessment & Plan  Last A1c:6.5 in past year  Monitor accuchecks and cover with SSI  Denies taking any outpatient meds  Diabetic diet  Hypoglycemic protocol        VTE " prophylaxis: SCDs/TEDs and heparin ppx

## 2023-02-23 LAB
ANION GAP SERPL CALC-SCNC: 23 MMOL/L (ref 7–16)
BUN SERPL-MCNC: 103 MG/DL (ref 8–22)
CA-I SERPL-SCNC: 1 MMOL/L (ref 1.1–1.3)
CALCIUM SERPL-MCNC: 5.9 MG/DL (ref 8.5–10.5)
CHLORIDE SERPL-SCNC: 99 MMOL/L (ref 96–112)
CO2 SERPL-SCNC: 18 MMOL/L (ref 20–33)
CREAT SERPL-MCNC: 10.26 MG/DL (ref 0.5–1.4)
EKG IMPRESSION: NORMAL
ERYTHROCYTE [DISTWIDTH] IN BLOOD BY AUTOMATED COUNT: 59.1 FL (ref 35.9–50)
GFR SERPLBLD CREATININE-BSD FMLA CKD-EPI: 6 ML/MIN/1.73 M 2
GLUCOSE BLD STRIP.AUTO-MCNC: 134 MG/DL (ref 65–99)
GLUCOSE BLD STRIP.AUTO-MCNC: 157 MG/DL (ref 65–99)
GLUCOSE BLD STRIP.AUTO-MCNC: 166 MG/DL (ref 65–99)
GLUCOSE BLD STRIP.AUTO-MCNC: 189 MG/DL (ref 65–99)
GLUCOSE BLD STRIP.AUTO-MCNC: 88 MG/DL (ref 65–99)
GLUCOSE SERPL-MCNC: 158 MG/DL (ref 65–99)
HAV IGM SERPL QL IA: NORMAL
HBV CORE IGM SER QL: NORMAL
HBV SURFACE AB SERPL IA-ACNC: 15.2 MIU/ML (ref 0–10)
HBV SURFACE AG SER QL: NORMAL
HCT VFR BLD AUTO: 28.5 % (ref 42–52)
HCV AB SER QL: NORMAL
HGB BLD-MCNC: 9.3 G/DL (ref 14–18)
MCH RBC QN AUTO: 28.4 PG (ref 27–33)
MCHC RBC AUTO-ENTMCNC: 32.6 G/DL (ref 33.7–35.3)
MCV RBC AUTO: 87.2 FL (ref 81.4–97.8)
PLATELET # BLD AUTO: 134 K/UL (ref 164–446)
PMV BLD AUTO: 10.4 FL (ref 9–12.9)
POTASSIUM SERPL-SCNC: 5.2 MMOL/L (ref 3.6–5.5)
RBC # BLD AUTO: 3.27 M/UL (ref 4.7–6.1)
SODIUM SERPL-SCNC: 140 MMOL/L (ref 135–145)
WBC # BLD AUTO: 9.3 K/UL (ref 4.8–10.8)

## 2023-02-23 PROCEDURE — 86706 HEP B SURFACE ANTIBODY: CPT

## 2023-02-23 PROCEDURE — 90935 HEMODIALYSIS ONE EVALUATION: CPT

## 2023-02-23 PROCEDURE — 93010 ELECTROCARDIOGRAM REPORT: CPT | Performed by: INTERNAL MEDICINE

## 2023-02-23 PROCEDURE — 85027 COMPLETE CBC AUTOMATED: CPT

## 2023-02-23 PROCEDURE — 97162 PT EVAL MOD COMPLEX 30 MIN: CPT

## 2023-02-23 PROCEDURE — 770006 HCHG ROOM/CARE - MED/SURG/GYN SEMI*

## 2023-02-23 PROCEDURE — 82962 GLUCOSE BLOOD TEST: CPT | Mod: 91

## 2023-02-23 PROCEDURE — 93005 ELECTROCARDIOGRAM TRACING: CPT

## 2023-02-23 PROCEDURE — A9270 NON-COVERED ITEM OR SERVICE: HCPCS | Performed by: HOSPITALIST

## 2023-02-23 PROCEDURE — A9270 NON-COVERED ITEM OR SERVICE: HCPCS

## 2023-02-23 PROCEDURE — 80074 ACUTE HEPATITIS PANEL: CPT

## 2023-02-23 PROCEDURE — 700111 HCHG RX REV CODE 636 W/ 250 OVERRIDE (IP): Performed by: HOSPITALIST

## 2023-02-23 PROCEDURE — 97166 OT EVAL MOD COMPLEX 45 MIN: CPT

## 2023-02-23 PROCEDURE — 36415 COLL VENOUS BLD VENIPUNCTURE: CPT

## 2023-02-23 PROCEDURE — 99232 SBSQ HOSP IP/OBS MODERATE 35: CPT | Performed by: HOSPITALIST

## 2023-02-23 PROCEDURE — 86480 TB TEST CELL IMMUN MEASURE: CPT

## 2023-02-23 PROCEDURE — 700102 HCHG RX REV CODE 250 W/ 637 OVERRIDE(OP): Performed by: HOSPITALIST

## 2023-02-23 PROCEDURE — 80048 BASIC METABOLIC PNL TOTAL CA: CPT

## 2023-02-23 PROCEDURE — 700102 HCHG RX REV CODE 250 W/ 637 OVERRIDE(OP)

## 2023-02-23 PROCEDURE — 700111 HCHG RX REV CODE 636 W/ 250 OVERRIDE (IP)

## 2023-02-23 PROCEDURE — 82330 ASSAY OF CALCIUM: CPT

## 2023-02-23 RX ORDER — SODIUM HYPOCHLORITE 1.25 MG/ML
SOLUTION TOPICAL 2 TIMES DAILY
Status: DISCONTINUED | OUTPATIENT
Start: 2023-02-23 | End: 2023-03-01

## 2023-02-23 RX ORDER — HYDROXYZINE HYDROCHLORIDE 10 MG/1
10 TABLET, FILM COATED ORAL ONCE
Status: COMPLETED | OUTPATIENT
Start: 2023-02-23 | End: 2023-02-23

## 2023-02-23 RX ORDER — HEPARIN SODIUM 1000 [USP'U]/ML
INJECTION, SOLUTION INTRAVENOUS; SUBCUTANEOUS
Status: COMPLETED
Start: 2023-02-23 | End: 2023-02-23

## 2023-02-23 RX ADMIN — OXYCODONE AND ACETAMINOPHEN 1 TABLET: 10; 325 TABLET ORAL at 11:53

## 2023-02-23 RX ADMIN — OXYCODONE AND ACETAMINOPHEN 1 TABLET: 10; 325 TABLET ORAL at 06:39

## 2023-02-23 RX ADMIN — OXYCODONE AND ACETAMINOPHEN 1 TABLET: 10; 325 TABLET ORAL at 17:21

## 2023-02-23 RX ADMIN — Medication 500 MG: at 05:16

## 2023-02-23 RX ADMIN — GABAPENTIN 100 MG: 100 CAPSULE ORAL at 05:16

## 2023-02-23 RX ADMIN — HEPARIN SODIUM 5000 UNITS: 5000 INJECTION, SOLUTION INTRAVENOUS; SUBCUTANEOUS at 20:32

## 2023-02-23 RX ADMIN — HEPARIN SODIUM 3800 UNITS: 1000 INJECTION, SOLUTION INTRAVENOUS; SUBCUTANEOUS at 15:41

## 2023-02-23 RX ADMIN — GABAPENTIN 100 MG: 100 CAPSULE ORAL at 11:51

## 2023-02-23 RX ADMIN — GABAPENTIN 100 MG: 100 CAPSULE ORAL at 17:21

## 2023-02-23 RX ADMIN — INSULIN HUMAN 2 UNITS: 100 INJECTION, SOLUTION PARENTERAL at 20:33

## 2023-02-23 RX ADMIN — ACETAMINOPHEN 650 MG: 325 TABLET, FILM COATED ORAL at 21:10

## 2023-02-23 RX ADMIN — OXYCODONE AND ACETAMINOPHEN 1 TABLET: 10; 325 TABLET ORAL at 00:27

## 2023-02-23 RX ADMIN — ONDANSETRON 4 MG: 2 INJECTION INTRAMUSCULAR; INTRAVENOUS at 21:10

## 2023-02-23 RX ADMIN — Medication 220 MG: at 05:16

## 2023-02-23 RX ADMIN — HYDROXYZINE HYDROCHLORIDE 10 MG: 10 TABLET ORAL at 05:16

## 2023-02-23 RX ADMIN — INSULIN HUMAN 2 UNITS: 100 INJECTION, SOLUTION PARENTERAL at 11:51

## 2023-02-23 RX ADMIN — HEPARIN SODIUM 5000 UNITS: 5000 INJECTION, SOLUTION INTRAVENOUS; SUBCUTANEOUS at 05:18

## 2023-02-23 ASSESSMENT — LIFESTYLE VARIABLES: SUBSTANCE_ABUSE: 0

## 2023-02-23 ASSESSMENT — ENCOUNTER SYMPTOMS
DOUBLE VISION: 0
BLURRED VISION: 0
HEARTBURN: 0
VOMITING: 0
PALPITATIONS: 0
HEMOPTYSIS: 0
SINUS PAIN: 0
FEVER: 0
DIZZINESS: 0
HEADACHES: 0
NAUSEA: 0
BACK PAIN: 0
BRUISES/BLEEDS EASILY: 0
DEPRESSION: 0
CLAUDICATION: 0
MYALGIAS: 0
PND: 0
COUGH: 0
CHILLS: 0
WHEEZING: 0

## 2023-02-23 ASSESSMENT — COGNITIVE AND FUNCTIONAL STATUS - GENERAL
CLIMB 3 TO 5 STEPS WITH RAILING: A LITTLE
SUGGESTED CMS G CODE MODIFIER DAILY ACTIVITY: CJ
DRESSING REGULAR LOWER BODY CLOTHING: A LITTLE
SUGGESTED CMS G CODE MODIFIER MOBILITY: CI
HELP NEEDED FOR BATHING: A LITTLE
MOBILITY SCORE: 23
DAILY ACTIVITIY SCORE: 22

## 2023-02-23 ASSESSMENT — PAIN DESCRIPTION - PAIN TYPE
TYPE: ACUTE PAIN;CHRONIC PAIN
TYPE: ACUTE PAIN

## 2023-02-23 ASSESSMENT — GAIT ASSESSMENTS
DISTANCE (FEET): 100
GAIT LEVEL OF ASSIST: SUPERVISED

## 2023-02-23 ASSESSMENT — ACTIVITIES OF DAILY LIVING (ADL): TOILETING: INDEPENDENT

## 2023-02-23 NOTE — PROGRESS NOTES
This RN came to the pt's room to transport him to the dialysis unit because no transporter available. Pt refused to come down to the dialysis unit and wanted to eat his lunch first at noon. Charge RN aware.

## 2023-02-23 NOTE — CARE PLAN
The patient is Stable - Low risk of patient condition declining or worsening         Progress made toward(s) clinical / shift goals:  Patient with normalized K, wound care orders entered    Patient is not progressing towards the following goals: Patient pain not controlled, PO meds not sufficient for pain control with patient's wounds/wound care. Patient continues to miss HD and not follow-up appropriately for wound care.      Problem: Pain - Standard  Goal: Alleviation of pain or a reduction in pain to the patient’s comfort goal  Outcome: Not Progressing     Problem: Knowledge Deficit - Standard  Goal: Patient and family/care givers will demonstrate understanding of plan of care, disease process/condition, diagnostic tests and medications  Outcome: Not Progressing     Problem: Skin Integrity  Goal: Skin integrity is maintained or improved  Outcome: Not Progressing

## 2023-02-23 NOTE — CARE PLAN
The patient is Watcher - Medium risk of patient condition declining or worsening    Shift Goals  Clinical Goals: wound  Patient Goals: pain control and sleep  Family Goals: na    Progress made toward(s) clinical / shift goals:    Problem: Skin Integrity  Goal: Skin integrity is maintained or improved  Outcome: Progressing  Note: Wound is following patient and patient allowed wounds to be dressed.        Patient is not progressing towards the following goals:      Problem: Fall Risk  Goal: Patient will remain free from falls  Outcome: Not Progressing  Note: Patient does not use call light prior to trying to get out of bed.

## 2023-02-23 NOTE — PROGRESS NOTES
Attempted to call report to receiving RN on S6, bed is clean and ready, Charge RN on S6 stated the patient has not yet been assigned and to wait. Transport is already assigned, Charge RN aware of situation.

## 2023-02-23 NOTE — PROGRESS NOTES
0602 Lab called this RN with a critical calcium of 5.9.    0603 On call hospitalist, David COLON notified.

## 2023-02-23 NOTE — PROGRESS NOTES
Patient began yelling out. When staff arrived he needed help reaching his phone. This RN informed the patient he needs to use his call light and cannot yell out into the hallway. Patient become more agitated and told this RN to leave the room and continued to yell.

## 2023-02-23 NOTE — THERAPY
Physical Therapy   Initial Evaluation     Patient Name: Ambrose Watkins  Age:  47 y.o., Sex:  male  Medical Record #: [id number]  Today's Date: 2/23/2023          Assessment  Patient is 47 y.o. male admitted after being found in his car w/ meth.  Police brought him to [facility] for medical clearance.  He is homeless.  Chronic LE sores.  Medically non compliant.  Misses HD.  He was mobile PTA w/o AD.  Today, he requests to try a cane.  He is able to ambulate w/o an assistive device, equally as well as w/ either the cane or fww.  No acute PT needs.  PT for d/c needs  Plan    Physical Therapy Initial Treatment Plan   Duration: Evaluation only    DC Equipment Recommendations: None  Discharge Recommendations: Anticipate that the patient will have no further physical therapy needs after discharge from the hospital          Objective       02/23/23 0916   Prior Living Situation   Housing / Facility Homeless   Lives with - Patient's Self Care Capacity Alone and Able to Care For Self   Prior Level of Functional Mobility   Bed Mobility Independent   Transfer Status Independent   Ambulation Independent   Assistive Devices Used None   Cognition    Level of Consciousness Alert   Strength Lower Body   Comments unable to formally assess due to LE sores, grossly moving against gravity   Balance Assessment   Sitting Balance (Static) Fair +   Sitting Balance (Dynamic) Fair +   Standing Balance (Static) Fair   Standing Balance (Dynamic) Fair   Weight Shift Sitting Good   Weight Shift Standing Good   Bed Mobility    Supine to Sit Supervised   Sit to Supine Supervised   Gait Analysis   Gait Level Of Assist Supervised   Assistive Device None   Distance (Feet) 100   Functional Mobility   Sit to Stand Supervised   Bed, Chair, Wheelchair Transfer Supervised   Physical Therapy Initial Treatment Plan    Duration Evaluation only   Anticipated Discharge Equipment and Recommendations   DC Equipment Recommendations None   Discharge Recommendations  Anticipate that the patient will have no further physical therapy needs after discharge from the hospital

## 2023-02-23 NOTE — PROGRESS NOTES
Bedside report received from night RN, pt care assumed, assessment completed. Pt is A&Ox4 and agitated. Updated on POC, questions answered. Bed in lowest, locked position, treaded socks on, call light and belongings within reach.

## 2023-02-23 NOTE — THERAPY
"Occupational Therapy   Initial Evaluation     Patient Name: Ambrose Watkins  Age:  47 y.o., Sex:  male  Medical Record #: [id number]  Today's Date: 2/23/2023     Precautions: Fall Risk  Comments: R eye blindness    Assessment    Patient is 47 y.o. male admitted with BLE swelling, current meth user, R eye blindness, reduced vision in L eye, ESRD noncompliant with dialysis, pt states he lives in his car and moves between [place] and [place]. Pt presents to OT eval able to manage self-care ADLs and household distance mobility without AD at SPV level. Pt does not require further OT intervention at this time.     Plan    Occupational Therapy Initial Treatment Plan   Duration: Evaluation only    Discharge Recommendations: Anticipate that the patient will have no further occupational therapy needs after discharge from the hospital     Subjective    \"I can barely see you over there.\"      Objective       02/23/23 0901   Prior Living Situation   Housing / Facility Homeless   Lives with - Patient's Self Care Capacity Alone and Able to Care For Self   Comments pt reports living in his Jeep moving between [place] and [place] often   Prior Level of ADL Function   Self Feeding Independent   Grooming / Hygiene Independent   Bathing Independent   Dressing Independent   Toileting Independent   Precautions   Precautions Fall Risk   Comments R eye blindness   Cognition    Level of Consciousness Alert   Strength Upper Body   Upper Body Strength  WDL   Coordination Upper Body   Coordination WDL   Balance Assessment   Sitting Balance (Static) Fair +   Sitting Balance (Dynamic) Fair +   Standing Balance (Static) Fair   Standing Balance (Dynamic) Fair   Weight Shift Sitting Fair   Weight Shift Standing Fair   Comments no AD   Bed Mobility    Supine to Sit Supervised   Sit to Supine Supervised   Scooting Supervised   ADL Assessment   Eating Modified Independent   Grooming Supervision;Seated   Upper Body Dressing Supervision   Lower " Body Dressing Supervision   How much help from another person does the patient currently need...   Putting on and taking off regular lower body clothing? 3   Bathing (including washing, rinsing, and drying)? 3   Toileting, which includes using a toilet, bedpan, or urinal? 4   Putting on and taking off regular upper body clothing? 4   Taking care of personal grooming such as brushing teeth? 4   Eating meals? 4   6 Clicks Daily Activity Score 22   Functional Mobility   Sit to Stand Supervised   Bed, Chair, Wheelchair Transfer Supervised   Mobility no AD   Edema / Skin Assessment   Comments BLE edema, chronic, worse after missing dialysis   Activity Tolerance   Comments functional for ADLs   Education Group   Education Provided Activities of Daily Living;Adaptive Equipment   Role of Occupational Therapist Patient Response Patient;Acceptance;Explanation;Verbal Demonstration   ADL Patient Response Patient;Acceptance;Explanation;Verbal Demonstration;Action Demonstration   Occupational Therapy Initial Treatment Plan    Duration Evaluation only   Anticipated Discharge Equipment and Recommendations   Discharge Recommendations Anticipate that the patient will have no further occupational therapy needs after discharge from the hospital

## 2023-02-23 NOTE — HOSPITAL COURSE
"Ambrose Watkins is a homeless 47 y.o. male with diabetes since 2007, ESRD on HD for past 2-3 years, methamphetamine abuse, tobacco use, HTN, DLD, medical noncompliance who presented 2/22/2023 with evaluation by police prior to being incarcerated.  He was found in ER to be volume overloaded and have a K:7.6.  He states having his last dialysis 4 days ago in [facility].  He has chronic pain in his thighs from chronic bilateral thigh wounds  and per pt, \"calciphylaxis.\"  In ER he was consulted by nephrology and emergent dialysis is planned.  The patient keeps pulling off his telemetry leads despite being educated on why we recommend them.  Patient was also noted to have redness of the right eye.  Ophthalmology was consulted on the case.  Patient was noted to have glaucoma and vitreous hemorrhage of the right eye.  Ophthalmic drops were started.  Ophthalmology recommended outpatient follow-up with Dr. Dominguez .   unable to find outpatient dialysis.  Long length of committee recommended discharge and follow-up to the ED as needed. assisting assisting with placement.  He has extensive bilateral thigh wound growing MRSA, Citrobacter, Pseudomonas.  ID been consulted.  Patient was started on vancomycin and Zosyn.     "

## 2023-02-23 NOTE — PROGRESS NOTES
Received report from previous shift RN, assumed care of patient. Patient is A&Ox4, vital signs are stable, on room air. Patient reports pain to their wounds, medicated per MAR. Call light and belongings within reach. Bed in lowest/locked position. Hourly rounding in place.

## 2023-02-23 NOTE — PROGRESS NOTES
"Hospital Medicine Daily Progress Note    Date of Service  2/23/2023    Chief Complaint  Ambrose Watkins is a 47 y.o. male admitted 2/22/2023 with missed hd    Hospital Course  Ambrose Watkins is a homeless 47 y.o. male with diabetes since 2007, ESRD on HD for past 2-3 years, methamphetamine abuse, tobacco use, HTN, DLD, medical noncompliance who presented 2/22/2023 with evaluation by police prior to being incarcerated.  He was found in ER to be volume overloaded and have a K:7.6.  He states having his last dialysis 4 days ago in [facility].  He has chronic pain in his thighs from chronic bilateral thigh wounds  and per pt, \"calciphylaxis.\"  In ER he was consulted by nephrology and emergent dialysis is planned.  The patient keeps pulling off his telemetry leads despite being educated on why we recommend them.      Interval Problem Update  2/23 in bed, no fever or chills no nausea or vomiting, not in distress, k improved, no chest pain or palpitation. Follows commands. F/u renal function in am.     I have discussed this patient's plan of care and discharge plan at IDT rounds today with Case Management, Nursing, Nursing leadership, and other members of the IDT team.    Consultants/Specialty  nephrology    Code Status  Full Code    Disposition  Patient is not medically cleared for discharge.   Anticipate discharge to to home with organized home healthcare and close outpatient follow-up.  I have placed the appropriate orders for post-discharge needs.    Review of Systems  Review of Systems   Constitutional:  Negative for chills and fever.   HENT:  Negative for congestion, nosebleeds and sinus pain.    Eyes:  Negative for blurred vision and double vision.   Respiratory:  Negative for cough, hemoptysis and wheezing.    Cardiovascular:  Negative for chest pain, palpitations, claudication, leg swelling and PND.   Gastrointestinal:  Negative for heartburn, nausea and vomiting.   Genitourinary:  Negative " for hematuria and urgency.   Musculoskeletal:  Negative for back pain and myalgias.   Skin:  Negative for rash.   Neurological:  Negative for dizziness and headaches.   Endo/Heme/Allergies:  Does not bruise/bleed easily.   Psychiatric/Behavioral:  Negative for depression, substance abuse and suicidal ideas.       Physical Exam  Temp:  [36.7 °C (98 °F)-36.8 °C (98.2 °F)] 36.8 °C (98.2 °F)  Pulse:  [80-95] 80  Resp:  [17-20] 17  BP: (128-145)/(83-89) 132/88  SpO2:  [92 %-96 %] 96 %    Physical Exam  Vitals and nursing note reviewed.   Constitutional:       General: He is not in acute distress.     Appearance: Normal appearance. He is not ill-appearing.   HENT:      Head: Normocephalic.      Nose: Nose normal.      Mouth/Throat:      Pharynx: Oropharynx is clear. No oropharyngeal exudate or posterior oropharyngeal erythema.   Eyes:      General: No scleral icterus.        Right eye: No discharge.         Left eye: No discharge.      Extraocular Movements: Extraocular movements intact.      Conjunctiva/sclera: Conjunctivae normal.   Cardiovascular:      Rate and Rhythm: Normal rate and regular rhythm.      Pulses: Normal pulses.      Heart sounds: Normal heart sounds.   Pulmonary:      Effort: Pulmonary effort is normal. No respiratory distress.      Breath sounds: Normal breath sounds. No wheezing.   Abdominal:      General: Bowel sounds are normal. There is no distension.      Palpations: Abdomen is soft.      Tenderness: There is no abdominal tenderness. There is no guarding.   Musculoskeletal:         General: Normal range of motion.      Cervical back: Normal range of motion and neck supple. No tenderness.      Right lower leg: No edema.      Left lower leg: No edema.   Skin:     General: Skin is warm and dry.      Capillary Refill: Capillary refill takes less than 2 seconds.      Coloration: Skin is not jaundiced.      Findings: No bruising.   Neurological:      General: No focal deficit present.      Mental  "Status: He is alert and oriented to person, place, and time.      Cranial Nerves: No cranial nerve deficit.      Motor: No weakness.   Psychiatric:         Mood and Affect: Mood normal.         Behavior: Behavior normal.       Fluids  No intake or output data in the 24 hours ending 02/23/23 1506    Laboratory  Recent Labs     02/22/23  0432 02/23/23 0432   WBC 11.7* 9.3   RBC 3.43* 3.27*   HEMOGLOBIN 9.8* 9.3*   HEMATOCRIT 30.9* 28.5*   MCV 90.1 87.2   MCH 28.6 28.4   MCHC 31.7* 32.6*   RDW 61.3* 59.1*   PLATELETCT 167 134*   MPV 10.0 10.4     Recent Labs     02/22/23  0600 02/22/23  1423 02/23/23 0432   SODIUM 137  137 139 140   POTASSIUM 7.6*  7.6* 4.8 5.2   CHLORIDE 98  98 98 99   CO2 7*  7* 17* 18*   GLUCOSE 115*  115* 150* 158*   *  148* 94* 103*   CREATININE 13.04*  13.04* 8.14* 10.26*   CALCIUM 6.6*  6.6* 7.0* 5.9*                   Imaging  No orders to display        Assessment/Plan  * Hyperkalemia- (present on admission)  Assessment & Plan  K:7.6  EKG reviewed  On telemetry but keeps ripping it off despite being told we need to monitor his heart in the face of severe hyperkalemia.  Emergent hemodialysis  Was not given insulin due to hypoglycemia noted in ER  Monitor after dialysis labs  Improved, continue close monitoring.     Open wound of thigh  Assessment & Plan  Per patient, \"calciphalaxis\" and his last I+D of the wounds were \"6 months ago\"  Wound care to assess.  Add ascorbic acid and zinc supplements  No sign of active infection.    Tobacco use  Assessment & Plan  Cessation advised  Patient uninterested in stopping smoking.  Nicotine patch offered    Noncompliance of patient with renal dialysis (HCC)- (present on admission)  Assessment & Plan  Patient wants FULL code.  Addressed need of medical follow up.  Difficulty as hx of homelessness. And active drug abuse.    Anemia- (present on admission)  Assessment & Plan  Of chronic renal failure  EPO per nephrology  Monitor " CBC    Hypocalcemia- (present on admission)  Assessment & Plan  Monitor labs  2/22 Ca:6.6, Alb:3.3    Methamphetamine abuse (HCC)- (present on admission)  Assessment & Plan  Active use.    End stage renal disease (HCC)- (present on admission)  Assessment & Plan  Noncompliance concern.  He states last hemodialysis was 4 days ago in [facility].  Will need emergent dialysis  Nephrology consulting  Follow up on labs  Hd today.     Hypertension- (present on admission)  Assessment & Plan  States not being on any medications  Monitor vitals.    DM (diabetes mellitus) (HCC)- (present on admission)  Assessment & Plan  Last A1c:6.5 in past year  Monitor accuchecks and cover with SSI  Denies taking any outpatient meds  Diabetic diet  Hypoglycemic protocol         VTE prophylaxis: SCDs/TEDs    I have performed a physical exam and reviewed and updated ROS and Plan today (2/23/2023). In review of yesterday's note (2/22/2023), there are no changes except as documented above.

## 2023-02-23 NOTE — PROGRESS NOTES
"[facility] -  PROGRESS NOTE               Author: Serafin Pina M.D. Date & Time: 2/23/2023  12:51 PM     HPI:  Patient is a 47 year old male with a PMHx of ESRD on HD qTTS, meth user and history of calciphylaxis here for leg pain.  States he's still using meth.  Last HD session was about 4-5 days ago.  Here potassium was 7.6 and bicarb of 7.  Nephrology consulted for maintenance dialysis.  Currently complaining of leg pain.  History of nonadherence.    DAILY NEPHROLOGY SUMMARY:  2/23 - Pain controlled.  Denies nausea.  No SOB  Had HD yesterday    REVIEW OF SYSTEMS:    10 point ROS reviewed and is as per HPI/daily summary or otherwise negative    PMH/PSH/SH/FH:   Reviewed and unchanged since admission note    CURRENT MEDICATIONS:   Reviewed from admission to present day    VS:  /88   Pulse 80   Temp 36.8 °C (98.2 °F)   Resp 17   Ht 1.803 m (5' 10.98\")   Wt 90 kg (198 lb 6.6 oz)   SpO2 96%   BMI 27.69 kg/m²     Physical Exam  Constitutional:       Appearance: He is ill-appearing.   HENT:      Head: Normocephalic.      Right Ear: External ear normal.      Left Ear: External ear normal.      Nose: Nose normal.      Mouth/Throat:      Mouth: Mucous membranes are moist.   Eyes:      Conjunctiva/sclera: Conjunctivae normal.   Pulmonary:      Effort: Pulmonary effort is normal.   Abdominal:      Palpations: Abdomen is soft.   Musculoskeletal:         General: Swelling present.      Cervical back: Normal range of motion.      Comments: Thigh leg wound wrapped   Skin:     General: Skin is warm.   Neurological:      Mental Status: Mental status is at baseline.   Psychiatric:         Mood and Affect: Mood normal.      Fluids:  In: 740 [P.O.:240; Dialysis:500]  Out: 2500     LABS:  Recent Labs     02/22/23  0600 02/22/23  1423 02/23/23  0432   SODIUM 137  137 139 140   POTASSIUM 7.6*  7.6* 4.8 5.2   CHLORIDE 98  98 98 99   CO2 7*  7* 17* 18*   GLUCOSE 115*  115* 150* 158*   *  " 148* 94* 103*   CREATININE 13.04*  13.04* 8.14* 10.26*   CALCIUM 6.6*  6.6* 7.0* 5.9*       IMAGING:   All imaging reviewed from admission to present day    IMPRESSION:  # ESRD  - Etiology likely 2/2 DM/HTN  - HD qTTHS via [facility] TDC  - Lost his HD chair at [facility] previously  # Hyperkalemia             - State HD   # LFA AVG thrombosed  - US 2/23 no flow  - s/p OR 2/25 AVG Brachio-axillary Creation, thrombectomy and fistulogram   - stenosis and wall thrombus noted on US, TDC in use  # HTN, fair control   - Goal BP < 140/90  # Anemia of CKD, below target  - Iron panel  # CKD-MBD     # Hyperkalemia, corrected  - Due to missed HD  - Manage with HD and low K+ diet  # Calciphylaxis  - Per reports from patient  - Will do 4 days of dialysis a week   # Weakness  # h/o Methamphetamine use   # Homelessness  # DM   # Severe metabolic acidosis, c  # Wound     PLAN:  - Repeat HD today  - High calcium bath for now but will avoid elevated calcium given history  - UF as tolerated  - Use PermCath for now  - AVG eval as OP at [facility]  - Transfuse PRN Hgb <7.0   - JACKIE with HD to goal Hgb 10-11  - No dietary protein restrictions  - Dose all meds per ESRD/iHD  - Renal diet, low salt diet, fluid restriction to 1.5L daily  -  support for placement     Thank you for the consultation!

## 2023-02-23 NOTE — DISCHARGE PLANNING
Outpatient Dialysis Placement Notification     Received notification for outpatient dialysis placement due to pt returning to [facility].     Per pt, he was an active pt at [facility] prior to traveling to [place]. Spoke with Misty at [facility]   Who confirmed pt has not attended dialysis there in about a month and will be discharged from the clinic 02/27.   Pt says he does not know how long he will remain in town, but will need a clinic while he is here.   Patient was previously discharged from [facility] after a month of not attending treatment.     New referral initiated to:     [facility]  [address] [place] [address]     Pending medical and financial clearance.     Xitlaly Reyes   Dialysis Coordinator / [facility]   Ph: [phone]

## 2023-02-23 NOTE — PROGRESS NOTES
4 Eyes Skin Assessment Completed by SHASHI Alonso and LJ Collins.    Head Scab  Ears WDL  Nose WDL  Mouth WDL  Neck Scab  Breast/Chest Abrasion and Scab  Shoulder Blades Redness  Spine Redness  (R) Arm/Elbow/Hand Redness, Abrasion, and Scab  (L) Arm/Elbow/Hand Redness, Abrasion, and Scab  Abdomen Redness, edema  Groin Swelling  Scrotum/Coccyx/Buttocks Redness and Blanching  (R) Leg Redness, Blanching, Non-Blanching, and Edema, open wounds on upper thigh  (L) Leg Redness, Blanching, Non-Blanching, and Edema, open wounds on upper thigh, open wound on back of thigh  (R) Heel/Foot/Toe Redness, Blanching, Discoloration, and Swelling  (L) Heel/Foot/Toe Redness, Blanching, Discoloration, and Swelling          Devices In Places Central Line      Interventions In Place Pillows    Possible Skin Injury Yes    Pictures Uploaded Into Epic Yes  Wound Consult Placed Yes  RN Wound Prevention Protocol Ordered No

## 2023-02-23 NOTE — PROGRESS NOTES
Assumed care of patient, received bedside report from Padma DANIELLE. Patient is A&O X 4. Pain 10/10 medicated per MAR, on RA. Patient is medical. POC discussed with patient. Patient verbalized understanding. All questions answered. Call light within reach and fall precautions in place. Bed alarm on, bed locked and in lowest position.

## 2023-02-23 NOTE — PROGRESS NOTES
4 Eyes Skin Assessment Completed by SHASHI Phan and SHASHI Chen.    Head WDL  Ears WDL  Nose WDL  Mouth WDL  Neck WDL  Breast/Chest WDL  Shoulder Blades WDL  Spine Redness and Blanching  (R) Arm/Elbow/Hand WDL  (L) Arm/Elbow/Hand WDL  Abdomen WDL  Groin WDL  Scrotum/Coccyx/Buttocks Redness and Blanching  (R) Leg Redness, Swelling, Shiny, Weeping, and Edema  (L) Leg Redness, Swelling, Shiny, Weeping, and Edema, scab  (R) Heel/Foot/Toe Redness, Blanching, Bruising, Swelling, and Edema  (L) Heel/Foot/Toe Redness, Discoloration, Swelling, and Edema          Devices In Places N/A      Interventions In Place Pillows    Possible Skin Injury Yes    Pictures Uploaded Into Epic Yes  Wound Consult Placed Yes  RN Wound Prevention Protocol Ordered Yes

## 2023-02-23 NOTE — PROGRESS NOTES
[facility] Progress Note      HD today x 3 hours per Dr. Pina.   Initiated at 1236 and ended at 1536.     Assessment:    Patient is so upset and frustrated upon arrival to dialysis room. Patient complained about his wound care  and lunch.This RN coordinated patient's concerns to Sabine wound care RN. Lunch will be delivered in HD room per Summer. No SOB, denies pain.      Access used: L IJ catheter, clean dry and intact.      Net Fluid Removed: 2,000 mL      Procedure Events/ Complications:   Patient tolerated treatment. No hypotensive episodes.      Post Access Care:   Blood returned. Flushed with NS.  CVC locked with Heparin 1000 units/ mL   Arterial port:  1.9 mL   Venous port: 1.9 mL  Clamped, capped and secured. Labeled   Dressing change: Due on 2/25/23        Report given to Primary SIMONE Miller RN.

## 2023-02-23 NOTE — PROGRESS NOTES
Patient agitated upon arrival to floor and refusing 2RN skin check. Patient did let this RN assess the thigh wounds later on in shift.    Patient does have additional left posterior thigh wound, LDA opened and photo taken

## 2023-02-23 NOTE — PROGRESS NOTES
NOC HOSPITALIST CROSS COVER    Notified by RN regarding critical potassium of 5.9.  Based on patient's most recent albumin corrected calcium 6.5.  Patient is on dialysis.  Orders placed for EKG and ionized calcium.  Spoke with pharmacy regarding correction of calcium given patient's ESRD.  Per pharmacy calcium correction will be addressed by dialysis RN.    A/P:  #Hypocalcemia  -EKG, ionized calcium  -Calcium to be corrected by dialysis RN.  -----------------------------------------------------------------------------------------------------------    Electronically signed by:  GAURAV Lovett PA-C  Hospitalist Services

## 2023-02-24 ENCOUNTER — APPOINTMENT (OUTPATIENT)
Dept: RADIOLOGY | Facility: MEDICAL CENTER | Age: 48
DRG: 291 | End: 2023-02-24
Attending: INTERNAL MEDICINE
Payer: MEDICARE

## 2023-02-24 PROBLEM — F17.200 TOBACCO DEPENDENCE: Status: ACTIVE | Noted: 2023-02-22

## 2023-02-24 PROBLEM — H57.11 ACUTE RIGHT EYE PAIN: Status: ACTIVE | Noted: 2023-02-24

## 2023-02-24 LAB
ANION GAP SERPL CALC-SCNC: 22 MMOL/L (ref 7–16)
BASOPHILS # BLD AUTO: 0.2 % (ref 0–1.8)
BASOPHILS # BLD: 0.02 K/UL (ref 0–0.12)
BUN SERPL-MCNC: 67 MG/DL (ref 8–22)
CALCIUM SERPL-MCNC: 7.3 MG/DL (ref 8.5–10.5)
CHLORIDE SERPL-SCNC: 97 MMOL/L (ref 96–112)
CO2 SERPL-SCNC: 15 MMOL/L (ref 20–33)
CREAT SERPL-MCNC: 7.37 MG/DL (ref 0.5–1.4)
EOSINOPHIL # BLD AUTO: 0.09 K/UL (ref 0–0.51)
EOSINOPHIL NFR BLD: 1.1 % (ref 0–6.9)
ERYTHROCYTE [DISTWIDTH] IN BLOOD BY AUTOMATED COUNT: 63.3 FL (ref 35.9–50)
GAMMA INTERFERON BACKGROUND BLD IA-ACNC: 0.05 IU/ML
GFR SERPLBLD CREATININE-BSD FMLA CKD-EPI: 9 ML/MIN/1.73 M 2
GLUCOSE BLD STRIP.AUTO-MCNC: 135 MG/DL (ref 65–99)
GLUCOSE BLD STRIP.AUTO-MCNC: 190 MG/DL (ref 65–99)
GLUCOSE BLD STRIP.AUTO-MCNC: 84 MG/DL (ref 65–99)
GLUCOSE BLD STRIP.AUTO-MCNC: 98 MG/DL (ref 65–99)
GLUCOSE SERPL-MCNC: 173 MG/DL (ref 65–99)
HCT VFR BLD AUTO: 31.1 % (ref 42–52)
HGB BLD-MCNC: 9.7 G/DL (ref 14–18)
IMM GRANULOCYTES # BLD AUTO: 0.04 K/UL (ref 0–0.11)
IMM GRANULOCYTES NFR BLD AUTO: 0.5 % (ref 0–0.9)
LYMPHOCYTES # BLD AUTO: 0.8 K/UL (ref 1–4.8)
LYMPHOCYTES NFR BLD: 9.8 % (ref 22–41)
M TB IFN-G BLD-IMP: NEGATIVE
M TB IFN-G CD4+ BCKGRND COR BLD-ACNC: 0 IU/ML
MCH RBC QN AUTO: 28.2 PG (ref 27–33)
MCHC RBC AUTO-ENTMCNC: 31.2 G/DL (ref 33.7–35.3)
MCV RBC AUTO: 90.4 FL (ref 81.4–97.8)
MITOGEN IGNF BCKGRD COR BLD-ACNC: >10 IU/ML
MONOCYTES # BLD AUTO: 0.66 K/UL (ref 0–0.85)
MONOCYTES NFR BLD AUTO: 8.1 % (ref 0–13.4)
NEUTROPHILS # BLD AUTO: 6.53 K/UL (ref 1.82–7.42)
NEUTROPHILS NFR BLD: 80.3 % (ref 44–72)
NRBC # BLD AUTO: 0 K/UL
NRBC BLD-RTO: 0 /100 WBC
PLATELET # BLD AUTO: 137 K/UL (ref 164–446)
PMV BLD AUTO: 10.4 FL (ref 9–12.9)
POTASSIUM SERPL-SCNC: 5.2 MMOL/L (ref 3.6–5.5)
QFT TB2 - NIL TBQ2: -0.01 IU/ML
RBC # BLD AUTO: 3.44 M/UL (ref 4.7–6.1)
SODIUM SERPL-SCNC: 134 MMOL/L (ref 135–145)
WBC # BLD AUTO: 8.1 K/UL (ref 4.8–10.8)

## 2023-02-24 PROCEDURE — 99232 SBSQ HOSP IP/OBS MODERATE 35: CPT | Performed by: INTERNAL MEDICINE

## 2023-02-24 PROCEDURE — 90935 HEMODIALYSIS ONE EVALUATION: CPT

## 2023-02-24 PROCEDURE — 700101 HCHG RX REV CODE 250: Performed by: INTERNAL MEDICINE

## 2023-02-24 PROCEDURE — 700102 HCHG RX REV CODE 250 W/ 637 OVERRIDE(OP): Performed by: INTERNAL MEDICINE

## 2023-02-24 PROCEDURE — 70480 CT ORBIT/EAR/FOSSA W/O DYE: CPT

## 2023-02-24 PROCEDURE — 700111 HCHG RX REV CODE 636 W/ 250 OVERRIDE (IP)

## 2023-02-24 PROCEDURE — 36415 COLL VENOUS BLD VENIPUNCTURE: CPT

## 2023-02-24 PROCEDURE — 82962 GLUCOSE BLOOD TEST: CPT

## 2023-02-24 PROCEDURE — A9270 NON-COVERED ITEM OR SERVICE: HCPCS | Performed by: HOSPITALIST

## 2023-02-24 PROCEDURE — 80048 BASIC METABOLIC PNL TOTAL CA: CPT

## 2023-02-24 PROCEDURE — 770006 HCHG ROOM/CARE - MED/SURG/GYN SEMI*

## 2023-02-24 PROCEDURE — 700101 HCHG RX REV CODE 250: Performed by: HOSPITALIST

## 2023-02-24 PROCEDURE — 700111 HCHG RX REV CODE 636 W/ 250 OVERRIDE (IP): Performed by: HOSPITALIST

## 2023-02-24 PROCEDURE — 700102 HCHG RX REV CODE 250 W/ 637 OVERRIDE(OP): Performed by: HOSPITALIST

## 2023-02-24 PROCEDURE — 85025 COMPLETE CBC W/AUTO DIFF WBC: CPT

## 2023-02-24 PROCEDURE — A9270 NON-COVERED ITEM OR SERVICE: HCPCS | Performed by: INTERNAL MEDICINE

## 2023-02-24 RX ORDER — CIPROFLOXACIN HYDROCHLORIDE 3.5 MG/ML
1 SOLUTION/ DROPS TOPICAL
Status: DISCONTINUED | OUTPATIENT
Start: 2023-02-24 | End: 2023-02-25

## 2023-02-24 RX ORDER — HEPARIN SODIUM 1000 [USP'U]/ML
INJECTION, SOLUTION INTRAVENOUS; SUBCUTANEOUS
Status: COMPLETED
Start: 2023-02-24 | End: 2023-02-24

## 2023-02-24 RX ORDER — CIPROFLOXACIN HYDROCHLORIDE 3.5 MG/ML
1 SOLUTION/ DROPS TOPICAL
Status: DISCONTINUED | OUTPATIENT
Start: 2023-02-24 | End: 2023-02-24

## 2023-02-24 RX ORDER — HYDROMORPHONE HYDROCHLORIDE 1 MG/ML
0.5 INJECTION, SOLUTION INTRAMUSCULAR; INTRAVENOUS; SUBCUTANEOUS ONCE
Status: COMPLETED | OUTPATIENT
Start: 2023-02-24 | End: 2023-02-24

## 2023-02-24 RX ORDER — AMLODIPINE BESYLATE 5 MG/1
5 TABLET ORAL
Status: DISCONTINUED | OUTPATIENT
Start: 2023-02-24 | End: 2023-02-25

## 2023-02-24 RX ORDER — CIPROFLOXACIN HYDROCHLORIDE 3.5 MG/ML
1 SOLUTION/ DROPS TOPICAL
Status: DISCONTINUED | OUTPATIENT
Start: 2023-02-26 | End: 2023-02-25

## 2023-02-24 RX ADMIN — Medication 220 MG: at 04:34

## 2023-02-24 RX ADMIN — OXYCODONE AND ACETAMINOPHEN 1 TABLET: 10; 325 TABLET ORAL at 08:48

## 2023-02-24 RX ADMIN — DOCUSATE SODIUM 50 MG AND SENNOSIDES 8.6 MG 2 TABLET: 8.6; 5 TABLET, FILM COATED ORAL at 04:34

## 2023-02-24 RX ADMIN — HEPARIN SODIUM 3800 UNITS: 1000 INJECTION, SOLUTION INTRAVENOUS; SUBCUTANEOUS at 12:35

## 2023-02-24 RX ADMIN — OXYCODONE AND ACETAMINOPHEN 1 TABLET: 10; 325 TABLET ORAL at 01:10

## 2023-02-24 RX ADMIN — HEPARIN SODIUM 5000 UNITS: 5000 INJECTION, SOLUTION INTRAVENOUS; SUBCUTANEOUS at 04:34

## 2023-02-24 RX ADMIN — LABETALOL HYDROCHLORIDE 10 MG: 5 INJECTION, SOLUTION INTRAVENOUS at 04:33

## 2023-02-24 RX ADMIN — MINERAL OIL, PETROLATUM 1 APPLICATION: 425; 573 OINTMENT OPHTHALMIC at 16:53

## 2023-02-24 RX ADMIN — GABAPENTIN 100 MG: 100 CAPSULE ORAL at 13:11

## 2023-02-24 RX ADMIN — Medication 500 MG: at 04:34

## 2023-02-24 RX ADMIN — HYDROMORPHONE HYDROCHLORIDE 0.5 MG: 1 INJECTION, SOLUTION INTRAMUSCULAR; INTRAVENOUS; SUBCUTANEOUS at 04:34

## 2023-02-24 RX ADMIN — MINERAL OIL, PETROLATUM 1 APPLICATION: 425; 573 OINTMENT OPHTHALMIC at 22:00

## 2023-02-24 RX ADMIN — OXYCODONE AND ACETAMINOPHEN 1 TABLET: 10; 325 TABLET ORAL at 23:34

## 2023-02-24 RX ADMIN — OXYCODONE AND ACETAMINOPHEN 1 TABLET: 10; 325 TABLET ORAL at 13:11

## 2023-02-24 RX ADMIN — AMLODIPINE BESYLATE 5 MG: 5 TABLET ORAL at 16:53

## 2023-02-24 RX ADMIN — CIPROFLOXACIN HYDROCHLORIDE 1 DROP: 3 SOLUTION/ DROPS OPHTHALMIC at 13:11

## 2023-02-24 RX ADMIN — CIPROFLOXACIN HYDROCHLORIDE 1 DROP: 3 SOLUTION/ DROPS OPHTHALMIC at 16:53

## 2023-02-24 RX ADMIN — CIPROFLOXACIN HYDROCHLORIDE 1 DROP: 3 SOLUTION/ DROPS OPHTHALMIC at 22:00

## 2023-02-24 RX ADMIN — HEPARIN SODIUM 5000 UNITS: 5000 INJECTION, SOLUTION INTRAVENOUS; SUBCUTANEOUS at 13:11

## 2023-02-24 RX ADMIN — SODIUM HYPOCHLORITE 1 ML: 1.25 SOLUTION TOPICAL at 18:00

## 2023-02-24 RX ADMIN — GABAPENTIN 100 MG: 100 CAPSULE ORAL at 04:34

## 2023-02-24 RX ADMIN — INSULIN HUMAN 2 UNITS: 100 INJECTION, SOLUTION PARENTERAL at 08:14

## 2023-02-24 ASSESSMENT — PAIN DESCRIPTION - PAIN TYPE
TYPE: ACUTE PAIN
TYPE: ACUTE PAIN

## 2023-02-24 NOTE — PROGRESS NOTES
"[facility] -  PROGRESS NOTE               Author: Serafin Pina M.D. Date & Time: 2/24/2023  2:32 PM     HPI:  Patient is a 47 year old male with a PMHx of ESRD on HD qTTS, meth user and history of calciphylaxis here for leg pain.  States he's still using meth.  Last HD session was about 4-5 days ago.  Here potassium was 7.6 and bicarb of 7.  Nephrology consulted for maintenance dialysis.  Currently complaining of leg pain.  History of nonadherence.    DAILY NEPHROLOGY SUMMARY:  2/23 - Pain controlled.  Denies nausea.  No SOB  Had HD yesterday  2/24 - Denies pain or SOB.  Laying in bed comfortable    REVIEW OF SYSTEMS:    10 point ROS reviewed and is as per HPI/daily summary or otherwise negative    PMH/PSH/SH/FH:   Reviewed and unchanged since admission note    CURRENT MEDICATIONS:   Reviewed from admission to present day    VS:  BP (!) 152/94 Comment: nurse aware  Pulse 72   Temp 36.1 °C (97 °F) (Temporal)   Resp 16   Ht 1.803 m (5' 10.98\")   Wt 90 kg (198 lb 6.6 oz)   SpO2 93%   BMI 27.69 kg/m²     Physical Exam  Constitutional:       Appearance: He is ill-appearing.   HENT:      Head: Normocephalic.      Right Ear: External ear normal.      Left Ear: External ear normal.      Nose: Nose normal.      Mouth/Throat:      Mouth: Mucous membranes are moist.   Eyes:      Conjunctiva/sclera: Conjunctivae normal.   Pulmonary:      Effort: Pulmonary effort is normal.   Abdominal:      Palpations: Abdomen is soft.   Musculoskeletal:         General: Swelling present.      Cervical back: Normal range of motion.      Comments: Thigh leg wound wrapped   Skin:     General: Skin is warm.   Neurological:      Mental Status: Mental status is at baseline.   Psychiatric:         Mood and Affect: Mood normal.      Fluids:  In: 740 [P.O.:240; Dialysis:500]  Out: 2500     LABS:  Recent Labs     02/22/23  1423 02/23/23  0432 02/24/23  0406   SODIUM 139 140 134*   POTASSIUM 4.8 5.2 5.2   CHLORIDE 98 99 " 97   CO2 17* 18* 15*   GLUCOSE 150* 158* 173*   BUN 94* 103* 67*   CREATININE 8.14* 10.26* 7.37*   CALCIUM 7.0* 5.9* 7.3*         IMAGING:   All imaging reviewed from admission to present day    IMPRESSION:  # ESRD  - Etiology likely 2/2 DM/HTN  - HD qTTHS via [facility] TDC  - Lost his HD chair at [facility] previously  # Hyperkalemia             - State HD   # LFA AVG thrombosed  - US 2/23 no flow  - s/p OR 2/25 AVG Brachio-axillary Creation, thrombectomy and fistulogram   - stenosis and wall thrombus noted on US, TDC in use  # HTN, fair control   - Goal BP < 140/90  # Anemia of CKD, below target  - Iron panel  # CKD-MBD     # Hyperkalemia, corrected  - Due to missed HD  - Manage with HD and low K+ diet  # Calciphylaxis  - Per reports from patient  - Will do 4 days of dialysis a week   # Weakness  # h/o Methamphetamine use   # Homelessness  # DM   # Severe metabolic acidosis, c  # Wound  # Conjunctivitis     PLAN:  - Repeat HD today  - High calcium bath for now but will avoid elevated calcium given history  - UF as tolerated  - Use PermCath for now  - AVG eval as OP at [facility]  - Transfuse PRN Hgb <7.0   - JACKIE with HD to goal Hgb 10-11  - No dietary protein restrictions  - Dose all meds per ESRD/iHD  - Renal diet, low salt diet, fluid restriction to 1.5L daily  -  support for placement     Thank you for the consultation!

## 2023-02-24 NOTE — PROGRESS NOTES
"Hospital Medicine Daily Progress Note    Date of Service  2023    Chief Complaint  Volume overload    Hospital Course  Ambrose Watkins is a homeless 47 y.o. male with diabetes since , ESRD on HD for past 2-3 years, methamphetamine abuse, tobacco use, hypertension, hyperlipidemia, medical noncompliance, who presented 2023 with evaluation by police prior to being incarcerated.  On evaluation, he was noted to be volume overloaded, and have hyperkalemia and hypocalcemia.  He admitted to missing hemodialysis 4 days prior in [facility].  He has chronic pain in his thighs from chronic bilateral thigh wounds  and per pt, \"calciphylaxis.\"  He had no evidence of wound infection.  Nephrology was consulted, and patient had emergent hemodialysis.      Interval Problem Update  2023 - I reviewed the patient's chart today. Uneventful night. VSS albeit blood pressure elevated. Afebrile. Saturating well on RA.  No leukocytosis.  Hemoglobin stable at 9.7.  Potassium is normal and stable at 5.2.  Sodium 134.  Creatinine 7.37.  Calcium 7.3.    > I have personally seen and examined the patient today.  He states he lives in [place], and drove here 2 days ago as a friend .  However he is not able to drive back as he is right vision is bad with complaints of pain on the right eye worse with movement, along with conjunctival redness and swelling.  He is not short of breath.  No leg swelling.  He has chronic wounds in the abdomen due to calciphylaxis.  No nausea or vomiting.  He states he thinks he still has his dialysis chair at [place].      I have discussed this patient's plan of care and discharge plan at IDT rounds today with Case Management, Nursing, Nursing leadership, and other members of the IDT team.    Consultants/Specialty  nephrology    Code Status  Full Code    Disposition  Patient is not medically cleared for discharge.   Anticipate discharge to to home with organized home " healthcare and close outpatient follow-up.  I have placed the appropriate orders for post-discharge needs.    Review of Systems  ROS     Pertinent positives/negatives as mentioned above.     A complete review of systems was personally done by me. All other systems were negative.       Physical Exam  Temp:  [36.1 °C (97 °F)-36.8 °C (98.3 °F)] 36.1 °C (97 °F)  Pulse:  [72-87] 72  Resp:  [16-20] 16  BP: (152-179)/() 152/94  SpO2:  [92 %-98 %] 93 %    Physical Exam  Vitals and nursing note reviewed.   Constitutional:       General: He is not in acute distress.     Appearance: Normal appearance. He is not ill-appearing, toxic-appearing or diaphoretic.   HENT:      Head: Normocephalic and atraumatic.      Right Ear: External ear normal.      Left Ear: External ear normal.      Nose: Nose normal.      Mouth/Throat:      Mouth: Mucous membranes are moist.      Pharynx: Oropharynx is clear. No oropharyngeal exudate or posterior oropharyngeal erythema.   Eyes:      General: No scleral icterus.        Right eye: Discharge present.         Left eye: No discharge.      Extraocular Movements: Extraocular movements intact.      Conjunctiva/sclera: Conjunctivae normal.      Pupils: Pupils are equal, round, and reactive to light.      Comments: Conjunctival erythema  Pain with EOM movement on the right eye  (+) Increased tearing  Cellulitic changes surrounding the right eye   Cardiovascular:      Rate and Rhythm: Normal rate and regular rhythm.      Pulses: Normal pulses.      Heart sounds: Normal heart sounds. No murmur heard.    No gallop.   Pulmonary:      Effort: Pulmonary effort is normal. No respiratory distress.      Breath sounds: Normal breath sounds. No stridor. No wheezing, rhonchi or rales.   Chest:      Chest wall: No tenderness.   Abdominal:      General: Bowel sounds are normal. There is no distension.      Palpations: Abdomen is soft. There is no mass.      Tenderness: There is no abdominal tenderness. There is  no guarding or rebound.   Musculoskeletal:         General: No swelling. Normal range of motion.      Cervical back: Normal range of motion and neck supple. No tenderness.      Right lower leg: No edema.      Left lower leg: No edema.   Lymphadenopathy:      Cervical: No cervical adenopathy.   Skin:     General: Skin is warm and dry.      Capillary Refill: Capillary refill takes less than 2 seconds.      Coloration: Skin is not jaundiced.      Findings: No bruising or rash.      Comments: Chronic wounds on the abdomen and thighs, due to calciphylaxis, does not appear to be infected   Neurological:      General: No focal deficit present.      Mental Status: He is alert and oriented to person, place, and time.      Cranial Nerves: No cranial nerve deficit.      Motor: No weakness.   Psychiatric:         Thought Content: Thought content normal.         Judgment: Judgment normal.      Comments: Not very forthcoming, and not cooperative, gets easily agitated       Fluids    Intake/Output Summary (Last 24 hours) at 2/24/2023 1420  Last data filed at 2/24/2023 1100  Gross per 24 hour   Intake 980 ml   Output 2900 ml   Net -1920 ml       Laboratory  Recent Labs     02/22/23  0432 02/23/23  0432 02/24/23  0406   WBC 11.7* 9.3 8.1   RBC 3.43* 3.27* 3.44*   HEMOGLOBIN 9.8* 9.3* 9.7*   HEMATOCRIT 30.9* 28.5* 31.1*   MCV 90.1 87.2 90.4   MCH 28.6 28.4 28.2   MCHC 31.7* 32.6* 31.2*   RDW 61.3* 59.1* 63.3*   PLATELETCT 167 134* 137*   MPV 10.0 10.4 10.4     Recent Labs     02/22/23  1423 02/23/23  0432 02/24/23  0406   SODIUM 139 140 134*   POTASSIUM 4.8 5.2 5.2   CHLORIDE 98 99 97   CO2 17* 18* 15*   GLUCOSE 150* 158* 173*   BUN 94* 103* 67*   CREATININE 8.14* 10.26* 7.37*   CALCIUM 7.0* 5.9* 7.3*                   Imaging  TK-CTOLZY-VLTSM W/O PLUS RECONS    (Results Pending)        Assessment/Plan  * Hyperkalemia- (present on admission)  Assessment & Plan  -Due to missed hemodialysis.  -Improved with  hemodialysis.  -Trend.    Acute right eye pain, conjunctivitis and blepharitis- (present on admission)  Assessment & Plan  - Suspect conjunctivitis and blepharitis, but also has pain when moving his right eye.  -Will obtain CT of the orbit to rule out orbital cellulitis.  -Start ciprofloxacin ophthalmic solution.  -Monitor for improvement with topical treatment, if does not improve clinically may need to start systemic antibiotics.  -Supportive care with artificial tears/lubricant.    Open wound of thigh- (present on admission)  Assessment & Plan  -Likely calciphylaxis.  Does not appear to be infected.  -Wound care while in the hospital.  -Continue ascorbic acid and zinc supplements.    Noncompliance of patient with renal dialysis (HCC)- (present on admission)  Assessment & Plan  -Patient wants FULL code.  -Addressed need of medical follow up.  -Difficulty due to homelessness and active drug abuse.    Hypocalcemia- (present on admission)  Assessment & Plan  - Improved with high calcium bath with dialysis.  Trend.    End stage renal disease (HCC)- (present on admission)  Assessment & Plan  -Concern for noncompliance.  He states he has a dialysis chair in [place], but he drove to [place] due to to a death of a friend.  Vision problems precludes him from driving back to [place].  -Continue hemodialysis while in the hospital.  -He will need to go back to [place] for his regularly scheduled hemodialysis once medically cleared.    Tobacco dependence- (present on admission)  Assessment & Plan  -Counseled on smoking cessation, but patient not interested in stopping smoking.    Anemia- (present on admission)  Assessment & Plan  -Anemia of chronic kidney disease.  -Continue EPO per nephrology.  -Monitor hemoglobin closely.  Restrictive transfusion strategy.    Methamphetamine abuse (HCC)- (present on admission)  Assessment & Plan  -Active use.    Hypertension- (present on admission)  Assessment & Plan  -He states he  is not on any medications.  Blood pressure trend elevated.  -Start Norvasc 5 mg daily.  Monitor blood pressure trend closely with as needed IV labetalol for significant hypertension.  Optimize blood pressure control.    DM (diabetes mellitus) (HCC)- (present on admission)  Assessment & Plan  - Continue sliding scale insulin coverage while in-house.  Diabetic diet. Accu-Cheks before meals and at bedtime. Goal to keep BG between 140-180 per 2019 ADA guidelines.           VTE prophylaxis: SCDs/TEDs

## 2023-02-24 NOTE — CARE PLAN
The patient is Stable - Low risk of patient condition declining or worsening    Shift Goals  Clinical Goals: Wound care, pain management, safety  Patient Goals: Wound care and pain management  Family Goals: N/A    Progress made toward(s) clinical / shift goals: Pain managed with PRN pain medication throughout shift. Wound care performed. Pt refused bed alarm, moved to room closer to nurses station to maintain pt safety.      Problem: Fall Risk  Goal: Patient will remain free from falls  Outcome: Progressing     Problem: Pain - Standard  Goal: Alleviation of pain or a reduction in pain to the patient’s comfort goal  Outcome: Progressing     Problem: Skin Integrity  Goal: Skin integrity is maintained or improved  Outcome: Progressing       Patient is not progressing towards the following goals:       Dr Phyllis Rodriguez Brown [facility]

## 2023-02-24 NOTE — CARE PLAN
The patient is Stable - Low risk of patient condition declining or worsening    Shift Goals  Clinical Goals: Wound care, pain management  Patient Goals: Wound care and pain management  Family Goals: N/A    Progress made toward(s) clinical / shift goals:  Patient progressing towards goals. Call light within reach. Hourly rounding.     Patient is not progressing towards the following goals:      Problem: Pain - Standard  Goal: Alleviation of pain or a reduction in pain to the patient’s comfort goal  Outcome: Progressing     Problem: Knowledge Deficit - Standard  Goal: Patient and family/care givers will demonstrate understanding of plan of care, disease process/condition, diagnostic tests and medications  Outcome: Progressing     Problem: Skin Integrity  Goal: Skin integrity is maintained or improved  Outcome: Progressing     Problem: Fall Risk  Goal: Patient will remain free from falls  Outcome: Progressing

## 2023-02-24 NOTE — PROGRESS NOTES
Pt refused dressing change for noc shift. Pt was c/o 8/10 BLE thigh pain that was unrelieved by PRN pain medication. Order for 1x dose of dilaudid obtained. No other complaints at this time.

## 2023-02-24 NOTE — CARE PLAN
The patient is Stable - Low risk of patient condition declining or worsening    Shift Goals  Clinical Goals: Wound care, pain management  Patient Goals: Wound care and pain management  Family Goals: N/A    Progress made toward(s) clinical / shift goals:    Problem: Pain - Standard  Goal: Alleviation of pain or a reduction in pain to the patient’s comfort goal  Description: Target End Date:  Prior to discharge or change in level of care    Document on Vitals flowsheet    1.  Document pain using the appropriate pain scale per order or unit policy  2.  Educate and implement non-pharmacologic comfort measures (i.e. relaxation, distraction, massage, cold/heat therapy, etc.)  3.  Pain management medications as ordered  4.  Reassess pain after pain med administration per policy  5.  If opiods administered assess patient's response to pain medication is appropriate per POSS sedation scale  6.  Follow pain management plan developed in collaboration with patient and interdisciplinary team (including palliative care or pain specialists if applicable)  Outcome: Progressing     Problem: Knowledge Deficit - Standard  Goal: Patient and family/care givers will demonstrate understanding of plan of care, disease process/condition, diagnostic tests and medications  Description: Target End Date:  1-3 days or as soon as patient condition allows    Document in Patient Education    1.  Patient and family/caregiver oriented to unit, equipment, visitation policy and means for communicating concern  2.  Complete/review Learning Assessment  3.  Assess knowledge level of disease process/condition, treatment plan, diagnostic tests and medications  4.  Explain disease process/condition, treatment plan, diagnostic tests and medications  Outcome: Not Met     Problem: Skin Integrity  Goal: Skin integrity is maintained or improved  Description: Target End Date:  Prior to discharge or change in level of care    Document interventions on Skin  Risk/Rex flowsheet groups and corresponding LDA    1.  Assess and monitor skin integrity, appearance and/or temperature  2.  Assess risk factors for impaired skin integrity and/or pressures ulcers  3.  Implement precautions to protect skin integrity in collaboration with interdisciplinary team  4.  Implement pressure ulcer prevention protocol if at risk for skin breakdown  5.  Confirm wound care consult if at risk for skin breakdown  6.  Ensure patient use of pressure relieving devices  (Low air loss bed, waffle overlay, heel protectors, ROHO cushion, etc)  Outcome: Progressing       Patient is not progressing towards the following goals:      Problem: Knowledge Deficit - Standard  Goal: Patient and family/care givers will demonstrate understanding of plan of care, disease process/condition, diagnostic tests and medications  Description: Target End Date:  1-3 days or as soon as patient condition allows    Document in Patient Education    1.  Patient and family/caregiver oriented to unit, equipment, visitation policy and means for communicating concern  2.  Complete/review Learning Assessment  3.  Assess knowledge level of disease process/condition, treatment plan, diagnostic tests and medications  4.  Explain disease process/condition, treatment plan, diagnostic tests and medications  Outcome: Not Met

## 2023-02-24 NOTE — WOUND TEAM
"[facility]  Inpatient Services  Initial Wound and Skin Care Evaluation    Admission Date: 2/22/2023     Last order of IP CONSULT TO WOUND CARE was found on 2/22/2023 from Hospital Encounter on 2/22/2023     HPI, PMH, SH: Reviewed    Past Surgical History:   Procedure Laterality Date    AV FISTULA CREATION Left 2/25/2022    Procedure: CREATION, AV FISTULA-UPPER EXTREMITY GRAFT, AND FISTULOGRAM;  Surgeon: Tone Hartman M.D.;  Location: SURGERY [facility];  Service: Vascular    THROMBECTOMY Left 2/25/2022    Procedure: THROMBECTOMY;  Surgeon: Tone Hartman M.D.;  Location: SURGERY [facility];  Service: Vascular     Social History     Tobacco Use    Smoking status: Every Day     Packs/day: 0.25     Types: Cigarettes    Smokeless tobacco: Never   Substance Use Topics    Alcohol use: Not Currently     Chief Complaint   Patient presents with    Leg Pain     Pt states sores on bilateral legs and thighs, states infection, pt with bs 58, missed diaylsis today, brought via [facility] from assisted     Diagnosis: Hyperkalemia [E87.5]    Unit where seen by Wound Team: S601/02     WOUND CONSULT/FOLLOW UP RELATED TO:  BL thighs      WOUND HISTORY:  Ambrose Watkins is a homeless 47 y.o. male with diabetes since 2007, ESRD on HD for past 2-3 years, methamphetamine abuse, tobacco use, HTN, DLD, medical noncompliance who presented 2/22/2023 with evaluation by police prior to being incarcerated.  He was found in ER to be volume overloaded and have a K:7.6.  He states having his last dialysis 4 days ago in [place] [facility].  He has chronic pain in his thighs from chronic bilateral thigh wounds  and per pt, \"calciphylaxis.\"     WOUND ASSESSMENT/LDA     Wound 02/22/23 Full Thickness Wound Thigh Anterior Right (Active)   Wound Image   02/23/23 1700   Site Assessment Drainage;Pale;Pink;Yellow 02/23/23 1700   Periwound Assessment Red 02/23/23 1700   Margins Defined edges;Unattached edges 02/23/23 1700   Closure " Secondary intention 02/23/23 1700   Drainage Amount Moderate 02/23/23 1700   Drainage Description Yellow;Green 02/23/23 1700   Treatments Cleansed;Site care 02/23/23 1700   Wound Cleansing Approved Wound Cleanser 02/23/23 1700   Periwound Protectant Barrier Paste 02/23/23 1700   Dressing Cleansing/Solutions Normal Saline 02/23/23 1700   Dressing Options Moist Roll Gauze;Mepilex 02/23/23 1700   Dressing Changed New 02/23/23 1700   Dressing Status Clean;Dry;Intact 02/23/23 1700   Dressing Change/Treatment Frequency Every Shift, and As Needed 02/23/23 1700   NEXT Dressing Change/Treatment Date 02/24/23 02/23/23 1700   NEXT Weekly Photo (Inpatient Only) 03/02/23 02/23/23 1700   Non-staged Wound Description Full thickness 02/23/23 1700   Wound Length (cm) 8.5 cm 02/23/23 1700   Wound Width (cm) 16 cm 02/23/23 1700   Wound Depth (cm) 0.6 cm 02/23/23 1700   Wound Surface Area (cm^2) 136 cm^2 02/23/23 1700   Wound Volume (cm^3) 81.6 cm^3 02/23/23 1700   Shape irregular 02/23/23 1700   Wound Odor Strong;Foul 02/23/23 1700   Exposed Structures None 02/23/23 1700   WOUND NURSE ONLY - Time Spent with Patient (mins) 45 02/23/23 1700       Wound 02/22/23 Full Thickness Wound Thigh Anterior;Medial;Posterior Left (Active)   Wound Image     02/23/23 1700   Site Assessment Pale;Cornelia;Drainage 02/23/23 1700   Periwound Assessment Purple;Fragile 02/23/23 1700   Margins Defined edges;Unattached edges 02/23/23 1700   Closure Secondary intention 02/23/23 1700   Drainage Amount Moderate 02/23/23 1700   Drainage Description Yellow;Serous 02/23/23 1700   Treatments Cleansed;Site care 02/23/23 1700   Wound Cleansing Approved Wound Cleanser 02/23/23 1700   Periwound Protectant Barrier Paste 02/23/23 1700   Dressing Cleansing/Solutions Normal Saline 02/23/23 1700   Dressing Options Moist Roll Gauze;Mepilex 02/23/23 1700   Dressing Changed Changed 02/23/23 1700   Dressing Status Clean;Dry;Intact 02/23/23 1700   Dressing Change/Treatment Frequency  Every Shift, and As Needed 02/23/23 1700   NEXT Dressing Change/Treatment Date 02/23/23 02/23/23 1700   NEXT Weekly Photo (Inpatient Only) 03/02/23 02/23/23 1700   Non-staged Wound Description Full thickness 02/23/23 1700   Shape irregular x3 wounds 02/23/23 1700   Wound Odor Foul;Strong 02/23/23 1700   Exposed Structures None 02/23/23 1700   WOUND NURSE ONLY - Time Spent with Patient (mins) 45 02/23/23 1700       Vascular:    ROMAINE:   No results found.    Lab Values:    Lab Results   Component Value Date/Time    WBC 9.3 02/23/2023 04:32 AM    RBC 3.27 (L) 02/23/2023 04:32 AM    HEMOGLOBIN 9.3 (L) 02/23/2023 04:32 AM    HEMATOCRIT 28.5 (L) 02/23/2023 04:32 AM    CREACTPROT 14.27 (H) 02/21/2022 09:00 PM    SEDRATEWES 45 (H) 02/21/2022 09:00 PM    HBA1C 6.5 (H) 02/22/2022 08:00 AM        Culture Results show:  No results found for this or any previous visit (from the past 720 hour(s)).    Pain Level/Medicated:  Pain with site care -        INTERVENTIONS BY WOUND TEAM:  Chart and images reviewed. Discussed with bedside RN. All areas of concern (based on picture review, LDA review and discussion with bedside RN) have been thoroughly assessed. Documentation of areas based on significant findings. This RN in to assess patient. Performed standard wound care which includes appropriate positioning, dressing removal and non-selective debridement. Pictures and measurements obtained weekly if/when required.    Preparation for Dressing removal: Previous HFB dressing removed  Non-selectively Debrided with:  NS and gauze.  Sharp debridement: NA  Shahida wound: Cleansed with NS and gauze, Prepped with barrier paste  Primary Dressing: NS soaked roll gauze   Secondary (Outer) Dressing: mepilex     Advanced Wound Care Discharge Planning  Number of Clinicians necessary to complete wound care: 1-2  Is patient requiring IV pain medications for dressing changes: No  Length of time for dressing change 30 min. (This does not include chart  review, pre-medication time, set up, clean up or time spent charting.)    Interdisciplinary consultation: Patient, Bedside RN, Sabine PENA (Wound RN)    EVALUATION / RATIONALE FOR TREATMENT:  Most Recent Date:    2/23/23: Patient with full thickness wounds along his right anteromedial thigh and along his anterior, medial, and posterior left thigh. All wounds beds were pale pink with moderate yellow-green drainage and was malodorous. Will initiate Dakins wet to dry dressing to address infectious process.    LEFT THIGH WOUNDS:   ANTERIOR: 4 X 10 X 0.3  MEDIAL: 7 x 5 x 0.3  POSTERIOR: 2 x 2 x 1 (to slough base)      Goals: Steady decrease in wound area and depth weekly.    WOUND TEAM PLAN OF CARE ([X] for frequency of wound follow up,):   Nursing to follow dressing orders written for wound care. Contact wound team if area fails to progress, deteriorates or with any questions/concerns if something comes up before next scheduled follow up (See below as to whether wound is following and frequency of wound follow up)  Dressing changes by wound team:                   Follow up 3 times weekly:                NPWT change 3 times weekly:     Follow up 1-2 times weekly:    X  Follow up Bi-Monthly:           Follow up Monthly (High Risk):                        Follow up as needed:     Other (explain):     NURSING PLAN OF CARE ORDERS (X):  Dressing changes: See Dressing Care orders: X  Skin care: See Skin Care orders: X  RN Prevention Protocol: X  Rectal tube care: See Rectal Tube Care orders:   Other orders:    RSKIN:   CURRENTLY IN PLACE (X), APPLIED THIS VISIT (A), ORDERED (O):   Q shift Rex:  X  Q shift pressure point assessments:  X    Surface/Positioning   Pressure redistribution mattress            Low Airloss        X  ICU Low Airloss   Bariatric NIKOLAS     Waffle cushion        Waffle Overlay          Reposition q 2 hours      TAPs Turning system     Z Chapin Pillow     Offloading/Redistribution   Sacral Mepilex (Silicone  dressing)     Heel Mepilex (Silicone dressing)       O  Heel float boots (Prevalon boot)             Float Heels off Bed with Pillows           Respiratory NA  Silicone O2 tubing         Gray Foam Ear protectors     Cannula fixation Device (Tender )          High flow offloading Clip    Elastic head band offloading device      Anchorfast                                                         Trach with Optifoam split foam             Containment/Moisture Prevention NA    Rectal tube or BMS    Purwick/Condom Cath        Dumont Catheter    Barrier wipes           Barrier paste       Antifungal tx      Interdry        Mobilization       Up to chair      X  Ambulate    X  PT/OT      Nutrition       Dietician        Diabetes Education      PO   X  TF     TPN     NPO   # days     Other        Anticipated discharge plans:   LTACH:        SNF/Rehab:                  Home Health Care:           Outpatient Wound Center:        X    Self/Family Care:        Other:                  Vac Discharge Needs: TBD  Vac Discharge plan is purely a recommendation from wound team and not a requirement for discharge unless otherwise stated by physician.  Not Applicable Pt not on a wound vac:       Regular Vac while inpatient, alternative dressing at DC:        Regular Vac in use and continued at DC:            Reg. Vac w/ Skin Sub/Biologic in use. Will need to be changed 2x wkly:      Veraflo Vac while inpatient, ok to transition to Regular Vac on Discharge (Bedside RN to Clamp small instillation tubing at time of DC):           Veraflo Vac while inpatient, would benefit from remaining on Veraflo Vac upon discharge:

## 2023-02-25 PROBLEM — H43.11 VITREOUS HEMORRHAGE OF RIGHT EYE (HCC): Status: ACTIVE | Noted: 2023-02-24

## 2023-02-25 LAB
ANION GAP SERPL CALC-SCNC: 17 MMOL/L (ref 7–16)
BUN SERPL-MCNC: 53 MG/DL (ref 8–22)
CALCIUM SERPL-MCNC: 7.6 MG/DL (ref 8.5–10.5)
CHLORIDE SERPL-SCNC: 98 MMOL/L (ref 96–112)
CO2 SERPL-SCNC: 19 MMOL/L (ref 20–33)
CREAT SERPL-MCNC: 7.03 MG/DL (ref 0.5–1.4)
GFR SERPLBLD CREATININE-BSD FMLA CKD-EPI: 9 ML/MIN/1.73 M 2
GLUCOSE BLD STRIP.AUTO-MCNC: 115 MG/DL (ref 65–99)
GLUCOSE BLD STRIP.AUTO-MCNC: 76 MG/DL (ref 65–99)
GLUCOSE BLD STRIP.AUTO-MCNC: 91 MG/DL (ref 65–99)
GLUCOSE SERPL-MCNC: 83 MG/DL (ref 65–99)
POTASSIUM SERPL-SCNC: 5.2 MMOL/L (ref 3.6–5.5)
SODIUM SERPL-SCNC: 134 MMOL/L (ref 135–145)

## 2023-02-25 PROCEDURE — A9270 NON-COVERED ITEM OR SERVICE: HCPCS | Performed by: INTERNAL MEDICINE

## 2023-02-25 PROCEDURE — 700111 HCHG RX REV CODE 636 W/ 250 OVERRIDE (IP): Performed by: HOSPITALIST

## 2023-02-25 PROCEDURE — A9270 NON-COVERED ITEM OR SERVICE: HCPCS | Performed by: HOSPITALIST

## 2023-02-25 PROCEDURE — 700102 HCHG RX REV CODE 250 W/ 637 OVERRIDE(OP): Performed by: NURSE PRACTITIONER

## 2023-02-25 PROCEDURE — 99232 SBSQ HOSP IP/OBS MODERATE 35: CPT | Performed by: INTERNAL MEDICINE

## 2023-02-25 PROCEDURE — 700102 HCHG RX REV CODE 250 W/ 637 OVERRIDE(OP): Performed by: HOSPITALIST

## 2023-02-25 PROCEDURE — 770006 HCHG ROOM/CARE - MED/SURG/GYN SEMI*

## 2023-02-25 PROCEDURE — A9270 NON-COVERED ITEM OR SERVICE: HCPCS | Performed by: NURSE PRACTITIONER

## 2023-02-25 PROCEDURE — 82962 GLUCOSE BLOOD TEST: CPT | Mod: 91

## 2023-02-25 PROCEDURE — 80048 BASIC METABOLIC PNL TOTAL CA: CPT

## 2023-02-25 PROCEDURE — 700102 HCHG RX REV CODE 250 W/ 637 OVERRIDE(OP): Performed by: INTERNAL MEDICINE

## 2023-02-25 PROCEDURE — 90935 HEMODIALYSIS ONE EVALUATION: CPT

## 2023-02-25 PROCEDURE — 700111 HCHG RX REV CODE 636 W/ 250 OVERRIDE (IP)

## 2023-02-25 RX ORDER — CARVEDILOL 3.12 MG/1
3.12 TABLET ORAL 2 TIMES DAILY WITH MEALS
Status: DISCONTINUED | OUTPATIENT
Start: 2023-02-25 | End: 2023-02-26

## 2023-02-25 RX ORDER — AMLODIPINE BESYLATE 10 MG/1
10 TABLET ORAL
Status: DISCONTINUED | OUTPATIENT
Start: 2023-02-26 | End: 2023-03-02

## 2023-02-25 RX ORDER — HEPARIN SODIUM 1000 [USP'U]/ML
INJECTION, SOLUTION INTRAVENOUS; SUBCUTANEOUS
Status: COMPLETED
Start: 2023-02-25 | End: 2023-02-25

## 2023-02-25 RX ORDER — AMLODIPINE BESYLATE 5 MG/1
5 TABLET ORAL ONCE
Status: COMPLETED | OUTPATIENT
Start: 2023-02-25 | End: 2023-02-25

## 2023-02-25 RX ADMIN — HEPARIN SODIUM 3800 UNITS: 1000 INJECTION, SOLUTION INTRAVENOUS; SUBCUTANEOUS at 16:54

## 2023-02-25 RX ADMIN — MINERAL OIL, PETROLATUM 1 APPLICATION: 425; 573 OINTMENT OPHTHALMIC at 04:50

## 2023-02-25 RX ADMIN — HEPARIN SODIUM 5000 UNITS: 5000 INJECTION, SOLUTION INTRAVENOUS; SUBCUTANEOUS at 20:05

## 2023-02-25 RX ADMIN — CIPROFLOXACIN HYDROCHLORIDE 1 DROP: 3 SOLUTION/ DROPS OPHTHALMIC at 13:28

## 2023-02-25 RX ADMIN — CARVEDILOL 3.12 MG: 3.12 TABLET, FILM COATED ORAL at 18:03

## 2023-02-25 RX ADMIN — GABAPENTIN 100 MG: 100 CAPSULE ORAL at 18:03

## 2023-02-25 RX ADMIN — AMLODIPINE BESYLATE 5 MG: 5 TABLET ORAL at 04:22

## 2023-02-25 RX ADMIN — Medication 500 MG: at 04:22

## 2023-02-25 RX ADMIN — Medication 220 MG: at 04:23

## 2023-02-25 RX ADMIN — MINERAL OIL, PETROLATUM 1 APPLICATION: 425; 573 OINTMENT OPHTHALMIC at 20:05

## 2023-02-25 RX ADMIN — OXYCODONE AND ACETAMINOPHEN 1 TABLET: 10; 325 TABLET ORAL at 20:05

## 2023-02-25 RX ADMIN — AMLODIPINE BESYLATE 5 MG: 5 TABLET ORAL at 13:27

## 2023-02-25 RX ADMIN — SODIUM HYPOCHLORITE 1 ML: 1.25 SOLUTION TOPICAL at 06:00

## 2023-02-25 RX ADMIN — HEPARIN SODIUM 5000 UNITS: 5000 INJECTION, SOLUTION INTRAVENOUS; SUBCUTANEOUS at 13:27

## 2023-02-25 RX ADMIN — CIPROFLOXACIN HYDROCHLORIDE 1 DROP: 3 SOLUTION/ DROPS OPHTHALMIC at 04:50

## 2023-02-25 RX ADMIN — CIPROFLOXACIN HYDROCHLORIDE 1 DROP: 3 SOLUTION/ DROPS OPHTHALMIC at 08:22

## 2023-02-25 RX ADMIN — SODIUM HYPOCHLORITE 473 ML: 1.25 SOLUTION TOPICAL at 18:04

## 2023-02-25 RX ADMIN — OXYCODONE AND ACETAMINOPHEN 1 TABLET: 10; 325 TABLET ORAL at 04:22

## 2023-02-25 RX ADMIN — OXYCODONE AND ACETAMINOPHEN 1 TABLET: 10; 325 TABLET ORAL at 13:27

## 2023-02-25 RX ADMIN — CIPROFLOXACIN HYDROCHLORIDE 1 DROP: 3 SOLUTION/ DROPS OPHTHALMIC at 10:47

## 2023-02-25 RX ADMIN — HEPARIN SODIUM 5000 UNITS: 5000 INJECTION, SOLUTION INTRAVENOUS; SUBCUTANEOUS at 04:23

## 2023-02-25 RX ADMIN — CARVEDILOL 3.12 MG: 3.12 TABLET, FILM COATED ORAL at 10:47

## 2023-02-25 RX ADMIN — GABAPENTIN 100 MG: 100 CAPSULE ORAL at 13:27

## 2023-02-25 ASSESSMENT — PAIN DESCRIPTION - PAIN TYPE
TYPE: ACUTE PAIN

## 2023-02-25 ASSESSMENT — PAIN SCALES - WONG BAKER: WONGBAKER_NUMERICALRESPONSE: DOESN'T HURT AT ALL

## 2023-02-25 NOTE — PROGRESS NOTES
"Hospital Medicine Daily Progress Note    Date of Service  2/25/2023    Chief Complaint  Volume overload    Hospital Course  Ambrose Watkins is a homeless 47 y.o. male with diabetes since 2007, ESRD on HD for past 2-3 years, methamphetamine abuse, tobacco use, hypertension, hyperlipidemia, medical noncompliance, who presented 2/22/2023 with evaluation by police prior to being incarcerated.  On evaluation, he was noted to be volume overloaded, and have hyperkalemia and hypocalcemia.  He admitted to missing hemodialysis 4 days prior in [facility].  He has chronic pain in his thighs from chronic bilateral thigh wounds  and per pt, \"calciphylaxis.\"  He had no evidence of wound infection.  Nephrology was consulted, and patient had emergent hemodialysis.  However, he shared that he is visiting from [facility], and here in town had significant deterioration of his right eye vision and so would not be able to drive back to [place] immediately..    Interval Problem Update  2/25/2023 - I reviewed the patient's chart. There were no significant overnight events. Remains hemodynamically stable, albeit blood pressure elevated. Afebrile. Stable on RA.  CT of the orbits showed increased density throughout the right globe which may suggest underlying hemorrhage with possible retinal detachment, with left globe unremarkable, and no postseptal fluid collection.  Ophthalmology was consulted.      > I have personally seen and examined the patient today.  Still has pain on the right eye, with swelling of sclerae, and conjunctival injection.  He is not short of breath.  He gets easily agitated, and can be noncooperative.  No nausea or vomiting.    I have discussed this patient's plan of care and discharge plan at IDT rounds today with Case Management, Nursing, Nursing leadership, and other members of the IDT team.      Consultants/Specialty  nephrology    Code Status  Full Code    Disposition  Patient is " not medically cleared for discharge.   Anticipate discharge to to home with organized home healthcare and close outpatient follow-up.  I have placed the appropriate orders for post-discharge needs.    Review of Systems  ROS     Pertinent positives/negatives as mentioned above.     A complete review of systems was personally done by me. All other systems were negative.       Physical Exam  Temp:  [36.1 °C (97 °F)-36.8 °C (98.2 °F)] 36.7 °C (98.1 °F)  Pulse:  [67-92] 92  Resp:  [17-18] 17  BP: (141-167)/(73-95) 141/82  SpO2:  [90 %-96 %] 96 %    Physical Exam  Vitals and nursing note reviewed.   Constitutional:       General: He is not in acute distress.     Appearance: Normal appearance. He is not ill-appearing, toxic-appearing or diaphoretic.   HENT:      Head: Normocephalic and atraumatic.      Right Ear: External ear normal.      Left Ear: External ear normal.      Nose: Nose normal.      Mouth/Throat:      Mouth: Mucous membranes are moist.      Pharynx: Oropharynx is clear. No oropharyngeal exudate or posterior oropharyngeal erythema.   Eyes:      General: No scleral icterus.        Right eye: Discharge present.         Left eye: No discharge.      Extraocular Movements: Extraocular movements intact.      Conjunctiva/sclera: Conjunctivae normal.      Pupils: Pupils are equal, round, and reactive to light.      Comments: Conjunctival erythema  Pain with EOM movement on the right eye  (+) Increased tearing  Cellulitic changes surrounding the right eye   Cardiovascular:      Rate and Rhythm: Normal rate and regular rhythm.      Pulses: Normal pulses.      Heart sounds: Normal heart sounds. No murmur heard.    No gallop.   Pulmonary:      Effort: Pulmonary effort is normal. No respiratory distress.      Breath sounds: Normal breath sounds. No stridor. No wheezing, rhonchi or rales.   Chest:      Chest wall: No tenderness.   Abdominal:      General: Bowel sounds are normal. There is no distension.      Palpations:  Abdomen is soft. There is no mass.      Tenderness: There is no abdominal tenderness. There is no guarding or rebound.   Musculoskeletal:         General: No swelling. Normal range of motion.      Cervical back: Normal range of motion and neck supple. No tenderness.      Right lower leg: No edema.      Left lower leg: No edema.   Lymphadenopathy:      Cervical: No cervical adenopathy.   Skin:     General: Skin is warm and dry.      Capillary Refill: Capillary refill takes less than 2 seconds.      Coloration: Skin is not jaundiced.      Findings: No bruising or rash.      Comments: Chronic wounds on the abdomen and thighs, due to calciphylaxis, does not appear to be infected   Neurological:      General: No focal deficit present.      Mental Status: He is alert and oriented to person, place, and time.      Cranial Nerves: No cranial nerve deficit.      Motor: No weakness.   Psychiatric:         Thought Content: Thought content normal.         Judgment: Judgment normal.      Comments: Not very forthcoming, and not cooperative, gets easily agitated       I have performed the physical examination today 2/25/2023.  In review of yesterday's note, there are no new changes except as documented above.      Fluids    Intake/Output Summary (Last 24 hours) at 2/25/2023 1113  Last data filed at 2/25/2023 0536  Gross per 24 hour   Intake 1100 ml   Output 3300 ml   Net -2200 ml       Laboratory  Recent Labs     02/23/23  0432 02/24/23  0406   WBC 9.3 8.1   RBC 3.27* 3.44*   HEMOGLOBIN 9.3* 9.7*   HEMATOCRIT 28.5* 31.1*   MCV 87.2 90.4   MCH 28.4 28.2   MCHC 32.6* 31.2*   RDW 59.1* 63.3*   PLATELETCT 134* 137*   MPV 10.4 10.4     Recent Labs     02/22/23  1423 02/23/23  0432 02/24/23  0406   SODIUM 139 140 134*   POTASSIUM 4.8 5.2 5.2   CHLORIDE 98 99 97   CO2 17* 18* 15*   GLUCOSE 150* 158* 173*   BUN 94* 103* 67*   CREATININE 8.14* 10.26* 7.37*   CALCIUM 7.0* 5.9* 7.3*                   Imaging  [id number] W/O PLUS RECONS    Final Result      1.  Increased density throughout the right lobe is nonspecific though may suggest underlying hemorrhage with possible retinal detachment.   2.  No post septal fluid collection.           Assessment/Plan  * Hyperkalemia- (present on admission)  Assessment & Plan  -Due to missed hemodialysis.  -Improved with hemodialysis.  -Trend.    Vitreous hemorrhage of right eye (HCC)- (present on admission)  Assessment & Plan  - Suspect this is due to hypertension and medication/hemodialysis noncompliance.  -Ophthalmology has been contacted earlier this morning, will likely need vitrectomy.  -Continue ciprofloxacin ophthalmic solution.  -Monitor.  -Supportive care with artificial tears/lubricant.    Open wound of thigh- (present on admission)  Assessment & Plan  -Likely calciphylaxis.  Does not appear to be infected.  -Wound care while in the hospital.  -Continue ascorbic acid and zinc supplements.    Noncompliance of patient with renal dialysis (HCC)- (present on admission)  Assessment & Plan  -Patient wants FULL code.  -Addressed need of medical follow up.  -Difficulty due to homelessness and active drug abuse.    Hypocalcemia- (present on admission)  Assessment & Plan  - Improved with high calcium bath with dialysis.  Trend.    End stage renal disease (HCC)- (present on admission)  Assessment & Plan  -Concern for noncompliance.  He states he has a dialysis chair in [place], but he drove to [place] due to to a death of a friend.  Vision problems precludes him from driving back to [place].  -Continue hemodialysis while in the hospital.  -He will need to go back to [place] for his regularly scheduled hemodialysis once medically cleared.    Tobacco dependence- (present on admission)  Assessment & Plan  -Counseled on smoking cessation, but patient not interested in stopping smoking.    Anemia- (present on admission)  Assessment & Plan  -Anemia of chronic kidney disease.  -Continue EPO per  nephrology.  -Monitor hemoglobin closely.  Restrictive transfusion strategy.    Methamphetamine abuse (HCC)- (present on admission)  Assessment & Plan  -Active use.    Hypertension- (present on admission)  Assessment & Plan  -He states he is not on any medications.  Blood pressure trend remain elevated.  -Increase Norvasc to 10 mg daily.  Monitor blood pressure trend closely with as needed IV labetalol for significant hypertension.  Optimize blood pressure control.    DM (diabetes mellitus) ([facility])- (present on admission)  Assessment & Plan  - Continue sliding scale insulin coverage while in-house.  Diabetic diet. Accu-Cheks before meals and at bedtime. Goal to keep BG between 140-180 per 2019 ADA guidelines.           VTE prophylaxis: SCDs/TEDs

## 2023-02-25 NOTE — PROGRESS NOTES
"[facility] -  PROGRESS NOTE               Author: EMMA Dial Date & Time: 2/25/2023  10:18 AM     HPI:  Patient is a 47 year old male with a PMHx of ESRD on HD qTTS, meth user and history of calciphylaxis here for leg pain.  States he's still using meth.  Last HD session was about 4-5 days ago.  Here potassium was 7.6 and bicarb of 7.  Nephrology consulted for maintenance dialysis.  Currently complaining of leg pain.  History of nonadherence.    DAILY NEPHROLOGY SUMMARY:  2/23 - Pain controlled.  Denies nausea.  No SOB  Had HD yesterday  2/24 - Denies pain or SOB.  Laying in bed comfortable  2/25 - 2L Net UF. NPO for OR this am w/Opth for retinal detachment. Reports that he is hungry. 1200 mL UOP last 24 hrs.     REVIEW OF SYSTEMS:    10 point ROS reviewed and is as per HPI/daily summary or otherwise negative    PMH/PSH/SH/FH:   Reviewed and unchanged since admission note    CURRENT MEDICATIONS:   Reviewed from admission to present day    VS:  BP (!) 141/82   Pulse 92   Temp 36.7 °C (98.1 °F) (Temporal)   Resp 17   Ht 1.803 m (5' 10.98\")   Wt 90 kg (198 lb 6.6 oz)   SpO2 96%   BMI 27.69 kg/m²     Physical Exam  Constitutional:       Appearance: He is ill-appearing.   HENT:      Head: Normocephalic.      Right Ear: External ear normal.      Left Ear: External ear normal.      Nose: Nose normal.      Mouth/Throat:      Mouth: Mucous membranes are moist.   Eyes:      Conjunctiva/sclera: Conjunctivae normal.   Pulmonary:      Effort: Pulmonary effort is normal.   Abdominal:      Palpations: Abdomen is soft.   Musculoskeletal:         General: Swelling present.      Cervical back: Normal range of motion.      Comments: Thigh leg wound wrapped   Skin:     General: Skin is warm.   Neurological:      Mental Status: Mental status is at baseline.   Psychiatric:         Mood and Affect: Mood normal.      Fluids:  In: 1340 [P.O.:840; Dialysis:500]  Out: 3700     LABS:  Recent Labs "     02/22/23  1423 02/23/23  0432 02/24/23  0406   SODIUM 139 140 134*   POTASSIUM 4.8 5.2 5.2   CHLORIDE 98 99 97   CO2 17* 18* 15*   GLUCOSE 150* 158* 173*   BUN 94* 103* 67*   CREATININE 8.14* 10.26* 7.37*   CALCIUM 7.0* 5.9* 7.3*         IMAGING:   All imaging reviewed from admission to present day    IMPRESSION:  # ESRD  - Etiology likely 2/2 DM/HTN  - HD qTTHS via [facility] TDC  - Lost his HD chair at [facility] previously  # Hyperkalemia             - State HD   # LFA AVG thrombosed  - US 2/23 no flow  - s/p OR 2/25 AVG Brachio-axillary Creation, thrombectomy and fistulogram   - stenosis and wall thrombus noted on US, TDC in use  # HTN, fair control   - Goal BP < 140/90  # Anemia of CKD, below target  - Iron panel  # CKD-MBD     # Hyperkalemia, corrected  - Due to missed HD  - Manage with HD and low K+ diet  # Calciphylaxis  - Per reports from patient  - Will do 4 days of dialysis a week   # Weakness  # h/o Methamphetamine use   # Homelessness  # DM   # Severe metabolic acidosis  # Wound  # Conjunctivitis     PLAN:  - iHD today (SAT)  - then QTTS and PRN  - NPO for possible Opth surgery this am  - High calcium bath for now but will avoid elevated calcium given history  - UF as tolerated  - Use PermCath for now  - Start Carvedilol 3.125 mg PO BID  - AVG eval as OP at [facility]  - Transfuse PRN Hgb <7.0   - JACKIE with HD to goal Hgb 10-11  - No dietary protein restrictions  - Dose all meds per ESRD/iHD  - Renal diet, low salt diet, fluid restriction to 1.5L daily  - SW support for placement

## 2023-02-25 NOTE — PROGRESS NOTES
Hemodialysis ordered by Dr. Pina. Treatment started at at 1039 and ended at 1239. Pt stable, vss, no c/o post tx. Net UF 2.0 L. Report to BRENDA Pagan RN.

## 2023-02-25 NOTE — PROGRESS NOTES
Relayed CT results of the right eye to on call hospitalist. APRN assessed pt at bedside. Consult for possible surgery advised.

## 2023-02-25 NOTE — PROGRESS NOTES
[facility] Coverage Progress Note:     I was contacted this morning by the patient's bedside nurse regarding CT results.  CT orbits obtained yesterday evening demonstrates increased density throughout the right lobe, nonspecific finding that may suggest underlying hemorrhage with possible retinal detachment.    Patient was seen and examined at bedside.  He reports right eye pain, tearing, and visual loss.  He reports complete visual loss out of his right eye.  EOM intact.  Clear drainage tearing from right eye with scleral redness.     Ophthalmology called, Dr. Damir Camarena. Dr. Camarena will formally consult on the patient in the morning. Per optho, patient likely has had vitreous hemorrhage and will need vitrectomy.      RALPH Ashley   [facility] Hospitalist

## 2023-02-25 NOTE — CARE PLAN
The patient is Stable - Low risk of patient condition declining or worsening    Shift Goals  Clinical Goals: Wound care, pain management  Patient Goals: Wound care and pain management  Family Goals: N/A    Progress made toward(s) clinical / shift goals:    Problem: Pain - Standard  Goal: Alleviation of pain or a reduction in pain to the patient’s comfort goal  Description: Target End Date:  Prior to discharge or change in level of care    Document on Vitals flowsheet    1.  Document pain using the appropriate pain scale per order or unit policy  2.  Educate and implement non-pharmacologic comfort measures (i.e. relaxation, distraction, massage, cold/heat therapy, etc.)  3.  Pain management medications as ordered  4.  Reassess pain after pain med administration per policy  5.  If opiods administered assess patient's response to pain medication is appropriate per POSS sedation scale  6.  Follow pain management plan developed in collaboration with patient and interdisciplinary team (including palliative care or pain specialists if applicable)  Outcome: Progressing     Problem: Knowledge Deficit - Standard  Goal: Patient and family/care givers will demonstrate understanding of plan of care, disease process/condition, diagnostic tests and medications  Description: Target End Date:  1-3 days or as soon as patient condition allows    Document in Patient Education    1.  Patient and family/caregiver oriented to unit, equipment, visitation policy and means for communicating concern  2.  Complete/review Learning Assessment  3.  Assess knowledge level of disease process/condition, treatment plan, diagnostic tests and medications  4.  Explain disease process/condition, treatment plan, diagnostic tests and medications  Outcome: Not Met     Problem: Fall Risk  Goal: Patient will remain free from falls  Description: Target End Date:  Prior to discharge or change in level of care    Document interventions on the [place] [facility] Fall  Risk Assessment    1.  Assess for fall risk factors  2.  Implement fall precautions  Outcome: Progressing        Patient is not progressing towards the following goals:      Problem: Knowledge Deficit - Standard  Goal: Patient and family/care givers will demonstrate understanding of plan of care, disease process/condition, diagnostic tests and medications  Description: Target End Date:  1-3 days or as soon as patient condition allows    Document in Patient Education    1.  Patient and family/caregiver oriented to unit, equipment, visitation policy and means for communicating concern  2.  Complete/review Learning Assessment  3.  Assess knowledge level of disease process/condition, treatment plan, diagnostic tests and medications  4.  Explain disease process/condition, treatment plan, diagnostic tests and medications  Outcome: Not Met

## 2023-02-26 PROBLEM — H40.89 OTHER SPECIFIED GLAUCOMA: Status: ACTIVE | Noted: 2023-02-26

## 2023-02-26 LAB
GLUCOSE BLD STRIP.AUTO-MCNC: 139 MG/DL (ref 65–99)
GLUCOSE BLD STRIP.AUTO-MCNC: 147 MG/DL (ref 65–99)
GLUCOSE BLD STRIP.AUTO-MCNC: 210 MG/DL (ref 65–99)
GLUCOSE BLD STRIP.AUTO-MCNC: 88 MG/DL (ref 65–99)

## 2023-02-26 PROCEDURE — A9270 NON-COVERED ITEM OR SERVICE: HCPCS | Performed by: HOSPITALIST

## 2023-02-26 PROCEDURE — A9270 NON-COVERED ITEM OR SERVICE: HCPCS | Performed by: NURSE PRACTITIONER

## 2023-02-26 PROCEDURE — 700111 HCHG RX REV CODE 636 W/ 250 OVERRIDE (IP)

## 2023-02-26 PROCEDURE — 700102 HCHG RX REV CODE 250 W/ 637 OVERRIDE(OP): Performed by: NURSE PRACTITIONER

## 2023-02-26 PROCEDURE — 700102 HCHG RX REV CODE 250 W/ 637 OVERRIDE(OP): Performed by: HOSPITALIST

## 2023-02-26 PROCEDURE — 770006 HCHG ROOM/CARE - MED/SURG/GYN SEMI*

## 2023-02-26 PROCEDURE — 700102 HCHG RX REV CODE 250 W/ 637 OVERRIDE(OP): Performed by: INTERNAL MEDICINE

## 2023-02-26 PROCEDURE — 700111 HCHG RX REV CODE 636 W/ 250 OVERRIDE (IP): Performed by: HOSPITALIST

## 2023-02-26 PROCEDURE — 99232 SBSQ HOSP IP/OBS MODERATE 35: CPT | Performed by: INTERNAL MEDICINE

## 2023-02-26 PROCEDURE — 82962 GLUCOSE BLOOD TEST: CPT | Mod: 91

## 2023-02-26 PROCEDURE — 700101 HCHG RX REV CODE 250: Performed by: INTERNAL MEDICINE

## 2023-02-26 PROCEDURE — A9270 NON-COVERED ITEM OR SERVICE: HCPCS | Performed by: INTERNAL MEDICINE

## 2023-02-26 RX ORDER — DORZOLAMIDE HYDROCHLORIDE AND TIMOLOL MALEATE 20; 5 MG/ML; MG/ML
1 SOLUTION/ DROPS OPHTHALMIC 2 TIMES DAILY
Status: DISCONTINUED | OUTPATIENT
Start: 2023-02-26 | End: 2023-03-16 | Stop reason: HOSPADM

## 2023-02-26 RX ORDER — HYDROMORPHONE HYDROCHLORIDE 1 MG/ML
0.25 INJECTION, SOLUTION INTRAMUSCULAR; INTRAVENOUS; SUBCUTANEOUS ONCE
Status: COMPLETED | OUTPATIENT
Start: 2023-02-26 | End: 2023-02-26

## 2023-02-26 RX ORDER — PREDNISOLONE SODIUM PHOSPHATE 10 MG/ML
1 SOLUTION/ DROPS OPHTHALMIC 4 TIMES DAILY
Status: DISCONTINUED | OUTPATIENT
Start: 2023-02-26 | End: 2023-03-16 | Stop reason: HOSPADM

## 2023-02-26 RX ORDER — TIMOLOL MALEATE 5 MG/ML
1 SOLUTION/ DROPS OPHTHALMIC 2 TIMES DAILY
Status: DISCONTINUED | OUTPATIENT
Start: 2023-02-26 | End: 2023-03-16 | Stop reason: HOSPADM

## 2023-02-26 RX ORDER — BRIMONIDINE TARTRATE 2 MG/ML
1 SOLUTION/ DROPS OPHTHALMIC 2 TIMES DAILY
Status: DISCONTINUED | OUTPATIENT
Start: 2023-02-26 | End: 2023-03-16 | Stop reason: HOSPADM

## 2023-02-26 RX ORDER — CARVEDILOL 6.25 MG/1
6.25 TABLET ORAL 2 TIMES DAILY WITH MEALS
Status: DISCONTINUED | OUTPATIENT
Start: 2023-02-26 | End: 2023-03-02

## 2023-02-26 RX ORDER — LATANOPROST 50 UG/ML
1 SOLUTION/ DROPS OPHTHALMIC EVERY EVENING
Status: DISCONTINUED | OUTPATIENT
Start: 2023-02-26 | End: 2023-03-16 | Stop reason: HOSPADM

## 2023-02-26 RX ORDER — ACETAZOLAMIDE 500 MG/1
500 CAPSULE, EXTENDED RELEASE ORAL EVERY 12 HOURS
Status: DISCONTINUED | OUTPATIENT
Start: 2023-02-26 | End: 2023-03-16 | Stop reason: HOSPADM

## 2023-02-26 RX ADMIN — GABAPENTIN 100 MG: 100 CAPSULE ORAL at 11:00

## 2023-02-26 RX ADMIN — LATANOPROST 1 DROP: 50 SOLUTION OPHTHALMIC at 17:56

## 2023-02-26 RX ADMIN — SODIUM HYPOCHLORITE 1 ML: 1.25 SOLUTION TOPICAL at 18:00

## 2023-02-26 RX ADMIN — INSULIN HUMAN 3 UNITS: 100 INJECTION, SOLUTION PARENTERAL at 21:11

## 2023-02-26 RX ADMIN — TIMOLOL MALEATE 1 DROP: 5 SOLUTION OPHTHALMIC at 17:56

## 2023-02-26 RX ADMIN — CARVEDILOL 3.12 MG: 3.12 TABLET, FILM COATED ORAL at 08:43

## 2023-02-26 RX ADMIN — SODIUM HYPOCHLORITE 1 ML: 1.25 SOLUTION TOPICAL at 06:23

## 2023-02-26 RX ADMIN — GABAPENTIN 100 MG: 100 CAPSULE ORAL at 04:56

## 2023-02-26 RX ADMIN — Medication 500 MG: at 04:56

## 2023-02-26 RX ADMIN — GABAPENTIN 100 MG: 100 CAPSULE ORAL at 17:55

## 2023-02-26 RX ADMIN — ACETAZOLAMIDE EXTENDED-RELEASE 500 MG: 500 CAPSULE ORAL at 17:55

## 2023-02-26 RX ADMIN — CARVEDILOL 6.25 MG: 6.25 TABLET, FILM COATED ORAL at 17:55

## 2023-02-26 RX ADMIN — MINERAL OIL, PETROLATUM 1 APPLICATION: 425; 573 OINTMENT OPHTHALMIC at 15:46

## 2023-02-26 RX ADMIN — HEPARIN SODIUM 5000 UNITS: 5000 INJECTION, SOLUTION INTRAVENOUS; SUBCUTANEOUS at 15:46

## 2023-02-26 RX ADMIN — HYDROMORPHONE HYDROCHLORIDE 0.25 MG: 1 INJECTION, SOLUTION INTRAMUSCULAR; INTRAVENOUS; SUBCUTANEOUS at 23:33

## 2023-02-26 RX ADMIN — TIMOLOL MALEATE 1 DROP: 5 SOLUTION OPHTHALMIC at 12:44

## 2023-02-26 RX ADMIN — BRIMONIDINE TARTRATE 1 DROP: 2 SOLUTION OPHTHALMIC at 11:01

## 2023-02-26 RX ADMIN — DOCUSATE SODIUM 50 MG AND SENNOSIDES 8.6 MG 2 TABLET: 8.6; 5 TABLET, FILM COATED ORAL at 04:56

## 2023-02-26 RX ADMIN — ACETAZOLAMIDE EXTENDED-RELEASE 500 MG: 500 CAPSULE ORAL at 12:44

## 2023-02-26 RX ADMIN — Medication 220 MG: at 04:56

## 2023-02-26 RX ADMIN — AMLODIPINE BESYLATE 10 MG: 10 TABLET ORAL at 05:05

## 2023-02-26 RX ADMIN — OXYCODONE AND ACETAMINOPHEN 1 TABLET: 10; 325 TABLET ORAL at 08:43

## 2023-02-26 RX ADMIN — PREDNISOLONE SODIUM PHOSPHATE 1 DROP: 10 SOLUTION/ DROPS OPHTHALMIC at 21:15

## 2023-02-26 RX ADMIN — BRIMONIDINE TARTRATE 1 DROP: 2 SOLUTION OPHTHALMIC at 17:56

## 2023-02-26 RX ADMIN — MINERAL OIL, PETROLATUM 1 APPLICATION: 425; 573 OINTMENT OPHTHALMIC at 04:56

## 2023-02-26 RX ADMIN — HEPARIN SODIUM 5000 UNITS: 5000 INJECTION, SOLUTION INTRAVENOUS; SUBCUTANEOUS at 04:56

## 2023-02-26 RX ADMIN — DOCUSATE SODIUM 50 MG AND SENNOSIDES 8.6 MG 2 TABLET: 8.6; 5 TABLET, FILM COATED ORAL at 17:55

## 2023-02-26 RX ADMIN — OXYCODONE AND ACETAMINOPHEN 1 TABLET: 10; 325 TABLET ORAL at 22:02

## 2023-02-26 RX ADMIN — DORZOLAMIDE HYDROCHLORIDE AND TIMOLOL MALEATE 1 DROP: 20; 5 SOLUTION/ DROPS OPHTHALMIC at 11:00

## 2023-02-26 RX ADMIN — DORZOLAMIDE HYDROCHLORIDE AND TIMOLOL MALEATE 1 DROP: 20; 5 SOLUTION/ DROPS OPHTHALMIC at 17:56

## 2023-02-26 RX ADMIN — PREDNISOLONE SODIUM PHOSPHATE 1 DROP: 10 SOLUTION/ DROPS OPHTHALMIC at 17:55

## 2023-02-26 RX ADMIN — HEPARIN SODIUM 5000 UNITS: 5000 INJECTION, SOLUTION INTRAVENOUS; SUBCUTANEOUS at 21:11

## 2023-02-26 RX ADMIN — OXYCODONE AND ACETAMINOPHEN 1 TABLET: 10; 325 TABLET ORAL at 18:02

## 2023-02-26 RX ADMIN — PREDNISOLONE SODIUM PHOSPHATE 1 DROP: 10 SOLUTION/ DROPS OPHTHALMIC at 12:44

## 2023-02-26 ASSESSMENT — PAIN DESCRIPTION - PAIN TYPE
TYPE: ACUTE PAIN

## 2023-02-26 NOTE — CONSULTS
S: 47 year old man reports extreme eye pain OD x 2 days. He reports intermittent vision problems in both eyes over the past two years, with total vision loss OD (completely black) for the past 7 months. PMH is remarkable for IDDM, ESRD on dialysis, drug abuse. He denies any prior eye surgery or history of eye disease.     O: VA NLP OD, 20/70 OS. External: 1-2+ eyelid edema without erythema or warmth OD, normal OS. Pupil small and fixed OD, R/R OS. Motility: mild global reduction OD, full OS. Unable to measure IOP (pressure feels high to digital depression). Slit lamp: 3+ chemosis OD, mild microcystic edema of cornea, extremely shallow anterior chamber with 360 degrees posterior synechiae and mature white cataract; normal anterior segment with minimal cataract OS. Fundus: no view OD; normal fundus with vitreous debris (possibly old blood) OS. CT of orbits shows vitreous opacification likely representing vitreous hemorrhage OD. No orbital process.    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 Goal of treatment at this point is palliative only.   Probable vitreous hemorrhage OD    Plan:   Medical management to include Latanoprost qd OD, Combigan bid OD, Cosopt bid OD, Pred Forte qid OD, Diamox 500 mg SR bid (if ok from metabolic standpoint).  Refer for glaucoma consult (Dr Tevin Dominguez): consider cyclophotocoagulation, laser peripheral iridotomy, or possible cataract extraction to relieve pressure.  Stop antibiotics, no evidence of infectious process

## 2023-02-26 NOTE — CARE PLAN
The patient is Stable - Low risk of patient condition declining or worsening    Shift Goals  Clinical Goals: pain control, safety  Patient Goals: pain control, food  Family Goals: N/A    Progress made toward(s) clinical / shift goals:  PRN pain meds on board for eye pain, needed items within reach, able to make his needs known.     Patient is not progressing towards the following goals: N/A    Problem: Pain - Standard  Goal: Alleviation of pain or a reduction in pain to the patient’s comfort goal  Outcome: Progressing     Problem: Knowledge Deficit - Standard  Goal: Patient and family/care givers will demonstrate understanding of plan of care, disease process/condition, diagnostic tests and medications  Outcome: Progressing     Problem: Skin Integrity  Goal: Skin integrity is maintained or improved  Outcome: Progressing     Problem: Fall Risk  Goal: Patient will remain free from falls  Outcome: Progressing

## 2023-02-26 NOTE — ASSESSMENT & PLAN NOTE
Ophthalmology - probable end-stage glaucoma from angle-closure and phacomorphic components.  Latanoprost, Timolol, Brimonidine, Cosopt, Prednisone forte ophthalmic drops and Diamox  Follow up with Ophthalmology as outpatient

## 2023-02-26 NOTE — PROGRESS NOTES
"Hospital Medicine Daily Progress Note    Date of Service  2/26/2023    Chief Complaint  Volume overload    Hospital Course  Ambrose Watkins is a homeless 47 y.o. male with diabetes since 2007, ESRD on HD for past 2-3 years, methamphetamine abuse, tobacco use, hypertension, hyperlipidemia, medical noncompliance, who presented 2/22/2023 with evaluation by police prior to being incarcerated.  On evaluation, he was noted to be volume overloaded, and have hyperkalemia and hypocalcemia.  He admitted to missing hemodialysis 4 days prior in [facility].  He has chronic pain in his thighs from chronic bilateral thigh wounds  and per pt, \"calciphylaxis.\"  He had no evidence of wound infection.  Nephrology was consulted, and patient had emergent hemodialysis.  However, he shared that he is visiting from [facility], and here in town had significant deterioration of his right eye vision and so would not be able to drive back to [place] immediately. CT of the orbits showed increased density throughout the right globe which may suggest underlying hemorrhage with possible retinal detachment, with left globe unremarkable, and no postseptal fluid collection.  Ophthalmology was consulted.      Interval Problem Update  2/26/2023 - I reviewed the patient's chart today. Uneventful night. VSS. Afebrile. Saturating well on RA.  He was seen and evaluated by ophthalmology, who noted probable end-stage glaucoma from angle-closure and phacomorphic components, who gave the opinion that the goal of treatment at this point is palliative only, and outpatient referral for glaucoma consult.  Ophthalmology recommended to start on latanoprost, Combigan, Cosopt, prednisone forte eyedrops, and Diamox PO.  Antibiotic eyedrops were stopped as there was no evidence of infectious process.    > I have personally seen and examined the patient today.  Still has right eye pain.  He is not short of breath.  No leg edema.  I asked " him what his intentions are regarding residence, as he states he is not able to drive back to [place].  He lives out of his [facility].  He states he will likely stay in [place], and will intend to be a resident here.  He is inquiring if SNF placement will be possible.    I have discussed this patient's plan of care and discharge plan at IDT rounds today with Case Management, Nursing, Nursing leadership, and other members of the IDT team.      Consultants/Specialty  nephrology    Code Status  Full Code    Disposition  Patient is not medically cleared for discharge.   Anticipate discharge to to home with organized home healthcare and close outpatient follow-up.  Will need outpatient hemodialysis chair arranged in [place] if he intends to move here.  I do not think he has any daily skilled therapy needs that warrants SNF placement - PT/OT evaluation.    I have placed the appropriate orders for post-discharge needs.    Review of Systems  ROS     Pertinent positives/negatives as mentioned above.     A complete review of systems was personally done by me. All other systems were negative.       Physical Exam  Temp:  [36.4 °C (97.6 °F)-37.1 °C (98.8 °F)] 36.6 °C (97.9 °F)  Pulse:  [87-96] 96  Resp:  [18-20] 18  BP: (111-157)/(77-88) 149/88  SpO2:  [91 %-93 %] 93 %    Physical Exam  Vitals and nursing note reviewed.   Constitutional:       General: He is not in acute distress.     Appearance: Normal appearance. He is not ill-appearing, toxic-appearing or diaphoretic.   HENT:      Head: Normocephalic and atraumatic.      Right Ear: External ear normal.      Left Ear: External ear normal.      Nose: Nose normal.      Mouth/Throat:      Mouth: Mucous membranes are moist.      Pharynx: Oropharynx is clear. No oropharyngeal exudate or posterior oropharyngeal erythema.   Eyes:      General: No scleral icterus.        Right eye: Discharge present.         Left eye: No discharge.      Extraocular Movements: Extraocular movements  intact.      Conjunctiva/sclera: Conjunctivae normal.      Pupils: Pupils are equal, round, and reactive to light.      Comments: Conjunctival erythema  Pain with EOM movement on the right eye  (+) Increased tearing   Cardiovascular:      Rate and Rhythm: Normal rate and regular rhythm.      Pulses: Normal pulses.      Heart sounds: Normal heart sounds. No murmur heard.    No gallop.   Pulmonary:      Effort: Pulmonary effort is normal. No respiratory distress.      Breath sounds: Normal breath sounds. No stridor. No wheezing, rhonchi or rales.   Chest:      Chest wall: No tenderness.   Abdominal:      General: Bowel sounds are normal. There is no distension.      Palpations: Abdomen is soft. There is no mass.      Tenderness: There is no abdominal tenderness. There is no guarding or rebound.   Musculoskeletal:         General: No swelling. Normal range of motion.      Cervical back: Normal range of motion and neck supple. No tenderness.      Right lower leg: No edema.      Left lower leg: No edema.   Lymphadenopathy:      Cervical: No cervical adenopathy.   Skin:     General: Skin is warm and dry.      Capillary Refill: Capillary refill takes less than 2 seconds.      Coloration: Skin is not jaundiced.      Findings: No bruising or rash.      Comments: Chronic wounds on the abdomen and thighs, due to calciphylaxis, does not appear to be infected   Neurological:      General: No focal deficit present.      Mental Status: He is alert and oriented to person, place, and time.      Cranial Nerves: No cranial nerve deficit.      Motor: No weakness.   Psychiatric:         Thought Content: Thought content normal.         Judgment: Judgment normal.      Comments: Not very forthcoming, and not cooperative, gets easily agitated       I have performed the physical examination today 2/26/2023.  In review of yesterday's note, there are no new changes except as documented above.      Fluids    Intake/Output Summary (Last 24  hours) at 2/26/2023 1108  Last data filed at 2/26/2023 0901  Gross per 24 hour   Intake 1100 ml   Output 3000 ml   Net -1900 ml       Laboratory  Recent Labs     02/24/23  0406   WBC 8.1   RBC 3.44*   HEMOGLOBIN 9.7*   HEMATOCRIT 31.1*   MCV 90.4   MCH 28.2   MCHC 31.2*   RDW 63.3*   PLATELETCT 137*   MPV 10.4     Recent Labs     02/24/23  0406 02/25/23  1116   SODIUM 134* 134*   POTASSIUM 5.2 5.2   CHLORIDE 97 98   CO2 15* 19*   GLUCOSE 173* 83   BUN 67* 53*   CREATININE 7.37* 7.03*   CALCIUM 7.3* 7.6*                   Imaging  [id number] W/O PLUS RECONS   Final Result      1.  Increased density throughout the right lobe is nonspecific though may suggest underlying hemorrhage with possible retinal detachment.   2.  No post septal fluid collection.           Assessment/Plan  * Hyperkalemia- (present on admission)  Assessment & Plan  -Due to missed hemodialysis.  -Improved with hemodialysis.  -Trend.    Right eye glaucoma, due to angle-closure and phacomorphic components- (present on admission)  Assessment & Plan  - States he has been having issues with his eye for about a year now, with vision worsened in the past several days since arriving in [place].  -Seen by ophthalmology, gave the opinion that probable end-stage glaucoma from angle-closure and phacomorphic components.  -start latanoprost, Combigan, Cosopt, prednisone forte and Diamox tablets.  Antibiotic eyedrops were stopped as there was no evidence of infectious process.  -He will need outpatient follow-up with glaucoma specialist.    Vitreous hemorrhage of right eye (HCC)- (present on admission)  Assessment & Plan  - Likely due to glaucoma, along with hypertension and medication/hemodialysis noncompliance.  -Ophthalmology consulted.  -Continue eyedrops for glaucoma and Diamox as above.  -Discontinue antibiotic drops as no evidence of bacterial infection.  -Supportive care with artificial tears/lubricant.    Open wound of thigh- (present on  admission)  Assessment & Plan  -Likely calciphylaxis.  Does not appear to be infected.  -Wound care while in the hospital.  -Continue ascorbic acid and zinc supplements.    Noncompliance of patient with renal dialysis ([facility])- (present on admission)  Assessment & Plan  -Patient wants FULL code.  -Addressed need of medical follow up.  -Difficulty due to homelessness and active drug abuse.    Hypocalcemia- (present on admission)  Assessment & Plan  - Improved with high calcium bath with dialysis.  Trend.    End stage renal disease (HCC)- (present on admission)  Assessment & Plan  -Concern for noncompliance.  He states he has a dialysis chair in [place], but he drove to [place] due to to a death of a friend.  Vision problems precludes him from driving back to [place].  -Continue hemodialysis while in the hospital.  -He will need to go back to [place] for his regularly scheduled hemodialysis once medically cleared, however his vision issues precludes him from driving back to [place].  If he ends up staying here in [place], will need to arrange outpatient hemodialysis chair for him.    Tobacco dependence- (present on admission)  Assessment & Plan  -Counseled on smoking cessation, but patient not interested in stopping smoking.    Anemia- (present on admission)  Assessment & Plan  -Anemia of chronic kidney disease.  -Continue EPO per nephrology.  -Monitor hemoglobin closely.  Restrictive transfusion strategy.    Methamphetamine abuse ([facility])- (present on admission)  Assessment & Plan  -Active use.    Hypertension- (present on admission)  Assessment & Plan  -He states he is not on any medications.  Blood pressure trend fairly controlled.  -Continue Norvasc to 10 mg daily.  Monitor blood pressure trend closely with as needed IV labetalol for significant hypertension.  Optimize blood pressure control.    DM (diabetes mellitus) ([facility])- (present on admission)  Assessment & Plan  - Continue sliding scale insulin  coverage while in-house.  Diabetic diet. Accu-Cheks before meals and at bedtime. Goal to keep BG between 140-180 per 2019 ADA guidelines.           VTE prophylaxis: SCDs/TEDs

## 2023-02-26 NOTE — PROGRESS NOTES
Hemodialysis ordered by Dr. Pina. Treatment started at 1355 and ended at 1655. Pt stable, vss, no c/o post tx. Net UF 2.5 L. Report to BRENDA Pagan RN.

## 2023-02-26 NOTE — PROGRESS NOTES
"[facility] -  PROGRESS NOTE               Author: EMMA Dial Date & Time: 2/26/2023  10:02 AM     HPI:  Patient is a 47 year old male with a PMHx of ESRD on HD qTTS, meth user and history of calciphylaxis here for leg pain.  States he's still using meth.  Last HD session was about 4-5 days ago.  Here potassium was 7.6 and bicarb of 7.  Nephrology consulted for maintenance dialysis.  Currently complaining of leg pain.  History of nonadherence.    DAILY NEPHROLOGY SUMMARY:  2/23 - Pain controlled.  Denies nausea.  No SOB  Had HD yesterday  2/24 - Denies pain or SOB.  Laying in bed comfortable  2/25 - 2L Net UF. NPO for OR this am w/Opth for retinal detachment. Reports that he is hungry. 1200 mL UOP last 24 hrs.   2/26: 2.5L Net UF. Bps better. Sitting at side of bed eating breakfast. States that he wants HD chair set up here. Unable to drive to [place] to HD clinic d/t vision.     REVIEW OF SYSTEMS:    10 point ROS reviewed and is as per HPI/daily summary or otherwise negative    PMH/PSH/SH/FH:   Reviewed and unchanged since admission note    CURRENT MEDICATIONS:   Reviewed from admission to present day    VS:  BP (!) 149/88   Pulse 96   Temp 36.6 °C (97.9 °F) (Temporal)   Resp 18   Ht 1.803 m (5' 10.98\")   Wt 90 kg (198 lb 6.6 oz)   SpO2 93%   BMI 27.69 kg/m²     Physical Exam  Constitutional:       Appearance: He is ill-appearing.   HENT:      Head: Normocephalic.      Right Ear: External ear normal.      Left Ear: External ear normal.      Nose: Nose normal.      Mouth/Throat:      Mouth: Mucous membranes are moist.   Eyes:      Conjunctiva/sclera: Conjunctivae discharge. Bilaterl loss of vision.   Pulmonary:      Effort: Pulmonary effort is normal.   Abdominal:      Palpations: Abdomen is soft.   Musculoskeletal:         General: Swelling present.      Cervical back: Normal range of motion.      Comments: Thigh leg wound wrapped   Skin:     General: Skin is warm. "   Neurological:      Mental Status: Mental status is at baseline.   Psychiatric:         Mood and Affect: Mood normal.      Fluids:  In: 620 [P.O.:120; Dialysis:500]  Out: 3000     LABS:  Recent Labs     02/24/23  0406 02/25/23  1116   SODIUM 134* 134*   POTASSIUM 5.2 5.2   CHLORIDE 97 98   CO2 15* 19*   GLUCOSE 173* 83   BUN 67* 53*   CREATININE 7.37* 7.03*   CALCIUM 7.3* 7.6*         IMAGING:   All imaging reviewed from admission to present day    IMPRESSION:  # ESRD  - Etiology likely 2/2 DM/HTN  - HD qTTHS via [place] TDC  - Lost his HD chair at [facility] previously  # Hyperkalemia             - State HD   # LFA AVG thrombosed  - US 2/23 no flow  - s/p OR 2/25 AVG Brachio-axillary Creation, thrombectomy and fistulogram   - stenosis and wall thrombus noted on US, TDC in use  # HTN, fair control   - Goal BP < 140/90  # Anemia of CKD, below target  - Iron panel  # CKD-MBD     # Hyperkalemia, corrected  - Due to missed HD  - Manage with HD and low K+ diet  # Calciphylaxis  - Per reports from patient  - Will do 4 days of dialysis a week   # Weakness  # h/o Methamphetamine use   # Homelessness  # DM   # Severe metabolic acidosis, improving    - Correct with HD  # Wound  # Conjunctivitis/End Stage Glaucoma  -Med tx   -Diamox  -Latanoprost, Combigan, Cosopy, Preds Forte     PLAN:  - No iHD today (SUN)  - then QTTS and PRN  - Opth Recs  - High calcium bath for now but will avoid elevated calcium given history  - UF as tolerated  - Use PermCath for now  - Increase Carvedilol 6.25 mg PO BID  - AVG eval as OP at [facility]  - Transfuse PRN Hgb <7.0   - JACKIE with HD to goal Hgb 10-11  - No dietary protein restrictions  - Dose all meds per ESRD/iHD  - Renal diet, low salt diet, fluid restriction to 1.5L daily  - SW support for placement. Requesting local HD chair. Hx of non adherence to HD.   - Can transition to Outpt HD clinic when medically cleared    ** Discussed with hospitalist

## 2023-02-27 LAB
GLUCOSE BLD STRIP.AUTO-MCNC: 119 MG/DL (ref 65–99)
GLUCOSE BLD STRIP.AUTO-MCNC: 138 MG/DL (ref 65–99)
GLUCOSE BLD STRIP.AUTO-MCNC: 145 MG/DL (ref 65–99)
GLUCOSE BLD STRIP.AUTO-MCNC: 154 MG/DL (ref 65–99)
GLUCOSE BLD STRIP.AUTO-MCNC: 160 MG/DL (ref 65–99)

## 2023-02-27 PROCEDURE — 99232 SBSQ HOSP IP/OBS MODERATE 35: CPT | Performed by: INTERNAL MEDICINE

## 2023-02-27 PROCEDURE — A9270 NON-COVERED ITEM OR SERVICE: HCPCS | Performed by: INTERNAL MEDICINE

## 2023-02-27 PROCEDURE — 700102 HCHG RX REV CODE 250 W/ 637 OVERRIDE(OP): Performed by: NURSE PRACTITIONER

## 2023-02-27 PROCEDURE — 700111 HCHG RX REV CODE 636 W/ 250 OVERRIDE (IP): Performed by: HOSPITALIST

## 2023-02-27 PROCEDURE — 700102 HCHG RX REV CODE 250 W/ 637 OVERRIDE(OP): Performed by: INTERNAL MEDICINE

## 2023-02-27 PROCEDURE — A9270 NON-COVERED ITEM OR SERVICE: HCPCS | Performed by: HOSPITALIST

## 2023-02-27 PROCEDURE — A9270 NON-COVERED ITEM OR SERVICE: HCPCS | Performed by: NURSE PRACTITIONER

## 2023-02-27 PROCEDURE — 700101 HCHG RX REV CODE 250: Performed by: HOSPITALIST

## 2023-02-27 PROCEDURE — 770006 HCHG ROOM/CARE - MED/SURG/GYN SEMI*

## 2023-02-27 PROCEDURE — 700102 HCHG RX REV CODE 250 W/ 637 OVERRIDE(OP): Performed by: HOSPITALIST

## 2023-02-27 PROCEDURE — 700111 HCHG RX REV CODE 636 W/ 250 OVERRIDE (IP)

## 2023-02-27 PROCEDURE — 82962 GLUCOSE BLOOD TEST: CPT | Mod: 91

## 2023-02-27 RX ORDER — MORPHINE SULFATE 4 MG/ML
4 INJECTION INTRAVENOUS 2 TIMES DAILY PRN
Status: DISCONTINUED | OUTPATIENT
Start: 2023-02-27 | End: 2023-03-16 | Stop reason: HOSPADM

## 2023-02-27 RX ADMIN — ACETAZOLAMIDE EXTENDED-RELEASE 500 MG: 500 CAPSULE ORAL at 05:45

## 2023-02-27 RX ADMIN — BRIMONIDINE TARTRATE 1 DROP: 2 SOLUTION OPHTHALMIC at 05:47

## 2023-02-27 RX ADMIN — GABAPENTIN 100 MG: 100 CAPSULE ORAL at 17:50

## 2023-02-27 RX ADMIN — OXYCODONE AND ACETAMINOPHEN 1 TABLET: 10; 325 TABLET ORAL at 05:46

## 2023-02-27 RX ADMIN — MINERAL OIL, PETROLATUM 1 APPLICATION: 425; 573 OINTMENT OPHTHALMIC at 16:22

## 2023-02-27 RX ADMIN — OXYCODONE AND ACETAMINOPHEN 1 TABLET: 10; 325 TABLET ORAL at 22:22

## 2023-02-27 RX ADMIN — MINERAL OIL, PETROLATUM 1 APPLICATION: 425; 573 OINTMENT OPHTHALMIC at 20:10

## 2023-02-27 RX ADMIN — SODIUM HYPOCHLORITE 1 ML: 1.25 SOLUTION TOPICAL at 05:57

## 2023-02-27 RX ADMIN — GABAPENTIN 100 MG: 100 CAPSULE ORAL at 12:18

## 2023-02-27 RX ADMIN — TIMOLOL MALEATE 1 DROP: 5 SOLUTION OPHTHALMIC at 05:47

## 2023-02-27 RX ADMIN — BRIMONIDINE TARTRATE 1 DROP: 2 SOLUTION OPHTHALMIC at 17:44

## 2023-02-27 RX ADMIN — PREDNISOLONE SODIUM PHOSPHATE 1 DROP: 10 SOLUTION/ DROPS OPHTHALMIC at 17:56

## 2023-02-27 RX ADMIN — Medication 500 MG: at 05:46

## 2023-02-27 RX ADMIN — DORZOLAMIDE HYDROCHLORIDE AND TIMOLOL MALEATE 1 DROP: 20; 5 SOLUTION/ DROPS OPHTHALMIC at 17:52

## 2023-02-27 RX ADMIN — LATANOPROST 1 DROP: 50 SOLUTION OPHTHALMIC at 17:49

## 2023-02-27 RX ADMIN — INSULIN HUMAN 2 UNITS: 100 INJECTION, SOLUTION PARENTERAL at 20:17

## 2023-02-27 RX ADMIN — MORPHINE SULFATE 4 MG: 4 INJECTION INTRAVENOUS at 16:22

## 2023-02-27 RX ADMIN — HEPARIN SODIUM 5000 UNITS: 5000 INJECTION, SOLUTION INTRAVENOUS; SUBCUTANEOUS at 14:55

## 2023-02-27 RX ADMIN — MINERAL OIL, PETROLATUM 1 APPLICATION: 425; 573 OINTMENT OPHTHALMIC at 05:46

## 2023-02-27 RX ADMIN — ACETAZOLAMIDE EXTENDED-RELEASE 500 MG: 500 CAPSULE ORAL at 17:50

## 2023-02-27 RX ADMIN — HEPARIN SODIUM 5000 UNITS: 5000 INJECTION, SOLUTION INTRAVENOUS; SUBCUTANEOUS at 05:46

## 2023-02-27 RX ADMIN — SODIUM HYPOCHLORITE 1 ML: 1.25 SOLUTION TOPICAL at 17:00

## 2023-02-27 RX ADMIN — PREDNISOLONE SODIUM PHOSPHATE 1 DROP: 10 SOLUTION/ DROPS OPHTHALMIC at 20:12

## 2023-02-27 RX ADMIN — DORZOLAMIDE HYDROCHLORIDE AND TIMOLOL MALEATE 1 DROP: 20; 5 SOLUTION/ DROPS OPHTHALMIC at 05:47

## 2023-02-27 RX ADMIN — HEPARIN SODIUM 5000 UNITS: 5000 INJECTION, SOLUTION INTRAVENOUS; SUBCUTANEOUS at 20:11

## 2023-02-27 RX ADMIN — DOCUSATE SODIUM 50 MG AND SENNOSIDES 8.6 MG 2 TABLET: 8.6; 5 TABLET, FILM COATED ORAL at 05:46

## 2023-02-27 RX ADMIN — DOCUSATE SODIUM 50 MG AND SENNOSIDES 8.6 MG 2 TABLET: 8.6; 5 TABLET, FILM COATED ORAL at 18:00

## 2023-02-27 RX ADMIN — CARVEDILOL 6.25 MG: 6.25 TABLET, FILM COATED ORAL at 09:15

## 2023-02-27 RX ADMIN — PREDNISOLONE SODIUM PHOSPHATE 1 DROP: 10 SOLUTION/ DROPS OPHTHALMIC at 14:55

## 2023-02-27 RX ADMIN — Medication 220 MG: at 05:45

## 2023-02-27 RX ADMIN — CARVEDILOL 6.25 MG: 6.25 TABLET, FILM COATED ORAL at 17:50

## 2023-02-27 RX ADMIN — AMLODIPINE BESYLATE 10 MG: 10 TABLET ORAL at 05:46

## 2023-02-27 RX ADMIN — INSULIN HUMAN 2 UNITS: 100 INJECTION, SOLUTION PARENTERAL at 12:19

## 2023-02-27 RX ADMIN — PREDNISOLONE SODIUM PHOSPHATE 1 DROP: 10 SOLUTION/ DROPS OPHTHALMIC at 10:20

## 2023-02-27 RX ADMIN — OXYCODONE AND ACETAMINOPHEN 1 TABLET: 10; 325 TABLET ORAL at 14:55

## 2023-02-27 RX ADMIN — TIMOLOL MALEATE 1 DROP: 5 SOLUTION OPHTHALMIC at 17:54

## 2023-02-27 RX ADMIN — GABAPENTIN 100 MG: 100 CAPSULE ORAL at 05:46

## 2023-02-27 NOTE — PROGRESS NOTES
"Hospital Medicine Daily Progress Note    Date of Service  2/27/2023    Chief Complaint  Volume overload    Hospital Course  Ambrose Watkins is a homeless 47 y.o. male with diabetes since 2007, ESRD on HD for past 2-3 years, methamphetamine abuse, tobacco use, hypertension, hyperlipidemia, medical noncompliance, who presented 2/22/2023 with evaluation by police prior to being incarcerated.  On evaluation, he was noted to be volume overloaded, and have hyperkalemia and hypocalcemia.  He admitted to missing hemodialysis 4 days prior in [facility].  He has chronic pain in his thighs from chronic bilateral thigh wounds  and per pt, \"calciphylaxis.\"  He had no evidence of wound infection.  Nephrology was consulted, and patient had emergent hemodialysis.  However, he shared that he is visiting from [facility], and here in town had significant deterioration of his right eye vision and so would not be able to drive back to [place] immediately. CT of the orbits showed increased density throughout the right globe which may suggest underlying hemorrhage with possible retinal detachment, with left globe unremarkable, and no postseptal fluid collection.  Ophthalmology was consulted who noted probable end-stage glaucoma from angle-closure and phacomorphic components, who gave the opinion that the goal of treatment at this point is palliative only, and outpatient referral for glaucoma consult.  Ophthalmology recommended to start on latanoprost, Combigan, Cosopt, prednisone forte eyedrops, and Diamox PO.  Antibiotic eyedrops were stopped as there was no evidence of infectious process..  Patient shared that he intends to stay in [place] as he is not able to drive back to [place].  He lives out of his [facility].  Case management following closely for discharge planning.    Interval Problem Update  2/27/2023 - I reviewed the patient's chart. There were no significant overnight events. Remains " hemodynamically stable and afebrile. Stable on RA.      > I have personally seen and examined the patient today.  He is in a better mood today.  Eye pain is improving.  Eyes are not tearing as much.  Vision is still blurry.  No nausea or vomiting.  Not short of breath.  No abdominal pain.    I have discussed this patient's plan of care and discharge plan at IDT rounds today with Case Management, Nursing, Nursing leadership, and other members of the IDT team.      Consultants/Specialty  nephrology    Code Status  Full Code    Disposition  Patient is not medically cleared for discharge.   Anticipate discharge to to home with organized home healthcare and close outpatient follow-up.  Will need outpatient hemodialysis chair arranged in [place] if he intends to move here.  I do not think he has any daily skilled therapy needs that warrants SNF placement - pending PT/OT evaluation.  He is homeless, and he is not safe to drive anymore due to his glaucoma and vision problems.  May need to discharge to the shelter.  I have placed the appropriate orders for post-discharge needs.    Review of Systems  ROS     Pertinent positives/negatives as mentioned above.     A complete review of systems was personally done by me. All other systems were negative.       Physical Exam  Temp:  [36.3 °C (97.4 °F)-36.4 °C (97.6 °F)] 36.4 °C (97.6 °F)  Pulse:  [77-80] 78  Resp:  [17-18] 18  BP: (132-140)/(76-88) 135/88  SpO2:  [95 %-97 %] 95 %    Physical Exam  Vitals and nursing note reviewed.   Constitutional:       General: He is not in acute distress.     Appearance: Normal appearance. He is not ill-appearing, toxic-appearing or diaphoretic.   HENT:      Head: Normocephalic and atraumatic.      Right Ear: External ear normal.      Left Ear: External ear normal.      Nose: Nose normal.      Mouth/Throat:      Mouth: Mucous membranes are moist.      Pharynx: Oropharynx is clear. No oropharyngeal exudate or posterior oropharyngeal erythema.    Eyes:      General: No scleral icterus.        Right eye: No discharge.         Left eye: No discharge.      Extraocular Movements: Extraocular movements intact.      Conjunctiva/sclera: Conjunctivae normal.      Pupils: Pupils are equal, round, and reactive to light.      Comments: Swelling on the right eye is improved.  Decreased tearing.  Sclera swelling OD, slightly improved from yesterday.   Cardiovascular:      Rate and Rhythm: Normal rate and regular rhythm.      Pulses: Normal pulses.      Heart sounds: Normal heart sounds. No murmur heard.    No gallop.   Pulmonary:      Effort: Pulmonary effort is normal. No respiratory distress.      Breath sounds: Normal breath sounds. No stridor. No wheezing, rhonchi or rales.   Chest:      Chest wall: No tenderness.   Abdominal:      General: Bowel sounds are normal. There is no distension.      Palpations: Abdomen is soft. There is no mass.      Tenderness: There is no abdominal tenderness. There is no guarding or rebound.   Musculoskeletal:         General: No swelling. Normal range of motion.      Cervical back: Normal range of motion and neck supple. No tenderness.      Right lower leg: No edema.      Left lower leg: No edema.   Lymphadenopathy:      Cervical: No cervical adenopathy.   Skin:     General: Skin is warm and dry.      Capillary Refill: Capillary refill takes less than 2 seconds.      Coloration: Skin is not jaundiced.      Findings: No bruising or rash.      Comments: Chronic wounds on the abdomen and thighs, due to calciphylaxis, does not appear to be infected   Neurological:      General: No focal deficit present.      Mental Status: He is alert and oriented to person, place, and time.      Cranial Nerves: No cranial nerve deficit.      Motor: No weakness.   Psychiatric:         Mood and Affect: Mood normal.         Thought Content: Thought content normal.         Judgment: Judgment normal.      Comments: More calm and forthcoming today,  cooperative.           Fluids    Intake/Output Summary (Last 24 hours) at 2/27/2023 1142  Last data filed at 2/26/2023 1900  Gross per 24 hour   Intake 720 ml   Output --   Net 720 ml       Laboratory        Recent Labs     02/25/23  1116   SODIUM 134*   POTASSIUM 5.2   CHLORIDE 98   CO2 19*   GLUCOSE 83   BUN 53*   CREATININE 7.03*   CALCIUM 7.6*                   Imaging  [id number] W/O PLUS RECONS   Final Result      1.  Increased density throughout the right lobe is nonspecific though may suggest underlying hemorrhage with possible retinal detachment.   2.  No post septal fluid collection.           Assessment/Plan  * Hyperkalemia- (present on admission)  Assessment & Plan  -Due to missed hemodialysis.  -Improved with hemodialysis.  -Trend.    Right eye glaucoma, due to angle-closure and phacomorphic components- (present on admission)  Assessment & Plan  - States he has been having issues with his eye for about a year now, with vision worsened in the past several days since arriving in [place].  -Seen by ophthalmology, gave the opinion that probable end-stage glaucoma from angle-closure and phacomorphic components.  -Continue latanoprost, Combigan, Cosopt, prednisone forte and Diamox tablets.  Antibiotic eyedrops were stopped as there was no evidence of infectious process.  -He will need outpatient follow-up with glaucoma specialist.    Vitreous hemorrhage of right eye (HCC)- (present on admission)  Assessment & Plan  - Likely due to glaucoma, along with hypertension and medication/hemodialysis noncompliance.  -Ophthalmology consulted.  -Continue eyedrops for glaucoma and Diamox as above.  -Discontinued antibiotic drops as no evidence of bacterial infection.  -Supportive care with artificial tears/lubricant.    Open wound of thigh- (present on admission)  Assessment & Plan  -Likely calciphylaxis.  Does not appear to be infected.  -Wound care while in the hospital.  -Continue ascorbic acid and zinc  supplements.    Noncompliance of patient with renal dialysis ([facility])- (present on admission)  Assessment & Plan  -Patient wants FULL code.  -Addressed need of medical follow up.  -Difficulty due to homelessness and active drug abuse.    Hypocalcemia- (present on admission)  Assessment & Plan  - Improved with high calcium bath with dialysis.  Trend.    End stage renal disease (HCC)- (present on admission)  Assessment & Plan  -Concern for noncompliance.  He states he has a dialysis chair in [place], but he drove to [place] due to to a death of a friend.  Vision problems precludes him from driving back to [place].  -Continue hemodialysis while in the hospital.  -He will need to go back to [place] for his regularly scheduled hemodialysis once medically cleared, however his vision issues precludes him from driving back to [place].  If he ends up staying here in [place], will need to arrange outpatient hemodialysis chair for him.    Tobacco dependence- (present on admission)  Assessment & Plan  -Counseled on smoking cessation, but patient not interested in stopping smoking.    Anemia- (present on admission)  Assessment & Plan  -Anemia of chronic kidney disease.  -Continue EPO per nephrology.  -Monitor hemoglobin closely.  Restrictive transfusion strategy.    Methamphetamine abuse ([facility])- (present on admission)  Assessment & Plan  -Active use.    Hypertension- (present on admission)  Assessment & Plan  -He states he is not on any medications.  Blood pressure trend fairly controlled.  -Continue Norvasc 10 mg daily and Coreg 6.25 twice daily.  He is also on Diamox.  Monitor blood pressure trend closely with as needed IV labetalol for significant hypertension.  Optimize blood pressure control.    DM (diabetes mellitus) ([facility])- (present on admission)  Assessment & Plan  - Continue sliding scale insulin coverage while in-house.  Diabetic diet. Accu-Cheks before meals and at bedtime. Goal to keep BG between 140-180 per  2019 ADA guidelines.           VTE prophylaxis: SCDs/TEDs

## 2023-02-27 NOTE — CARE PLAN
The patient is Stable - Low risk of patient condition declining or worsening    Shift Goals  Clinical Goals: colleen cotrol, skin integrity/ wound care  Patient Goals: pain control  Family Goals: donis        Progress made toward(s) clinical / shift goals:    Problem: Pain - Standard  Goal: Alleviation of pain or a reduction in pain to the patient’s comfort goal  Outcome: Progressing     Problem: Skin Integrity  Goal: Skin integrity is maintained or improved  Outcome: Progressing     Problem: Fall Risk  Goal: Patient will remain free from falls  Outcome: Progressing       Patient is not progressing towards the following goals:      Problem: Knowledge Deficit - Standard  Goal: Patient and family/care givers will demonstrate understanding of plan of care, disease process/condition, diagnostic tests and medications  Outcome: Not Progressing

## 2023-02-27 NOTE — DISCHARGE PLANNING
Case Management Discharge Planning    Admission Date: 2/22/2023  GMLOS: 3.4  ALOS: 5    6-Clicks ADL Score: 22  6-Clicks Mobility Score: 23      Anticipated Discharge Dispo:      DME Needed: No    Action(s) Taken: Updated Provider/Nurse on Discharge Plan    Escalations Completed: None    Medically Clear: Yes    Next Steps: follow up with [facility]    Barriers to Discharge: Dialysis    Is the patient up for discharge tomorrow: No  SW spoke to patient about his plan to relocated to [place] from [place], saying it is cheaper to live in [place].  Patient was brought into the hospital from [facility] and believes his jeep is still there. Patient asked where he will be staying, patient stated he isn't sure, but will find a motel to stay.    SW will assist patient in ending his Med[employer] and request PFA to screen patient for Medicaid    PC to Doris, [phone]; patient is still pending, possible chair tomorrow or day after

## 2023-02-27 NOTE — THERAPY
Occupational Therapy Contact Note    Patient Name: Ambrose Waktins  Age:  47 y.o., Sex:  male  Medical Record #: [id number]  Today's Date: 2/27/2023    Pt seen for OT evaluation on 2/23, cleared for discharge with no further needs. 6 clicks score 22/24 for ADLs, pt likely has no acute OT needs warranting an OT re-evaluation.      Xu Camarillo OTD, OTR/L

## 2023-02-27 NOTE — CARE PLAN
The patient is Stable - Low risk of patient condition declining or worsening    Shift Goals  Clinical Goals: pain control, safety  Patient Goals: pain control, food  Family Goals: N/A    Progress made toward(s) clinical / shift goals:  Patient progressing towards goals. Call light within reach. Hourly rounding.     Patient is not progressing towards the following goals:      Problem: Pain - Standard  Goal: Alleviation of pain or a reduction in pain to the patient’s comfort goal  Outcome: Progressing     Problem: Knowledge Deficit - Standard  Goal: Patient and family/care givers will demonstrate understanding of plan of care, disease process/condition, diagnostic tests and medications  Outcome: Progressing     Problem: Skin Integrity  Goal: Skin integrity is maintained or improved  Outcome: Progressing     Problem: Fall Risk  Goal: Patient will remain free from falls  Outcome: Progressing

## 2023-02-27 NOTE — PROGRESS NOTES
"[facility] -  PROGRESS NOTE               Author: EMMA Beck Date & Time: 2/27/2023  10:49 AM     HPI:  Patient is a 47 year old male with a PMHx of ESRD on HD qTTS, meth user and history of calciphylaxis here for leg pain.  States he's still using meth.  Last HD session was about 4-5 days ago.  Here potassium was 7.6 and bicarb of 7.  Nephrology consulted for maintenance dialysis.  Currently complaining of leg pain.  History of nonadherence.    DAILY NEPHROLOGY SUMMARY:  2/23 - Pain controlled.  Denies nausea.  No SOB  Had HD yesterday  2/24 - Denies pain or SOB.  Laying in bed comfortable  2/25 - 2L Net UF. NPO for OR this am w/Opth for retinal detachment. Reports that he is hungry. 1200 mL UOP last 24 hrs.   2/26: 2.5L Net UF. Bps better. Sitting at side of bed eating breakfast. States that he wants HD chair set up here. Unable to drive to [place] to HD clinic d/t vision.   2/27: Pt sitting up in bed eating breakfast, no overnight events, VSS on RA, worked with OT this AM and cleared for DC, waiting on DC plan    REVIEW OF SYSTEMS:    10 point ROS reviewed and is as per HPI/daily summary or otherwise negative    PMH/PSH/SH/FH:   Reviewed and unchanged since admission note    CURRENT MEDICATIONS:   Reviewed from admission to present day    VS:  /88   Pulse 78   Temp 36.4 °C (97.6 °F) (Temporal)   Resp 18   Ht 1.803 m (5' 10.98\")   Wt 90 kg (198 lb 6.6 oz)   SpO2 95%   BMI 27.69 kg/m²     Physical Exam  Nursing note reviewed.   Constitutional:       Appearance: Normal appearance.   Eyes:      General: No scleral icterus.     Comments: R eye erythema   Cardiovascular:      Comments: No edema  LFA AVG  LIJ TDC  Pulmonary:      Effort: Pulmonary effort is normal.   Abdominal:      General: There is no distension.   Musculoskeletal:         General: No deformity.   Skin:     Findings: No rash.      Comments: Chronic wounds b/l thighs/abd   Neurological:      " Mental Status: He is alert.   Psychiatric:         Behavior: Behavior is cooperative.        Fluids:  In: 1200 [P.O.:1200]  Out: -     LABS:  Recent Labs     02/25/23  1116   SODIUM 134*   POTASSIUM 5.2   CHLORIDE 98   CO2 19*   GLUCOSE 83   BUN 53*   CREATININE 7.03*   CALCIUM 7.6*         IMAGING:   All imaging reviewed from admission to present day    IMPRESSION:  # ESRD  - Etiology likely 2/2 DM/HTN  - HD qTTHS via [facility]J TDC  - [facility] HD chair at [facility] previously  # Hyperkalemia, corrected with HD             - s/p stat HD   # LFA AVG thrombosed  - US 2/23 no flow  - s/p OR 2/25 AVG Brachio-axillary Creation, thrombectomy and fistulogram   - Stenosis and wall thrombus noted on US, LIJ TDC in use  # HTN, fair control   - Goal BP < 140/90  # Anemia of CKD, below target  - Goal Ggb 10-11  - Ferritin 2722, %sat 41  # CKD-MBD  - Ca 7.6  - iCa 1.0  - Check phos  # Calciphylaxis hx  - Per report from patient  # Generalized weakness, improved   - Evaluated with PT/OT  # h/o Methamphetamine use   # Homelessness  # DM   # Severe metabolic acidosis, improving    - Correct with HD  # Chronic wounds b/l thighs/abd   - Wound care following   # Conjunctivitis/End Stage Glaucoma  - Diamox  - Latanoprost, Combigan, Cosopy, Preds Forte     PLAN:  - iHD tomorrow (TUES) and qTTS/PRN  - UF as tolerated   - FU Opthalm Recs  - Has been on high Ca bath with HD, but trying to avoid given hx   - Use [employer] PermCath for now  - On increased Carvedilol 6.25 mg PO BID  - AVG eval as OP at [facility]  - Transfuse PRN Hgb <7.0   - JACKIE with HD to goal Hgb 10-11  - No dietary protein restrictions  - Dose all meds per ESRD/iHD  - Renal diet, low salt diet, fluid restriction to 1.5L daily  - SW support for placement. Requesting local HD chair. Hx of non adherence to HD.   - Can transition to Outpt HD clinic when medically cleared and chair secured  - FU labs/MBD metrics in AM  - Wound care    Thank you,

## 2023-02-27 NOTE — DISCHARGE PLANNING
Care Transition Team Assessment    CM met with pt at the bedside to complete DC Assessment and DCPlan needs. CM introduced self and department roles. Pt verified that he is homeless and was living in his jeep in [facility]. Pt has established HD in [place]. Pt states that he has moved to [place] with intention of residing in [place]. Pt states he has no support in [place] and has not changed his insurance for [place] Medicaid. Pt believes is medication is from [employer] as he does not have a Rx with Medicare. Pt updates that he makes around $900 a month in disability. Pt updates that his jeep is parked at [place]. Pt states that he does not plan to return to [place]. 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 Pt is not able to drive at this time to return to [place]. Pt states he has no supports to assist in transporting pt and pt's jeep back to [place]. Pt expressed that he would like to go to SNF. CM discussed that pt has to have medical necessity for SNF option. CM discussed barriers to SNF is current insurance for long term placement is in [place]. Dicussed that pt does not have HD in [place].     CM updated MD that pt expressed that he would need to go to SNF. MD plan to follow up with pt on SNF - if PT/OT recommendation. Discussed option of pt returning to [place] with assistance and last if pt is to reside in [place] with new DX with eyes options of shelters/new HD chair needed and also with change of Medicaid to [place].       Information Source  Orientation Level: Oriented X4      Elopement Risk  Legal Hold: No  Ambulatory or Self Mobile in Wheelchair: Yes  Disoriented: No  Psychiatric Symptoms: None  History of Wandering: No  Elopement this Admit: No  Vocalizing Wanting to Leave: No  Displays Behaviors, Body Language Wanting to Leave: No-Not at Risk for Elopement  Elopement Risk: Not at Risk for Elopement    Interdisciplinary Discharge Planning  Lives with - Patient's Self Care Capacity: Alone and Able to Care For  Self  Patient or legal guardian wants to designate a caregiver: No  Support Systems: None  Housing / Facility: Homeless  Durable Medical Equipment: Unknown    Discharge Preparedness  Prior Functional Level: Ambulatory    Functional Assesment  Prior Functional Level: Ambulatory         Vision / Hearing Impairment  Vision Impairment : Yes  Right Eye Vision: Impaired  Left Eye Vision: Impaired  Hearing Impairment : No              Domestic Abuse  Have you ever been the victim of abuse or violence?: No  Physical Abuse or Sexual Abuse: No  Verbal Abuse or Emotional Abuse: No  Possible Abuse/Neglect Reported to:: Not Applicable         Discharge Risks or Barriers  Discharge risks or barriers?: Post-acute placement / services, Lives alone, no community support, Complex medical needs, Homeless / couch surfing

## 2023-02-27 NOTE — CARE PLAN
The patient is Stable - Low risk of patient condition declining or worsening    Shift Goals  Clinical Goals: pain control/safety  Patient Goals: pain control  Family Goals: N/A    Progress made toward(s) clinical / shift goals:  patient has progressed on these goals    Patient is not progressing towards the following goals:      Problem: Knowledge Deficit - Standard  Goal: Patient and family/care givers will demonstrate understanding of plan of care, disease process/condition, diagnostic tests and medications  Outcome: Progressing     Problem: Pain - Standard  Goal: Alleviation of pain or a reduction in pain to the patient’s comfort goal  Outcome: Progressing

## 2023-02-28 PROBLEM — I27.20 PULMONARY HTN (HCC): Status: ACTIVE | Noted: 2023-02-28

## 2023-02-28 PROBLEM — I50.22 CHRONIC HFREF (HEART FAILURE WITH REDUCED EJECTION FRACTION) (HCC): Status: ACTIVE | Noted: 2023-02-28

## 2023-02-28 PROBLEM — I42.9 CARDIOMYOPATHY (HCC): Status: ACTIVE | Noted: 2023-02-28

## 2023-02-28 PROBLEM — D63.8 ANEMIA, CHRONIC DISEASE: Status: ACTIVE | Noted: 2022-02-22

## 2023-02-28 PROBLEM — T14.8XXA OPEN WOUND: Status: ACTIVE | Noted: 2023-02-22

## 2023-02-28 LAB
ALBUMIN SERPL BCP-MCNC: 2.7 G/DL (ref 3.2–4.9)
BUN SERPL-MCNC: 69 MG/DL (ref 8–22)
CA-I SERPL-SCNC: 1 MMOL/L (ref 1.1–1.3)
CALCIUM ALBUM COR SERPL-MCNC: 8.8 MG/DL (ref 8.5–10.5)
CALCIUM SERPL-MCNC: 7.8 MG/DL (ref 8.5–10.5)
CHLORIDE SERPL-SCNC: 97 MMOL/L (ref 96–112)
CO2 SERPL-SCNC: 21 MMOL/L (ref 20–33)
CREAT SERPL-MCNC: 8.27 MG/DL (ref 0.5–1.4)
GFR SERPLBLD CREATININE-BSD FMLA CKD-EPI: 7 ML/MIN/1.73 M 2
GLUCOSE BLD STRIP.AUTO-MCNC: 101 MG/DL (ref 65–99)
GLUCOSE BLD STRIP.AUTO-MCNC: 129 MG/DL (ref 65–99)
GLUCOSE BLD STRIP.AUTO-MCNC: 136 MG/DL (ref 65–99)
GLUCOSE BLD STRIP.AUTO-MCNC: 158 MG/DL (ref 65–99)
GLUCOSE SERPL-MCNC: 130 MG/DL (ref 65–99)
PHOSPHATE SERPL-MCNC: 9.3 MG/DL (ref 2.5–4.5)
POTASSIUM SERPL-SCNC: 5.5 MMOL/L (ref 3.6–5.5)
PTH-INTACT SERPL-MCNC: 357 PG/ML (ref 14–72)
SODIUM SERPL-SCNC: 132 MMOL/L (ref 135–145)

## 2023-02-28 PROCEDURE — 82962 GLUCOSE BLOOD TEST: CPT | Mod: 91

## 2023-02-28 PROCEDURE — 700111 HCHG RX REV CODE 636 W/ 250 OVERRIDE (IP): Performed by: NURSE PRACTITIONER

## 2023-02-28 PROCEDURE — 700111 HCHG RX REV CODE 636 W/ 250 OVERRIDE (IP)

## 2023-02-28 PROCEDURE — 83970 ASSAY OF PARATHORMONE: CPT

## 2023-02-28 PROCEDURE — 80069 RENAL FUNCTION PANEL: CPT

## 2023-02-28 PROCEDURE — A9270 NON-COVERED ITEM OR SERVICE: HCPCS | Performed by: HOSPITALIST

## 2023-02-28 PROCEDURE — 700102 HCHG RX REV CODE 250 W/ 637 OVERRIDE(OP): Performed by: NURSE PRACTITIONER

## 2023-02-28 PROCEDURE — 90935 HEMODIALYSIS ONE EVALUATION: CPT

## 2023-02-28 PROCEDURE — 700111 HCHG RX REV CODE 636 W/ 250 OVERRIDE (IP): Performed by: HOSPITALIST

## 2023-02-28 PROCEDURE — 99232 SBSQ HOSP IP/OBS MODERATE 35: CPT | Performed by: FAMILY MEDICINE

## 2023-02-28 PROCEDURE — 700102 HCHG RX REV CODE 250 W/ 637 OVERRIDE(OP): Performed by: HOSPITALIST

## 2023-02-28 PROCEDURE — 36415 COLL VENOUS BLD VENIPUNCTURE: CPT

## 2023-02-28 PROCEDURE — A9270 NON-COVERED ITEM OR SERVICE: HCPCS | Performed by: NURSE PRACTITIONER

## 2023-02-28 PROCEDURE — A9270 NON-COVERED ITEM OR SERVICE: HCPCS | Performed by: INTERNAL MEDICINE

## 2023-02-28 PROCEDURE — 770006 HCHG ROOM/CARE - MED/SURG/GYN SEMI*

## 2023-02-28 PROCEDURE — 700102 HCHG RX REV CODE 250 W/ 637 OVERRIDE(OP): Performed by: INTERNAL MEDICINE

## 2023-02-28 PROCEDURE — 82330 ASSAY OF CALCIUM: CPT

## 2023-02-28 RX ORDER — GABAPENTIN 100 MG/1
100 CAPSULE ORAL EVERY EVENING
Status: DISCONTINUED | OUTPATIENT
Start: 2023-02-28 | End: 2023-03-16 | Stop reason: HOSPADM

## 2023-02-28 RX ORDER — HEPARIN SODIUM 1000 [USP'U]/ML
INJECTION, SOLUTION INTRAVENOUS; SUBCUTANEOUS
Status: COMPLETED
Start: 2023-02-28 | End: 2023-02-28

## 2023-02-28 RX ADMIN — MORPHINE SULFATE 4 MG: 4 INJECTION INTRAVENOUS at 23:57

## 2023-02-28 RX ADMIN — DOCUSATE SODIUM 50 MG AND SENNOSIDES 8.6 MG 2 TABLET: 8.6; 5 TABLET, FILM COATED ORAL at 17:32

## 2023-02-28 RX ADMIN — PREDNISOLONE SODIUM PHOSPHATE 1 DROP: 10 SOLUTION/ DROPS OPHTHALMIC at 17:35

## 2023-02-28 RX ADMIN — Medication 500 MG: at 05:05

## 2023-02-28 RX ADMIN — BRIMONIDINE TARTRATE 1 DROP: 2 SOLUTION OPHTHALMIC at 05:05

## 2023-02-28 RX ADMIN — BRIMONIDINE TARTRATE 1 DROP: 2 SOLUTION OPHTHALMIC at 17:35

## 2023-02-28 RX ADMIN — PREDNISOLONE SODIUM PHOSPHATE 1 DROP: 10 SOLUTION/ DROPS OPHTHALMIC at 11:41

## 2023-02-28 RX ADMIN — TIMOLOL MALEATE 1 DROP: 5 SOLUTION OPHTHALMIC at 05:07

## 2023-02-28 RX ADMIN — ACETAZOLAMIDE EXTENDED-RELEASE 500 MG: 500 CAPSULE ORAL at 17:33

## 2023-02-28 RX ADMIN — HEPARIN SODIUM 5000 UNITS: 5000 INJECTION, SOLUTION INTRAVENOUS; SUBCUTANEOUS at 20:39

## 2023-02-28 RX ADMIN — MORPHINE SULFATE 4 MG: 4 INJECTION INTRAVENOUS at 11:04

## 2023-02-28 RX ADMIN — PREDNISOLONE SODIUM PHOSPHATE 1 DROP: 10 SOLUTION/ DROPS OPHTHALMIC at 20:43

## 2023-02-28 RX ADMIN — GABAPENTIN 100 MG: 100 CAPSULE ORAL at 05:05

## 2023-02-28 RX ADMIN — OXYCODONE AND ACETAMINOPHEN 1 TABLET: 10; 325 TABLET ORAL at 05:06

## 2023-02-28 RX ADMIN — EPOETIN ALFA-EPBX 5000 UNITS: 3000 INJECTION, SOLUTION INTRAVENOUS; SUBCUTANEOUS at 14:41

## 2023-02-28 RX ADMIN — GABAPENTIN 100 MG: 100 CAPSULE ORAL at 17:32

## 2023-02-28 RX ADMIN — LATANOPROST 1 DROP: 50 SOLUTION OPHTHALMIC at 17:37

## 2023-02-28 RX ADMIN — MINERAL OIL, PETROLATUM 1 APPLICATION: 425; 573 OINTMENT OPHTHALMIC at 15:43

## 2023-02-28 RX ADMIN — MORPHINE SULFATE 4 MG: 4 INJECTION INTRAVENOUS at 01:24

## 2023-02-28 RX ADMIN — SODIUM HYPOCHLORITE 1 ML: 1.25 SOLUTION TOPICAL at 11:30

## 2023-02-28 RX ADMIN — Medication 220 MG: at 05:05

## 2023-02-28 RX ADMIN — HEPARIN SODIUM 5000 UNITS: 5000 INJECTION, SOLUTION INTRAVENOUS; SUBCUTANEOUS at 05:06

## 2023-02-28 RX ADMIN — MINERAL OIL, PETROLATUM 1 APPLICATION: 425; 573 OINTMENT OPHTHALMIC at 05:11

## 2023-02-28 RX ADMIN — MINERAL OIL, PETROLATUM 1 APPLICATION: 425; 573 OINTMENT OPHTHALMIC at 20:41

## 2023-02-28 RX ADMIN — CARVEDILOL 6.25 MG: 6.25 TABLET, FILM COATED ORAL at 17:32

## 2023-02-28 RX ADMIN — DORZOLAMIDE HYDROCHLORIDE AND TIMOLOL MALEATE 1 DROP: 20; 5 SOLUTION/ DROPS OPHTHALMIC at 05:09

## 2023-02-28 RX ADMIN — INSULIN HUMAN 2 UNITS: 100 INJECTION, SOLUTION PARENTERAL at 17:40

## 2023-02-28 RX ADMIN — CARVEDILOL 6.25 MG: 6.25 TABLET, FILM COATED ORAL at 08:34

## 2023-02-28 RX ADMIN — OXYCODONE AND ACETAMINOPHEN 1 TABLET: 10; 325 TABLET ORAL at 15:43

## 2023-02-28 RX ADMIN — OXYCODONE AND ACETAMINOPHEN 1 TABLET: 10; 325 TABLET ORAL at 20:39

## 2023-02-28 RX ADMIN — POLYETHYLENE GLYCOL 3350 1 PACKET: 17 POWDER, FOR SOLUTION ORAL at 05:05

## 2023-02-28 RX ADMIN — DORZOLAMIDE HYDROCHLORIDE AND TIMOLOL MALEATE 1 DROP: 20; 5 SOLUTION/ DROPS OPHTHALMIC at 17:34

## 2023-02-28 RX ADMIN — TIMOLOL MALEATE 1 DROP: 5 SOLUTION OPHTHALMIC at 17:33

## 2023-02-28 RX ADMIN — SODIUM HYPOCHLORITE 50 ML: 1.25 SOLUTION TOPICAL at 02:00

## 2023-02-28 RX ADMIN — DOCUSATE SODIUM 50 MG AND SENNOSIDES 8.6 MG 2 TABLET: 8.6; 5 TABLET, FILM COATED ORAL at 05:05

## 2023-02-28 RX ADMIN — HEPARIN SODIUM 3800 UNITS: 1000 INJECTION, SOLUTION INTRAVENOUS; SUBCUTANEOUS at 15:11

## 2023-02-28 RX ADMIN — HEPARIN SODIUM 5000 UNITS: 5000 INJECTION, SOLUTION INTRAVENOUS; SUBCUTANEOUS at 15:43

## 2023-02-28 RX ADMIN — ACETAZOLAMIDE EXTENDED-RELEASE 500 MG: 500 CAPSULE ORAL at 05:08

## 2023-02-28 RX ADMIN — AMLODIPINE BESYLATE 10 MG: 10 TABLET ORAL at 05:05

## 2023-02-28 RX ADMIN — PREDNISOLONE SODIUM PHOSPHATE 1 DROP: 10 SOLUTION/ DROPS OPHTHALMIC at 08:35

## 2023-02-28 ASSESSMENT — PAIN DESCRIPTION - PAIN TYPE
TYPE: ACUTE PAIN

## 2023-02-28 ASSESSMENT — ENCOUNTER SYMPTOMS
HEARTBURN: 0
NECK PAIN: 0
CHILLS: 0
SPEECH CHANGE: 0
MYALGIAS: 1
ABDOMINAL PAIN: 0
SHORTNESS OF BREATH: 0
DIZZINESS: 1
VOMITING: 0
SORE THROAT: 0
DIAPHORESIS: 0
PALPITATIONS: 0
FOCAL WEAKNESS: 0
HEADACHES: 0
BLURRED VISION: 1
WHEEZING: 0
FEVER: 0
BACK PAIN: 0
FLANK PAIN: 0
NAUSEA: 1
COUGH: 0
SENSORY CHANGE: 0
WEAKNESS: 1
EYE REDNESS: 1
DIARRHEA: 0
NERVOUS/ANXIOUS: 0

## 2023-02-28 NOTE — DISCHARGE PLANNING
Medical Social Work  SW spoke to patient about his discharge plan, as it appears patient wanting to move in with his roommate who lives in [facility].    Roommate verified that patient can move in with him if he wants to.  SW informed patient that while where he lives is his decision, there are no Dialysis Centers in [place] and the closet would be in [place]. Patient asked how far away is [place], KASSIE stated it is about a 2 hour drive maybe more.  Patient became visible upset, stating he doesn't have anywhere else to go, and closing eyes and began to hit his head with the palms of his head.

## 2023-02-28 NOTE — PROGRESS NOTES
[facility] Progress Note      HD today x 3 hours per Dr. Simon.   Initiated at 1206 and ended at 1506.     Assessment:    Patient stable, alert and oriented. Not in distress.      Access used: L chest Permacath, clean dry intact.      Net Fluid Removed: 2,000 mL      Procedure Events/ Complications:     Patient stable, alert and oriented x 4. Tolerated treatment. Patient complained of leg pain 10/10. PCN Lalit aware.     Post Access Care:   Blood returned. Flushed with NS.  CVC locked with Heparin 1000 units/ mL   Arterial port:  1.9 mL   Venous port:  1.9 mL  Clamped, capped and secured. Labeled   Dressing change: Yes / Due on         Report given to Primary soto Tran RN.

## 2023-02-28 NOTE — PROGRESS NOTES
"[facility] -  PROGRESS NOTE               Author: Mookie Simon M.D. Date & Time: 2/28/2023  10:09 AM     HPI:  Patient is a 47 year old male with a PMHx of ESRD on HD qTTS, meth user and history of calciphylaxis here for leg pain.  States he's still using meth.  Last HD session was about 4-5 days ago.  Here potassium was 7.6 and bicarb of 7.  Nephrology consulted for maintenance dialysis.  Currently complaining of leg pain.  History of nonadherence.    DAILY NEPHROLOGY SUMMARY:  2/23 - Pain controlled.  Denies nausea.  No SOB  Had HD yesterday  2/24 - Denies pain or SOB.  Laying in bed comfortable  2/25 - 2L Net UF. NPO for OR this am w/Opth for retinal detachment. Reports that he is hungry. 1200 mL UOP last 24 hrs.   2/26: 2.5L Net UF. Bps better. Sitting at side of bed eating breakfast. States that he wants HD chair set up here. Unable to drive to [place] to HD clinic d/t vision.   2/27: Pt sitting up in bed eating breakfast, no overnight events, VSS on RA, worked with OT this AM and cleared for DC, waiting on DC plan  2/28: Pt confused today    REVIEW OF SYSTEMS:    10 point ROS rnot completed second to mentals tatus    PMH/PSH/SH/FH:   Reviewed and unchanged since admission note    CURRENT MEDICATIONS:   Reviewed from admission to present day    VS:  /66   Pulse 77   Temp 37.3 °C (99.1 °F) (Temporal)   Resp 18   Ht 1.803 m (5' 10.98\")   Wt 90 kg (198 lb 6.6 oz)   SpO2 96%   BMI 27.69 kg/m²     Physical Exam  Nursing note reviewed.   Constitutional:       Appearance: Normal appearance.   Eyes:      General: No scleral icterus.     Comments: R eye erythema   Cardiovascular:      Comments: No edema  LFA AVG  LIJ TDC  Pulmonary:      Effort: Pulmonary effort is normal.   Abdominal:      General: There is no distension.   Musculoskeletal:         General: No deformity.   Skin:     Findings: No rash.      Comments: Chronic wounds b/l thighs/abd   Neurological:      " Mental Status: He is alert.   Psychiatric:         Behavior: Behavior is cooperative.        Fluids:  In: 480 [P.O.:480]  Out: -     LABS:  Recent Labs     02/25/23  1116 02/28/23  0117   SODIUM 134* 132*   POTASSIUM 5.2 5.5   CHLORIDE 98 97   CO2 19* 21   GLUCOSE 83 130*   BUN 53* 69*   CREATININE 7.03* 8.27*   CALCIUM 7.6* 7.8*         IMAGING:   All imaging reviewed from admission to present day    IMPRESSION:  # ESRD  - Etiology likely 2/2 DM/HTN  - HD qTTHS via LIJ TDC  - [facility] HD chair at [facility] previously  # Hyperkalemia, corrected with HD             - s/p stat HD   # LFA AVG thrombosed  - US 2/23 no flow  - s/p OR 2/25 AVG Brachio-axillary Creation, thrombectomy and fistulogram   - Stenosis and wall thrombus noted on US, LIJ TDC in use  # HTN, fair control     # Anemia of CKD, below target  - Goal Ggb 10-11  - Ferritin 2722, %sat 41  # CKD-MBD  - Ca 7.6  - iCa 1.0  - Check phos  # Calciphylaxis hx  - Per report from patient  # Generalized weakness, improved   - Evaluated with PT/OT  # h/o Methamphetamine use   # Homelessness  # Encephalopthy ? Meds vs infection  # DM   # Chronic wounds b/l thighs/abd   - Wound care following   # Conjunctivitis/End Stage Glaucoma  - Diamox  - Latanoprost, Combigan, Cosopy, Preds Forte       PLAN:  - iHD today (TUES) and qTTS/PRN  - UF as tolerated   - Decreased neurontin second to mental status  - FU Opthalm Recs  - Has been on high Ca bath with HD, but trying to avoid given hx   - Use LIJ PermCath for now  - AVG eval as OP at    - JACKEI with HD to goal Hgb 10-11  - No dietary protein restrictions  - Dose all meds per ESRD/iHD  - Renal diet, low salt diet, fluid restriction to 1.5L daily  - SW support for placement. Requesting local HD chair. Hx of non adherence to HD.   - Can transition to Outpt HD clinic when medically cleared and chair secured  - FU labs/MBD metrics in AM  - Wound care    Thank you,

## 2023-02-28 NOTE — PROGRESS NOTES
Irritable with questioning, bed alarm in place. Sleepy but arouses .  Dressing changed as ordered with iv morphine prior

## 2023-02-28 NOTE — ASSESSMENT & PLAN NOTE
Continue carvedilol no ACE inhibitor or spironolactone given hyperkalemia  Fluid management with hemodialysis  Add aspirin and statin  Outpatient follow-up with cardiology

## 2023-02-28 NOTE — CARE PLAN
The patient is Stable - Low risk of patient condition declining or worsening    Shift Goals  Clinical Goals: wound care, pain management, dialysis  Patient Goals: Pain control  Family Goals: donis    Progress made toward(s) clinical / shift goals:    Problem: Pain - Standard  Goal: Alleviation of pain or a reduction in pain to the patient’s comfort goal  Outcome: Progressing  PRN pain medications administered per MAR.     Problem: Skin Integrity  Goal: Skin integrity is maintained or improved  Outcome: Progressing  Qshift dressing change completed per orders. Patient encouraged to turn and reposition frequently.          Previously Declined (within the last year)

## 2023-02-28 NOTE — WOUND TEAM
Wound consult placed for bilateral toes. Pt is on follow up with wound care for Wednesday. Wound consult completed. Will follow up for toes on Wednesday 3/1/23

## 2023-02-28 NOTE — PROGRESS NOTES
Received report from night shift RN. Assumed patient care at 0715. Assessment completed. A&OX3. Bilateral thigh dressings in place. Patient updated on POC for dialysis today.     Bed in lowest position, bed locked, bed alarm on for safety, treaded socks in place, RN and CNA numbers provided, call light within reach. Hourly rounding in place.

## 2023-02-28 NOTE — PROGRESS NOTES
[facility] Medicine Daily Progress Note    Date of Service  2/28/2023    Chief Complaint  Ambrose Watkins is a 47 y.o. male admitted 2/22/2023 with hyperkalemia.    Hospital Course  Admitted with hyperkalemia and volume overload.  Patient with known history of end-stage renal disease on hemodialysis with known history of noncompliance. Nephrology was consulted on the case.  Patient had emergent hemodialysis.  Patient was also noted to have redness of the right eye.  Ophthalmology was consulted on the case.  Patient was noted to have glaucoma and vitreous hemorrhage of the right eye.  Ophthalmic drops were started.    Interval Problem Update  HTN - sbp 113-125  Diabetes - -154    I have discussed this patient's plan of care and discharge plan at IDT rounds today with Case Management, Nursing, Nursing leadership, and other members of the IDT team.    Consultants/Specialty  nephrology and ophthalmology    Code Status  Full Code    Disposition  Patient is not medically cleared for discharge.   Anticipate discharge to to home with close outpatient follow-up.  I have placed the appropriate orders for post-discharge needs.    Review of Systems  Review of Systems   Constitutional:  Positive for malaise/fatigue. Negative for chills, diaphoresis and fever.   HENT:  Negative for congestion, hearing loss and sore throat.    Eyes:  Positive for blurred vision and redness.   Respiratory:  Negative for cough, shortness of breath and wheezing.    Cardiovascular:  Positive for leg swelling. Negative for chest pain and palpitations.   Gastrointestinal:  Positive for nausea. Negative for abdominal pain, diarrhea, heartburn and vomiting.   Genitourinary:  Negative for dysuria, flank pain and hematuria.   Musculoskeletal:  Positive for myalgias. Negative for back pain, joint pain and neck pain.   Skin:  Negative for rash.   Neurological:  Positive for dizziness and weakness. Negative for sensory change, speech change, focal  weakness and headaches.   Psychiatric/Behavioral:  The patient is not nervous/anxious.       Physical Exam  Temp:  [36.7 °C (98.1 °F)-37.3 °C (99.1 °F)] 37.3 °C (99.1 °F)  Pulse:  [77-87] 77  Resp:  [17-18] 18  BP: (113-125)/(66-76) 114/66  SpO2:  [96 %-100 %] 96 %    Physical Exam  Vitals and nursing note reviewed.   HENT:      Head: Normocephalic and atraumatic.      Nose: No congestion.      Mouth/Throat:      Mouth: Mucous membranes are moist.   Eyes:      Conjunctiva/sclera:      Right eye: Right conjunctiva is injected. Hemorrhage present.   Cardiovascular:      Rate and Rhythm: Normal rate and regular rhythm.      Heart sounds: Murmur heard.   Pulmonary:      Effort: Pulmonary effort is normal.   Abdominal:      General: There is no distension.      Tenderness: There is no abdominal tenderness. There is no guarding or rebound.   Musculoskeletal:      Cervical back: No tenderness.      Right lower leg: Edema present.      Left lower leg: Edema present.   Skin:     Comments: Open wounds at both lower extremities   Neurological:      General: No focal deficit present.      Mental Status: He is alert and oriented to person, place, and time.      Cranial Nerves: No cranial nerve deficit.   Psychiatric:         Speech: Speech is delayed.         Cognition and Memory: He exhibits impaired recent memory.       Fluids    Intake/Output Summary (Last 24 hours) at 2/28/2023 1348  Last data filed at 2/28/2023 0930  Gross per 24 hour   Intake 480 ml   Output --   Net 480 ml       Laboratory      Recent Labs     02/28/23  0117   SODIUM 132*   POTASSIUM 5.5   CHLORIDE 97   CO2 21   GLUCOSE 130*   BUN 69*   CREATININE 8.27*   CALCIUM 7.8*                   Imaging  [id number] W/O PLUS RECONS   Final Result      1.  Increased density throughout the right lobe is nonspecific though may suggest underlying hemorrhage with possible retinal detachment.   2.  No post septal fluid collection.           Assessment/Plan  *  Hyperkalemia- (present on admission)  Assessment & Plan  Follow bmp    Noncompliance of patient with renal dialysis (HCC)- (present on admission)  Assessment & Plan  Education and counseling    End stage renal disease (HCC)- (present on admission)  Assessment & Plan  HD per Nephrology    Pulmonary HTN ([facility])- (present on admission)  Assessment & Plan  Monitor volume status    Cardiomyopathy (HCC)- (present on admission)  Assessment & Plan  Coreg    Chronic HFrEF (heart failure with reduced ejection fraction) ([facility])- (present on admission)  Assessment & Plan  Coreg    Right eye glaucoma, due to angle-closure and phacomorphic components- (present on admission)  Assessment & Plan  Ophthalmology - probable end-stage glaucoma from angle-closure and phacomorphic components.  Latanoprost, Timolol, Brimonidine, Cosopt, Prednisone forte ophthalmic drops and Diamox  Follow up with Ophthalmology as outpatient    Vitreous hemorrhage of right eye (HCC)- (present on admission)  Assessment & Plan  Likely due to glaucoma    Open wound- (present on admission)  Assessment & Plan  Chronic  Wound care    Anemia, chronic disease- (present on admission)  Assessment & Plan  Epogen  Follow cbc    Hypocalcemia- (present on admission)  Assessment & Plan  Follow bmp    Methamphetamine abuse (HCC)- (present on admission)  Assessment & Plan  Check UDS    Hypertension- (present on admission)  Assessment & Plan  Norvasc, Coreg    Type 2 diabetes mellitus with kidney complication, without long-term current use of insulin ([facility])- (present on admission)  Assessment & Plan  SSI    Tobacco dependence- (present on admission)  Assessment & Plan  Refused Nicotine replacement protocol         VTE prophylaxis: heparin ppx    I have performed a physical exam and reviewed and updated ROS and Plan today (2/28/2023). In review of yesterday's note (2/27/2023), there are no changes except as documented above.

## 2023-03-01 LAB
ANION GAP SERPL CALC-SCNC: 13 MMOL/L (ref 7–16)
BUN SERPL-MCNC: 57 MG/DL (ref 8–22)
CALCIUM SERPL-MCNC: 8.2 MG/DL (ref 8.5–10.5)
CHLORIDE SERPL-SCNC: 98 MMOL/L (ref 96–112)
CO2 SERPL-SCNC: 23 MMOL/L (ref 20–33)
CREAT SERPL-MCNC: 7.54 MG/DL (ref 0.5–1.4)
ERYTHROCYTE [DISTWIDTH] IN BLOOD BY AUTOMATED COUNT: 64.9 FL (ref 35.9–50)
EST. AVERAGE GLUCOSE BLD GHB EST-MCNC: 123 MG/DL
GFR SERPLBLD CREATININE-BSD FMLA CKD-EPI: 8 ML/MIN/1.73 M 2
GLUCOSE BLD STRIP.AUTO-MCNC: 114 MG/DL (ref 65–99)
GLUCOSE BLD STRIP.AUTO-MCNC: 120 MG/DL (ref 65–99)
GLUCOSE BLD STRIP.AUTO-MCNC: 129 MG/DL (ref 65–99)
GLUCOSE BLD STRIP.AUTO-MCNC: 150 MG/DL (ref 65–99)
GLUCOSE SERPL-MCNC: 122 MG/DL (ref 65–99)
HBA1C MFR BLD: 5.9 % (ref 4–5.6)
HCT VFR BLD AUTO: 29.8 % (ref 42–52)
HGB BLD-MCNC: 9.2 G/DL (ref 14–18)
MCH RBC QN AUTO: 28.8 PG (ref 27–33)
MCHC RBC AUTO-ENTMCNC: 30.9 G/DL (ref 33.7–35.3)
MCV RBC AUTO: 93.1 FL (ref 81.4–97.8)
PLATELET # BLD AUTO: 110 K/UL (ref 164–446)
PMV BLD AUTO: 10.4 FL (ref 9–12.9)
POTASSIUM SERPL-SCNC: 4.7 MMOL/L (ref 3.6–5.5)
RBC # BLD AUTO: 3.2 M/UL (ref 4.7–6.1)
SODIUM SERPL-SCNC: 134 MMOL/L (ref 135–145)
WBC # BLD AUTO: 7.2 K/UL (ref 4.8–10.8)

## 2023-03-01 PROCEDURE — A9270 NON-COVERED ITEM OR SERVICE: HCPCS | Performed by: INTERNAL MEDICINE

## 2023-03-01 PROCEDURE — 700111 HCHG RX REV CODE 636 W/ 250 OVERRIDE (IP): Performed by: HOSPITALIST

## 2023-03-01 PROCEDURE — 770006 HCHG ROOM/CARE - MED/SURG/GYN SEMI*

## 2023-03-01 PROCEDURE — 700111 HCHG RX REV CODE 636 W/ 250 OVERRIDE (IP)

## 2023-03-01 PROCEDURE — 85027 COMPLETE CBC AUTOMATED: CPT

## 2023-03-01 PROCEDURE — 80048 BASIC METABOLIC PNL TOTAL CA: CPT

## 2023-03-01 PROCEDURE — 700102 HCHG RX REV CODE 250 W/ 637 OVERRIDE(OP): Performed by: HOSPITALIST

## 2023-03-01 PROCEDURE — A9270 NON-COVERED ITEM OR SERVICE: HCPCS | Performed by: NURSE PRACTITIONER

## 2023-03-01 PROCEDURE — 700102 HCHG RX REV CODE 250 W/ 637 OVERRIDE(OP): Performed by: NURSE PRACTITIONER

## 2023-03-01 PROCEDURE — 99232 SBSQ HOSP IP/OBS MODERATE 35: CPT | Performed by: FAMILY MEDICINE

## 2023-03-01 PROCEDURE — A9270 NON-COVERED ITEM OR SERVICE: HCPCS | Performed by: HOSPITALIST

## 2023-03-01 PROCEDURE — 36415 COLL VENOUS BLD VENIPUNCTURE: CPT

## 2023-03-01 PROCEDURE — 700102 HCHG RX REV CODE 250 W/ 637 OVERRIDE(OP): Performed by: INTERNAL MEDICINE

## 2023-03-01 PROCEDURE — 83036 HEMOGLOBIN GLYCOSYLATED A1C: CPT

## 2023-03-01 PROCEDURE — 82962 GLUCOSE BLOOD TEST: CPT | Mod: 91

## 2023-03-01 PROCEDURE — 97602 WOUND(S) CARE NON-SELECTIVE: CPT

## 2023-03-01 RX ADMIN — DORZOLAMIDE HYDROCHLORIDE AND TIMOLOL MALEATE 1 DROP: 20; 5 SOLUTION/ DROPS OPHTHALMIC at 18:17

## 2023-03-01 RX ADMIN — MINERAL OIL, PETROLATUM 1 APPLICATION: 425; 573 OINTMENT OPHTHALMIC at 04:20

## 2023-03-01 RX ADMIN — MORPHINE SULFATE 4 MG: 4 INJECTION INTRAVENOUS at 15:32

## 2023-03-01 RX ADMIN — OXYCODONE AND ACETAMINOPHEN 1 TABLET: 10; 325 TABLET ORAL at 13:12

## 2023-03-01 RX ADMIN — HEPARIN SODIUM 5000 UNITS: 5000 INJECTION, SOLUTION INTRAVENOUS; SUBCUTANEOUS at 12:11

## 2023-03-01 RX ADMIN — PREDNISOLONE SODIUM PHOSPHATE 1 DROP: 10 SOLUTION/ DROPS OPHTHALMIC at 20:37

## 2023-03-01 RX ADMIN — HEPARIN SODIUM 5000 UNITS: 5000 INJECTION, SOLUTION INTRAVENOUS; SUBCUTANEOUS at 20:37

## 2023-03-01 RX ADMIN — GABAPENTIN 100 MG: 100 CAPSULE ORAL at 18:08

## 2023-03-01 RX ADMIN — AMLODIPINE BESYLATE 10 MG: 10 TABLET ORAL at 04:16

## 2023-03-01 RX ADMIN — BRIMONIDINE TARTRATE 1 DROP: 2 SOLUTION OPHTHALMIC at 18:16

## 2023-03-01 RX ADMIN — MINERAL OIL, PETROLATUM 1 APPLICATION: 425; 573 OINTMENT OPHTHALMIC at 20:37

## 2023-03-01 RX ADMIN — Medication 500 MG: at 04:16

## 2023-03-01 RX ADMIN — PREDNISOLONE SODIUM PHOSPHATE 1 DROP: 10 SOLUTION/ DROPS OPHTHALMIC at 08:13

## 2023-03-01 RX ADMIN — PREDNISOLONE SODIUM PHOSPHATE 1 DROP: 10 SOLUTION/ DROPS OPHTHALMIC at 12:11

## 2023-03-01 RX ADMIN — HEPARIN SODIUM 5000 UNITS: 5000 INJECTION, SOLUTION INTRAVENOUS; SUBCUTANEOUS at 04:16

## 2023-03-01 RX ADMIN — MINERAL OIL, PETROLATUM 1 APPLICATION: 425; 573 OINTMENT OPHTHALMIC at 13:12

## 2023-03-01 RX ADMIN — Medication 220 MG: at 04:16

## 2023-03-01 RX ADMIN — DORZOLAMIDE HYDROCHLORIDE AND TIMOLOL MALEATE 1 DROP: 20; 5 SOLUTION/ DROPS OPHTHALMIC at 04:17

## 2023-03-01 RX ADMIN — LATANOPROST 1 DROP: 50 SOLUTION OPHTHALMIC at 18:16

## 2023-03-01 RX ADMIN — DOCUSATE SODIUM 50 MG AND SENNOSIDES 8.6 MG 2 TABLET: 8.6; 5 TABLET, FILM COATED ORAL at 18:07

## 2023-03-01 RX ADMIN — OXYCODONE AND ACETAMINOPHEN 1 TABLET: 10; 325 TABLET ORAL at 04:22

## 2023-03-01 RX ADMIN — TIMOLOL MALEATE 1 DROP: 5 SOLUTION OPHTHALMIC at 04:19

## 2023-03-01 RX ADMIN — ACETAZOLAMIDE EXTENDED-RELEASE 500 MG: 500 CAPSULE ORAL at 18:09

## 2023-03-01 RX ADMIN — DOCUSATE SODIUM 50 MG AND SENNOSIDES 8.6 MG 2 TABLET: 8.6; 5 TABLET, FILM COATED ORAL at 04:16

## 2023-03-01 RX ADMIN — SODIUM HYPOCHLORITE 50 ML: 1.25 SOLUTION TOPICAL at 00:00

## 2023-03-01 RX ADMIN — ACETAZOLAMIDE EXTENDED-RELEASE 500 MG: 500 CAPSULE ORAL at 04:16

## 2023-03-01 RX ADMIN — BRIMONIDINE TARTRATE 1 DROP: 2 SOLUTION OPHTHALMIC at 04:16

## 2023-03-01 RX ADMIN — TIMOLOL MALEATE 1 DROP: 5 SOLUTION OPHTHALMIC at 18:11

## 2023-03-01 RX ADMIN — PREDNISOLONE SODIUM PHOSPHATE 1 DROP: 10 SOLUTION/ DROPS OPHTHALMIC at 18:05

## 2023-03-01 RX ADMIN — CARVEDILOL 6.25 MG: 6.25 TABLET, FILM COATED ORAL at 08:30

## 2023-03-01 ASSESSMENT — PAIN DESCRIPTION - PAIN TYPE
TYPE: ACUTE PAIN
TYPE: ACUTE PAIN

## 2023-03-01 ASSESSMENT — ENCOUNTER SYMPTOMS
FOCAL WEAKNESS: 0
SHORTNESS OF BREATH: 0
BACK PAIN: 0
EYE REDNESS: 1
MYALGIAS: 1
DIZZINESS: 1
DIAPHORESIS: 0
PALPITATIONS: 0
BLURRED VISION: 1
DIARRHEA: 0
WEAKNESS: 1
FEVER: 0
FLANK PAIN: 0
VOMITING: 0
COUGH: 0
WHEEZING: 0
SPEECH CHANGE: 0
HEARTBURN: 0
CHILLS: 0
NAUSEA: 1
ABDOMINAL PAIN: 0
NERVOUS/ANXIOUS: 0
NECK PAIN: 0
SORE THROAT: 0
HEADACHES: 0
SENSORY CHANGE: 0

## 2023-03-01 NOTE — PROGRESS NOTES
Patient educated that a urine sample is needed next time he voids.  Urine cup placed at bedside. Patient verbalizes understanding.

## 2023-03-01 NOTE — PROGRESS NOTES
Sleepy but arouses, when he wakes he wants snacks and pain medication. Encourage to turn to allieve pressure to sacral area    Bilat thigh dressings changed as ordered; drainage noted to have green drainage; no new orders received from on call but to follow up with day team

## 2023-03-01 NOTE — DISCHARGE PLANNING
Medical Social Work  PC from Dialysis Liaison, returning SW call. Stated patient is still pending, having a difficult time due to numerous no shows at [facility] and when he was at the [facility].

## 2023-03-01 NOTE — PROGRESS NOTES
"[facility] -  PROGRESS NOTE               Author: CARLA Patel Date & Time: 3/1/2023  11:38 AM     HPI:  Patient is a 47 year old male with a PMHx of ESRD on HD qTTS, meth user and history of calciphylaxis here for leg pain.  States he's still using meth.  Last HD session was about 4-5 days ago.  Here potassium was 7.6 and bicarb of 7.  Nephrology consulted for maintenance dialysis.  Currently complaining of leg pain.  History of nonadherence.    DAILY NEPHROLOGY SUMMARY:  2/23 - Pain controlled.  Denies nausea.  No SOB  Had HD yesterday  2/24 - Denies pain or SOB.  Laying in bed comfortable  2/25 - 2L Net UF. NPO for OR this am w/Opth for retinal detachment. Reports that he is hungry. 1200 mL UOP last 24 hrs.   2/26: 2.5L Net UF. Bps better. Sitting at side of bed eating breakfast. States that he wants HD chair set up here. Unable to drive to [place] to HD clinic d/t vision.   2/27: Pt sitting up in bed eating breakfast, no overnight events, VSS on RA, worked with OT this AM and cleared for DC, waiting on DC plan  2/28: Pt confused today  3/01: HD yest, UF 2L. VSS. Sitting up in bed holding his head \"I hurt all over\". Vague about whether medication is helping his pain.     REVIEW OF SYSTEMS:    10 point ROS rnot completed second to mentals tat    PMH/PSH/SH/FH:   Reviewed and unchanged since admission note    CURRENT MEDICATIONS:   Reviewed from admission to present day    VS:  /74   Pulse 84   Temp 36.6 °C (97.8 °F) (Temporal)   Resp 17   Ht 1.803 m (5' 10.98\")   Wt 90 kg (198 lb 6.6 oz)   SpO2 94%   BMI 27.69 kg/m²     Physical Exam  Nursing note reviewed.   Constitutional:       Appearance: Normal appearance.   Eyes:      General: No scleral icterus.     Comments: R eye erythema   Cardiovascular:      Comments: No edema  LFA AVG  [facility] TDC  Pulmonary:      Effort: Pulmonary effort is normal.   Abdominal:      General: There is no distension. "   Musculoskeletal:         General: No deformity.   Skin:     Findings: No rash.      Comments: Chronic wounds b/l thighs/abd   Neurological:      Mental Status: He is alert.   Psychiatric:         Behavior: Behavior is cooperative.        Fluids:  In: 1220 [P.O.:720; Dialysis:500]  Out: 2500     LABS:  Recent Labs     02/28/23  0117   SODIUM 132*   POTASSIUM 5.5   CHLORIDE 97   CO2 21   GLUCOSE 130*   BUN 69*   CREATININE 8.27*   CALCIUM 7.8*         IMAGING:   All imaging reviewed from admission to present day    IMPRESSION:  # ESRD  - Etiology likely 2/2 DM/HTN  - HD qTTHS via [facility] TDC  - Lost his HD chair at [facility] previously  # Hyperkalemia, corrected with HD             - s/p stat HD   # LFA AVG thrombosed  - US 2/23 no flow  - s/p OR 2/25 AVG Brachio-axillary Creation, thrombectomy and fistulogram   - Stenosis and wall thrombus noted on US, [facility] TDC in use  # HTN, fair control     # Anemia of CKD, below target  - Goal Ggb 10-11  - Ferritin 2722, %sat 41  # CKD-MBD  - Ca 7.6  - iCa 1.0  - Check phos  # Calciphylaxis hx  - Per report from patient  # Generalized weakness, improved   - Evaluated with PT/OT  # h/o Methamphetamine use   # Homelessness  # Encephalopthy ? Meds vs infection  # DM   # Chronic wounds b/l thighs/abd   - Wound care following   # Conjunctivitis/End Stage Glaucoma  - Diamox  - Latanoprost, Combigan, Cosopy, Preds Forte       PLAN:  - No iHD today (Wed)   - Continue iHD qTTS/PRN  - UF as tolerated   - Decreased neurontin second to mental status  - FU Opthalm Recs  - Has been on high Ca bath with HD, but trying to avoid given hx   - Use [facility] PermCath for now  - AVG eval as OP at    - JACKIE with HD to goal Hgb 10-11  - No dietary protein restrictions  - Dose all meds per ESRD/iHD  - Renal diet, low salt diet, fluid restriction to 1.5L daily  - SW support for placement. Requesting local HD chair. Hx of non adherence to HD.   - Can transition to Outpt HD clinic when medically cleared  and chair secured  - Labs with further recommendations to follow  - Wound care    Thank you,

## 2023-03-01 NOTE — PROGRESS NOTES
[facility] Medicine Daily Progress Note    Date of Service  3/1/2023    Chief Complaint  Ambrose Watkins is a 47 y.o. male admitted 2/22/2023 with hyperkalemia.    Hospital Course  Admitted with hyperkalemia and volume overload.  Patient with known history of end-stage renal disease on hemodialysis with known history of noncompliance. Nephrology was consulted on the case.  Patient had emergent hemodialysis.  Patient was also noted to have redness of the right eye.  Ophthalmology was consulted on the case.  Patient was noted to have glaucoma and vitreous hemorrhage of the right eye.  Ophthalmic drops were started.    Interval Problem Update  HTN - sbp 109-134  Diabetes - -136    I have discussed this patient's plan of care and discharge plan at IDT rounds today with Case Management, Nursing, Nursing leadership, and other members of the IDT team.    Consultants/Specialty  nephrology and ophthalmology    Code Status  Full Code    Disposition  Patient is medically cleared for discharge.   Anticipate discharge to to home with close outpatient follow-up.  I have placed the appropriate orders for post-discharge needs.    Review of Systems  Review of Systems   Constitutional:  Positive for malaise/fatigue. Negative for chills, diaphoresis and fever.   HENT:  Negative for congestion, hearing loss and sore throat.    Eyes:  Positive for blurred vision and redness.   Respiratory:  Negative for cough, shortness of breath and wheezing.    Cardiovascular:  Positive for leg swelling. Negative for chest pain and palpitations.   Gastrointestinal:  Positive for nausea. Negative for abdominal pain, diarrhea, heartburn and vomiting.   Genitourinary:  Negative for dysuria, flank pain and hematuria.   Musculoskeletal:  Positive for myalgias. Negative for back pain, joint pain and neck pain.   Skin:  Negative for rash.   Neurological:  Positive for dizziness and weakness. Negative for sensory change, speech change, focal weakness  and headaches.   Psychiatric/Behavioral:  The patient is not nervous/anxious.       Physical Exam  Temp:  [36.1 °C (96.9 °F)-37.3 °C (99.2 °F)] 36.6 °C (97.8 °F)  Pulse:  [84-88] 84  Resp:  [17-20] 17  BP: (109-134)/(64-79) 124/74  SpO2:  [94 %-98 %] 94 %    Physical Exam  Vitals and nursing note reviewed.   HENT:      Head: Normocephalic and atraumatic.      Nose: No congestion.      Mouth/Throat:      Mouth: Mucous membranes are moist.   Eyes:      Conjunctiva/sclera:      Right eye: Right conjunctiva is injected. Hemorrhage present.   Cardiovascular:      Rate and Rhythm: Normal rate and regular rhythm.      Heart sounds: Murmur heard.   Pulmonary:      Effort: Pulmonary effort is normal.   Abdominal:      General: There is no distension.      Tenderness: There is no abdominal tenderness. There is no guarding or rebound.   Musculoskeletal:      Cervical back: No tenderness.      Right lower leg: Edema present.      Left lower leg: Edema present.   Skin:     Comments: Open wounds at both lower extremities   Neurological:      General: No focal deficit present.      Mental Status: He is alert and oriented to person, place, and time.      Cranial Nerves: No cranial nerve deficit.   Psychiatric:         Speech: Speech is delayed.         Cognition and Memory: He exhibits impaired recent memory.       Fluids    Intake/Output Summary (Last 24 hours) at 3/1/2023 1442  Last data filed at 3/1/2023 1300  Gross per 24 hour   Intake 1220 ml   Output 2500 ml   Net -1280 ml       Laboratory  Recent Labs     03/01/23  1201   WBC 7.2   RBC 3.20*   HEMOGLOBIN 9.2*   HEMATOCRIT 29.8*   MCV 93.1   MCH 28.8   MCHC 30.9*   RDW 64.9*   PLATELETCT 110*   MPV 10.4     Recent Labs     02/28/23  0117 03/01/23  1201   SODIUM 132* 134*   POTASSIUM 5.5 4.7   CHLORIDE 97 98   CO2 21 23   GLUCOSE 130* 122*   BUN 69* 57*   CREATININE 8.27* 7.54*   CALCIUM 7.8* 8.2*                   Imaging  [id number] W/O PLUS RECONS   Final Result       1.  Increased density throughout the right lobe is nonspecific though may suggest underlying hemorrhage with possible retinal detachment.   2.  No post septal fluid collection.           Assessment/Plan  * Hyperkalemia- (present on admission)  Assessment & Plan  Follow bmp    Noncompliance of patient with renal dialysis (HCC)- (present on admission)  Assessment & Plan  Education and counseling    End stage renal disease (HCC)- (present on admission)  Assessment & Plan  HD per Nephrology    Case management for discharge planning    Pulmonary HTN (HCC)- (present on admission)  Assessment & Plan  Monitor volume status    Cardiomyopathy (HCC)- (present on admission)  Assessment & Plan  Coreg    Chronic HFrEF (heart failure with reduced ejection fraction) (HCC)- (present on admission)  Assessment & Plan  Coreg    Right eye glaucoma, due to angle-closure and phacomorphic components- (present on admission)  Assessment & Plan  Ophthalmology - probable end-stage glaucoma from angle-closure and phacomorphic components.  Latanoprost, Timolol, Brimonidine, Cosopt, Prednisone forte ophthalmic drops and Diamox  Follow up with Ophthalmology as outpatient    Vitreous hemorrhage of right eye (HCC)- (present on admission)  Assessment & Plan  Likely due to glaucoma    Open wound- (present on admission)  Assessment & Plan  Chronic  Wound care    Anemia, chronic disease- (present on admission)  Assessment & Plan  Epogen  Follow cbc    Hypocalcemia- (present on admission)  Assessment & Plan  Follow bmp    Methamphetamine abuse (HCC)- (present on admission)  Assessment & Plan  UDS pending    Hypertension- (present on admission)  Assessment & Plan  Norvasc, Coreg    Type 2 diabetes mellitus with kidney complication, without long-term current use of insulin (HCC)- (present on admission)  Assessment & Plan  SSI  hgba1c 5.9    Tobacco dependence- (present on admission)  Assessment & Plan  Refused Nicotine replacement protocol         VTE  prophylaxis: heparin ppx    I have performed a physical exam and reviewed and updated ROS and Plan today (3/1/2023). In review of yesterday's note (2/28/2023), there are no changes except as documented above.

## 2023-03-02 LAB
ANION GAP SERPL CALC-SCNC: 15 MMOL/L (ref 7–16)
BUN SERPL-MCNC: 68 MG/DL (ref 8–22)
CALCIUM SERPL-MCNC: 8.2 MG/DL (ref 8.5–10.5)
CHLORIDE SERPL-SCNC: 97 MMOL/L (ref 96–112)
CO2 SERPL-SCNC: 23 MMOL/L (ref 20–33)
CREAT SERPL-MCNC: 8.15 MG/DL (ref 0.5–1.4)
ERYTHROCYTE [DISTWIDTH] IN BLOOD BY AUTOMATED COUNT: 65.8 FL (ref 35.9–50)
GFR SERPLBLD CREATININE-BSD FMLA CKD-EPI: 8 ML/MIN/1.73 M 2
GLUCOSE BLD STRIP.AUTO-MCNC: 103 MG/DL (ref 65–99)
GLUCOSE BLD STRIP.AUTO-MCNC: 104 MG/DL (ref 65–99)
GLUCOSE BLD STRIP.AUTO-MCNC: 149 MG/DL (ref 65–99)
GLUCOSE SERPL-MCNC: 123 MG/DL (ref 65–99)
HCT VFR BLD AUTO: 30.4 % (ref 42–52)
HGB BLD-MCNC: 9.2 G/DL (ref 14–18)
MCH RBC QN AUTO: 28.7 PG (ref 27–33)
MCHC RBC AUTO-ENTMCNC: 30.3 G/DL (ref 33.7–35.3)
MCV RBC AUTO: 94.7 FL (ref 81.4–97.8)
PLATELET # BLD AUTO: 124 K/UL (ref 164–446)
PMV BLD AUTO: 10.1 FL (ref 9–12.9)
POTASSIUM SERPL-SCNC: 5.3 MMOL/L (ref 3.6–5.5)
RBC # BLD AUTO: 3.21 M/UL (ref 4.7–6.1)
SODIUM SERPL-SCNC: 135 MMOL/L (ref 135–145)
WBC # BLD AUTO: 8.4 K/UL (ref 4.8–10.8)

## 2023-03-02 PROCEDURE — 36415 COLL VENOUS BLD VENIPUNCTURE: CPT

## 2023-03-02 PROCEDURE — 770006 HCHG ROOM/CARE - MED/SURG/GYN SEMI*

## 2023-03-02 PROCEDURE — 700102 HCHG RX REV CODE 250 W/ 637 OVERRIDE(OP): Performed by: FAMILY MEDICINE

## 2023-03-02 PROCEDURE — A9270 NON-COVERED ITEM OR SERVICE: HCPCS | Performed by: HOSPITALIST

## 2023-03-02 PROCEDURE — A9270 NON-COVERED ITEM OR SERVICE: HCPCS | Performed by: INTERNAL MEDICINE

## 2023-03-02 PROCEDURE — A9270 NON-COVERED ITEM OR SERVICE: HCPCS | Performed by: FAMILY MEDICINE

## 2023-03-02 PROCEDURE — 90935 HEMODIALYSIS ONE EVALUATION: CPT

## 2023-03-02 PROCEDURE — 700111 HCHG RX REV CODE 636 W/ 250 OVERRIDE (IP): Performed by: HOSPITALIST

## 2023-03-02 PROCEDURE — 700101 HCHG RX REV CODE 250: Performed by: HOSPITALIST

## 2023-03-02 PROCEDURE — 85027 COMPLETE CBC AUTOMATED: CPT

## 2023-03-02 PROCEDURE — 700102 HCHG RX REV CODE 250 W/ 637 OVERRIDE(OP): Performed by: HOSPITALIST

## 2023-03-02 PROCEDURE — A9270 NON-COVERED ITEM OR SERVICE: HCPCS | Performed by: NURSE PRACTITIONER

## 2023-03-02 PROCEDURE — 82962 GLUCOSE BLOOD TEST: CPT | Mod: 91

## 2023-03-02 PROCEDURE — 700111 HCHG RX REV CODE 636 W/ 250 OVERRIDE (IP)

## 2023-03-02 PROCEDURE — 700102 HCHG RX REV CODE 250 W/ 637 OVERRIDE(OP): Performed by: INTERNAL MEDICINE

## 2023-03-02 PROCEDURE — 99232 SBSQ HOSP IP/OBS MODERATE 35: CPT | Performed by: FAMILY MEDICINE

## 2023-03-02 PROCEDURE — 700111 HCHG RX REV CODE 636 W/ 250 OVERRIDE (IP): Performed by: NURSE PRACTITIONER

## 2023-03-02 PROCEDURE — 80048 BASIC METABOLIC PNL TOTAL CA: CPT

## 2023-03-02 PROCEDURE — 700102 HCHG RX REV CODE 250 W/ 637 OVERRIDE(OP): Performed by: NURSE PRACTITIONER

## 2023-03-02 RX ORDER — OXYCODONE HYDROCHLORIDE 5 MG/1
5 TABLET ORAL EVERY 4 HOURS PRN
Status: DISCONTINUED | OUTPATIENT
Start: 2023-03-02 | End: 2023-03-05

## 2023-03-02 RX ORDER — ACETAMINOPHEN 500 MG
1000 TABLET ORAL 3 TIMES DAILY
Status: DISCONTINUED | OUTPATIENT
Start: 2023-03-02 | End: 2023-03-16 | Stop reason: HOSPADM

## 2023-03-02 RX ORDER — SODIUM HYPOCHLORITE 1.25 MG/ML
SOLUTION TOPICAL 2 TIMES DAILY
Status: DISCONTINUED | OUTPATIENT
Start: 2023-03-03 | End: 2023-03-16 | Stop reason: HOSPADM

## 2023-03-02 RX ORDER — HEPARIN SODIUM 1000 [USP'U]/ML
INJECTION, SOLUTION INTRAVENOUS; SUBCUTANEOUS
Status: COMPLETED
Start: 2023-03-02 | End: 2023-03-02

## 2023-03-02 RX ORDER — CARVEDILOL 3.12 MG/1
3.12 TABLET ORAL 2 TIMES DAILY WITH MEALS
Status: DISCONTINUED | OUTPATIENT
Start: 2023-03-02 | End: 2023-03-16 | Stop reason: HOSPADM

## 2023-03-02 RX ADMIN — PREDNISOLONE SODIUM PHOSPHATE 1 DROP: 10 SOLUTION/ DROPS OPHTHALMIC at 17:33

## 2023-03-02 RX ADMIN — SODIUM BICARBONATE 1000 ML: 1000 POWDER ORAL at 06:00

## 2023-03-02 RX ADMIN — MORPHINE SULFATE 4 MG: 4 INJECTION INTRAVENOUS at 04:46

## 2023-03-02 RX ADMIN — DOCUSATE SODIUM 50 MG AND SENNOSIDES 8.6 MG 2 TABLET: 8.6; 5 TABLET, FILM COATED ORAL at 17:31

## 2023-03-02 RX ADMIN — AMLODIPINE BESYLATE 10 MG: 10 TABLET ORAL at 04:50

## 2023-03-02 RX ADMIN — TIMOLOL MALEATE 1 DROP: 5 SOLUTION OPHTHALMIC at 04:53

## 2023-03-02 RX ADMIN — MINERAL OIL, PETROLATUM 1 APPLICATION: 425; 573 OINTMENT OPHTHALMIC at 13:51

## 2023-03-02 RX ADMIN — PREDNISOLONE SODIUM PHOSPHATE 1 DROP: 10 SOLUTION/ DROPS OPHTHALMIC at 08:14

## 2023-03-02 RX ADMIN — HEPARIN SODIUM 5000 UNITS: 5000 INJECTION, SOLUTION INTRAVENOUS; SUBCUTANEOUS at 04:51

## 2023-03-02 RX ADMIN — MINERAL OIL, PETROLATUM 1 APPLICATION: 425; 573 OINTMENT OPHTHALMIC at 21:03

## 2023-03-02 RX ADMIN — CARVEDILOL 6.25 MG: 6.25 TABLET, FILM COATED ORAL at 08:14

## 2023-03-02 RX ADMIN — EPOETIN ALFA-EPBX 3000 UNITS: 3000 INJECTION, SOLUTION INTRAVENOUS; SUBCUTANEOUS at 11:20

## 2023-03-02 RX ADMIN — MORPHINE SULFATE 4 MG: 4 INJECTION INTRAVENOUS at 22:36

## 2023-03-02 RX ADMIN — BRIMONIDINE TARTRATE 1 DROP: 2 SOLUTION OPHTHALMIC at 21:02

## 2023-03-02 RX ADMIN — TIMOLOL MALEATE 1 DROP: 5 SOLUTION OPHTHALMIC at 17:31

## 2023-03-02 RX ADMIN — ACETAMINOPHEN 1000 MG: 325 TABLET, FILM COATED ORAL at 21:00

## 2023-03-02 RX ADMIN — Medication 220 MG: at 04:50

## 2023-03-02 RX ADMIN — HEPARIN SODIUM 5000 UNITS: 5000 INJECTION, SOLUTION INTRAVENOUS; SUBCUTANEOUS at 13:58

## 2023-03-02 RX ADMIN — DORZOLAMIDE HYDROCHLORIDE AND TIMOLOL MALEATE 1 DROP: 20; 5 SOLUTION/ DROPS OPHTHALMIC at 04:53

## 2023-03-02 RX ADMIN — LATANOPROST 1 DROP: 50 SOLUTION OPHTHALMIC at 17:32

## 2023-03-02 RX ADMIN — HEPARIN SODIUM 3800 UNITS: 1000 INJECTION, SOLUTION INTRAVENOUS; SUBCUTANEOUS at 12:08

## 2023-03-02 RX ADMIN — ACETAMINOPHEN 1000 MG: 325 TABLET, FILM COATED ORAL at 14:58

## 2023-03-02 RX ADMIN — EPOETIN ALFA-EPBX 2000 UNITS: 2000 INJECTION, SOLUTION INTRAVENOUS; SUBCUTANEOUS at 11:20

## 2023-03-02 RX ADMIN — EPOETIN ALFA-EPBX 5000 UNITS: 3000 INJECTION, SOLUTION INTRAVENOUS; SUBCUTANEOUS at 11:20

## 2023-03-02 RX ADMIN — OXYCODONE AND ACETAMINOPHEN 1 TABLET: 10; 325 TABLET ORAL at 17:51

## 2023-03-02 RX ADMIN — PREDNISOLONE SODIUM PHOSPHATE 1 DROP: 10 SOLUTION/ DROPS OPHTHALMIC at 21:02

## 2023-03-02 RX ADMIN — HEPARIN SODIUM 5000 UNITS: 5000 INJECTION, SOLUTION INTRAVENOUS; SUBCUTANEOUS at 21:01

## 2023-03-02 RX ADMIN — GABAPENTIN 100 MG: 100 CAPSULE ORAL at 17:31

## 2023-03-02 RX ADMIN — BRIMONIDINE TARTRATE 1 DROP: 2 SOLUTION OPHTHALMIC at 04:53

## 2023-03-02 RX ADMIN — DORZOLAMIDE HYDROCHLORIDE AND TIMOLOL MALEATE 1 DROP: 20; 5 SOLUTION/ DROPS OPHTHALMIC at 17:59

## 2023-03-02 RX ADMIN — CARVEDILOL 3.12 MG: 3.12 TABLET, FILM COATED ORAL at 17:31

## 2023-03-02 RX ADMIN — ACETAZOLAMIDE EXTENDED-RELEASE 500 MG: 500 CAPSULE ORAL at 04:50

## 2023-03-02 RX ADMIN — ACETAZOLAMIDE EXTENDED-RELEASE 500 MG: 500 CAPSULE ORAL at 17:57

## 2023-03-02 RX ADMIN — PREDNISOLONE SODIUM PHOSPHATE 1 DROP: 10 SOLUTION/ DROPS OPHTHALMIC at 13:50

## 2023-03-02 RX ADMIN — MINERAL OIL, PETROLATUM 1 APPLICATION: 425; 573 OINTMENT OPHTHALMIC at 04:53

## 2023-03-02 RX ADMIN — Medication 500 MG: at 04:50

## 2023-03-02 ASSESSMENT — ENCOUNTER SYMPTOMS
SORE THROAT: 0
MYALGIAS: 1
BACK PAIN: 0
NAUSEA: 1
PALPITATIONS: 0
SHORTNESS OF BREATH: 0
EYE REDNESS: 1
DIARRHEA: 0
HEADACHES: 0
WEAKNESS: 1
VOMITING: 0
FOCAL WEAKNESS: 0
NECK PAIN: 0
BLURRED VISION: 1
FLANK PAIN: 0
CHILLS: 0
COUGH: 0
SENSORY CHANGE: 0
WHEEZING: 0
FEVER: 0
ABDOMINAL PAIN: 0
DIAPHORESIS: 0
DIZZINESS: 1
HEARTBURN: 0
NERVOUS/ANXIOUS: 1
SPEECH CHANGE: 0

## 2023-03-02 ASSESSMENT — PAIN DESCRIPTION - PAIN TYPE
TYPE: ACUTE PAIN

## 2023-03-02 ASSESSMENT — PATIENT HEALTH QUESTIONNAIRE - PHQ9
4. FEELING TIRED OR HAVING LITTLE ENERGY: SEVERAL DAYS
7. TROUBLE CONCENTRATING ON THINGS, SUCH AS READING THE NEWSPAPER OR WATCHING TELEVISION: NOT AT ALL
5. POOR APPETITE OR OVEREATING: NOT AT ALL
1. LITTLE INTEREST OR PLEASURE IN DOING THINGS: SEVERAL DAYS
9. THOUGHTS THAT YOU WOULD BE BETTER OFF DEAD, OR OF HURTING YOURSELF: NOT AT ALL
3. TROUBLE FALLING OR STAYING ASLEEP OR SLEEPING TOO MUCH: NOT AT ALL
SUM OF ALL RESPONSES TO PHQ QUESTIONS 1-9: 4
SUM OF ALL RESPONSES TO PHQ9 QUESTIONS 1 AND 2: 2
8. MOVING OR SPEAKING SO SLOWLY THAT OTHER PEOPLE COULD HAVE NOTICED. OR THE OPPOSITE, BEING SO FIGETY OR RESTLESS THAT YOU HAVE BEEN MOVING AROUND A LOT MORE THAN USUAL: NOT AT ALL
6. FEELING BAD ABOUT YOURSELF - OR THAT YOU ARE A FAILURE OR HAVE LET YOURSELF OR YOUR FAMILY DOWN: SEVERAL DAYS
2. FEELING DOWN, DEPRESSED, IRRITABLE, OR HOPELESS: SEVERAL DAYS

## 2023-03-02 NOTE — PROGRESS NOTES
Pt is A&O x4, RA, ambulatory w/ one person assist. All medications taken and tolerated, no c/o pain at this time. Pt resting in bed w/ call light and belongings in reach, bed alarm on, hourly rounding in place. No additional needs at this time.

## 2023-03-02 NOTE — PROGRESS NOTES
[facility] Services Progress Note    Hemodialysis treatment ordered today per JEAN De La VegaPANTHONY. x 3 hours. Treatment initiated at 0916 and ended at 1216.    Pt A/Ox3, dozing on and off, no signs of discomfort, VSS.    Pt tolerated treatment; see e-flow sheets for details.    Net UF 2,000 mL    Post tx, CVC flushed with saline then locked with heparin 1000 units/mL per designated amount in each wing then clamped and capped. Aspirate heparin prior to next CVC use.    Report given to Primary RN.

## 2023-03-02 NOTE — CARE PLAN
The patient is Stable - Low risk of patient condition declining or worsening    Shift Goals  Clinical Goals: Wound care, pain mgnmt, rest  Patient Goals: Pain mgnmt  Family Goals: donis    Progress made toward(s) clinical / shift goals: Pt wound care administered, no c/o pain at this time.     Problem: Pain - Standard  Goal: Alleviation of pain or a reduction in pain to the patient’s comfort goal  Outcome: Progressing     Problem: Knowledge Deficit - Standard  Goal: Patient and family/care givers will demonstrate understanding of plan of care, disease process/condition, diagnostic tests and medications  Outcome: Progressing     Problem: Skin Integrity  Goal: Skin integrity is maintained or improved  Outcome: Progressing     Problem: Fall Risk  Goal: Patient will remain free from falls  Outcome: Progressing

## 2023-03-02 NOTE — PROGRESS NOTES
[facility] Medicine Daily Progress Note    Date of Service  3/2/2023    Chief Complaint  Ambrose Watkins is a 47 y.o. male admitted 2/22/2023 with hyperkalemia.    Hospital Course  Admitted with hyperkalemia and volume overload.  Patient with known history of end-stage renal disease on hemodialysis with known history of noncompliance. Nephrology was consulted on the case.  Patient had emergent hemodialysis.  Patient was also noted to have redness of the right eye.  Ophthalmology was consulted on the case.  Patient was noted to have glaucoma and vitreous hemorrhage of the right eye.  Ophthalmic drops were started.    Interval Problem Update  HTN - sbp   Diabetes - -150  Open wounds - pain not controlled    I have discussed this patient's plan of care and discharge plan at IDT rounds today with Case Management, Nursing, Nursing leadership, and other members of the IDT team.    Consultants/Specialty  nephrology and ophthalmology    Code Status  Full Code    Disposition  Patient is medically cleared pending case management arangements for outpatient dialysis  for discharge.   Anticipate discharge to to home with close outpatient follow-up.  I have placed the appropriate orders for post-discharge needs.    Review of Systems  Review of Systems   Constitutional:  Positive for malaise/fatigue. Negative for chills, diaphoresis and fever.   HENT:  Negative for congestion, hearing loss and sore throat.    Eyes:  Positive for blurred vision and redness.   Respiratory:  Negative for cough, shortness of breath and wheezing.    Cardiovascular:  Positive for leg swelling. Negative for chest pain and palpitations.   Gastrointestinal:  Positive for nausea. Negative for abdominal pain, diarrhea, heartburn and vomiting.   Genitourinary:  Negative for dysuria, flank pain and hematuria.   Musculoskeletal:  Positive for myalgias. Negative for back pain, joint pain and neck pain.   Skin:  Negative for rash.   Neurological:   Positive for dizziness and weakness. Negative for sensory change, speech change, focal weakness and headaches.   Psychiatric/Behavioral:  The patient is nervous/anxious.       Physical Exam  Temp:  [36.2 °C (97.2 °F)-37.6 °C (99.6 °F)] 36.2 °C (97.2 °F)  Pulse:  [74-80] 78  Resp:  [16-18] 18  BP: ()/(55-69) 128/69  SpO2:  [93 %-96 %] 96 %    Physical Exam  Vitals and nursing note reviewed.   HENT:      Head: Normocephalic and atraumatic.      Nose: No congestion.      Mouth/Throat:      Mouth: Mucous membranes are moist.   Eyes:      Conjunctiva/sclera:      Right eye: Right conjunctiva is injected. Hemorrhage present.   Cardiovascular:      Rate and Rhythm: Normal rate and regular rhythm.      Heart sounds: Murmur heard.   Pulmonary:      Effort: Pulmonary effort is normal.   Abdominal:      General: There is no distension.      Tenderness: There is no abdominal tenderness. There is no guarding or rebound.   Musculoskeletal:      Cervical back: No tenderness.      Right lower leg: Edema present.      Left lower leg: Edema present.   Skin:     Comments: Open wounds at both lower extremities   Neurological:      General: No focal deficit present.      Mental Status: He is alert and oriented to person, place, and time.      Cranial Nerves: No cranial nerve deficit.   Psychiatric:         Mood and Affect: Mood is anxious.         Speech: Speech is delayed.         Cognition and Memory: He exhibits impaired recent memory.       Fluids    Intake/Output Summary (Last 24 hours) at 3/2/2023 1421  Last data filed at 3/2/2023 1220  Gross per 24 hour   Intake 740 ml   Output 2500 ml   Net -1760 ml       Laboratory  Recent Labs     03/01/23  1201 03/02/23  0245   WBC 7.2 8.4   RBC 3.20* 3.21*   HEMOGLOBIN 9.2* 9.2*   HEMATOCRIT 29.8* 30.4*   MCV 93.1 94.7   MCH 28.8 28.7   MCHC 30.9* 30.3*   RDW 64.9* 65.8*   PLATELETCT 110* 124*   MPV 10.4 10.1     Recent Labs     02/28/23  0117 03/01/23  1201 03/02/23  0245   SODIUM  132* 134* 135   POTASSIUM 5.5 4.7 5.3   CHLORIDE 97 98 97   CO2 21 23 23   GLUCOSE 130* 122* 123*   BUN 69* 57* 68*   CREATININE 8.27* 7.54* 8.15*   CALCIUM 7.8* 8.2* 8.2*                   Imaging  [id number] W/O PLUS RECONS   Final Result      1.  Increased density throughout the right lobe is nonspecific though may suggest underlying hemorrhage with possible retinal detachment.   2.  No post septal fluid collection.           Assessment/Plan  * Hyperkalemia- (present on admission)  Assessment & Plan  Follow bmp    Noncompliance of patient with renal dialysis (formerly [facility])- (present on admission)  Assessment & Plan  Education and counseling    End stage renal disease (formerly [facility])- (present on admission)  Assessment & Plan  HD per Nephrology    Case management for discharge planning - outpatient Dialysis arrangement    Pulmonary HTN (formerly [facility])- (present on admission)  Assessment & Plan  Monitor volume status    Cardiomyopathy (formerly [facility])- (present on admission)  Assessment & Plan  Coreg    Chronic HFrEF (heart failure with reduced ejection fraction) (formerly [facility])- (present on admission)  Assessment & Plan  Coreg    Right eye glaucoma, due to angle-closure and phacomorphic components- (present on admission)  Assessment & Plan  Ophthalmology - probable end-stage glaucoma from angle-closure and phacomorphic components.  Latanoprost, Timolol, Brimonidine, Cosopt, Prednisone forte ophthalmic drops and Diamox  Follow up with Ophthalmology as outpatient    Vitreous hemorrhage of right eye (formerly [facility])- (present on admission)  Assessment & Plan  Likely due to glaucoma    Open wound- (present on admission)  Assessment & Plan  Chronic  Wound care  Pain control - start scheduled Tylenol, Oxycodone prn, IV Morphine for dressing changes     Anemia, chronic disease- (present on admission)  Assessment & Plan  Epogen  Follow cbc    Hypocalcemia- (present on admission)  Assessment & Plan  Follow bmp    Methamphetamine abuse (HCC)- (present on admission)  Assessment &  Plan  UDS pending    Hypertension- (present on admission)  Assessment & Plan  decrease Coreg to 3.125 mg twice daily  Stop Norvasc    Type 2 diabetes mellitus with kidney complication, without long-term current use of insulin (HCC)- (present on admission)  Assessment & Plan  Stop SSI  hgba1c 5.9    Tobacco dependence- (present on admission)  Assessment & Plan  Refused Nicotine replacement protocol         VTE prophylaxis: heparin ppx    I have performed a physical exam and reviewed and updated ROS and Plan today (3/2/2023). In review of yesterday's note (3/1/2023), there are no changes except as documented above.

## 2023-03-02 NOTE — PROGRESS NOTES
Pt is A&Ox4, no c/o pain at this time. Medicated per MAR, well tolerated. Pt is compliant with care, all needs met at this time.Bed in lowest position, call light/personal belongings within reach.

## 2023-03-02 NOTE — CARE PLAN
The patient is Stable - Low risk of patient condition declining or worsening    Shift Goals  Clinical Goals: Wound care and pain management  Patient Goals: pain control  Family Goals: donis    Progress made toward(s) clinical / shift goals:    Problem: Pain - Standard  Goal: Alleviation of pain or a reduction in pain to the patient’s comfort goal  Outcome: Progressing  Note: Educated on PRN pain medication and providing medication per MAR and prior to dressing change.      Problem: Skin Integrity  Goal: Skin integrity is maintained or improved  Outcome: Progressing  Note: Collaborating with wound team to identify appropriate wound care orders. Pt encouraged to mobilize and reposition.       Patient is not progressing towards the following goals:    N/A

## 2023-03-02 NOTE — PROGRESS NOTES
"[facility] -  PROGRESS NOTE               Author: CARLA Patel Date & Time: 3/2/2023  9:40 AM     HPI:  Patient is a 47 year old male with a PMHx of ESRD on HD qTTS, meth user and history of calciphylaxis here for leg pain.  States he's still using meth.  Last HD session was about 4-5 days ago.  Here potassium was 7.6 and bicarb of 7.  Nephrology consulted for maintenance dialysis.  Currently complaining of leg pain.  History of nonadherence.    DAILY NEPHROLOGY SUMMARY:  2/23 - Pain controlled.  Denies nausea.  No SOB  Had HD yesterday  2/24 - Denies pain or SOB.  Laying in bed comfortable  2/25 - 2L Net UF. NPO for OR this am w/Opth for retinal detachment. Reports that he is hungry. 1200 mL UOP last 24 hrs.   2/26: 2.5L Net UF. Bps better. Sitting at side of bed eating breakfast. States that he wants HD chair set up here. Unable to drive to [place] to HD clinic d/t vision.   2/27: Pt sitting up in bed eating breakfast, no overnight events, VSS on RA, worked with OT this AM and cleared for DC, waiting on DC plan  2/28: Pt confused today  3/01: HD yest, UF 2L. VSS. Sitting up in bed holding his head \"I hurt all over\". Vague about whether medication is helping his pain.   3/02: C/O pain eyes and thighs. Due for dialysis. He voiced no concerns. Eating and drinking okay. VSS.     REVIEW OF SYSTEMS:    10 point ROS rnot completed second to mentals saida    PMH/PSH/SH/FH:   Reviewed and unchanged since admission note    CURRENT MEDICATIONS:   Reviewed from admission to present day    VS:  /67   Pulse 80   Temp 36.7 °C (98.1 °F) (Temporal)   Resp 17   Ht 1.803 m (5' 10.98\")   Wt 90 kg (198 lb 6.6 oz)   SpO2 94%   BMI 27.69 kg/m²     Physical Exam  Nursing note reviewed.   Constitutional:       Appearance: Normal appearance.   Eyes:      General: No scleral icterus.     Comments: R eye erythema   Cardiovascular:      Comments: No edema  LFA AVG  [facility] TDC  Pulmonary: "      Effort: Pulmonary effort is normal.   Abdominal:      General: There is no distension.   Musculoskeletal:         General: No deformity.   Skin:     Findings: No rash.      Comments: Chronic wounds b/l thighs/abd   Neurological:      Mental Status: He is alert.   Psychiatric:         Behavior: Behavior is cooperative.        Fluids:  In: 720 [P.O.:720]  Out: -     LABS:  Recent Labs     02/28/23  0117 03/01/23  1201 03/02/23  0245   SODIUM 132* 134* 135   POTASSIUM 5.5 4.7 5.3   CHLORIDE 97 98 97   CO2 21 23 23   GLUCOSE 130* 122* 123*   BUN 69* 57* 68*   CREATININE 8.27* 7.54* 8.15*   CALCIUM 7.8* 8.2* 8.2*         IMAGING:   All imaging reviewed from admission to present day    IMPRESSION:  # ESRD  - Etiology likely 2/2 DM/HTN  - HD qTTHS via [facility] TDC  - Lost his HD chair at [facility] previously  # Hyperkalemia, corrected with HD             - s/p stat HD   # LFA AVG thrombosed  - US 2/23 no flow  - s/p OR 2/25 AVG Brachio-axillary Creation, thrombectomy and fistulogram   - Stenosis and wall thrombus noted on US, [facility] TDC in use  # HTN, fair control     # Anemia of CKD, below target  - Goal Ggb 10-11  - Ferritin 2722, %sat 41  # CKD-MBD  - Ca 7.6  - iCa 1.0  - Check phos  # Calciphylaxis hx  - Per report from patient  # Generalized weakness, improved   - Evaluated with PT/OT  # h/o Methamphetamine use   # Homelessness  # Encephalopthy ? Meds vs infection  # DM   # Chronic wounds b/l thighs/abd   - Wound care following   # Conjunctivitis/End Stage Glaucoma  - Diamox  - Latanoprost, Combigan, Cosopy, Preds Forte       PLAN:  - iHD today (Thurs)   - Continue iHD qTTS/PRN  - UF as tolerated   - If mental status allows, can increase gabapentin to 100 mg TID (Max 300 mg/day in ESRD) Defer to primary team   - FU Opthalm Recs  - Has been on high Ca bath with HD, but trying to avoid given hx   - Use [facility] PermCath for now  - AVG eval as OP at    - JACKIE with HD to goal Hgb 10-11  - No dietary protein  restrictions  - Dose all meds per ESRD/iHD  - Renal diet, low salt diet, fluid restriction to 1.5L daily  -  support for placement. Requesting local HD chair. Hx of non adherence to HD.   - Can transition to Outpt HD clinic when medically cleared and chair secured  - Labs with further recommendations to follow  - Wound care    Thank you,

## 2023-03-02 NOTE — WOUND TEAM
"[facility]  Inpatient Services   Wound and Skin Care Evaluation    Admission Date: 2/22/2023     Last order of IP CONSULT TO WOUND CARE was found on 2/22/2023 from Hospital Encounter on 2/22/2023     HPI, PMH, SH: Reviewed    Past Surgical History:   Procedure Laterality Date    AV FISTULA CREATION Left 2/25/2022    Procedure: CREATION, AV FISTULA-UPPER EXTREMITY GRAFT, AND FISTULOGRAM;  Surgeon: Tone Hartman M.D.;  Location: SURGERY [facility];  Service: Vascular    THROMBECTOMY Left 2/25/2022    Procedure: THROMBECTOMY;  Surgeon: Tone Hartman M.D.;  Location: SURGERY [facility];  Service: Vascular     Social History     Tobacco Use    Smoking status: Every Day     Packs/day: 0.25     Types: Cigarettes    Smokeless tobacco: Never   Substance Use Topics    Alcohol use: Not Currently     Chief Complaint   Patient presents with    Leg Pain     Pt states sores on bilateral legs and thighs, states infection, pt with bs 58, missed diaylsis today, brought via [facility] from long-term     Diagnosis: Hyperkalemia [E87.5]    Unit where seen by Wound Team: S609/01     WOUND CONSULT/FOLLOW UP RELATED TO:  BL thighs      WOUND HISTORY:  Ambrose Watkins is a homeless 47 y.o. male with diabetes since 2007, ESRD on HD for past 2-3 years, methamphetamine abuse, tobacco use, HTN, DLD, medical noncompliance who presented 2/22/2023 with evaluation by police prior to being incarcerated.  He was found in ER to be volume overloaded and have a K:7.6.  He states having his last dialysis 4 days ago in [facility].  He has chronic pain in his thighs from chronic bilateral thigh wounds  and per pt, \"calciphylaxis.\"     WOUND ASSESSMENT/LDA         Wound 02/22/23 Full Thickness Wound Thigh Anterior Right (Active)   Wound Image   03/01/23 1600   Site Assessment Pink;Red 03/01/23 1600   Periwound Assessment Red;Scar tissue 03/01/23 1600   Margins Defined edges 03/01/23 1600   Closure Secondary intention " 03/01/23 1600   Drainage Amount Small 03/01/23 1600   Drainage Description Tan 03/01/23 1600   Treatments Cleansed;Site care;Chemical Debridement 03/01/23 1600   Wound Cleansing Approved Wound Cleanser 03/01/23 1600   Periwound Protectant Barrier Paste 03/01/23 1600   Dressing Cleansing/Solutions 1/4 Strength Dakin's Solution 03/01/23 1600   Dressing Options Moist Roll Gauze;Mepilex 03/01/23 1600   Dressing Changed Changed 03/01/23 1600   Dressing Status Clean;Dry;Intact 03/01/23 1600   Dressing Change/Treatment Frequency Every Shift, and As Needed 03/01/23 1600   NEXT Dressing Change/Treatment Date 03/02/23 03/01/23 1600   NEXT Weekly Photo (Inpatient Only) 03/08/23 03/01/23 1600   Non-staged Wound Description Full thickness 03/01/23 1600   Wound Length (cm) 8.5 cm 02/23/23 1700   Wound Width (cm) 16 cm 02/23/23 1700   Wound Depth (cm) 0.6 cm 02/23/23 1700   Wound Surface Area (cm^2) 136 cm^2 02/23/23 1700   Wound Volume (cm^3) 81.6 cm^3 02/23/23 1700   Shape irregular 03/01/23 1600   Wound Odor Foul;Mild 03/01/23 1600   Exposed Structures None 03/01/23 1600   WOUND NURSE ONLY - Time Spent with Patient (mins) 60 03/01/23 1600       Wound 02/22/23 Full Thickness Wound Thigh Anterior;Medial;Posterior Left (Active)   Wound Image     03/01/23 1600   Site Assessment Tan;Red;[place] 03/01/23 1600   Periwound Assessment Red;Scar tissue 03/01/23 1600   Margins Defined edges 03/01/23 1600   Closure Secondary intention 03/01/23 1600   Drainage Amount Moderate 03/01/23 1600   Drainage Description Tan 03/01/23 1600   Treatments Cleansed;Site care;Chemical Debridement 03/01/23 1600   Wound Cleansing Approved Wound Cleanser 03/01/23 1600   Periwound Protectant Barrier Paste 03/01/23 1600   Dressing Cleansing/Solutions 1/4 Strength Dakin's Solution 03/01/23 1600   Dressing Options Moist Roll Gauze;Mepilex 03/01/23 1600   Dressing Changed Changed 03/01/23 1600   Dressing Status Clean;Dry;Intact 03/01/23 1600   Dressing  Change/Treatment Frequency Every Shift, and As Needed 03/01/23 1600   NEXT Dressing Change/Treatment Date 03/02/23 03/01/23 1600   NEXT Weekly Photo (Inpatient Only) 03/08/23 03/01/23 1600   Non-staged Wound Description Full thickness 03/01/23 1600   Shape irregular x3 03/01/23 1600   Wound Odor Foul;Strong 03/01/23 1600   Exposed Structures None 03/01/23 1600   WOUND NURSE ONLY - Time Spent with Patient (mins) 45 02/23/23 1700              Vascular:    ROMAINE:   No results found.    Lab Values:    Lab Results   Component Value Date/Time    WBC 7.2 03/01/2023 12:01 PM    RBC 3.20 (L) 03/01/2023 12:01 PM    HEMOGLOBIN 9.2 (L) 03/01/2023 12:01 PM    HEMATOCRIT 29.8 (L) 03/01/2023 12:01 PM    CREACTPROT 14.27 (H) 02/21/2022 09:00 PM    SEDRATEWES 45 (H) 02/21/2022 09:00 PM    HBA1C 5.9 (H) 03/01/2023 12:01 PM        Culture Results show:  No results found for this or any previous visit (from the past 720 hour(s)).    Pain Level/Medicated:  medicated with PO and IV    INTERVENTIONS BY WOUND TEAM:  Chart and images reviewed. Discussed with bedside RN. All areas of concern (based on picture review, LDA review and discussion with bedside RN) have been thoroughly assessed. Documentation of areas based on significant findings. This RN in to assess patient. Performed standard wound care which includes appropriate positioning, dressing removal and non-selective debridement. Pictures and measurements obtained weekly if/when required.    Preparation for Dressing removal: easily removed  Non-selectively Debrided with:  wound cleanser and gauze.  Sharp debridement: NA  Shahida wound: Cleansed with wound cleanser and gauze, Prepped with barrier paste  Primary Dressing: Dakins  soaked roll gauze   Secondary (Outer) Dressing: mepilex     Advanced Wound Care Discharge Planning  Number of Clinicians necessary to complete wound care: 1  Is patient requiring IV pain medications for dressing changes: No  Length of time for dressing change 30  min. (This does not include chart review, pre-medication time, set up, clean up or time spent charting.)    Interdisciplinary consultation: Patient, Bedside RN,     EVALUATION / RATIONALE FOR TREATMENT:  Most Recent Date:  3/1/23: Bilateral thighs are less malodorous, L. Thigh has garza purulent drainage, wound beds are more red.  Continue Dakin's Wet to Dry.      2/23/23: Patient with full thickness wounds along his right anteromedial thigh and along his anterior, medial, and posterior left thigh. All wounds beds were pale pink with moderate yellow-green drainage and was malodorous. Will initiate Dakins wet to dry dressing to address infectious process.    LEFT THIGH WOUNDS:   ANTERIOR: 4 X 10 X 0.3  MEDIAL: 7 x 5 x 0.3  POSTERIOR: 2 x 2 x 1 (to slough base)      Goals: Steady decrease in wound area and depth weekly.    WOUND TEAM PLAN OF CARE ([X] for frequency of wound follow up,):   Nursing to follow dressing orders written for wound care. Contact wound team if area fails to progress, deteriorates or with any questions/concerns if something comes up before next scheduled follow up (See below as to whether wound is following and frequency of wound follow up)  Dressing changes by wound team:                   Follow up 3 times weekly:                NPWT change 3 times weekly:     Follow up 1-2 times weekly:    X  Follow up Bi-Monthly:           Follow up Monthly (High Risk):                        Follow up as needed:     Other (explain):     NURSING PLAN OF CARE ORDERS (X):  Dressing changes: See Dressing Care orders: X  Skin care: See Skin Care orders: X  RN Prevention Protocol: X  Rectal tube care: See Rectal Tube Care orders:   Other orders:    RSKIN:   CURRENTLY IN PLACE (X), APPLIED THIS VISIT (A), ORDERED (O):   Q shift Rex:  X  Q shift pressure point assessments:  X    Surface/Positioning   Pressure redistribution mattress            Low Airloss        X  ICU Low Airloss   Bariatric NIKOLAS     Ryan  cushion        Waffle Overlay          Reposition q 2 hours      TAPs Turning system     Z Chapin Pillow     Offloading/Redistribution   Sacral Mepilex (Silicone dressing)     Heel Mepilex (Silicone dressing)       O  Heel float boots (Prevalon boot)             Float Heels off Bed with Pillows           Respiratory NA  Silicone O2 tubing         Gray Foam Ear protectors     Cannula fixation Device (Tender )          High flow offloading Clip    Elastic head band offloading device      Anchorfast                                                         Trach with Optifoam split foam             Containment/Moisture Prevention NA    Rectal tube or BMS    Purwick/Condom Cath        Dumont Catheter    Barrier wipes           Barrier paste       Antifungal tx      Interdry        Mobilization       Up to chair      X  Ambulate    X  PT/OT      Nutrition       Dietician        Diabetes Education      PO   X  TF     TPN     NPO   # days     Other        Anticipated discharge plans:   LTACH:        SNF/Rehab:                  Home Health Care:           Outpatient Wound Center:        X    Self/Family Care:        Other:                  Vac Discharge Needs:   Vac Discharge plan is purely a recommendation from wound team and not a requirement for discharge unless otherwise stated by physician.  Not Applicable Pt not on a wound vac:     x  Regular Vac while inpatient, alternative dressing at DC:        Regular Vac in use and continued at DC:            Reg. Vac w/ Skin Sub/Biologic in use. Will need to be changed 2x wkly:      Veraflo Vac while inpatient, ok to transition to Regular Vac on Discharge (Bedside RN to Clamp small instillation tubing at time of DC):           Veraflo Vac while inpatient, would benefit from remaining on Veraflo Vac upon discharge:

## 2023-03-03 LAB
ANION GAP SERPL CALC-SCNC: 14 MMOL/L (ref 7–16)
BUN SERPL-MCNC: 58 MG/DL (ref 8–22)
CALCIUM SERPL-MCNC: 8 MG/DL (ref 8.5–10.5)
CHLORIDE SERPL-SCNC: 95 MMOL/L (ref 96–112)
CO2 SERPL-SCNC: 24 MMOL/L (ref 20–33)
CREAT SERPL-MCNC: 6.86 MG/DL (ref 0.5–1.4)
ERYTHROCYTE [DISTWIDTH] IN BLOOD BY AUTOMATED COUNT: 64.1 FL (ref 35.9–50)
GFR SERPLBLD CREATININE-BSD FMLA CKD-EPI: 9 ML/MIN/1.73 M 2
GLUCOSE BLD STRIP.AUTO-MCNC: 110 MG/DL (ref 65–99)
GLUCOSE BLD STRIP.AUTO-MCNC: 152 MG/DL (ref 65–99)
GLUCOSE BLD STRIP.AUTO-MCNC: 163 MG/DL (ref 65–99)
GLUCOSE SERPL-MCNC: 147 MG/DL (ref 65–99)
HCT VFR BLD AUTO: 28.7 % (ref 42–52)
HGB BLD-MCNC: 8.6 G/DL (ref 14–18)
MCH RBC QN AUTO: 28.4 PG (ref 27–33)
MCHC RBC AUTO-ENTMCNC: 30 G/DL (ref 33.7–35.3)
MCV RBC AUTO: 94.7 FL (ref 81.4–97.8)
PLATELET # BLD AUTO: 165 K/UL (ref 164–446)
PMV BLD AUTO: 9.6 FL (ref 9–12.9)
POTASSIUM SERPL-SCNC: 4.5 MMOL/L (ref 3.6–5.5)
RBC # BLD AUTO: 3.03 M/UL (ref 4.7–6.1)
SODIUM SERPL-SCNC: 133 MMOL/L (ref 135–145)
WBC # BLD AUTO: 7.4 K/UL (ref 4.8–10.8)

## 2023-03-03 PROCEDURE — A9270 NON-COVERED ITEM OR SERVICE: HCPCS | Performed by: INTERNAL MEDICINE

## 2023-03-03 PROCEDURE — 700101 HCHG RX REV CODE 250: Performed by: HOSPITALIST

## 2023-03-03 PROCEDURE — 82962 GLUCOSE BLOOD TEST: CPT | Mod: 91

## 2023-03-03 PROCEDURE — 99232 SBSQ HOSP IP/OBS MODERATE 35: CPT | Performed by: FAMILY MEDICINE

## 2023-03-03 PROCEDURE — 700102 HCHG RX REV CODE 250 W/ 637 OVERRIDE(OP): Performed by: INTERNAL MEDICINE

## 2023-03-03 PROCEDURE — A9270 NON-COVERED ITEM OR SERVICE: HCPCS | Performed by: HOSPITALIST

## 2023-03-03 PROCEDURE — 700102 HCHG RX REV CODE 250 W/ 637 OVERRIDE(OP): Performed by: FAMILY MEDICINE

## 2023-03-03 PROCEDURE — 36415 COLL VENOUS BLD VENIPUNCTURE: CPT

## 2023-03-03 PROCEDURE — 80048 BASIC METABOLIC PNL TOTAL CA: CPT

## 2023-03-03 PROCEDURE — 700101 HCHG RX REV CODE 250: Performed by: INTERNAL MEDICINE

## 2023-03-03 PROCEDURE — 85027 COMPLETE CBC AUTOMATED: CPT

## 2023-03-03 PROCEDURE — A9270 NON-COVERED ITEM OR SERVICE: HCPCS | Performed by: FAMILY MEDICINE

## 2023-03-03 PROCEDURE — 770006 HCHG ROOM/CARE - MED/SURG/GYN SEMI*

## 2023-03-03 PROCEDURE — 700111 HCHG RX REV CODE 636 W/ 250 OVERRIDE (IP): Performed by: HOSPITALIST

## 2023-03-03 PROCEDURE — 700102 HCHG RX REV CODE 250 W/ 637 OVERRIDE(OP): Performed by: HOSPITALIST

## 2023-03-03 PROCEDURE — 700111 HCHG RX REV CODE 636 W/ 250 OVERRIDE (IP)

## 2023-03-03 RX ADMIN — Medication 500 MG: at 06:10

## 2023-03-03 RX ADMIN — HEPARIN SODIUM 5000 UNITS: 5000 INJECTION, SOLUTION INTRAVENOUS; SUBCUTANEOUS at 06:15

## 2023-03-03 RX ADMIN — MINERAL OIL, PETROLATUM 1 APPLICATION: 425; 573 OINTMENT OPHTHALMIC at 06:13

## 2023-03-03 RX ADMIN — DAKIN'S SOLUTION 0.125% (QUARTER STRENGTH) 1 ML: 0.12 SOLUTION at 17:44

## 2023-03-03 RX ADMIN — ACETAZOLAMIDE EXTENDED-RELEASE 500 MG: 500 CAPSULE ORAL at 17:38

## 2023-03-03 RX ADMIN — ACETAZOLAMIDE EXTENDED-RELEASE 500 MG: 500 CAPSULE ORAL at 06:10

## 2023-03-03 RX ADMIN — TIMOLOL MALEATE 1 DROP: 5 SOLUTION OPHTHALMIC at 06:13

## 2023-03-03 RX ADMIN — ACETAMINOPHEN 1000 MG: 325 TABLET, FILM COATED ORAL at 23:57

## 2023-03-03 RX ADMIN — ACETAMINOPHEN 1000 MG: 325 TABLET, FILM COATED ORAL at 08:40

## 2023-03-03 RX ADMIN — MORPHINE SULFATE 4 MG: 4 INJECTION INTRAVENOUS at 15:18

## 2023-03-03 RX ADMIN — DORZOLAMIDE HYDROCHLORIDE AND TIMOLOL MALEATE 1 DROP: 20; 5 SOLUTION/ DROPS OPHTHALMIC at 06:13

## 2023-03-03 RX ADMIN — DORZOLAMIDE HYDROCHLORIDE AND TIMOLOL MALEATE 1 DROP: 20; 5 SOLUTION/ DROPS OPHTHALMIC at 23:59

## 2023-03-03 RX ADMIN — HEPARIN SODIUM 5000 UNITS: 5000 INJECTION, SOLUTION INTRAVENOUS; SUBCUTANEOUS at 13:00

## 2023-03-03 RX ADMIN — MORPHINE SULFATE 4 MG: 4 INJECTION INTRAVENOUS at 23:51

## 2023-03-03 RX ADMIN — PREDNISOLONE SODIUM PHOSPHATE 1 DROP: 10 SOLUTION/ DROPS OPHTHALMIC at 08:40

## 2023-03-03 RX ADMIN — HEPARIN SODIUM 5000 UNITS: 5000 INJECTION, SOLUTION INTRAVENOUS; SUBCUTANEOUS at 23:58

## 2023-03-03 RX ADMIN — PREDNISOLONE SODIUM PHOSPHATE 1 DROP: 10 SOLUTION/ DROPS OPHTHALMIC at 17:37

## 2023-03-03 RX ADMIN — PREDNISOLONE SODIUM PHOSPHATE 1 DROP: 10 SOLUTION/ DROPS OPHTHALMIC at 13:00

## 2023-03-03 RX ADMIN — Medication 220 MG: at 06:10

## 2023-03-03 RX ADMIN — TIMOLOL MALEATE 1 DROP: 5 SOLUTION OPHTHALMIC at 17:37

## 2023-03-03 RX ADMIN — DAKIN'S SOLUTION 0.125% (QUARTER STRENGTH) 50 ML: 0.12 SOLUTION at 06:30

## 2023-03-03 RX ADMIN — GABAPENTIN 100 MG: 100 CAPSULE ORAL at 17:36

## 2023-03-03 RX ADMIN — DOCUSATE SODIUM 50 MG AND SENNOSIDES 8.6 MG 2 TABLET: 8.6; 5 TABLET, FILM COATED ORAL at 17:36

## 2023-03-03 RX ADMIN — MINERAL OIL, PETROLATUM 1 APPLICATION: 425; 573 OINTMENT OPHTHALMIC at 14:44

## 2023-03-03 RX ADMIN — BRIMONIDINE TARTRATE 1 DROP: 2 SOLUTION OPHTHALMIC at 06:13

## 2023-03-03 RX ADMIN — ACETAMINOPHEN 1000 MG: 325 TABLET, FILM COATED ORAL at 14:44

## 2023-03-03 RX ADMIN — LATANOPROST 1 DROP: 50 SOLUTION OPHTHALMIC at 17:38

## 2023-03-03 ASSESSMENT — ENCOUNTER SYMPTOMS
HEADACHES: 0
WEAKNESS: 1
NECK PAIN: 0
BACK PAIN: 0
FOCAL WEAKNESS: 0
PALPITATIONS: 0
SORE THROAT: 0
BLURRED VISION: 1
NERVOUS/ANXIOUS: 0
DIZZINESS: 1
ABDOMINAL PAIN: 0
MYALGIAS: 1
DIARRHEA: 0
SHORTNESS OF BREATH: 0
HEARTBURN: 0
EYE REDNESS: 1
DIAPHORESIS: 0
VOMITING: 0
COUGH: 0
FLANK PAIN: 0
SPEECH CHANGE: 0
NAUSEA: 1
FEVER: 0
SENSORY CHANGE: 0
WHEEZING: 0
CHILLS: 0

## 2023-03-03 ASSESSMENT — PATIENT HEALTH QUESTIONNAIRE - PHQ9
SUM OF ALL RESPONSES TO PHQ9 QUESTIONS 1 AND 2: 2
8. MOVING OR SPEAKING SO SLOWLY THAT OTHER PEOPLE COULD HAVE NOTICED. OR THE OPPOSITE, BEING SO FIGETY OR RESTLESS THAT YOU HAVE BEEN MOVING AROUND A LOT MORE THAN USUAL: NOT AT ALL
2. FEELING DOWN, DEPRESSED, IRRITABLE, OR HOPELESS: SEVERAL DAYS
3. TROUBLE FALLING OR STAYING ASLEEP OR SLEEPING TOO MUCH: NOT AT ALL
SUM OF ALL RESPONSES TO PHQ QUESTIONS 1-9: 4
5. POOR APPETITE OR OVEREATING: NOT AT ALL
9. THOUGHTS THAT YOU WOULD BE BETTER OFF DEAD, OR OF HURTING YOURSELF: NOT AT ALL
1. LITTLE INTEREST OR PLEASURE IN DOING THINGS: SEVERAL DAYS
6. FEELING BAD ABOUT YOURSELF - OR THAT YOU ARE A FAILURE OR HAVE LET YOURSELF OR YOUR FAMILY DOWN: SEVERAL DAYS
4. FEELING TIRED OR HAVING LITTLE ENERGY: SEVERAL DAYS
7. TROUBLE CONCENTRATING ON THINGS, SUCH AS READING THE NEWSPAPER OR WATCHING TELEVISION: NOT AT ALL

## 2023-03-03 ASSESSMENT — PAIN DESCRIPTION - PAIN TYPE
TYPE: ACUTE PAIN

## 2023-03-03 NOTE — PROGRESS NOTES
Pt is A&O x4, RA, ambulatory w/ one person SB assist and FWW. All medications taken and tolerated, medicated for pain and dressing changes per MAR. Pt resting in bed w/ call light and belongings in reach, bed alarm on, hourly rounding in place. No additional needs at this time.

## 2023-03-03 NOTE — PROGRESS NOTES
Pt is A&Ox4, denies pain at this time. Pt is able to make needs known, compliant with care. Medicated per MAR, tolerated well. Hourly rounding initiated, call light within reach. Safety measures in place.

## 2023-03-03 NOTE — CARE PLAN
Problem: Pain - Standard  Goal: Alleviation of pain or a reduction in pain to the patient’s comfort goal  Description: Target End Date:  Prior to discharge or change in level of care    Document on Vitals flowsheet    1.  Document pain using the appropriate pain scale per order or unit policy  2.  Educate and implement non-pharmacologic comfort measures (i.e. relaxation, distraction, massage, cold/heat therapy, etc.)  3.  Pain management medications as ordered  4.  Reassess pain after pain med administration per policy  5.  If opiods administered assess patient's response to pain medication is appropriate per POSS sedation scale  6.  Follow pain management plan developed in collaboration with patient and interdisciplinary team (including palliative care or pain specialists if applicable)  Outcome: Not Progressing   The patient is Stable - Low risk of patient condition declining or worsening    Shift Goals  Clinical Goals: Wound care, pain mgnmt, rest  Patient Goals: Pain mgnmt, rest  Family Goals: donis    Progress made toward(s) clinical / shift goals:  PRN Morphine administered for pt's pain reported 10/10. Hayden thighs dressing changed. Pt will report decrease in pain, and be able to sleep comfortably.          Problem: Pain - Standard  Goal: Alleviation of pain or a reduction in pain to the patient’s comfort goal  Description: Target End Date:  Prior to discharge or change in level of care    Document on Vitals flowsheet    1.  Document pain using the appropriate pain scale per order or unit policy  2.  Educate and implement non-pharmacologic comfort measures (i.e. relaxation, distraction, massage, cold/heat therapy, etc.)  3.  Pain management medications as ordered  4.  Reassess pain after pain med administration per policy  5.  If opiods administered assess patient's response to pain medication is appropriate per POSS sedation scale  6.  Follow pain management plan developed in collaboration with patient and  interdisciplinary team (including palliative care or pain specialists if applicable)  Outcome: Not Progressing

## 2023-03-03 NOTE — PROGRESS NOTES
"[facility] -  PROGRESS NOTE               Author: CARLA Patel Date & Time: 3/3/2023  12:32 PM     HPI:  Patient is a 47 year old male with a PMHx of ESRD on HD qTTS, meth user and history of calciphylaxis here for leg pain.  States he's still using meth.  Last HD session was about 4-5 days ago.  Here potassium was 7.6 and bicarb of 7.  Nephrology consulted for maintenance dialysis.  Currently complaining of leg pain.  History of nonadherence.    DAILY NEPHROLOGY SUMMARY:  2/23 - Pain controlled.  Denies nausea.  No SOB  Had HD yesterday  2/24 - Denies pain or SOB.  Laying in bed comfortable  2/25 - 2L Net UF. NPO for OR this am w/Opth for retinal detachment. Reports that he is hungry. 1200 mL UOP last 24 hrs.   2/26: 2.5L Net UF. Bps better. Sitting at side of bed eating breakfast. States that he wants HD chair set up here. Unable to drive to [place] to HD clinic d/t vision.   2/27: Pt sitting up in bed eating breakfast, no overnight events, VSS on RA, worked with OT this AM and cleared for DC, waiting on DC plan  2/28: Pt confused today  3/01: HD yest, UF 2L. VSS. Sitting up in bed holding his head \"I hurt all over\". Vague about whether medication is helping his pain.   3/02: C/O pain eyes and thighs. Due for dialysis. He voiced no concerns. Eating and drinking okay. VSS.   3/03: HD yest, UF 2L. VSS. Pt sleeps a lot. Briefly wakes and engages in conversation then goes back to sleep. Intermittent nause and pain (legs and eyes) No new issues. CM looking for outpt HD chair.    REVIEW OF SYSTEMS:    A 10 pt review of systems was obtained and is per HPI/&/or daily summary otherwise negative    PMH/PSH/SH/FH:   Reviewed and unchanged since admission note    CURRENT MEDICATIONS:   Reviewed from admission to present day    VS:  /64   Pulse 73   Temp 36.3 °C (97.4 °F) (Temporal)   Resp 19   Ht 1.803 m (5' 10.98\")   Wt 90 kg (198 lb 6.6 oz)   SpO2 100%   BMI 27.69 " kg/m²     Physical Exam  Nursing note reviewed.   Constitutional:       Appearance: Normal appearance.   Eyes:      General: No scleral icterus.     Comments: R eye erythema   Cardiovascular:      Comments: No edema  LFA AVG  [facility] TDC  Pulmonary:      Effort: Pulmonary effort is normal.   Abdominal:      General: There is no distension.   Musculoskeletal:         General: No deformity.   Skin:     Findings: No rash.      Comments: Chronic wounds b/l thighs/abd   Neurological:      Mental Status: He is alert.   Psychiatric:         Behavior: Behavior is cooperative.        Fluids:  In: 1360 [P.O.:860; Dialysis:500]  Out: 2700     LABS:  Recent Labs     03/01/23  1201 03/02/23  0245 03/03/23  0540   SODIUM 134* 135 133*   POTASSIUM 4.7 5.3 4.5   CHLORIDE 98 97 95*   CO2 23 23 24   GLUCOSE 122* 123* 147*   BUN 57* 68* 58*   CREATININE 7.54* 8.15* 6.86*   CALCIUM 8.2* 8.2* 8.0*         IMAGING:   All imaging reviewed from admission to present day    IMPRESSION:  # ESRD  - Etiology likely 2/2 DM/HTN  - HD qTTHS via [facility] TDC  - [facility] his HD chair at [facility] previously  # Hyperkalemia, corrected with HD             - s/p stat HD   # LFA AVG thrombosed  - US 2/23 no flow  - s/p OR 2/25 AVG Brachio-axillary Creation, thrombectomy and fistulogram   - Stenosis and wall thrombus noted on US, [facility] TDC in use  # HTN, fair control     # Anemia of CKD, below target  - Goal Ggb 10-11  - Ferritin 2722, %sat 41  # CKD-MBD  - Ca 7.6  - iCa 1.0  - Check phos  # Calciphylaxis hx  - Per report from patient  # Generalized weakness, improved   - Evaluated with PT/OT  # h/o Methamphetamine use   # Homelessness  # Encephalopthy ? Meds vs infection  # DM   # Chronic wounds b/l thighs/abd   - Wound care following   # Conjunctivitis/End Stage Glaucoma  - Diamox  - Latanoprost, Combigan, Cosopy, Preds Forte       PLAN:  - No iHD today (Friday)   - Continue iHD qTTS/PRN  - UF as tolerated   - FU Opthalm Recs  - Has been on high Ca bath with  HD, but trying to avoid given hx   - Use [facility] PermCath for now  - AVG eval as OP at    - JACKIE with HD to goal Hgb 10-11  - No dietary protein restrictions  - Dose all meds per ESRD/iHD  - Renal diet, low salt diet, fluid restriction to 1.5L daily  - SW support for placement. Requesting local HD chair. Hx of non adherence to HD.   - Can transition to Outpt HD clinic when medically cleared and chair secured  - Labs with further recommendations to follow  - Wound care    Thank you,

## 2023-03-03 NOTE — DISCHARGE PLANNING
Medical Social Work  PC to [facility] Coordinator [phone]; message left requesting an update

## 2023-03-03 NOTE — CARE PLAN
The patient is Stable - Low risk of patient condition declining or worsening    Shift Goals  Clinical Goals: Wound care, pain mgnmt, rest  Patient Goals: Pain mgnmt, rest  Family Goals: donis    Progress made toward(s) clinical / shift goals: Pt wound care administered, medicated for pain per MAR.     Problem: Pain - Standard  Goal: Alleviation of pain or a reduction in pain to the patient’s comfort goal  Outcome: Progressing     Problem: Knowledge Deficit - Standard  Goal: Patient and family/care givers will demonstrate understanding of plan of care, disease process/condition, diagnostic tests and medications  Outcome: Progressing     Problem: Skin Integrity  Goal: Skin integrity is maintained or improved  Outcome: Progressing     Problem: Fall Risk  Goal: Patient will remain free from falls  Outcome: Progressing

## 2023-03-03 NOTE — PROGRESS NOTES
[facility] Medicine Daily Progress Note    Date of Service  3/3/2023    Chief Complaint  Ambrose Watkins is a 47 y.o. male admitted 2/22/2023 with hyperkalemia.    Hospital Course  Admitted with hyperkalemia and volume overload.  Patient with known history of end-stage renal disease on hemodialysis with known history of noncompliance. Nephrology was consulted on the case.  Patient had emergent hemodialysis.  Patient was also noted to have redness of the right eye.  Ophthalmology was consulted on the case.  Patient was noted to have glaucoma and vitreous hemorrhage of the right eye.  Ophthalmic drops were started.    Interval Problem Update  HTN - sbp 103-119  Open wounds - pain better controlled    I have discussed this patient's plan of care and discharge plan at IDT rounds today with Case Management, Nursing, Nursing leadership, and other members of the IDT team.    Consultants/Specialty  nephrology and ophthalmology    Code Status  Full Code    Disposition  Patient is medically cleared pending case management arangements for outpatient dialysis  for discharge.   Anticipate discharge to to home with close outpatient follow-up.  I have placed the appropriate orders for post-discharge needs.    Review of Systems  Review of Systems   Constitutional:  Positive for malaise/fatigue. Negative for chills, diaphoresis and fever.   HENT:  Negative for congestion, hearing loss and sore throat.    Eyes:  Positive for blurred vision and redness.   Respiratory:  Negative for cough, shortness of breath and wheezing.    Cardiovascular:  Positive for leg swelling. Negative for chest pain and palpitations.   Gastrointestinal:  Positive for nausea. Negative for abdominal pain, diarrhea, heartburn and vomiting.   Genitourinary:  Negative for dysuria, flank pain and hematuria.   Musculoskeletal:  Positive for myalgias. Negative for back pain, joint pain and neck pain.   Skin:  Negative for rash.   Neurological:  Positive for  dizziness and weakness. Negative for sensory change, speech change, focal weakness and headaches.   Psychiatric/Behavioral:  The patient is not nervous/anxious.       Physical Exam  Temp:  [36.3 °C (97.4 °F)-37 °C (98.6 °F)] 36.3 °C (97.4 °F)  Pulse:  [73-85] 73  Resp:  [16-19] 19  BP: (103-119)/(58-66) 103/64  SpO2:  [93 %-100 %] 100 %    Physical Exam  Vitals and nursing note reviewed.   HENT:      Head: Normocephalic and atraumatic.      Nose: No congestion.      Mouth/Throat:      Mouth: Mucous membranes are moist.   Eyes:      Conjunctiva/sclera:      Right eye: Right conjunctiva is injected. Hemorrhage present.   Cardiovascular:      Rate and Rhythm: Normal rate and regular rhythm.      Heart sounds: Murmur heard.   Pulmonary:      Effort: Pulmonary effort is normal.   Abdominal:      General: There is no distension.      Tenderness: There is no abdominal tenderness. There is no guarding or rebound.   Musculoskeletal:      Cervical back: No tenderness.      Right lower leg: Edema present.      Left lower leg: Edema present.   Skin:     Comments: Open wounds at both lower extremities   Neurological:      General: No focal deficit present.      Mental Status: He is alert and oriented to person, place, and time.      Cranial Nerves: No cranial nerve deficit.   Psychiatric:         Speech: Speech is delayed.         Cognition and Memory: He exhibits impaired recent memory.       Fluids    Intake/Output Summary (Last 24 hours) at 3/3/2023 1356  Last data filed at 3/3/2023 0900  Gross per 24 hour   Intake 980 ml   Output 200 ml   Net 780 ml       Laboratory  Recent Labs     03/01/23  1201 03/02/23  0245 03/03/23  0540   WBC 7.2 8.4 7.4   RBC 3.20* 3.21* 3.03*   HEMOGLOBIN 9.2* 9.2* 8.6*   HEMATOCRIT 29.8* 30.4* 28.7*   MCV 93.1 94.7 94.7   MCH 28.8 28.7 28.4   MCHC 30.9* 30.3* 30.0*   RDW 64.9* 65.8* 64.1*   PLATELETCT 110* 124* 165   MPV 10.4 10.1 9.6     Recent Labs     03/01/23  1201 03/02/23  0245 03/03/23  0540    SODIUM 134* 135 133*   POTASSIUM 4.7 5.3 4.5   CHLORIDE 98 97 95*   CO2 23 23 24   GLUCOSE 122* 123* 147*   BUN 57* 68* 58*   CREATININE 7.54* 8.15* 6.86*   CALCIUM 8.2* 8.2* 8.0*                   Imaging  [id number] W/O PLUS RECONS   Final Result      1.  Increased density throughout the right lobe is nonspecific though may suggest underlying hemorrhage with possible retinal detachment.   2.  No post septal fluid collection.           Assessment/Plan  * Hyperkalemia- (present on admission)  Assessment & Plan  Follow bmp    Noncompliance of patient with renal dialysis ([facility])- (present on admission)  Assessment & Plan  Education and counseling    End stage renal disease ([facility])- (present on admission)  Assessment & Plan  HD per Nephrology    Case management for discharge planning - outpatient Dialysis arrangement    Pulmonary HTN ([facility])- (present on admission)  Assessment & Plan  Monitor volume status    Cardiomyopathy ([facility])- (present on admission)  Assessment & Plan  Coreg    Chronic HFrEF (heart failure with reduced ejection fraction) ([facility])- (present on admission)  Assessment & Plan  Coreg    Right eye glaucoma, due to angle-closure and phacomorphic components- (present on admission)  Assessment & Plan  Ophthalmology - probable end-stage glaucoma from angle-closure and phacomorphic components.  Latanoprost, Timolol, Brimonidine, Cosopt, Prednisone forte ophthalmic drops and Diamox  Follow up with Ophthalmology as outpatient    Vitreous hemorrhage of right eye ([facility])- (present on admission)  Assessment & Plan  Likely due to glaucoma    Open wound- (present on admission)  Assessment & Plan  Chronic  Wound care  Pain control - scheduled Tylenol, Oxycodone prn, IV Morphine for dressing changes     Anemia, chronic disease- (present on admission)  Assessment & Plan  Epogen  Follow cbc    Hypocalcemia- (present on admission)  Assessment & Plan  Follow bmp    Methamphetamine abuse ([facility])- (present on  admission)  Assessment & Plan  UDS pending    Hypertension- (present on admission)  Assessment & Plan  Coreg     Type 2 diabetes mellitus with kidney complication, without long-term current use of insulin (HCC)- (present on admission)  Assessment & Plan  hgba1c 5.9    Tobacco dependence- (present on admission)  Assessment & Plan  Refused Nicotine replacement protocol         VTE prophylaxis: heparin ppx    I have performed a physical exam and reviewed and updated ROS and Plan today (3/3/2023). In review of yesterday's note (3/2/2023), there are no changes except as documented above.

## 2023-03-03 NOTE — CARE PLAN
The patient is Stable - Low risk of patient condition declining or worsening    Shift Goals  Clinical Goals: Wound care, pain management, rest  Patient Goals: Pain management  Family Goals: donis    Progress made toward(s) clinical / shift goals:  Pt c/o eye/back pain and headaches, PRN percocet 10 mg administered. Pt instructed to avoid touching and robbing eyes,. Pt will be able to report decrease in pain at the end of shift. Pt encourage to reposition and use relaxation technique to help with positive reliefs in pain.        Problem: Pain - Standard  Goal: Alleviation of pain or a reduction in pain to the patient’s comfort goal  Description: Target End Date:  Prior to discharge or change in level of care    Document on Vitals flowsheet    1.  Document pain using the appropriate pain scale per order or unit policy  2.  Educate and implement non-pharmacologic comfort measures (i.e. relaxation, distraction, massage, cold/heat therapy, etc.)  3.  Pain management medications as ordered  4.  Reassess pain after pain med administration per policy  5.  If opiods administered assess patient's response to pain medication is appropriate per POSS sedation scale  6.  Follow pain management plan developed in collaboration with patient and interdisciplinary team (including palliative care or pain specialists if applicable)  Outcome: Not Met

## 2023-03-04 LAB
ANION GAP SERPL CALC-SCNC: 13 MMOL/L (ref 7–16)
BUN SERPL-MCNC: 41 MG/DL (ref 8–22)
CALCIUM SERPL-MCNC: 8.4 MG/DL (ref 8.5–10.5)
CHLORIDE SERPL-SCNC: 96 MMOL/L (ref 96–112)
CO2 SERPL-SCNC: 25 MMOL/L (ref 20–33)
CREAT SERPL-MCNC: 5.61 MG/DL (ref 0.5–1.4)
ERYTHROCYTE [DISTWIDTH] IN BLOOD BY AUTOMATED COUNT: 62.3 FL (ref 35.9–50)
GFR SERPLBLD CREATININE-BSD FMLA CKD-EPI: 12 ML/MIN/1.73 M 2
GLUCOSE SERPL-MCNC: 144 MG/DL (ref 65–99)
HCT VFR BLD AUTO: 28.2 % (ref 42–52)
HGB BLD-MCNC: 8.6 G/DL (ref 14–18)
MCH RBC QN AUTO: 28.5 PG (ref 27–33)
MCHC RBC AUTO-ENTMCNC: 30.5 G/DL (ref 33.7–35.3)
MCV RBC AUTO: 93.4 FL (ref 81.4–97.8)
PLATELET # BLD AUTO: 223 K/UL (ref 164–446)
PMV BLD AUTO: 9.6 FL (ref 9–12.9)
POTASSIUM SERPL-SCNC: 4.3 MMOL/L (ref 3.6–5.5)
RBC # BLD AUTO: 3.02 M/UL (ref 4.7–6.1)
SODIUM SERPL-SCNC: 134 MMOL/L (ref 135–145)
WBC # BLD AUTO: 7.1 K/UL (ref 4.8–10.8)

## 2023-03-04 PROCEDURE — 700111 HCHG RX REV CODE 636 W/ 250 OVERRIDE (IP): Performed by: HOSPITALIST

## 2023-03-04 PROCEDURE — A9270 NON-COVERED ITEM OR SERVICE: HCPCS | Performed by: INTERNAL MEDICINE

## 2023-03-04 PROCEDURE — 36415 COLL VENOUS BLD VENIPUNCTURE: CPT

## 2023-03-04 PROCEDURE — 700102 HCHG RX REV CODE 250 W/ 637 OVERRIDE(OP): Performed by: HOSPITALIST

## 2023-03-04 PROCEDURE — 700111 HCHG RX REV CODE 636 W/ 250 OVERRIDE (IP)

## 2023-03-04 PROCEDURE — 700102 HCHG RX REV CODE 250 W/ 637 OVERRIDE(OP): Performed by: NURSE PRACTITIONER

## 2023-03-04 PROCEDURE — 80048 BASIC METABOLIC PNL TOTAL CA: CPT

## 2023-03-04 PROCEDURE — 85027 COMPLETE CBC AUTOMATED: CPT

## 2023-03-04 PROCEDURE — 700102 HCHG RX REV CODE 250 W/ 637 OVERRIDE(OP): Performed by: INTERNAL MEDICINE

## 2023-03-04 PROCEDURE — A9270 NON-COVERED ITEM OR SERVICE: HCPCS | Performed by: NURSE PRACTITIONER

## 2023-03-04 PROCEDURE — A9270 NON-COVERED ITEM OR SERVICE: HCPCS | Performed by: FAMILY MEDICINE

## 2023-03-04 PROCEDURE — A9270 NON-COVERED ITEM OR SERVICE: HCPCS | Performed by: HOSPITALIST

## 2023-03-04 PROCEDURE — 99232 SBSQ HOSP IP/OBS MODERATE 35: CPT | Performed by: FAMILY MEDICINE

## 2023-03-04 PROCEDURE — 700102 HCHG RX REV CODE 250 W/ 637 OVERRIDE(OP): Performed by: FAMILY MEDICINE

## 2023-03-04 PROCEDURE — 770006 HCHG ROOM/CARE - MED/SURG/GYN SEMI*

## 2023-03-04 PROCEDURE — 90935 HEMODIALYSIS ONE EVALUATION: CPT

## 2023-03-04 PROCEDURE — 700111 HCHG RX REV CODE 636 W/ 250 OVERRIDE (IP): Performed by: NURSE PRACTITIONER

## 2023-03-04 RX ORDER — HEPARIN SODIUM 1000 [USP'U]/ML
INJECTION, SOLUTION INTRAVENOUS; SUBCUTANEOUS
Status: COMPLETED
Start: 2023-03-04 | End: 2023-03-04

## 2023-03-04 RX ORDER — SEVELAMER CARBONATE 800 MG/1
800 TABLET, FILM COATED ORAL
Status: DISCONTINUED | OUTPATIENT
Start: 2023-03-04 | End: 2023-03-16

## 2023-03-04 RX ADMIN — GABAPENTIN 100 MG: 100 CAPSULE ORAL at 17:11

## 2023-03-04 RX ADMIN — PREDNISOLONE SODIUM PHOSPHATE 1 DROP: 10 SOLUTION/ DROPS OPHTHALMIC at 08:14

## 2023-03-04 RX ADMIN — TIMOLOL MALEATE 1 DROP: 5 SOLUTION OPHTHALMIC at 06:47

## 2023-03-04 RX ADMIN — PREDNISOLONE SODIUM PHOSPHATE 1 DROP: 10 SOLUTION/ DROPS OPHTHALMIC at 21:40

## 2023-03-04 RX ADMIN — DOCUSATE SODIUM 50 MG AND SENNOSIDES 8.6 MG 2 TABLET: 8.6; 5 TABLET, FILM COATED ORAL at 17:11

## 2023-03-04 RX ADMIN — MINERAL OIL, PETROLATUM 1 APPLICATION: 425; 573 OINTMENT OPHTHALMIC at 14:41

## 2023-03-04 RX ADMIN — BRIMONIDINE TARTRATE 1 DROP: 2 SOLUTION OPHTHALMIC at 17:12

## 2023-03-04 RX ADMIN — EPOETIN ALFA-EPBX 3000 UNITS: 3000 INJECTION, SOLUTION INTRAVENOUS; SUBCUTANEOUS at 13:09

## 2023-03-04 RX ADMIN — HEPARIN SODIUM 3800 UNITS: 1000 INJECTION, SOLUTION INTRAVENOUS; SUBCUTANEOUS at 13:27

## 2023-03-04 RX ADMIN — HEPARIN SODIUM 5000 UNITS: 5000 INJECTION, SOLUTION INTRAVENOUS; SUBCUTANEOUS at 06:45

## 2023-03-04 RX ADMIN — EPOETIN ALFA-EPBX 2000 UNITS: 2000 INJECTION, SOLUTION INTRAVENOUS; SUBCUTANEOUS at 13:09

## 2023-03-04 RX ADMIN — DORZOLAMIDE HYDROCHLORIDE AND TIMOLOL MALEATE 1 DROP: 20; 5 SOLUTION/ DROPS OPHTHALMIC at 06:47

## 2023-03-04 RX ADMIN — MINERAL OIL, PETROLATUM 1 APPLICATION: 425; 573 OINTMENT OPHTHALMIC at 21:41

## 2023-03-04 RX ADMIN — HEPARIN SODIUM 5000 UNITS: 5000 INJECTION, SOLUTION INTRAVENOUS; SUBCUTANEOUS at 14:41

## 2023-03-04 RX ADMIN — DAKIN'S SOLUTION 0.125% (QUARTER STRENGTH) 1 ML: 0.12 SOLUTION at 17:10

## 2023-03-04 RX ADMIN — PREDNISOLONE SODIUM PHOSPHATE 1 DROP: 10 SOLUTION/ DROPS OPHTHALMIC at 17:13

## 2023-03-04 RX ADMIN — Medication 220 MG: at 06:44

## 2023-03-04 RX ADMIN — MORPHINE SULFATE 4 MG: 4 INJECTION INTRAVENOUS at 16:03

## 2023-03-04 RX ADMIN — ACETAMINOPHEN 1000 MG: 325 TABLET, FILM COATED ORAL at 21:40

## 2023-03-04 RX ADMIN — ACETAZOLAMIDE EXTENDED-RELEASE 500 MG: 500 CAPSULE ORAL at 17:12

## 2023-03-04 RX ADMIN — BRIMONIDINE TARTRATE 1 DROP: 2 SOLUTION OPHTHALMIC at 06:48

## 2023-03-04 RX ADMIN — CARVEDILOL 3.12 MG: 3.12 TABLET, FILM COATED ORAL at 17:11

## 2023-03-04 RX ADMIN — BRIMONIDINE TARTRATE 1 DROP: 2 SOLUTION OPHTHALMIC at 00:00

## 2023-03-04 RX ADMIN — PREDNISOLONE SODIUM PHOSPHATE 1 DROP: 10 SOLUTION/ DROPS OPHTHALMIC at 00:00

## 2023-03-04 RX ADMIN — PREDNISOLONE SODIUM PHOSPHATE 1 DROP: 10 SOLUTION/ DROPS OPHTHALMIC at 14:41

## 2023-03-04 RX ADMIN — SEVELAMER CARBONATE 800 MG: 800 TABLET, FILM COATED ORAL at 17:11

## 2023-03-04 RX ADMIN — ACETAZOLAMIDE EXTENDED-RELEASE 500 MG: 500 CAPSULE ORAL at 06:44

## 2023-03-04 RX ADMIN — ACETAMINOPHEN 1000 MG: 325 TABLET, FILM COATED ORAL at 14:41

## 2023-03-04 RX ADMIN — MINERAL OIL, PETROLATUM 1 APPLICATION: 425; 573 OINTMENT OPHTHALMIC at 06:48

## 2023-03-04 RX ADMIN — ACETAMINOPHEN 1000 MG: 325 TABLET, FILM COATED ORAL at 08:14

## 2023-03-04 RX ADMIN — DORZOLAMIDE HYDROCHLORIDE AND TIMOLOL MALEATE 1 DROP: 20; 5 SOLUTION/ DROPS OPHTHALMIC at 17:13

## 2023-03-04 RX ADMIN — TIMOLOL MALEATE 1 DROP: 5 SOLUTION OPHTHALMIC at 17:13

## 2023-03-04 RX ADMIN — Medication 500 MG: at 06:44

## 2023-03-04 RX ADMIN — LATANOPROST 1 DROP: 50 SOLUTION OPHTHALMIC at 17:13

## 2023-03-04 RX ADMIN — HEPARIN SODIUM 5000 UNITS: 5000 INJECTION, SOLUTION INTRAVENOUS; SUBCUTANEOUS at 21:41

## 2023-03-04 RX ADMIN — MINERAL OIL, PETROLATUM 1 APPLICATION: 425; 573 OINTMENT OPHTHALMIC at 00:00

## 2023-03-04 RX ADMIN — EPOETIN ALFA-EPBX 5000 UNITS: 3000 INJECTION, SOLUTION INTRAVENOUS; SUBCUTANEOUS at 13:10

## 2023-03-04 ASSESSMENT — ENCOUNTER SYMPTOMS
FEVER: 0
SPEECH CHANGE: 0
WEAKNESS: 1
SENSORY CHANGE: 0
BLURRED VISION: 1
SHORTNESS OF BREATH: 0
PALPITATIONS: 0
NAUSEA: 1
CHILLS: 0
NERVOUS/ANXIOUS: 0
HEARTBURN: 0
BACK PAIN: 0
MYALGIAS: 1
VOMITING: 0
DIZZINESS: 0
FOCAL WEAKNESS: 0
DIARRHEA: 0
HEADACHES: 0
ABDOMINAL PAIN: 0
NECK PAIN: 0
FLANK PAIN: 0
COUGH: 0
SORE THROAT: 0
WHEEZING: 0
DIAPHORESIS: 0
EYE REDNESS: 1

## 2023-03-04 ASSESSMENT — PATIENT HEALTH QUESTIONNAIRE - PHQ9
SUM OF ALL RESPONSES TO PHQ QUESTIONS 1-9: 4
2. FEELING DOWN, DEPRESSED, IRRITABLE, OR HOPELESS: SEVERAL DAYS
SUM OF ALL RESPONSES TO PHQ9 QUESTIONS 1 AND 2: 2
9. THOUGHTS THAT YOU WOULD BE BETTER OFF DEAD, OR OF HURTING YOURSELF: NOT AT ALL
8. MOVING OR SPEAKING SO SLOWLY THAT OTHER PEOPLE COULD HAVE NOTICED. OR THE OPPOSITE, BEING SO FIGETY OR RESTLESS THAT YOU HAVE BEEN MOVING AROUND A LOT MORE THAN USUAL: NOT AT ALL
1. LITTLE INTEREST OR PLEASURE IN DOING THINGS: SEVERAL DAYS
5. POOR APPETITE OR OVEREATING: NOT AT ALL
3. TROUBLE FALLING OR STAYING ASLEEP OR SLEEPING TOO MUCH: NOT AT ALL
4. FEELING TIRED OR HAVING LITTLE ENERGY: SEVERAL DAYS
6. FEELING BAD ABOUT YOURSELF - OR THAT YOU ARE A FAILURE OR HAVE LET YOURSELF OR YOUR FAMILY DOWN: SEVERAL DAYS
7. TROUBLE CONCENTRATING ON THINGS, SUCH AS READING THE NEWSPAPER OR WATCHING TELEVISION: NOT AT ALL

## 2023-03-04 ASSESSMENT — PAIN DESCRIPTION - PAIN TYPE
TYPE: ACUTE PAIN

## 2023-03-04 NOTE — PROGRESS NOTES
"[facility] -  PROGRESS NOTE               Author: EMMA Beck Date & Time: 3/4/2023  10:59 AM     HPI:  Patient is a 47 year old male with a PMHx of ESRD on HD qTTS, meth user and history of calciphylaxis here for leg pain.  States he's still using meth.  Last HD session was about 4-5 days ago.  Here potassium was 7.6 and bicarb of 7.  Nephrology consulted for maintenance dialysis.  Currently complaining of leg pain.  History of nonadherence.    DAILY NEPHROLOGY SUMMARY:  2/23 - Pain controlled.  Denies nausea.  No SOB  Had HD yesterday  2/24 - Denies pain or SOB.  Laying in bed comfortable  2/25 - 2L Net UF. NPO for OR this am w/Opth for retinal detachment. Reports that he is hungry. 1200 mL UOP last 24 hrs.   2/26: 2.5L Net UF. Bps better. Sitting at side of bed eating breakfast. States that he wants HD chair set up here. Unable to drive to [place] to HD clinic d/t vision.   2/27: Pt sitting up in bed eating breakfast, no overnight events, VSS on RA, worked with OT this AM and cleared for DC, waiting on DC plan  2/28: Pt confused today  3/01: HD yest, UF 2L. VSS. Sitting up in bed holding his head \"I hurt all over\". Vague about whether medication is helping his pain.   3/02: C/O pain eyes and thighs. Due for dialysis. He voiced no concerns. Eating and drinking okay. VSS.   3/03: HD yest, UF 2L. VSS. Pt sleeps a lot. Briefly wakes and engages in conversation then goes back to sleep. Intermittent nause and pain (legs and eyes) No new issues. CM looking for outpt HD chair.  3/04: Pt seen on HD, sleeping, VSS, pending cleared, just waiting on OP HD chair    REVIEW OF SYSTEMS:    A 10 pt review of systems was obtained and is per HPI/&/or daily summary otherwise negative    PMH/PSH/SH/FH:   Reviewed and unchanged since admission note    CURRENT MEDICATIONS:   Reviewed from admission to present day    VS:  /68   Pulse 68   Temp 36.2 °C (97.2 °F) (Temporal)   Resp 18  " " Ht 1.803 m (5' 10.98\")   Wt 90 kg (198 lb 6.6 oz)   SpO2 98%   BMI 27.69 kg/m²     Physical Exam  Nursing note reviewed.   Constitutional:       Appearance: Normal appearance.   Eyes:      General: No scleral icterus.     Comments: R eye erythema   Cardiovascular:      Comments: No edema  LFA AVG  [facility] TDC in use  Pulmonary:      Effort: Pulmonary effort is normal.   Abdominal:      General: There is no distension.   Musculoskeletal:         General: No deformity.   Skin:     Findings: No rash.      Comments: Chronic wounds b/l thighs/abd   Neurological:      Mental Status: He is alert.   Psychiatric:         Behavior: Behavior is cooperative.        Fluids:  In: 978 [P.O.:978]  Out: 800     LABS:  Recent Labs     03/01/23  1201 03/02/23  0245 03/03/23  0540   SODIUM 134* 135 133*   POTASSIUM 4.7 5.3 4.5   CHLORIDE 98 97 95*   CO2 23 23 24   GLUCOSE 122* 123* 147*   BUN 57* 68* 58*   CREATININE 7.54* 8.15* 6.86*   CALCIUM 8.2* 8.2* 8.0*       IMAGING:   All imaging reviewed from admission to present day    IMPRESSION:  # ESRD  - Etiology likely 2/2 DM/HTN  - HD qTTHS via [facility] TDC  - [facility] his HD chair at [facility] previously  # Hyperkalemia, corrected with HD             - s/p stat HD   # LFA AVG thrombosed  - US 2/23 no flow  - s/p OR 2/25 AVG Brachio-axillary Creation, thrombectomy and fistulogram  - Stenosis and wall thrombus noted on US, [facility] TDC in use  # HTN, fair control    # Anemia of CKD, below target  - Goal Ggb 10-11  - Ferritin 2722, %sat 41  # CKD-MBD  - Ca 7.6  - iCa 1.0  - Phos 9.3  # Calciphylaxis hx  - Per report from patient  # Generalized weakness, improved   - Evaluated with PT/OT  # h/o Methamphetamine use   # Homelessness  # Encephalopthy ? Meds vs infection   - Improved  # DM   # Chronic wounds b/l thighs/abd   - Wound care following   # Conjunctivitis/End Stage Glaucoma  - Diamox  - Latanoprost, Combigan, Cosopy, Preds Forte    PLAN:  - Continue iHD qTTS/PRN, treatment today " (SAT)  - UF as tolerated   - FU Opthalm Recs  - Has been on high Ca bath with HD, but trying to avoid given hx, currently on 2.5   - Use [facility] PermCath for now  - AVG eval as OP at    - JACKIE with HD to goal Hgb 10-11  - No dietary protein restrictions  - Dose all meds per ESRD/iHD  - Renal diet, low salt diet, fluid restriction to 1.5L daily  - SW support for placement. Requesting local HD chair. Hx of non adherence to HD.   - Can transition to Outpt HD clinic when medically cleared and chair secured.  - Follow labs  - Wound care  - Add sevelamer WM    Thank you,

## 2023-03-04 NOTE — PROGRESS NOTES
[facility] Medicine Daily Progress Note    Date of Service  3/4/2023    Chief Complaint  Ambrose Watkins is a 47 y.o. male admitted 2/22/2023 with hyperkalemia.    Hospital Course  Admitted with hyperkalemia and volume overload.  Patient with known history of end-stage renal disease on hemodialysis with known history of noncompliance. Nephrology was consulted on the case.  Patient had emergent hemodialysis.  Patient was also noted to have redness of the right eye.  Ophthalmology was consulted on the case.  Patient was noted to have glaucoma and vitreous hemorrhage of the right eye.  Ophthalmic drops were started.    Interval Problem Update  HTN - sbp 105-114  Open wounds - pain better controlled    I have discussed this patient's plan of care and discharge plan at IDT rounds today with Case Management, Nursing, Nursing leadership, and other members of the IDT team.    Consultants/Specialty  nephrology and ophthalmology    Code Status  Full Code    Disposition  Patient is medically cleared pending case management arangements for outpatient dialysis  for discharge.   Anticipate discharge to to home with close outpatient follow-up.  I have placed the appropriate orders for post-discharge needs.    Review of Systems  Review of Systems   Constitutional:  Positive for malaise/fatigue. Negative for chills, diaphoresis and fever.   HENT:  Negative for congestion, hearing loss and sore throat.    Eyes:  Positive for blurred vision and redness.   Respiratory:  Negative for cough, shortness of breath and wheezing.    Cardiovascular:  Positive for leg swelling. Negative for chest pain and palpitations.   Gastrointestinal:  Positive for nausea. Negative for abdominal pain, diarrhea, heartburn and vomiting.   Genitourinary:  Negative for dysuria, flank pain and hematuria.   Musculoskeletal:  Positive for myalgias. Negative for back pain, joint pain and neck pain.   Skin:  Negative for rash.   Neurological:  Positive for  weakness. Negative for dizziness, sensory change, speech change, focal weakness and headaches.   Psychiatric/Behavioral:  The patient is not nervous/anxious.       Physical Exam  Temp:  [36.2 °C (97.2 °F)-37.1 °C (98.8 °F)] 36.2 °C (97.2 °F)  Pulse:  [68-75] 68  Resp:  [17-18] 18  BP: (105-114)/(60-70) 107/68  SpO2:  [91 %-98 %] 98 %    Physical Exam  Vitals and nursing note reviewed.   HENT:      Head: Normocephalic and atraumatic.      Nose: No congestion.      Mouth/Throat:      Mouth: Mucous membranes are moist.   Eyes:      Conjunctiva/sclera:      Right eye: Right conjunctiva is injected. Hemorrhage present.   Cardiovascular:      Rate and Rhythm: Normal rate and regular rhythm.      Heart sounds: Murmur heard.   Pulmonary:      Effort: Pulmonary effort is normal.   Abdominal:      General: There is no distension.      Tenderness: There is no abdominal tenderness. There is no guarding or rebound.   Musculoskeletal:      Cervical back: No tenderness.      Right lower leg: Edema present.      Left lower leg: Edema present.   Skin:     Comments: Open wounds at both lower extremities   Neurological:      General: No focal deficit present.      Mental Status: He is alert and oriented to person, place, and time.      Cranial Nerves: No cranial nerve deficit.   Psychiatric:         Speech: Speech is delayed.         Cognition and Memory: He exhibits impaired recent memory.       Fluids    Intake/Output Summary (Last 24 hours) at 3/4/2023 1045  Last data filed at 3/4/2023 0000  Gross per 24 hour   Intake 858 ml   Output 800 ml   Net 58 ml         Laboratory  Recent Labs     03/01/23  1201 03/02/23  0245 03/03/23  0540   WBC 7.2 8.4 7.4   RBC 3.20* 3.21* 3.03*   HEMOGLOBIN 9.2* 9.2* 8.6*   HEMATOCRIT 29.8* 30.4* 28.7*   MCV 93.1 94.7 94.7   MCH 28.8 28.7 28.4   MCHC 30.9* 30.3* 30.0*   RDW 64.9* 65.8* 64.1*   PLATELETCT 110* 124* 165   MPV 10.4 10.1 9.6       Recent Labs     03/01/23  1201 03/02/23  0245 03/03/23  0540    SODIUM 134* 135 133*   POTASSIUM 4.7 5.3 4.5   CHLORIDE 98 97 95*   CO2 23 23 24   GLUCOSE 122* 123* 147*   BUN 57* 68* 58*   CREATININE 7.54* 8.15* 6.86*   CALCIUM 8.2* 8.2* 8.0*                     Imaging  [id number] W/O PLUS RECONS   Final Result      1.  Increased density throughout the right lobe is nonspecific though may suggest underlying hemorrhage with possible retinal detachment.   2.  No post septal fluid collection.             Assessment/Plan  * Hyperkalemia- (present on admission)  Assessment & Plan  Follow bmp    Noncompliance of patient with renal dialysis ([facility])- (present on admission)  Assessment & Plan  Education and counseling    End stage renal disease ([facility])- (present on admission)  Assessment & Plan  HD per Nephrology    Case management for discharge planning - outpatient Dialysis arrangement    Pulmonary HTN ([facility])- (present on admission)  Assessment & Plan  Monitor volume status    Cardiomyopathy ([facility])- (present on admission)  Assessment & Plan  Coreg    Chronic HFrEF (heart failure with reduced ejection fraction) ([facility])- (present on admission)  Assessment & Plan  Coreg    Right eye glaucoma, due to angle-closure and phacomorphic components- (present on admission)  Assessment & Plan  Ophthalmology - probable end-stage glaucoma from angle-closure and phacomorphic components.  Latanoprost, Timolol, Brimonidine, Cosopt, Prednisone forte ophthalmic drops and Diamox  Follow up with Ophthalmology as outpatient    Vitreous hemorrhage of right eye ([facility])- (present on admission)  Assessment & Plan  Likely due to glaucoma    Open wound- (present on admission)  Assessment & Plan  Chronic  Wound care  Pain control - scheduled Tylenol, Oxycodone prn, IV Morphine for dressing changes     Anemia, chronic disease- (present on admission)  Assessment & Plan  Epogen  Follow cbc    Hypocalcemia- (present on admission)  Assessment & Plan  Follow bmp    Methamphetamine abuse ([facility])- (present on  admission)  Assessment & Plan  UDS pending    Hypertension- (present on admission)  Assessment & Plan  Coreg     Type 2 diabetes mellitus with kidney complication, without long-term current use of insulin (HCC)- (present on admission)  Assessment & Plan  hgba1c 5.9    Tobacco dependence- (present on admission)  Assessment & Plan  Refused Nicotine replacement protocol         VTE prophylaxis: heparin ppx    I have performed a physical exam and reviewed and updated ROS and Plan today (3/4/2023). In review of yesterday's note (3/3/2023), there are no changes except as documented above.

## 2023-03-04 NOTE — PROGRESS NOTES
[facility] Services Progress Note    Hemodialysis treatment ordered today per Dr. Simon x 3 hours. Treatment initiated at 1037 and ended at 1337.    Pt A/Ox3, fatigued, denies any discomfort pre-tx , VSS.    Pt tolerated treatment, no issues reported during HD tx.; see e-flow sheets for details.    Net UF 2,000 mL    Post tx, CVC flushed with saline then locked with heparin 1000 units/mL per designated amount in each wing then clamped and capped. Aspirate heparin prior to next CVC use.    Report given to Primary RN.

## 2023-03-04 NOTE — CARE PLAN
The patient is Stable - Low risk of patient condition declining or worsening    Shift Goals  Clinical Goals: Wound care, pain mngmt, rest  Patient Goals: Pain mgnmt, rest  Family Goals: donis    Progress made toward(s) clinical / shift goals: Wound care administered, medicated for pain per MAR.     Problem: Pain - Standard  Goal: Alleviation of pain or a reduction in pain to the patient’s comfort goal  Outcome: Progressing     Problem: Knowledge Deficit - Standard  Goal: Patient and family/care givers will demonstrate understanding of plan of care, disease process/condition, diagnostic tests and medications  Outcome: Progressing     Problem: Skin Integrity  Goal: Skin integrity is maintained or improved  Outcome: Progressing     Problem: Fall Risk  Goal: Patient will remain free from falls  Outcome: Progressing

## 2023-03-05 LAB
ANION GAP SERPL CALC-SCNC: 15 MMOL/L (ref 7–16)
BUN SERPL-MCNC: 51 MG/DL (ref 8–22)
CALCIUM SERPL-MCNC: 8.8 MG/DL (ref 8.5–10.5)
CHLORIDE SERPL-SCNC: 94 MMOL/L (ref 96–112)
CO2 SERPL-SCNC: 23 MMOL/L (ref 20–33)
CREAT SERPL-MCNC: 6.58 MG/DL (ref 0.5–1.4)
ERYTHROCYTE [DISTWIDTH] IN BLOOD BY AUTOMATED COUNT: 62.3 FL (ref 35.9–50)
GFR SERPLBLD CREATININE-BSD FMLA CKD-EPI: 10 ML/MIN/1.73 M 2
GLUCOSE SERPL-MCNC: 90 MG/DL (ref 65–99)
HCT VFR BLD AUTO: 32.6 % (ref 42–52)
HGB BLD-MCNC: 9.8 G/DL (ref 14–18)
MCH RBC QN AUTO: 28.3 PG (ref 27–33)
MCHC RBC AUTO-ENTMCNC: 30.1 G/DL (ref 33.7–35.3)
MCV RBC AUTO: 94.2 FL (ref 81.4–97.8)
PLATELET # BLD AUTO: 219 K/UL (ref 164–446)
PMV BLD AUTO: 9.8 FL (ref 9–12.9)
POTASSIUM SERPL-SCNC: 4.9 MMOL/L (ref 3.6–5.5)
RBC # BLD AUTO: 3.46 M/UL (ref 4.7–6.1)
SODIUM SERPL-SCNC: 132 MMOL/L (ref 135–145)
WBC # BLD AUTO: 6.8 K/UL (ref 4.8–10.8)

## 2023-03-05 PROCEDURE — 700102 HCHG RX REV CODE 250 W/ 637 OVERRIDE(OP): Performed by: FAMILY MEDICINE

## 2023-03-05 PROCEDURE — 700102 HCHG RX REV CODE 250 W/ 637 OVERRIDE(OP): Performed by: NURSE PRACTITIONER

## 2023-03-05 PROCEDURE — 85027 COMPLETE CBC AUTOMATED: CPT

## 2023-03-05 PROCEDURE — 99232 SBSQ HOSP IP/OBS MODERATE 35: CPT | Performed by: FAMILY MEDICINE

## 2023-03-05 PROCEDURE — 700102 HCHG RX REV CODE 250 W/ 637 OVERRIDE(OP): Performed by: HOSPITALIST

## 2023-03-05 PROCEDURE — 700102 HCHG RX REV CODE 250 W/ 637 OVERRIDE(OP): Performed by: INTERNAL MEDICINE

## 2023-03-05 PROCEDURE — A9270 NON-COVERED ITEM OR SERVICE: HCPCS | Performed by: FAMILY MEDICINE

## 2023-03-05 PROCEDURE — 770006 HCHG ROOM/CARE - MED/SURG/GYN SEMI*

## 2023-03-05 PROCEDURE — A9270 NON-COVERED ITEM OR SERVICE: HCPCS | Performed by: NURSE PRACTITIONER

## 2023-03-05 PROCEDURE — A9270 NON-COVERED ITEM OR SERVICE: HCPCS | Performed by: INTERNAL MEDICINE

## 2023-03-05 PROCEDURE — 80048 BASIC METABOLIC PNL TOTAL CA: CPT

## 2023-03-05 PROCEDURE — 36415 COLL VENOUS BLD VENIPUNCTURE: CPT

## 2023-03-05 PROCEDURE — A9270 NON-COVERED ITEM OR SERVICE: HCPCS | Performed by: HOSPITALIST

## 2023-03-05 PROCEDURE — 700111 HCHG RX REV CODE 636 W/ 250 OVERRIDE (IP)

## 2023-03-05 PROCEDURE — 700111 HCHG RX REV CODE 636 W/ 250 OVERRIDE (IP): Performed by: HOSPITALIST

## 2023-03-05 RX ORDER — OXYCODONE HYDROCHLORIDE 10 MG/1
10-15 TABLET ORAL EVERY 4 HOURS PRN
Status: DISCONTINUED | OUTPATIENT
Start: 2023-03-05 | End: 2023-03-16 | Stop reason: HOSPADM

## 2023-03-05 RX ADMIN — MORPHINE SULFATE 4 MG: 4 INJECTION INTRAVENOUS at 13:15

## 2023-03-05 RX ADMIN — ONDANSETRON 4 MG: 2 INJECTION INTRAMUSCULAR; INTRAVENOUS at 01:05

## 2023-03-05 RX ADMIN — MINERAL OIL, PETROLATUM 1 APPLICATION: 425; 573 OINTMENT OPHTHALMIC at 04:50

## 2023-03-05 RX ADMIN — DORZOLAMIDE HYDROCHLORIDE AND TIMOLOL MALEATE 1 DROP: 20; 5 SOLUTION/ DROPS OPHTHALMIC at 18:00

## 2023-03-05 RX ADMIN — HEPARIN SODIUM 5000 UNITS: 5000 INJECTION, SOLUTION INTRAVENOUS; SUBCUTANEOUS at 21:59

## 2023-03-05 RX ADMIN — MINERAL OIL, PETROLATUM 1 APPLICATION: 425; 573 OINTMENT OPHTHALMIC at 13:16

## 2023-03-05 RX ADMIN — DAKIN'S SOLUTION 0.125% (QUARTER STRENGTH) 1 ML: 0.12 SOLUTION at 16:04

## 2023-03-05 RX ADMIN — CARVEDILOL 3.12 MG: 3.12 TABLET, FILM COATED ORAL at 16:04

## 2023-03-05 RX ADMIN — TIMOLOL MALEATE 1 DROP: 5 SOLUTION OPHTHALMIC at 16:05

## 2023-03-05 RX ADMIN — BRIMONIDINE TARTRATE 1 DROP: 2 SOLUTION OPHTHALMIC at 16:05

## 2023-03-05 RX ADMIN — DORZOLAMIDE HYDROCHLORIDE AND TIMOLOL MALEATE 1 DROP: 20; 5 SOLUTION/ DROPS OPHTHALMIC at 04:47

## 2023-03-05 RX ADMIN — PREDNISOLONE SODIUM PHOSPHATE 1 DROP: 10 SOLUTION/ DROPS OPHTHALMIC at 21:59

## 2023-03-05 RX ADMIN — OXYCODONE HYDROCHLORIDE 15 MG: 10 TABLET ORAL at 20:08

## 2023-03-05 RX ADMIN — SEVELAMER CARBONATE 800 MG: 800 TABLET, FILM COATED ORAL at 08:13

## 2023-03-05 RX ADMIN — SEVELAMER CARBONATE 800 MG: 800 TABLET, FILM COATED ORAL at 11:30

## 2023-03-05 RX ADMIN — DAKIN'S SOLUTION 0.125% (QUARTER STRENGTH) 1 ML: 0.12 SOLUTION at 06:00

## 2023-03-05 RX ADMIN — HEPARIN SODIUM 5000 UNITS: 5000 INJECTION, SOLUTION INTRAVENOUS; SUBCUTANEOUS at 14:00

## 2023-03-05 RX ADMIN — ACETAMINOPHEN 1000 MG: 325 TABLET, FILM COATED ORAL at 16:04

## 2023-03-05 RX ADMIN — GABAPENTIN 100 MG: 100 CAPSULE ORAL at 16:04

## 2023-03-05 RX ADMIN — ACETAZOLAMIDE EXTENDED-RELEASE 500 MG: 500 CAPSULE ORAL at 16:11

## 2023-03-05 RX ADMIN — Medication 220 MG: at 04:47

## 2023-03-05 RX ADMIN — MORPHINE SULFATE 4 MG: 4 INJECTION INTRAVENOUS at 00:21

## 2023-03-05 RX ADMIN — PREDNISOLONE SODIUM PHOSPHATE 1 DROP: 10 SOLUTION/ DROPS OPHTHALMIC at 08:13

## 2023-03-05 RX ADMIN — Medication 500 MG: at 06:00

## 2023-03-05 RX ADMIN — DOCUSATE SODIUM 50 MG AND SENNOSIDES 8.6 MG 2 TABLET: 8.6; 5 TABLET, FILM COATED ORAL at 06:00

## 2023-03-05 RX ADMIN — TIMOLOL MALEATE 1 DROP: 5 SOLUTION OPHTHALMIC at 04:47

## 2023-03-05 RX ADMIN — PREDNISOLONE SODIUM PHOSPHATE 1 DROP: 10 SOLUTION/ DROPS OPHTHALMIC at 16:04

## 2023-03-05 RX ADMIN — ACETAZOLAMIDE EXTENDED-RELEASE 500 MG: 500 CAPSULE ORAL at 04:48

## 2023-03-05 RX ADMIN — MINERAL OIL, PETROLATUM 1 APPLICATION: 425; 573 OINTMENT OPHTHALMIC at 21:59

## 2023-03-05 RX ADMIN — BRIMONIDINE TARTRATE 1 DROP: 2 SOLUTION OPHTHALMIC at 04:47

## 2023-03-05 RX ADMIN — OXYCODONE AND ACETAMINOPHEN 1 TABLET: 10; 325 TABLET ORAL at 10:07

## 2023-03-05 RX ADMIN — HEPARIN SODIUM 5000 UNITS: 5000 INJECTION, SOLUTION INTRAVENOUS; SUBCUTANEOUS at 04:51

## 2023-03-05 RX ADMIN — SEVELAMER CARBONATE 800 MG: 800 TABLET, FILM COATED ORAL at 16:04

## 2023-03-05 RX ADMIN — LATANOPROST 1 DROP: 50 SOLUTION OPHTHALMIC at 16:05

## 2023-03-05 RX ADMIN — PREDNISOLONE SODIUM PHOSPHATE 1 DROP: 10 SOLUTION/ DROPS OPHTHALMIC at 13:16

## 2023-03-05 RX ADMIN — CARVEDILOL 3.12 MG: 3.12 TABLET, FILM COATED ORAL at 08:13

## 2023-03-05 RX ADMIN — ACETAMINOPHEN 1000 MG: 325 TABLET, FILM COATED ORAL at 08:13

## 2023-03-05 RX ADMIN — DOCUSATE SODIUM 50 MG AND SENNOSIDES 8.6 MG 2 TABLET: 8.6; 5 TABLET, FILM COATED ORAL at 16:04

## 2023-03-05 ASSESSMENT — ENCOUNTER SYMPTOMS
ABDOMINAL PAIN: 0
SORE THROAT: 0
DIAPHORESIS: 0
WEAKNESS: 1
SPEECH CHANGE: 0
DIARRHEA: 0
FLANK PAIN: 0
COUGH: 0
VOMITING: 0
DIZZINESS: 0
HEARTBURN: 0
BLURRED VISION: 1
HEADACHES: 0
PALPITATIONS: 0
NERVOUS/ANXIOUS: 1
FEVER: 0
BACK PAIN: 0
SHORTNESS OF BREATH: 0
WHEEZING: 0
FOCAL WEAKNESS: 0
CHILLS: 0
MYALGIAS: 1
EYE REDNESS: 1
SENSORY CHANGE: 0
NAUSEA: 1
NECK PAIN: 0

## 2023-03-05 ASSESSMENT — PAIN DESCRIPTION - PAIN TYPE
TYPE: ACUTE PAIN
TYPE: ACUTE PAIN

## 2023-03-05 NOTE — PROGRESS NOTES
Pt is A&ox4, no c/o pain reported at this time. Medicated per MAR. Reinforced the use of call light when needed assistance, verbalized understanding. Safety measures in place.

## 2023-03-05 NOTE — PROGRESS NOTES
Bedside report received. Assumed care of patient this PM. Assessment completed      Patient is A&O x 4, pt calls for assistance appropriately  Reports no pain at start of shift, then 10 /10 pain in thighs before wound dressing change, prn medication administered.  Pt is r/a  Mobility SBA to FWW   Bed alarm in on.  Voiding - oliguric/anuric  Flatus +            Plan of care reviewed with the patient. Bed is locked and in the lowest position. Call light is within reach. Patient encouraged to voice needs and concerns, all needs met at this time. Hourly rounding in place.

## 2023-03-05 NOTE — PROGRESS NOTES
"[facility] -  PROGRESS NOTE               Author: EMMA Beck Date & Time: 3/5/2023  12:38 PM     HPI:  Patient is a 47 year old male with a PMHx of ESRD on HD qTTS, meth user and history of calciphylaxis here for leg pain.  States he's still using meth.  Last HD session was about 4-5 days ago.  Here potassium was 7.6 and bicarb of 7.  Nephrology consulted for maintenance dialysis.  Currently complaining of leg pain.  History of nonadherence.    DAILY NEPHROLOGY SUMMARY:  2/23 - Pain controlled.  Denies nausea.  No SOB  Had HD yesterday  2/24 - Denies pain or SOB.  Laying in bed comfortable  2/25 - 2L Net UF. NPO for OR this am w/Opth for retinal detachment. Reports that he is hungry. 1200 mL UOP last 24 hrs.   2/26: 2.5L Net UF. Bps better. Sitting at side of bed eating breakfast. States that he wants HD chair set up here. Unable to drive to [place] to HD clinic d/t vision.   2/27: Pt sitting up in bed eating breakfast, no overnight events, VSS on RA, worked with OT this AM and cleared for DC, waiting on DC plan  2/28: Pt confused today  3/01: HD yest, UF 2L. VSS. Sitting up in bed holding his head \"I hurt all over\". Vague about whether medication is helping his pain.   3/02: C/O pain eyes and thighs. Due for dialysis. He voiced no concerns. Eating and drinking okay. VSS.   3/03: HD yest, UF 2L. VSS. Pt sleeps a lot. Briefly wakes and engages in conversation then goes back to sleep. Intermittent nause and pain (legs and eyes) No new issues. CM looking for outpt HD chair.  3/04: Pt seen on HD, sleeping, VSS, medically cleared, just waiting on OP HD chair  3/05: Pt sitting up in bed, says he hurts all over, iHD yesterday with 2L net UF, labs reviewed, VSS on RA    REVIEW OF SYSTEMS:    A 10 pt review of systems was obtained and is per HPI/&/or daily summary otherwise negative    PMH/PSH/SH/FH:   Reviewed and unchanged since admission note    CURRENT MEDICATIONS: " "  Reviewed from admission to present day    VS:  /79   Pulse 73   Temp 36.7 °C (98 °F) (Temporal)   Resp 18   Ht 1.803 m (5' 10.98\")   Wt 90 kg (198 lb 6.6 oz)   SpO2 94%   BMI 27.69 kg/m²     Physical Exam  Nursing note reviewed.   Constitutional:       Appearance: Normal appearance.   Eyes:      General: No scleral icterus.     Comments: R eye erythema   Cardiovascular:      Comments: LFA AVG  [facility] TDC  Edema w/wounds  Pulmonary:      Effort: Pulmonary effort is normal.   Abdominal:      General: There is no distension.   Musculoskeletal:         General: No deformity.   Skin:     Findings: No rash.      Comments: Chronic wounds b/l thighs/abd   Neurological:      Mental Status: He is alert.   Psychiatric:         Behavior: Behavior is cooperative.        Fluids:  In: 500 [Dialysis:500]  Out: 2500     LABS:  Recent Labs     03/03/23  0540 03/04/23  1458 03/05/23  0953   SODIUM 133* 134* 132*   POTASSIUM 4.5 4.3 4.9   CHLORIDE 95* 96 94*   CO2 24 25 23   GLUCOSE 147* 144* 90   BUN 58* 41* 51*   CREATININE 6.86* 5.61* 6.58*   CALCIUM 8.0* 8.4* 8.8       IMAGING:   All imaging reviewed from admission to present day    IMPRESSION:  # ESRD  - Etiology likely 2/2 DM/HTN  - HD qTTHS via [facility] TDC  - [facility] his HD chair at [facility] previously  # Hyperkalemia, corrected with HD             - s/p stat HD   # LFA AVG thrombosed  - US 2/23 no flow  - s/p OR 2/25 AVG Brachio-axillary Creation, thrombectomy and fistulogram  - Stenosis and wall thrombus noted on US, [facility] TDC in use  # HTN, fair control    # Anemia of CKD, below target  - Goal Ggb 10-11  - Ferritin 2722, %sat 41  # CKD-MBD  - Ca 7.6  - iCa 1.0  - Phos 9.3  # Calciphylaxis hx  - Per report from patient  # Generalized weakness, improved   - Evaluated with PT/OT  # h/o Methamphetamine use   # Homelessness  # Encephalopthy ? Meds vs infection   - Improved  # DM   # Chronic wounds b/l thighs/abd   - Wound care following   # Conjunctivitis/End Stage " Glaucoma  - Diamox  - Latanoprost, Combigan, Cosopy, Preds Forte    PLAN:  - Continue iHD qTTS/PRN, treatment next due TUES  - UF as tolerated   - FU Opthalm Recs  - Has been on high Ca bath with HD, but trying to avoid given hx, currently on 2.5   - Use [facility] PermCath for now  - AVG eval as OP at [facility]   - JACKIE with HD to goal Hgb 10-11  - No dietary protein restrictions  - Dose all meds per ESRD/iHD  - Renal diet, low salt diet, fluid restriction to 1.5L daily  - SW support for placement. Requesting local HD chair. Hx of non adherence to HD.   - Can transition to Outpt HD clinic when medically cleared and chair secured.  - Follow labs  - Wound care  - Added sevelamer WM    Thank you,

## 2023-03-05 NOTE — CARE PLAN
The patient is Stable - Low risk of patient condition declining or worsening    Shift Goals  Clinical Goals: pain < 4, wound care, rest  Patient Goals: pain mgmt, wound care  Family Goals: POLINA    Progress made toward(s) clinical / shift goals:  pt meds passed per MAR, no injuries this shift      Problem: Pain - Standard  Goal: Alleviation of pain or a reduction in pain to the patient’s comfort goal  Outcome: Progressing     Problem: Knowledge Deficit - Standard  Goal: Patient and family/care givers will demonstrate understanding of plan of care, disease process/condition, diagnostic tests and medications  Outcome: Progressing     Problem: Skin Integrity  Goal: Skin integrity is maintained or improved  Outcome: Progressing     Problem: Fall Risk  Goal: Patient will remain free from falls  Outcome: Progressing       Patient is not progressing towards the following goals:

## 2023-03-05 NOTE — PROGRESS NOTES
[facility] Medicine Daily Progress Note    Date of Service  3/5/2023    Chief Complaint  Ambrose Watkins is a 47 y.o. male admitted 2/22/2023 with hyperkalemia.    Hospital Course  Admitted with hyperkalemia and volume overload.  Patient with known history of end-stage renal disease on hemodialysis with known history of noncompliance. Nephrology was consulted on the case.  Patient had emergent hemodialysis.  Patient was also noted to have redness of the right eye.  Ophthalmology was consulted on the case.  Patient was noted to have glaucoma and vitreous hemorrhage of the right eye.  Ophthalmic drops were started.    Interval Problem Update  HTN - sbp 107-123  Open wounds - pain not  controlled    I have discussed this patient's plan of care and discharge plan at IDT rounds today with Case Management, Nursing, Nursing leadership, and other members of the IDT team.    Consultants/Specialty  nephrology and ophthalmology    Code Status  Full Code    Disposition  Patient is medically cleared pending case management arangements for outpatient dialysis  for discharge.   Anticipate discharge to to home with close outpatient follow-up.  I have placed the appropriate orders for post-discharge needs.    Review of Systems  Review of Systems   Constitutional:  Positive for malaise/fatigue. Negative for chills, diaphoresis and fever.   HENT:  Negative for congestion, hearing loss and sore throat.    Eyes:  Positive for blurred vision and redness.   Respiratory:  Negative for cough, shortness of breath and wheezing.    Cardiovascular:  Positive for leg swelling. Negative for chest pain and palpitations.   Gastrointestinal:  Positive for nausea. Negative for abdominal pain, diarrhea, heartburn and vomiting.   Genitourinary:  Negative for dysuria, flank pain and hematuria.   Musculoskeletal:  Positive for myalgias. Negative for back pain, joint pain and neck pain.   Skin:  Negative for rash.   Neurological:  Positive for  weakness. Negative for dizziness, sensory change, speech change, focal weakness and headaches.   Psychiatric/Behavioral:  The patient is nervous/anxious.       Physical Exam  Temp:  [36.4 °C (97.6 °F)-37.1 °C (98.8 °F)] 36.7 °C (98 °F)  Pulse:  [65-82] 73  Resp:  [18-19] 18  BP: (107-123)/(64-79) 121/79  SpO2:  [94 %-96 %] 94 %    Physical Exam  Vitals and nursing note reviewed.   HENT:      Head: Normocephalic and atraumatic.      Nose: No congestion.      Mouth/Throat:      Mouth: Mucous membranes are moist.   Eyes:      Conjunctiva/sclera:      Right eye: Right conjunctiva is injected. Hemorrhage present.   Cardiovascular:      Rate and Rhythm: Normal rate and regular rhythm.      Heart sounds: Murmur heard.   Pulmonary:      Effort: Pulmonary effort is normal.   Abdominal:      General: There is no distension.      Tenderness: There is no abdominal tenderness. There is no guarding or rebound.   Musculoskeletal:      Cervical back: No tenderness.      Right lower leg: Edema present.      Left lower leg: Edema present.   Skin:     Comments: Open wounds at both lower extremities   Neurological:      General: No focal deficit present.      Mental Status: He is alert and oriented to person, place, and time.      Cranial Nerves: No cranial nerve deficit.   Psychiatric:         Mood and Affect: Mood is anxious.         Speech: Speech is delayed.         Cognition and Memory: He exhibits impaired recent memory.       Fluids    Intake/Output Summary (Last 24 hours) at 3/5/2023 1159  Last data filed at 3/4/2023 1345  Gross per 24 hour   Intake 500 ml   Output 2500 ml   Net -2000 ml         Laboratory  Recent Labs     03/03/23  0540 03/04/23  1458 03/05/23  0953   WBC 7.4 7.1 6.8   RBC 3.03* 3.02* 3.46*   HEMOGLOBIN 8.6* 8.6* 9.8*   HEMATOCRIT 28.7* 28.2* 32.6*   MCV 94.7 93.4 94.2   MCH 28.4 28.5 28.3   MCHC 30.0* 30.5* 30.1*   RDW 64.1* 62.3* 62.3*   PLATELETCT 165 223 219   MPV 9.6 9.6 9.8       Recent Labs      03/03/23  0540 03/04/23  1458 03/05/23  0953   SODIUM 133* 134* 132*   POTASSIUM 4.5 4.3 4.9   CHLORIDE 95* 96 94*   CO2 24 25 23   GLUCOSE 147* 144* 90   BUN 58* 41* 51*   CREATININE 6.86* 5.61* 6.58*   CALCIUM 8.0* 8.4* 8.8                     Imaging  [id number] W/O PLUS RECONS   Final Result      1.  Increased density throughout the right lobe is nonspecific though may suggest underlying hemorrhage with possible retinal detachment.   2.  No post septal fluid collection.             Assessment/Plan  * Hyperkalemia- (present on admission)  Assessment & Plan  Follow bmp    Noncompliance of patient with renal dialysis (HCC)- (present on admission)  Assessment & Plan  Education and counseling    End stage renal disease (HCC)- (present on admission)  Assessment & Plan  HD per Nephrology    Case management for discharge planning - outpatient Dialysis arrangement    Pulmonary HTN (HCC)- (present on admission)  Assessment & Plan  Monitor volume status    Cardiomyopathy ([facility])- (present on admission)  Assessment & Plan  Coreg    Chronic HFrEF (heart failure with reduced ejection fraction) ([facility])- (present on admission)  Assessment & Plan  Coreg    Right eye glaucoma, due to angle-closure and phacomorphic components- (present on admission)  Assessment & Plan  Ophthalmology - probable end-stage glaucoma from angle-closure and phacomorphic components.  Latanoprost, Timolol, Brimonidine, Cosopt, Prednisone forte ophthalmic drops and Diamox  Follow up with Ophthalmology as outpatient    Vitreous hemorrhage of right eye (HCC)- (present on admission)  Assessment & Plan  Likely due to glaucoma    Open wound- (present on admission)  Assessment & Plan  Chronic  Wound care  Pain control - scheduled Tylenol, adjust Oxycodone prn, IV Morphine for dressing changes     Anemia, chronic disease- (present on admission)  Assessment & Plan  Epogen  Follow cbc    Hypocalcemia- (present on admission)  Assessment & Plan  Follow  bmp    Methamphetamine abuse (HCC)- (present on admission)  Assessment & Plan  UDS pending    Hypertension- (present on admission)  Assessment & Plan  Coreg     Type 2 diabetes mellitus with kidney complication, without long-term current use of insulin (HCC)- (present on admission)  Assessment & Plan  hgba1c 5.9    Tobacco dependence- (present on admission)  Assessment & Plan  Refused Nicotine replacement protocol         VTE prophylaxis: heparin ppx    I have performed a physical exam and reviewed and updated ROS and Plan today (3/5/2023). In review of yesterday's note (3/4/2023), there are no changes except as documented above.

## 2023-03-05 NOTE — CARE PLAN
The patient is Watcher - Medium risk of patient condition declining or worsening    Shift Goals  Clinical Goals: Pain mgmt, wound care, rest  Patient Goals: pain magmt, wound care  Family Goals: donis    Progress made toward(s) clinical / shift goals:      Problem: Pain - Standard  Goal: Alleviation of pain or a reduction in pain to the patient’s comfort goal  Outcome: Progressing  Prn pain meds given to patient before dressing change     Problem: Knowledge Deficit - Standard  Goal: Patient and family/care givers will demonstrate understanding of plan of care, disease process/condition, diagnostic tests and medications  Outcome: Progressing  Pt displays an understanding of his wound care     Problem: Skin Integrity  Goal: Skin integrity is maintained or improved  Outcome: Progressing  Pt changed and skin cleansed after skin found to be moist, all wounds changed and cleanses per order set    Problem: Fall Risk  Goal: Patient will remain free from falls  Outcome: Progressing   Fall precautions in place    Patient is not progressing towards the following goals:

## 2023-03-05 NOTE — CARE PLAN
Problem: Pain - Standard  Goal: Alleviation of pain or a reduction in pain to the patient’s comfort goal  Description: Target End Date:  Prior to discharge or change in level of care    Document on Vitals flowsheet    1.  Document pain using the appropriate pain scale per order or unit policy  2.  Educate and implement non-pharmacologic comfort measures (i.e. relaxation, distraction, massage, cold/heat therapy, etc.)  3.  Pain management medications as ordered  4.  Reassess pain after pain med administration per policy  5.  If opiods administered assess patient's response to pain medication is appropriate per POSS sedation scale  6.  Follow pain management plan developed in collaboration with patient and interdisciplinary team (including palliative care or pain specialists if applicable)  Outcome: Not Progressing   The patient is Stable - Low risk of patient condition declining or worsening    Shift Goals  Clinical Goals: pain management, wound care, rest  Patient Goals: rest, pain management  Family Goals: donis    Progress made toward(s) clinical / shift goals:  Pt reports pin 9/10, PRN morphine 4 mg/1 ml administered. Dressing to sruthi thighs changed. Pt will report decrease in pain by the end of shift, and be able to sleep comfortably.        Problem: Pain - Standard  Goal: Alleviation of pain or a reduction in pain to the patient’s comfort goal  Description: Target End Date:  Prior to discharge or change in level of care    Document on Vitals flowsheet    1.  Document pain using the appropriate pain scale per order or unit policy  2.  Educate and implement non-pharmacologic comfort measures (i.e. relaxation, distraction, massage, cold/heat therapy, etc.)  3.  Pain management medications as ordered  4.  Reassess pain after pain med administration per policy  5.  If opiods administered assess patient's response to pain medication is appropriate per POSS sedation scale  6.  Follow pain management plan developed in  collaboration with patient and interdisciplinary team (including palliative care or pain specialists if applicable)  Outcome: Not Progressing

## 2023-03-06 LAB
ANION GAP SERPL CALC-SCNC: 13 MMOL/L (ref 7–16)
BUN SERPL-MCNC: 60 MG/DL (ref 8–22)
CALCIUM SERPL-MCNC: 8.6 MG/DL (ref 8.5–10.5)
CHLORIDE SERPL-SCNC: 91 MMOL/L (ref 96–112)
CO2 SERPL-SCNC: 25 MMOL/L (ref 20–33)
CREAT SERPL-MCNC: 7.73 MG/DL (ref 0.5–1.4)
ERYTHROCYTE [DISTWIDTH] IN BLOOD BY AUTOMATED COUNT: 62.2 FL (ref 35.9–50)
GFR SERPLBLD CREATININE-BSD FMLA CKD-EPI: 8 ML/MIN/1.73 M 2
GLUCOSE SERPL-MCNC: 111 MG/DL (ref 65–99)
HCT VFR BLD AUTO: 31.7 % (ref 42–52)
HGB BLD-MCNC: 9.6 G/DL (ref 14–18)
MCH RBC QN AUTO: 28.2 PG (ref 27–33)
MCHC RBC AUTO-ENTMCNC: 30.3 G/DL (ref 33.7–35.3)
MCV RBC AUTO: 93.2 FL (ref 81.4–97.8)
PLATELET # BLD AUTO: 244 K/UL (ref 164–446)
PMV BLD AUTO: 9.8 FL (ref 9–12.9)
POTASSIUM SERPL-SCNC: 5.5 MMOL/L (ref 3.6–5.5)
RBC # BLD AUTO: 3.4 M/UL (ref 4.7–6.1)
SODIUM SERPL-SCNC: 129 MMOL/L (ref 135–145)
WBC # BLD AUTO: 7.6 K/UL (ref 4.8–10.8)

## 2023-03-06 PROCEDURE — 700102 HCHG RX REV CODE 250 W/ 637 OVERRIDE(OP): Performed by: FAMILY MEDICINE

## 2023-03-06 PROCEDURE — 700102 HCHG RX REV CODE 250 W/ 637 OVERRIDE(OP): Performed by: INTERNAL MEDICINE

## 2023-03-06 PROCEDURE — A9270 NON-COVERED ITEM OR SERVICE: HCPCS | Performed by: INTERNAL MEDICINE

## 2023-03-06 PROCEDURE — 80048 BASIC METABOLIC PNL TOTAL CA: CPT

## 2023-03-06 PROCEDURE — 36415 COLL VENOUS BLD VENIPUNCTURE: CPT

## 2023-03-06 PROCEDURE — 85027 COMPLETE CBC AUTOMATED: CPT

## 2023-03-06 PROCEDURE — A9270 NON-COVERED ITEM OR SERVICE: HCPCS | Performed by: NURSE PRACTITIONER

## 2023-03-06 PROCEDURE — 700102 HCHG RX REV CODE 250 W/ 637 OVERRIDE(OP): Performed by: NURSE PRACTITIONER

## 2023-03-06 PROCEDURE — 770006 HCHG ROOM/CARE - MED/SURG/GYN SEMI*

## 2023-03-06 PROCEDURE — A9270 NON-COVERED ITEM OR SERVICE: HCPCS | Performed by: FAMILY MEDICINE

## 2023-03-06 PROCEDURE — 700111 HCHG RX REV CODE 636 W/ 250 OVERRIDE (IP)

## 2023-03-06 PROCEDURE — 700111 HCHG RX REV CODE 636 W/ 250 OVERRIDE (IP): Performed by: HOSPITALIST

## 2023-03-06 PROCEDURE — 700102 HCHG RX REV CODE 250 W/ 637 OVERRIDE(OP): Performed by: HOSPITALIST

## 2023-03-06 PROCEDURE — A9270 NON-COVERED ITEM OR SERVICE: HCPCS | Performed by: HOSPITALIST

## 2023-03-06 PROCEDURE — 99232 SBSQ HOSP IP/OBS MODERATE 35: CPT | Performed by: FAMILY MEDICINE

## 2023-03-06 RX ADMIN — DORZOLAMIDE HYDROCHLORIDE AND TIMOLOL MALEATE 1 DROP: 20; 5 SOLUTION/ DROPS OPHTHALMIC at 06:19

## 2023-03-06 RX ADMIN — HEPARIN SODIUM 5000 UNITS: 5000 INJECTION, SOLUTION INTRAVENOUS; SUBCUTANEOUS at 15:42

## 2023-03-06 RX ADMIN — BRIMONIDINE TARTRATE 1 DROP: 2 SOLUTION OPHTHALMIC at 16:46

## 2023-03-06 RX ADMIN — HEPARIN SODIUM 5000 UNITS: 5000 INJECTION, SOLUTION INTRAVENOUS; SUBCUTANEOUS at 21:26

## 2023-03-06 RX ADMIN — SEVELAMER CARBONATE 800 MG: 800 TABLET, FILM COATED ORAL at 16:47

## 2023-03-06 RX ADMIN — GABAPENTIN 100 MG: 100 CAPSULE ORAL at 16:47

## 2023-03-06 RX ADMIN — PREDNISOLONE SODIUM PHOSPHATE 1 DROP: 10 SOLUTION/ DROPS OPHTHALMIC at 08:46

## 2023-03-06 RX ADMIN — SEVELAMER CARBONATE 800 MG: 800 TABLET, FILM COATED ORAL at 08:47

## 2023-03-06 RX ADMIN — TIMOLOL MALEATE 1 DROP: 5 SOLUTION OPHTHALMIC at 16:47

## 2023-03-06 RX ADMIN — MINERAL OIL, PETROLATUM 1 APPLICATION: 425; 573 OINTMENT OPHTHALMIC at 15:42

## 2023-03-06 RX ADMIN — ACETAMINOPHEN 1000 MG: 325 TABLET, FILM COATED ORAL at 21:26

## 2023-03-06 RX ADMIN — Medication 220 MG: at 06:20

## 2023-03-06 RX ADMIN — OXYCODONE HYDROCHLORIDE 15 MG: 10 TABLET ORAL at 21:25

## 2023-03-06 RX ADMIN — DAKIN'S SOLUTION 0.125% (QUARTER STRENGTH) 1 ML: 0.12 SOLUTION at 18:00

## 2023-03-06 RX ADMIN — ACETAMINOPHEN 1000 MG: 325 TABLET, FILM COATED ORAL at 08:47

## 2023-03-06 RX ADMIN — ONDANSETRON 4 MG: 2 INJECTION INTRAMUSCULAR; INTRAVENOUS at 04:41

## 2023-03-06 RX ADMIN — CARVEDILOL 3.12 MG: 3.12 TABLET, FILM COATED ORAL at 08:47

## 2023-03-06 RX ADMIN — OXYCODONE HYDROCHLORIDE 10 MG: 10 TABLET ORAL at 08:47

## 2023-03-06 RX ADMIN — PREDNISOLONE SODIUM PHOSPHATE 1 DROP: 10 SOLUTION/ DROPS OPHTHALMIC at 21:29

## 2023-03-06 RX ADMIN — MORPHINE SULFATE 4 MG: 4 INJECTION INTRAVENOUS at 04:41

## 2023-03-06 RX ADMIN — DOCUSATE SODIUM 50 MG AND SENNOSIDES 8.6 MG 2 TABLET: 8.6; 5 TABLET, FILM COATED ORAL at 16:47

## 2023-03-06 RX ADMIN — PREDNISOLONE SODIUM PHOSPHATE 1 DROP: 10 SOLUTION/ DROPS OPHTHALMIC at 16:46

## 2023-03-06 RX ADMIN — DOCUSATE SODIUM 50 MG AND SENNOSIDES 8.6 MG 2 TABLET: 8.6; 5 TABLET, FILM COATED ORAL at 06:22

## 2023-03-06 RX ADMIN — LATANOPROST 1 DROP: 50 SOLUTION OPHTHALMIC at 16:46

## 2023-03-06 RX ADMIN — DAKIN'S SOLUTION 0.125% (QUARTER STRENGTH) 30 ML: 0.12 SOLUTION at 06:22

## 2023-03-06 RX ADMIN — OXYCODONE HYDROCHLORIDE 15 MG: 10 TABLET ORAL at 02:05

## 2023-03-06 RX ADMIN — ACETAZOLAMIDE EXTENDED-RELEASE 500 MG: 500 CAPSULE ORAL at 06:20

## 2023-03-06 RX ADMIN — CARVEDILOL 3.12 MG: 3.12 TABLET, FILM COATED ORAL at 16:47

## 2023-03-06 RX ADMIN — MINERAL OIL, PETROLATUM 1 APPLICATION: 425; 573 OINTMENT OPHTHALMIC at 21:29

## 2023-03-06 RX ADMIN — ACETAMINOPHEN 1000 MG: 325 TABLET, FILM COATED ORAL at 15:42

## 2023-03-06 RX ADMIN — DORZOLAMIDE HYDROCHLORIDE AND TIMOLOL MALEATE 1 DROP: 20; 5 SOLUTION/ DROPS OPHTHALMIC at 16:46

## 2023-03-06 RX ADMIN — BRIMONIDINE TARTRATE 1 DROP: 2 SOLUTION OPHTHALMIC at 06:19

## 2023-03-06 RX ADMIN — PREDNISOLONE SODIUM PHOSPHATE 1 DROP: 10 SOLUTION/ DROPS OPHTHALMIC at 15:42

## 2023-03-06 RX ADMIN — SEVELAMER CARBONATE 800 MG: 800 TABLET, FILM COATED ORAL at 11:59

## 2023-03-06 RX ADMIN — HEPARIN SODIUM 5000 UNITS: 5000 INJECTION, SOLUTION INTRAVENOUS; SUBCUTANEOUS at 06:20

## 2023-03-06 RX ADMIN — ACETAZOLAMIDE EXTENDED-RELEASE 500 MG: 500 CAPSULE ORAL at 16:47

## 2023-03-06 RX ADMIN — TIMOLOL MALEATE 1 DROP: 5 SOLUTION OPHTHALMIC at 06:19

## 2023-03-06 RX ADMIN — Medication 500 MG: at 06:19

## 2023-03-06 RX ADMIN — MINERAL OIL, PETROLATUM 1 APPLICATION: 425; 573 OINTMENT OPHTHALMIC at 06:20

## 2023-03-06 ASSESSMENT — PAIN DESCRIPTION - PAIN TYPE
TYPE: ACUTE PAIN
TYPE: ACUTE PAIN;CHRONIC PAIN
TYPE: ACUTE PAIN;CHRONIC PAIN

## 2023-03-06 ASSESSMENT — ENCOUNTER SYMPTOMS
BLURRED VISION: 1
FOCAL WEAKNESS: 0
FLANK PAIN: 0
EYE REDNESS: 1
SENSORY CHANGE: 0
FEVER: 0
NAUSEA: 1
WHEEZING: 0
DIZZINESS: 0
HEARTBURN: 0
SPEECH CHANGE: 0
CHILLS: 0
DIARRHEA: 0
SHORTNESS OF BREATH: 0
PALPITATIONS: 0
HEADACHES: 0
MYALGIAS: 1
ABDOMINAL PAIN: 0
NECK PAIN: 0
SORE THROAT: 0
DIAPHORESIS: 0
VOMITING: 0
COUGH: 0
NERVOUS/ANXIOUS: 1
WEAKNESS: 1
BACK PAIN: 0

## 2023-03-06 NOTE — CARE PLAN
The patient is Stable - Low risk of patient condition declining or worsening    Shift Goals  Clinical Goals: pain mgmt  Patient Goals: pain mgmt  Family Goals: POLINA    Progress made toward(s) clinical / shift goals:  pt meds passed per MAR, no injuries this shift      Problem: Pain - Standard  Goal: Alleviation of pain or a reduction in pain to the patient’s comfort goal  Outcome: Progressing     Problem: Knowledge Deficit - Standard  Goal: Patient and family/care givers will demonstrate understanding of plan of care, disease process/condition, diagnostic tests and medications  Outcome: Progressing     Problem: Skin Integrity  Goal: Skin integrity is maintained or improved  Outcome: Progressing     Problem: Fall Risk  Goal: Patient will remain free from falls  Outcome: Progressing       Patient is not progressing towards the following goals:

## 2023-03-06 NOTE — PROGRESS NOTES
[facility] Medicine Daily Progress Note    Date of Service  3/6/2023    Chief Complaint  Ambrose Watkins is a 47 y.o. male admitted 2/22/2023 with hyperkalemia.    Hospital Course  Admitted with hyperkalemia and volume overload.  Patient with known history of end-stage renal disease on hemodialysis with known history of noncompliance. Nephrology was consulted on the case.  Patient had emergent hemodialysis.  Patient was also noted to have redness of the right eye.  Ophthalmology was consulted on the case.  Patient was noted to have glaucoma and vitreous hemorrhage of the right eye.  Ophthalmic drops were started.    Interval Problem Update  HTN - sbp 108-121  Open wounds - pain better controlled    I have discussed this patient's plan of care and discharge plan at IDT rounds today with Case Management, Nursing, Nursing leadership, and other members of the IDT team.    Consultants/Specialty  nephrology and ophthalmology    Code Status  Full Code    Disposition  Patient is medically cleared pending case management arangements for outpatient dialysis  for discharge.   Anticipate discharge to to home with close outpatient follow-up.  I have placed the appropriate orders for post-discharge needs.    Review of Systems  Review of Systems   Constitutional:  Positive for malaise/fatigue. Negative for chills, diaphoresis and fever.   HENT:  Negative for congestion, hearing loss and sore throat.    Eyes:  Positive for blurred vision and redness.   Respiratory:  Negative for cough, shortness of breath and wheezing.    Cardiovascular:  Positive for leg swelling. Negative for chest pain and palpitations.   Gastrointestinal:  Positive for nausea. Negative for abdominal pain, diarrhea, heartburn and vomiting.   Genitourinary:  Negative for dysuria, flank pain and hematuria.   Musculoskeletal:  Positive for myalgias. Negative for back pain, joint pain and neck pain.   Skin:  Negative for rash.   Neurological:  Positive for  weakness. Negative for dizziness, sensory change, speech change, focal weakness and headaches.   Psychiatric/Behavioral:  The patient is nervous/anxious.       Physical Exam  Temp:  [36.1 °C (97 °F)-36.4 °C (97.5 °F)] 36.4 °C (97.5 °F)  Pulse:  [67-71] 69  Resp:  [17-18] 18  BP: (108-121)/(72-76) 115/74  SpO2:  [90 %-98 %] 92 %    Physical Exam  Vitals and nursing note reviewed.   HENT:      Head: Normocephalic and atraumatic.      Nose: No congestion.      Mouth/Throat:      Mouth: Mucous membranes are moist.   Eyes:      Conjunctiva/sclera:      Right eye: Right conjunctiva is injected. Hemorrhage present.   Cardiovascular:      Rate and Rhythm: Normal rate and regular rhythm.      Heart sounds: Murmur heard.   Pulmonary:      Effort: Pulmonary effort is normal.   Abdominal:      General: There is no distension.      Tenderness: There is no abdominal tenderness. There is no guarding or rebound.   Musculoskeletal:      Cervical back: No tenderness.      Right lower leg: Edema present.      Left lower leg: Edema present.   Skin:     Comments: Open wounds at both lower extremities   Neurological:      General: No focal deficit present.      Mental Status: He is alert and oriented to person, place, and time.      Cranial Nerves: No cranial nerve deficit.   Psychiatric:         Mood and Affect: Mood is anxious.         Speech: Speech is delayed.         Cognition and Memory: He exhibits impaired recent memory.       Fluids    Intake/Output Summary (Last 24 hours) at 3/6/2023 0857  Last data filed at 3/5/2023 2003  Gross per 24 hour   Intake 240 ml   Output --   Net 240 ml       Laboratory  Recent Labs     03/04/23  1458 03/05/23  0953 03/06/23  0635   WBC 7.1 6.8 7.6   RBC 3.02* 3.46* 3.40*   HEMOGLOBIN 8.6* 9.8* 9.6*   HEMATOCRIT 28.2* 32.6* 31.7*   MCV 93.4 94.2 93.2   MCH 28.5 28.3 28.2   MCHC 30.5* 30.1* 30.3*   RDW 62.3* 62.3* 62.2*   PLATELETCT 223 219 244   MPV 9.6 9.8 9.8     Recent Labs     03/04/23  1458  03/05/23  0953 03/06/23  0635   SODIUM 134* 132* 129*   POTASSIUM 4.3 4.9 5.5   CHLORIDE 96 94* 91*   CO2 25 23 25   GLUCOSE 144* 90 111*   BUN 41* 51* 60*   CREATININE 5.61* 6.58* 7.73*   CALCIUM 8.4* 8.8 8.6                   Imaging  [id number] W/O PLUS RECONS   Final Result      1.  Increased density throughout the right lobe is nonspecific though may suggest underlying hemorrhage with possible retinal detachment.   2.  No post septal fluid collection.             Assessment/Plan  * Hyperkalemia- (present on admission)  Assessment & Plan  Follow bmp  Renal low potassium diet    Noncompliance of patient with renal dialysis (HCC)- (present on admission)  Assessment & Plan  Education and counseling    End stage renal disease (HCC)- (present on admission)  Assessment & Plan  HD per Nephrology - T-Th-    Case management for discharge planning - outpatient Dialysis arrangement    Pulmonary HTN (HCC)- (present on admission)  Assessment & Plan  Monitor volume status    Cardiomyopathy (HCC)- (present on admission)  Assessment & Plan  Coreg    Chronic HFrEF (heart failure with reduced ejection fraction) ([facility])- (present on admission)  Assessment & Plan  Coreg    Right eye glaucoma, due to angle-closure and phacomorphic components- (present on admission)  Assessment & Plan  Ophthalmology - probable end-stage glaucoma from angle-closure and phacomorphic components.  Latanoprost, Timolol, Brimonidine, Cosopt, Prednisone forte ophthalmic drops and Diamox  Follow up with Ophthalmology as outpatient    Vitreous hemorrhage of right eye (HCC)- (present on admission)  Assessment & Plan  Likely due to glaucoma    Open wound- (present on admission)  Assessment & Plan  Chronic  Wound care  Pain control - scheduled Tylenol, adjusted Oxycodone prn, IV Morphine for dressing changes     Anemia, chronic disease- (present on admission)  Assessment & Plan  Epogen  Follow cbc    Hypocalcemia- (present on admission)  Assessment &  Plan  Follow bmp    Methamphetamine abuse (HCC)- (present on admission)  Assessment & Plan  UDS pending    Hypertension- (present on admission)  Assessment & Plan  Coreg     Type 2 diabetes mellitus with kidney complication, without long-term current use of insulin (HCC)- (present on admission)  Assessment & Plan  hgba1c 5.9    Tobacco dependence- (present on admission)  Assessment & Plan  Refused Nicotine replacement protocol         VTE prophylaxis: heparin ppx    I have performed a physical exam and reviewed and updated ROS and Plan today (3/6/2023). In review of yesterday's note (3/5/2023), there are no changes except as documented above.

## 2023-03-06 NOTE — CARE PLAN
The patient is Stable - Low risk of patient condition declining or worsening    Shift Goals  Clinical Goals: pain management, comfort  Patient Goals: pain management  Family Goals: POLNIA      Problem: Skin Integrity  Goal: Skin integrity is maintained or improved  Outcome: Progressing     Problem: Pain - Standard  Goal: Alleviation of pain or a reduction in pain to the patient’s comfort goal  Outcome: Progressing     Problem: Fall Risk  Goal: Patient will remain free from falls  Outcome: Progressing

## 2023-03-06 NOTE — PROGRESS NOTES
"[facility] -  PROGRESS NOTE               Author: EMMA Ramachandran Date & Time: 3/6/2023  1:54 PM     HPI:  Patient is a 47 year old male with a PMHx of ESRD on HD qTTS, meth user and history of calciphylaxis here for leg pain.  States he's still using meth.  Last HD session was about 4-5 days ago.  Here potassium was 7.6 and bicarb of 7.  Nephrology consulted for maintenance dialysis.  Currently complaining of leg pain.  History of nonadherence.    DAILY NEPHROLOGY SUMMARY:  2/23 - Pain controlled.  Denies nausea.  No SOB  Had HD yesterday  2/24 - Denies pain or SOB.  Laying in bed comfortable  2/25 - 2L Net UF. NPO for OR this am w/Opth for retinal detachment. Reports that he is hungry. 1200 mL UOP last 24 hrs.   2/26: 2.5L Net UF. Bps better. Sitting at side of bed eating breakfast. States that he wants HD chair set up here. Unable to drive to [place] to HD clinic d/t vision.   2/27: Pt sitting up in bed eating breakfast, no overnight events, VSS on RA, worked with OT this AM and cleared for DC, waiting on DC plan  2/28: Pt confused today  3/01: HD yest, UF 2L. VSS. Sitting up in bed holding his head \"I hurt all over\". Vague about whether medication is helping his pain.   3/02: C/O pain eyes and thighs. Due for dialysis. He voiced no concerns. Eating and drinking okay. VSS.   3/03: HD yest, UF 2L. VSS. Pt sleeps a lot. Briefly wakes and engages in conversation then goes back to sleep. Intermittent nause and pain (legs and eyes) No new issues. CM looking for outpt HD chair.  3/04: Pt seen on HD, sleeping, VSS, medically cleared, just waiting on OP HD chair  3/05: Pt sitting up in bed, says he hurts all over, iHD yesterday with 2L net UF, labs reviewed, VSS on RA  3/06: pt laying in bed sleeping, does not wish to participate in exam much, denies any complaints, for iHD tomorrow     REVIEW OF SYSTEMS:    A 10 pt review of systems was obtained and is per HPI/&/or daily summary " "otherwise negative    PMH/PSH/SH/FH:   Reviewed and unchanged since admission note    CURRENT MEDICATIONS:   Reviewed from admission to present day    VS:  /74   Pulse 69   Temp 36.4 °C (97.5 °F) (Temporal)   Resp 18   Ht 1.803 m (5' 10.98\")   Wt 90 kg (198 lb 6.6 oz)   SpO2 92%   BMI 27.69 kg/m²     Physical Exam  Nursing note reviewed.   Constitutional:       Appearance: Normal appearance.   Eyes:      General: No scleral icterus.     Comments: R eye erythema   Cardiovascular:      Comments: LFA AVG  [facility] TDC  Edema w/wounds  Pulmonary:      Effort: Pulmonary effort is normal.   Abdominal:      General: There is no distension.   Musculoskeletal:         General: No deformity.   Skin:     Findings: No rash.      Comments: Chronic wounds b/l thighs/abd   Neurological:      Mental Status: He is alert.   Psychiatric:         Behavior: Behavior is cooperative.        Fluids:  In: 240 [P.O.:240]  Out: -     LABS:  Recent Labs     03/04/23  1458 03/05/23  0953 03/06/23  0635   SODIUM 134* 132* 129*   POTASSIUM 4.3 4.9 5.5   CHLORIDE 96 94* 91*   CO2 25 23 25   GLUCOSE 144* 90 111*   BUN 41* 51* 60*   CREATININE 5.61* 6.58* 7.73*   CALCIUM 8.4* 8.8 8.6       IMAGING:   All imaging reviewed from admission to present day    IMPRESSION:  # ESRD  - Etiology likely 2/2 DM/HTN  - HD qTTHS via [facility] TDC  - Lost his HD chair at [facility] previously  # Hyperkalemia, corrected with HD             - s/p stat HD   # LFA AVG thrombosed  - US 2/23 no flow  - s/p OR 2/25 AVG Brachio-axillary Creation, thrombectomy and fistulogram  - Stenosis and wall thrombus noted on US, [facility] TDC in use  # HTN, fair control    # Anemia of CKD, below target  - Goal Ggb 10-11  - Ferritin 2722, %sat 41  # CKD-MBD  - Ca 7.6  - iCa 1.0  - Phos 9.3  # Calciphylaxis hx  - Per report from patient  # Generalized weakness, improved   - Evaluated with PT/OT  # h/o Methamphetamine use   # Homelessness  # Encephalopthy ? Meds vs infection   - " Improved  # DM   # Chronic wounds b/l thighs/abd   - Wound care following   # Conjunctivitis/End Stage Glaucoma  - Diamox  - Latanoprost, Combigan, Mima, Preds Forte    PLAN:  - Continue iHD qTTS/PRN, treatment next due TUES  - UF as tolerated   - FU Opthalm Recs  - Has been on high Ca bath with HD, but trying to avoid given hx, currently on 2.5   - Use [facility] PermCath for now  - AVG eval as OP at    - JACKIE with HD to goal Hgb 10-11  - No dietary protein restrictions  - Dose all meds per ESRD/iHD  - Renal diet, low salt diet, fluid restriction to 1.5L daily  -  support for placement. Requesting local HD chair. Hx of non adherence to HD.   - Can transition to Outpt HD clinic when medically cleared and chair secured.  - Follow labs  - Wound care  - Added sevelasusana RICKS    Thank you,

## 2023-03-07 LAB
ANION GAP SERPL CALC-SCNC: 15 MMOL/L (ref 7–16)
BUN SERPL-MCNC: 68 MG/DL (ref 8–22)
CALCIUM SERPL-MCNC: 8.1 MG/DL (ref 8.5–10.5)
CHLORIDE SERPL-SCNC: 90 MMOL/L (ref 96–112)
CO2 SERPL-SCNC: 24 MMOL/L (ref 20–33)
CREAT SERPL-MCNC: 8.23 MG/DL (ref 0.5–1.4)
ERYTHROCYTE [DISTWIDTH] IN BLOOD BY AUTOMATED COUNT: 61.9 FL (ref 35.9–50)
GFR SERPLBLD CREATININE-BSD FMLA CKD-EPI: 8 ML/MIN/1.73 M 2
GLUCOSE SERPL-MCNC: 102 MG/DL (ref 65–99)
HCT VFR BLD AUTO: 30.4 % (ref 42–52)
HGB BLD-MCNC: 9.3 G/DL (ref 14–18)
MCH RBC QN AUTO: 28.7 PG (ref 27–33)
MCHC RBC AUTO-ENTMCNC: 30.6 G/DL (ref 33.7–35.3)
MCV RBC AUTO: 93.8 FL (ref 81.4–97.8)
PLATELET # BLD AUTO: 222 K/UL (ref 164–446)
PMV BLD AUTO: 9.8 FL (ref 9–12.9)
POTASSIUM SERPL-SCNC: 4.6 MMOL/L (ref 3.6–5.5)
POTASSIUM SERPL-SCNC: 5.9 MMOL/L (ref 3.6–5.5)
RBC # BLD AUTO: 3.24 M/UL (ref 4.7–6.1)
SODIUM SERPL-SCNC: 129 MMOL/L (ref 135–145)
WBC # BLD AUTO: 7.8 K/UL (ref 4.8–10.8)

## 2023-03-07 PROCEDURE — 700111 HCHG RX REV CODE 636 W/ 250 OVERRIDE (IP): Performed by: HOSPITALIST

## 2023-03-07 PROCEDURE — 700111 HCHG RX REV CODE 636 W/ 250 OVERRIDE (IP): Performed by: NURSE PRACTITIONER

## 2023-03-07 PROCEDURE — A9270 NON-COVERED ITEM OR SERVICE: HCPCS | Performed by: NURSE PRACTITIONER

## 2023-03-07 PROCEDURE — 700111 HCHG RX REV CODE 636 W/ 250 OVERRIDE (IP)

## 2023-03-07 PROCEDURE — 770006 HCHG ROOM/CARE - MED/SURG/GYN SEMI*

## 2023-03-07 PROCEDURE — 80048 BASIC METABOLIC PNL TOTAL CA: CPT

## 2023-03-07 PROCEDURE — 84132 ASSAY OF SERUM POTASSIUM: CPT

## 2023-03-07 PROCEDURE — 700102 HCHG RX REV CODE 250 W/ 637 OVERRIDE(OP): Performed by: NURSE PRACTITIONER

## 2023-03-07 PROCEDURE — A9270 NON-COVERED ITEM OR SERVICE: HCPCS | Performed by: FAMILY MEDICINE

## 2023-03-07 PROCEDURE — A9270 NON-COVERED ITEM OR SERVICE: HCPCS | Performed by: INTERNAL MEDICINE

## 2023-03-07 PROCEDURE — 700102 HCHG RX REV CODE 250 W/ 637 OVERRIDE(OP): Performed by: HOSPITALIST

## 2023-03-07 PROCEDURE — 99232 SBSQ HOSP IP/OBS MODERATE 35: CPT | Performed by: STUDENT IN AN ORGANIZED HEALTH CARE EDUCATION/TRAINING PROGRAM

## 2023-03-07 PROCEDURE — 85027 COMPLETE CBC AUTOMATED: CPT

## 2023-03-07 PROCEDURE — 700102 HCHG RX REV CODE 250 W/ 637 OVERRIDE(OP): Performed by: FAMILY MEDICINE

## 2023-03-07 PROCEDURE — 700102 HCHG RX REV CODE 250 W/ 637 OVERRIDE(OP): Performed by: INTERNAL MEDICINE

## 2023-03-07 PROCEDURE — 90935 HEMODIALYSIS ONE EVALUATION: CPT

## 2023-03-07 PROCEDURE — A9270 NON-COVERED ITEM OR SERVICE: HCPCS | Performed by: HOSPITALIST

## 2023-03-07 PROCEDURE — 36415 COLL VENOUS BLD VENIPUNCTURE: CPT

## 2023-03-07 RX ORDER — HEPARIN SODIUM 1000 [USP'U]/ML
INJECTION, SOLUTION INTRAVENOUS; SUBCUTANEOUS
Status: COMPLETED
Start: 2023-03-07 | End: 2023-03-07

## 2023-03-07 RX ADMIN — ACETAZOLAMIDE EXTENDED-RELEASE 500 MG: 500 CAPSULE ORAL at 05:38

## 2023-03-07 RX ADMIN — PREDNISOLONE SODIUM PHOSPHATE 1 DROP: 10 SOLUTION/ DROPS OPHTHALMIC at 22:23

## 2023-03-07 RX ADMIN — GABAPENTIN 100 MG: 100 CAPSULE ORAL at 16:30

## 2023-03-07 RX ADMIN — MINERAL OIL, PETROLATUM 1 APPLICATION: 425; 573 OINTMENT OPHTHALMIC at 05:42

## 2023-03-07 RX ADMIN — TIMOLOL MALEATE 1 DROP: 5 SOLUTION OPHTHALMIC at 05:42

## 2023-03-07 RX ADMIN — PREDNISOLONE SODIUM PHOSPHATE 1 DROP: 10 SOLUTION/ DROPS OPHTHALMIC at 16:31

## 2023-03-07 RX ADMIN — SEVELAMER CARBONATE 800 MG: 800 TABLET, FILM COATED ORAL at 12:57

## 2023-03-07 RX ADMIN — ACETAMINOPHEN 1000 MG: 325 TABLET, FILM COATED ORAL at 08:02

## 2023-03-07 RX ADMIN — HEPARIN SODIUM 3800 UNITS: 1000 INJECTION, SOLUTION INTRAVENOUS; SUBCUTANEOUS at 11:37

## 2023-03-07 RX ADMIN — DORZOLAMIDE HYDROCHLORIDE AND TIMOLOL MALEATE 1 DROP: 20; 5 SOLUTION/ DROPS OPHTHALMIC at 16:31

## 2023-03-07 RX ADMIN — DOCUSATE SODIUM 50 MG AND SENNOSIDES 8.6 MG 2 TABLET: 8.6; 5 TABLET, FILM COATED ORAL at 16:30

## 2023-03-07 RX ADMIN — MINERAL OIL, PETROLATUM 1 APPLICATION: 425; 573 OINTMENT OPHTHALMIC at 22:23

## 2023-03-07 RX ADMIN — HEPARIN SODIUM 5000 UNITS: 5000 INJECTION, SOLUTION INTRAVENOUS; SUBCUTANEOUS at 05:39

## 2023-03-07 RX ADMIN — HEPARIN SODIUM 5000 UNITS: 5000 INJECTION, SOLUTION INTRAVENOUS; SUBCUTANEOUS at 22:22

## 2023-03-07 RX ADMIN — MORPHINE SULFATE 4 MG: 4 INJECTION INTRAVENOUS at 12:56

## 2023-03-07 RX ADMIN — EPOETIN ALFA-EPBX 2000 UNITS: 2000 INJECTION, SOLUTION INTRAVENOUS; SUBCUTANEOUS at 11:20

## 2023-03-07 RX ADMIN — CARVEDILOL 3.12 MG: 3.12 TABLET, FILM COATED ORAL at 16:31

## 2023-03-07 RX ADMIN — OXYCODONE HYDROCHLORIDE 10 MG: 10 TABLET ORAL at 08:50

## 2023-03-07 RX ADMIN — EPOETIN ALFA-EPBX 5000 UNITS: 3000 INJECTION, SOLUTION INTRAVENOUS; SUBCUTANEOUS at 11:14

## 2023-03-07 RX ADMIN — ACETAMINOPHEN 1000 MG: 325 TABLET, FILM COATED ORAL at 22:22

## 2023-03-07 RX ADMIN — DOCUSATE SODIUM 50 MG AND SENNOSIDES 8.6 MG 2 TABLET: 8.6; 5 TABLET, FILM COATED ORAL at 05:45

## 2023-03-07 RX ADMIN — DAKIN'S SOLUTION 0.125% (QUARTER STRENGTH) 1 ML: 0.12 SOLUTION at 18:00

## 2023-03-07 RX ADMIN — MORPHINE SULFATE 4 MG: 4 INJECTION INTRAVENOUS at 05:43

## 2023-03-07 RX ADMIN — DORZOLAMIDE HYDROCHLORIDE AND TIMOLOL MALEATE 1 DROP: 20; 5 SOLUTION/ DROPS OPHTHALMIC at 05:42

## 2023-03-07 RX ADMIN — EPOETIN ALFA-EPBX 3000 UNITS: 3000 INJECTION, SOLUTION INTRAVENOUS; SUBCUTANEOUS at 11:21

## 2023-03-07 RX ADMIN — Medication 500 MG: at 05:39

## 2023-03-07 RX ADMIN — DAKIN'S SOLUTION 0.125% (QUARTER STRENGTH) 473 ML: 0.12 SOLUTION at 06:01

## 2023-03-07 RX ADMIN — SEVELAMER CARBONATE 800 MG: 800 TABLET, FILM COATED ORAL at 16:31

## 2023-03-07 RX ADMIN — ACETAMINOPHEN 1000 MG: 325 TABLET, FILM COATED ORAL at 16:31

## 2023-03-07 RX ADMIN — BRIMONIDINE TARTRATE 1 DROP: 2 SOLUTION OPHTHALMIC at 16:31

## 2023-03-07 RX ADMIN — PREDNISOLONE SODIUM PHOSPHATE 1 DROP: 10 SOLUTION/ DROPS OPHTHALMIC at 08:32

## 2023-03-07 RX ADMIN — PREDNISOLONE SODIUM PHOSPHATE 1 DROP: 10 SOLUTION/ DROPS OPHTHALMIC at 12:56

## 2023-03-07 RX ADMIN — Medication 220 MG: at 05:39

## 2023-03-07 RX ADMIN — BRIMONIDINE TARTRATE 1 DROP: 2 SOLUTION OPHTHALMIC at 05:42

## 2023-03-07 RX ADMIN — MINERAL OIL, PETROLATUM 1 APPLICATION: 425; 573 OINTMENT OPHTHALMIC at 13:20

## 2023-03-07 RX ADMIN — LATANOPROST 1 DROP: 50 SOLUTION OPHTHALMIC at 16:31

## 2023-03-07 RX ADMIN — SEVELAMER CARBONATE 800 MG: 800 TABLET, FILM COATED ORAL at 08:02

## 2023-03-07 RX ADMIN — HEPARIN SODIUM 5000 UNITS: 5000 INJECTION, SOLUTION INTRAVENOUS; SUBCUTANEOUS at 13:20

## 2023-03-07 RX ADMIN — TIMOLOL MALEATE 1 DROP: 5 SOLUTION OPHTHALMIC at 16:31

## 2023-03-07 RX ADMIN — ACETAZOLAMIDE EXTENDED-RELEASE 500 MG: 500 CAPSULE ORAL at 16:31

## 2023-03-07 ASSESSMENT — ENCOUNTER SYMPTOMS
BRUISES/BLEEDS EASILY: 0
BLURRED VISION: 0
COUGH: 0
DIZZINESS: 0
SHORTNESS OF BREATH: 0
NAUSEA: 0
MYALGIAS: 0
FEVER: 0
VOMITING: 0

## 2023-03-07 ASSESSMENT — PAIN DESCRIPTION - PAIN TYPE
TYPE: ACUTE PAIN

## 2023-03-07 NOTE — PROGRESS NOTES
"Hospital Medicine Daily Progress Note    Date of Service  3/7/2023    Chief Complaint  Ambrose Watkins is a 47 y.o. male admitted 2/22/2023 with hyperkalemia    Hospital Course  Ambrose Watkins is a homeless 47 y.o. male with diabetes since 2007, ESRD on HD for past 2-3 years, methamphetamine abuse, tobacco use, HTN, DLD, medical noncompliance who presented 2/22/2023 with evaluation by police prior to being incarcerated.  He was found in ER to be volume overloaded and have a K:7.6.  He states having his last dialysis 4 days ago in [facility].  He has chronic pain in his thighs from chronic bilateral thigh wounds  and per pt, \"calciphylaxis.\"  In ER he was consulted by nephrology and emergent dialysis is planned.  The patient keeps pulling off his telemetry leads despite being educated on why we recommend them.  Patient was also noted to have redness of the right eye.  Ophthalmology was consulted on the case.  Patient was noted to have glaucoma and vitreous hemorrhage of the right eye.  Ophthalmic drops were started.  Patient need placement.   assisting assisting with placement.         Interval Problem Update  3/7/2023  Remained stable.  Denies any discomfort.  Labs reviewed noted with mild hyponatremia, hyperkalemia  He had some hemodialysis  Repeat labs post hemodialysis  Need placement.   assisting    I have discussed this patient's plan of care and discharge plan at IDT rounds today with Case Management, Nursing, Nursing leadership, and other members of the IDT team.    Consultants/Specialty  nephrology and ophthalmology    Code Status  Full Code    Disposition  Patient is medically cleared for discharge.   Anticipate discharge to to skilled nursing facility.  I have placed the appropriate orders for post-discharge needs.    Review of Systems  Review of Systems   Constitutional:  Negative for fever.   HENT:  Negative for hearing loss.    Eyes:  Negative for blurred " vision.   Respiratory:  Negative for cough and shortness of breath.    Cardiovascular:  Negative for chest pain.   Gastrointestinal:  Negative for nausea and vomiting.   Genitourinary:  Negative for dysuria.   Musculoskeletal:  Negative for myalgias.   Skin:  Negative for rash.   Neurological:  Negative for dizziness.   Endo/Heme/Allergies:  Does not bruise/bleed easily.      Physical Exam  Temp:  [35.7 °C (96.2 °F)-36.6 °C (97.8 °F)] 36.3 °C (97.4 °F)  Pulse:  [67-81] 81  Resp:  [18-20] 18  BP: (102-144)/(62-76) 144/76  SpO2:  [92 %-98 %] 98 %    Physical Exam  Constitutional:       General: He is not in acute distress.  HENT:      Head: Normocephalic and atraumatic.      Right Ear: Tympanic membrane normal.      Left Ear: Tympanic membrane normal.      Nose: Nose normal.      Mouth/Throat:      Mouth: Mucous membranes are moist.   Eyes:      Extraocular Movements: Extraocular movements intact.      Conjunctiva/sclera:      Right eye: Right conjunctiva is injected.      Left eye: Left conjunctiva is injected.      Pupils: Pupils are equal, round, and reactive to light.   Cardiovascular:      Rate and Rhythm: Normal rate and regular rhythm.      Pulses: Normal pulses.   Pulmonary:      Effort: Pulmonary effort is normal. No respiratory distress.   Abdominal:      General: Abdomen is flat. There is no distension.   Musculoskeletal:      Cervical back: Normal range of motion.      Right lower leg: No edema.      Left lower leg: No edema.   Skin:     General: Skin is warm.   Neurological:      General: No focal deficit present.      Mental Status: He is alert and oriented to person, place, and time. Mental status is at baseline.   Psychiatric:         Mood and Affect: Mood normal.       Fluids    Intake/Output Summary (Last 24 hours) at 3/7/2023 1410  Last data filed at 3/7/2023 1208  Gross per 24 hour   Intake 860 ml   Output 2500 ml   Net -1640 ml       Laboratory  Recent Labs     03/05/23  0953 03/06/23  0655  03/07/23  0830   WBC 6.8 7.6 7.8   RBC 3.46* 3.40* 3.24*   HEMOGLOBIN 9.8* 9.6* 9.3*   HEMATOCRIT 32.6* 31.7* 30.4*   MCV 94.2 93.2 93.8   MCH 28.3 28.2 28.7   MCHC 30.1* 30.3* 30.6*   RDW 62.3* 62.2* 61.9*   PLATELETCT 219 244 222   MPV 9.8 9.8 9.8     Recent Labs     03/05/23  0953 03/06/23  0635 03/07/23  0830   SODIUM 132* 129* 129*   POTASSIUM 4.9 5.5 5.9*   CHLORIDE 94* 91* 90*   CO2 23 25 24   GLUCOSE 90 111* 102*   BUN 51* 60* 68*   CREATININE 6.58* 7.73* 8.23*   CALCIUM 8.8 8.6 8.1*                   Imaging  [id number] W/O PLUS RECONS   Final Result      1.  Increased density throughout the right lobe is nonspecific though may suggest underlying hemorrhage with possible retinal detachment.   2.  No post septal fluid collection.           Assessment/Plan  * Hyperkalemia- (present on admission)  Assessment & Plan  Follow bmp  Renal low potassium diet    Pulmonary HTN (HCC)- (present on admission)  Assessment & Plan  Monitor volume status    Cardiomyopathy (HCC)- (present on admission)  Assessment & Plan  Coreg    Chronic HFrEF (heart failure with reduced ejection fraction) ([facility])- (present on admission)  Assessment & Plan  Coreg    Right eye glaucoma, due to angle-closure and phacomorphic components- (present on admission)  Assessment & Plan  Ophthalmology - probable end-stage glaucoma from angle-closure and phacomorphic components.  Latanoprost, Timolol, Brimonidine, Cosopt, Prednisone forte ophthalmic drops and Diamox  Follow up with Ophthalmology as outpatient    Vitreous hemorrhage of right eye ([facility])- (present on admission)  Assessment & Plan  Likely due to glaucoma    Open wound- (present on admission)  Assessment & Plan  Chronic  Wound care  Pain control - scheduled Tylenol, adjusted Oxycodone prn, IV Morphine for dressing changes     Tobacco dependence- (present on admission)  Assessment & Plan  Refused Nicotine replacement protocol    Noncompliance of patient with renal dialysis ([facility])- (present  on admission)  Assessment & Plan  Education and counseling    Anemia, chronic disease- (present on admission)  Assessment & Plan  Epogen  Follow cbc    Hypocalcemia- (present on admission)  Assessment & Plan  Follow bmp    Methamphetamine abuse (HCC)- (present on admission)  Assessment & Plan  UDS pending    End stage renal disease (HCC)- (present on admission)  Assessment & Plan  HD per Nephrology - T-Th-Sa    Case management for discharge planning - outpatient Dialysis arrangement    Hypertension- (present on admission)  Assessment & Plan  Coreg     Type 2 diabetes mellitus with kidney complication, without long-term current use of insulin (HCC)- (present on admission)  Assessment & Plan  hgba1c 5.9         VTE prophylaxis: SCDs/TEDs and heparin ppx    I have performed a physical exam and reviewed and updated ROS and Plan today (3/7/2023). In review of yesterday's note (3/6/2023), there are no changes except as documented above.

## 2023-03-07 NOTE — PROGRESS NOTES
[facility] Progress Note     HD treatment x 3 hours today.  HD tx initiated at 0907 and ended at 1207.    Received pt via bed at dialysis room. Pt is alert, oriented x 4, not in any signs of distress at this time. Left IJ tunneled catheter with clean, dry and intact dressing. No signs of infection noted. Routine labs drawn pre-HD. S/E by medical doctor and Dr. Palacio during HD. Pt reported of eye and both legs pain, scale of 10/10. Informed primary RN. Tylenol taken pre-HD. Epoetin 5,000 units IVP given with HD. HD treatment completed and tolerated well.     UF Net: 2L    Post tx: Left IJ tunneled catheter with clean, dry and intact dressing. Both catheter ports saline flushed, heparin locked, clamped and capped. Pt reported of severe pain on both legs, pain scale of 10/10. Primary RN notified, next pain meds due at 1250.    Report given to primary RN RAFAEL Marcos. See electronic flowsheets for additional information.

## 2023-03-07 NOTE — PROGRESS NOTES
Pt alert/oriented x4, medicated for BLE pain and prior to dressing change. Pt SBA with FWW. Pt resting in bed, needs met. Call light within reach, personal belongings available, bed in lowest position, treaded socks on, and hourly rounding in place.

## 2023-03-07 NOTE — DISCHARGE PLANNING
Medical Social Work  PC to [facility] Coordinator [phone]; message left to see if she has submitted referrals to [facility], if not please submit.

## 2023-03-07 NOTE — PROGRESS NOTES
"[facility] -  PROGRESS NOTE               Author: Matilda Palacio M.D. Date & Time: 3/7/2023  12:46 PM     HPI:  Patient is a 47 year old male with a PMHx of ESRD on HD qTTS, meth user and history of calciphylaxis here for leg pain.  States he's still using meth.  Last HD session was about 4-5 days ago.  Here potassium was 7.6 and bicarb of 7.  Nephrology consulted for maintenance dialysis.  Currently complaining of leg pain.  History of nonadherence.    DAILY NEPHROLOGY SUMMARY:  2/23 - Pain controlled.  Denies nausea.  No SOB  Had HD yesterday  2/24 - Denies pain or SOB.  Laying in bed comfortable  2/25 - 2L Net UF. NPO for OR this am w/Opth for retinal detachment. Reports that he is hungry. 1200 mL UOP last 24 hrs.   2/26: 2.5L Net UF. Bps better. Sitting at side of bed eating breakfast. States that he wants HD chair set up here. Unable to drive to [place] to HD clinic d/t vision.   2/27: Pt sitting up in bed eating breakfast, no overnight events, VSS on RA, worked with OT this AM and cleared for DC, waiting on DC plan  2/28: Pt confused today  3/01: HD yest, UF 2L. VSS. Sitting up in bed holding his head \"I hurt all over\". Vague about whether medication is helping his pain.   3/02: C/O pain eyes and thighs. Due for dialysis. He voiced no concerns. Eating and drinking okay. VSS.   3/03: HD yest, UF 2L. VSS. Pt sleeps a lot. Briefly wakes and engages in conversation then goes back to sleep. Intermittent nause and pain (legs and eyes) No new issues. CM looking for outpt HD chair.  3/04: Pt seen on HD, sleeping, VSS, medically cleared, just waiting on OP HD chair  3/05: Pt sitting up in bed, says he hurts all over, iHD yesterday with 2L net UF, labs reviewed, VSS on RA  3/06: pt laying in bed sleeping, does not wish to participate in exam much, denies any complaints, for iHD tomorrow   3/07: No events, pt seen and examined on hemodialysis, VSS--see dialysis treatment sheet for full " "details, denies any CP/SOB/Abd pain, irritable    REVIEW OF SYSTEMS:    A 10 pt review of systems was obtained and is per HPI/&/or daily summary otherwise negative    PMH/PSH/SH/FH:   Reviewed and unchanged since admission note    CURRENT MEDICATIONS:   Reviewed from admission to present day    VS:  /72   Pulse 67   Temp (!) 35.7 °C (96.2 °F) (Temporal)   Resp 20   Ht 1.803 m (5' 10.98\")   Wt 90 kg (198 lb 6.6 oz)   SpO2 96%   BMI 27.69 kg/m²     Physical Exam  Vitals and nursing note reviewed.   Constitutional:       Comments: +chronically ill-appearing   HENT:      Head: Normocephalic and atraumatic.   Eyes:      General: No scleral icterus.     Comments: R eye erythema   Cardiovascular:      Rate and Rhythm: Normal rate and regular rhythm.      Comments: LFA AVG  [facility] TDC  Pulmonary:      Effort: Pulmonary effort is normal.   Abdominal:      General: There is no distension.   Musculoskeletal:         General: No deformity.      Right lower leg: Edema present.      Left lower leg: Edema present.   Skin:     Findings: No rash.      Comments: Chronic wounds b/l thighs/abd   Neurological:      Mental Status: He is alert and oriented to person, place, and time. Mental status is at baseline.   Psychiatric:         Attention and Perception: He is inattentive.         Mood and Affect: Affect is flat.         Behavior: Behavior is cooperative.        Fluids:  In: 360 [P.O.:360]  Out: -     LABS:  Recent Labs     03/05/23  0953 03/06/23  0635 03/07/23  0830   SODIUM 132* 129* 129*   POTASSIUM 4.9 5.5 5.9*   CHLORIDE 94* 91* 90*   CO2 23 25 24   GLUCOSE 90 111* 102*   BUN 51* 60* 68*   CREATININE 6.58* 7.73* 8.23*   CALCIUM 8.8 8.6 8.1*       IMAGING:   All imaging reviewed from admission to present day    IMPRESSION:  # ESRD  - Etiology likely 2/2 DM/HTN  - HD qTTHS via [facility] TDC  - Lost his HD chair at [facility] previously  # Hyperkalemia--treat with HD  # LFA AVG thrombosed  - US 2/23 no flow  - s/p OR " 2/25 AVG Brachio-axillary Creation, thrombectomy and fistulogram  - Stenosis and wall thrombus noted on US, [facility] TDC in use  # HTN, fair control    # Anemia of CKD, below target  - Goal Ggb 10-11  - Ferritin 2722, %sat 41  # CKD-MBD  - Ca 7.6  - iCa 1.0  - Phos 9.3  # Calciphylaxis hx  - Per report from patient  # Generalized weakness, improved   - Evaluated with PT/OT  # h/o Methamphetamine use   # Homelessness  # Encephalopthy ? Meds vs infection   - Improved  # DM   # Chronic wounds b/l thighs/abd   - Wound care following   # Conjunctivitis/End Stage Glaucoma  - Diamox  - Latanoprost, Combigan, Cosopy, Preds Forte    PLAN:  - Continue iHD qTTS/PRN, treatment next due TUES  - UF as tolerated   - FU Opthalm Recs  - Has been on high Ca bath with HD, but trying to avoid given hx, currently on 2.5   - Use [facility] PermCath for now  - AVG eval as OP at    - Continue phos binder sevelamer with meals  - Recheck phos level  - JACKIE with HD to goal Hgb 10-11  - No dietary protein restrictions  - Dose all meds per ESRD/iHD  - Renal diet, low salt diet, fluid restriction to 1.5L daily  -  support for placement. Requesting local HD chair. Hx of non adherence to HD.   - Can transition to Outpt HD clinic when medically cleared and chair secured.  - Follow labs  - Wound care

## 2023-03-07 NOTE — DISCHARGE PLANNING
Medical Social Work  PC from [facility] informed a referral was submitted to [facility] as requested.  Also told patient has a history with them also of non compliance/no shows

## 2023-03-07 NOTE — CARE PLAN
The patient is Stable - Low risk of patient condition declining or worsening    Shift Goals  Clinical Goals: pain management  Patient Goals: pain mgmt  Family Goals: POLINA    Progress made toward(s) clinical / shift goals:  pt meds passed per MAR, no injuries this shift      Problem: Pain - Standard  Goal: Alleviation of pain or a reduction in pain to the patient’s comfort goal  Outcome: Progressing     Problem: Knowledge Deficit - Standard  Goal: Patient and family/care givers will demonstrate understanding of plan of care, disease process/condition, diagnostic tests and medications  Outcome: Progressing     Problem: Skin Integrity  Goal: Skin integrity is maintained or improved  Outcome: Progressing     Problem: Fall Risk  Goal: Patient will remain free from falls  Outcome: Progressing       Patient is not progressing towards the following goals:

## 2023-03-08 LAB
ANION GAP SERPL CALC-SCNC: 13 MMOL/L (ref 7–16)
BUN SERPL-MCNC: 46 MG/DL (ref 8–22)
CALCIUM SERPL-MCNC: 8.6 MG/DL (ref 8.5–10.5)
CHLORIDE SERPL-SCNC: 92 MMOL/L (ref 96–112)
CO2 SERPL-SCNC: 27 MMOL/L (ref 20–33)
CREAT SERPL-MCNC: 6.79 MG/DL (ref 0.5–1.4)
GFR SERPLBLD CREATININE-BSD FMLA CKD-EPI: 9 ML/MIN/1.73 M 2
GLUCOSE SERPL-MCNC: 92 MG/DL (ref 65–99)
POTASSIUM SERPL-SCNC: 4.8 MMOL/L (ref 3.6–5.5)
SODIUM SERPL-SCNC: 132 MMOL/L (ref 135–145)

## 2023-03-08 PROCEDURE — A9270 NON-COVERED ITEM OR SERVICE: HCPCS | Performed by: NURSE PRACTITIONER

## 2023-03-08 PROCEDURE — 700111 HCHG RX REV CODE 636 W/ 250 OVERRIDE (IP): Performed by: HOSPITALIST

## 2023-03-08 PROCEDURE — 700102 HCHG RX REV CODE 250 W/ 637 OVERRIDE(OP): Performed by: INTERNAL MEDICINE

## 2023-03-08 PROCEDURE — 700101 HCHG RX REV CODE 250: Performed by: HOSPITALIST

## 2023-03-08 PROCEDURE — 99232 SBSQ HOSP IP/OBS MODERATE 35: CPT | Performed by: STUDENT IN AN ORGANIZED HEALTH CARE EDUCATION/TRAINING PROGRAM

## 2023-03-08 PROCEDURE — 700102 HCHG RX REV CODE 250 W/ 637 OVERRIDE(OP): Performed by: HOSPITALIST

## 2023-03-08 PROCEDURE — 700111 HCHG RX REV CODE 636 W/ 250 OVERRIDE (IP)

## 2023-03-08 PROCEDURE — 302009 SKINTEGRITY WOUND CLEANSER: Performed by: STUDENT IN AN ORGANIZED HEALTH CARE EDUCATION/TRAINING PROGRAM

## 2023-03-08 PROCEDURE — A9270 NON-COVERED ITEM OR SERVICE: HCPCS | Performed by: FAMILY MEDICINE

## 2023-03-08 PROCEDURE — 770006 HCHG ROOM/CARE - MED/SURG/GYN SEMI*

## 2023-03-08 PROCEDURE — A9270 NON-COVERED ITEM OR SERVICE: HCPCS | Performed by: INTERNAL MEDICINE

## 2023-03-08 PROCEDURE — 36415 COLL VENOUS BLD VENIPUNCTURE: CPT

## 2023-03-08 PROCEDURE — 97602 WOUND(S) CARE NON-SELECTIVE: CPT

## 2023-03-08 PROCEDURE — 700102 HCHG RX REV CODE 250 W/ 637 OVERRIDE(OP): Performed by: FAMILY MEDICINE

## 2023-03-08 PROCEDURE — 80048 BASIC METABOLIC PNL TOTAL CA: CPT

## 2023-03-08 PROCEDURE — A9270 NON-COVERED ITEM OR SERVICE: HCPCS | Performed by: HOSPITALIST

## 2023-03-08 PROCEDURE — 700102 HCHG RX REV CODE 250 W/ 637 OVERRIDE(OP): Performed by: NURSE PRACTITIONER

## 2023-03-08 RX ADMIN — MORPHINE SULFATE 4 MG: 4 INJECTION INTRAVENOUS at 10:20

## 2023-03-08 RX ADMIN — PREDNISOLONE SODIUM PHOSPHATE 1 DROP: 10 SOLUTION/ DROPS OPHTHALMIC at 13:28

## 2023-03-08 RX ADMIN — PREDNISOLONE SODIUM PHOSPHATE 1 DROP: 10 SOLUTION/ DROPS OPHTHALMIC at 21:01

## 2023-03-08 RX ADMIN — TIMOLOL MALEATE 1 DROP: 5 SOLUTION OPHTHALMIC at 05:31

## 2023-03-08 RX ADMIN — TIMOLOL MALEATE 1 DROP: 5 SOLUTION OPHTHALMIC at 17:39

## 2023-03-08 RX ADMIN — MORPHINE SULFATE 4 MG: 4 INJECTION INTRAVENOUS at 05:30

## 2023-03-08 RX ADMIN — MINERAL OIL, PETROLATUM 1 APPLICATION: 425; 573 OINTMENT OPHTHALMIC at 21:01

## 2023-03-08 RX ADMIN — SEVELAMER CARBONATE 800 MG: 800 TABLET, FILM COATED ORAL at 08:56

## 2023-03-08 RX ADMIN — HEPARIN SODIUM 5000 UNITS: 5000 INJECTION, SOLUTION INTRAVENOUS; SUBCUTANEOUS at 13:26

## 2023-03-08 RX ADMIN — SEVELAMER CARBONATE 800 MG: 800 TABLET, FILM COATED ORAL at 17:34

## 2023-03-08 RX ADMIN — MINERAL OIL, PETROLATUM 1 APPLICATION: 425; 573 OINTMENT OPHTHALMIC at 13:28

## 2023-03-08 RX ADMIN — DOCUSATE SODIUM 50 MG AND SENNOSIDES 8.6 MG 2 TABLET: 8.6; 5 TABLET, FILM COATED ORAL at 05:29

## 2023-03-08 RX ADMIN — DAKIN'S SOLUTION 0.125% (QUARTER STRENGTH) 1 ML: 0.12 SOLUTION at 18:00

## 2023-03-08 RX ADMIN — HEPARIN SODIUM 5000 UNITS: 5000 INJECTION, SOLUTION INTRAVENOUS; SUBCUTANEOUS at 21:01

## 2023-03-08 RX ADMIN — GABAPENTIN 100 MG: 100 CAPSULE ORAL at 17:34

## 2023-03-08 RX ADMIN — DAKIN'S SOLUTION 0.125% (QUARTER STRENGTH) 40 ML: 0.12 SOLUTION at 10:22

## 2023-03-08 RX ADMIN — ACETAMINOPHEN 1000 MG: 325 TABLET, FILM COATED ORAL at 15:50

## 2023-03-08 RX ADMIN — HEPARIN SODIUM 5000 UNITS: 5000 INJECTION, SOLUTION INTRAVENOUS; SUBCUTANEOUS at 05:29

## 2023-03-08 RX ADMIN — DORZOLAMIDE HYDROCHLORIDE AND TIMOLOL MALEATE 1 DROP: 20; 5 SOLUTION/ DROPS OPHTHALMIC at 05:31

## 2023-03-08 RX ADMIN — BRIMONIDINE TARTRATE 1 DROP: 2 SOLUTION OPHTHALMIC at 17:34

## 2023-03-08 RX ADMIN — ACETAZOLAMIDE EXTENDED-RELEASE 500 MG: 500 CAPSULE ORAL at 05:30

## 2023-03-08 RX ADMIN — SEVELAMER CARBONATE 800 MG: 800 TABLET, FILM COATED ORAL at 13:25

## 2023-03-08 RX ADMIN — ACETAZOLAMIDE EXTENDED-RELEASE 500 MG: 500 CAPSULE ORAL at 17:34

## 2023-03-08 RX ADMIN — MINERAL OIL, PETROLATUM 1 APPLICATION: 425; 573 OINTMENT OPHTHALMIC at 05:31

## 2023-03-08 RX ADMIN — CARVEDILOL 3.12 MG: 3.12 TABLET, FILM COATED ORAL at 17:34

## 2023-03-08 RX ADMIN — LATANOPROST 1 DROP: 50 SOLUTION OPHTHALMIC at 17:34

## 2023-03-08 RX ADMIN — Medication 500 MG: at 05:34

## 2023-03-08 RX ADMIN — Medication 220 MG: at 05:29

## 2023-03-08 RX ADMIN — DORZOLAMIDE HYDROCHLORIDE AND TIMOLOL MALEATE 1 DROP: 20; 5 SOLUTION/ DROPS OPHTHALMIC at 17:34

## 2023-03-08 RX ADMIN — PREDNISOLONE SODIUM PHOSPHATE 1 DROP: 10 SOLUTION/ DROPS OPHTHALMIC at 17:34

## 2023-03-08 RX ADMIN — PREDNISOLONE SODIUM PHOSPHATE 1 DROP: 10 SOLUTION/ DROPS OPHTHALMIC at 08:56

## 2023-03-08 RX ADMIN — BRIMONIDINE TARTRATE 1 DROP: 2 SOLUTION OPHTHALMIC at 05:31

## 2023-03-08 RX ADMIN — DOCUSATE SODIUM 50 MG AND SENNOSIDES 8.6 MG 2 TABLET: 8.6; 5 TABLET, FILM COATED ORAL at 17:35

## 2023-03-08 RX ADMIN — OXYCODONE HYDROCHLORIDE 15 MG: 10 TABLET ORAL at 03:24

## 2023-03-08 RX ADMIN — DAKIN'S SOLUTION 0.125% (QUARTER STRENGTH) 473 ML: 0.12 SOLUTION at 05:47

## 2023-03-08 RX ADMIN — ACETAMINOPHEN 1000 MG: 325 TABLET, FILM COATED ORAL at 08:57

## 2023-03-08 RX ADMIN — ACETAMINOPHEN 1000 MG: 325 TABLET, FILM COATED ORAL at 21:01

## 2023-03-08 ASSESSMENT — PAIN DESCRIPTION - PAIN TYPE
TYPE: ACUTE PAIN;CHRONIC PAIN
TYPE: ACUTE PAIN

## 2023-03-08 ASSESSMENT — ENCOUNTER SYMPTOMS
FEVER: 0
BLURRED VISION: 0
SHORTNESS OF BREATH: 0
NAUSEA: 0
MYALGIAS: 0
VOMITING: 0
BRUISES/BLEEDS EASILY: 0
COUGH: 0
DIZZINESS: 0

## 2023-03-08 NOTE — PROGRESS NOTES
"[facility] -  PROGRESS NOTE               Author: EMMA Ramachandran Date & Time: 3/8/2023  1:39 PM     HPI:  Patient is a 47 year old male with a PMHx of ESRD on HD qTTS, meth user and history of calciphylaxis here for leg pain.  States he's still using meth.  Last HD session was about 4-5 days ago.  Here potassium was 7.6 and bicarb of 7.  Nephrology consulted for maintenance dialysis.  Currently complaining of leg pain.  History of nonadherence.    DAILY NEPHROLOGY SUMMARY:  2/23 - Pain controlled.  Denies nausea.  No SOB  Had HD yesterday  2/24 - Denies pain or SOB.  Laying in bed comfortable  2/25 - 2L Net UF. NPO for OR this am w/Opth for retinal detachment. Reports that he is hungry. 1200 mL UOP last 24 hrs.   2/26: 2.5L Net UF. Bps better. Sitting at side of bed eating breakfast. States that he wants HD chair set up here. Unable to drive to [place] to HD clinic d/t vision.   2/27: Pt sitting up in bed eating breakfast, no overnight events, VSS on RA, worked with OT this AM and cleared for DC, waiting on DC plan  2/28: Pt confused today  3/01: HD yest, UF 2L. VSS. Sitting up in bed holding his head \"I hurt all over\". Vague about whether medication is helping his pain.   3/02: C/O pain eyes and thighs. Due for dialysis. He voiced no concerns. Eating and drinking okay. VSS.   3/03: HD yest, UF 2L. VSS. Pt sleeps a lot. Briefly wakes and engages in conversation then goes back to sleep. Intermittent nause and pain (legs and eyes) No new issues. CM looking for outpt HD chair.  3/04: Pt seen on HD, sleeping, VSS, medically cleared, just waiting on OP HD chair  3/05: Pt sitting up in bed, says he hurts all over, iHD yesterday with 2L net UF, labs reviewed, VSS on RA  3/06: pt laying in bed sleeping, does not wish to participate in exam much, denies any complaints, for iHD tomorrow   3/07: No events, pt seen and examined on hemodialysis, VSS--see dialysis treatment sheet for full " "details, denies any CP/SOB/Abd pain, irritable  3/08: sitting up in bed, reports \"pain everywhere\", does have dependent edema and BLE edema, + ascites, tolerated iHD yesterday with UF: 2.0L, pending outpt chair     REVIEW OF SYSTEMS:    A 10 pt review of systems was obtained and is per HPI/&/or daily summary otherwise negative    PMH/PSH/SH/FH:   Reviewed and unchanged since admission note    CURRENT MEDICATIONS:   Reviewed from admission to present day    VS:  /54   Pulse 73   Temp 36.6 °C (97.8 °F) (Temporal)   Resp 18   Ht 1.803 m (5' 10.98\")   Wt 90 kg (198 lb 6.6 oz)   SpO2 96%   BMI 27.69 kg/m²     Physical Exam  Vitals and nursing note reviewed.   Constitutional:       Comments: +chronically ill-appearing   HENT:      Head: Normocephalic and atraumatic.   Eyes:      General: No scleral icterus.     Comments: R eye erythema   Cardiovascular:      Rate and Rhythm: Normal rate and regular rhythm.      Comments: LFA AVG  LIJ TDC  Pulmonary:      Effort: Pulmonary effort is normal.   Abdominal:      General: There is no distension.   Musculoskeletal:         General: No deformity.      Right lower leg: Edema present.      Left lower leg: Edema present.   Skin:     Findings: No rash.      Comments: Chronic wounds b/l thighs/abd   Neurological:      Mental Status: He is alert and oriented to person, place, and time. Mental status is at baseline.   Psychiatric:         Attention and Perception: He is inattentive.         Mood and Affect: Affect is flat.         Behavior: Behavior is cooperative.        Fluids:  In: 1300 [P.O.:800; Dialysis:500]  Out: 2500     LABS:  Recent Labs     03/06/23  0635 03/07/23  0830 03/07/23 2002 03/08/23  0322   SODIUM 129* 129*  --  132*   POTASSIUM 5.5 5.9* 4.6 4.8   CHLORIDE 91* 90*  --  92*   CO2 25 24  --  27   GLUCOSE 111* 102*  --  92   BUN 60* 68*  --  46*   CREATININE 7.73* 8.23*  --  6.79*   CALCIUM 8.6 8.1*  --  8.6       IMAGING:   All imaging reviewed from " admission to present day    IMPRESSION:  # ESRD  - Etiology likely 2/2 DM/HTN  - HD qTTHS via [facility] TDC  - Lost his HD chair at [facility] previously  # Hyperkalemia--treat with HD  # LFA AVG thrombosed  - US 2/23 no flow  - s/p OR 2/25 AVG Brachio-axillary Creation, thrombectomy and fistulogram  - Stenosis and wall thrombus noted on US, [facility] TDC in use  # HTN, fair control    # Anemia of CKD, below target  - Goal Ggb 10-11  - Ferritin 2722, %sat 41  # CKD-MBD  - Ca 7.6  - iCa 1.0  - Phos 9.3  # Calciphylaxis hx  - Per report from patient  # Generalized weakness, improved   - Evaluated with PT/OT  # h/o Methamphetamine use   # Homelessness  # Encephalopthy ? Meds vs infection   - Improved  # DM   # Chronic wounds b/l thighs/abd   - Wound care following   # Conjunctivitis/End Stage Glaucoma  - Diamox  - Latanoprost, Combigan, Cosopy, Preds Forte    PLAN:  - Continue iHD qTTS/PRN, treatment next due (THUR)  - UF as tolerated   - FU Opthalm Recs  - Has been on high Ca bath with HD, but trying to avoid given hx, currently on 2.5   - Use [facility] PermCath for now  - AVG eval as OP at    - Continue phos binder sevelamer with meals  - JACKIE with HD to goal Hgb 10-11  - No dietary protein restrictions  - Dose all meds per ESRD/iHD  - Renal diet, low salt diet, fluid restriction to 1.5L daily  - SW support for placement. Requesting local HD chair. Hx of non adherence to HD.   - Can transition to Outpt HD clinic when medically cleared and chair secured.  - Follow labs  - Wound care

## 2023-03-08 NOTE — DISCHARGE PLANNING
Case Management Discharge Planning    Admission Date: 2/22/2023  GMLOS: 3.4  ALOS: 14    6-Clicks ADL Score: 22  6-Clicks Mobility Score: 23      Anticipated Discharge Dispo: Discharge Disposition: D/T to home/self-care w/planned acute care hosp IP readmit (81)    DME Needed: No    Action(s) Taken: Updated Provider/Nurse on Discharge Plan    Escalations Completed: Long Length of Stay Committee     Medically Clear: Yes    Next Steps: follow up with Dialysis representative    Barriers to Discharge: Dialysis    Is the patient up for discharge tomorrow: No  Patient requested to speak to SW as he is wanting to know when he will be able to discharge.  SW stated that local dialysis centers have declined accepting his referral, due to numerous no shows/SW stated that local and [place] facilities are stating patient lost his chair.  As he was a no show for 30 days.  Patient denied this, saying he went to the [place] facility and did go to some of his appointment when he lived in [place].  Sw stated that attempts are still being made to find a dialysis chair for him with another provider in town.

## 2023-03-08 NOTE — PROGRESS NOTES
Pt drowsy and lethargic, oriented x4. Dressings changed per orders. Medicated for BLE pain. Call light within reach, personal belongings available, bed in lowest position, treaded socks on, and hourly rounding in place.

## 2023-03-08 NOTE — PROGRESS NOTES
Pt is oriented x 4 without complaints.  O2 dropping to low 80's.  2L NC added.  Call light within reach.

## 2023-03-08 NOTE — WOUND TEAM
"[facility]  Inpatient Services   Wound and Skin Care Evaluation    Admission Date: 2/22/2023     Last order of IP CONSULT TO WOUND CARE was found on 2/22/2023 from Hospital Encounter on 2/22/2023     HPI, PMH, SH: Reviewed    Past Surgical History:   Procedure Laterality Date    AV FISTULA CREATION Left 2/25/2022    Procedure: CREATION, AV FISTULA-UPPER EXTREMITY GRAFT, AND FISTULOGRAM;  Surgeon: Tone Hartman M.D.;  Location: SURGERY [facility];  Service: Vascular    THROMBECTOMY Left 2/25/2022    Procedure: THROMBECTOMY;  Surgeon: Tone Hartman M.D.;  Location: SURGERY [facility];  Service: Vascular     Social History     Tobacco Use    Smoking status: Every Day     Packs/day: 0.25     Types: Cigarettes    Smokeless tobacco: Never   Substance Use Topics    Alcohol use: Not Currently     Chief Complaint   Patient presents with    Leg Pain     Pt states sores on bilateral legs and thighs, states infection, pt with bs 58, missed diaylsis today, brought via [facility] from senior care     Diagnosis: Hyperkalemia [E87.5]    Unit where seen by Wound Team: S609/01     WOUND CONSULT/FOLLOW UP RELATED TO:  BL thighs      WOUND HISTORY:  Ambrose Watkins is a homeless 47 y.o. male with diabetes since 2007, ESRD on HD for past 2-3 years, methamphetamine abuse, tobacco use, HTN, DLD, medical noncompliance who presented 2/22/2023 with evaluation by police prior to being incarcerated.  He was found in ER to be volume overloaded and have a K:7.6.  He states having his last dialysis 4 days ago in [facility].  He has chronic pain in his thighs from chronic bilateral thigh wounds  and per pt, \"calciphylaxis.\"     WOUND ASSESSMENT/LDA      Wound 02/22/23 Full Thickness Wound Thigh Anterior Right (Active)   Wound Image   03/08/23 1030   Site Assessment Drainage;Fragile;Granulation tissue;Slough;Tunneling;Red 03/08/23 1030   Periwound Assessment Fragile;Pink 03/08/23 1030   Margins Defined " edges;Attached edges 03/08/23 1030   Closure Secondary intention 03/08/23 1030   Drainage Amount Small 03/08/23 1030   Drainage Description Green;Serosanguineous 03/08/23 1030   Treatments Cleansed;Site care 03/08/23 1030   Wound Cleansing Approved Wound Cleanser 03/08/23 1030   Periwound Protectant Barrier Paste 03/08/23 1030   Dressing Cleansing/Solutions 1/4 Strength Dakin's Solution 03/08/23 1030   Dressing Options Moist Roll Gauze;Mepilex 03/08/23 1030   Dressing Changed Changed 03/08/23 1030   Dressing Status Clean;Dry;Intact 03/08/23 1030   Dressing Change/Treatment Frequency Every Shift, and As Needed 03/08/23 1030   NEXT Dressing Change/Treatment Date 03/08/23 03/08/23 1030   NEXT Weekly Photo (Inpatient Only) 03/15/23 03/08/23 1030   Non-staged Wound Description Full thickness 03/08/23 1030   Wound Length (cm) 2.5 cm 03/08/23 1030   Wound Width (cm) 5 cm 03/08/23 1030   Wound Depth (cm) 0.6 cm 03/08/23 1030   Wound Surface Area (cm^2) 12.5 cm^2 03/08/23 1030   Wound Volume (cm^3) 7.5 cm^3 03/08/23 1030   Wound Healing % 91 03/08/23 1030   Tunneling (cm) 2.7 cm 03/08/23 1030   Tunneling Clock Position of Wound 5 03/08/23 1030   Shape irregular 03/08/23 1030   Wound Odor Strong 03/08/23 1030   Exposed Structures None 03/08/23 1030   WOUND NURSE ONLY - Time Spent with Patient (mins) 45 03/08/23 1030       Wound 02/22/23 Full Thickness Wound Thigh Anterior;Medial;Posterior Left (Active)   Wound Image    03/08/23 1030   Site Assessment Epithelialization;Granulation tissue;Slough 03/08/23 1030   Periwound Assessment Scar tissue 03/08/23 1030   Margins Defined edges;Unattached edges 03/08/23 1030   Closure Secondary intention 03/08/23 1030   Drainage Amount Small 03/08/23 1030   Drainage Description Green;Serosanguineous 03/08/23 1030   Treatments Cleansed;Site care 03/08/23 1030   Wound Cleansing Approved Wound Cleanser 03/08/23 1030   Periwound Protectant Barrier Paste 03/08/23 1030   Dressing  Cleansing/Solutions 1/4 Strength Dakin's Solution 03/08/23 1030   Dressing Options Moist Roll Gauze;Mepilex 03/08/23 1030   Dressing Changed New 03/08/23 1030   Dressing Status Clean;Dry;Intact 03/08/23 1030   Dressing Change/Treatment Frequency Every Shift, and As Needed 03/08/23 1030   NEXT Dressing Change/Treatment Date 03/08/23 03/08/23 1030   NEXT Weekly Photo (Inpatient Only) 03/15/23 03/08/23 1030   Non-staged Wound Description Full thickness 03/08/23 1030   Shape irregular 03/08/23 1030   Wound Odor Strong 03/08/23 1030   Exposed Structures None 03/08/23 1030   WOUND NURSE ONLY - Time Spent with Patient (mins) 45 03/08/23 1030     Vascular:    ROMAINE:   No results found.    Lab Values:    Lab Results   Component Value Date/Time    WBC 7.8 03/07/2023 08:30 AM    RBC 3.24 (L) 03/07/2023 08:30 AM    HEMOGLOBIN 9.3 (L) 03/07/2023 08:30 AM    HEMATOCRIT 30.4 (L) 03/07/2023 08:30 AM    CREACTPROT 14.27 (H) 02/21/2022 09:00 PM    SEDRATEWES 45 (H) 02/21/2022 09:00 PM    HBA1C 5.9 (H) 03/01/2023 12:01 PM        Culture Results show:  No results found for this or any previous visit (from the past 720 hour(s)).    Pain Level/Medicated:  medicated with IV     INTERVENTIONS BY WOUND TEAM:  Chart and images reviewed. Discussed with bedside RN. All areas of concern (based on picture review, LDA review and discussion with bedside RN) have been thoroughly assessed. Documentation of areas based on significant findings. This RN in to assess patient. Performed standard wound care which includes appropriate positioning, dressing removal and non-selective debridement. Pictures and measurements obtained weekly if/when required.    Preparation for Dressing removal: easily removed  Non-selectively Debrided with:  wound cleanser and gauze.  Sharp debridement: NA  Shahida wound: Cleansed with wound cleanser and gauze, Prepped with barrier paste  Primary Dressing: Dakins  soaked roll gauze with Dakins   Secondary (Outer) Dressing: mepilex      Advanced Wound Care Discharge Planning  Number of Clinicians necessary to complete wound care: 1  Is patient requiring IV pain medications for dressing changes: Yes  Length of time for dressing change 30 min. (This does not include chart review, pre-medication time, set up, clean up or time spent charting.)    Interdisciplinary consultation: Patient, Bedside RN, Wound RN Maylin     EVALUATION / RATIONALE FOR TREATMENT:  Most Recent Date:    3/8/23: Right thigh with small area of depth towards the medial aspect that does drain green/brown purulent drainage. Wound bed otherwise with clean, red, and granular tissue. Left thigh wounds still with greenish serosanguinous drainage as well. Anterior and medial left thigh wounds= beds with clean, red, and with granular tissue. Posterior left thigh wound still with slough at the wound base. Continue Dakins WTD.     3/1/23: Bilateral thighs are less malodorous, L. Thigh has garza purulent drainage, wound beds are more red.  Continue Dakin's Wet to Dry.      2/23/23: Patient with full thickness wounds along his right anteromedial thigh and along his anterior, medial, and posterior left thigh. All wounds beds were pale pink with moderate yellow-green drainage and was malodorous. Will initiate Dakins wet to dry dressing to address infectious process.    LEFT THIGH WOUNDS:   ANTERIOR: 4 X 10 X 0.3  MEDIAL: 7 x 5 x 0.3  POSTERIOR: 2 x 2 x 1 (to slough base)      Goals: Steady decrease in wound area and depth weekly.    WOUND TEAM PLAN OF CARE ([X] for frequency of wound follow up,):   Nursing to follow dressing orders written for wound care. Contact wound team if area fails to progress, deteriorates or with any questions/concerns if something comes up before next scheduled follow up (See below as to whether wound is following and frequency of wound follow up)  Dressing changes by wound team:                   Follow up 3 times weekly:                NPWT change 3 times weekly:      Follow up 1-2 times weekly:    X  Follow up Bi-Monthly:           Follow up Monthly (High Risk):                        Follow up as needed:     Other (explain):     NURSING PLAN OF CARE ORDERS (X):  Dressing changes: See Dressing Care orders: X  Skin care: See Skin Care orders: X  RN Prevention Protocol: X  Rectal tube care: See Rectal Tube Care orders:   Other orders:    RSKIN:   CURRENTLY IN PLACE (X), APPLIED THIS VISIT (A), ORDERED (O):   Q shift Rex:  X  Q shift pressure point assessments:  X    Surface/Positioning   Pressure redistribution mattress            Low Airloss        X  ICU Low Airloss   Bariatric NIKOLAS     Waffle cushion        Waffle Overlay          Reposition q 2 hours      TAPs Turning system     Z Chapin Pillow     Offloading/Redistribution   Sacral Mepilex (Silicone dressing)     Heel Mepilex (Silicone dressing)       O  Heel float boots (Prevalon boot)             Float Heels off Bed with Pillows           Respiratory NA  Silicone O2 tubing         Gray Foam Ear protectors     Cannula fixation Device (Tender )          High flow offloading Clip    Elastic head band offloading device      Anchorfast                                                         Trach with Optifoam split foam             Containment/Moisture Prevention NA    Rectal tube or BMS    Purwick/Condom Cath        Dumont Catheter    Barrier wipes           Barrier paste       Antifungal tx      Interdry        Mobilization       Up to chair      X  Ambulate    X  PT/OT      Nutrition       Dietician        Diabetes Education      PO   X  TF     TPN     NPO   # days     Other        Anticipated discharge plans:   LTACH:        SNF/Rehab:                  Home Health Care:           Outpatient Wound Center:        X    Self/Family Care:        Other:                  Vac Discharge Needs:   Vac Discharge plan is purely a recommendation from wound team and not a requirement for discharge unless otherwise stated by  physician.  Not Applicable Pt not on a wound vac:     x  Regular Vac while inpatient, alternative dressing at DC:        Regular Vac in use and continued at DC:            Reg. Vac w/ Skin Sub/Biologic in use. Will need to be changed 2x wkly:      Veraflo Vac while inpatient, ok to transition to Regular Vac on Discharge (Bedside RN to Clamp small instillation tubing at time of DC):           Veraflo Vac while inpatient, would benefit from remaining on Veraflo Vac upon discharge:

## 2023-03-08 NOTE — PROGRESS NOTES
"Hospital Medicine Daily Progress Note    Date of Service  3/8/2023    Chief Complaint  Ambrose Watkins is a 47 y.o. male admitted 2/22/2023 with hyperkalemia    Hospital Course  Ambrose Watkins is a homeless 47 y.o. male with diabetes since 2007, ESRD on HD for past 2-3 years, methamphetamine abuse, tobacco use, HTN, DLD, medical noncompliance who presented 2/22/2023 with evaluation by police prior to being incarcerated.  He was found in ER to be volume overloaded and have a K:7.6.  He states having his last dialysis 4 days ago in [facility].  He has chronic pain in his thighs from chronic bilateral thigh wounds  and per pt, \"calciphylaxis.\"  In ER he was consulted by nephrology and emergent dialysis is planned.  The patient keeps pulling off his telemetry leads despite being educated on why we recommend them.  Patient was also noted to have redness of the right eye.  Ophthalmology was consulted on the case.  Patient was noted to have glaucoma and vitreous hemorrhage of the right eye.  Ophthalmic drops were started.  Patient need placement.   assisting assisting with placement.         Interval Problem Update  3/7/2023  Remained stable.  Denies any discomfort.  Labs reviewed noted with mild hyponatremia, hyperkalemia  He had some hemodialysis  Repeat labs post hemodialysis  Need placement.   assisting    3/8/2023  Vitals remained stable  Labs reviewed  Nephrology assisting with hemodialysis  Need placement.   assisting    I have discussed this patient's plan of care and discharge plan at IDT rounds today with Case Management, Nursing, Nursing leadership, and other members of the IDT team.    Consultants/Specialty  nephrology and ophthalmology    Code Status  Full Code    Disposition  Patient is medically cleared for discharge.   Anticipate discharge to to skilled nursing facility.  I have placed the appropriate orders for post-discharge needs.    Review of " Systems  Review of Systems   Constitutional:  Negative for fever.   HENT:  Negative for hearing loss.    Eyes:  Negative for blurred vision.   Respiratory:  Negative for cough and shortness of breath.    Cardiovascular:  Negative for chest pain.   Gastrointestinal:  Negative for nausea and vomiting.   Genitourinary:  Negative for dysuria.   Musculoskeletal:  Negative for myalgias.   Skin:  Negative for rash.   Neurological:  Negative for dizziness.   Endo/Heme/Allergies:  Does not bruise/bleed easily.      Physical Exam  Temp:  [36.3 °C (97.4 °F)-36.6 °C (97.8 °F)] 36.6 °C (97.8 °F)  Pulse:  [73-86] 73  Resp:  [18] 18  BP: (108-139)/(54-78) 108/54  SpO2:  [90 %-98 %] 96 %    Physical Exam  Constitutional:       General: He is not in acute distress.  HENT:      Head: Normocephalic and atraumatic.      Right Ear: Tympanic membrane normal.      Left Ear: Tympanic membrane normal.      Nose: Nose normal.      Mouth/Throat:      Mouth: Mucous membranes are moist.   Eyes:      Extraocular Movements: Extraocular movements intact.      Conjunctiva/sclera:      Right eye: Right conjunctiva is injected.      Left eye: Left conjunctiva is injected.      Pupils: Pupils are equal, round, and reactive to light.   Cardiovascular:      Rate and Rhythm: Normal rate and regular rhythm.      Pulses: Normal pulses.   Pulmonary:      Effort: Pulmonary effort is normal. No respiratory distress.   Abdominal:      General: Abdomen is flat. There is no distension.   Musculoskeletal:      Cervical back: Normal range of motion.      Right lower leg: No edema.      Left lower leg: No edema.   Skin:     General: Skin is warm.   Neurological:      General: No focal deficit present.      Mental Status: He is alert and oriented to person, place, and time. Mental status is at baseline.   Psychiatric:         Mood and Affect: Mood normal.       Fluids    Intake/Output Summary (Last 24 hours) at 3/8/2023 1235  Last data filed at 3/7/2023 1800  Gross  per 24 hour   Intake 560 ml   Output --   Net 560 ml         Laboratory  Recent Labs     03/06/23  0635 03/07/23  0830   WBC 7.6 7.8   RBC 3.40* 3.24*   HEMOGLOBIN 9.6* 9.3*   HEMATOCRIT 31.7* 30.4*   MCV 93.2 93.8   MCH 28.2 28.7   MCHC 30.3* 30.6*   RDW 62.2* 61.9*   PLATELETCT 244 222   MPV 9.8 9.8       Recent Labs     03/06/23  0635 03/07/23  0830 03/07/23 2002 03/08/23  0322   SODIUM 129* 129*  --  132*   POTASSIUM 5.5 5.9* 4.6 4.8   CHLORIDE 91* 90*  --  92*   CO2 25 24  --  27   GLUCOSE 111* 102*  --  92   BUN 60* 68*  --  46*   CREATININE 7.73* 8.23*  --  6.79*   CALCIUM 8.6 8.1*  --  8.6                     Imaging  [id number] W/O PLUS RECONS   Final Result      1.  Increased density throughout the right lobe is nonspecific though may suggest underlying hemorrhage with possible retinal detachment.   2.  No post septal fluid collection.             Assessment/Plan  * Hyperkalemia- (present on admission)  Assessment & Plan  Monitor     Pulmonary HTN (HCC)- (present on admission)  Assessment & Plan  Monitor volume status    Cardiomyopathy (HCC)- (present on admission)  Assessment & Plan  Coreg    Chronic HFrEF (heart failure with reduced ejection fraction) (HCC)- (present on admission)  Assessment & Plan  Coreg    Right eye glaucoma, due to angle-closure and phacomorphic components- (present on admission)  Assessment & Plan  Ophthalmology - probable end-stage glaucoma from angle-closure and phacomorphic components.  Latanoprost, Timolol, Brimonidine, Cosopt, Prednisone forte ophthalmic drops and Diamox  Follow up with Ophthalmology as outpatient    Vitreous hemorrhage of right eye (HCC)- (present on admission)  Assessment & Plan  Likely due to glaucoma    Open wound- (present on admission)  Assessment & Plan  Chronic  Wound care  Pain control - scheduled Tylenol, adjusted Oxycodone prn, IV Morphine for dressing changes     Tobacco dependence- (present on admission)  Assessment & Plan  Refused Nicotine  replacement protocol    Noncompliance of patient with renal dialysis (HCC)- (present on admission)  Assessment & Plan  Education and counseling    Anemia, chronic disease- (present on admission)  Assessment & Plan  Epogen  Follow cbc    Hypocalcemia- (present on admission)  Assessment & Plan  Follow bmp    Methamphetamine abuse (HCC)- (present on admission)  Assessment & Plan  UDS pending    End stage renal disease (HCC)- (present on admission)  Assessment & Plan  HD per Nephrology - T-Th-    Case management for discharge planning - outpatient Dialysis arrangement    Hypertension- (present on admission)  Assessment & Plan  Coreg     Type 2 diabetes mellitus with kidney complication, without long-term current use of insulin (HCC)- (present on admission)  Assessment & Plan  hgba1c 5.9         VTE prophylaxis: SCDs/TEDs and heparin ppx    I have performed a physical exam and reviewed and updated ROS and Plan today (3/8/2023). In review of yesterday's note (3/7/2023), there are no changes except as documented above.

## 2023-03-09 LAB
ANION GAP SERPL CALC-SCNC: 15 MMOL/L (ref 7–16)
BUN SERPL-MCNC: 59 MG/DL (ref 8–22)
CALCIUM SERPL-MCNC: 8.8 MG/DL (ref 8.5–10.5)
CHLORIDE SERPL-SCNC: 91 MMOL/L (ref 96–112)
CO2 SERPL-SCNC: 26 MMOL/L (ref 20–33)
CREAT SERPL-MCNC: 7.74 MG/DL (ref 0.5–1.4)
GFR SERPLBLD CREATININE-BSD FMLA CKD-EPI: 8 ML/MIN/1.73 M 2
GLUCOSE SERPL-MCNC: 121 MG/DL (ref 65–99)
PHOSPHATE SERPL-MCNC: 8.5 MG/DL (ref 2.5–4.5)
POTASSIUM SERPL-SCNC: 5.1 MMOL/L (ref 3.6–5.5)
SODIUM SERPL-SCNC: 132 MMOL/L (ref 135–145)

## 2023-03-09 PROCEDURE — A9270 NON-COVERED ITEM OR SERVICE: HCPCS | Performed by: INTERNAL MEDICINE

## 2023-03-09 PROCEDURE — A9270 NON-COVERED ITEM OR SERVICE: HCPCS | Performed by: NURSE PRACTITIONER

## 2023-03-09 PROCEDURE — 99232 SBSQ HOSP IP/OBS MODERATE 35: CPT | Performed by: STUDENT IN AN ORGANIZED HEALTH CARE EDUCATION/TRAINING PROGRAM

## 2023-03-09 PROCEDURE — 700111 HCHG RX REV CODE 636 W/ 250 OVERRIDE (IP): Performed by: NURSE PRACTITIONER

## 2023-03-09 PROCEDURE — 700111 HCHG RX REV CODE 636 W/ 250 OVERRIDE (IP)

## 2023-03-09 PROCEDURE — 700102 HCHG RX REV CODE 250 W/ 637 OVERRIDE(OP): Performed by: FAMILY MEDICINE

## 2023-03-09 PROCEDURE — A9270 NON-COVERED ITEM OR SERVICE: HCPCS | Performed by: FAMILY MEDICINE

## 2023-03-09 PROCEDURE — 84100 ASSAY OF PHOSPHORUS: CPT

## 2023-03-09 PROCEDURE — A9270 NON-COVERED ITEM OR SERVICE: HCPCS | Performed by: HOSPITALIST

## 2023-03-09 PROCEDURE — 700111 HCHG RX REV CODE 636 W/ 250 OVERRIDE (IP): Performed by: HOSPITALIST

## 2023-03-09 PROCEDURE — 700102 HCHG RX REV CODE 250 W/ 637 OVERRIDE(OP): Performed by: INTERNAL MEDICINE

## 2023-03-09 PROCEDURE — 36415 COLL VENOUS BLD VENIPUNCTURE: CPT

## 2023-03-09 PROCEDURE — 700102 HCHG RX REV CODE 250 W/ 637 OVERRIDE(OP): Performed by: NURSE PRACTITIONER

## 2023-03-09 PROCEDURE — 90935 HEMODIALYSIS ONE EVALUATION: CPT

## 2023-03-09 PROCEDURE — 700102 HCHG RX REV CODE 250 W/ 637 OVERRIDE(OP): Performed by: HOSPITALIST

## 2023-03-09 PROCEDURE — 80048 BASIC METABOLIC PNL TOTAL CA: CPT

## 2023-03-09 PROCEDURE — 770006 HCHG ROOM/CARE - MED/SURG/GYN SEMI*

## 2023-03-09 RX ORDER — HEPARIN SODIUM 1000 [USP'U]/ML
INJECTION, SOLUTION INTRAVENOUS; SUBCUTANEOUS
Status: COMPLETED
Start: 2023-03-09 | End: 2023-03-09

## 2023-03-09 RX ADMIN — MINERAL OIL, PETROLATUM 1 APPLICATION: 425; 573 OINTMENT OPHTHALMIC at 13:27

## 2023-03-09 RX ADMIN — TIMOLOL MALEATE 1 DROP: 5 SOLUTION OPHTHALMIC at 16:07

## 2023-03-09 RX ADMIN — CARVEDILOL 3.12 MG: 3.12 TABLET, FILM COATED ORAL at 11:35

## 2023-03-09 RX ADMIN — HEPARIN SODIUM 3800 UNITS: 1000 INJECTION, SOLUTION INTRAVENOUS; SUBCUTANEOUS at 10:38

## 2023-03-09 RX ADMIN — OXYCODONE HYDROCHLORIDE 15 MG: 10 TABLET ORAL at 04:40

## 2023-03-09 RX ADMIN — TIMOLOL MALEATE 1 DROP: 5 SOLUTION OPHTHALMIC at 04:42

## 2023-03-09 RX ADMIN — CARVEDILOL 3.12 MG: 3.12 TABLET, FILM COATED ORAL at 16:06

## 2023-03-09 RX ADMIN — BRIMONIDINE TARTRATE 1 DROP: 2 SOLUTION OPHTHALMIC at 16:07

## 2023-03-09 RX ADMIN — BRIMONIDINE TARTRATE 1 DROP: 2 SOLUTION OPHTHALMIC at 04:42

## 2023-03-09 RX ADMIN — Medication 500 MG: at 04:40

## 2023-03-09 RX ADMIN — SEVELAMER CARBONATE 800 MG: 800 TABLET, FILM COATED ORAL at 16:06

## 2023-03-09 RX ADMIN — OXYCODONE HYDROCHLORIDE 10 MG: 10 TABLET ORAL at 22:04

## 2023-03-09 RX ADMIN — DORZOLAMIDE HYDROCHLORIDE AND TIMOLOL MALEATE 1 DROP: 20; 5 SOLUTION/ DROPS OPHTHALMIC at 04:42

## 2023-03-09 RX ADMIN — DAKIN'S SOLUTION 0.125% (QUARTER STRENGTH) 1 ML: 0.12 SOLUTION at 18:00

## 2023-03-09 RX ADMIN — ACETAMINOPHEN 1000 MG: 325 TABLET, FILM COATED ORAL at 22:04

## 2023-03-09 RX ADMIN — Medication 220 MG: at 04:40

## 2023-03-09 RX ADMIN — ACETAMINOPHEN 1000 MG: 325 TABLET, FILM COATED ORAL at 14:12

## 2023-03-09 RX ADMIN — OXYCODONE HYDROCHLORIDE 10 MG: 10 TABLET ORAL at 11:34

## 2023-03-09 RX ADMIN — MINERAL OIL, PETROLATUM 1 APPLICATION: 425; 573 OINTMENT OPHTHALMIC at 04:42

## 2023-03-09 RX ADMIN — PREDNISOLONE SODIUM PHOSPHATE 1 DROP: 10 SOLUTION/ DROPS OPHTHALMIC at 13:27

## 2023-03-09 RX ADMIN — SEVELAMER CARBONATE 800 MG: 800 TABLET, FILM COATED ORAL at 11:35

## 2023-03-09 RX ADMIN — ACETAZOLAMIDE EXTENDED-RELEASE 500 MG: 500 CAPSULE ORAL at 04:40

## 2023-03-09 RX ADMIN — PREDNISOLONE SODIUM PHOSPHATE 1 DROP: 10 SOLUTION/ DROPS OPHTHALMIC at 22:04

## 2023-03-09 RX ADMIN — GABAPENTIN 100 MG: 100 CAPSULE ORAL at 16:06

## 2023-03-09 RX ADMIN — LATANOPROST 1 DROP: 50 SOLUTION OPHTHALMIC at 16:07

## 2023-03-09 RX ADMIN — HEPARIN SODIUM 5000 UNITS: 5000 INJECTION, SOLUTION INTRAVENOUS; SUBCUTANEOUS at 04:39

## 2023-03-09 RX ADMIN — ONDANSETRON 4 MG: 2 INJECTION INTRAMUSCULAR; INTRAVENOUS at 16:17

## 2023-03-09 RX ADMIN — DORZOLAMIDE HYDROCHLORIDE AND TIMOLOL MALEATE 1 DROP: 20; 5 SOLUTION/ DROPS OPHTHALMIC at 16:07

## 2023-03-09 RX ADMIN — PREDNISOLONE SODIUM PHOSPHATE 1 DROP: 10 SOLUTION/ DROPS OPHTHALMIC at 16:07

## 2023-03-09 RX ADMIN — DOCUSATE SODIUM 50 MG AND SENNOSIDES 8.6 MG 2 TABLET: 8.6; 5 TABLET, FILM COATED ORAL at 04:40

## 2023-03-09 RX ADMIN — EPOETIN ALFA-EPBX 5000 UNITS: 3000 INJECTION, SOLUTION INTRAVENOUS; SUBCUTANEOUS at 10:37

## 2023-03-09 RX ADMIN — MINERAL OIL, PETROLATUM 1 APPLICATION: 425; 573 OINTMENT OPHTHALMIC at 22:04

## 2023-03-09 RX ADMIN — DOCUSATE SODIUM 50 MG AND SENNOSIDES 8.6 MG 2 TABLET: 8.6; 5 TABLET, FILM COATED ORAL at 16:06

## 2023-03-09 RX ADMIN — ACETAZOLAMIDE EXTENDED-RELEASE 500 MG: 500 CAPSULE ORAL at 16:06

## 2023-03-09 RX ADMIN — HEPARIN SODIUM 5000 UNITS: 5000 INJECTION, SOLUTION INTRAVENOUS; SUBCUTANEOUS at 22:04

## 2023-03-09 RX ADMIN — MORPHINE SULFATE 4 MG: 4 INJECTION INTRAVENOUS at 06:05

## 2023-03-09 ASSESSMENT — ENCOUNTER SYMPTOMS
DIZZINESS: 0
SHORTNESS OF BREATH: 0
FEVER: 0
MYALGIAS: 0
BRUISES/BLEEDS EASILY: 0
VOMITING: 0
COUGH: 0
EYE REDNESS: 1
NAUSEA: 0
BLURRED VISION: 1

## 2023-03-09 ASSESSMENT — PAIN DESCRIPTION - PAIN TYPE: TYPE: ACUTE PAIN

## 2023-03-09 NOTE — DISCHARGE PLANNING
Medical Social Work  PC to [facility] Coordinator [phone]: No updates, Marlene has not said yes or no, requested a call back today for an update.  Hopes to have an answer tomorrow

## 2023-03-09 NOTE — PROGRESS NOTES
Patient arrived back to unit from dialysis in stable condition. Toes on bilateral feet painted with betadine at this time per MD orders. Patient toleratedwell. Will continue to monitor.

## 2023-03-09 NOTE — PROGRESS NOTES
"Hospital Medicine Daily Progress Note    Date of Service  3/9/2023    Chief Complaint  Ambrose Watkins is a 47 y.o. male admitted 2/22/2023 with hyperkalemia    Hospital Course  Ambrose Watkins is a homeless 47 y.o. male with diabetes since 2007, ESRD on HD for past 2-3 years, methamphetamine abuse, tobacco use, HTN, DLD, medical noncompliance who presented 2/22/2023 with evaluation by police prior to being incarcerated.  He was found in ER to be volume overloaded and have a K:7.6.  He states having his last dialysis 4 days ago in [facility].  He has chronic pain in his thighs from chronic bilateral thigh wounds  and per pt, \"calciphylaxis.\"  In ER he was consulted by nephrology and emergent dialysis is planned.  The patient keeps pulling off his telemetry leads despite being educated on why we recommend them.  Patient was also noted to have redness of the right eye.  Ophthalmology was consulted on the case.  Patient was noted to have glaucoma and vitreous hemorrhage of the right eye.  Ophthalmic drops were started.  Patient need placement.   assisting assisting with placement.         Interval Problem Update  3/7/2023  Remained stable.  Denies any discomfort.  Labs reviewed noted with mild hyponatremia, hyperkalemia  He had some hemodialysis  Repeat labs post hemodialysis  Need placement.   assisting    3/8/2023  Vitals remained stable  Labs reviewed  Nephrology assisting with hemodialysis  Need placement.   assisting    3/9/2023  Vitals remained stable  Labs reviewed  Nephrology following for hemodialysis  Wound care assisting with left thigh wound  Need placement.   assisting    I have discussed this patient's plan of care and discharge plan at IDT rounds today with Case Management, Nursing, Nursing leadership, and other members of the IDT team.    Consultants/Specialty  nephrology and ophthalmology    Code Status  Full " Code    Disposition  Patient is medically cleared for discharge.   Anticipate discharge to to skilled nursing facility.  I have placed the appropriate orders for post-discharge needs.    Review of Systems  Review of Systems   Constitutional:  Positive for malaise/fatigue. Negative for fever.   HENT:  Negative for hearing loss.    Eyes:  Positive for blurred vision and redness.   Respiratory:  Negative for cough and shortness of breath.    Cardiovascular:  Negative for chest pain.   Gastrointestinal:  Negative for nausea and vomiting.   Genitourinary:  Negative for dysuria.   Musculoskeletal:  Negative for myalgias.   Skin:  Negative for rash.   Neurological:  Negative for dizziness.   Endo/Heme/Allergies:  Does not bruise/bleed easily.      Physical Exam  Temp:  [36.4 °C (97.5 °F)-36.6 °C (97.8 °F)] 36.4 °C (97.5 °F)  Pulse:  [70-82] 74  Resp:  [17-18] 17  BP: (117-125)/(62-75) 125/75  SpO2:  [95 %-100 %] 95 %    Physical Exam  Constitutional:       General: He is not in acute distress.  HENT:      Head: Normocephalic and atraumatic.      Right Ear: Tympanic membrane normal.      Left Ear: Tympanic membrane normal.      Nose: Nose normal.      Mouth/Throat:      Mouth: Mucous membranes are moist.   Eyes:      Extraocular Movements: Extraocular movements intact.      Conjunctiva/sclera:      Right eye: Right conjunctiva is injected.      Left eye: Left conjunctiva is injected.      Pupils: Pupils are equal, round, and reactive to light.   Cardiovascular:      Rate and Rhythm: Normal rate and regular rhythm.      Pulses: Normal pulses.   Pulmonary:      Effort: Pulmonary effort is normal. No respiratory distress.   Abdominal:      General: Abdomen is flat. There is no distension.   Musculoskeletal:      Cervical back: Normal range of motion.      Right lower leg: No edema.      Left lower leg: No edema.   Skin:     General: Skin is warm.   Neurological:      General: No focal deficit present.      Mental Status: He is  alert and oriented to person, place, and time. Mental status is at baseline.   Psychiatric:         Mood and Affect: Mood normal.       Fluids    Intake/Output Summary (Last 24 hours) at 3/9/2023 1124  Last data filed at 3/8/2023 2101  Gross per 24 hour   Intake 360 ml   Output --   Net 360 ml         Laboratory  Recent Labs     03/07/23  0830   WBC 7.8   RBC 3.24*   HEMOGLOBIN 9.3*   HEMATOCRIT 30.4*   MCV 93.8   MCH 28.7   MCHC 30.6*   RDW 61.9*   PLATELETCT 222   MPV 9.8       Recent Labs     03/07/23  0830 03/07/23 2002 03/08/23  0322 03/09/23  0451   SODIUM 129*  --  132* 132*   POTASSIUM 5.9* 4.6 4.8 5.1   CHLORIDE 90*  --  92* 91*   CO2 24  --  27 26   GLUCOSE 102*  --  92 121*   BUN 68*  --  46* 59*   CREATININE 8.23*  --  6.79* 7.74*   CALCIUM 8.1*  --  8.6 8.8                     Imaging  [id number] W/O PLUS RECONS   Final Result      1.  Increased density throughout the right lobe is nonspecific though may suggest underlying hemorrhage with possible retinal detachment.   2.  No post septal fluid collection.             Assessment/Plan  * Hyperkalemia- (present on admission)  Assessment & Plan  Monitor     Pulmonary HTN (HCC)- (present on admission)  Assessment & Plan  Monitor volume status    Cardiomyopathy (HCC)- (present on admission)  Assessment & Plan  Coreg    Chronic HFrEF (heart failure with reduced ejection fraction) ([facility])- (present on admission)  Assessment & Plan  Coreg    Right eye glaucoma, due to angle-closure and phacomorphic components- (present on admission)  Assessment & Plan  Ophthalmology - probable end-stage glaucoma from angle-closure and phacomorphic components.  Latanoprost, Timolol, Brimonidine, Cosopt, Prednisone forte ophthalmic drops and Diamox  Follow up with Ophthalmology as outpatient    Vitreous hemorrhage of right eye (HCC)- (present on admission)  Assessment & Plan  Likely due to glaucoma    Open wound- (present on admission)  Assessment & Plan  Chronic  Wound  care  Pain control - scheduled Tylenol, adjusted Oxycodone prn, IV Morphine for dressing changes     Tobacco dependence- (present on admission)  Assessment & Plan  Refused Nicotine replacement protocol    Noncompliance of patient with renal dialysis (HCC)- (present on admission)  Assessment & Plan  Education and counseling    Anemia, chronic disease- (present on admission)  Assessment & Plan  Epogen  Follow cbc    Hypocalcemia- (present on admission)  Assessment & Plan  Follow bmp    Methamphetamine abuse (HCC)- (present on admission)  Assessment & Plan  UDS pending    End stage renal disease ([facility])- (present on admission)  Assessment & Plan  HD per Nephrology - T-Th-    Case management for discharge planning - outpatient Dialysis arrangement    Hypertension- (present on admission)  Assessment & Plan  Coreg     Type 2 diabetes mellitus with kidney complication, without long-term current use of insulin ([facility])- (present on admission)  Assessment & Plan  hgba1c 5.9         VTE prophylaxis: SCDs/TEDs and heparin ppx    I have performed a physical exam and reviewed and updated ROS and Plan today (3/9/2023). In review of yesterday's note (3/8/2023), there are no changes except as documented above.

## 2023-03-09 NOTE — PROGRESS NOTES
LATE ENTRY:    Pt alert/oriented x4, sleepy. Medicated for BLE pain. Dressings changed per orders. Call light within reach, personal belongings available, bed in lowest position, treaded socks on, and hourly rounding in place.

## 2023-03-09 NOTE — PROGRESS NOTES
"[facility] -  PROGRESS NOTE               Author: Matilda Palacio M.D. Date & Time: 3/9/2023  7:34 AM     HPI:  Patient is a 47 year old male with a PMHx of ESRD on HD qTTS, meth user and history of calciphylaxis here for leg pain.  States he's still using meth.  Last HD session was about 4-5 days ago.  Here potassium was 7.6 and bicarb of 7.  Nephrology consulted for maintenance dialysis.  Currently complaining of leg pain.  History of nonadherence.    DAILY NEPHROLOGY SUMMARY:  2/23 - Pain controlled.  Denies nausea.  No SOB  Had HD yesterday  2/24 - Denies pain or SOB.  Laying in bed comfortable  2/25 - 2L Net UF. NPO for OR this am w/Opth for retinal detachment. Reports that he is hungry. 1200 mL UOP last 24 hrs.   2/26: 2.5L Net UF. Bps better. Sitting at side of bed eating breakfast. States that he wants HD chair set up here. Unable to drive to [place] to HD clinic d/t vision.   2/27: Pt sitting up in bed eating breakfast, no overnight events, VSS on RA, worked with OT this AM and cleared for DC, waiting on DC plan  2/28: Pt confused today  3/01: HD yest, UF 2L. VSS. Sitting up in bed holding his head \"I hurt all over\". Vague about whether medication is helping his pain.   3/02: C/O pain eyes and thighs. Due for dialysis. He voiced no concerns. Eating and drinking okay. VSS.   3/03: HD yest, UF 2L. VSS. Pt sleeps a lot. Briefly wakes and engages in conversation then goes back to sleep. Intermittent nause and pain (legs and eyes) No new issues. CM looking for outpt HD chair.  3/04: Pt seen on HD, sleeping, VSS, medically cleared, just waiting on OP HD chair  3/05: Pt sitting up in bed, says he hurts all over, iHD yesterday with 2L net UF, labs reviewed, VSS on RA  3/06: pt laying in bed sleeping, does not wish to participate in exam much, denies any complaints, for iHD tomorrow   3/07: No events, pt seen and examined on hemodialysis, VSS--see dialysis treatment sheet for full " "details, denies any CP/SOB/Abd pain, irritable  3/08: sitting up in bed, reports \"pain everywhere\", does have dependent edema and BLE edema, + ascites, tolerated iHD yesterday with UF: 2.0L, pending outpt chair   3/09: No events, Pt seen and examined on hemodialysis, VSS--see dialysis treatment sheet for full details, reports generalized pain, denies any SOB/LE edema    REVIEW OF SYSTEMS:    A 10 pt review of systems was obtained and is per HPI/&/or daily summary otherwise negative    PMH/PSH/SH/FH:   Reviewed and unchanged since admission note    CURRENT MEDICATIONS:   Reviewed from admission to present day    VS:  /75   Pulse 74   Temp 36.4 °C (97.5 °F) (Temporal)   Resp 17   Ht 1.803 m (5' 10.98\")   Wt 90 kg (198 lb 6.6 oz)   SpO2 95%   BMI 27.69 kg/m²     Physical Exam  Vitals and nursing note reviewed.   Constitutional:       Comments: +chronically ill-appearing   HENT:      Head: Normocephalic and atraumatic.   Eyes:      General: No scleral icterus.     Comments: R eye erythema   Cardiovascular:      Rate and Rhythm: Normal rate and regular rhythm.      Comments: LFA AVG  [facility] TDC  Pulmonary:      Effort: Pulmonary effort is normal.   Abdominal:      General: There is no distension.   Musculoskeletal:         General: No deformity.      Right lower leg: Edema (trace pedal) present.      Left lower leg: Edema (trace pedal) present.   Skin:     Findings: No rash.      Comments: Chronic wounds b/l thighs/abd   Neurological:      Mental Status: He is alert and oriented to person, place, and time. Mental status is at baseline.   Psychiatric:         Attention and Perception: He is inattentive.         Mood and Affect: Affect is flat.         Behavior: Behavior is cooperative.        Fluids:  In: 240 [P.O.:240]  Out: -     LABS:  Recent Labs     03/07/23  0830 03/07/23 2002 03/08/23  0322 03/09/23  0451   SODIUM 129*  --  132* 132*   POTASSIUM 5.9* 4.6 4.8 5.1   CHLORIDE 90*  --  92* 91*   CO2 24  --  " 27 26   GLUCOSE 102*  --  92 121*   BUN 68*  --  46* 59*   CREATININE 8.23*  --  6.79* 7.74*   CALCIUM 8.1*  --  8.6 8.8       IMAGING:   All imaging reviewed from admission to present day    IMPRESSION:  # ESRD  - Etiology likely 2/2 DM/HTN  - HD qTTHS via [facility] TDC  - Lost his HD chair at [facility] previously  # Hyperkalemia--treated with HD  # LFA AVG thrombosed  - US 2/23 no flow  - s/p OR 2/25 AVG Brachio-axillary Creation, thrombectomy and fistulogram  - Stenosis and wall thrombus noted on US, [facility] TDC in use  # HTN, fair control    # Anemia of CKD, below target  - Goal Ggb 10-11  - Ferritin 2722, %sat 41  # CKD-MBD  - Ca 7.6  - iCa 1.0  - Phos 9.3  # Calciphylaxis hx  - Per report from patient  # Generalized weakness, improved   - Evaluated with PT/OT  # h/o Methamphetamine use   # Homelessness  # Encephalopthy ? Meds vs infection   - Improved  # DM   # Chronic wounds b/l thighs/abd   - Wound care following   # Conjunctivitis/End Stage Glaucoma  - Diamox  - Latanoprost, Combigan, Cosopy, Preds Forte    PLAN:  - HD today ([facility]) and continue iHD qTTS/PRN  - UF as tolerated   - FU Opthalm Recs  - Use [facility] PermCath for now  - AVG eval as OP at [facility]   - Continue phos binder sevelamer with meals  - JACKIE with HD to goal Hgb 10-11  - No dietary protein restrictions  - Dose all meds per ESRD/iHD  - Renal diet, low salt diet, fluid restriction to 1.5L daily  -  support for placement. Requesting local HD chair. Hx of non adherence to HD.   - Can transition to Outpt HD clinic when medically cleared and chair secured.  - Follow labs  - Wound care

## 2023-03-09 NOTE — PROGRESS NOTES
Received bedside report from night shift RN.   Assumed care of patient at change of shift.   Assessment complete and POC discussed.   STATUS: Patient is A&Ox4, VSS, on RA.   PAIN: Patient denies pain, no apparent signs of distress or discomfort.   Patient is sitting up in bed for breakfast.   Bed alarm set, call light in reach.  Bed is in lowest/locked position.   Call light and belongings are within reach.   Patient transported to dialysis at this time.   No further needs at this time.  Will continue to monitor.

## 2023-03-09 NOTE — CARE PLAN
Problem: Pain - Standard  Goal: Alleviation of pain or a reduction in pain to the patient’s comfort goal  Outcome: Progressing  Note: Medicated per MAR with pain control success.     Problem: Knowledge Deficit - Standard  Goal: Patient and family/care givers will demonstrate understanding of plan of care, disease process/condition, diagnostic tests and medications  Note: Pt updated on POC for today.     The patient is Stable - Low risk of patient condition declining or worsening    Shift Goals  Clinical Goals: pain control  Patient Goals: rest  Family Goals: POLINA    Progress made toward(s) clinical / shift goals:      Patient is not progressing towards the following goals:

## 2023-03-09 NOTE — CARE PLAN
The patient is Stable - Low risk of patient condition declining or worsening    Shift Goals  Clinical Goals: pain management  Patient Goals: rest  Family Goals: POLINA    Progress made toward(s) clinical / shift goals:  medicated for BLE pain per MAR.       Problem: Pain - Standard  Goal: Alleviation of pain or a reduction in pain to the patient’s comfort goal  Outcome: Progressing     Problem: Knowledge Deficit - Standard  Goal: Patient and family/care givers will demonstrate understanding of plan of care, disease process/condition, diagnostic tests and medications  Outcome: Progressing     Problem: Skin Integrity  Goal: Skin integrity is maintained or improved  Outcome: Progressing     Problem: Fall Risk  Goal: Patient will remain free from falls  Outcome: Progressing

## 2023-03-10 PROCEDURE — 700102 HCHG RX REV CODE 250 W/ 637 OVERRIDE(OP): Performed by: STUDENT IN AN ORGANIZED HEALTH CARE EDUCATION/TRAINING PROGRAM

## 2023-03-10 PROCEDURE — 97116 GAIT TRAINING THERAPY: CPT

## 2023-03-10 PROCEDURE — 87205 SMEAR GRAM STAIN: CPT

## 2023-03-10 PROCEDURE — 99232 SBSQ HOSP IP/OBS MODERATE 35: CPT | Performed by: STUDENT IN AN ORGANIZED HEALTH CARE EDUCATION/TRAINING PROGRAM

## 2023-03-10 PROCEDURE — 700102 HCHG RX REV CODE 250 W/ 637 OVERRIDE(OP): Performed by: NURSE PRACTITIONER

## 2023-03-10 PROCEDURE — 97164 PT RE-EVAL EST PLAN CARE: CPT

## 2023-03-10 PROCEDURE — A9270 NON-COVERED ITEM OR SERVICE: HCPCS | Performed by: INTERNAL MEDICINE

## 2023-03-10 PROCEDURE — 700102 HCHG RX REV CODE 250 W/ 637 OVERRIDE(OP): Performed by: HOSPITALIST

## 2023-03-10 PROCEDURE — 87186 SC STD MICRODIL/AGAR DIL: CPT | Mod: 91

## 2023-03-10 PROCEDURE — 700102 HCHG RX REV CODE 250 W/ 637 OVERRIDE(OP): Performed by: INTERNAL MEDICINE

## 2023-03-10 PROCEDURE — 700111 HCHG RX REV CODE 636 W/ 250 OVERRIDE (IP)

## 2023-03-10 PROCEDURE — A9270 NON-COVERED ITEM OR SERVICE: HCPCS | Performed by: STUDENT IN AN ORGANIZED HEALTH CARE EDUCATION/TRAINING PROGRAM

## 2023-03-10 PROCEDURE — 87147 CULTURE TYPE IMMUNOLOGIC: CPT

## 2023-03-10 PROCEDURE — 87070 CULTURE OTHR SPECIMN AEROBIC: CPT

## 2023-03-10 PROCEDURE — 97535 SELF CARE MNGMENT TRAINING: CPT

## 2023-03-10 PROCEDURE — 700102 HCHG RX REV CODE 250 W/ 637 OVERRIDE(OP): Performed by: FAMILY MEDICINE

## 2023-03-10 PROCEDURE — 87077 CULTURE AEROBIC IDENTIFY: CPT | Mod: 91

## 2023-03-10 PROCEDURE — A9270 NON-COVERED ITEM OR SERVICE: HCPCS | Performed by: FAMILY MEDICINE

## 2023-03-10 PROCEDURE — A9270 NON-COVERED ITEM OR SERVICE: HCPCS | Performed by: HOSPITALIST

## 2023-03-10 PROCEDURE — A9270 NON-COVERED ITEM OR SERVICE: HCPCS | Performed by: NURSE PRACTITIONER

## 2023-03-10 PROCEDURE — 700111 HCHG RX REV CODE 636 W/ 250 OVERRIDE (IP): Performed by: HOSPITALIST

## 2023-03-10 PROCEDURE — 770006 HCHG ROOM/CARE - MED/SURG/GYN SEMI*

## 2023-03-10 RX ORDER — SULFAMETHOXAZOLE AND TRIMETHOPRIM 800; 160 MG/1; MG/1
1 TABLET ORAL EVERY 12 HOURS
Status: DISCONTINUED | OUTPATIENT
Start: 2023-03-10 | End: 2023-03-10

## 2023-03-10 RX ORDER — DOXYCYCLINE 100 MG/1
100 TABLET ORAL EVERY 12 HOURS
Status: DISCONTINUED | OUTPATIENT
Start: 2023-03-10 | End: 2023-03-12

## 2023-03-10 RX ADMIN — ACETAMINOPHEN 1000 MG: 325 TABLET, FILM COATED ORAL at 22:07

## 2023-03-10 RX ADMIN — Medication 500 MG: at 06:19

## 2023-03-10 RX ADMIN — TIMOLOL MALEATE 1 DROP: 5 SOLUTION OPHTHALMIC at 17:31

## 2023-03-10 RX ADMIN — OXYCODONE HYDROCHLORIDE 15 MG: 10 TABLET ORAL at 22:26

## 2023-03-10 RX ADMIN — DOXYCYCLINE 100 MG: 100 TABLET, FILM COATED ORAL at 17:30

## 2023-03-10 RX ADMIN — PREDNISOLONE SODIUM PHOSPHATE 1 DROP: 10 SOLUTION/ DROPS OPHTHALMIC at 22:13

## 2023-03-10 RX ADMIN — HEPARIN SODIUM 5000 UNITS: 5000 INJECTION, SOLUTION INTRAVENOUS; SUBCUTANEOUS at 13:30

## 2023-03-10 RX ADMIN — ACETAMINOPHEN 1000 MG: 325 TABLET, FILM COATED ORAL at 08:14

## 2023-03-10 RX ADMIN — OXYCODONE HYDROCHLORIDE 15 MG: 10 TABLET ORAL at 08:13

## 2023-03-10 RX ADMIN — PREDNISOLONE SODIUM PHOSPHATE 1 DROP: 10 SOLUTION/ DROPS OPHTHALMIC at 08:14

## 2023-03-10 RX ADMIN — DORZOLAMIDE HYDROCHLORIDE AND TIMOLOL MALEATE 1 DROP: 20; 5 SOLUTION/ DROPS OPHTHALMIC at 17:32

## 2023-03-10 RX ADMIN — SEVELAMER CARBONATE 800 MG: 800 TABLET, FILM COATED ORAL at 07:54

## 2023-03-10 RX ADMIN — ACETAMINOPHEN 1000 MG: 325 TABLET, FILM COATED ORAL at 14:03

## 2023-03-10 RX ADMIN — CARVEDILOL 3.12 MG: 3.12 TABLET, FILM COATED ORAL at 07:54

## 2023-03-10 RX ADMIN — TIMOLOL MALEATE 1 DROP: 5 SOLUTION OPHTHALMIC at 06:22

## 2023-03-10 RX ADMIN — PREDNISOLONE SODIUM PHOSPHATE 1 DROP: 10 SOLUTION/ DROPS OPHTHALMIC at 13:31

## 2023-03-10 RX ADMIN — ACETAZOLAMIDE EXTENDED-RELEASE 500 MG: 500 CAPSULE ORAL at 17:30

## 2023-03-10 RX ADMIN — DOCUSATE SODIUM 50 MG AND SENNOSIDES 8.6 MG 2 TABLET: 8.6; 5 TABLET, FILM COATED ORAL at 17:30

## 2023-03-10 RX ADMIN — OXYCODONE HYDROCHLORIDE 15 MG: 10 TABLET ORAL at 14:03

## 2023-03-10 RX ADMIN — DOCUSATE SODIUM 50 MG AND SENNOSIDES 8.6 MG 2 TABLET: 8.6; 5 TABLET, FILM COATED ORAL at 06:19

## 2023-03-10 RX ADMIN — LATANOPROST 1 DROP: 50 SOLUTION OPHTHALMIC at 17:31

## 2023-03-10 RX ADMIN — DAKIN'S SOLUTION 0.125% (QUARTER STRENGTH) 473 ML: 0.12 SOLUTION at 06:26

## 2023-03-10 RX ADMIN — SEVELAMER CARBONATE 800 MG: 800 TABLET, FILM COATED ORAL at 17:30

## 2023-03-10 RX ADMIN — CARVEDILOL 3.12 MG: 3.12 TABLET, FILM COATED ORAL at 17:30

## 2023-03-10 RX ADMIN — GABAPENTIN 100 MG: 100 CAPSULE ORAL at 17:30

## 2023-03-10 RX ADMIN — HEPARIN SODIUM 5000 UNITS: 5000 INJECTION, SOLUTION INTRAVENOUS; SUBCUTANEOUS at 06:19

## 2023-03-10 RX ADMIN — MORPHINE SULFATE 4 MG: 4 INJECTION INTRAVENOUS at 15:06

## 2023-03-10 RX ADMIN — Medication 220 MG: at 06:19

## 2023-03-10 RX ADMIN — ACETAZOLAMIDE EXTENDED-RELEASE 500 MG: 500 CAPSULE ORAL at 06:21

## 2023-03-10 RX ADMIN — DORZOLAMIDE HYDROCHLORIDE AND TIMOLOL MALEATE 1 DROP: 20; 5 SOLUTION/ DROPS OPHTHALMIC at 06:22

## 2023-03-10 RX ADMIN — BRIMONIDINE TARTRATE 1 DROP: 2 SOLUTION OPHTHALMIC at 17:31

## 2023-03-10 RX ADMIN — SEVELAMER CARBONATE 800 MG: 800 TABLET, FILM COATED ORAL at 11:27

## 2023-03-10 RX ADMIN — MINERAL OIL, PETROLATUM 1 APPLICATION: 425; 573 OINTMENT OPHTHALMIC at 13:31

## 2023-03-10 RX ADMIN — BRIMONIDINE TARTRATE 1 DROP: 2 SOLUTION OPHTHALMIC at 06:22

## 2023-03-10 RX ADMIN — HEPARIN SODIUM 5000 UNITS: 5000 INJECTION, SOLUTION INTRAVENOUS; SUBCUTANEOUS at 22:10

## 2023-03-10 RX ADMIN — MINERAL OIL, PETROLATUM 1 APPLICATION: 425; 573 OINTMENT OPHTHALMIC at 22:13

## 2023-03-10 RX ADMIN — MINERAL OIL, PETROLATUM 1 APPLICATION: 425; 573 OINTMENT OPHTHALMIC at 06:22

## 2023-03-10 RX ADMIN — MORPHINE SULFATE 4 MG: 4 INJECTION INTRAVENOUS at 06:19

## 2023-03-10 RX ADMIN — DAKIN'S SOLUTION 0.125% (QUARTER STRENGTH) 1 ML: 0.12 SOLUTION at 17:34

## 2023-03-10 RX ADMIN — PREDNISOLONE SODIUM PHOSPHATE 1 DROP: 10 SOLUTION/ DROPS OPHTHALMIC at 17:31

## 2023-03-10 ASSESSMENT — COGNITIVE AND FUNCTIONAL STATUS - GENERAL
SUGGESTED CMS G CODE MODIFIER DAILY ACTIVITY: CH
DAILY ACTIVITIY SCORE: 24
MOBILITY SCORE: 23
CLIMB 3 TO 5 STEPS WITH RAILING: A LITTLE
SUGGESTED CMS G CODE MODIFIER MOBILITY: CI
MOBILITY SCORE: 23
SUGGESTED CMS G CODE MODIFIER MOBILITY: CI
CLIMB 3 TO 5 STEPS WITH RAILING: A LITTLE

## 2023-03-10 ASSESSMENT — PAIN DESCRIPTION - PAIN TYPE
TYPE: CHRONIC PAIN
TYPE: ACUTE PAIN;CHRONIC PAIN
TYPE: ACUTE PAIN;CHRONIC PAIN
TYPE: CHRONIC PAIN

## 2023-03-10 ASSESSMENT — ENCOUNTER SYMPTOMS
FEVER: 0
MYALGIAS: 0
EYE REDNESS: 1
BLURRED VISION: 1
SHORTNESS OF BREATH: 0
BRUISES/BLEEDS EASILY: 0
COUGH: 0
VOMITING: 0
NAUSEA: 0
DIZZINESS: 0

## 2023-03-10 ASSESSMENT — GAIT ASSESSMENTS
DISTANCE (FEET): 140
ASSISTIVE DEVICE: FRONT WHEEL WALKER
GAIT LEVEL OF ASSIST: STANDBY ASSIST
DEVIATION: INCREASED BASE OF SUPPORT;OTHER (COMMENT)

## 2023-03-10 ASSESSMENT — FIBROSIS 4 INDEX: FIB4 SCORE: 1.74

## 2023-03-10 NOTE — PROGRESS NOTES
Pt alert/oriented x4, medicated for BLE pain. Tolerating eye drops. Pt sitting up in bed, needs met.  Call light within reach, personal belongings available, bed in lowest position, and bed frame alarm on. Hourly rounding in place.

## 2023-03-10 NOTE — DISCHARGE PLANNING
Medical Social Work  PC to [facility] Coordinator [phone]; message left to call SW, requesting an update

## 2023-03-10 NOTE — PROGRESS NOTES
Hemodialysis ordered by Dr. Palacio. Treatment started at 0750 and ended at 1050. Pt stable, vss, no c/o post tx. Net UF 2.0 L. Report to CHRISTIE Montelongo RN.

## 2023-03-10 NOTE — DISCHARGE PLANNING
Case Management Discharge Planning    Admission Date: 2023  GMLOS: 3.4  ALOS: 16    6-Clicks ADL Score: 24  6-Clicks Mobility Score: 23      Anticipated Discharge Dispo: Discharge Disposition: D/T to home/self-care w/planned acute care hosp IP readmit (81)    DME Needed: No    Action(s) Taken: Updated Provider/Nurse on Discharge Plan    Escalations Completed: Long Length of Stay Committee     Medically Clear: Yes    Next Steps: follow up with patient on discharge to location of his choice    Barriers to Discharge: None    Is the patient up for discharge tomorrow: No  PC to [facility] has denied due to no shows in the past.    Patient has been denied by all local dialysis centers due to past no shows at their facilities.  This has been discussed at length at Long Length of Stay.  Patient can be discharged, and if he feels he has a medical need patient can go to the closet ER for assessment of need.    Volt to Dr Mcpherson with about information, requesting patient be discharged today.       Sw spoke to patient about his discharge plan. When patient told that he has been denied by all local dialysis centers as why, patient again stated this is not true. Patient asked what he is going to do if he is sick.  SW stated that if he is feeling sick then he needs to go to the closet ER to him for medical assessment.  Patient then asked where will he live, SW stated to where every he wants.   SW stated that patient has a jeep or he could go to [facility].  Patient stated what hospital is closet to [facility], SW stated [facility]. SW asked patient if he has any family, patient stated that they have all .  That he only has his step mother Virginia Watkins in [place].  SW asked if he can live with her, patient stated no.  That she is living with someone who doesn't like him.  Patient then told SW he can't see, SW asked didn't he drive to [place] from [place].  Patient stated yeah I did.  SW stated he will inform his  attending, but would have a possible discharge of tomorrow after his dialysis

## 2023-03-10 NOTE — PROGRESS NOTES
"[facility] -  PROGRESS NOTE               Author: EMMA Ramachandran Date & Time: 3/10/2023  12:57 PM     HPI:  Patient is a 47 year old male with a PMHx of ESRD on HD qTTS, meth user and history of calciphylaxis here for leg pain.  States he's still using meth.  Last HD session was about 4-5 days ago.  Here potassium was 7.6 and bicarb of 7.  Nephrology consulted for maintenance dialysis.  Currently complaining of leg pain.  History of nonadherence.    DAILY NEPHROLOGY SUMMARY:  2/23 - Pain controlled.  Denies nausea.  No SOB  Had HD yesterday  2/24 - Denies pain or SOB.  Laying in bed comfortable  2/25 - 2L Net UF. NPO for OR this am w/Opth for retinal detachment. Reports that he is hungry. 1200 mL UOP last 24 hrs.   2/26: 2.5L Net UF. Bps better. Sitting at side of bed eating breakfast. States that he wants HD chair set up here. Unable to drive to [place] to HD clinic d/t vision.   2/27: Pt sitting up in bed eating breakfast, no overnight events, VSS on RA, worked with OT this AM and cleared for DC, waiting on DC plan  2/28: Pt confused today  3/01: HD yest, UF 2L. VSS. Sitting up in bed holding his head \"I hurt all over\". Vague about whether medication is helping his pain.   3/02: C/O pain eyes and thighs. Due for dialysis. He voiced no concerns. Eating and drinking okay. VSS.   3/03: HD yest, UF 2L. VSS. Pt sleeps a lot. Briefly wakes and engages in conversation then goes back to sleep. Intermittent nause and pain (legs and eyes) No new issues. CM looking for outpt HD chair.  3/04: Pt seen on HD, sleeping, VSS, medically cleared, just waiting on OP HD chair  3/05: Pt sitting up in bed, says he hurts all over, iHD yesterday with 2L net UF, labs reviewed, VSS on RA  3/06: pt laying in bed sleeping, does not wish to participate in exam much, denies any complaints, for iHD tomorrow   3/07: No events, pt seen and examined on hemodialysis, VSS--see dialysis treatment sheet for full " "details, denies any CP/SOB/Abd pain, irritable  3/08: sitting up in bed, reports \"pain everywhere\", does have dependent edema and BLE edema, + ascites, tolerated iHD yesterday with UF: 2.0L, pending outpt chair   3/09: No events, Pt seen and examined on hemodialysis, VSS--see dialysis treatment sheet for full details, reports generalized pain, denies any SOB/LE edema  3/10: sitting in bed, cranky about me waking him up, c/o loss of vision and pain \"all over\", tolerated iHD yesterday with UF: 2.0L    REVIEW OF SYSTEMS:    A 10 pt review of systems was obtained and is per HPI/&/or daily summary otherwise negative    PMH/PSH/SH/FH:   Reviewed and unchanged since admission note    CURRENT MEDICATIONS:   Reviewed from admission to present day    VS:  /73   Pulse 78   Temp 36.3 °C (97.4 °F) (Temporal)   Resp 18   Ht 1.803 m (5' 10.98\")   Wt 90 kg (198 lb 6.6 oz)   SpO2 91%   BMI 27.69 kg/m²     Physical Exam  Vitals and nursing note reviewed.   Constitutional:       Comments: +chronically ill-appearing   HENT:      Head: Normocephalic and atraumatic.   Eyes:      General: No scleral icterus.     Comments: R eye erythema   Cardiovascular:      Rate and Rhythm: Normal rate and regular rhythm.      Comments: LFA AVG  LIJ TDC  Pulmonary:      Effort: Pulmonary effort is normal.   Abdominal:      General: There is no distension.   Musculoskeletal:         General: No deformity.      Right lower leg: Edema (trace pedal) present.      Left lower leg: Edema (trace pedal) present.   Skin:     Findings: No rash.      Comments: Chronic wounds b/l thighs/abd   Neurological:      Mental Status: He is alert and oriented to person, place, and time. Mental status is at baseline.   Psychiatric:         Attention and Perception: He is inattentive.         Mood and Affect: Affect is flat.         Behavior: Behavior is cooperative.        Fluids:  In: 860 [P.O.:360; Dialysis:500]  Out: 2500     LABS:  Recent Labs     " 03/07/23 2002 03/08/23 0322 03/09/23  0451   SODIUM  --  132* 132*   POTASSIUM 4.6 4.8 5.1   CHLORIDE  --  92* 91*   CO2  --  27 26   GLUCOSE  --  92 121*   BUN  --  46* 59*   CREATININE  --  6.79* 7.74*   CALCIUM  --  8.6 8.8       IMAGING:   All imaging reviewed from admission to present day    IMPRESSION:  # ESRD  - Etiology likely 2/2 DM/HTN  - HD qTTHS via [facility] TDC  - Lost his HD chair at [facility] previously  # Hyperkalemia--treated with HD  # LFA AVG thrombosed  - US 2/23 no flow  - s/p OR 2/25 AVG Brachio-axillary Creation, thrombectomy and fistulogram  - Stenosis and wall thrombus noted on US, [facility] TDC in use  # HTN, fair control    # Anemia of CKD, below target  - Goal Ggb 10-11  - Ferritin 2722, %sat 41  # CKD-MBD  - Ca 7.6  - iCa 1.0  - Phos 9.3  # Calciphylaxis hx  - Per report from patient  # Generalized weakness, improved   - Evaluated with PT/OT  # h/o Methamphetamine use   # Homelessness  # Encephalopthy ? Meds vs infection   - Improved  # DM   # Chronic wounds b/l thighs/abd   - Wound care following   # Conjunctivitis/End Stage Glaucoma  - Diamox  - Latanoprost, Combigan, Cosopy, Preds Forte    PLAN:  - No HD today, next tx due tomorrow (FRI) and continue iHD qTTS/PRN  - UF as tolerated   - FU Opthalm Recs  - Use LI[facility] PermCath for now  - AVG eval as OP at    - Continue phos binder sevelamer with meals  - JACKIE with HD to goal Hgb 10-11  - No dietary protein restrictions  - Dose all meds per ESRD/iHD  - Renal diet, low salt diet, fluid restriction to 1.5L daily  - SW support for placement. Requesting local HD chair. Hx of non adherence to HD.   - Can transition to Outpt HD clinic when medically cleared and chair secured.  - Follow labs  - Wound care

## 2023-03-10 NOTE — THERAPY
"Physical Therapy   ReEvaluation     Patient Name: Ambrose Watkins  Age:  47 y.o., Sex:  male  Medical Record #: [id number]  Today's Date: 3/10/2023     Precautions  Precautions: (P) Fall Risk    Assessment    Patient is 47 y.o. male with a history of meth use, medical non compliance, homelessness. Re-evaluation requested by physician to assess DC needs. Pt is agreeable to mobility, demonstrates supine to sit EOB with supervision, sit to stand and ambulation with FWW with SBA, widened base of support, 140 feet primarily limited by pain in B LE at rest and with mobility, and fatigue. Pt does not require further PT services in acute setting; would benefit from frequent ambulation with FWW and nursing SBA.     Plan    Physical Therapy Initial Treatment Plan   Duration: (P) Evaluation only    DC Equipment Recommendations: (P) Front-Wheel Walker  Discharge Recommendations: (P) Anticipate that the patient will have no further physical therapy needs after discharge from the hospital       Subjective    \"My thighs are burning.\"     Objective       03/10/23 1445   Charge Group   Charges  Yes   PT Gait Training (Units) 1   PT Re-evaluation PT Re-evaluation   Total Time Spent   PT Total Time Yes   PT Gait Training Time Spent (Mins) 10   PT Re-evaluation Time Spent (Mins) 14    Services   Is patient using  services for this encounter? No   Precautions   Precautions Fall Risk   Vitals   O2 Delivery Device None - Room Air   Pain 0 - 10 Group   Location Leg  (thigh)   Location Orientation Right;Left   Pain Rating Scale (NPRS) 7   Therapist Pain Assessment During Activity;Prior to Activity   Cognition    Cognition / Consciousness WDL   Level of Consciousness Alert   Comments cooperative   Strength Lower Body   Lower Body Strength  WDL   Comments   (adequate for functional transfers and ambulation)   Vision   Vision Comments pt able to navigate room and hallway, stepping onto scales with good obstacle " avoidance   Balance   Sitting Balance (Static) Good   Sitting Balance (Dynamic) Fair +   Standing Balance (Static) Fair   Standing Balance (Dynamic) Fair   Weight Shift Sitting Good   Weight Shift Standing Fair   Bed Mobility    Supine to Sit Supervised   Sit to Supine Supervised   Scooting Supervised   Gait Analysis   Gait Level Of Assist Standby Assist   Assistive Device Front Wheel Walker   Distance (Feet) 140   # of Times Distance was Traveled 1   Deviation Increased Base Of Support;Other (Comment)  (limited by pain in B LE)   Skilled Intervention Verbal Cuing   Functional Mobility   Sit to Stand Supervised   Mobility bed mobility, ambulation in hallway with FWW, up to EOB   Skilled Intervention Verbal Cuing;Sequencing;Compensatory Strategies   How much difficulty does the patient currently have...   Turning over in bed (including adjusting bedclothes, sheets and blankets)? 4   Sitting down on and standing up from a chair with arms (e.g., wheelchair, bedside commode, etc.) 4   Moving from lying on back to sitting on the side of the bed? 4   How much help from another person does the patient currently need...   Moving to and from a bed to a chair (including a wheelchair)? 4   Need to walk in a hospital room? 4   Climbing 3-5 steps with a railing? 3   6 clicks Mobility Score 23   Activity Tolerance   Sitting in Chair NT   Sitting Edge of Bed 5 min + left EOB   Standing 8 min total   Education Group   Education Provided Role of Physical Therapist;Gait Training   Role of Physical Therapist Patient Response Patient;Acceptance;Explanation;Verbal Demonstration   Gait Training Patient Response Patient;Acceptance;Explanation;Verbal Demonstration;Action Demonstration   Physical Therapy Treatment Plan   Duration Evaluation only   Anticipated Discharge Equipment and Recommendations   DC Equipment Recommendations Front-Wheel Walker   Discharge Recommendations Anticipate that the patient will have no further physical therapy  needs after discharge from the hospital   Interdisciplinary Plan of Care Collaboration   IDT Collaboration with  Nursing;Physician   Patient Position at End of Therapy Edge of Bed;Call Light within Reach;Tray Table within Reach;Phone within Reach   Collaboration Comments MD aware of DC recommendations, RN updated   Session Information   Date / Session Number  3/10  -reevaluation only for DC needs       ZACH LucianoT

## 2023-03-10 NOTE — CARE PLAN
The patient is Stable - Low risk of patient condition declining or worsening    Shift Goals  Clinical Goals: pain managment, dialysis  Patient Goals: rest  Family Goals: POLINA    Progress made toward(s) clinical / shift goals:  yes      Problem: Pain - Standard  Goal: Alleviation of pain or a reduction in pain to the patient’s comfort goal  Outcome: Progressing     Problem: Knowledge Deficit - Standard  Goal: Patient and family/care givers will demonstrate understanding of plan of care, disease process/condition, diagnostic tests and medications  Outcome: Progressing     Problem: Skin Integrity  Goal: Skin integrity is maintained or improved  Outcome: Progressing     Problem: Fall Risk  Goal: Patient will remain free from falls  Outcome: Progressing       Patient is not progressing towards the following goals:

## 2023-03-10 NOTE — PROGRESS NOTES
"Hospital Medicine Daily Progress Note    Date of Service  3/10/2023    Chief Complaint  Ambrose Watkins is a 47 y.o. male admitted 2/22/2023 with hyperkalemia    Hospital Course  Ambrose Watkins is a homeless 47 y.o. male with diabetes since 2007, ESRD on HD for past 2-3 years, methamphetamine abuse, tobacco use, HTN, DLD, medical noncompliance who presented 2/22/2023 with evaluation by police prior to being incarcerated.  He was found in ER to be volume overloaded and have a K:7.6.  He states having his last dialysis 4 days ago in [facility].  He has chronic pain in his thighs from chronic bilateral thigh wounds  and per pt, \"calciphylaxis.\"  In ER he was consulted by nephrology and emergent dialysis is planned.  The patient keeps pulling off his telemetry leads despite being educated on why we recommend them.  Patient was also noted to have redness of the right eye.  Ophthalmology was consulted on the case.  Patient was noted to have glaucoma and vitreous hemorrhage of the right eye.  Ophthalmic drops were started.  Patient need placement.   assisting assisting with placement.         Interval Problem Update  3/7/2023  Remained stable.  Denies any discomfort.  Labs reviewed noted with mild hyponatremia, hyperkalemia  He had some hemodialysis  Repeat labs post hemodialysis  Need placement.   assisting    3/8/2023  Vitals remained stable  Labs reviewed  Nephrology assisting with hemodialysis  Need placement.   assisting    3/9/2023  Vitals remained stable  Labs reviewed  Nephrology following for hemodialysis  Wound care assisting with left thigh wound  Need placement.   assisting    3/10/2023  Vitals remained stable  Patient has significant loss of vision and pain in his right eye.  Reports of blurry vision and he can barely see anything.  Reportedly patient was able to drive before being admitted to the hospital.  I have asked ophthalmologist for " follow-up given significant vision loss.  Patient have extensive bilateral thigh wound with purulent discharge.  Wound care following closely.  He need to closely follow-up with wound care on discharge.  Current 6 click score 15  I will have PT/OT reevaluation.   unable to find outpatient dialysis.  Long length of committee recommended discharge and follow-up to the ED as needed.    Currently patient is not safe to discharge to shelter.  Given his new vision loss and significant bilateral thigh wound he will benefit from repeat PT /OT, likely need extensive wound care .      I have discussed this patient's plan of care and discharge plan at IDT rounds today with Case Management, Nursing, Nursing leadership, and other members of the IDT team.    Consultants/Specialty  nephrology and ophthalmology    Code Status  Full Code    Disposition  Patient is medically cleared for discharge.   Anticipate discharge to to skilled nursing facility.  I have placed the appropriate orders for post-discharge needs.    Review of Systems  Review of Systems   Constitutional:  Positive for malaise/fatigue. Negative for fever.   HENT:  Negative for hearing loss.    Eyes:  Positive for blurred vision and redness.   Respiratory:  Negative for cough and shortness of breath.    Cardiovascular:  Negative for chest pain.   Gastrointestinal:  Negative for nausea and vomiting.   Genitourinary:  Negative for dysuria.   Musculoskeletal:  Negative for myalgias.   Skin:  Negative for rash.   Neurological:  Negative for dizziness.   Endo/Heme/Allergies:  Does not bruise/bleed easily.      Physical Exam  Temp:  [36.1 °C (97 °F)-36.7 °C (98 °F)] 36.3 °C (97.4 °F)  Pulse:  [77-87] 78  Resp:  [16-18] 18  BP: (123-141)/(73-87) 123/73  SpO2:  [90 %-92 %] 91 %    Physical Exam  Constitutional:       General: He is not in acute distress.  HENT:      Head: Normocephalic and atraumatic.      Right Ear: Tympanic membrane normal.      Left Ear:  Tympanic membrane normal.      Nose: Nose normal.      Mouth/Throat:      Mouth: Mucous membranes are moist.   Eyes:      Extraocular Movements: Extraocular movements intact.      Conjunctiva/sclera:      Right eye: Right conjunctiva is injected.      Left eye: Left conjunctiva is injected.      Pupils: Pupils are equal, round, and reactive to light.   Cardiovascular:      Rate and Rhythm: Normal rate and regular rhythm.      Pulses: Normal pulses.   Pulmonary:      Effort: Pulmonary effort is normal. No respiratory distress.   Abdominal:      General: Abdomen is flat. There is no distension.   Musculoskeletal:      Cervical back: Normal range of motion.      Right lower leg: No edema.      Left lower leg: No edema.      Comments: Extensive b/l thigh wound   Skin:     General: Skin is warm.   Neurological:      General: No focal deficit present.      Mental Status: He is alert and oriented to person, place, and time. Mental status is at baseline.   Psychiatric:         Mood and Affect: Mood normal.       Fluids    Intake/Output Summary (Last 24 hours) at 3/10/2023 1204  Last data filed at 3/10/2023 0900  Gross per 24 hour   Intake 720 ml   Output --   Net 720 ml         Laboratory        Recent Labs     03/07/23 2002 03/08/23  0322 03/09/23  0451   SODIUM  --  132* 132*   POTASSIUM 4.6 4.8 5.1   CHLORIDE  --  92* 91*   CO2  --  27 26   GLUCOSE  --  92 121*   BUN  --  46* 59*   CREATININE  --  6.79* 7.74*   CALCIUM  --  8.6 8.8                     Imaging  [id number] W/O PLUS RECONS   Final Result      1.  Increased density throughout the right lobe is nonspecific though may suggest underlying hemorrhage with possible retinal detachment.   2.  No post septal fluid collection.             Assessment/Plan  * Hyperkalemia- (present on admission)  Assessment & Plan  Monitor     Pulmonary HTN (HCC)- (present on admission)  Assessment & Plan  Monitor volume status    Cardiomyopathy (HCC)- (present on  admission)  Assessment & Plan  Coreg    Chronic HFrEF (heart failure with reduced ejection fraction) ([facility])- (present on admission)  Assessment & Plan  Coreg    Right eye glaucoma, due to angle-closure and phacomorphic components- (present on admission)  Assessment & Plan  Ophthalmology - probable end-stage glaucoma from angle-closure and phacomorphic components.  Latanoprost, Timolol, Brimonidine, Cosopt, Prednisone forte ophthalmic drops and Diamox  Follow up with Ophthalmology as outpatient    Vitreous hemorrhage of right eye ([facility])- (present on admission)  Assessment & Plan  Likely due to glaucoma    Open wound- (present on admission)  Assessment & Plan  Chronic  Wound care  Pain control - scheduled Tylenol, adjusted Oxycodone prn, IV Morphine for dressing changes     Tobacco dependence- (present on admission)  Assessment & Plan  Refused Nicotine replacement protocol    Noncompliance of patient with renal dialysis ([facility])- (present on admission)  Assessment & Plan  Education and counseling    Anemia, chronic disease- (present on admission)  Assessment & Plan  Epogen  Follow cbc    Hypocalcemia- (present on admission)  Assessment & Plan  Follow bmp    Methamphetamine abuse ([facility])- (present on admission)  Assessment & Plan  Hx of     End stage renal disease ([facility])- (present on admission)  Assessment & Plan  HD per Nephrology - T-Th-    Case management for discharge planning - outpatient Dialysis arrangement    Hypertension- (present on admission)  Assessment & Plan  Coreg     Type 2 diabetes mellitus with kidney complication, without long-term current use of insulin ([facility])- (present on admission)  Assessment & Plan  hgba1c 5.9         VTE prophylaxis: SCDs/TEDs and heparin ppx    I have performed a physical exam and reviewed and updated ROS and Plan today (3/10/2023). In review of yesterday's note (3/9/2023), there are no changes except as documented above.

## 2023-03-10 NOTE — CARE PLAN
The patient is Stable - Low risk of patient condition declining or worsening    Shift Goals  Clinical Goals: pain management  Patient Goals: rest  Family Goals: POLINA    Progress made toward(s) clinical / shift goals:  pt medicated with tylenol and oxycodone for BLE pain.      Problem: Pain - Standard  Goal: Alleviation of pain or a reduction in pain to the patient’s comfort goal  Outcome: Progressing     Problem: Knowledge Deficit - Standard  Goal: Patient and family/care givers will demonstrate understanding of plan of care, disease process/condition, diagnostic tests and medications  Outcome: Progressing     Problem: Skin Integrity  Goal: Skin integrity is maintained or improved  Outcome: Progressing     Problem: Fall Risk  Goal: Patient will remain free from falls  Outcome: Progressing

## 2023-03-10 NOTE — DISCHARGE PLANNING
Medical Social Work  PC from Dr Mcpherson, concerned over discharging patient to the location of his choice, wounds on his back, and being blind.  SW stated unless the blindness is new, patient drove from [place] to [place].  Dr Mcpherson stated he will have Wound and ophthalmology to see the patient again.

## 2023-03-11 LAB
ALBUMIN SERPL BCP-MCNC: 3.2 G/DL (ref 3.2–4.9)
ALBUMIN/GLOB SERPL: 0.7 G/DL
ALP SERPL-CCNC: 133 U/L (ref 30–99)
ALT SERPL-CCNC: <5 U/L (ref 2–50)
ANION GAP SERPL CALC-SCNC: 14 MMOL/L (ref 7–16)
AST SERPL-CCNC: 6 U/L (ref 12–45)
BASOPHILS # BLD AUTO: 0.9 % (ref 0–1.8)
BASOPHILS # BLD: 0.07 K/UL (ref 0–0.12)
BILIRUB SERPL-MCNC: 0.3 MG/DL (ref 0.1–1.5)
BUN SERPL-MCNC: 50 MG/DL (ref 8–22)
CALCIUM ALBUM COR SERPL-MCNC: 9 MG/DL (ref 8.5–10.5)
CALCIUM SERPL-MCNC: 8.4 MG/DL (ref 8.5–10.5)
CHLORIDE SERPL-SCNC: 93 MMOL/L (ref 96–112)
CO2 SERPL-SCNC: 25 MMOL/L (ref 20–33)
CREAT SERPL-MCNC: 7.15 MG/DL (ref 0.5–1.4)
EOSINOPHIL # BLD AUTO: 0.09 K/UL (ref 0–0.51)
EOSINOPHIL NFR BLD: 1.2 % (ref 0–6.9)
ERYTHROCYTE [DISTWIDTH] IN BLOOD BY AUTOMATED COUNT: 62.7 FL (ref 35.9–50)
GFR SERPLBLD CREATININE-BSD FMLA CKD-EPI: 9 ML/MIN/1.73 M 2
GLOBULIN SER CALC-MCNC: 4.7 G/DL (ref 1.9–3.5)
GLUCOSE SERPL-MCNC: 109 MG/DL (ref 65–99)
GRAM STN SPEC: NORMAL
HCT VFR BLD AUTO: 28.8 % (ref 42–52)
HGB BLD-MCNC: 8.8 G/DL (ref 14–18)
IMM GRANULOCYTES # BLD AUTO: 0.03 K/UL (ref 0–0.11)
IMM GRANULOCYTES NFR BLD AUTO: 0.4 % (ref 0–0.9)
LYMPHOCYTES # BLD AUTO: 1.06 K/UL (ref 1–4.8)
LYMPHOCYTES NFR BLD: 13.8 % (ref 22–41)
MAGNESIUM SERPL-MCNC: 2.1 MG/DL (ref 1.5–2.5)
MCH RBC QN AUTO: 29.1 PG (ref 27–33)
MCHC RBC AUTO-ENTMCNC: 30.6 G/DL (ref 33.7–35.3)
MCV RBC AUTO: 95.4 FL (ref 81.4–97.8)
MONOCYTES # BLD AUTO: 0.97 K/UL (ref 0–0.85)
MONOCYTES NFR BLD AUTO: 12.6 % (ref 0–13.4)
NEUTROPHILS # BLD AUTO: 5.45 K/UL (ref 1.82–7.42)
NEUTROPHILS NFR BLD: 71.1 % (ref 44–72)
NRBC # BLD AUTO: 0 K/UL
NRBC BLD-RTO: 0 /100 WBC
PHOSPHATE SERPL-MCNC: 7.6 MG/DL (ref 2.5–4.5)
PLATELET # BLD AUTO: 248 K/UL (ref 164–446)
PMV BLD AUTO: 9.2 FL (ref 9–12.9)
POTASSIUM SERPL-SCNC: 4.9 MMOL/L (ref 3.6–5.5)
PROT SERPL-MCNC: 7.9 G/DL (ref 6–8.2)
RBC # BLD AUTO: 3.02 M/UL (ref 4.7–6.1)
SIGNIFICANT IND 70042: NORMAL
SITE SITE: NORMAL
SODIUM SERPL-SCNC: 132 MMOL/L (ref 135–145)
SOURCE SOURCE: NORMAL
WBC # BLD AUTO: 7.7 K/UL (ref 4.8–10.8)

## 2023-03-11 PROCEDURE — 700105 HCHG RX REV CODE 258: Performed by: STUDENT IN AN ORGANIZED HEALTH CARE EDUCATION/TRAINING PROGRAM

## 2023-03-11 PROCEDURE — 700111 HCHG RX REV CODE 636 W/ 250 OVERRIDE (IP)

## 2023-03-11 PROCEDURE — 700111 HCHG RX REV CODE 636 W/ 250 OVERRIDE (IP): Performed by: HOSPITALIST

## 2023-03-11 PROCEDURE — A9270 NON-COVERED ITEM OR SERVICE: HCPCS | Performed by: INTERNAL MEDICINE

## 2023-03-11 PROCEDURE — 700102 HCHG RX REV CODE 250 W/ 637 OVERRIDE(OP): Performed by: FAMILY MEDICINE

## 2023-03-11 PROCEDURE — 700111 HCHG RX REV CODE 636 W/ 250 OVERRIDE (IP): Performed by: NURSE PRACTITIONER

## 2023-03-11 PROCEDURE — 83735 ASSAY OF MAGNESIUM: CPT

## 2023-03-11 PROCEDURE — 700102 HCHG RX REV CODE 250 W/ 637 OVERRIDE(OP): Performed by: INTERNAL MEDICINE

## 2023-03-11 PROCEDURE — 84100 ASSAY OF PHOSPHORUS: CPT

## 2023-03-11 PROCEDURE — A9270 NON-COVERED ITEM OR SERVICE: HCPCS | Performed by: STUDENT IN AN ORGANIZED HEALTH CARE EDUCATION/TRAINING PROGRAM

## 2023-03-11 PROCEDURE — A9270 NON-COVERED ITEM OR SERVICE: HCPCS | Performed by: NURSE PRACTITIONER

## 2023-03-11 PROCEDURE — 90935 HEMODIALYSIS ONE EVALUATION: CPT

## 2023-03-11 PROCEDURE — 36415 COLL VENOUS BLD VENIPUNCTURE: CPT

## 2023-03-11 PROCEDURE — 99232 SBSQ HOSP IP/OBS MODERATE 35: CPT | Performed by: STUDENT IN AN ORGANIZED HEALTH CARE EDUCATION/TRAINING PROGRAM

## 2023-03-11 PROCEDURE — 700111 HCHG RX REV CODE 636 W/ 250 OVERRIDE (IP): Performed by: STUDENT IN AN ORGANIZED HEALTH CARE EDUCATION/TRAINING PROGRAM

## 2023-03-11 PROCEDURE — 700102 HCHG RX REV CODE 250 W/ 637 OVERRIDE(OP): Performed by: HOSPITALIST

## 2023-03-11 PROCEDURE — 700102 HCHG RX REV CODE 250 W/ 637 OVERRIDE(OP): Performed by: NURSE PRACTITIONER

## 2023-03-11 PROCEDURE — 85025 COMPLETE CBC W/AUTO DIFF WBC: CPT

## 2023-03-11 PROCEDURE — 700102 HCHG RX REV CODE 250 W/ 637 OVERRIDE(OP): Performed by: STUDENT IN AN ORGANIZED HEALTH CARE EDUCATION/TRAINING PROGRAM

## 2023-03-11 PROCEDURE — A9270 NON-COVERED ITEM OR SERVICE: HCPCS | Performed by: FAMILY MEDICINE

## 2023-03-11 PROCEDURE — 770001 HCHG ROOM/CARE - MED/SURG/GYN PRIV*

## 2023-03-11 PROCEDURE — 80053 COMPREHEN METABOLIC PANEL: CPT

## 2023-03-11 PROCEDURE — A9270 NON-COVERED ITEM OR SERVICE: HCPCS | Performed by: HOSPITALIST

## 2023-03-11 RX ORDER — HEPARIN SODIUM 1000 [USP'U]/ML
INJECTION, SOLUTION INTRAVENOUS; SUBCUTANEOUS
Status: COMPLETED
Start: 2023-03-11 | End: 2023-03-11

## 2023-03-11 RX ADMIN — DORZOLAMIDE HYDROCHLORIDE AND TIMOLOL MALEATE 1 DROP: 20; 5 SOLUTION/ DROPS OPHTHALMIC at 04:58

## 2023-03-11 RX ADMIN — TIMOLOL MALEATE 1 DROP: 5 SOLUTION OPHTHALMIC at 04:57

## 2023-03-11 RX ADMIN — Medication 220 MG: at 04:54

## 2023-03-11 RX ADMIN — DOXYCYCLINE 100 MG: 100 TABLET, FILM COATED ORAL at 17:27

## 2023-03-11 RX ADMIN — DORZOLAMIDE HYDROCHLORIDE AND TIMOLOL MALEATE 1 DROP: 20; 5 SOLUTION/ DROPS OPHTHALMIC at 18:00

## 2023-03-11 RX ADMIN — DOCUSATE SODIUM 50 MG AND SENNOSIDES 8.6 MG 2 TABLET: 8.6; 5 TABLET, FILM COATED ORAL at 04:54

## 2023-03-11 RX ADMIN — EPOETIN ALFA-EPBX 5000 UNITS: 3000 INJECTION, SOLUTION INTRAVENOUS; SUBCUTANEOUS at 11:00

## 2023-03-11 RX ADMIN — HEPARIN SODIUM 3800 UNITS: 1000 INJECTION, SOLUTION INTRAVENOUS; SUBCUTANEOUS at 11:01

## 2023-03-11 RX ADMIN — ACETAMINOPHEN 1000 MG: 325 TABLET, FILM COATED ORAL at 14:58

## 2023-03-11 RX ADMIN — DOCUSATE SODIUM 50 MG AND SENNOSIDES 8.6 MG 2 TABLET: 8.6; 5 TABLET, FILM COATED ORAL at 17:27

## 2023-03-11 RX ADMIN — DAKIN'S SOLUTION 0.125% (QUARTER STRENGTH) 1 ML: 0.12 SOLUTION at 05:02

## 2023-03-11 RX ADMIN — MORPHINE SULFATE 4 MG: 4 INJECTION INTRAVENOUS at 05:18

## 2023-03-11 RX ADMIN — SEVELAMER CARBONATE 800 MG: 800 TABLET, FILM COATED ORAL at 12:09

## 2023-03-11 RX ADMIN — SEVELAMER CARBONATE 800 MG: 800 TABLET, FILM COATED ORAL at 17:27

## 2023-03-11 RX ADMIN — ACETAMINOPHEN 1000 MG: 325 TABLET, FILM COATED ORAL at 20:49

## 2023-03-11 RX ADMIN — MINERAL OIL, PETROLATUM 1 APPLICATION: 425; 573 OINTMENT OPHTHALMIC at 21:59

## 2023-03-11 RX ADMIN — MINERAL OIL, PETROLATUM 1 APPLICATION: 425; 573 OINTMENT OPHTHALMIC at 13:09

## 2023-03-11 RX ADMIN — TIMOLOL MALEATE 1 DROP: 5 SOLUTION OPHTHALMIC at 17:28

## 2023-03-11 RX ADMIN — PREDNISOLONE SODIUM PHOSPHATE 1 DROP: 10 SOLUTION/ DROPS OPHTHALMIC at 20:53

## 2023-03-11 RX ADMIN — HEPARIN SODIUM 5000 UNITS: 5000 INJECTION, SOLUTION INTRAVENOUS; SUBCUTANEOUS at 04:58

## 2023-03-11 RX ADMIN — ACETAZOLAMIDE EXTENDED-RELEASE 500 MG: 500 CAPSULE ORAL at 04:53

## 2023-03-11 RX ADMIN — PREDNISOLONE SODIUM PHOSPHATE 1 DROP: 10 SOLUTION/ DROPS OPHTHALMIC at 12:09

## 2023-03-11 RX ADMIN — HEPARIN SODIUM 5000 UNITS: 5000 INJECTION, SOLUTION INTRAVENOUS; SUBCUTANEOUS at 21:56

## 2023-03-11 RX ADMIN — LATANOPROST 1 DROP: 50 SOLUTION OPHTHALMIC at 17:29

## 2023-03-11 RX ADMIN — BRIMONIDINE TARTRATE 1 DROP: 2 SOLUTION OPHTHALMIC at 17:29

## 2023-03-11 RX ADMIN — MINERAL OIL, PETROLATUM 1 APPLICATION: 425; 573 OINTMENT OPHTHALMIC at 04:57

## 2023-03-11 RX ADMIN — BRIMONIDINE TARTRATE 1 DROP: 2 SOLUTION OPHTHALMIC at 04:57

## 2023-03-11 RX ADMIN — HEPARIN SODIUM 5000 UNITS: 5000 INJECTION, SOLUTION INTRAVENOUS; SUBCUTANEOUS at 13:07

## 2023-03-11 RX ADMIN — DAKIN'S SOLUTION 0.125% (QUARTER STRENGTH) 1 ML: 0.12 SOLUTION at 18:00

## 2023-03-11 RX ADMIN — PIPERACILLIN AND TAZOBACTAM 3.38 G: 3; .375 INJECTION, POWDER, LYOPHILIZED, FOR SOLUTION INTRAVENOUS; PARENTERAL at 20:08

## 2023-03-11 RX ADMIN — ACETAZOLAMIDE EXTENDED-RELEASE 500 MG: 500 CAPSULE ORAL at 17:27

## 2023-03-11 RX ADMIN — CARVEDILOL 3.12 MG: 3.12 TABLET, FILM COATED ORAL at 17:27

## 2023-03-11 RX ADMIN — Medication 500 MG: at 04:54

## 2023-03-11 RX ADMIN — DOXYCYCLINE 100 MG: 100 TABLET, FILM COATED ORAL at 04:54

## 2023-03-11 RX ADMIN — GABAPENTIN 100 MG: 100 CAPSULE ORAL at 17:27

## 2023-03-11 RX ADMIN — MORPHINE SULFATE 4 MG: 4 INJECTION INTRAVENOUS at 12:12

## 2023-03-11 ASSESSMENT — ENCOUNTER SYMPTOMS
BLURRED VISION: 1
COUGH: 0
FEVER: 0
NAUSEA: 0
EYE REDNESS: 1
MYALGIAS: 0
BRUISES/BLEEDS EASILY: 0
VOMITING: 0
DIZZINESS: 0
SHORTNESS OF BREATH: 0

## 2023-03-11 ASSESSMENT — PAIN DESCRIPTION - PAIN TYPE
TYPE: CHRONIC PAIN

## 2023-03-11 ASSESSMENT — FIBROSIS 4 INDEX: FIB4 SCORE: 0.51

## 2023-03-11 NOTE — PROGRESS NOTES
Received report and assumed care at shift change. A&O x 4, patient very sleepy but easy to awake. cooperative with cares. Medicated for pain per MAR. Fall precautions in place, bed locked and in lowest position. Needs attended to.

## 2023-03-11 NOTE — CARE PLAN
The patient is Stable - Low risk of patient condition declining or worsening    Shift Goals  Clinical Goals: pain management, wound care   Patient Goals: rest  Family Goals: POLINA    Progress made toward(s) clinical / shift goals:    Problem: Pain - Standard  Goal: Alleviation of pain or a reduction in pain to the patient’s comfort goal  Outcome: Progressing     Problem: Knowledge Deficit - Standard  Goal: Patient and family/care givers will demonstrate understanding of plan of care, disease process/condition, diagnostic tests and medications  Outcome: Progressing     Problem: Skin Integrity  Goal: Skin integrity is maintained or improved  Outcome: Progressing     Problem: Fall Risk  Goal: Patient will remain free from falls  Outcome: Progressing       Patient is not progressing towards the following goals:

## 2023-03-11 NOTE — CARE PLAN
Problem: Pain - Standard  Goal: Alleviation of pain or a reduction in pain to the patient’s comfort goal  Outcome: Progressing     The patient is Stable - Low risk of patient condition declining or worsening    Shift Goals  Clinical Goals: pain management  Patient Goals: rest  Family Goals: POLINA    Progress made toward(s) clinical / shift goals:  medicated for pain per MAR.     Patient is not progressing towards the following goals:

## 2023-03-11 NOTE — THERAPY
Occupational Therapy Contact Note    Patient Name: Ambrose Watkins  Age:  47 y.o., Sex:  male  Medical Record #: [id number]  Today's Date: 3/10/2023    Pt cleared for home by physical therapy, 6 clicks score 24/24 for ADLs. Likely no acute OT needs at this time, will complete order as patient re-evaluated by PT and still no acute therapy needs identified.    Xu Camarillo OTD, OTR/L

## 2023-03-11 NOTE — PROGRESS NOTES
"[facility] -  PROGRESS NOTE               Author: Matilda Palacio M.D. Date & Time: 3/11/2023  9:39 AM     HPI:  Patient is a 47 year old male with a PMHx of ESRD on HD qTTS, meth user and history of calciphylaxis here for leg pain.  States he's still using meth.  Last HD session was about 4-5 days ago.  Here potassium was 7.6 and bicarb of 7.  Nephrology consulted for maintenance dialysis.  Currently complaining of leg pain.  History of nonadherence.    DAILY NEPHROLOGY SUMMARY:  2/23 - Pain controlled.  Denies nausea.  No SOB  Had HD yesterday  2/24 - Denies pain or SOB.  Laying in bed comfortable  2/25 - 2L Net UF. NPO for OR this am w/Opth for retinal detachment. Reports that he is hungry. 1200 mL UOP last 24 hrs.   2/26: 2.5L Net UF. Bps better. Sitting at side of bed eating breakfast. States that he wants HD chair set up here. Unable to drive to [place] to HD clinic d/t vision.   2/27: Pt sitting up in bed eating breakfast, no overnight events, VSS on RA, worked with OT this AM and cleared for DC, waiting on DC plan  2/28: Pt confused today  3/01: HD yest, UF 2L. VSS. Sitting up in bed holding his head \"I hurt all over\". Vague about whether medication is helping his pain.   3/02: C/O pain eyes and thighs. Due for dialysis. He voiced no concerns. Eating and drinking okay. VSS.   3/03: HD yest, UF 2L. VSS. Pt sleeps a lot. Briefly wakes and engages in conversation then goes back to sleep. Intermittent nause and pain (legs and eyes) No new issues. CM looking for outpt HD chair.  3/04: Pt seen on HD, sleeping, VSS, medically cleared, just waiting on OP HD chair  3/05: Pt sitting up in bed, says he hurts all over, iHD yesterday with 2L net UF, labs reviewed, VSS on RA  3/06: pt laying in bed sleeping, does not wish to participate in exam much, denies any complaints, for iHD tomorrow   3/07: No events, pt seen and examined on hemodialysis, VSS--see dialysis treatment sheet for full " "details, denies any CP/SOB/Abd pain, irritable  3/08: sitting up in bed, reports \"pain everywhere\", does have dependent edema and BLE edema, + ascites, tolerated iHD yesterday with UF: 2.0L, pending outpt chair   3/09: No events, Pt seen and examined on hemodialysis, VSS--see dialysis treatment sheet for full details, reports generalized pain, denies any SOB/LE edema  3/10: sitting in bed, cranky about me waking him up, c/o loss of vision and pain \"all over\", tolerated iHD yesterday with UF: 2.0L  3/11: No events, pt seen and examined on hemodialysis, VSS--see dialysis treatment sheet for full details, c/o leg pain and eye pain and difficulty seeing    REVIEW OF SYSTEMS:    A 10 pt review of systems was obtained and is per HPI/&/or daily summary otherwise negative    PMH/PSH/SH/FH:   Reviewed and unchanged since admission note    CURRENT MEDICATIONS:   Reviewed from admission to present day    VS:  /75   Pulse 73   Temp 36.2 °C (97.2 °F) (Temporal)   Resp 18   Ht 1.803 m (5' 10.98\")   Wt 86.4 kg (190 lb 7.6 oz)   SpO2 93%   BMI 26.58 kg/m²     Physical Exam  Vitals and nursing note reviewed.   Constitutional:       Comments: +chronically ill-appearing   HENT:      Head: Normocephalic and atraumatic.   Eyes:      General: No scleral icterus.     Comments: R eye erythema   Cardiovascular:      Rate and Rhythm: Normal rate and regular rhythm.      Comments: LFA AVG  [facility] TDC  Pulmonary:      Effort: Pulmonary effort is normal.   Abdominal:      General: There is no distension.   Musculoskeletal:         General: No deformity.      Right lower leg: Edema (trace pedal) present.      Left lower leg: Edema (trace pedal) present.   Skin:     Findings: No rash.      Comments: Chronic wounds b/l thighs/abd   Neurological:      Mental Status: He is alert and oriented to person, place, and time. Mental status is at baseline.   Psychiatric:         Attention and Perception: He is inattentive.         Mood and Affect: " Affect is flat.         Behavior: Behavior is cooperative.        Fluids:  In: 360 [P.O.:360]  Out: -     LABS:  Recent Labs     03/09/23  0451 03/11/23  0830   SODIUM 132* 132*   POTASSIUM 5.1 4.9   CHLORIDE 91* 93*   CO2 26 25   GLUCOSE 121* 109*   BUN 59* 50*   CREATININE 7.74* 7.15*   CALCIUM 8.8 8.4*       IMAGING:   All imaging reviewed from admission to present day    IMPRESSION:  # ESRD  - Etiology likely 2/2 DM/HTN  - HD qTTHS via [facility] TDC  - Lost his HD chair at [facility] previously  # Hyperkalemia--treated with HD  # LFA AVG thrombosed  - US 2/23 no flow  - s/p OR 2/25 AVG Brachio-axillary Creation, thrombectomy and fistulogram  - Stenosis and wall thrombus noted on US, [facility] TDC in use  # HTN, fair control    # Anemia of CKD, below target  - Goal Ggb 10-11  - Ferritin 2722, %sat 41  # CKD-MBD  - Ca 7.6  - iCa 1.0  - Phos 9.3  # Calciphylaxis hx  - Per report from patient  # Generalized weakness, improved   - Evaluated with PT/OT  # h/o Methamphetamine use   # Homelessness  # Encephalopthy ? Meds vs infection   - Improved  # DM   # Chronic wounds b/l thighs/abd   - Wound care following   # Conjunctivitis/End Stage Glaucoma  - Diamox  - Latanoprost, Combigan, Cosopy, Preds Forte    PLAN:  - HD today (SAT) and continue iHD qTTS/PRN  - UF as tolerated   - FU Opthalm Recs  - Use [facility] PermCath for now  - AVG eval as OP at    - Continue phos binder sevelamer with meals  - JACKIE with HD to goal Hgb 10-11  - No dietary protein restrictions  - Dose all meds per ESRD/iHD  - Renal diet, low salt diet, fluid restriction to 1.5L daily  -  support for placement. Requesting local HD chair. Hx of non adherence to HD.   - Can transition to Outpt HD clinic when medically cleared and chair secured.  - Follow labs  - Wound care

## 2023-03-11 NOTE — PROGRESS NOTES
"Hospital Medicine Daily Progress Note    Date of Service  3/11/2023    Chief Complaint  Ambrose Watkins is a 47 y.o. male admitted 2/22/2023 with hyperkalemia    Hospital Course  Ambrose Watkins is a homeless 47 y.o. male with diabetes since 2007, ESRD on HD for past 2-3 years, methamphetamine abuse, tobacco use, HTN, DLD, medical noncompliance who presented 2/22/2023 with evaluation by police prior to being incarcerated.  He was found in ER to be volume overloaded and have a K:7.6.  He states having his last dialysis 4 days ago in [facility].  He has chronic pain in his thighs from chronic bilateral thigh wounds  and per pt, \"calciphylaxis.\"  In ER he was consulted by nephrology and emergent dialysis is planned.  The patient keeps pulling off his telemetry leads despite being educated on why we recommend them.  Patient was also noted to have redness of the right eye.  Ophthalmology was consulted on the case.  Patient was noted to have glaucoma and vitreous hemorrhage of the right eye.  Ophthalmic drops were started.  Patient need placement.   assisting assisting with placement.         Interval Problem Update  3/7/2023  Remained stable.  Denies any discomfort.  Labs reviewed noted with mild hyponatremia, hyperkalemia  He had some hemodialysis  Repeat labs post hemodialysis  Need placement.   assisting    3/8/2023  Vitals remained stable  Labs reviewed  Nephrology assisting with hemodialysis  Need placement.   assisting    3/9/2023  Vitals remained stable  Labs reviewed  Nephrology following for hemodialysis  Wound care assisting with left thigh wound  Need placement.   assisting    3/10/2023  Vitals remained stable  Patient has significant loss of vision and pain in his right eye.  Reports of blurry vision and he can barely see anything.  Reportedly patient was able to drive before being admitted to the hospital.  I have asked ophthalmologist for " follow-up given significant vision loss.  Patient have extensive bilateral thigh wound with purulent discharge.  Wound care following closely.  He need to closely follow-up with wound care on discharge.  Current 6 click score 15  I will have PT/OT reevaluation.   unable to find outpatient dialysis.  Long length of committee recommended discharge and follow-up to the ED as needed.    Currently patient is not safe to discharge to shelter.  Given his new vision loss and significant bilateral thigh wound he will benefit from repeat PT /OT, likely need extensive wound care .    3/11/2023  Patient seen and examined at bedside during dialysis  Vitals remained stable  He continues to have blurry vision  I did ask ophthalmology for follow-up awaiting recommendation  He will need close wound care follow-up on discharge    I had extensive discussion with the patient regarding discharge plan and I told him he is medically clear and so far he is denied by all local dialysis center.  Patient refused to be discharged to street given extensive wound and blurry vision and requesting look for facility in [place].        I have discussed this patient's plan of care and discharge plan at IDT rounds today with Case Management, Nursing, Nursing leadership, and other members of the IDT team.    Consultants/Specialty  nephrology and ophthalmology    Code Status  Full Code    Disposition  Patient is medically cleared for discharge.   Anticipate discharge to to skilled nursing facility.  I have placed the appropriate orders for post-discharge needs.    Review of Systems  Review of Systems   Constitutional:  Positive for malaise/fatigue. Negative for fever.   HENT:  Negative for hearing loss.    Eyes:  Positive for blurred vision and redness.   Respiratory:  Negative for cough and shortness of breath.    Cardiovascular:  Negative for chest pain.   Gastrointestinal:  Negative for nausea and vomiting.   Genitourinary:  Negative  for dysuria.   Musculoskeletal:  Negative for myalgias.   Skin:  Negative for rash.   Neurological:  Negative for dizziness.   Endo/Heme/Allergies:  Does not bruise/bleed easily.      Physical Exam  Temp:  [36.2 °C (97.1 °F)-36.7 °C (98 °F)] 36.4 °C (97.6 °F)  Pulse:  [73-86] 86  Resp:  [16-18] 18  BP: (116-138)/(62-83) 138/81  SpO2:  [90 %-96 %] 95 %    Physical Exam  Constitutional:       General: He is not in acute distress.  HENT:      Head: Normocephalic and atraumatic.      Right Ear: Tympanic membrane normal.      Left Ear: Tympanic membrane normal.      Nose: Nose normal.      Mouth/Throat:      Mouth: Mucous membranes are moist.   Eyes:      Extraocular Movements: Extraocular movements intact.      Conjunctiva/sclera:      Right eye: Right conjunctiva is injected.      Left eye: Left conjunctiva is injected.      Pupils: Pupils are equal, round, and reactive to light.   Cardiovascular:      Rate and Rhythm: Normal rate and regular rhythm.      Pulses: Normal pulses.   Pulmonary:      Effort: Pulmonary effort is normal. No respiratory distress.   Abdominal:      General: Abdomen is flat. There is no distension.   Musculoskeletal:      Cervical back: Normal range of motion.      Right lower leg: No edema.      Left lower leg: No edema.      Comments: Extensive b/l thigh wound   Skin:     General: Skin is warm.   Neurological:      General: No focal deficit present.      Mental Status: He is alert and oriented to person, place, and time. Mental status is at baseline.   Psychiatric:         Mood and Affect: Mood normal.       Fluids  No intake or output data in the 24 hours ending 03/11/23 1233      Laboratory  Recent Labs     03/11/23  0830   WBC 7.7   RBC 3.02*   HEMOGLOBIN 8.8*   HEMATOCRIT 28.8*   MCV 95.4   MCH 29.1   MCHC 30.6*   RDW 62.7*   PLATELETCT 248   MPV 9.2       Recent Labs     03/09/23  0451 03/11/23  0830   SODIUM 132* 132*   POTASSIUM 5.1 4.9   CHLORIDE 91* 93*   CO2 26 25   GLUCOSE 121* 109*    BUN 59* 50*   CREATININE 7.74* 7.15*   CALCIUM 8.8 8.4*                     Imaging  [id number] W/O PLUS RECONS   Final Result      1.  Increased density throughout the right lobe is nonspecific though may suggest underlying hemorrhage with possible retinal detachment.   2.  No post septal fluid collection.             Assessment/Plan  * Hyperkalemia- (present on admission)  Assessment & Plan  Monitor     Pulmonary HTN (HCC)- (present on admission)  Assessment & Plan  Monitor volume status    Cardiomyopathy (HCC)- (present on admission)  Assessment & Plan  Coreg    Chronic HFrEF (heart failure with reduced ejection fraction) (HCC)- (present on admission)  Assessment & Plan  Coreg    Right eye glaucoma, due to angle-closure and phacomorphic components- (present on admission)  Assessment & Plan  Ophthalmology - probable end-stage glaucoma from angle-closure and phacomorphic components.  Latanoprost, Timolol, Brimonidine, Cosopt, Prednisone forte ophthalmic drops and Diamox  Follow up with Ophthalmology as outpatient    Vitreous hemorrhage of right eye (HCC)- (present on admission)  Assessment & Plan  Likely due to glaucoma    Open wound- (present on admission)  Assessment & Plan  Chronic  Wound care  Pain control - scheduled Tylenol, adjusted Oxycodone prn, IV Morphine for dressing changes     Tobacco dependence- (present on admission)  Assessment & Plan  Refused Nicotine replacement protocol    Noncompliance of patient with renal dialysis (HCC)- (present on admission)  Assessment & Plan  Education and counseling    Anemia, chronic disease- (present on admission)  Assessment & Plan  Epogen  Follow cbc    Hypocalcemia- (present on admission)  Assessment & Plan  Follow bmp    Methamphetamine abuse (HCC)- (present on admission)  Assessment & Plan  Hx of     End stage renal disease (HCC)- (present on admission)  Assessment & Plan  HD per Nephrology - T-Th-    Case management for discharge planning - outpatient  Dialysis arrangement    Hypertension- (present on admission)  Assessment & Plan  Coreg     Type 2 diabetes mellitus with kidney complication, without long-term current use of insulin (HCC)- (present on admission)  Assessment & Plan  hgba1c 5.9         VTE prophylaxis: SCDs/TEDs and heparin ppx    I have performed a physical exam and reviewed and updated ROS and Plan today (3/11/2023). In review of yesterday's note (3/10/2023), there are no changes except as documented above.

## 2023-03-12 LAB
ANION GAP SERPL CALC-SCNC: 16 MMOL/L (ref 7–16)
BACTERIA WND AEROBE CULT: ABNORMAL
BASOPHILS # BLD AUTO: 0.9 % (ref 0–1.8)
BASOPHILS # BLD: 0.06 K/UL (ref 0–0.12)
BUN SERPL-MCNC: 38 MG/DL (ref 8–22)
CALCIUM SERPL-MCNC: 8.8 MG/DL (ref 8.5–10.5)
CHLORIDE SERPL-SCNC: 94 MMOL/L (ref 96–112)
CO2 SERPL-SCNC: 26 MMOL/L (ref 20–33)
CREAT SERPL-MCNC: 6.17 MG/DL (ref 0.5–1.4)
EOSINOPHIL # BLD AUTO: 0.06 K/UL (ref 0–0.51)
EOSINOPHIL NFR BLD: 0.9 % (ref 0–6.9)
ERYTHROCYTE [DISTWIDTH] IN BLOOD BY AUTOMATED COUNT: 63.9 FL (ref 35.9–50)
GFR SERPLBLD CREATININE-BSD FMLA CKD-EPI: 11 ML/MIN/1.73 M 2
GLUCOSE SERPL-MCNC: 137 MG/DL (ref 65–99)
GRAM STN SPEC: ABNORMAL
HCT VFR BLD AUTO: 31.7 % (ref 42–52)
HGB BLD-MCNC: 9.7 G/DL (ref 14–18)
IMM GRANULOCYTES # BLD AUTO: 0.03 K/UL (ref 0–0.11)
IMM GRANULOCYTES NFR BLD AUTO: 0.5 % (ref 0–0.9)
LYMPHOCYTES # BLD AUTO: 0.98 K/UL (ref 1–4.8)
LYMPHOCYTES NFR BLD: 15 % (ref 22–41)
MCH RBC QN AUTO: 29 PG (ref 27–33)
MCHC RBC AUTO-ENTMCNC: 30.6 G/DL (ref 33.7–35.3)
MCV RBC AUTO: 94.9 FL (ref 81.4–97.8)
MONOCYTES # BLD AUTO: 0.94 K/UL (ref 0–0.85)
MONOCYTES NFR BLD AUTO: 14.4 % (ref 0–13.4)
NEUTROPHILS # BLD AUTO: 4.48 K/UL (ref 1.82–7.42)
NEUTROPHILS NFR BLD: 68.3 % (ref 44–72)
NRBC # BLD AUTO: 0 K/UL
NRBC BLD-RTO: 0 /100 WBC
PLATELET # BLD AUTO: 274 K/UL (ref 164–446)
PMV BLD AUTO: 9.7 FL (ref 9–12.9)
POTASSIUM SERPL-SCNC: 4.3 MMOL/L (ref 3.6–5.5)
RBC # BLD AUTO: 3.34 M/UL (ref 4.7–6.1)
SIGNIFICANT IND 70042: ABNORMAL
SITE SITE: ABNORMAL
SODIUM SERPL-SCNC: 136 MMOL/L (ref 135–145)
SOURCE SOURCE: ABNORMAL
WBC # BLD AUTO: 6.6 K/UL (ref 4.8–10.8)

## 2023-03-12 PROCEDURE — A9270 NON-COVERED ITEM OR SERVICE: HCPCS | Performed by: INTERNAL MEDICINE

## 2023-03-12 PROCEDURE — 700102 HCHG RX REV CODE 250 W/ 637 OVERRIDE(OP): Performed by: NURSE PRACTITIONER

## 2023-03-12 PROCEDURE — 85025 COMPLETE CBC W/AUTO DIFF WBC: CPT

## 2023-03-12 PROCEDURE — 700111 HCHG RX REV CODE 636 W/ 250 OVERRIDE (IP): Performed by: INTERNAL MEDICINE

## 2023-03-12 PROCEDURE — A9270 NON-COVERED ITEM OR SERVICE: HCPCS | Performed by: STUDENT IN AN ORGANIZED HEALTH CARE EDUCATION/TRAINING PROGRAM

## 2023-03-12 PROCEDURE — A9270 NON-COVERED ITEM OR SERVICE: HCPCS | Performed by: NURSE PRACTITIONER

## 2023-03-12 PROCEDURE — 700105 HCHG RX REV CODE 258: Performed by: INTERNAL MEDICINE

## 2023-03-12 PROCEDURE — A9270 NON-COVERED ITEM OR SERVICE: HCPCS | Performed by: HOSPITALIST

## 2023-03-12 PROCEDURE — 770001 HCHG ROOM/CARE - MED/SURG/GYN PRIV*

## 2023-03-12 PROCEDURE — 99233 SBSQ HOSP IP/OBS HIGH 50: CPT | Performed by: STUDENT IN AN ORGANIZED HEALTH CARE EDUCATION/TRAINING PROGRAM

## 2023-03-12 PROCEDURE — 700111 HCHG RX REV CODE 636 W/ 250 OVERRIDE (IP)

## 2023-03-12 PROCEDURE — A9270 NON-COVERED ITEM OR SERVICE: HCPCS | Performed by: FAMILY MEDICINE

## 2023-03-12 PROCEDURE — 700111 HCHG RX REV CODE 636 W/ 250 OVERRIDE (IP): Performed by: HOSPITALIST

## 2023-03-12 PROCEDURE — 80048 BASIC METABOLIC PNL TOTAL CA: CPT

## 2023-03-12 PROCEDURE — 36415 COLL VENOUS BLD VENIPUNCTURE: CPT

## 2023-03-12 PROCEDURE — 700102 HCHG RX REV CODE 250 W/ 637 OVERRIDE(OP): Performed by: HOSPITALIST

## 2023-03-12 PROCEDURE — 700111 HCHG RX REV CODE 636 W/ 250 OVERRIDE (IP): Performed by: STUDENT IN AN ORGANIZED HEALTH CARE EDUCATION/TRAINING PROGRAM

## 2023-03-12 PROCEDURE — 700102 HCHG RX REV CODE 250 W/ 637 OVERRIDE(OP): Performed by: STUDENT IN AN ORGANIZED HEALTH CARE EDUCATION/TRAINING PROGRAM

## 2023-03-12 PROCEDURE — 700102 HCHG RX REV CODE 250 W/ 637 OVERRIDE(OP): Performed by: INTERNAL MEDICINE

## 2023-03-12 PROCEDURE — 700102 HCHG RX REV CODE 250 W/ 637 OVERRIDE(OP): Performed by: FAMILY MEDICINE

## 2023-03-12 PROCEDURE — 700105 HCHG RX REV CODE 258: Performed by: STUDENT IN AN ORGANIZED HEALTH CARE EDUCATION/TRAINING PROGRAM

## 2023-03-12 PROCEDURE — 99223 1ST HOSP IP/OBS HIGH 75: CPT | Performed by: INTERNAL MEDICINE

## 2023-03-12 RX ADMIN — HEPARIN SODIUM 5000 UNITS: 5000 INJECTION, SOLUTION INTRAVENOUS; SUBCUTANEOUS at 14:04

## 2023-03-12 RX ADMIN — DAKIN'S SOLUTION 0.125% (QUARTER STRENGTH) 1 ML: 0.12 SOLUTION at 06:29

## 2023-03-12 RX ADMIN — SEVELAMER CARBONATE 800 MG: 800 TABLET, FILM COATED ORAL at 07:54

## 2023-03-12 RX ADMIN — ACETAZOLAMIDE EXTENDED-RELEASE 500 MG: 500 CAPSULE ORAL at 17:31

## 2023-03-12 RX ADMIN — CARVEDILOL 3.12 MG: 3.12 TABLET, FILM COATED ORAL at 07:54

## 2023-03-12 RX ADMIN — POLYETHYLENE GLYCOL 3350 1 PACKET: 17 POWDER, FOR SOLUTION ORAL at 06:20

## 2023-03-12 RX ADMIN — SEVELAMER CARBONATE 800 MG: 800 TABLET, FILM COATED ORAL at 17:31

## 2023-03-12 RX ADMIN — TIMOLOL MALEATE 1 DROP: 5 SOLUTION OPHTHALMIC at 06:28

## 2023-03-12 RX ADMIN — MINERAL OIL, PETROLATUM 1 APPLICATION: 425; 573 OINTMENT OPHTHALMIC at 14:04

## 2023-03-12 RX ADMIN — CARVEDILOL 3.12 MG: 3.12 TABLET, FILM COATED ORAL at 17:31

## 2023-03-12 RX ADMIN — ACETAMINOPHEN 1000 MG: 325 TABLET, FILM COATED ORAL at 22:59

## 2023-03-12 RX ADMIN — PIPERACILLIN AND TAZOBACTAM 3.38 G: 3; .375 INJECTION, POWDER, LYOPHILIZED, FOR SOLUTION INTRAVENOUS; PARENTERAL at 12:35

## 2023-03-12 RX ADMIN — TIMOLOL MALEATE 1 DROP: 5 SOLUTION OPHTHALMIC at 17:32

## 2023-03-12 RX ADMIN — PIPERACILLIN AND TAZOBACTAM 3.38 G: 3; .375 INJECTION, POWDER, LYOPHILIZED, FOR SOLUTION INTRAVENOUS; PARENTERAL at 00:25

## 2023-03-12 RX ADMIN — DORZOLAMIDE HYDROCHLORIDE AND TIMOLOL MALEATE 1 DROP: 20; 5 SOLUTION/ DROPS OPHTHALMIC at 06:29

## 2023-03-12 RX ADMIN — HEPARIN SODIUM 5000 UNITS: 5000 INJECTION, SOLUTION INTRAVENOUS; SUBCUTANEOUS at 06:16

## 2023-03-12 RX ADMIN — MINERAL OIL, PETROLATUM 1 APPLICATION: 425; 573 OINTMENT OPHTHALMIC at 22:59

## 2023-03-12 RX ADMIN — BRIMONIDINE TARTRATE 1 DROP: 2 SOLUTION OPHTHALMIC at 06:28

## 2023-03-12 RX ADMIN — PREDNISOLONE SODIUM PHOSPHATE 1 DROP: 10 SOLUTION/ DROPS OPHTHALMIC at 16:12

## 2023-03-12 RX ADMIN — PREDNISOLONE SODIUM PHOSPHATE 1 DROP: 10 SOLUTION/ DROPS OPHTHALMIC at 08:55

## 2023-03-12 RX ADMIN — SEVELAMER CARBONATE 800 MG: 800 TABLET, FILM COATED ORAL at 12:02

## 2023-03-12 RX ADMIN — PREDNISOLONE SODIUM PHOSPHATE 1 DROP: 10 SOLUTION/ DROPS OPHTHALMIC at 12:03

## 2023-03-12 RX ADMIN — ACETAMINOPHEN 1000 MG: 325 TABLET, FILM COATED ORAL at 08:53

## 2023-03-12 RX ADMIN — Medication 500 MG: at 06:16

## 2023-03-12 RX ADMIN — HEPARIN SODIUM 5000 UNITS: 5000 INJECTION, SOLUTION INTRAVENOUS; SUBCUTANEOUS at 22:59

## 2023-03-12 RX ADMIN — Medication 220 MG: at 06:16

## 2023-03-12 RX ADMIN — DOCUSATE SODIUM 50 MG AND SENNOSIDES 8.6 MG 2 TABLET: 8.6; 5 TABLET, FILM COATED ORAL at 06:16

## 2023-03-12 RX ADMIN — ACETAZOLAMIDE EXTENDED-RELEASE 500 MG: 500 CAPSULE ORAL at 06:06

## 2023-03-12 RX ADMIN — BRIMONIDINE TARTRATE 1 DROP: 2 SOLUTION OPHTHALMIC at 17:32

## 2023-03-12 RX ADMIN — MINERAL OIL, PETROLATUM 1 APPLICATION: 425; 573 OINTMENT OPHTHALMIC at 06:28

## 2023-03-12 RX ADMIN — DORZOLAMIDE HYDROCHLORIDE AND TIMOLOL MALEATE 1 DROP: 20; 5 SOLUTION/ DROPS OPHTHALMIC at 17:32

## 2023-03-12 RX ADMIN — LATANOPROST 1 DROP: 50 SOLUTION OPHTHALMIC at 17:32

## 2023-03-12 RX ADMIN — GABAPENTIN 100 MG: 100 CAPSULE ORAL at 17:31

## 2023-03-12 RX ADMIN — MORPHINE SULFATE 4 MG: 4 INJECTION INTRAVENOUS at 06:24

## 2023-03-12 RX ADMIN — DOXYCYCLINE 100 MG: 100 TABLET, FILM COATED ORAL at 06:15

## 2023-03-12 RX ADMIN — VANCOMYCIN HYDROCHLORIDE 2000 MG: 500 INJECTION, POWDER, LYOPHILIZED, FOR SOLUTION INTRAVENOUS at 16:14

## 2023-03-12 ASSESSMENT — ENCOUNTER SYMPTOMS
NAUSEA: 0
BLURRED VISION: 1
EYE REDNESS: 1
SHORTNESS OF BREATH: 0
VOMITING: 0
MYALGIAS: 0
DIZZINESS: 0
FEVER: 0
COUGH: 0
BRUISES/BLEEDS EASILY: 0

## 2023-03-12 ASSESSMENT — PAIN DESCRIPTION - PAIN TYPE: TYPE: CHRONIC PAIN

## 2023-03-12 NOTE — CONSULTS
"INFECTIOUS DISEASES INPATIENT CONSULT NOTE     Date of Service:3/12/2023    Consult Requested By: Francisco Mcpherson M.D.    Reason for Consultation: thigh wounds    History of Present Illness:   Ambrose Watkins is a 47 y.o. diabetic male admitted 2/22/2023 due to noncompliance with dialysis  ESRD on HD with AVF, active methamphetamine abuse, tobacco use, medical noncompliance brought in by police prior to being incarcerated.    In ED \" volume overloaded and have a K:7.6.  He states having his last dialysis 4 days ago in [facility].  He has chronic pain in his thighs from chronic bilateral thigh wounds  and per pt, \"calciphylaxis.\"  In ER he was consulted by nephrology\"  He underwent emergent dialysis LFA AVG thrombosed so permacath placed  Optho consulted +glaucoma and cataracts  Wound care consulted for chronic thigh wounds-noted purulent drainage at last dressing change. Culture is polymicrobial. Drainage has decreased since admission  Started on doxy and Zosyn  Infectious Diseases consulted for antibiotic recommendation and management  Wound pictures reviewed    Review Of Systems:  Limited  No fever      PMH:   Past Medical History:   Diagnosis Date    Diabetes     High anion gap metabolic acidosis 8/9/2022    Hyperkalemia 2/22/2022    Hypertension     Renal disease          PSH:  Past Surgical History:   Procedure Laterality Date    AV FISTULA CREATION Left 2/25/2022    Procedure: CREATION, AV FISTULA-UPPER EXTREMITY GRAFT, AND FISTULOGRAM;  Surgeon: Tone Hartman M.D.;  Location: SURGERY [facility];  Service: Vascular    THROMBECTOMY Left 2/25/2022    Procedure: THROMBECTOMY;  Surgeon: Tone Hartman M.D.;  Location: SURGERY [facility];  Service: Vascular       FAMILY HX:  Not pertinent    SOCIAL HX:  Social History     Socioeconomic History    Marital status: Single     Spouse name: Not on file    Number of children: Not on file    Years of education: Not on file    Highest education " level: Not on file   Occupational History    Not on file   Tobacco Use    Smoking status: Every Day     Packs/day: 0.25     Types: Cigarettes    Smokeless tobacco: Never   Vaping Use    Vaping Use: Never used   Substance and Sexual Activity    Alcohol use: Not Currently    Drug use: No    Sexual activity: Not on file   Other Topics Concern    Not on file   Social History Narrative    Not on file     Social Determinants of Health     Financial Resource Strain: Not on file   Food Insecurity: Not on file   Transportation Needs: Not on file   Physical Activity: Not on file   Stress: Not on file   Social Connections: Not on file   Intimate Partner Violence: Not on file   Housing Stability: Not on file     Social History     Tobacco Use   Smoking Status Every Day    Packs/day: 0.25    Types: Cigarettes   Smokeless Tobacco Never     Social History     Substance and Sexual Activity   Alcohol Use Not Currently       Allergies/Intolerances:  No Known Allergies      Other Current Medications:    Current Facility-Administered Medications:     [COMPLETED] piperacillin-tazobactam (Zosyn) 3.375 g in  mL IVPB, 3.375 g, Intravenous, Once, Stopped at 03/11/23 2038 **AND** piperacillin-tazobactam (Zosyn) 3.375 g in  mL IVPB, 3.375 g, Intravenous, BID, Francisco Mcpherson M.D., Stopped at 03/12/23 0525    doxycycline monohydrate (ADOXA) tablet 100 mg, 100 mg, Oral, Q12HRS, Francisco Mcpherson M.D., 100 mg at 03/12/23 0615    oxyCODONE immediate release (ROXICODONE) tablet 10-15 mg, 10-15 mg, Oral, Q4HRS PRN, Gulshan White M.D., 15 mg at 03/10/23 2226    sevelamer carbonate (RENVELA) tablet 800 mg, 800 mg, Oral, TID WITH MEALS, EMMA Beck, 800 mg at 03/12/23 0754    acetaminophen (TYLENOL) tablet 1,000 mg, 1,000 mg, Oral, TID, Gulshan White M.D., 1,000 mg at 03/12/23 0853    carvedilol (COREG) tablet 3.125 mg, 3.125 mg, Oral, BID WITH MEALS, Gulshan White M.D., 3.125 mg at 03/12/23 0754    dakins 0.125% (1/4  strength) topical soln, , Topical, BID, Ruddy Fay M.D., 1 mL at 03/12/23 0629    gabapentin (NEURONTIN) capsule 100 mg, 100 mg, Oral, Q EVENING, Mookie Simon M.D., 100 mg at 03/11/23 1727    morphine 4 MG/ML injection 4 mg, 4 mg, Intravenous, BID PRN, Abigail Goff A.P.R.NEduin, 4 mg at 03/12/23 0624    latanoprost (XALATAN) 0.005 % ophthalmic solution 1 Drop, 1 Drop, Right Eye, Q EVENING, Jasen Bobby M.D., 1 Drop at 03/11/23 1729    brimonidine (ALPHAGAN) 0.2 % ophthalmic solution 1 Drop, 1 Drop, Right Eye, BID, 1 Drop at 03/12/23 0628 **AND** timolol (TIMOPTIC) 0.5 % ophthalmic solution 1 Drop, 1 Drop, Right Eye, BID, Jasen Bobby M.D., 1 Drop at 03/12/23 0628    dorzolamide-timolol (COSOPT) 22.3-6.8 MG/ML ophthalmic drops 1 Drop, 1 Drop, Right Eye, BID, Jasen Bobby M.D., 1 Drop at 03/12/23 0629    prednisoLONE sodium phosphate (INFLAMASE FORTE) 1 % ophthalmic solution 1 Drop, 1 Drop, Both Eyes, 4X/DAY, Jasen Bobby M.D., 1 Drop at 03/12/23 0855    acetaZOLAMIDE SR (DIAMOX) capsule 500 mg, 500 mg, Oral, Q12HRS, Jasen Bobby M.D., 500 mg at 03/12/23 0606    epoetin (Retacrit) 5,000 Units injection (Dialysis Use Only), 5,000 Units, Intravenous, TUE+THU+SAT, Roxanne Chen, A.P.R.N., 5,000 Units at 03/11/23 1100    artificial tears (EYE LUBRICANT) ophth ointment 1 Application, 1 Application, Both Eyes, Q8HRS, Jasen Bobby M.D., 1 Application at 03/12/23 0628    senna-docusate (PERICOLACE or SENOKOT S) 8.6-50 MG per tablet 2 Tablet, 2 Tablet, Oral, BID, 2 Tablet at 03/12/23 0616 **AND** polyethylene glycol/lytes (MIRALAX) PACKET 1 Packet, 1 Packet, Oral, QDAY PRN, 1 Packet at 03/12/23 0620 **AND** [DISCONTINUED] magnesium hydroxide (MILK OF MAGNESIA) suspension 30 mL, 30 mL, Oral, QDAY PRN **AND** bisacodyl (DULCOLAX) suppository 10 mg, 10 mg, Rectal, QDAY PRN, GAURAV MoreiraO.    heparin injection 5,000 Units, 5,000 Units, Subcutaneous, Q8HRS, GAURAV MoreiraO., 5,000  "Units at 23 0616    labetalol (NORMODYNE/TRANDATE) injection 10 mg, 10 mg, Intravenous, Q4HRS PRN, Ken Grey D.O., 10 mg at 23 0433    ondansetron (ZOFRAN) syringe/vial injection 4 mg, 4 mg, Intravenous, Q4HRS PRN, Ken Grey D.O., 4 mg at 23 1617    ondansetron (ZOFRAN ODT) dispertab 4 mg, 4 mg, Oral, Q4HRS PRN, Ken Grey D.O.    promethazine (PHENERGAN) tablet 12.5-25 mg, 12.5-25 mg, Oral, Q4HRS PRN, Ken Grey D.O.    promethazine (PHENERGAN) suppository 12.5-25 mg, 12.5-25 mg, Rectal, Q4HRS PRN, Ken Grey D.O.    prochlorperazine (COMPAZINE) injection 5-10 mg, 5-10 mg, Intravenous, Q4HRS PRN, Ken Grey D.O.    ascorbic acid (Vitamin C) tablet 500 mg, 500 mg, Oral, DAILY, Ken Grey D.O., 500 mg at 23 0616    zinc sulfate (ZINCATE) capsule 220 mg, 220 mg, Oral, DAILY, Ken Grey D.O., 220 mg at 23 0616    heparin injection 3,800 Units, 3,800 Units, Intracatheter, DIALYSIS PRN, Serafin Pina M.D., 3,800 Units at 23 1101    lidocaine 2 % jelly 1 Application, 1 Application, Topical, QDAY PRN, Ken Grey D.O.    lidocaine (XYLOCAINE) 2 % injection 20 mL, 20 mL, Topical, QDAY PRN **OR** lidocaine (XYLOCAINE) 4 % topical solution, , Topical, QDAY PRN, Ken Grey D.O., 50 mL at 23      Most Recent Vital Signs:  BP (!) 155/87 Comment: nurse aware  Pulse 79   Temp 36.3 °C (97.3 °F) (Temporal)   Resp 18   Ht 1.803 m (5' 10.98\")   Wt 84.1 kg (185 lb 6.5 oz)   SpO2 94%   BMI 25.87 kg/m²   Temp  Av.5 °C (97.7 °F)  Min: 35.7 °C (96.2 °F)  Max: 37.6 °C (99.6 °F)    Physical Exam:  General:  no acute distress  Skin: Thigh wounds improved from admission-+granulation Per wound care note +greenish drainage right thigh 2.5 cm X 5 cm X 0.6 cm wound +granulation 2.7 cm tunneling  Left thigh irregular shallow except posterior thigh 2.5 X 3.5 cm X 1 cm Multiple wounds +granulation with some residual slough  Neuro: " Irritable    Pertinent Lab Results:  Recent Labs     03/11/23  0830 03/12/23  0519   WBC 7.7 6.6      Recent Labs     03/11/23  0830 03/12/23  0519   HEMOGLOBIN 8.8* 9.7*   HEMATOCRIT 28.8* 31.7*   MCV 95.4 94.9   MCH 29.1 29.0   PLATELETCT 248 274         Recent Labs     03/11/23  0830 03/12/23  0519   SODIUM 132* 136   POTASSIUM 4.9 4.3   CHLORIDE 93* 94*   CO2 25 26   CREATININE 7.15* 6.17*        Recent Labs     03/11/23  0830   ALBUMIN 3.2        Pertinent Micro:  Results       Procedure Component Value Units Date/Time    CULTURE WOUND W/ GRAM STAIN [[id number]]  (Abnormal) Collected: 03/10/23 1525    Order Status: Completed Specimen: Wound from Left Leg Updated: 03/11/23 1654     Significant Indicator POS     Source WND     Site LEFT LEG     Culture Result -     Gram Stain Result Few WBCs.  No organisms seen.       Culture Result Staphylococcus aureus  Light growth  Methicillin resistant by screening method        Citrobacter freundii complex  Light growth        Pseudomonas aeruginosa  Light growth      Narrative:      Collected By: [id number] MUSHTAQ MONTEJO  Collected By: [id number] MUSHTAQ MONTEJO    GRAM STAIN [[id number]] Collected: 03/10/23 1525    Order Status: Completed Specimen: Wound Updated: 03/11/23 0543     Significant Indicator .     Source WND     Site LEFT LEG     Gram Stain Result Few WBCs.  No organisms seen.      Narrative:      Collected By: [id number] MUSHTAQ MONTEJO  Collected By: [id number] MUSHTAQ MONTEJO          Blood Culture Hold   Date Value Ref Range Status   02/22/2023 Collected  Final        Studies:    IMPRESSION:   Chronic thigh wounds-bacterial superinfection  Polymicrobial infection : MRSA, Citrobacter, and pseudomonas  ESRD  Homeless  DM  Meth abuse        PLAN:   Agree with Zosyn for now  Switch doxy to vanco  FU susceptibilities  Anticipate 7-10 day course of abx treatment  Continue wound care    PICC/midline-no    Plan of care discussed with OLVIN Mcpherson M.D.. Will  continue to follow    Latricia Pena M.D.

## 2023-03-12 NOTE — PROGRESS NOTES
"Hospital Medicine Daily Progress Note    Date of Service  3/12/2023    Chief Complaint  Ambrose Watkins is a 47 y.o. male admitted 2/22/2023 with hyperkalemia    Hospital Course  Ambrose Watkins is a homeless 47 y.o. male with diabetes since 2007, ESRD on HD for past 2-3 years, methamphetamine abuse, tobacco use, HTN, DLD, medical noncompliance who presented 2/22/2023 with evaluation by police prior to being incarcerated.  He was found in ER to be volume overloaded and have a K:7.6.  He states having his last dialysis 4 days ago in [facility].  He has chronic pain in his thighs from chronic bilateral thigh wounds  and per pt, \"calciphylaxis.\"  In ER he was consulted by nephrology and emergent dialysis is planned.  The patient keeps pulling off his telemetry leads despite being educated on why we recommend them.  Patient was also noted to have redness of the right eye.  Ophthalmology was consulted on the case.  Patient was noted to have glaucoma and vitreous hemorrhage of the right eye.  Ophthalmic drops were started.  Patient need placement.   assisting assisting with placement.         Interval Problem Update  3/7/2023  Remained stable.  Denies any discomfort.  Labs reviewed noted with mild hyponatremia, hyperkalemia  He had some hemodialysis  Repeat labs post hemodialysis  Need placement.   assisting    3/8/2023  Vitals remained stable  Labs reviewed  Nephrology assisting with hemodialysis  Need placement.   assisting    3/9/2023  Vitals remained stable  Labs reviewed  Nephrology following for hemodialysis  Wound care assisting with left thigh wound  Need placement.   assisting    3/10/2023  Vitals remained stable  Patient has significant loss of vision and pain in his right eye.  Reports of blurry vision and he can barely see anything.  Reportedly patient was able to drive before being admitted to the hospital.  I have asked ophthalmologist for " follow-up given significant vision loss.  Patient have extensive bilateral thigh wound with purulent discharge.  Wound care following closely.  He need to closely follow-up with wound care on discharge.  Current 6 click score 15  I will have PT/OT reevaluation.   unable to find outpatient dialysis.  Long length of committee recommended discharge and follow-up to the ED as needed.    Currently patient is not safe to discharge to shelter.  Given his new vision loss and significant bilateral thigh wound he will benefit from repeat PT /OT, likely need extensive wound care .    3/11/2023  Patient seen and examined at bedside during dialysis  Vitals remained stable  He continues to have blurry vision  I did ask ophthalmology for follow-up awaiting recommendation  He will need close wound care follow-up on discharge    I had extensive discussion with the patient regarding discharge plan and I told him he is medically clear and so far he is denied by all local dialysis center.  Patient refused to be discharged to street given extensive wound and blurry vision and requesting look for facility in [place].    3/12/2023  Vitals remained stable, remained afebrile  Labs reviewed  Bilateral thigh wound noted with purulent green drainage  Wound culture growing MRSA, Citrobacter, Pseudomonas  Started on vancomycin and Zosyn  Final culture pending  Wound care on board  ID following      I have discussed this patient's plan of care and discharge plan at IDT rounds today with Case Management, Nursing, Nursing leadership, and other members of the IDT team.    Consultants/Specialty  nephrology and ophthalmology    Code Status  Full Code    Disposition  Patient is medically cleared for discharge.   Anticipate discharge to to skilled nursing facility.  I have placed the appropriate orders for post-discharge needs.    Review of Systems  Review of Systems   Constitutional:  Positive for malaise/fatigue. Negative for fever.    HENT:  Negative for hearing loss.    Eyes:  Positive for blurred vision and redness.   Respiratory:  Negative for cough and shortness of breath.    Cardiovascular:  Negative for chest pain.   Gastrointestinal:  Negative for nausea and vomiting.   Genitourinary:  Negative for dysuria.   Musculoskeletal:  Negative for myalgias.   Skin:  Negative for rash.   Neurological:  Negative for dizziness.   Endo/Heme/Allergies:  Does not bruise/bleed easily.      Physical Exam  Temp:  [36.2 °C (97.2 °F)-36.5 °C (97.7 °F)] 36.3 °C (97.3 °F)  Pulse:  [79-88] 79  Resp:  [16-18] 18  BP: (133-156)/(79-95) 155/87  SpO2:  [92 %-94 %] 94 %    Physical Exam  Constitutional:       General: He is not in acute distress.  HENT:      Head: Normocephalic and atraumatic.      Right Ear: Tympanic membrane normal.      Left Ear: Tympanic membrane normal.      Nose: Nose normal.      Mouth/Throat:      Mouth: Mucous membranes are moist.   Eyes:      Extraocular Movements: Extraocular movements intact.      Conjunctiva/sclera:      Right eye: Right conjunctiva is injected.      Left eye: Left conjunctiva is injected.      Pupils: Pupils are equal, round, and reactive to light.   Cardiovascular:      Rate and Rhythm: Normal rate and regular rhythm.      Pulses: Normal pulses.   Pulmonary:      Effort: Pulmonary effort is normal. No respiratory distress.   Abdominal:      General: Abdomen is flat. There is no distension.   Musculoskeletal:      Cervical back: Normal range of motion.      Right lower leg: No edema.      Left lower leg: No edema.      Comments: Extensive b/l thigh wound   Skin:     General: Skin is warm.   Neurological:      General: No focal deficit present.      Mental Status: He is alert and oriented to person, place, and time. Mental status is at baseline.   Psychiatric:         Mood and Affect: Mood normal.       Fluids  No intake or output data in the 24 hours ending 03/12/23 1230      Laboratory  Recent Labs      03/11/23  0830 03/12/23  0519   WBC 7.7 6.6   RBC 3.02* 3.34*   HEMOGLOBIN 8.8* 9.7*   HEMATOCRIT 28.8* 31.7*   MCV 95.4 94.9   MCH 29.1 29.0   MCHC 30.6* 30.6*   RDW 62.7* 63.9*   PLATELETCT 248 274   MPV 9.2 9.7       Recent Labs     03/11/23  0830 03/12/23  0519   SODIUM 132* 136   POTASSIUM 4.9 4.3   CHLORIDE 93* 94*   CO2 25 26   GLUCOSE 109* 137*   BUN 50* 38*   CREATININE 7.15* 6.17*   CALCIUM 8.4* 8.8                     Imaging  [id number] W/O PLUS RECONS   Final Result      1.  Increased density throughout the right lobe is nonspecific though may suggest underlying hemorrhage with possible retinal detachment.   2.  No post septal fluid collection.             Assessment/Plan  * Hyperkalemia- (present on admission)  Assessment & Plan  Monitor     Pulmonary HTN (HCC)- (present on admission)  Assessment & Plan  Monitor volume status    Cardiomyopathy (HCC)- (present on admission)  Assessment & Plan  Coreg    Chronic HFrEF (heart failure with reduced ejection fraction) ([facility])- (present on admission)  Assessment & Plan  Coreg    Right eye glaucoma, due to angle-closure and phacomorphic components- (present on admission)  Assessment & Plan  Ophthalmology - probable end-stage glaucoma from angle-closure and phacomorphic components.  Latanoprost, Timolol, Brimonidine, Cosopt, Prednisone forte ophthalmic drops and Diamox  Follow up with Ophthalmology as outpatient    Vitreous hemorrhage of right eye (HCC)- (present on admission)  Assessment & Plan  Likely due to glaucoma    Open wound- (present on admission)  Assessment & Plan  Chronic  Wound care  Pain control - scheduled Tylenol, adjusted Oxycodone prn, IV Morphine for dressing changes     Tobacco dependence- (present on admission)  Assessment & Plan  Refused Nicotine replacement protocol    Noncompliance of patient with renal dialysis (HCC)- (present on admission)  Assessment & Plan  Education and counseling    Anemia, chronic disease- (present on  admission)  Assessment & Plan  Epogen  Follow cbc    Hypocalcemia- (present on admission)  Assessment & Plan  Follow bmp    Methamphetamine abuse (HCC)- (present on admission)  Assessment & Plan  Hx of     End stage renal disease (HCC)- (present on admission)  Assessment & Plan  HD per Nephrology - T-Th-Sa    Case management for discharge planning - outpatient Dialysis arrangement    Hypertension- (present on admission)  Assessment & Plan  Coreg     Type 2 diabetes mellitus with kidney complication, without long-term current use of insulin (HCC)- (present on admission)  Assessment & Plan  hgba1c 5.9         VTE prophylaxis: SCDs/TEDs and heparin ppx    I have performed a physical exam and reviewed and updated ROS and Plan today (3/12/2023). In review of yesterday's note (3/11/2023), there are no changes except as documented above.

## 2023-03-12 NOTE — CARE PLAN
Problem: Pain - Standard  Goal: Alleviation of pain or a reduction in pain to the patient’s comfort goal  Outcome: Progressing     Problem: Fall Risk  Goal: Patient will remain free from falls  Outcome: Progressing     The patient is Stable - Low risk of patient condition declining or worsening    Shift Goals  Clinical Goals: IV antibiotic  Patient Goals: rest  Family Goals: POLINA    Progress made toward(s) clinical / shift goals:  patient is cooperative with cares. Calls for assistance to ambulate to bathroom.     Patient is not progressing towards the following goals:

## 2023-03-12 NOTE — DOCUMENTATION QUERY
"                                                                         [facility]                                                                       Query Response Note      PATIENT:               CONY MORAN  ACCT #:                  [id number]  MRN:                     [id number]  :                      1975  ADMIT DATE:       2023 4:22 AM  DISCH DATE:          RESPONDING  PROVIDER #:        [id number]           QUERY TEXT:    Nephrology has documented confusion and encephalopathy but neither condition has been substantiated by a hospitalist. Can a clarification be provided?    NOTE:  If an appropriate response is not listed below, please respond with a new note.       The patient's Clinical Indicators include:  \": Pt confused today\" and \"Encephalopthy ? Meds vs infection\" is documented in the Nephrology Progress Notes on . \"Mental Status: He is alert and oriented to person, place, and time\" is documented in the Hospitalist Progress Notes throughout admission. Na: 132, BUN/Cr: 59/7.74, Glu: 121, and Cl: 91 on 3/9. Anion Gap: 32, AST: 53, BUN/Cr: 148/13.04, Glu:115, K: 7.6, and Na: 137 on admission.     Treatments include: Hemodialysis, Daily BMP, and CBC.     Risk factors include: dx Fluid Overload 2/2 Missed Hemodialysis, dx HTN + CSCHF + ESRD, dx Hyponatremia, and hx DM II.    Thank you,  Stone Del Rosario RN, BSN  Clinical   Connect via Voalte Messenger  Options provided:   -- Encephalopathy is ruled out   -- Encephalopathy is ruled in, Please specify the type of encephalopathy present (e.g. Metabolic, Toxic 2/2 Medications), if known   -- Other explanation, Please specify   -- Unable to determine      Query created by: Stone Del Rosario on 3/10/2023 12:39 PM    RESPONSE TEXT:    Encephalopathy is ruled in- metabolic          Electronically signed by:  EBONIE STAUFFER MD 3/11/2023 7:17 PM              "

## 2023-03-12 NOTE — PROGRESS NOTES
Hemodialysis ordered by Dr. Palacio. Treatment started at 0828 and ended at 1128. Pt stable, vss, no c/o post tx. Net UF 2.0 L. Report to JERRELL Zavala RN.

## 2023-03-12 NOTE — PROGRESS NOTES
"[facility] -  PROGRESS NOTE               Author: Matilda Palacio M.D. Date & Time: 3/12/2023  7:23 AM     HPI:  Patient is a 47 year old male with a PMHx of ESRD on HD qTTS, meth user and history of calciphylaxis here for leg pain.  States he's still using meth.  Last HD session was about 4-5 days ago.  Here potassium was 7.6 and bicarb of 7.  Nephrology consulted for maintenance dialysis.  Currently complaining of leg pain.  History of nonadherence.    DAILY NEPHROLOGY SUMMARY:  2/23 - Pain controlled.  Denies nausea.  No SOB  Had HD yesterday  2/24 - Denies pain or SOB.  Laying in bed comfortable  2/25 - 2L Net UF. NPO for OR this am w/Opth for retinal detachment. Reports that he is hungry. 1200 mL UOP last 24 hrs.   2/26: 2.5L Net UF. Bps better. Sitting at side of bed eating breakfast. States that he wants HD chair set up here. Unable to drive to [place] to HD clinic d/t vision.   2/27: Pt sitting up in bed eating breakfast, no overnight events, VSS on RA, worked with OT this AM and cleared for DC, waiting on DC plan  2/28: Pt confused today  3/01: HD yest, UF 2L. VSS. Sitting up in bed holding his head \"I hurt all over\". Vague about whether medication is helping his pain.   3/02: C/O pain eyes and thighs. Due for dialysis. He voiced no concerns. Eating and drinking okay. VSS.   3/03: HD yest, UF 2L. VSS. Pt sleeps a lot. Briefly wakes and engages in conversation then goes back to sleep. Intermittent nause and pain (legs and eyes) No new issues. CM looking for outpt HD chair.  3/04: Pt seen on HD, sleeping, VSS, medically cleared, just waiting on OP HD chair  3/05: Pt sitting up in bed, says he hurts all over, iHD yesterday with 2L net UF, labs reviewed, VSS on RA  3/06: pt laying in bed sleeping, does not wish to participate in exam much, denies any complaints, for iHD tomorrow   3/07: No events, pt seen and examined on hemodialysis, VSS--see dialysis treatment sheet for full " "details, denies any CP/SOB/Abd pain, irritable  3/08: sitting up in bed, reports \"pain everywhere\", does have dependent edema and BLE edema, + ascites, tolerated iHD yesterday with UF: 2.0L, pending outpt chair   3/09: No events, Pt seen and examined on hemodialysis, VSS--see dialysis treatment sheet for full details, reports generalized pain, denies any SOB/LE edema  3/10: sitting in bed, cranky about me waking him up, c/o loss of vision and pain \"all over\", tolerated iHD yesterday with UF: 2.0L  3/11: No events, pt seen and examined on hemodialysis, VSS--see dialysis treatment sheet for full details, c/o leg pain and eye pain and difficulty seeing  3/12: No events, tolerated HD yesterday with 2 L UF, BP elevated today, no new complaints, still with generalized pain, noted to have purulent drainage from thigh wounds    REVIEW OF SYSTEMS:    A 10 pt review of systems was obtained and is per HPI/&/or daily summary otherwise negative    PMH/PSH/SH/FH:   Reviewed and unchanged since admission note    CURRENT MEDICATIONS:   Reviewed from admission to present day    VS:  BP (!) 156/95   Pulse 79   Temp 36.3 °C (97.3 °F) (Temporal)   Resp 18   Ht 1.803 m (5' 10.98\")   Wt 84.1 kg (185 lb 6.5 oz)   SpO2 94%   BMI 25.87 kg/m²     Physical Exam  Vitals and nursing note reviewed.   Constitutional:       Comments: +chronically ill-appearing   HENT:      Head: Normocephalic and atraumatic.   Eyes:      General: No scleral icterus.     Comments: R eye erythema   Cardiovascular:      Rate and Rhythm: Normal rate and regular rhythm.      Comments: LFA AVG  [facility] TDC  Pulmonary:      Effort: Pulmonary effort is normal.   Abdominal:      General: There is no distension.   Musculoskeletal:         General: No deformity.      Right lower leg: Edema (trace pedal) present.      Left lower leg: Edema (trace pedal) present.   Skin:     Findings: No rash.      Comments: Chronic wounds b/l thighs/abd dressed   Neurological:      Mental " Status: He is alert and oriented to person, place, and time. Mental status is at baseline.   Psychiatric:         Attention and Perception: He is inattentive.         Mood and Affect: Affect is flat.         Behavior: Behavior is cooperative.        Fluids:  In: 500 [Dialysis:500]  Out: 2500     LABS:  Recent Labs     03/11/23  0830 03/12/23  0519   SODIUM 132* 136   POTASSIUM 4.9 4.3   CHLORIDE 93* 94*   CO2 25 26   GLUCOSE 109* 137*   BUN 50* 38*   CREATININE 7.15* 6.17*   CALCIUM 8.4* 8.8       IMAGING:   All imaging reviewed from admission to present day    IMPRESSION:  # ESRD  - Etiology likely 2/2 DM/HTN  - HD qTTHS via [facility] TDC  - Lost his HD chair at [facility] previously  # Hyperkalemia--treated with HD  # LFA AVG thrombosed  - US 2/23 no flow  - s/p OR 2/25 AVG Brachio-axillary Creation, thrombectomy and fistulogram  - Stenosis and wall thrombus noted on US, [facility] TDC in use  # HTN, fair control    # Anemia of CKD, below target  - Goal Ggb 10-11  - Ferritin 2722, %sat 41  # CKD-MBD  - Ca 7.6  - iCa 1.0  - Phos 7.6, improving  # Calciphylaxis hx  - Per report from patient  # Generalized weakness, improved   - Evaluated with PT/OT  # h/o Methamphetamine use   # Homelessness  # Encephalopthy ? Meds vs infection   - Improved  # DM   # Chronic wounds b/l thighs/abd   - Wound care following    -ID consulted and now on antibiotics for infected wounds with purulent drainage  # Conjunctivitis/End Stage Glaucoma  - Diamox  - Latanoprost, Combigan, Cosopy, Preds Forte    PLAN:  -No HD today (SUN), continue iHD qTTS/PRN  - UF as tolerated   - FU Opthalm Recs  - Use [facility] PermCath for now  - AVG eval as OP at    - Continue phos binder sevelamer with meals  - JACKIE with HD to goal Hgb 10-11  - No dietary protein restrictions  - Dose all meds per ESRD/iHD  - Renal diet, low salt diet, fluid restriction to 1.5L daily  - SW support for placement. Requesting local HD chair. Hx of non adherence to HD.   - Can transition  to Outpt HD clinic when medically cleared and chair secured.  - Follow labs  -Continue wound care  -Antibiotics per ID for skin wounds

## 2023-03-12 NOTE — PROGRESS NOTES
Received report and assumed care at shift change. A&O x 4 , cooperative with cares. Tolerating IV antibiotic treatment. Fall precautions in place, bed locked and in lowest position. Needs attended to.

## 2023-03-12 NOTE — PROGRESS NOTES
Pharmacy Vancomycin Kinetics Note for 3/12/2023     47 y.o. male on Vancomycin day # 1     Vancomycin Indication (Two level/Trough based Dosing): Skin/skin structure infection (goal trough 10-15)    Provider specified end date: 03/19/23    Active Antibiotics (From admission, onward)      Ordered     Ordering Provider       Sun Mar 12, 2023 12:12 PM    03/12/23 1212  MD Alert...Vancomycin per Pharmacy  PHARMACY TO DOSE        Question:  Indication(s) for vancomycin?  Answer:  Skin and soft tissue infection    Latricia Pena M.D.       Sat Mar 11, 2023 10:22 PM    03/11/23 2222  piperacillin-tazobactam (Zosyn) 3.375 g in  mL IVPB  (piperacillin-tazobactam (ZOSYN) IV (Extended-infusion) PANEL )  2 TIMES DAILY        See [facility] for full Linked Orders Report.    Latricia Pena M.D.            Dosing Weight: 84.1 kg (185 lb 6.5 oz)      Admission History: Admitted on 2/22/2023 for Hyperkalemia [E87.5]  Pertinent history: Patient with chronic leg wounds. Patient afebrile and WBC 6.6. Wound culture is showing polymicrobial growth with MRSA, PSA, and citrobacter. RID c/s, escalating therapy to zosyn and vancomycin.    Allergies:     Patient has no known allergies.     Pertinent cultures to date:     Results       Procedure Component Value Units Date/Time    CULTURE WOUND W/ GRAM STAIN [[id number]]  (Abnormal) Collected: 03/10/23 1525    Order Status: Completed Specimen: Wound from Left Leg Updated: 03/11/23 1654     Significant Indicator POS     Source WND     Site LEFT LEG     Culture Result -     Gram Stain Result Few WBCs.  No organisms seen.       Culture Result Staphylococcus aureus  Light growth  Methicillin resistant by screening method        Citrobacter freundii complex  Light growth        Pseudomonas aeruginosa  Light growth      Narrative:      Collected By: [id number] MUSHTAQ MONTEJO  Collected By: [id number] MUSHTAQ MONTEJO    GRAM STAIN [[id number]] Collected: 03/10/23 1525    Order Status:  "Completed Specimen: Wound Updated: 2343     Significant Indicator .     Source WND     Site LEFT LEG     Gram Stain Result Few WBCs.  No organisms seen.      Narrative:      Collected By: [id number] MUSHTAQ MONTEJO  Collected By: [id number] MUSHTAQ MONTEJO            Labs:     Estimated Creatinine Clearance: 16.1 mL/min (A) (by C-G formula based on SCr of 6.17 mg/dL (HH)).  Recent Labs     23  0823   WBC 7.7 6.6   NEUTSPOLYS 71.10 68.30     Recent Labs     23  0830 23   BUN 50* 38*   CREATININE 7.15* 6.17*   ALBUMIN 3.2  --        Intake/Output Summary (Last 24 hours) at 3/12/2023 1502  Last data filed at 3/12/2023 1400  Gross per 24 hour   Intake 118 ml   Output --   Net 118 ml      BP (!) 155/87   Pulse 79   Temp 36.3 °C (97.3 °F) (Temporal)   Resp 18   Ht 1.803 m (5' 10.98\")   Wt 84.1 kg (185 lb 6.5 oz)   SpO2 94%  Temp (24hrs), Av.3 °C (97.4 °F), Min:36.2 °C (97.2 °F), Max:36.5 °C (97.7 °F)      List concerns for Vancomycin clearance:     ESRD;CHF    Pharmacokinetics:     Trough kinetics:   No results for input(s): VANCOTROUGH, VANCOPEAK, VANCORANDOM in the last 72 hours.    A/P:     -  Vancomycin dose: 2000 mg once    -  Next vancomycin level(s):    -3/13 Vr @ 1600    -  Comments: Patient with polymicrobial leg wounds growing MRSA, PSA, and citrobacter fruendii. RID c/s. Patient with ESRD with HD on TTS. Risk for accumulation. Will pulse dose vancomycin and get a random level ~24 hours after first dose. Will continue to adjust dosing as necessary.     Khloe Rodriguez, PharmD    "

## 2023-03-13 ENCOUNTER — APPOINTMENT (OUTPATIENT)
Dept: RADIOLOGY | Facility: MEDICAL CENTER | Age: 48
DRG: 291 | End: 2023-03-13
Attending: STUDENT IN AN ORGANIZED HEALTH CARE EDUCATION/TRAINING PROGRAM
Payer: MEDICARE

## 2023-03-13 PROBLEM — B95.62 MRSA CELLULITIS: Status: ACTIVE | Noted: 2023-03-13

## 2023-03-13 PROBLEM — A49.8 PSEUDOMONAS INFECTION: Status: ACTIVE | Noted: 2023-03-13

## 2023-03-13 PROBLEM — L03.90 MRSA CELLULITIS: Status: ACTIVE | Noted: 2023-03-13

## 2023-03-13 LAB
ANION GAP SERPL CALC-SCNC: 15 MMOL/L (ref 7–16)
ANISOCYTOSIS BLD QL SMEAR: ABNORMAL
BASOPHILS # BLD AUTO: 1 % (ref 0–1.8)
BASOPHILS # BLD: 0.06 K/UL (ref 0–0.12)
BUN SERPL-MCNC: 46 MG/DL (ref 8–22)
BURR CELLS BLD QL SMEAR: NORMAL
CALCIUM SERPL-MCNC: 8.7 MG/DL (ref 8.5–10.5)
CHLORIDE SERPL-SCNC: 96 MMOL/L (ref 96–112)
CO2 SERPL-SCNC: 24 MMOL/L (ref 20–33)
COMMENT 1642: NORMAL
CREAT SERPL-MCNC: 7.6 MG/DL (ref 0.5–1.4)
EOSINOPHIL # BLD AUTO: 0.08 K/UL (ref 0–0.51)
EOSINOPHIL NFR BLD: 1.4 % (ref 0–6.9)
ERYTHROCYTE [DISTWIDTH] IN BLOOD BY AUTOMATED COUNT: 66.9 FL (ref 35.9–50)
GFR SERPLBLD CREATININE-BSD FMLA CKD-EPI: 8 ML/MIN/1.73 M 2
GLUCOSE SERPL-MCNC: 120 MG/DL (ref 65–99)
HCT VFR BLD AUTO: 32.6 % (ref 42–52)
HGB BLD-MCNC: 9.6 G/DL (ref 14–18)
HYPOCHROMIA BLD QL SMEAR: ABNORMAL
IMM GRANULOCYTES # BLD AUTO: 0.03 K/UL (ref 0–0.11)
IMM GRANULOCYTES NFR BLD AUTO: 0.5 % (ref 0–0.9)
LYMPHOCYTES # BLD AUTO: 1.09 K/UL (ref 1–4.8)
LYMPHOCYTES NFR BLD: 19 % (ref 22–41)
MACROCYTES BLD QL SMEAR: ABNORMAL
MCH RBC QN AUTO: 29.2 PG (ref 27–33)
MCHC RBC AUTO-ENTMCNC: 29.4 G/DL (ref 33.7–35.3)
MCV RBC AUTO: 99.1 FL (ref 81.4–97.8)
MICROCYTES BLD QL SMEAR: ABNORMAL
MONOCYTES # BLD AUTO: 0.81 K/UL (ref 0–0.85)
MONOCYTES NFR BLD AUTO: 14.1 % (ref 0–13.4)
MORPHOLOGY BLD-IMP: NORMAL
NEUTROPHILS # BLD AUTO: 3.68 K/UL (ref 1.82–7.42)
NEUTROPHILS NFR BLD: 64 % (ref 44–72)
NRBC # BLD AUTO: 0 K/UL
NRBC BLD-RTO: 0 /100 WBC
OVALOCYTES BLD QL SMEAR: NORMAL
PLATELET # BLD AUTO: 262 K/UL (ref 164–446)
PLATELET BLD QL SMEAR: NORMAL
PMV BLD AUTO: 9.6 FL (ref 9–12.9)
POIKILOCYTOSIS BLD QL SMEAR: NORMAL
POTASSIUM SERPL-SCNC: 4.6 MMOL/L (ref 3.6–5.5)
RBC # BLD AUTO: 3.29 M/UL (ref 4.7–6.1)
RBC BLD AUTO: PRESENT
SODIUM SERPL-SCNC: 135 MMOL/L (ref 135–145)
WBC # BLD AUTO: 5.8 K/UL (ref 4.8–10.8)

## 2023-03-13 PROCEDURE — 700111 HCHG RX REV CODE 636 W/ 250 OVERRIDE (IP)

## 2023-03-13 PROCEDURE — 36415 COLL VENOUS BLD VENIPUNCTURE: CPT

## 2023-03-13 PROCEDURE — 99233 SBSQ HOSP IP/OBS HIGH 50: CPT | Performed by: INTERNAL MEDICINE

## 2023-03-13 PROCEDURE — A9270 NON-COVERED ITEM OR SERVICE: HCPCS | Performed by: INTERNAL MEDICINE

## 2023-03-13 PROCEDURE — 700102 HCHG RX REV CODE 250 W/ 637 OVERRIDE(OP): Performed by: HOSPITALIST

## 2023-03-13 PROCEDURE — 80048 BASIC METABOLIC PNL TOTAL CA: CPT

## 2023-03-13 PROCEDURE — 700102 HCHG RX REV CODE 250 W/ 637 OVERRIDE(OP): Performed by: NURSE PRACTITIONER

## 2023-03-13 PROCEDURE — 700105 HCHG RX REV CODE 258: Performed by: INTERNAL MEDICINE

## 2023-03-13 PROCEDURE — 700111 HCHG RX REV CODE 636 W/ 250 OVERRIDE (IP): Performed by: INTERNAL MEDICINE

## 2023-03-13 PROCEDURE — A9270 NON-COVERED ITEM OR SERVICE: HCPCS | Performed by: NURSE PRACTITIONER

## 2023-03-13 PROCEDURE — A9270 NON-COVERED ITEM OR SERVICE: HCPCS | Performed by: FAMILY MEDICINE

## 2023-03-13 PROCEDURE — 700111 HCHG RX REV CODE 636 W/ 250 OVERRIDE (IP): Performed by: HOSPITALIST

## 2023-03-13 PROCEDURE — 700102 HCHG RX REV CODE 250 W/ 637 OVERRIDE(OP): Performed by: INTERNAL MEDICINE

## 2023-03-13 PROCEDURE — 85025 COMPLETE CBC W/AUTO DIFF WBC: CPT

## 2023-03-13 PROCEDURE — 700102 HCHG RX REV CODE 250 W/ 637 OVERRIDE(OP): Performed by: FAMILY MEDICINE

## 2023-03-13 PROCEDURE — 770006 HCHG ROOM/CARE - MED/SURG/GYN SEMI*

## 2023-03-13 PROCEDURE — A9270 NON-COVERED ITEM OR SERVICE: HCPCS | Performed by: HOSPITALIST

## 2023-03-13 PROCEDURE — 700101 HCHG RX REV CODE 250: Performed by: HOSPITALIST

## 2023-03-13 PROCEDURE — 99232 SBSQ HOSP IP/OBS MODERATE 35: CPT | Performed by: STUDENT IN AN ORGANIZED HEALTH CARE EDUCATION/TRAINING PROGRAM

## 2023-03-13 RX ORDER — DOXYCYCLINE 100 MG/1
100 TABLET ORAL EVERY 12 HOURS
Status: DISCONTINUED | OUTPATIENT
Start: 2023-03-13 | End: 2023-03-16 | Stop reason: HOSPADM

## 2023-03-13 RX ADMIN — PREDNISOLONE SODIUM PHOSPHATE 1 DROP: 10 SOLUTION/ DROPS OPHTHALMIC at 01:30

## 2023-03-13 RX ADMIN — Medication 500 MG: at 06:03

## 2023-03-13 RX ADMIN — DORZOLAMIDE HYDROCHLORIDE AND TIMOLOL MALEATE 1 DROP: 20; 5 SOLUTION/ DROPS OPHTHALMIC at 06:09

## 2023-03-13 RX ADMIN — MINERAL OIL, PETROLATUM 1 APPLICATION: 425; 573 OINTMENT OPHTHALMIC at 20:38

## 2023-03-13 RX ADMIN — SEVELAMER CARBONATE 800 MG: 800 TABLET, FILM COATED ORAL at 08:48

## 2023-03-13 RX ADMIN — HEPARIN SODIUM 5000 UNITS: 5000 INJECTION, SOLUTION INTRAVENOUS; SUBCUTANEOUS at 20:35

## 2023-03-13 RX ADMIN — PREDNISOLONE SODIUM PHOSPHATE 1 DROP: 10 SOLUTION/ DROPS OPHTHALMIC at 15:39

## 2023-03-13 RX ADMIN — ACETAMINOPHEN 1000 MG: 325 TABLET, FILM COATED ORAL at 08:48

## 2023-03-13 RX ADMIN — TIMOLOL MALEATE 1 DROP: 5 SOLUTION OPHTHALMIC at 06:09

## 2023-03-13 RX ADMIN — MINERAL OIL, PETROLATUM 1 APPLICATION: 425; 573 OINTMENT OPHTHALMIC at 06:09

## 2023-03-13 RX ADMIN — PREDNISOLONE SODIUM PHOSPHATE 1 DROP: 10 SOLUTION/ DROPS OPHTHALMIC at 08:48

## 2023-03-13 RX ADMIN — GABAPENTIN 100 MG: 100 CAPSULE ORAL at 18:26

## 2023-03-13 RX ADMIN — Medication 220 MG: at 06:03

## 2023-03-13 RX ADMIN — DORZOLAMIDE HYDROCHLORIDE AND TIMOLOL MALEATE 1 DROP: 20; 5 SOLUTION/ DROPS OPHTHALMIC at 18:27

## 2023-03-13 RX ADMIN — MORPHINE SULFATE 4 MG: 4 INJECTION INTRAVENOUS at 18:39

## 2023-03-13 RX ADMIN — BRIMONIDINE TARTRATE 1 DROP: 2 SOLUTION OPHTHALMIC at 18:27

## 2023-03-13 RX ADMIN — CARVEDILOL 3.12 MG: 3.12 TABLET, FILM COATED ORAL at 16:35

## 2023-03-13 RX ADMIN — DAKIN'S SOLUTION 0.125% (QUARTER STRENGTH) 1 ML: 0.12 SOLUTION at 06:00

## 2023-03-13 RX ADMIN — PREDNISOLONE SODIUM PHOSPHATE 1 DROP: 10 SOLUTION/ DROPS OPHTHALMIC at 20:41

## 2023-03-13 RX ADMIN — PIPERACILLIN AND TAZOBACTAM 3.38 G: 3; .375 INJECTION, POWDER, LYOPHILIZED, FOR SOLUTION INTRAVENOUS; PARENTERAL at 16:35

## 2023-03-13 RX ADMIN — ACETAMINOPHEN 1000 MG: 325 TABLET, FILM COATED ORAL at 20:37

## 2023-03-13 RX ADMIN — DOCUSATE SODIUM 50 MG AND SENNOSIDES 8.6 MG 2 TABLET: 8.6; 5 TABLET, FILM COATED ORAL at 18:26

## 2023-03-13 RX ADMIN — CARVEDILOL 3.12 MG: 3.12 TABLET, FILM COATED ORAL at 08:48

## 2023-03-13 RX ADMIN — BRIMONIDINE TARTRATE 1 DROP: 2 SOLUTION OPHTHALMIC at 06:05

## 2023-03-13 RX ADMIN — TIMOLOL MALEATE 1 DROP: 5 SOLUTION OPHTHALMIC at 18:27

## 2023-03-13 RX ADMIN — HEPARIN SODIUM 5000 UNITS: 5000 INJECTION, SOLUTION INTRAVENOUS; SUBCUTANEOUS at 06:03

## 2023-03-13 RX ADMIN — ACETAZOLAMIDE EXTENDED-RELEASE 500 MG: 500 CAPSULE ORAL at 06:03

## 2023-03-13 RX ADMIN — ACETAMINOPHEN 1000 MG: 325 TABLET, FILM COATED ORAL at 15:39

## 2023-03-13 RX ADMIN — MORPHINE SULFATE 4 MG: 4 INJECTION INTRAVENOUS at 04:02

## 2023-03-13 RX ADMIN — DOCUSATE SODIUM 50 MG AND SENNOSIDES 8.6 MG 2 TABLET: 8.6; 5 TABLET, FILM COATED ORAL at 06:03

## 2023-03-13 RX ADMIN — DOXYCYCLINE 100 MG: 100 TABLET, FILM COATED ORAL at 18:26

## 2023-03-13 RX ADMIN — VANCOMYCIN HYDROCHLORIDE 500 MG: 500 INJECTION, POWDER, LYOPHILIZED, FOR SOLUTION INTRAVENOUS at 04:09

## 2023-03-13 RX ADMIN — PIPERACILLIN AND TAZOBACTAM 3.38 G: 3; .375 INJECTION, POWDER, LYOPHILIZED, FOR SOLUTION INTRAVENOUS; PARENTERAL at 04:13

## 2023-03-13 RX ADMIN — ACETAZOLAMIDE EXTENDED-RELEASE 500 MG: 500 CAPSULE ORAL at 18:26

## 2023-03-13 RX ADMIN — DAKIN'S SOLUTION 0.125% (QUARTER STRENGTH) 1 ML: 0.12 SOLUTION at 18:00

## 2023-03-13 RX ADMIN — LATANOPROST 1 DROP: 50 SOLUTION OPHTHALMIC at 18:26

## 2023-03-13 RX ADMIN — MINERAL OIL, PETROLATUM 1 APPLICATION: 425; 573 OINTMENT OPHTHALMIC at 15:39

## 2023-03-13 RX ADMIN — SEVELAMER CARBONATE 800 MG: 800 TABLET, FILM COATED ORAL at 16:35

## 2023-03-13 RX ADMIN — HEPARIN SODIUM 5000 UNITS: 5000 INJECTION, SOLUTION INTRAVENOUS; SUBCUTANEOUS at 15:39

## 2023-03-13 ASSESSMENT — ENCOUNTER SYMPTOMS
BRUISES/BLEEDS EASILY: 0
MYALGIAS: 0
EYE REDNESS: 1
COUGH: 0
VOMITING: 0
FEVER: 0
NAUSEA: 0
SHORTNESS OF BREATH: 0
DIZZINESS: 0
BLURRED VISION: 1

## 2023-03-13 ASSESSMENT — PAIN DESCRIPTION - PAIN TYPE: TYPE: CHRONIC PAIN

## 2023-03-13 NOTE — CONSULTS
"Reason for PC Consult: Advance Care Planning    Consulted by: Dr. Mcpherson    Assessment:  General: Ambrose is a 47 y.o. male with PMHx of DM, ESRD on HD for 2-3 years, HTN, DLD, methamphetamine abuse, tobacco use, homelessness who presented to the ED 2/22/23 with leg pain, large right upper leg wound, and multiple lower leg sores. Per patient, his last HD was 4 days prior to admission in [place], [place]. BS = 58, K= 7.6. Pt was seen by Opthalmology as patient complained of right eye pain and vision changes. He underwent a CT Orbit 2/24 which revealed increased density throughout the right lobe is non specific through may suggest underlying hemorrhage with possible retinal detachment. Opthamology  believes the goal of treatment emil be palliative only and outpatient referral placed for glaucoma consult.     Social: Ambrose was previously leaving in his Jeep in [place]. He moved to [place] with the intention of living in a motel in [place]. He states he has one living blood relative, a sister named Macie who is currently in group home. He has step family in [place]. He states that prior to this admission he was independent in ADLs/IADLs and he drove himself to [place] from [place]. He enjoys gambling and he shared, \"I keep to myself.\"     Consults: Nephrology, Ophthalmology, Infectious Disease    Dyspnea: no  Last BM: 03/12/23-    Pain:  yes, patient reports intermittent leg pain he describes as \"a hot iron being slapped on my leg.\" PC RN offered to notify his RN of pain and he declines needing a pain intervention at this time  Depression: mood appropriate for situation    Dementia: Unable to Determine;       Spiritual:  Is Evangelical or spirituality important for coping with this illness?  no  Has a  or spiritual provider visit been requested?  no    Palliative Performance Scale: 60%    Advance Directive: None.  PC RN queried if pt has ever discussed healthcare wishes and values or completed an Advance Directive in the " "past. He states that he has not. Reviewed Advanced Directive with patient. Inquired if there is anybody that he would trust to act as medical power of . He states that he would want his ex-girl friend and emergency contact Bladimir \"Ailyn\"Gilda to act as his medical power of  if needed. He states that she knows his wishes. Assisted patient in completion of Advanced Directive naming Bladimir Vo as DPOA-HC. Patient agreed to review statements of desire. During review patient states, \"No I don't agree\" in regards to the statement I desire that my life be prolonged to the greatest extent possible, without regard to my condition, the chances I have for recovery or the long-term survival, or the cost of the procedures. PC RN continued to review document and patient stated, \"These statements are bullsh*t\" and further asked PC RN to, \"shut up about this.\"   DPOA: No    POLST: No      Code Status: Full.Addressed at this encounter with pt. Described the measures employed in a full code (including CPR, medications, and possibly defibrillation) and survival statistics.  Further explained that a DNR would not preclude any treatments/interventions offered to pt. Expressed concern that due to patient's acute situation, progressive disease, age, and co-morbidities, he is unlikely to endure invasive code measures well. He states, \"At least try and bring me back\" and continued to share further that \"whatever happens, happens.\" Discussed that even if he was successfully resuscitated, he may come out of it with significantly reduced quality of life from even his current state. Pt verbalized understanding; however, he wishes to remain Full Code at this time.     Outcome:  0850: Consult received and EMR reviewed; case discussed with Dr. Mcpherson,updates appreciated.     32981: PC RN to patient's bedside. Introduced self and role of Palliative Care.  Assessed pt's understanding of their current medical status, overall " "health picture, and options for future care. Pt expressed good understanding of acute situation and chronic issues. Patient discussed his recent vision loss and new blurry vision in his left eye. He shared that he has been, \"in the hospital the last five months because of wounds.\"     Explored pt's values, beliefs, and preferences in order to identify GOC. Pt's source of strength is \"me\" and he shared, \"the strength is running out, but I don't want to give up.\" Further, his goal is to find a motel to live in and \"go to the ER for dialysis.\"  Pt's greatest concern is regarding where he will go on discharge.     PC RN asked permission to call Bladimir \"Ailyn\" to update her regarding the Advanced Directive and the patient granted permission.     1138: PC RN placed phone call to Bladimir, no answer and left message with request to call back.     1030: Met with Dr. Mcpherson who signed Certificate of Competency and discussed updates.     Active listening, reflection, reminiscing, validation & normalization,and empathic support utilized throughout this encounter.  All questions answered.  PC contact information given. Acknowledged pt's courage in having this discussion.     Updated: Dr. Mcpherson    Plan: Palliative care to continue to follow, provide support, and help facilitate decision-making as clinical picture evolves.    Thank you for allowing Palliative Care to participate in this patient's care. Please feel free to call [phone] with any questions or concerns.   "

## 2023-03-13 NOTE — PROGRESS NOTES
Infectious Disease Progress Note    Author: Latricia Pena M.D. Date & Time of service: 3/13/2023  12:38 PM    Chief Complaint:  Chronic thigh wounds    Interval History:  47 y.o. diabetic male admitted 2023 due to noncompliance with dialysis. Known active methamphetamine abuse   3/13 AF WBC 5.8 no acute events  Labs Reviewed, Medications Reviewed, and Wound Reviewed.    Review of Systems:  Review of Systems   Constitutional:  Negative for fever.     Hemodynamics:  Temp (24hrs), Av.6 °C (97.8 °F), Min:36.3 °C (97.4 °F), Max:36.8 °C (98.2 °F)  Temperature: 36.5 °C (97.7 °F)  Pulse  Av.4  Min: 65  Max: 96   Blood Pressure: 135/80       Physical Exam:  Physical Exam  Vitals and nursing note reviewed.   Cardiovascular:      Rate and Rhythm: Normal rate.   Pulmonary:      Effort: Pulmonary effort is normal. No respiratory distress.   Musculoskeletal:      Comments: Thigh wounds dressed  See consult       Meds:    Current Facility-Administered Medications:     doxycycline monohydrate    [COMPLETED] piperacillin-tazobactam **AND** piperacillin-tazobactam    oxyCODONE immediate-release    sevelamer carbonate    acetaminophen    carvedilol    dakins 0.125% (1/4 strength)    gabapentin    morphine injection    latanoprost    brimonidine **AND** timolol    dorzolamide-timolol    prednisoLONE sodium phosphate    acetaZOLAMIDE SR    epoetin    artificial tears    senna-docusate **AND** polyethylene glycol/lytes **AND** [DISCONTINUED] magnesium hydroxide **AND** bisacodyl    heparin    labetalol    ondansetron    ondansetron    promethazine    promethazine    prochlorperazine    ascorbic acid    zinc sulfate    heparin    lidocaine    lidocaine **OR** lidocaine    Labs:  Recent Labs     23  0830 23  0519 23  0743   WBC 7.7 6.6 5.8   RBC 3.02* 3.34* 3.29*   HEMOGLOBIN 8.8* 9.7* 9.6*   HEMATOCRIT 28.8* 31.7* 32.6*   MCV 95.4 94.9 99.1*   MCH 29.1 29.0 29.2   RDW 62.7* 63.9* 66.9*   PLATELETCT  248 274 262   MPV 9.2 9.7 9.6   NEUTSPOLYS 71.10 68.30 64.00   LYMPHOCYTES 13.80* 15.00* 19.00*   MONOCYTES 12.60 14.40* 14.10*   EOSINOPHILS 1.20 0.90 1.40   BASOPHILS 0.90 0.90 1.00   RBCMORPHOLO  --   --  Present     Recent Labs     03/11/23  0830 03/12/23  0519 03/13/23  0743   SODIUM 132* 136 135   POTASSIUM 4.9 4.3 4.6   CHLORIDE 93* 94* 96   CO2 25 26 24   GLUCOSE 109* 137* 120*   BUN 50* 38* 46*     Recent Labs     03/11/23  0830 03/12/23  0519 03/13/23  0743   ALBUMIN 3.2  --   --    TBILIRUBIN 0.3  --   --    ALKPHOSPHAT 133*  --   --    TOTPROTEIN 7.9  --   --    ALTSGPT <5  --   --    ASTSGOT 6*  --   --    CREATININE 7.15* 6.17* 7.60*       Imaging:  [id number] W/O PLUS RECONS    Result Date: 2/24/2023 2/24/2023 6:21 PM HISTORY/REASON FOR EXAM:  Acute inflammation of orbit; right eye swelling and pain. TECHNIQUE/EXAM DESCRIPTION AND NUMBER OF VIEWS: CT Orbits without contrast, axial with coronal reconstructions. The study was performed on a helical multidetector CT scanner. Contiguous thin section helical images were obtained of the orbits in axial plane. Coronal images were reconstructed from the helical data set. Images are reviewed at bone and soft tissue windows. Low dose optimization technique was utilized for this CT exam including automated exposure control and adjustment of the mA and/or kV according to patient size. COMPARISON: None. FINDINGS: There is asymmetric increased density in the right globe, with increased density most prominently dependently, suggesting hemorrhage, possibly retinal detachment although not well characterized on CT. Left globe is unremarkable. No retrobulbar hematoma or definite focal fluid collection. Superior ophthalmic veins are not definitely dilated. There are severe atherosclerotic calcifications. Mild mucosal thickening or trace amount of fluid in the right maxillary sinus. Small mucous retention cyst in the right sphenoid sinus No acute osseous abnormality      1.  Increased density throughout the right lobe is nonspecific though may suggest underlying hemorrhage with possible retinal detachment. 2.  No post septal fluid collection.      Micro:  Results       Procedure Component Value Units Date/Time    CULTURE WOUND W/ GRAM STAIN [[id number]]  (Abnormal)  (Susceptibility) Collected: 03/10/23 1525    Order Status: Completed Specimen: Wound from Left Leg Updated: 03/12/23 1547     Significant Indicator POS     Source WND     Site LEFT LEG     Culture Result -     Gram Stain Result Few WBCs.  No organisms seen.       Culture Result Methicillin Resistant Staphylococcus aureus  Moderate growth        Citrobacter freundii complex  Moderate growth        Pseudomonas aeruginosa  Moderate growth      Narrative:      Collected By: [id number] [facility]  Collected By: [id number] [facility]    Susceptibility       Methicillin resistant staphylococcus aureus (1)       Antibiotic Interpretation Microscan   Method Status    Azithromycin Resistant >4 mcg/mL JASVIR Final    Clindamycin Sensitive <=0.25 mcg/mL JASVIR Final    Cefazolin Resistant >16 mcg/mL JASVIR Final    Cefepime Resistant >16 mcg/mL JASVIR Final    Ceftaroline Sensitive 1 mcg/mL JASVIR Final    Daptomycin Sensitive 1 mcg/mL JASVIR Final    Erythromycin Resistant >4 mcg/mL JASVRI Final    Ampicillin/sulbactam Resistant >16/8 mcg/mL JASVIR Final    Oxacillin Resistant >2 mcg/mL JASVIR Final    Trimeth/Sulfa Sensitive <=0.5/9.5 mcg/mL JASVIR Final    Tetracycline Sensitive <=4 mcg/mL JASVIR Final    Vancomycin Sensitive 1 mcg/mL JASVIR Final              Citrobacter freundii complex (2)       Antibiotic Interpretation Microscan   Method Status    Ampicillin Resistant 16 mcg/mL JASVIR Final    Ceftriaxone Sensitive <=1 mcg/mL JASVIR Final    Cefazolin Resistant >16 mcg/mL JASVIR Final    Ciprofloxacin Sensitive <=0.25 mcg/mL JASVIR Final    Cefepime Sensitive <=2 mcg/mL JASVIR Final    Gentamicin Sensitive <=2 mcg/mL JASVIR Final    Ampicillin/sulbactam Resistant  <=4/2 mcg/mL JASVIR Final    Trimeth/Sulfa Sensitive <=0.5/9.5 mcg/mL JASVIR Final    Tigecycline Sensitive <=2 mcg/mL JASVIR Final    Cefuroxime Resistant <=4 mcg/mL JASVIR Final    Ertapenem Sensitive <=0.5 mcg/mL JASVIR Final    Minocycline Sensitive <=4 mcg/mL JASVIR Final    Moxifloxacin Sensitive <=2 mcg/mL JASVIR Final    Pip/Tazobactam Sensitive <=8 mcg/mL JASVIR Final    Tobramycin Sensitive <=2 mcg/mL JASVIR Final              Pseudomonas aeruginosa (3)       Antibiotic Interpretation Microscan   Method Status    Ciprofloxacin Sensitive 0.5 mcg/mL JASVIR Final    Cefepime Sensitive 8 mcg/mL JASVIR Final    Gentamicin Sensitive 4 mcg/mL JASVIR Final    Meropenem Sensitive <=1 mcg/mL JASVIR Final    Amikacin Sensitive <=16 mcg/mL JASVIR Final    Pip/Tazobactam Sensitive <=8 mcg/mL JASVIR Final    Tobramycin Sensitive <=2 mcg/mL JASVIR Final                       GRAM STAIN [[id number]] Collected: 03/10/23 1525    Order Status: Completed Specimen: Wound Updated: 03/12/23 1549     Significant Indicator .     Source WND     Site LEFT LEG     Gram Stain Result Few WBCs.  No organisms seen.      Narrative:      Collected By: [id number] [facility]  Collected By: [id number] [facility]            Assessment:  Active Hospital Problems    Diagnosis     *Open wound [T14.8XXA]     MRSA cellulitis [L03.90, B95.62]     Pseudomonas infection [A49.8]     Chronic HFrEF (heart failure with reduced ejection fraction) ([facility]) [I50.22]     Cardiomyopathy ([facility]) [I42.9]     Pulmonary HTN ([facility]) [I27.20]     Right eye glaucoma, due to angle-closure and phacomorphic components [H40.89]     Vitreous hemorrhage of right eye ([facility]) [H43.11]     Tobacco dependence [F17.200]     Noncompliance of patient with renal dialysis ([facility]) [Z91.15]     End stage renal disease ([facility]) [N18.6]     Hyperkalemia [E87.5]     Anemia, chronic disease [D63.8]     Methamphetamine abuse ([facility]) [F15.10]     Hypocalcemia [E83.51]     Type 2 diabetes mellitus with kidney complication, without long-term  current use of insulin ([facility]) [E11.29]     Hypertension [I10]      Chronic thigh wounds-bacterial superinfection  Polymicrobial infection : MRSA, Citrobacter, and pseudomonas  ESRD  Homeless  DM  Meth abuse           PLAN:   Continue Zosyn for now-EKG reviewed and   Can use renally dosed cipro at discharge   Switch back to doxy and dc vanco  Recommend 7-10 day course of abx treatment  Anticipate stop date 3/20/2023  Continue wound care    Will sign off    SHAYY Mcpherson

## 2023-03-13 NOTE — PROGRESS NOTES
"Hospital Medicine Daily Progress Note    Date of Service  3/13/2023    Chief Complaint  Ambrose Watkins is a 47 y.o. male admitted 2/22/2023 with hyperkalemia    Hospital Course  Ambrose Watkins is a homeless 47 y.o. male with diabetes since 2007, ESRD on HD for past 2-3 years, methamphetamine abuse, tobacco use, HTN, DLD, medical noncompliance who presented 2/22/2023 with evaluation by police prior to being incarcerated.  He was found in ER to be volume overloaded and have a K:7.6.  He states having his last dialysis 4 days ago in [facility].  He has chronic pain in his thighs from chronic bilateral thigh wounds  and per pt, \"calciphylaxis.\"  In ER he was consulted by nephrology and emergent dialysis is planned.  The patient keeps pulling off his telemetry leads despite being educated on why we recommend them.  Patient was also noted to have redness of the right eye.  Ophthalmology was consulted on the case.  Patient was noted to have glaucoma and vitreous hemorrhage of the right eye.  Ophthalmic drops were started.  Ophthalmology recommended outpatient follow-up with Dr. Dominguez .   unable to find outpatient dialysis.  Long length of committee recommended discharge and follow-up to the ED as needed. assisting assisting with placement.  He has extensive bilateral thigh wound growing MRSA, Citrobacter, Pseudomonas.  ID been consulted.  Patient was started on vancomycin and Zosyn.         Interval Problem Update  3/7/2023  Remained stable.  Denies any discomfort.  Labs reviewed noted with mild hyponatremia, hyperkalemia  He had some hemodialysis  Repeat labs post hemodialysis  Need placement.   assisting    3/8/2023  Vitals remained stable  Labs reviewed  Nephrology assisting with hemodialysis  Need placement.   assisting    3/9/2023  Vitals remained stable  Labs reviewed  Nephrology following for hemodialysis  Wound care assisting with left thigh " wound  Need placement.   assisting    3/10/2023  Vitals remained stable  Patient has significant loss of vision and pain in his right eye.  Reports of blurry vision and he can barely see anything.  Reportedly patient was able to drive before being admitted to the hospital.  I have asked ophthalmologist for follow-up given significant vision loss.  Patient have extensive bilateral thigh wound with purulent discharge.  Wound care following closely.  He need to closely follow-up with wound care on discharge.  Current 6 click score 15  I will have PT/OT reevaluation.   unable to find outpatient dialysis.  Long length of committee recommended discharge and follow-up to the ED as needed.    Currently patient is not safe to discharge to shelter.  Given his new vision loss and significant bilateral thigh wound he will benefit from repeat PT /OT, likely need extensive wound care .    3/11/2023  Patient seen and examined at bedside during dialysis  Vitals remained stable  He continues to have blurry vision  I did ask ophthalmology for follow-up awaiting recommendation  He will need close wound care follow-up on discharge    I had extensive discussion with the patient regarding discharge plan and I told him he is medically clear and so far he is denied by all local dialysis center.  Patient refused to be discharged to street given extensive wound and blurry vision and requesting look for facility in [place].    3/12/2023  Vitals remained stable, remained afebrile  Labs reviewed  Bilateral thigh wound noted with purulent green drainage  Wound culture growing MRSA, Citrobacter, Pseudomonas  Started on vancomycin and Zosyn  Final culture pending  Wound care on board  ID following    3/13/2023  Vitals remained stable, remained afebrile  Labs reviewed  Wound culture growing MRSA, Citrobacter, Pseudomonas.  Final culture out  Currently on vancomycin and Zosyn  ID following  Antibiotic recommendation per  ID  Nephrology following for hemodialysis  Patient will need wound care follow-up on discharge  Pain from glaucoma uncontrolled.  Need to follow-up with ophthalmology on discharge.  Palliative care discussed patient regarding hospice.  He will need to continue with dialysis and will remain full code.  If unable to find  dialysis facility in [place].  He will need to come back to the ED for dialysis.    I have discussed this patient's plan of care and discharge plan at IDT rounds today with Case Management, Nursing, Nursing leadership, and other members of the IDT team.    Consultants/Specialty  nephrology and ophthalmology    Code Status  Full Code    Disposition  Patient is medically cleared for discharge.   Anticipate discharge to to skilled nursing facility.  I have placed the appropriate orders for post-discharge needs.    Review of Systems  Review of Systems   Constitutional:  Positive for malaise/fatigue. Negative for fever.   HENT:  Negative for hearing loss.    Eyes:  Positive for blurred vision and redness.   Respiratory:  Negative for cough and shortness of breath.    Cardiovascular:  Negative for chest pain.   Gastrointestinal:  Negative for nausea and vomiting.   Genitourinary:  Negative for dysuria.   Musculoskeletal:  Negative for myalgias.   Skin:  Negative for rash.   Neurological:  Negative for dizziness.   Endo/Heme/Allergies:  Does not bruise/bleed easily.      Physical Exam  Temp:  [36.3 °C (97.4 °F)-36.8 °C (98.2 °F)] 36.5 °C (97.7 °F)  Pulse:  [73-79] 76  Resp:  [17-18] 18  BP: (135-150)/(69-84) 135/80  SpO2:  [94 %-98 %] 96 %    Physical Exam  Constitutional:       General: He is not in acute distress.  HENT:      Head: Normocephalic and atraumatic.      Right Ear: Tympanic membrane normal.      Left Ear: Tympanic membrane normal.      Nose: Nose normal.      Mouth/Throat:      Mouth: Mucous membranes are moist.   Eyes:      Extraocular Movements: Extraocular movements intact.       Conjunctiva/sclera:      Right eye: Right conjunctiva is injected.      Left eye: Left conjunctiva is injected.      Pupils: Pupils are equal, round, and reactive to light.   Cardiovascular:      Rate and Rhythm: Normal rate and regular rhythm.      Pulses: Normal pulses.   Pulmonary:      Effort: Pulmonary effort is normal. No respiratory distress.   Abdominal:      General: Abdomen is flat. There is no distension.   Musculoskeletal:      Cervical back: Normal range of motion.      Right lower leg: No edema.      Left lower leg: No edema.      Comments: Extensive b/l thigh wound   Skin:     General: Skin is warm.   Neurological:      General: No focal deficit present.      Mental Status: He is alert and oriented to person, place, and time. Mental status is at baseline.   Psychiatric:         Mood and Affect: Mood normal.       Fluids    Intake/Output Summary (Last 24 hours) at 3/13/2023 1037  Last data filed at 3/13/2023 0900  Gross per 24 hour   Intake 858 ml   Output --   Net 858 ml         Laboratory  Recent Labs     03/11/23  0830 03/12/23 0519 03/13/23  0743   WBC 7.7 6.6 5.8   RBC 3.02* 3.34* 3.29*   HEMOGLOBIN 8.8* 9.7* 9.6*   HEMATOCRIT 28.8* 31.7* 32.6*   MCV 95.4 94.9 99.1*   MCH 29.1 29.0 29.2   MCHC 30.6* 30.6* 29.4*   RDW 62.7* 63.9* 66.9*   PLATELETCT 248 274 262   MPV 9.2 9.7 9.6       Recent Labs     03/11/23  0830 03/12/23  0519 03/13/23  0743   SODIUM 132* 136 135   POTASSIUM 4.9 4.3 4.6   CHLORIDE 93* 94* 96   CO2 25 26 24   GLUCOSE 109* 137* 120*   BUN 50* 38* 46*   CREATININE 7.15* 6.17* 7.60*   CALCIUM 8.4* 8.8 8.7                     Imaging  [id number] W/O PLUS RECONS   Final Result      1.  Increased density throughout the right lobe is nonspecific though may suggest underlying hemorrhage with possible retinal detachment.   2.  No post septal fluid collection.             Assessment/Plan  * Open wound- (present on admission)  Assessment & Plan  Bilateral thigh wound noted with  purulent green drainage  Wound culture growing MRSA, Citrobacter, Pseudomonas  Started on vancomycin and Zosyn  Final culture pending  Wound care on board  ID following    Pulmonary HTN ([facility])- (present on admission)  Assessment & Plan  Monitor volume status    Cardiomyopathy ([facility])- (present on admission)  Assessment & Plan  Coreg    Chronic HFrEF (heart failure with reduced ejection fraction) ([facility])- (present on admission)  Assessment & Plan  Coreg    Right eye glaucoma, due to angle-closure and phacomorphic components- (present on admission)  Assessment & Plan  Ophthalmology - probable end-stage glaucoma from angle-closure and phacomorphic components.  Latanoprost, Timolol, Brimonidine, Cosopt, Prednisone forte ophthalmic drops and Diamox  Follow up with Ophthalmology as outpatient    Vitreous hemorrhage of right eye (HCC)- (present on admission)  Assessment & Plan  Likely due to glaucoma    Tobacco dependence- (present on admission)  Assessment & Plan  Refused Nicotine replacement protocol    Noncompliance of patient with renal dialysis ([facility])- (present on admission)  Assessment & Plan  Education and counseling    Anemia, chronic disease- (present on admission)  Assessment & Plan  Epogen  Follow cbc    Hypocalcemia- (present on admission)  Assessment & Plan  Follow bmp    Methamphetamine abuse (HCC)- (present on admission)  Assessment & Plan  Hx of     End stage renal disease (HCC)- (present on admission)  Assessment & Plan  HD per Nephrology - T-Th-    Case management for discharge planning - outpatient Dialysis arrangement    Hyperkalemia- (present on admission)  Assessment & Plan  Monitor     Hypertension- (present on admission)  Assessment & Plan  Coreg     Type 2 diabetes mellitus with kidney complication, without long-term current use of insulin (HCC)- (present on admission)  Assessment & Plan  hgba1c 5.9         VTE prophylaxis: SCDs/TEDs and heparin ppx    I have performed a physical exam and reviewed and  updated ROS and Plan today (3/13/2023). In review of yesterday's note (3/12/2023), there are no changes except as documented above.

## 2023-03-13 NOTE — PROGRESS NOTES
IV infiltrated with Vanc infusing, infusion stopped, U/S guided IV removed, Dr and Pharmacy notified, will get another U/S guided IV placed per Dr. Pt refused infiltration treatment, will assess site closely. Pt up in bed eating dinner.

## 2023-03-13 NOTE — CARE PLAN
Problem: Pain - Standard  Goal: Alleviation of pain or a reduction in pain to the patient’s comfort goal  Outcome: Progressing     The patient is Stable - Low risk of patient condition declining or worsening    Shift Goals  Clinical Goals: wound care  Patient Goals: rest  Family Goals: POLINA    Progress made toward(s) clinical / shift goals:  medicated for pain per MAR    Patient is not progressing towards the following goals:

## 2023-03-13 NOTE — DISCHARGE PLANNING
Medical Social Work   requested a Next of Kin Search      PC to [place] [phone], a male answered phone, stated that a [place] Watkins does not live there  Email to [place] Watkins, requesting a call    PC to Jr Ball [phone], not accepting calls at this time  PC to Cezar Ball [phone], rang then stated this is not a working number  PC to Ambrose Watkins [phone]; message left to call   PC to Ken Watkins [phone]; not in service

## 2023-03-13 NOTE — PROGRESS NOTES
Received report and assumed care at shift change. A&O x 4 , cooperative with cares. Medicated for pain per MAR. Wound care completed, wound to left anterior upper thigh draining purulent, tan drainage. Tolerated IV antibiotics. Fall precautions in place, bed locked and in lowest position. Needs attended to.

## 2023-03-13 NOTE — PROGRESS NOTES
"[facility] -  PROGRESS NOTE               Author: Kai Metzger M.D. Date & Time: 3/13/2023  12:18 PM     HPI:  Patient is a 47 year old male with a PMHx of ESRD on HD qTTS, meth user and history of calciphylaxis here for leg pain.  States he's still using meth.  Last HD session was about 4-5 days ago.  Here potassium was 7.6 and bicarb of 7.  Nephrology consulted for maintenance dialysis.  Currently complaining of leg pain.  History of nonadherence.    DAILY NEPHROLOGY SUMMARY:  2/23 - Pain controlled.  Denies nausea.  No SOB  Had HD yesterday  2/24 - Denies pain or SOB.  Laying in bed comfortable  2/25 - 2L Net UF. NPO for OR this am w/Opth for retinal detachment. Reports that he is hungry. 1200 mL UOP last 24 hrs.   2/26: 2.5L Net UF. Bps better. Sitting at side of bed eating breakfast. States that he wants HD chair set up here. Unable to drive to [place] to HD clinic d/t vision.   2/27: Pt sitting up in bed eating breakfast, no overnight events, VSS on RA, worked with OT this AM and cleared for DC, waiting on DC plan  2/28: Pt confused today  3/01: HD yest, UF 2L. VSS. Sitting up in bed holding his head \"I hurt all over\". Vague about whether medication is helping his pain.   3/02: C/O pain eyes and thighs. Due for dialysis. He voiced no concerns. Eating and drinking okay. VSS.   3/03: HD yest, UF 2L. VSS. Pt sleeps a lot. Briefly wakes and engages in conversation then goes back to sleep. Intermittent nause and pain (legs and eyes) No new issues. CM looking for outpt HD chair.  3/04: Pt seen on HD, sleeping, VSS, medically cleared, just waiting on OP HD chair  3/05: Pt sitting up in bed, says he hurts all over, iHD yesterday with 2L net UF, labs reviewed, VSS on RA  3/06: pt laying in bed sleeping, does not wish to participate in exam much, denies any complaints, for iHD tomorrow   3/07: No events, pt seen and examined on hemodialysis, VSS--see dialysis treatment sheet for " "full details, denies any CP/SOB/Abd pain, irritable  3/08: sitting up in bed, reports \"pain everywhere\", does have dependent edema and BLE edema, + ascites, tolerated iHD yesterday with UF: 2.0L, pending outpt chair   3/09: No events, Pt seen and examined on hemodialysis, VSS--see dialysis treatment sheet for full details, reports generalized pain, denies any SOB/LE edema  3/10: sitting in bed, cranky about me waking him up, c/o loss of vision and pain \"all over\", tolerated iHD yesterday with UF: 2.0L  3/11: No events, pt seen and examined on hemodialysis, VSS--see dialysis treatment sheet for full details, c/o leg pain and eye pain and difficulty seeing  3/12: No events, tolerated HD yesterday with 2 L UF, BP elevated today, no new complaints, still with generalized pain, noted to have purulent drainage from thigh wounds  3/13: No events, no new c/o, high BP readings intermittently     REVIEW OF SYSTEMS:    A 10 pt review of systems was obtained and is per HPI/&/or daily summary otherwise negative    PMH/PSH/SH/FH:   Reviewed and unchanged since admission note    CURRENT MEDICATIONS:   Reviewed from admission to present day    VS:  /80   Pulse 76   Temp 36.5 °C (97.7 °F) (Temporal)   Resp 18   Ht 1.803 m (5' 10.98\")   Wt 84.1 kg (185 lb 6.5 oz)   SpO2 96%   BMI 25.87 kg/m²     Physical Exam  Vitals and nursing note reviewed.   Constitutional:       Comments: +chronically ill-appearing   HENT:      Head: Normocephalic and atraumatic.   Eyes:      General: No scleral icterus.     Comments: R eye erythema   Cardiovascular:      Rate and Rhythm: Normal rate and regular rhythm.      Comments: LFA AVG  [facility] TDC  Pulmonary:      Effort: Pulmonary effort is normal.   Abdominal:      General: There is no distension.   Musculoskeletal:         General: No deformity.      Right lower leg: Edema (trace pedal) present.      Left lower leg: Edema (trace pedal) present.   Skin:     Findings: No rash.      Comments: " Chronic wounds b/l thighs/abd dressed   Neurological:      Mental Status: He is alert and oriented to person, place, and time. Mental status is at baseline.   Psychiatric:         Attention and Perception: He is inattentive.         Mood and Affect: Affect is flat.         Behavior: Behavior is cooperative.        Fluids:  In: 618 [P.O.:618]  Out: -     LABS:  Recent Labs     03/11/23  0830 03/12/23  0519 03/13/23  0743   SODIUM 132* 136 135   POTASSIUM 4.9 4.3 4.6   CHLORIDE 93* 94* 96   CO2 25 26 24   GLUCOSE 109* 137* 120*   BUN 50* 38* 46*   CREATININE 7.15* 6.17* 7.60*   CALCIUM 8.4* 8.8 8.7     IMAGING:   All imaging reviewed from admission to present day    IMPRESSION:  # ESRD  - Etiology likely 2/2 DM/HTN  - HD qTTHS via [facility] TDC  - [place] [facility] [place]  # Hyperkalemia--treated with HD  # LFA AVG thrombosed  - US 2/23 no flow  - s/p OR 2/25 AVG Brachio-axillary creation, thrombectomy and fistulogram  - Stenosis and wall thrombus noted on US, currently using  [facility] TDC   # HTN, fair control            - BP goal < 140/90   # Anemia of CKD, below target  - Goal Ggb 10-11  - Ferritin 2722, %sat 41  # CKD-MBD  - Ca 7.6  - iCa 1.0  - Phos 7.6, improving  # Calciphylaxis hx  - Per report from patient  # Generalized weakness, improved   - Evaluated with PT/OT  # h/o Methamphetamine use   # Homelessness  # Encephalopthy ? Meds vs infection   - Improved  # DM   # Chronic wounds b/l thighs/abd   - Wound care following    -ID consulted and now on antibiotics for infected wounds with purulent drainage  # Conjunctivitis/End Stage Glaucoma  - Diamox  - Latanoprost, Combigan, Cosopy, Preds Forte  # HFrEF           - EF30%    PLAN:  -No HD today (Mon), continue iHD qTTS/PRN  - UF as tolerated   - Use [facility] PermCath for now  - AVG eval as OP at [facility]   - Continue phos binder sevelamer with meals  - JACKIE with HD to goal Hgb 10-11  - No dietary protein restrictions  - Dose all meds per ESRD/iHD  - Renal  diet, low salt diet, fluid restriction to 1.5L daily  - SW support for placement. Requesting local HD chair. Hx of non adherence to HD.   - Can transition to Outpt HD clinic when medically cleared and chair secured.  - Follow labs  -Continue wound care  -Antibiotics per ID for skin wounds

## 2023-03-14 PROBLEM — E83.51 HYPOCALCEMIA: Status: RESOLVED | Noted: 2022-02-22 | Resolved: 2023-03-14

## 2023-03-14 LAB
ALBUMIN SERPL BCP-MCNC: 3.3 G/DL (ref 3.2–4.9)
BUN SERPL-MCNC: 34 MG/DL (ref 8–22)
CALCIUM ALBUM COR SERPL-MCNC: 9.2 MG/DL (ref 8.5–10.5)
CALCIUM SERPL-MCNC: 8.6 MG/DL (ref 8.5–10.5)
CHLORIDE SERPL-SCNC: 99 MMOL/L (ref 96–112)
CO2 SERPL-SCNC: 25 MMOL/L (ref 20–33)
CREAT SERPL-MCNC: 5.97 MG/DL (ref 0.5–1.4)
GFR SERPLBLD CREATININE-BSD FMLA CKD-EPI: 11 ML/MIN/1.73 M 2
GLUCOSE SERPL-MCNC: 141 MG/DL (ref 65–99)
PHOSPHATE SERPL-MCNC: 5.2 MG/DL (ref 2.5–4.5)
POTASSIUM SERPL-SCNC: 4.6 MMOL/L (ref 3.6–5.5)
SODIUM SERPL-SCNC: 138 MMOL/L (ref 135–145)

## 2023-03-14 PROCEDURE — A9270 NON-COVERED ITEM OR SERVICE: HCPCS | Performed by: INTERNAL MEDICINE

## 2023-03-14 PROCEDURE — 700102 HCHG RX REV CODE 250 W/ 637 OVERRIDE(OP): Performed by: INTERNAL MEDICINE

## 2023-03-14 PROCEDURE — 700102 HCHG RX REV CODE 250 W/ 637 OVERRIDE(OP): Performed by: HOSPITALIST

## 2023-03-14 PROCEDURE — A9270 NON-COVERED ITEM OR SERVICE: HCPCS | Performed by: FAMILY MEDICINE

## 2023-03-14 PROCEDURE — 700111 HCHG RX REV CODE 636 W/ 250 OVERRIDE (IP): Performed by: INTERNAL MEDICINE

## 2023-03-14 PROCEDURE — A9270 NON-COVERED ITEM OR SERVICE: HCPCS | Performed by: HOSPITALIST

## 2023-03-14 PROCEDURE — 700111 HCHG RX REV CODE 636 W/ 250 OVERRIDE (IP): Performed by: NURSE PRACTITIONER

## 2023-03-14 PROCEDURE — 700102 HCHG RX REV CODE 250 W/ 637 OVERRIDE(OP): Performed by: FAMILY MEDICINE

## 2023-03-14 PROCEDURE — 97602 WOUND(S) CARE NON-SELECTIVE: CPT

## 2023-03-14 PROCEDURE — 700102 HCHG RX REV CODE 250 W/ 637 OVERRIDE(OP): Performed by: NURSE PRACTITIONER

## 2023-03-14 PROCEDURE — 99232 SBSQ HOSP IP/OBS MODERATE 35: CPT | Performed by: HOSPITALIST

## 2023-03-14 PROCEDURE — A9270 NON-COVERED ITEM OR SERVICE: HCPCS | Performed by: NURSE PRACTITIONER

## 2023-03-14 PROCEDURE — 700111 HCHG RX REV CODE 636 W/ 250 OVERRIDE (IP): Performed by: STUDENT IN AN ORGANIZED HEALTH CARE EDUCATION/TRAINING PROGRAM

## 2023-03-14 PROCEDURE — 700111 HCHG RX REV CODE 636 W/ 250 OVERRIDE (IP)

## 2023-03-14 PROCEDURE — 700111 HCHG RX REV CODE 636 W/ 250 OVERRIDE (IP): Performed by: HOSPITALIST

## 2023-03-14 PROCEDURE — 700105 HCHG RX REV CODE 258: Performed by: INTERNAL MEDICINE

## 2023-03-14 PROCEDURE — 90935 HEMODIALYSIS ONE EVALUATION: CPT

## 2023-03-14 PROCEDURE — 80069 RENAL FUNCTION PANEL: CPT

## 2023-03-14 PROCEDURE — 36415 COLL VENOUS BLD VENIPUNCTURE: CPT

## 2023-03-14 PROCEDURE — 770004 HCHG ROOM/CARE - ONCOLOGY PRIVATE *

## 2023-03-14 RX ORDER — ATORVASTATIN CALCIUM 40 MG/1
40 TABLET, FILM COATED ORAL
Status: DISCONTINUED | OUTPATIENT
Start: 2023-03-14 | End: 2023-03-16 | Stop reason: HOSPADM

## 2023-03-14 RX ORDER — ASPIRIN 81 MG/1
81 TABLET, CHEWABLE ORAL DAILY
Status: DISCONTINUED | OUTPATIENT
Start: 2023-03-14 | End: 2023-03-16 | Stop reason: HOSPADM

## 2023-03-14 RX ADMIN — MINERAL OIL, PETROLATUM 1 APPLICATION: 425; 573 OINTMENT OPHTHALMIC at 13:41

## 2023-03-14 RX ADMIN — PREDNISOLONE SODIUM PHOSPHATE 1 DROP: 10 SOLUTION/ DROPS OPHTHALMIC at 22:00

## 2023-03-14 RX ADMIN — PREDNISOLONE SODIUM PHOSPHATE 1 DROP: 10 SOLUTION/ DROPS OPHTHALMIC at 18:54

## 2023-03-14 RX ADMIN — ACETAZOLAMIDE EXTENDED-RELEASE 500 MG: 500 CAPSULE ORAL at 18:52

## 2023-03-14 RX ADMIN — ACETAMINOPHEN 1000 MG: 325 TABLET, FILM COATED ORAL at 21:51

## 2023-03-14 RX ADMIN — ACETAMINOPHEN 1000 MG: 325 TABLET, FILM COATED ORAL at 08:18

## 2023-03-14 RX ADMIN — PREDNISOLONE SODIUM PHOSPHATE 1 DROP: 10 SOLUTION/ DROPS OPHTHALMIC at 13:41

## 2023-03-14 RX ADMIN — OXYCODONE HYDROCHLORIDE 15 MG: 10 TABLET ORAL at 21:50

## 2023-03-14 RX ADMIN — DAKIN'S SOLUTION 0.125% (QUARTER STRENGTH) 100 ML: 0.12 SOLUTION at 02:40

## 2023-03-14 RX ADMIN — CARVEDILOL 3.12 MG: 3.12 TABLET, FILM COATED ORAL at 08:18

## 2023-03-14 RX ADMIN — DORZOLAMIDE HYDROCHLORIDE AND TIMOLOL MALEATE 1 DROP: 20; 5 SOLUTION/ DROPS OPHTHALMIC at 18:54

## 2023-03-14 RX ADMIN — PIPERACILLIN AND TAZOBACTAM 3.38 G: 3; .375 INJECTION, POWDER, LYOPHILIZED, FOR SOLUTION INTRAVENOUS; PARENTERAL at 04:56

## 2023-03-14 RX ADMIN — Medication 500 MG: at 04:49

## 2023-03-14 RX ADMIN — POLYETHYLENE GLYCOL 3350 1 PACKET: 17 POWDER, FOR SOLUTION ORAL at 04:48

## 2023-03-14 RX ADMIN — DAKIN'S SOLUTION 0.125% (QUARTER STRENGTH) 20 ML: 0.12 SOLUTION at 21:00

## 2023-03-14 RX ADMIN — DOXYCYCLINE 100 MG: 100 TABLET, FILM COATED ORAL at 04:49

## 2023-03-14 RX ADMIN — SEVELAMER CARBONATE 800 MG: 800 TABLET, FILM COATED ORAL at 18:52

## 2023-03-14 RX ADMIN — DOXYCYCLINE 100 MG: 100 TABLET, FILM COATED ORAL at 18:52

## 2023-03-14 RX ADMIN — ATORVASTATIN CALCIUM 40 MG: 40 TABLET, FILM COATED ORAL at 21:51

## 2023-03-14 RX ADMIN — MORPHINE SULFATE 4 MG: 4 INJECTION INTRAVENOUS at 02:32

## 2023-03-14 RX ADMIN — ACETAMINOPHEN 1000 MG: 325 TABLET, FILM COATED ORAL at 15:57

## 2023-03-14 RX ADMIN — HEPARIN SODIUM 5000 UNITS: 5000 INJECTION, SOLUTION INTRAVENOUS; SUBCUTANEOUS at 21:52

## 2023-03-14 RX ADMIN — DORZOLAMIDE HYDROCHLORIDE AND TIMOLOL MALEATE 1 DROP: 20; 5 SOLUTION/ DROPS OPHTHALMIC at 05:00

## 2023-03-14 RX ADMIN — PREDNISOLONE SODIUM PHOSPHATE 1 DROP: 10 SOLUTION/ DROPS OPHTHALMIC at 08:20

## 2023-03-14 RX ADMIN — OXYCODONE HYDROCHLORIDE: 10 TABLET ORAL at 15:58

## 2023-03-14 RX ADMIN — DOCUSATE SODIUM 50 MG AND SENNOSIDES 8.6 MG 2 TABLET: 8.6; 5 TABLET, FILM COATED ORAL at 04:48

## 2023-03-14 RX ADMIN — ASPIRIN 81 MG 81 MG: 81 TABLET ORAL at 13:40

## 2023-03-14 RX ADMIN — SEVELAMER CARBONATE 800 MG: 800 TABLET, FILM COATED ORAL at 08:18

## 2023-03-14 RX ADMIN — SEVELAMER CARBONATE 800 MG: 800 TABLET, FILM COATED ORAL at 14:42

## 2023-03-14 RX ADMIN — BRIMONIDINE TARTRATE 1 DROP: 2 SOLUTION OPHTHALMIC at 18:54

## 2023-03-14 RX ADMIN — GABAPENTIN 100 MG: 100 CAPSULE ORAL at 18:52

## 2023-03-14 RX ADMIN — CARVEDILOL 3.12 MG: 3.12 TABLET, FILM COATED ORAL at 18:52

## 2023-03-14 RX ADMIN — HEPARIN SODIUM 5000 UNITS: 5000 INJECTION, SOLUTION INTRAVENOUS; SUBCUTANEOUS at 04:48

## 2023-03-14 RX ADMIN — HEPARIN SODIUM 3800 UNITS: 1000 INJECTION, SOLUTION INTRAVENOUS; SUBCUTANEOUS at 14:45

## 2023-03-14 RX ADMIN — BRIMONIDINE TARTRATE 1 DROP: 2 SOLUTION OPHTHALMIC at 04:57

## 2023-03-14 RX ADMIN — Medication 220 MG: at 04:48

## 2023-03-14 RX ADMIN — TIMOLOL MALEATE 1 DROP: 5 SOLUTION OPHTHALMIC at 04:53

## 2023-03-14 RX ADMIN — HEPARIN SODIUM 5000 UNITS: 5000 INJECTION, SOLUTION INTRAVENOUS; SUBCUTANEOUS at 13:40

## 2023-03-14 RX ADMIN — MINERAL OIL, PETROLATUM 1 APPLICATION: 425; 573 OINTMENT OPHTHALMIC at 22:00

## 2023-03-14 RX ADMIN — PIPERACILLIN AND TAZOBACTAM 3.38 G: 3; .375 INJECTION, POWDER, LYOPHILIZED, FOR SOLUTION INTRAVENOUS; PARENTERAL at 15:57

## 2023-03-14 RX ADMIN — TIMOLOL MALEATE 1 DROP: 5 SOLUTION OPHTHALMIC at 18:54

## 2023-03-14 RX ADMIN — DOCUSATE SODIUM 50 MG AND SENNOSIDES 8.6 MG 2 TABLET: 8.6; 5 TABLET, FILM COATED ORAL at 18:52

## 2023-03-14 RX ADMIN — MINERAL OIL, PETROLATUM 1 APPLICATION: 425; 573 OINTMENT OPHTHALMIC at 05:02

## 2023-03-14 RX ADMIN — EPOETIN ALFA-EPBX 5000 UNITS: 3000 INJECTION, SOLUTION INTRAVENOUS; SUBCUTANEOUS at 13:41

## 2023-03-14 RX ADMIN — ACETAZOLAMIDE EXTENDED-RELEASE 500 MG: 500 CAPSULE ORAL at 04:48

## 2023-03-14 RX ADMIN — LATANOPROST 1 DROP: 50 SOLUTION OPHTHALMIC at 18:55

## 2023-03-14 ASSESSMENT — ENCOUNTER SYMPTOMS
FEVER: 0
BLURRED VISION: 1
EYE DISCHARGE: 1
SHORTNESS OF BREATH: 0

## 2023-03-14 ASSESSMENT — PAIN DESCRIPTION - PAIN TYPE
TYPE: ACUTE PAIN

## 2023-03-14 NOTE — PROGRESS NOTES
"[facility] -  PROGRESS NOTE               Author: Kai Metzger M.D. Date & Time: 3/14/2023  1:51 PM     HPI:  Patient is a 47 year old male with a PMHx of ESRD on HD qTTS, meth user and history of calciphylaxis here for leg pain.  States he's still using meth.  Last HD session was about 4-5 days ago.  Here potassium was 7.6 and bicarb of 7.  Nephrology consulted for maintenance dialysis.  Currently complaining of leg pain.  History of nonadherence.    DAILY NEPHROLOGY SUMMARY:  2/23 - Pain controlled.  Denies nausea.  No SOB  Had HD yesterday  2/24 - Denies pain or SOB.  Laying in bed comfortable  2/25 - 2L Net UF. NPO for OR this am w/Opth for retinal detachment. Reports that he is hungry. 1200 mL UOP last 24 hrs.   2/26: 2.5L Net UF. Bps better. Sitting at side of bed eating breakfast. States that he wants HD chair set up here. Unable to drive to [place] to HD clinic d/t vision.   2/27: Pt sitting up in bed eating breakfast, no overnight events, VSS on RA, worked with OT this AM and cleared for DC, waiting on DC plan  2/28: Pt confused today  3/01: HD yest, UF 2L. VSS. Sitting up in bed holding his head \"I hurt all over\". Vague about whether medication is helping his pain.   3/02: C/O pain eyes and thighs. Due for dialysis. He voiced no concerns. Eating and drinking okay. VSS.   3/03: HD yest, UF 2L. VSS. Pt sleeps a lot. Briefly wakes and engages in conversation then goes back to sleep. Intermittent nause and pain (legs and eyes) No new issues. CM looking for outpt HD chair.  3/04: Pt seen on HD, sleeping, VSS, medically cleared, just waiting on OP HD chair  3/05: Pt sitting up in bed, says he hurts all over, iHD yesterday with 2L net UF, labs reviewed, VSS on RA  3/06: pt laying in bed sleeping, does not wish to participate in exam much, denies any complaints, for iHD tomorrow   3/07: No events, pt seen and examined on hemodialysis, VSS--see dialysis treatment sheet for full " "details, denies any CP/SOB/Abd pain, irritable  3/08: sitting up in bed, reports \"pain everywhere\", does have dependent edema and BLE edema, + ascites, tolerated iHD yesterday with UF: 2.0L, pending outpt chair   3/09: No events, Pt seen and examined on hemodialysis, VSS--see dialysis treatment sheet for full details, reports generalized pain, denies any SOB/LE edema  3/10: sitting in bed, cranky about me waking him up, c/o loss of vision and pain \"all over\", tolerated iHD yesterday with UF: 2.0L  3/11: No events, pt seen and examined on hemodialysis, VSS--see dialysis treatment sheet for full details, c/o leg pain and eye pain and difficulty seeing  3/12: No events, tolerated HD yesterday with 2 L UF, BP elevated today, no new complaints, still with generalized pain, noted to have purulent drainage from thigh wounds  3/13: No events, no new c/o, high BP readings intermittently   3/14: No events, seen on HD, VSS, no labs for r/w today     REVIEW OF SYSTEMS:    A 10 pt review of systems was obtained and is per HPI/&/or daily summary otherwise negative    PMH/PSH/SH/FH:   Reviewed and unchanged since admission note    CURRENT MEDICATIONS:   Reviewed from admission to present day    VS:  /79   Pulse 69   Temp 36.3 °C (97.4 °F) (Temporal)   Resp 18   Ht 1.803 m (5' 10.98\")   Wt 84.1 kg (185 lb 6.5 oz)   SpO2 96%   BMI 25.87 kg/m²     Physical Exam  Vitals and nursing note reviewed.   Constitutional:       Comments: +chronically ill-appearing   HENT:      Head: Normocephalic and atraumatic.   Eyes:      General: No scleral icterus.     Comments: R eye erythema   Cardiovascular:      Rate and Rhythm: Normal rate and regular rhythm.      Comments: LFA AVG  [facility] TDC  Pulmonary:      Effort: Pulmonary effort is normal.   Abdominal:      General: There is no distension.   Musculoskeletal:         General: No deformity.      Right lower leg: Edema (trace pedal) present.      Left lower leg: Edema (trace pedal) " present.   Skin:     Findings: No rash.      Comments: Chronic wounds b/l thighs/abd dressed   Neurological:      Mental Status: He is alert and oriented to person, place, and time. Mental status is at baseline.   Psychiatric:         Attention and Perception: He is inattentive.         Mood and Affect: Affect is flat.         Behavior: Behavior is cooperative.        Fluids:  In: 720 [P.O.:720]  Out: -     LABS:  Recent Labs     03/12/23  0519 03/13/23  0743   SODIUM 136 135   POTASSIUM 4.3 4.6   CHLORIDE 94* 96   CO2 26 24   GLUCOSE 137* 120*   BUN 38* 46*   CREATININE 6.17* 7.60*   CALCIUM 8.8 8.7     IMAGING:   All imaging reviewed from admission to present day    IMPRESSION:  # ESRD  - Etiology likely 2/2 DM/HTN  - HD qTTHS via [facility] TDC  - Lost his HD chair at [facility] previously  # Hyperkalemia--treated with HD  # LFA AVG thrombosed  - US 2/23 no flow  - s/p OR 2/25 AVG Brachio-axillary creation, thrombectomy and fistulogram  - Stenosis and wall thrombus noted on US, currently using  [facility] TDC   # HTN, fair control            - BP goal < 140/90   # Anemia of CKD, below target  - Goal Ggb 10-11  - Ferritin 2722, %sat 41  # CKD-MBD  - Ca 7.6  - iCa 1.0  - Phos 7.6, improving  # Calciphylaxis hx  - Per report from patient  # Generalized weakness, improved   - Evaluated with PT/OT  # h/o Methamphetamine use   # Homelessness  # Encephalopthy ? Meds vs infection   - Improved  # DM   # Chronic wounds b/l thighs/abd   - Wound care following    -ID consulted and now on antibiotics for infected wounds with purulent drainage  # Conjunctivitis/End Stage Glaucoma  - Diamox  - Latanoprost, Combigan, Cosopy, Preds Forte  # HFrEF           - EF30%    PLAN:  -HD today (Tue), continue iHD qTTS/PRN  - UF as tolerated   - Use [facility] PermCath for now  - AVG eval as OP at    - Continue phos binder sevelamer with meals  - JACKIE with HD to goal Hgb 10-11  - No dietary protein restrictions  - Dose all meds per ESRD/iHD  - Renal  diet, low salt diet, fluid restriction to 1.5L daily  - SW support for placement. Requesting local HD chair. Hx of non adherence to HD.   - Can transition to Outpt HD clinic when medically cleared and chair secured.  - Follow labs  -Continue wound care  -Antibiotics per ID for skin wounds

## 2023-03-14 NOTE — PROGRESS NOTES
Alert and able to let his needs know when he's awake; drowsy but arousable otherwise.    Mepilex placed to sacral area and encouraged to turn, NIKOLAS mattress turned on

## 2023-03-14 NOTE — PROGRESS NOTES
"Hospital Medicine Daily Progress Note    Date of Service  3/14/2023    Chief Complaint  Ambrose Watkins is a 47 y.o. male admitted 2/22/2023 with hyperkalemia    Hospital Course  Ambrose Watkins is a homeless 47 y.o. male with diabetes since 2007, ESRD on HD for past 2-3 years, methamphetamine abuse, tobacco use, HTN, DLD, medical noncompliance who presented 2/22/2023 with evaluation by police prior to being incarcerated.  He was found in ER to be volume overloaded and have a K:7.6.  He states having his last dialysis 4 days ago in [facility].  He has chronic pain in his thighs from chronic bilateral thigh wounds  and per pt, \"calciphylaxis.\"  In ER he was consulted by nephrology and emergent dialysis is planned.  The patient keeps pulling off his telemetry leads despite being educated on why we recommend them.  Patient was also noted to have redness of the right eye.  Ophthalmology was consulted on the case.  Patient was noted to have glaucoma and vitreous hemorrhage of the right eye.  Ophthalmic drops were started.  Ophthalmology recommended outpatient follow-up with Dr. Dominguez .   unable to find outpatient dialysis.  Long length of committee recommended discharge and follow-up to the ED as needed. assisting assisting with placement.  He has extensive bilateral thigh wound growing MRSA, Citrobacter, Pseudomonas.  ID been consulted.  Patient was started on vancomycin and Zosyn.         Interval Problem Update  .    Patient is receiving hemodialysis  He is complaining of thigh pain at wound site  On room air afebrile          I have discussed this patient's plan of care and discharge plan at IDT rounds today with Case Management, Nursing, Nursing leadership, and other members of the IDT team.    Consultants/Specialty  nephrology and ophthalmology    Code Status  Full Code    Disposition  Patient is not medically cleared for discharge.   Anticipate discharge to to skilled " nursing facility.  I have placed the appropriate orders for post-discharge needs.    Review of Systems  Review of Systems   Constitutional:  Positive for malaise/fatigue. Negative for fever.   Eyes:  Positive for blurred vision and discharge.   Respiratory:  Negative for shortness of breath.    Musculoskeletal:  Positive for joint pain.   All other systems reviewed and are negative.     Physical Exam  Temp:  [36.3 °C (97.3 °F)-37.1 °C (98.8 °F)] 36.6 °C (97.8 °F)  Pulse:  [71-82] 82  Resp:  [16-18] 18  BP: (116-136)/(61-78) 118/78  SpO2:  [97 %-100 %] 97 %    Physical Exam  Vitals and nursing note reviewed.   Constitutional:       Appearance: He is well-developed. He is not diaphoretic.   HENT:      Head: Normocephalic and atraumatic.      Mouth/Throat:      Pharynx: No oropharyngeal exudate.   Eyes:      General: No scleral icterus.        Right eye: No discharge.         Left eye: No discharge.      Conjunctiva/sclera:      Right eye: Right conjunctiva is injected.   Neck:      Vascular: No JVD.      Trachea: No tracheal deviation.   Cardiovascular:      Rate and Rhythm: Normal rate and regular rhythm.      Heart sounds: No murmur heard.    No friction rub. No gallop.   Pulmonary:      Effort: Pulmonary effort is normal. No respiratory distress.      Breath sounds: Normal breath sounds. No stridor. No wheezing.   Chest:      Chest wall: No tenderness.   Abdominal:      General: Bowel sounds are normal. There is no distension.      Palpations: Abdomen is soft.      Tenderness: There is no abdominal tenderness. There is no rebound.   Musculoskeletal:         General: Swelling present. No tenderness.      Cervical back: Neck supple.      Comments: Wounds dressed no surrounding erythema reviewed images in epic   Skin:     General: Skin is warm and dry.      Nails: There is no clubbing.   Neurological:      Mental Status: He is alert and oriented to person, place, and time.      Cranial Nerves: No cranial nerve  deficit.      Motor: No abnormal muscle tone.   Psychiatric:         Behavior: Behavior normal.       Fluids    Intake/Output Summary (Last 24 hours) at 3/14/2023 0925  Last data filed at 3/13/2023 1800  Gross per 24 hour   Intake 480 ml   Output --   Net 480 ml         Laboratory  Recent Labs     03/11/23  0830 03/12/23  0519 03/13/23  0743   WBC 7.7 6.6 5.8   RBC 3.02* 3.34* 3.29*   HEMOGLOBIN 8.8* 9.7* 9.6*   HEMATOCRIT 28.8* 31.7* 32.6*   MCV 95.4 94.9 99.1*   MCH 29.1 29.0 29.2   MCHC 30.6* 30.6* 29.4*   RDW 62.7* 63.9* 66.9*   PLATELETCT 248 274 262   MPV 9.2 9.7 9.6       Recent Labs     03/11/23  0830 03/12/23  0519 03/13/23  0743   SODIUM 132* 136 135   POTASSIUM 4.9 4.3 4.6   CHLORIDE 93* 94* 96   CO2 25 26 24   GLUCOSE 109* 137* 120*   BUN 50* 38* 46*   CREATININE 7.15* 6.17* 7.60*   CALCIUM 8.4* 8.8 8.7                     Imaging  [id number] W/O PLUS RECONS   Final Result      1.  Increased density throughout the right lobe is nonspecific though may suggest underlying hemorrhage with possible retinal detachment.   2.  No post septal fluid collection.             Assessment/Plan  * Open wound- (present on admission)  Assessment & Plan  Bilateral thigh wound noted with purulent green drainage  Wound culture growing MRSA, Citrobacter, Pseudomonas  Continue Zosyn and doxycycline through 3/20/2023 per ID recommendations with option of transitioning to quinolones on discharge  Continue wound care    Pseudomonas infection  Assessment & Plan  Continue current antibiotics per ID    MRSA cellulitis  Assessment & Plan  Continue current antibiotics per ID    Pulmonary HTN (HCC)- (present on admission)  Assessment & Plan  Monitor volume status    Cardiomyopathy (HCC)- (present on admission)  Assessment & Plan  Coreg    Chronic HFrEF (heart failure with reduced ejection fraction) (HCC)- (present on admission)  Assessment & Plan  Continue carvedilol no ACE inhibitor or spironolactone given hyperkalemia  Fluid  management with hemodialysis  Add aspirin and statin  Outpatient follow-up with cardiology    Right eye glaucoma, due to angle-closure and phacomorphic components- (present on admission)  Assessment & Plan  Ophthalmology - probable end-stage glaucoma from angle-closure and phacomorphic components.  Latanoprost, Timolol, Brimonidine, Cosopt, Prednisone forte ophthalmic drops and Diamox  Follow up with Ophthalmology as outpatient    Vitreous hemorrhage of right eye (HCC)- (present on admission)  Assessment & Plan  Likely due to glaucoma    Tobacco dependence- (present on admission)  Assessment & Plan  Refused Nicotine replacement protocol    Noncompliance of patient with renal dialysis (HCC)- (present on admission)  Assessment & Plan  Education and counseling    Anemia, chronic disease- (present on admission)  Assessment & Plan  Hemoglobin 9.6 stable continue to monitor  Continue epoetin    Methamphetamine abuse (HCC)- (present on admission)  Assessment & Plan  Hx of     End stage renal disease (HCC)- (present on admission)  Assessment & Plan  HD per Nephrology - T-Th-    Case management for discharge planning - outpatient Dialysis arrangement    Hyperkalemia- (present on admission)  Assessment & Plan  Resolved continue hemodialysis and monitor    Hypertension- (present on admission)  Assessment & Plan  Stable on carvedilol monitor blood pressure    Type 2 diabetes mellitus with kidney complication, without long-term current use of insulin (HCC)- (present on admission)  Assessment & Plan  hgba1c 5.9         I certify that the patient requires continued medically necessary hospital services for the treatment of wound infection and will remain in the hospital for 2 days.  Discharge plan is anticipated to be home.     VTE prophylaxis: SCDs/TEDs and heparin ppx    I have performed a physical exam and reviewed and updated ROS and Plan today (3/14/2023). In review of yesterday's note (3/13/2023), there are no changes  except as documented above.

## 2023-03-14 NOTE — PROGRESS NOTES
HD treatment ordered by Dr Metzger started at 1140 and ended at 1440 with net UF of 2 Liters as ordered. Left IJ CVC was patent with good BFR x 2 entire treatment. Dressing C/D/I. Medications was given as ordered. See flow sheet for details.

## 2023-03-14 NOTE — PROGRESS NOTES
Pt previously refused bed alarm and stated he would call appropriately. Pt ambulated without calling, pt cannot see clearly. Bed alarm placed.

## 2023-03-14 NOTE — PALLIATIVE CARE
"Palliative Care follow-up  PC RN visited with patient. He as receiving dialysis. He declines pain at this time, but shared that his right eye has been irritated and red today. He has been receiving eye drops for this.     PC RN updated Ambrose that Bladimir \"Ailyn\" did not answer or return my phone call. He states, \"she probably got your message\" and \"I am not worried.\"      Reviewed Declaration-Directive to Physician. PC RN informed the patient that in the future, if he were unable to make his own medical decisions and the physician felt that he had an incurable or irreversible condition, this document would direct the physician to withhold or withdraw treatment that only prolongs the process of dying and is not necessary for comfort. Ambrose declined wanting to complete a Declaration-Directive to Physician at this time. He had no further questions    Plan: AD pending notary.     Thank you for allowing Palliative Care to support this patient and family. Contact [phone] for additional assistance, change in patient status, or with any questions/concerns.    "

## 2023-03-14 NOTE — WOUND TEAM
"[facility]  Inpatient Services   Wound and Skin Care Evaluation    Admission Date: 2/22/2023     Last order of IP CONSULT TO WOUND CARE was found on 2/22/2023 from Hospital Encounter on 2/22/2023     HPI, PMH, SH: Reviewed    Past Surgical History:   Procedure Laterality Date    AV FISTULA CREATION Left 2/25/2022    Procedure: CREATION, AV FISTULA-UPPER EXTREMITY GRAFT, AND FISTULOGRAM;  Surgeon: Tone Hartman M.D.;  Location: SURGERY [facility];  Service: Vascular    THROMBECTOMY Left 2/25/2022    Procedure: THROMBECTOMY;  Surgeon: Tone Hartman M.D.;  Location: SURGERY [facility];  Service: Vascular     Social History     Tobacco Use    Smoking status: Every Day     Packs/day: 0.25     Types: Cigarettes    Smokeless tobacco: Never   Substance Use Topics    Alcohol use: Not Currently     Chief Complaint   Patient presents with    Leg Pain     Pt states sores on bilateral legs and thighs, states infection, pt with bs 58, missed diaylsis today, brought via remsa from intermediate     Diagnosis: Hyperkalemia [E87.5]    Unit where seen by Wound Team: [id number]     WOUND CONSULT/FOLLOW UP RELATED TO:  BL thighs  L. Heel     WOUND HISTORY:  Ambrose Watkins is a homeless 47 y.o. male with diabetes since 2007, ESRD on HD for past 2-3 years, methamphetamine abuse, tobacco use, HTN, DLD, medical noncompliance who presented 2/22/2023 with evaluation by police prior to being incarcerated.  He was found in ER to be volume overloaded and have a K:7.6.  He states having his last dialysis 4 days ago in [facility].  He has chronic pain in his thighs from chronic bilateral thigh wounds  and per pt, \"calciphylaxis.\"     WOUND ASSESSMENT/LDA        Wound 03/13/23 Heel Left (Active)   Wound Image   03/14/23 1500   Site Assessment Purple 03/14/23 1500   Periwound Assessment [place] 03/14/23 1500   Margins Defined edges;Attached edges 03/14/23 1500   Closure Adhesive bandage 03/14/23 1500   Drainage Amount " None 03/14/23 1500   Treatments Cleansed;Site care;Offloading 03/14/23 1500   Wound Cleansing Approved Wound Cleanser 03/14/23 1500   Dressing Cleansing/Solutions Not Applicable 03/14/23 1500   Dressing Options Mepilex Heel 03/14/23 1500   Dressing Changed New 03/14/23 1500   Dressing Status Dry;Intact;Clean 03/14/23 1500   Dressing Change/Treatment Frequency Every 72 hrs, and As Needed 03/14/23 1500   NEXT Dressing Change/Treatment Date 03/17/23 03/14/23 1500   Shape [place] 03/14/23 1500   Wound Odor None 03/14/23 1500   Pulses Left;1+;DP;PT 03/14/23 1500   Exposed Structures None 03/14/23 1500   WOUND NURSE ONLY - Time Spent with Patient (mins) 45 03/14/23 1500            Vascular:    ROMAINE:   No results found.    Lab Values:    Lab Results   Component Value Date/Time    WBC 5.8 03/13/2023 07:43 AM    RBC 3.29 (L) 03/13/2023 07:43 AM    HEMOGLOBIN 9.6 (L) 03/13/2023 07:43 AM    HEMATOCRIT 32.6 (L) 03/13/2023 07:43 AM    CREACTPROT 14.27 (H) 02/21/2022 09:00 PM    SEDRATEWES 45 (H) 02/21/2022 09:00 PM    HBA1C 5.9 (H) 03/01/2023 12:01 PM        Culture Results show:  Recent Results (from the past 720 hour(s))   CULTURE WOUND W/ GRAM STAIN    Collection Time: 03/10/23  3:25 PM    Specimen: Left Leg; Wound   Result Value Ref Range    Significant Indicator POS (POS)     Source WND     Site LEFT LEG     Culture Result - (A)     Gram Stain Result Few WBCs.  No organisms seen.       Culture Result (A)      Methicillin Resistant Staphylococcus aureus  Moderate growth      Culture Result Citrobacter freundii complex  Moderate growth   (A)     Culture Result Pseudomonas aeruginosa  Moderate growth   (A)        Susceptibility    Methicillin resistant staphylococcus aureus - JASVIR     Azithromycin >4 Resistant mcg/mL     Clindamycin <=0.25 Sensitive mcg/mL     Cefazolin >16 Resistant mcg/mL     Cefepime >16 Resistant mcg/mL     Ceftaroline 1 Sensitive mcg/mL     Daptomycin 1 Sensitive mcg/mL     Erythromycin >4 Resistant mcg/mL      Ampicillin/sulbactam >16/8 Resistant mcg/mL     Oxacillin >2 Resistant mcg/mL     Trimeth/Sulfa <=0.5/9.5 Sensitive mcg/mL     Tetracycline <=4 Sensitive mcg/mL     Vancomycin 1 Sensitive mcg/mL    Pseudomonas aeruginosa - JASVIR     Ciprofloxacin 0.5 Sensitive mcg/mL     Cefepime 8 Sensitive mcg/mL     Gentamicin 4 Sensitive mcg/mL     Meropenem <=1 Sensitive mcg/mL     Amikacin <=16 Sensitive mcg/mL     Pip/Tazobactam <=8 Sensitive mcg/mL     Tobramycin <=2 Sensitive mcg/mL    Citrobacter freundii complex - JASVIR     Ampicillin 16 Resistant mcg/mL     Ceftriaxone <=1 Sensitive mcg/mL     Cefazolin >16 Resistant mcg/mL     Ciprofloxacin <=0.25 Sensitive mcg/mL     Cefepime <=2 Sensitive mcg/mL     Gentamicin <=2 Sensitive mcg/mL     Ampicillin/sulbactam <=4/2 Resistant mcg/mL     Trimeth/Sulfa <=0.5/9.5 Sensitive mcg/mL     Tigecycline <=2 Sensitive mcg/mL     Cefuroxime <=4 Resistant mcg/mL     Ertapenem <=0.5 Sensitive mcg/mL     Minocycline <=4 Sensitive mcg/mL     Moxifloxacin <=2 Sensitive mcg/mL     Pip/Tazobactam <=8 Sensitive mcg/mL     Tobramycin <=2 Sensitive mcg/mL       Pain Level/Medicated:  medicated with IV     INTERVENTIONS BY WOUND TEAM:  Chart and images reviewed. Discussed with bedside RN. All areas of concern (based on picture review, LDA review and discussion with bedside RN) have been thoroughly assessed. Documentation of areas based on significant findings. This RN in to assess patient. Performed standard wound care which includes appropriate positioning, dressing removal and non-selective debridement. Pictures and measurements obtained weekly if/when required.  Bilateral thighs--> not assessed 3/14/23  Preparation for Dressing removal: easily removed  Non-selectively Debrided with:  wound cleanser and gauze.  Sharp debridement: NA  [place]ah[place] wound: Cleansed with wound cleanser and gauze, Prepped with barrier paste  Primary Dressing: Dakins  soaked roll gauze with Dakins   Secondary (Outer)  Dressing: mepilex       ------> L. Heel reapplied heel mepilex    Advanced Wound Care Discharge Planning  Number of Clinicians necessary to complete wound care: 1  Is patient requiring IV pain medications for dressing changes: Yes  Length of time for dressing change 30 min. (This does not include chart review, pre-medication time, set up, clean up or time spent charting.)    Interdisciplinary consultation: Patient, Bedside RN,   EVALUATION / RATIONALE FOR TREATMENT:  Most Recent Date:  3/14/23 L. Heel with possible DTI, bruising, or normal skin discoloration.  Wound team to come and assess tomorrow during bilateral thigh follow up.  Continue to offload.      3/8/23: Right thigh with small area of depth towards the medial aspect that does drain green/brown purulent drainage. Wound bed otherwise with clean, red, and granular tissue. Left thigh wounds still with greenish serosanguinous drainage as well. Anterior and medial left thigh wounds= beds with clean, red, and with granular tissue. Posterior left thigh wound still with slough at the wound base. Continue Dakins WTD.     3/1/23: Bilateral thighs are less malodorous, L. Thigh has garza purulent drainage, wound beds are more red.  Continue Dakin's Wet to Dry.      2/23/23: Patient with full thickness wounds along his right anteromedial thigh and along his anterior, medial, and posterior left thigh. All wounds beds were pale pink with moderate yellow-green drainage and was malodorous. Will initiate Dakins wet to dry dressing to address infectious process.    LEFT THIGH WOUNDS:   ANTERIOR: 4 X 10 X 0.3  MEDIAL: 7 x 5 x 0.3  POSTERIOR: 2 x 2 x 1 (to slough base)      Goals: Steady decrease in wound area and depth weekly.    WOUND TEAM PLAN OF CARE ([X] for frequency of wound follow up,):   Nursing to follow dressing orders written for wound care. Contact wound team if area fails to progress, deteriorates or with any questions/concerns if something comes up before next  scheduled follow up (See below as to whether wound is following and frequency of wound follow up)  Dressing changes by wound team:                   Follow up 3 times weekly:                NPWT change 3 times weekly:     Follow up 1-2 times weekly:    X  Follow up Bi-Monthly:           Follow up Monthly (High Risk):                        Follow up as needed:     Other (explain):     NURSING PLAN OF CARE ORDERS (X):  Dressing changes: See Dressing Care orders: X  Skin care: See Skin Care orders: X  RN Prevention Protocol: X  Rectal tube care: See Rectal Tube Care orders:   Other orders:    RSKIN:   CURRENTLY IN PLACE (X), APPLIED THIS VISIT (A), ORDERED (O):   Q shift Rex:  X  Q shift pressure point assessments:  X    Surface/Positioning   Pressure redistribution mattress            Low Airloss        X  ICU Low Airloss   Bariatric NIKOLAS     Waffle cushion        Waffle Overlay          Reposition q 2 hours      TAPs Turning system     Z Chapin Pillow     Offloading/Redistribution   Sacral Mepilex (Silicone dressing)     Heel Mepilex (Silicone dressing)       O  Heel float boots (Prevalon boot)             Float Heels off Bed with Pillows           Respiratory NA  Silicone O2 tubing         Gray Foam Ear protectors     Cannula fixation Device (Tender )          High flow offloading Clip    Elastic head band offloading device      Anchorfast                                                         Trach with Optifoam split foam             Containment/Moisture Prevention NA    Rectal tube or BMS    Purwick/Condom Cath        Dumont Catheter    Barrier wipes           Barrier paste       Antifungal tx      Interdry        Mobilization       Up to chair      X  Ambulate    X  PT/OT      Nutrition       Dietician        Diabetes Education      PO   X  TF     TPN     NPO   # days     Other        Anticipated discharge plans:   LTACH:        SNF/Rehab:                  Home Health Care:           Outpatient Wound Center:         X    Self/Family Care:        Other:                  Vac Discharge Needs:   Vac Discharge plan is purely a recommendation from wound team and not a requirement for discharge unless otherwise stated by physician.  Not Applicable Pt not on a wound vac:     x  Regular Vac while inpatient, alternative dressing at DC:        Regular Vac in use and continued at DC:            Reg. Vac w/ Skin Sub/Biologic in use. Will need to be changed 2x wkly:      Veraflo Vac while inpatient, ok to transition to Regular Vac on Discharge (Bedside RN to Clamp small instillation tubing at time of DC):           Veraflo Vac while inpatient, would benefit from remaining on Veraflo Vac upon discharge:

## 2023-03-14 NOTE — CARE PLAN
The patient is Stable - Low risk of patient condition declining or worsening    Shift Goals  Clinical Goals: wound care, abx  Patient Goals: rest  Family Goals: POLINA    Progress made toward(s) clinical / shift goals:        Problem: Pain - Standard  Goal: Alleviation of pain or a reduction in pain to the patient’s comfort goal  Outcome: Progressing     Problem: Knowledge Deficit - Standard  Goal: Patient and family/care givers will demonstrate understanding of plan of care, disease process/condition, diagnostic tests and medications  Outcome: Progressing     Problem: Skin Integrity  Goal: Skin integrity is maintained or improved  Outcome: Progressing     Problem: Fall Risk  Goal: Patient will remain free from falls  Outcome: Progressing       Patient is not progressing towards the following goals:

## 2023-03-15 LAB
ERYTHROCYTE [DISTWIDTH] IN BLOOD BY AUTOMATED COUNT: 67.5 FL (ref 35.9–50)
HCT VFR BLD AUTO: 29.6 % (ref 42–52)
HGB BLD-MCNC: 9 G/DL (ref 14–18)
MCH RBC QN AUTO: 29.3 PG (ref 27–33)
MCHC RBC AUTO-ENTMCNC: 30.4 G/DL (ref 33.7–35.3)
MCV RBC AUTO: 96.4 FL (ref 81.4–97.8)
PLATELET # BLD AUTO: 269 K/UL (ref 164–446)
PMV BLD AUTO: 9.5 FL (ref 9–12.9)
RBC # BLD AUTO: 3.07 M/UL (ref 4.7–6.1)
WBC # BLD AUTO: 5.4 K/UL (ref 4.8–10.8)

## 2023-03-15 PROCEDURE — 99232 SBSQ HOSP IP/OBS MODERATE 35: CPT | Performed by: INTERNAL MEDICINE

## 2023-03-15 PROCEDURE — A9270 NON-COVERED ITEM OR SERVICE: HCPCS | Performed by: NURSE PRACTITIONER

## 2023-03-15 PROCEDURE — 97602 WOUND(S) CARE NON-SELECTIVE: CPT

## 2023-03-15 PROCEDURE — 770004 HCHG ROOM/CARE - ONCOLOGY PRIVATE *

## 2023-03-15 PROCEDURE — 85027 COMPLETE CBC AUTOMATED: CPT

## 2023-03-15 PROCEDURE — A9270 NON-COVERED ITEM OR SERVICE: HCPCS | Performed by: INTERNAL MEDICINE

## 2023-03-15 PROCEDURE — 700102 HCHG RX REV CODE 250 W/ 637 OVERRIDE(OP): Performed by: FAMILY MEDICINE

## 2023-03-15 PROCEDURE — 700102 HCHG RX REV CODE 250 W/ 637 OVERRIDE(OP): Performed by: INTERNAL MEDICINE

## 2023-03-15 PROCEDURE — 700111 HCHG RX REV CODE 636 W/ 250 OVERRIDE (IP): Performed by: HOSPITALIST

## 2023-03-15 PROCEDURE — 700105 HCHG RX REV CODE 258: Performed by: INTERNAL MEDICINE

## 2023-03-15 PROCEDURE — A9270 NON-COVERED ITEM OR SERVICE: HCPCS | Performed by: FAMILY MEDICINE

## 2023-03-15 PROCEDURE — 36415 COLL VENOUS BLD VENIPUNCTURE: CPT

## 2023-03-15 PROCEDURE — 700102 HCHG RX REV CODE 250 W/ 637 OVERRIDE(OP): Performed by: HOSPITALIST

## 2023-03-15 PROCEDURE — 700102 HCHG RX REV CODE 250 W/ 637 OVERRIDE(OP): Performed by: NURSE PRACTITIONER

## 2023-03-15 PROCEDURE — A9270 NON-COVERED ITEM OR SERVICE: HCPCS | Performed by: HOSPITALIST

## 2023-03-15 PROCEDURE — 700111 HCHG RX REV CODE 636 W/ 250 OVERRIDE (IP): Performed by: INTERNAL MEDICINE

## 2023-03-15 PROCEDURE — 700111 HCHG RX REV CODE 636 W/ 250 OVERRIDE (IP)

## 2023-03-15 RX ADMIN — DORZOLAMIDE HYDROCHLORIDE AND TIMOLOL MALEATE 1 DROP: 20; 5 SOLUTION/ DROPS OPHTHALMIC at 17:50

## 2023-03-15 RX ADMIN — Medication 220 MG: at 05:31

## 2023-03-15 RX ADMIN — GABAPENTIN 100 MG: 100 CAPSULE ORAL at 17:45

## 2023-03-15 RX ADMIN — DOXYCYCLINE 100 MG: 100 TABLET, FILM COATED ORAL at 17:45

## 2023-03-15 RX ADMIN — OXYCODONE HYDROCHLORIDE 15 MG: 10 TABLET ORAL at 17:44

## 2023-03-15 RX ADMIN — MORPHINE SULFATE 4 MG: 4 INJECTION INTRAVENOUS at 23:06

## 2023-03-15 RX ADMIN — HEPARIN SODIUM 5000 UNITS: 5000 INJECTION, SOLUTION INTRAVENOUS; SUBCUTANEOUS at 14:34

## 2023-03-15 RX ADMIN — MORPHINE SULFATE 4 MG: 4 INJECTION INTRAVENOUS at 11:34

## 2023-03-15 RX ADMIN — ACETAMINOPHEN 1000 MG: 325 TABLET, FILM COATED ORAL at 08:15

## 2023-03-15 RX ADMIN — ACETAZOLAMIDE EXTENDED-RELEASE 500 MG: 500 CAPSULE ORAL at 17:45

## 2023-03-15 RX ADMIN — OXYCODONE HYDROCHLORIDE 15 MG: 10 TABLET ORAL at 05:30

## 2023-03-15 RX ADMIN — DOXYCYCLINE 100 MG: 100 TABLET, FILM COATED ORAL at 05:31

## 2023-03-15 RX ADMIN — PIPERACILLIN AND TAZOBACTAM 3.38 G: 3; .375 INJECTION, POWDER, LYOPHILIZED, FOR SOLUTION INTRAVENOUS; PARENTERAL at 16:42

## 2023-03-15 RX ADMIN — PREDNISOLONE SODIUM PHOSPHATE 1 DROP: 10 SOLUTION/ DROPS OPHTHALMIC at 16:42

## 2023-03-15 RX ADMIN — SEVELAMER CARBONATE 800 MG: 800 TABLET, FILM COATED ORAL at 08:15

## 2023-03-15 RX ADMIN — ATORVASTATIN CALCIUM 40 MG: 40 TABLET, FILM COATED ORAL at 21:12

## 2023-03-15 RX ADMIN — Medication 500 MG: at 05:31

## 2023-03-15 RX ADMIN — DAKIN'S SOLUTION 0.125% (QUARTER STRENGTH) 1 ML: 0.12 SOLUTION at 11:00

## 2023-03-15 RX ADMIN — DORZOLAMIDE HYDROCHLORIDE AND TIMOLOL MALEATE 1 DROP: 20; 5 SOLUTION/ DROPS OPHTHALMIC at 05:24

## 2023-03-15 RX ADMIN — HEPARIN SODIUM 5000 UNITS: 5000 INJECTION, SOLUTION INTRAVENOUS; SUBCUTANEOUS at 05:31

## 2023-03-15 RX ADMIN — MINERAL OIL, PETROLATUM 1 APPLICATION: 425; 573 OINTMENT OPHTHALMIC at 05:24

## 2023-03-15 RX ADMIN — MORPHINE SULFATE 4 MG: 4 INJECTION INTRAVENOUS at 00:10

## 2023-03-15 RX ADMIN — LATANOPROST 1 DROP: 50 SOLUTION OPHTHALMIC at 17:52

## 2023-03-15 RX ADMIN — PREDNISOLONE SODIUM PHOSPHATE 1 DROP: 10 SOLUTION/ DROPS OPHTHALMIC at 08:16

## 2023-03-15 RX ADMIN — SEVELAMER CARBONATE 800 MG: 800 TABLET, FILM COATED ORAL at 11:34

## 2023-03-15 RX ADMIN — PREDNISOLONE SODIUM PHOSPHATE 1 DROP: 10 SOLUTION/ DROPS OPHTHALMIC at 14:34

## 2023-03-15 RX ADMIN — PIPERACILLIN AND TAZOBACTAM 3.38 G: 3; .375 INJECTION, POWDER, LYOPHILIZED, FOR SOLUTION INTRAVENOUS; PARENTERAL at 05:23

## 2023-03-15 RX ADMIN — DOCUSATE SODIUM 50 MG AND SENNOSIDES 8.6 MG 2 TABLET: 8.6; 5 TABLET, FILM COATED ORAL at 05:31

## 2023-03-15 RX ADMIN — TIMOLOL MALEATE 1 DROP: 5 SOLUTION OPHTHALMIC at 17:46

## 2023-03-15 RX ADMIN — OXYCODONE HYDROCHLORIDE 15 MG: 10 TABLET ORAL at 12:49

## 2023-03-15 RX ADMIN — ACETAMINOPHEN 1000 MG: 325 TABLET, FILM COATED ORAL at 21:12

## 2023-03-15 RX ADMIN — BRIMONIDINE TARTRATE 1 DROP: 2 SOLUTION OPHTHALMIC at 05:25

## 2023-03-15 RX ADMIN — HEPARIN SODIUM 5000 UNITS: 5000 INJECTION, SOLUTION INTRAVENOUS; SUBCUTANEOUS at 21:12

## 2023-03-15 RX ADMIN — CARVEDILOL 3.12 MG: 3.12 TABLET, FILM COATED ORAL at 16:39

## 2023-03-15 RX ADMIN — ASPIRIN 81 MG 81 MG: 81 TABLET ORAL at 05:30

## 2023-03-15 RX ADMIN — CARVEDILOL 3.12 MG: 3.12 TABLET, FILM COATED ORAL at 08:15

## 2023-03-15 RX ADMIN — BRIMONIDINE TARTRATE 1 DROP: 2 SOLUTION OPHTHALMIC at 17:50

## 2023-03-15 RX ADMIN — SEVELAMER CARBONATE 800 MG: 800 TABLET, FILM COATED ORAL at 16:39

## 2023-03-15 RX ADMIN — ACETAMINOPHEN 1000 MG: 325 TABLET, FILM COATED ORAL at 14:34

## 2023-03-15 RX ADMIN — MINERAL OIL, PETROLATUM 1 APPLICATION: 425; 573 OINTMENT OPHTHALMIC at 14:34

## 2023-03-15 RX ADMIN — ACETAZOLAMIDE EXTENDED-RELEASE 500 MG: 500 CAPSULE ORAL at 05:20

## 2023-03-15 RX ADMIN — TIMOLOL MALEATE 1 DROP: 5 SOLUTION OPHTHALMIC at 05:24

## 2023-03-15 ASSESSMENT — PAIN DESCRIPTION - PAIN TYPE
TYPE: ACUTE PAIN

## 2023-03-15 ASSESSMENT — ENCOUNTER SYMPTOMS
SHORTNESS OF BREATH: 0
EYE DISCHARGE: 1
FEVER: 0
BLURRED VISION: 1

## 2023-03-15 ASSESSMENT — COGNITIVE AND FUNCTIONAL STATUS - GENERAL
MOBILITY SCORE: 15
TOILETING: A LOT
EATING MEALS: A LOT
MOVING FROM LYING ON BACK TO SITTING ON SIDE OF FLAT BED: A LITTLE
TURNING FROM BACK TO SIDE WHILE IN FLAT BAD: A LITTLE
STANDING UP FROM CHAIR USING ARMS: A LOT
CLIMB 3 TO 5 STEPS WITH RAILING: A LOT
HELP NEEDED FOR BATHING: A LOT
DRESSING REGULAR UPPER BODY CLOTHING: A LOT
DRESSING REGULAR LOWER BODY CLOTHING: A LOT
DAILY ACTIVITIY SCORE: 12
PERSONAL GROOMING: A LOT
SUGGESTED CMS G CODE MODIFIER DAILY ACTIVITY: CL
SUGGESTED CMS G CODE MODIFIER MOBILITY: CK
WALKING IN HOSPITAL ROOM: A LOT
MOVING TO AND FROM BED TO CHAIR: A LITTLE

## 2023-03-15 NOTE — PROGRESS NOTES
"Hospital Medicine Daily Progress Note    Date of Service  3/15/2023    Chief Complaint  Ambrose Watkins is a 47 y.o. male admitted 2/22/2023 with hyperkalemia    Hospital Course  Ambrose Watkins is a homeless 47 y.o. male with diabetes since 2007, ESRD on HD for past 2-3 years, methamphetamine abuse, tobacco use, HTN, DLD, medical noncompliance who presented 2/22/2023 with evaluation by police prior to being incarcerated.  He was found in ER to be volume overloaded and have a K:7.6.  He states having his last dialysis 4 days ago in [facility].  He has chronic pain in his thighs from chronic bilateral thigh wounds  and per pt, \"calciphylaxis.\"  In ER he was consulted by nephrology and emergent dialysis is planned.  The patient keeps pulling off his telemetry leads despite being educated on why we recommend them.  Patient was also noted to have redness of the right eye.  Ophthalmology was consulted on the case.  Patient was noted to have glaucoma and vitreous hemorrhage of the right eye.  Ophthalmic drops were started.  Ophthalmology recommended outpatient follow-up with Dr. Dominguez .   unable to find outpatient dialysis.  Long length of committee recommended discharge and follow-up to the ED as needed. assisting assisting with placement.  He has extensive bilateral thigh wound growing MRSA, Citrobacter, Pseudomonas.  ID been consulted.  Patient recommended 10-day course of Zosyn and doxycycline to end 3/20.     He was declined from all local dialysis centers for behavior and noncompliance.      Interval Problem Update  Patient was seen and examined at bedside.  I have personally reviewed and interpreted vitals, labs, and imaging.    3/15.  Afebrile.  Stable vitals.  On room air.  Denies fever, chills or chest pains, shortness of breath.  Unable to find placement.  Declined from all local dialysis centers.  Disposition may ultimately be discharging to follow-up in the " emergency room for dialysis.  Antibiotic course ends 3/20      I have discussed this patient's plan of care and discharge plan at IDT rounds today with Case Management, Nursing, Nursing leadership, and other members of the IDT team.    Consultants/Specialty  nephrology and ophthalmology    Code Status  Full Code    Disposition  Patient is not medically cleared for discharge.   Anticipate discharge to to skilled nursing facility.  I have placed the appropriate orders for post-discharge needs.    Review of Systems  Review of Systems   Constitutional:  Positive for malaise/fatigue. Negative for fever.   Eyes:  Positive for blurred vision and discharge.   Respiratory:  Negative for shortness of breath.    Musculoskeletal:  Positive for joint pain.   All other systems reviewed and are negative.     Physical Exam  Temp:  [36.1 °C (97 °F)-36.8 °C (98.3 °F)] 36.8 °C (98.2 °F)  Pulse:  [69-86] 82  Resp:  [17-18] 17  BP: (118-144)/(67-82) 129/67  SpO2:  [92 %-99 %] 93 %    Physical Exam  Vitals and nursing note reviewed.   Constitutional:       Appearance: He is well-developed. He is not diaphoretic.   HENT:      Head: Normocephalic and atraumatic.      Mouth/Throat:      Pharynx: No oropharyngeal exudate.   Eyes:      General: No scleral icterus.        Right eye: No discharge.         Left eye: No discharge.      Conjunctiva/sclera:      Right eye: Right conjunctiva is injected.   Neck:      Vascular: No JVD.      Trachea: No tracheal deviation.   Cardiovascular:      Rate and Rhythm: Normal rate and regular rhythm.      Heart sounds: No murmur heard.    No friction rub. No gallop.   Pulmonary:      Effort: Pulmonary effort is normal. No respiratory distress.      Breath sounds: Normal breath sounds. No stridor. No wheezing.   Chest:      Chest wall: No tenderness.   Abdominal:      General: Bowel sounds are normal. There is no distension.      Palpations: Abdomen is soft.      Tenderness: There is no abdominal tenderness.  There is no rebound.   Musculoskeletal:         General: Swelling present. No tenderness.      Cervical back: Neck supple.      Comments: Wounds dressed no surrounding erythema reviewed images in epic   Skin:     General: Skin is warm and dry.      Nails: There is no clubbing.   Neurological:      Mental Status: He is alert and oriented to person, place, and time.      Cranial Nerves: No cranial nerve deficit.      Motor: No abnormal muscle tone.   Psychiatric:         Behavior: Behavior normal.       Fluids    Intake/Output Summary (Last 24 hours) at 3/15/2023 0647  Last data filed at 3/15/2023 0530  Gross per 24 hour   Intake 920 ml   Output 2700 ml   Net -1780 ml         Laboratory  Recent Labs     03/13/23  0743 03/15/23  0501   WBC 5.8 5.4   RBC 3.29* 3.07*   HEMOGLOBIN 9.6* 9.0*   HEMATOCRIT 32.6* 29.6*   MCV 99.1* 96.4   MCH 29.2 29.3   MCHC 29.4* 30.4*   RDW 66.9* 67.5*   PLATELETCT 262 269   MPV 9.6 9.5       Recent Labs     03/13/23  0743 03/14/23 2005   SODIUM 135 138   POTASSIUM 4.6 4.6   CHLORIDE 96 99   CO2 24 25   GLUCOSE 120* 141*   BUN 46* 34*   CREATININE 7.60* 5.97*   CALCIUM 8.7 8.6                     Imaging  [id number] W/O PLUS RECONS   Final Result      1.  Increased density throughout the right lobe is nonspecific though may suggest underlying hemorrhage with possible retinal detachment.   2.  No post septal fluid collection.             Assessment/Plan  * Open wound- (present on admission)  Assessment & Plan  Bilateral thigh wound noted with purulent green drainage  Wound culture growing MRSA, Citrobacter, Pseudomonas  Continue Zosyn and doxycycline through 3/20/2023 per ID recommendations with option of transitioning to quinolones on discharge  Continue wound care    Pseudomonas infection  Assessment & Plan  Continue current antibiotics per ID    MRSA cellulitis  Assessment & Plan  Continue doxycycline per ID    Pulmonary HTN (HCC)- (present on admission)  Assessment & Plan  Monitor  volume status    Cardiomyopathy (HCC)- (present on admission)  Assessment & Plan  Continue carvedilol  Monitor volume status    Chronic HFrEF (heart failure with reduced ejection fraction) ([facility])- (present on admission)  Assessment & Plan  Continue carvedilol no ACE inhibitor or spironolactone given hyperkalemia  Fluid management with hemodialysis  Add aspirin and statin  Outpatient follow-up with cardiology    Right eye glaucoma, due to angle-closure and phacomorphic components- (present on admission)  Assessment & Plan  Ophthalmology - probable end-stage glaucoma from angle-closure and phacomorphic components.  Latanoprost, Timolol, Brimonidine, Cosopt, Prednisone forte ophthalmic drops and Diamox  Follow up with Ophthalmology as outpatient    Vitreous hemorrhage of right eye ([facility])- (present on admission)  Assessment & Plan  Likely due to glaucoma    Tobacco dependence- (present on admission)  Assessment & Plan  Refused Nicotine replacement protocol    Noncompliance of patient with renal dialysis ([facility])- (present on admission)  Assessment & Plan  Education and counseling    Anemia, chronic disease- (present on admission)  Assessment & Plan  Hemoglobin 9.6 stable continue to monitor  Continue epoetin    Methamphetamine abuse ([facility])- (present on admission)  Assessment & Plan  Hx of     End stage renal disease ([facility])- (present on admission)  Assessment & Plan  HD per Nephrology - T-Th-Sa    Case management for discharge planning - outpatient Dialysis arrangement  So far has been declined from every dialysis center because of behavior, noncompliance    Hyperkalemia- (present on admission)  Assessment & Plan  Resolved continue hemodialysis and monitor    Hypertension- (present on admission)  Assessment & Plan  Stable on carvedilol monitor blood pressure    Type 2 diabetes mellitus with kidney complication, without long-term current use of insulin ([facility])- (present on admission)  Assessment & Plan  hgba1c 5.9             VTE  prophylaxis: SCDs/TEDs and heparin ppx    I have performed a physical exam and reviewed and updated ROS and Plan today (3/15/2023). In review of yesterday's note (3/14/2023), there are no changes except as documented above.

## 2023-03-15 NOTE — CARE PLAN
Problem: Pain - Standard  Goal: Alleviation of pain or a reduction in pain to the patient’s comfort goal  Outcome: Progressing     Problem: Skin Integrity  Goal: Skin integrity is maintained or improved  Outcome: Progressing     Problem: Fall Risk  Goal: Patient will remain free from falls  Outcome: Progressing   The patient is Stable - Low risk of patient condition declining or worsening    Shift Goals  Clinical Goals: maintain skin integrity; pain mgmt; q shift wound care and drg change  Patient Goals: pain control; wound change  Family Goals: POLINA    Progress made toward(s) clinical / shift goals:  yes

## 2023-03-15 NOTE — PROGRESS NOTES
Pt is AxOx4; on room air.  Verbalizes acute pain due to wounds. PRN pain meds in use per MAR.  Skin per flowsheets.  Denies SOB/chest pain/numbness or tingling.  +void. +BM 3/14. +passing flatus +bowel sounds.  Denies N/V. Tolerating low potassium renal diet.  SCDs refused. Pt ambulates with x1 assist using a FWW.  Subq heparin in use for VTE prophylaxis.

## 2023-03-15 NOTE — PROGRESS NOTES
"[facility] -  PROGRESS NOTE               Author: Kai Metzger M.D. Date & Time: 3/15/2023  11:37 AM     HPI:  Patient is a 47 year old male with a PMHx of ESRD on HD qTTS, meth user and history of calciphylaxis here for leg pain.  States he's still using meth.  Last HD session was about 4-5 days ago.  Here potassium was 7.6 and bicarb of 7.  Nephrology consulted for maintenance dialysis.  Currently complaining of leg pain.  History of nonadherence.    DAILY NEPHROLOGY SUMMARY:  2/23 - Pain controlled.  Denies nausea.  No SOB  Had HD yesterday  2/24 - Denies pain or SOB.  Laying in bed comfortable  2/25 - 2L Net UF. NPO for OR this am w/Opth for retinal detachment. Reports that he is hungry. 1200 mL UOP last 24 hrs.   2/26: 2.5L Net UF. Bps better. Sitting at side of bed eating breakfast. States that he wants HD chair set up here. Unable to drive to [place] to HD clinic d/t vision.   2/27: Pt sitting up in bed eating breakfast, no overnight events, VSS on RA, worked with OT this AM and cleared for DC, waiting on DC plan  2/28: Pt confused today  3/01: HD yest, UF 2L. VSS. Sitting up in bed holding his head \"I hurt all over\". Vague about whether medication is helping his pain.   3/02: C/O pain eyes and thighs. Due for dialysis. He voiced no concerns. Eating and drinking okay. VSS.   3/03: HD yest, UF 2L. VSS. Pt sleeps a lot. Briefly wakes and engages in conversation then goes back to sleep. Intermittent nause and pain (legs and eyes) No new issues. CM looking for outpt HD chair.  3/04: Pt seen on HD, sleeping, VSS, medically cleared, just waiting on OP HD chair  3/05: Pt sitting up in bed, says he hurts all over, iHD yesterday with 2L net UF, labs reviewed, VSS on RA  3/06: pt laying in bed sleeping, does not wish to participate in exam much, denies any complaints, for iHD tomorrow   3/07: No events, pt seen and examined on hemodialysis, VSS--see dialysis treatment sheet for " "full details, denies any CP/SOB/Abd pain, irritable  3/08: sitting up in bed, reports \"pain everywhere\", does have dependent edema and BLE edema, + ascites, tolerated iHD yesterday with UF: 2.0L, pending outpt chair   3/09: No events, Pt seen and examined on hemodialysis, VSS--see dialysis treatment sheet for full details, reports generalized pain, denies any SOB/LE edema  3/10: sitting in bed, cranky about me waking him up, c/o loss of vision and pain \"all over\", tolerated iHD yesterday with UF: 2.0L  3/11: No events, pt seen and examined on hemodialysis, VSS--see dialysis treatment sheet for full details, c/o leg pain and eye pain and difficulty seeing  3/12: No events, tolerated HD yesterday with 2 L UF, BP elevated today, no new complaints, still with generalized pain, noted to have purulent drainage from thigh wounds  3/13: No events, no new c/o, high BP readings intermittently   3/14: No events, seen on HD, VSS, no labs for r/w today   3/15:No events, NO NEW C/O     REVIEW OF SYSTEMS:    A 10 pt review of systems was obtained and is per HPI/&/or daily summary otherwise negative    PMH/PSH/SH/FH:   Reviewed and unchanged since admission note    CURRENT MEDICATIONS:   Reviewed from admission to present day    VS:  /66   Pulse 74   Temp 36.6 °C (97.9 °F) (Temporal)   Resp 16   Ht 1.803 m (5' 10.98\")   Wt 84.1 kg (185 lb 6.5 oz)   SpO2 92%   BMI 25.87 kg/m²     Physical Exam  Vitals and nursing note reviewed.   Constitutional:       Comments: +chronically ill-appearing   HENT:      Head: Normocephalic and atraumatic.   Eyes:      General: No scleral icterus.     Comments: R eye erythema   Cardiovascular:      Rate and Rhythm: Normal rate and regular rhythm.      Comments: LFA AVG  LIJ TDC  Pulmonary:      Effort: Pulmonary effort is normal.   Abdominal:      General: There is no distension.   Musculoskeletal:         General: No deformity.      Right lower leg: Edema (trace pedal) present.      Left " lower leg: Edema (trace pedal) present.   Skin:     Findings: No rash.      Comments: Chronic wounds b/l thighs/abd dressed   Neurological:      Mental Status: He is alert and oriented to person, place, and time. Mental status is at baseline.   Psychiatric:         Attention and Perception: He is inattentive.         Mood and Affect: Affect is flat.         Behavior: Behavior is cooperative.        Fluids:  In: 920 [P.O.:420; Dialysis:500]  Out: 2700     LABS:  Recent Labs     03/13/23  0743 03/14/23 2005   SODIUM 135 138   POTASSIUM 4.6 4.6   CHLORIDE 96 99   CO2 24 25   GLUCOSE 120* 141*   BUN 46* 34*   CREATININE 7.60* 5.97*   CALCIUM 8.7 8.6     IMAGING:   All imaging reviewed from admission to present day    IMPRESSION:  # ESRD  - Etiology likely 2/2 DM/HTN  - HD qTTHS via [facility] TDC  - Lost his HD chair at [facility] previously  # Hyperkalemia--treated with HD  # LFA AVG thrombosed  - US 2/23 no flow  - s/p OR 2/25 AVG Brachio-axillary creation, thrombectomy and fistulogram  - Stenosis and wall thrombus noted on US, currently using  [facility] TDC   # HTN, fair control            - BP goal < 140/90   # Anemia of CKD, below target  - Goal Ggb 10-11  - Ferritin 2722, %sat 41  # CKD-MBD  - Ca 7.6  - iCa 1.0  - Phos 7.6, improving  # Calciphylaxis hx  - Per report from patient  # Generalized weakness, improved   - Evaluated with PT/OT  # h/o Methamphetamine use   # Homelessness  # Encephalopthy ? Meds vs infection   - Improved  # DM   # Chronic wounds b/l thighs/abd   - Wound care following    -ID consulted and now on antibiotics for infected wounds with purulent drainage  # Conjunctivitis/End Stage Glaucoma  - Diamox  - Latanoprost, Combigan, Cosopy, Preds Forte  # HFrEF           - EF30%    PLAN:  - No HD today (Wed), continue iHD qTTS/PRN  - UF as tolerated   - Use [facility] PermCath for now  - AVG eval as OP at [facility]   - Continue phos binder sevelamer with meals  - JACKIE with HD to goal Hgb 10-11  - No dietary protein  restrictions  - Dose all meds per ESRD/iHD  - Renal diet, low salt diet, fluid restriction to 1.5L daily  -  support for placement. Requesting local HD chair. Hx of non adherence to HD.   - Can transition to Outpt HD clinic when medically cleared and chair secured.  - Follow labs  -Continue wound care  -Antibiotics per ID for skin wounds

## 2023-03-15 NOTE — WOUND TEAM
"[facility]  [facility] Services   Wound and Skin Care Evaluation    Admission Date: 2/22/2023     Last order of IP CONSULT TO WOUND CARE was found on 2/22/2023 from Hospital Encounter on 2/22/2023     HPI, PMH, SH: Reviewed    Past Surgical History:   Procedure Laterality Date    AV FISTULA CREATION Left 2/25/2022    Procedure: CREATION, AV FISTULA-UPPER EXTREMITY GRAFT, AND FISTULOGRAM;  Surgeon: Tone Hartman M.D.;  Location: SURGERY [facility];  Service: Vascular    THROMBECTOMY Left 2/25/2022    Procedure: THROMBECTOMY;  Surgeon: Tone Hartman M.D.;  Location: SURGERY [facility];  Service: Vascular     Social History     Tobacco Use    Smoking status: Every Day     Packs/day: 0.25     Types: Cigarettes    Smokeless tobacco: Never   Substance Use Topics    Alcohol use: Not Currently     Chief Complaint   Patient presents with    Leg Pain     Pt states sores on bilateral legs and thighs, states infection, pt with bs 58, missed diaylsis today, brought via [facility]sa from [facility]     Diagnosis: Hyperkalemia [E87.5]    Unit where seen by Wound Team: [id number]     WOUND CONSULT/FOLLOW UP RELATED TO:  BL thighs,  L. Heel, L toe    WOUND HISTORY:  Ambrose Watkins is a homeless 47 y.o. male with diabetes since 2007, ESRD on HD for past 2-3 years, methamphetamine abuse, tobacco use, HTN, DLD, medical noncompliance who presented 2/22/2023 with evaluation by police prior to being incarcerated.  He was found in ER to be volume overloaded and have a K:7.6.  He states having his last dialysis 4 days ago in [facility].  He has chronic pain in his thighs from chronic bilateral thigh wounds  and per pt, \"calciphylaxis.\"     WOUND ASSESSMENT/LDA        Wound 02/22/23 Full Thickness Wound Thigh Anterior Right (Active)   Wound Image   03/15/23 1230   Site Assessment Red;Granulation tissue 03/15/23 1230   Periwound Assessment Scar tissue 03/15/23 1230   Margins Defined edges 03/15/23 1230   Closure " Secondary intention 03/15/23 1230   Drainage Amount Small 03/15/23 1230   Drainage Description Serosanguineous;Green 03/15/23 1230   Treatments Cleansed;Site care;Chemical Debridement 03/15/23 1230   Wound Cleansing Approved Wound Cleanser 03/15/23 1230   Periwound Protectant Barrier Paste 03/15/23 1230   Dressing Cleansing/Solutions 1/4 Strength Dakin's Solution 03/15/23 1230   Dressing Options Moist Roll Gauze;Mepilex 03/15/23 1230   Dressing Changed Changed 03/15/23 1230   Dressing Status Clean;Dry;Intact 03/15/23 1230   Dressing Change/Treatment Frequency Every Shift, and As Needed 03/15/23 1230   NEXT Dressing Change/Treatment Date 03/16/23 03/15/23 1230   NEXT Weekly Photo (Inpatient Only) 03/22/23 03/15/23 1230   Non-staged Wound Description Full thickness 03/15/23 1230   Wound Length (cm) 6 cm 03/15/23 1230   Wound Width (cm) 14 cm 03/15/23 1230   Wound Depth (cm) 0.6 cm 03/08/23 1030   Wound Surface Area (cm^2) 84 cm^2 03/15/23 1230   Wound Volume (cm^3) 7.5 cm^3 03/08/23 1030   Wound Healing % 91 03/08/23 1030   Tunneling (cm) 2.7 cm 03/08/23 1030   Tunneling Clock Position of Wound 5 03/08/23 1030   Shape Irregular 03/15/23 1230   Wound Odor None 03/15/23 1230   Exposed Structures None 03/15/23 1230   WOUND NURSE ONLY - Time Spent with Patient (mins) 60 03/15/23 1230       Wound 02/22/23 Full Thickness Wound Thigh Anterior;Medial;Posterior Left (Active)   Wound Image    03/15/23 1230   Site Assessment Red;Granulation tissue;Epithelialization;Slough;Painful 03/15/23 1230   Periwound Assessment Scar tissue 03/15/23 1230   Margins Defined edges 03/15/23 1230   Closure Secondary intention 03/15/23 1230   Drainage Amount Small 03/15/23 1230   Drainage Description Serosanguineous;Green 03/15/23 1230   Treatments Cleansed;Site care;Chemical Debridement 03/15/23 1230   Wound Cleansing Approved Wound Cleanser 03/15/23 1230   Periwound Protectant Barrier Paste 03/15/23 1230   Dressing Cleansing/Solutions 1/4  Strength Dakin's Solution 03/15/23 1230   Dressing Options Moist Roll Gauze;Mepilex 03/15/23 1230   Dressing Changed Changed 03/15/23 1230   Dressing Status Clean;Dry;Intact 03/15/23 1230   Dressing Change/Treatment Frequency Every Shift, and As Needed 03/15/23 1230   NEXT Dressing Change/Treatment Date 03/16/23 03/15/23 1230   NEXT Weekly Photo (Inpatient Only) 03/22/23 03/15/23 1230   Non-staged Wound Description Full thickness 03/15/23 1230   Shape Irregular 03/15/23 1230   Wound Odor None 03/15/23 1230   Exposed Structures None;POLINA 03/15/23 1230   WOUND NURSE ONLY - Time Spent with Patient (mins) 45 03/08/23 1030       Wound 03/13/23 Partial Thickness Wound Heel Lateral Left unknown etiology (Active)   Wound Image   03/15/23 1230   Site Assessment Purple;Red 03/15/23 1230   Periwound Assessment Pink 03/15/23 1230   Margins Defined edges;Attached edges 03/15/23 1230   Closure Other (Comment) 03/14/23 2301   Drainage Amount None 03/15/23 1230   Treatments Site care;Offloading 03/15/23 1230   Wound Cleansing Approved Wound Cleanser 03/14/23 2301   Dressing Cleansing/Solutions Not Applicable 03/14/23 1500   Dressing Options Mepilex Heel 03/15/23 1230   Dressing Changed Changed 03/15/23 1230   Dressing Status Clean;Dry;Intact 03/15/23 1230   Dressing Change/Treatment Frequency Every 72 hrs, and As Needed 03/15/23 1230   NEXT Dressing Change/Treatment Date 03/18/23 03/15/23 1230   Non-staged Wound Description Partial thickness 03/15/23 1230   Shape Benton 03/14/23 1500   Wound Odor None 03/15/23 1230   Pulses Left;1+;DP;PT 03/15/23 1230   Exposed Structures None 03/14/23 1500   WOUND NURSE ONLY - Time Spent with Patient (mins) 45 03/14/23 1500       Wound 03/15/23 Traumatic Toe, Hallux Distal Left uknown etiology (Active)   Wound Image   03/15/23 1230   Site Assessment Red 03/15/23 1230   Periwound Assessment Intact 03/15/23 1230   Margins Attached edges 03/15/23 1230   Closure Open to air 03/15/23 1230   Drainage  Amount None 03/15/23 1230   Treatments Cleansed;Site care 03/15/23 1230   Wound Cleansing Approved Wound Cleanser 03/15/23 1230   Dressing Cleansing/Solutions 3% Betadine 03/15/23 1230   Dressing Options Open to Air 03/15/23 1230   Dressing Change/Treatment Frequency Every Shift, and As Needed 03/15/23 1230   NEXT Dressing Change/Treatment Date 03/15/23 03/15/23 1230   NEXT Weekly Photo (Inpatient Only) 03/22/23 03/15/23 1230   Shape Circular 03/15/23 1230   Wound Odor None 03/15/23 1230   Pulses Left;1+;DP;PT 03/15/23 1230   Exposed Structures None 03/15/23 1230       Vascular:    ROMAINE:   No results found.    Lab Values:    Lab Results   Component Value Date/Time    WBC 5.4 03/15/2023 05:01 AM    RBC 3.07 (L) 03/15/2023 05:01 AM    HEMOGLOBIN 9.0 (L) 03/15/2023 05:01 AM    HEMATOCRIT 29.6 (L) 03/15/2023 05:01 AM    CREACTPROT 14.27 (H) 02/21/2022 09:00 PM    SEDRATEWES 45 (H) 02/21/2022 09:00 PM    HBA1C 5.9 (H) 03/01/2023 12:01 PM        Culture Results show:  Recent Results (from the past 720 hour(s))   CULTURE WOUND W/ GRAM STAIN    Collection Time: 03/10/23  3:25 PM    Specimen: Left Leg; Wound   Result Value Ref Range    Significant Indicator POS (POS)     Source WND     Site LEFT LEG     Culture Result - (A)     Gram Stain Result Few WBCs.  No organisms seen.       Culture Result (A)      Methicillin Resistant Staphylococcus aureus  Moderate growth      Culture Result Citrobacter freundii complex  Moderate growth   (A)     Culture Result Pseudomonas aeruginosa  Moderate growth   (A)        Susceptibility    Methicillin resistant staphylococcus aureus - JASVIR     Azithromycin >4 Resistant mcg/mL     Clindamycin <=0.25 Sensitive mcg/mL     Cefazolin >16 Resistant mcg/mL     Cefepime >16 Resistant mcg/mL     Ceftaroline 1 Sensitive mcg/mL     Daptomycin 1 Sensitive mcg/mL     Erythromycin >4 Resistant mcg/mL     Ampicillin/sulbactam >16/8 Resistant mcg/mL     Oxacillin >2 Resistant mcg/mL     Trimeth/Sulfa  <=0.5/9.5 Sensitive mcg/mL     Tetracycline <=4 Sensitive mcg/mL     Vancomycin 1 Sensitive mcg/mL    Pseudomonas aeruginosa - JASVIR     Ciprofloxacin 0.5 Sensitive mcg/mL     Cefepime 8 Sensitive mcg/mL     Gentamicin 4 Sensitive mcg/mL     Meropenem <=1 Sensitive mcg/mL     Amikacin <=16 Sensitive mcg/mL     Pip/Tazobactam <=8 Sensitive mcg/mL     Tobramycin <=2 Sensitive mcg/mL    Citrobacter freundii complex - JASVIR     Ampicillin 16 Resistant mcg/mL     Ceftriaxone <=1 Sensitive mcg/mL     Cefazolin >16 Resistant mcg/mL     Ciprofloxacin <=0.25 Sensitive mcg/mL     Cefepime <=2 Sensitive mcg/mL     Gentamicin <=2 Sensitive mcg/mL     Ampicillin/sulbactam <=4/2 Resistant mcg/mL     Trimeth/Sulfa <=0.5/9.5 Sensitive mcg/mL     Tigecycline <=2 Sensitive mcg/mL     Cefuroxime <=4 Resistant mcg/mL     Ertapenem <=0.5 Sensitive mcg/mL     Minocycline <=4 Sensitive mcg/mL     Moxifloxacin <=2 Sensitive mcg/mL     Pip/Tazobactam <=8 Sensitive mcg/mL     Tobramycin <=2 Sensitive mcg/mL       Pain Level/Medicated:  Tolerated with     INTERVENTIONS BY WOUND TEAM:  Chart and images reviewed. Discussed with bedside RN. All areas of concern (based on picture review, LDA review and discussion with bedside RN) have been thoroughly assessed. Documentation of areas based on significant findings. This RN in to assess patient. Performed standard wound care which includes appropriate positioning, dressing removal and non-selective debridement. Pictures and measurements obtained weekly if/when required.    Bilateral thighs  Preparation for Dressing removal: Removed without difficulty  Non-selectively Debrided with:  wound cleanser and gauze.  Sharp debridement: NA  Shahida wound: Cleansed with wound cleanser and gauze, Prepped with barrier paste  Primary Dressing: Dakins  soaked roll gauze with Dakins   Secondary (Outer) Dressing: offloading foam dressing      Left Heel -  heel offloading foam    Left Hallux - betadine, WM        Advanced  Wound Care Discharge Planning  Number of Clinicians necessary to complete wound care: 1  Is patient requiring IV pain medications for dressing changes: Yes  Length of time for dressing change 30 min. (This does not include chart review, pre-medication time, set up, clean up or time spent charting.)    Interdisciplinary consultation: Patient, Bedside RN,   EVALUATION / RATIONALE FOR TREATMENT:  Most Recent Date:  3/15/23 No odor noted to bilateral thigh wounds, however drainage still green tinged. Continue with Dakins WTD.  Left lateral heel appears to be a shallow partial thickness wound. Heel noted to be dry with peeling skin. Wound possibly skin damage from previous skin peeling from this area. Continue with offloading dressing to prevent further damage to skin. Left distal hallux with what appears to be traumatic intact blood filled blister, unknown etiology. Not related to pressure as this area is not on a pressure point. Patient also denies feet hitting against foot of the bed. Pt also has hx of Type2 DM and ESRD, wound possibly related to microvascular changes. Betadine applied to keep clean and dry.      3/14/23 L. Heel with possible DTI, bruising, or normal skin discoloration.  Wound team to come and assess tomorrow during bilateral thigh follow up.  Continue to offload.      3/8/23: Right thigh with small area of depth towards the medial aspect that does drain green/brown purulent drainage. Wound bed otherwise with clean, red, and granular tissue. Left thigh wounds still with greenish serosanguinous drainage as well. Anterior and medial left thigh wounds= beds with clean, red, and with granular tissue. Posterior left thigh wound still with slough at the wound base. Continue Dakins WTD.     3/1/23: Bilateral thighs are less malodorous, L. Thigh has garza purulent drainage, wound beds are more red.  Continue Dakin's Wet to Dry.      2/23/23: Patient with full thickness wounds along his right anteromedial thigh  and along his anterior, medial, and posterior left thigh. All wounds beds were pale pink with moderate yellow-green drainage and was malodorous. Will initiate Dakins wet to dry dressing to address infectious process.    LEFT THIGH WOUNDS:   ANTERIOR: 4 X 10 X 0.3  MEDIAL: 7 x 5 x 0.3  POSTERIOR: 2 x 2 x 1 (to slough base)      Goals: Steady decrease in wound area and depth weekly.    WOUND TEAM PLAN OF CARE ([X] for frequency of wound follow up,):   Nursing to follow dressing orders written for wound care. Contact wound team if area fails to progress, deteriorates or with any questions/concerns if something comes up before next scheduled follow up (See below as to whether wound is following and frequency of wound follow up)  Dressing changes by wound team:                   Follow up 3 times weekly:                NPWT change 3 times weekly:     Follow up 1-2 times weekly:    X  Follow up Bi-Monthly:           Follow up Monthly (High Risk):                        Follow up as needed:     Other (explain):     NURSING PLAN OF CARE ORDERS (X):  Dressing changes: See Dressing Care orders: X  Skin care: See Skin Care orders: X  RN Prevention Protocol: X  Rectal tube care: See Rectal Tube Care orders:   Other orders:    RSKIN:   CURRENTLY IN PLACE (X), APPLIED THIS VISIT (A), ORDERED (O):   Q shift Rex:  X  Q shift pressure point assessments:  X    Surface/Positioning   Pressure redistribution mattress            Low Airloss        X  ICU Low Airloss   Bariatric NIKOLAS     Waffle cushion        Waffle Overlay          Reposition q 2 hours      TAPs Turning system     Z Chapin Pillow     Offloading/Redistribution   Sacral Mepilex (Silicone dressing)     Heel Mepilex (Silicone dressing)       X/A  Heel float boots (Prevalon boot)             Float Heels off Bed with Pillows           Respiratory NA  Silicone O2 tubing         Gray Foam Ear protectors     Cannula fixation Device (Tender )          High flow offloading Clip     Elastic head band offloading device      Anchorfast                                                         Trach with Optifoam split foam             Containment/Moisture Prevention continent   Rectal tube or BMS    Purwick/Condom Cath        Dumont Catheter    Barrier wipes           Barrier paste       Antifungal tx      Interdry        Mobilization       Up to chair      X  Ambulate    X  PT/OT      Nutrition       Dietician        Diabetes Education      PO   X  TF     TPN     NPO   # days     Other        Anticipated discharge plans:   LTACH:        SNF/Rehab:                  Home Health Care:           Outpatient Wound Center:        X    Self/Family Care:        Other:                  Vac Discharge Needs:   Vac Discharge plan is purely a recommendation from wound team and not a requirement for discharge unless otherwise stated by physician.  Not Applicable Pt not on a wound vac:     x  Regular Vac while inpatient, alternative dressing at DC:        Regular Vac in use and continued at DC:            Reg. Vac w/ Skin Sub/Biologic in use. Will need to be changed 2x wkly:      Veraflo Vac while inpatient, ok to transition to Regular Vac on Discharge (Bedside RN to Clamp small instillation tubing at time of DC):           Veraflo Vac while inpatient, would benefit from remaining on Veraflo Vac upon discharge:

## 2023-03-16 ENCOUNTER — PHARMACY VISIT (OUTPATIENT)
Dept: PHARMACY | Facility: MEDICAL CENTER | Age: 48
End: 2023-03-16
Payer: MEDICARE

## 2023-03-16 VITALS
SYSTOLIC BLOOD PRESSURE: 141 MMHG | TEMPERATURE: 97.2 F | HEART RATE: 70 BPM | HEIGHT: 71 IN | WEIGHT: 185.41 LBS | BODY MASS INDEX: 25.96 KG/M2 | RESPIRATION RATE: 20 BRPM | OXYGEN SATURATION: 100 % | DIASTOLIC BLOOD PRESSURE: 75 MMHG

## 2023-03-16 LAB
ANION GAP SERPL CALC-SCNC: 17 MMOL/L (ref 7–16)
BUN SERPL-MCNC: 45 MG/DL (ref 8–22)
CALCIUM SERPL-MCNC: 8.9 MG/DL (ref 8.5–10.5)
CHLORIDE SERPL-SCNC: 98 MMOL/L (ref 96–112)
CO2 SERPL-SCNC: 24 MMOL/L (ref 20–33)
CREAT SERPL-MCNC: 7.94 MG/DL (ref 0.5–1.4)
ERYTHROCYTE [DISTWIDTH] IN BLOOD BY AUTOMATED COUNT: 69.5 FL (ref 35.9–50)
GFR SERPLBLD CREATININE-BSD FMLA CKD-EPI: 8 ML/MIN/1.73 M 2
GLUCOSE SERPL-MCNC: 123 MG/DL (ref 65–99)
HCT VFR BLD AUTO: 34.4 % (ref 42–52)
HGB BLD-MCNC: 10 G/DL (ref 14–18)
MAGNESIUM SERPL-MCNC: 2.1 MG/DL (ref 1.5–2.5)
MCH RBC QN AUTO: 29 PG (ref 27–33)
MCHC RBC AUTO-ENTMCNC: 29.1 G/DL (ref 33.7–35.3)
MCV RBC AUTO: 99.7 FL (ref 81.4–97.8)
MORPHOLOGY BLD-IMP: NORMAL
PHOSPHATE SERPL-MCNC: 7.5 MG/DL (ref 2.5–4.5)
PLATELET # BLD AUTO: 273 K/UL (ref 164–446)
PMV BLD AUTO: 9.6 FL (ref 9–12.9)
POTASSIUM SERPL-SCNC: 4.4 MMOL/L (ref 3.6–5.5)
RBC # BLD AUTO: 3.45 M/UL (ref 4.7–6.1)
SODIUM SERPL-SCNC: 139 MMOL/L (ref 135–145)
WBC # BLD AUTO: 5.2 K/UL (ref 4.8–10.8)

## 2023-03-16 PROCEDURE — 700102 HCHG RX REV CODE 250 W/ 637 OVERRIDE(OP): Performed by: FAMILY MEDICINE

## 2023-03-16 PROCEDURE — 700111 HCHG RX REV CODE 636 W/ 250 OVERRIDE (IP): Performed by: INTERNAL MEDICINE

## 2023-03-16 PROCEDURE — 99239 HOSP IP/OBS DSCHRG MGMT >30: CPT | Performed by: INTERNAL MEDICINE

## 2023-03-16 PROCEDURE — 83735 ASSAY OF MAGNESIUM: CPT

## 2023-03-16 PROCEDURE — A9270 NON-COVERED ITEM OR SERVICE: HCPCS | Performed by: HOSPITALIST

## 2023-03-16 PROCEDURE — 700111 HCHG RX REV CODE 636 W/ 250 OVERRIDE (IP): Performed by: HOSPITALIST

## 2023-03-16 PROCEDURE — 36415 COLL VENOUS BLD VENIPUNCTURE: CPT

## 2023-03-16 PROCEDURE — 700102 HCHG RX REV CODE 250 W/ 637 OVERRIDE(OP): Performed by: INTERNAL MEDICINE

## 2023-03-16 PROCEDURE — 700105 HCHG RX REV CODE 258: Performed by: INTERNAL MEDICINE

## 2023-03-16 PROCEDURE — 84100 ASSAY OF PHOSPHORUS: CPT

## 2023-03-16 PROCEDURE — A9270 NON-COVERED ITEM OR SERVICE: HCPCS | Performed by: FAMILY MEDICINE

## 2023-03-16 PROCEDURE — 85027 COMPLETE CBC AUTOMATED: CPT

## 2023-03-16 PROCEDURE — 700111 HCHG RX REV CODE 636 W/ 250 OVERRIDE (IP): Performed by: STUDENT IN AN ORGANIZED HEALTH CARE EDUCATION/TRAINING PROGRAM

## 2023-03-16 PROCEDURE — 80048 BASIC METABOLIC PNL TOTAL CA: CPT

## 2023-03-16 PROCEDURE — 700102 HCHG RX REV CODE 250 W/ 637 OVERRIDE(OP): Performed by: HOSPITALIST

## 2023-03-16 PROCEDURE — 700111 HCHG RX REV CODE 636 W/ 250 OVERRIDE (IP): Performed by: NURSE PRACTITIONER

## 2023-03-16 PROCEDURE — 90935 HEMODIALYSIS ONE EVALUATION: CPT

## 2023-03-16 PROCEDURE — A9270 NON-COVERED ITEM OR SERVICE: HCPCS | Performed by: INTERNAL MEDICINE

## 2023-03-16 PROCEDURE — 700111 HCHG RX REV CODE 636 W/ 250 OVERRIDE (IP)

## 2023-03-16 PROCEDURE — RXMED WILLOW AMBULATORY MEDICATION CHARGE: Performed by: INTERNAL MEDICINE

## 2023-03-16 RX ORDER — ATORVASTATIN CALCIUM 40 MG/1
40 TABLET, FILM COATED ORAL
Qty: 30 TABLET | Refills: 0 | Status: SHIPPED | OUTPATIENT
Start: 2023-03-16 | End: 2023-04-09

## 2023-03-16 RX ORDER — SEVELAMER CARBONATE 800 MG/1
1600 TABLET, FILM COATED ORAL
Status: DISCONTINUED | OUTPATIENT
Start: 2023-03-16 | End: 2023-03-16 | Stop reason: HOSPADM

## 2023-03-16 RX ORDER — DOXYCYCLINE 100 MG/1
100 TABLET ORAL EVERY 12 HOURS
Qty: 8 TABLET | Refills: 0 | Status: ACTIVE | OUTPATIENT
Start: 2023-03-16 | End: 2023-03-20

## 2023-03-16 RX ORDER — GABAPENTIN 100 MG/1
100 CAPSULE ORAL EVERY EVENING
Qty: 30 CAPSULE | Refills: 0 | Status: SHIPPED | OUTPATIENT
Start: 2023-03-16 | End: 2023-04-09

## 2023-03-16 RX ORDER — CIPROFLOXACIN 500 MG/1
500 TABLET, FILM COATED ORAL EVERY 24 HOURS
Status: DISCONTINUED | OUTPATIENT
Start: 2023-03-16 | End: 2023-03-16 | Stop reason: HOSPADM

## 2023-03-16 RX ORDER — CIPROFLOXACIN 500 MG/1
500 TABLET, FILM COATED ORAL EVERY 24 HOURS
Qty: 5 TABLET | Refills: 0 | Status: ACTIVE | OUTPATIENT
Start: 2023-03-16 | End: 2023-03-21

## 2023-03-16 RX ORDER — ACETAZOLAMIDE 125 MG/1
125 TABLET ORAL EVERY 12 HOURS
Qty: 60 TABLET | Refills: 0 | Status: SHIPPED | OUTPATIENT
Start: 2023-03-16 | End: 2023-04-09

## 2023-03-16 RX ORDER — PREDNISOLONE ACETATE 10 MG/ML
1 SUSPENSION/ DROPS OPHTHALMIC 4 TIMES DAILY
Qty: 10 ML | Refills: 0 | Status: SHIPPED | OUTPATIENT
Start: 2023-03-16 | End: 2023-04-09

## 2023-03-16 RX ORDER — BRIMONIDINE TARTRATE 2 MG/ML
1 SOLUTION/ DROPS OPHTHALMIC 2 TIMES DAILY
Qty: 5 ML | Refills: 0 | Status: SHIPPED | OUTPATIENT
Start: 2023-03-16 | End: 2023-04-09

## 2023-03-16 RX ORDER — ASPIRIN 81 MG/1
81 TABLET, CHEWABLE ORAL DAILY
Qty: 30 TABLET | Refills: 0 | Status: SHIPPED | OUTPATIENT
Start: 2023-03-17 | End: 2023-04-09

## 2023-03-16 RX ORDER — SEVELAMER CARBONATE 800 MG/1
1600 TABLET, FILM COATED ORAL
Qty: 180 TABLET | Refills: 0 | Status: SHIPPED | OUTPATIENT
Start: 2023-03-16 | End: 2023-04-09

## 2023-03-16 RX ORDER — OXYCODONE HYDROCHLORIDE 5 MG/1
5 TABLET ORAL EVERY 6 HOURS PRN
Qty: 20 TABLET | Refills: 0 | Status: SHIPPED | OUTPATIENT
Start: 2023-03-16 | End: 2023-03-21

## 2023-03-16 RX ORDER — TIMOLOL MALEATE 5 MG/ML
1 SOLUTION/ DROPS OPHTHALMIC 2 TIMES DAILY
Qty: 5 ML | Refills: 0 | Status: SHIPPED | OUTPATIENT
Start: 2023-03-16 | End: 2023-03-16

## 2023-03-16 RX ORDER — CARVEDILOL 3.12 MG/1
3.12 TABLET ORAL 2 TIMES DAILY WITH MEALS
Qty: 60 TABLET | Refills: 0 | Status: SHIPPED | OUTPATIENT
Start: 2023-03-16 | End: 2023-04-09

## 2023-03-16 RX ORDER — LATANOPROST 50 UG/ML
1 SOLUTION/ DROPS OPHTHALMIC EVERY EVENING
Qty: 2.5 ML | Refills: 0 | Status: SHIPPED | OUTPATIENT
Start: 2023-03-16 | End: 2023-04-09

## 2023-03-16 RX ORDER — DORZOLAMIDE HYDROCHLORIDE AND TIMOLOL MALEATE 20; 5 MG/ML; MG/ML
1 SOLUTION/ DROPS OPHTHALMIC 2 TIMES DAILY
Qty: 10 ML | Refills: 0 | Status: SHIPPED | OUTPATIENT
Start: 2023-03-16 | End: 2023-04-09

## 2023-03-16 RX ADMIN — SEVELAMER CARBONATE 1600 MG: 800 TABLET, FILM COATED ORAL at 08:29

## 2023-03-16 RX ADMIN — TIMOLOL MALEATE 1 DROP: 5 SOLUTION OPHTHALMIC at 05:00

## 2023-03-16 RX ADMIN — DOXYCYCLINE 100 MG: 100 TABLET, FILM COATED ORAL at 04:59

## 2023-03-16 RX ADMIN — ACETAMINOPHEN 1000 MG: 325 TABLET, FILM COATED ORAL at 08:29

## 2023-03-16 RX ADMIN — MINERAL OIL, PETROLATUM 1 APPLICATION: 425; 573 OINTMENT OPHTHALMIC at 05:03

## 2023-03-16 RX ADMIN — CARVEDILOL 3.12 MG: 3.12 TABLET, FILM COATED ORAL at 11:33

## 2023-03-16 RX ADMIN — ACETAZOLAMIDE EXTENDED-RELEASE 500 MG: 500 CAPSULE ORAL at 04:59

## 2023-03-16 RX ADMIN — ASPIRIN 81 MG 81 MG: 81 TABLET ORAL at 04:59

## 2023-03-16 RX ADMIN — SEVELAMER CARBONATE 1600 MG: 800 TABLET, FILM COATED ORAL at 11:34

## 2023-03-16 RX ADMIN — OXYCODONE HYDROCHLORIDE 15 MG: 10 TABLET ORAL at 04:24

## 2023-03-16 RX ADMIN — HEPARIN SODIUM 5000 UNITS: 5000 INJECTION, SOLUTION INTRAVENOUS; SUBCUTANEOUS at 04:59

## 2023-03-16 RX ADMIN — HEPARIN SODIUM 3800 UNITS: 1000 INJECTION, SOLUTION INTRAVENOUS; SUBCUTANEOUS at 10:07

## 2023-03-16 RX ADMIN — Medication 220 MG: at 04:59

## 2023-03-16 RX ADMIN — DORZOLAMIDE HYDROCHLORIDE AND TIMOLOL MALEATE 1 DROP: 20; 5 SOLUTION/ DROPS OPHTHALMIC at 04:24

## 2023-03-16 RX ADMIN — BRIMONIDINE TARTRATE 1 DROP: 2 SOLUTION OPHTHALMIC at 05:04

## 2023-03-16 RX ADMIN — EPOETIN ALFA-EPBX 5000 UNITS: 3000 INJECTION, SOLUTION INTRAVENOUS; SUBCUTANEOUS at 10:10

## 2023-03-16 RX ADMIN — OXYCODONE HYDROCHLORIDE 15 MG: 10 TABLET ORAL at 08:30

## 2023-03-16 RX ADMIN — PIPERACILLIN AND TAZOBACTAM 3.38 G: 3; .375 INJECTION, POWDER, LYOPHILIZED, FOR SOLUTION INTRAVENOUS; PARENTERAL at 04:17

## 2023-03-16 RX ADMIN — MORPHINE SULFATE 4 MG: 4 INJECTION INTRAVENOUS at 11:35

## 2023-03-16 RX ADMIN — PREDNISOLONE SODIUM PHOSPHATE 1 DROP: 10 SOLUTION/ DROPS OPHTHALMIC at 08:31

## 2023-03-16 RX ADMIN — Medication 500 MG: at 04:59

## 2023-03-16 ASSESSMENT — PAIN DESCRIPTION - PAIN TYPE: TYPE: ACUTE PAIN

## 2023-03-16 ASSESSMENT — PATIENT HEALTH QUESTIONNAIRE - PHQ9
SUM OF ALL RESPONSES TO PHQ9 QUESTIONS 1 AND 2: 2
2. FEELING DOWN, DEPRESSED, IRRITABLE, OR HOPELESS: SEVERAL DAYS
1. LITTLE INTEREST OR PLEASURE IN DOING THINGS: SEVERAL DAYS

## 2023-03-16 NOTE — CARE PLAN
The patient is Stable - Low risk of patient condition declining or worsening    Shift Goals  Clinical Goals: wound care abx  Patient Goals: rest/pain control  Family Goals: POLINA    Progress made toward(s) clinical / shift goals:  education done on POC, all questions and concerns were addressed.     Patient is not progressing towards the following goals:      Problem: Pain - Standard  Goal: Alleviation of pain or a reduction in pain to the patient’s comfort goal  Outcome: Progressing     Problem: Knowledge Deficit - Standard  Goal: Patient and family/care givers will demonstrate understanding of plan of care, disease process/condition, diagnostic tests and medications  Outcome: Progressing     Problem: Skin Integrity  Goal: Skin integrity is maintained or improved  Outcome: Progressing

## 2023-03-16 NOTE — DISCHARGE INSTRUCTIONS
Discharge Instructions per Dr. Ken Burkett D.O.    DIET: Diet Order Diet: Renal (low potassium)    ACTIVITY: As tolerated    A proper diet that is low in grease, fat, and salt, along with 30 minutes of exercise per day will lead to weight loss, and better controlled blood sugar and blood pressure.    DIAGNOSIS: Open wound    Follow up with your Primary Care Provider Pcp Pt States None as scheduled or sooner if your symptoms persist or worsen.  Return to Emergency Room for sever chest pain, shortness of breath, signs of a stroke, or any other emergencies.

## 2023-03-16 NOTE — PROGRESS NOTES
Pt given meds to beds. Pt refused education on discharge paperwork and medications, pt reports he is blind and the MD is aware of this. Pt provided with bus pass to get back to his Jeep at the [facility].

## 2023-03-16 NOTE — DISCHARGE SUMMARY
"Discharge Summary    CHIEF COMPLAINT ON ADMISSION  Chief Complaint   Patient presents with    Leg Pain     Pt states sores on bilateral legs and thighs, states infection, pt with bs 58, missed diaylsis today, brought via remsa from intermediate       Reason for Admission  ems     Admission Date  2/22/2023    CODE STATUS  Full Code    HPI & HOSPITAL COURSE  Ambrose Watkins is a homeless 47 y.o. male with diabetes since 2007, ESRD on HD for past 2-3 years, methamphetamine abuse, tobacco use, HTN, DLD, medical noncompliance who presented 2/22/2023 with evaluation by police prior to being incarcerated.  He was found in ER to be volume overloaded and have a K:7.6.  He states having his last dialysis 4 days ago in [facility].  He has chronic pain in his thighs from chronic bilateral thigh wounds  and per pt, \"calciphylaxis.\"  In ER he was consulted by nephrology and emergent dialysis is planned.  The patient keeps pulling off his telemetry leads despite being educated on why we recommend them.  Patient was also noted to have redness of the right eye.  Ophthalmology was consulted on the case.  Patient was noted to have glaucoma and vitreous hemorrhage of the right eye.  Ophthalmic drops were started.  Ophthalmology recommended outpatient follow-up with Dr. Dominguez .   unable to find outpatient dialysis.  Long length of committee recommended discharge and follow-up to the ED as needed. assisting assisting with placement.  He has extensive bilateral thigh wound growing MRSA, Citrobacter, Pseudomonas.  ID been consulted.  Patient recommended 10-day course of Zosyn and doxycycline to end 3/20.     He was declined from all local dialysis centers for behavior and noncompliance.  This was discussed with risk and ethics committee.  It was determined patient can follow-up in the emergency room for further dialysis.     Patient also reports he is blind.  He does have a history of glaucoma.  He was " evaluated by ophthalmology.  Visual acuity 20/70 OS.  There is no evidence of infectious process.  Started on multiple eyedrops, Diamox.  He can follow-up with Dr. Dominguez for glaucoma as above.       For his lower extremity wounds patient was switched to ciprofloxacin and doxycycline.  Patient was then interested in learning how to care for wounds.  Outpatient referral for wound clinic.     Patient is stable to discharge home with a walker.  Given a list of motels he can call.  He also has been staying in his jeep.  Patient is unsure if he was staying in town.  Meds to beds delivered for antibiotics for lower extremity wounds as well as eyedrops and Diamox for glaucoma.  Discharged with a walker and DME.  Counseled extensively about lifestyle medications to discontinue methamphetamines, tobacco.  High risk of readmission.      Therefore, he is discharged in guarded and stable condition to home with close outpatient follow-up.    The patient met 2-midnight criteria for an inpatient stay at the time of discharge.    Discharge Date  3/16/2023    FOLLOW UP ITEMS POST DISCHARGE  Follow-up with ophthalmology, ER for dialysis, wound care clinic    DISCHARGE DIAGNOSES  Principal Problem:    Open wound POA: Yes  Active Problems:    Type 2 diabetes mellitus with kidney complication, without long-term current use of insulin (HCC) POA: Yes      Overview: Hemoglobin A1c 5/19/2020 was 7.2%. Recent hemoglobin A1c was 6.5%.       Initially started on glargine and aspart and sliding scale.     Hypertension POA: Yes    Hyperkalemia POA: Yes    End stage renal disease (HCC) (Chronic) POA: Yes    Methamphetamine abuse (HCC) (Chronic) POA: Yes    Anemia, chronic disease POA: Yes      Overview: Hemoglobin at 11.1, MCV 93.5, RDW elevated at 57.9. 2/23/22 labwork       revealed:Serum Iron 8 (L), ferritin 665 (H) , TIBC 178 (L),  Transferrin       149 (L). Suggesting a mixed picture of possibly iron deficiency and ACD in       the setting  of renal disease.  Stable    Noncompliance of patient with renal dialysis ([facility]) POA: Yes    Tobacco dependence POA: Yes    Vitreous hemorrhage of right eye ([facility]) POA: Yes    Right eye glaucoma, due to angle-closure and phacomorphic components POA: Yes    Chronic HFrEF (heart failure with reduced ejection fraction) ([facility]) POA: Yes    Cardiomyopathy ([facility]) POA: Yes    Pulmonary HTN ([facility]) POA: Yes    MRSA cellulitis POA: Yes    Pseudomonas infection POA: Yes  Resolved Problems:    Hypocalcemia POA: Yes      Overview: Upon presentation, calcium is low at 6.7. Albumin is normal at 3.5. If       patient is truly hypocalcemic, back pain could be secondary to bone       breakdown for calcium. Hypocalcemia could also be from malabsorption. On       2/23/22 labs revealed: Ionized calcium ,  (H), 25 Hydroxy Vitamin D       9 (L), Magnesium 2.2 (N), Phosphorus 7.8 (H). Ionized calcium low at 0.8       on 2/25/22, started on calcium carbonate.      FOLLOW UP  Future Appointments   Date Time Provider Department Center   3/22/2023 10:20 AM Vince Ahumada D.O. [facility]   3/23/2023  9:30 AM Joelle Solis R.N. [address]     Tevin Dominguez M.D.  [address] [place] [address] [phone]    Call  Call office to make appointment after discharge from hospital. [phone]    [facility]  [address]  [facility] [address] [phone]  Follow up in 7 day(s)  Arrive to appointment at least 15 minutes early, For wound re-check. Please call [phone].      MEDICATIONS ON DISCHARGE     Medication List        START taking these medications        Instructions   acetaZOLAMIDE  MG Cp12  Commonly known as: DIAMOX   Take 1 Capsule by mouth every 12 hours.  Dose: 500 mg     artificial tears Oint ophth ointment  Commonly known as: EYE LUBRICANT   Apply 1 Application. to both eyes every 8 hours.  Dose: 1 Application.     aspirin 81 MG Chew chewable tablet  Start taking on: March 17,  2023  Commonly known as: ASA   Chew 1 Tablet every day.  Dose: 81 mg     atorvastatin 40 MG Tabs  Commonly known as: LIPITOR   Take 1 Tablet by mouth at bedtime.  Dose: 40 mg     brimonidine 0.2 % Soln  Commonly known as: ALPHAGAN   Administer 1 Drop into the right eye 2 times a day.  Dose: 1 Drop     carvedilol 3.125 MG Tabs  Commonly known as: COREG   Take 1 Tablet by mouth 2 times a day with meals.  Dose: 3.125 mg     dorzolamide-timolol 22.3-6.8 MG/ML Soln  Commonly known as: COSOPT   Administer 1 Drop into the right eye 2 times a day.  Dose: 1 Drop     doxycycline monohydrate 100 MG tablet  Commonly known as: ADOXA   Take 1 Tablet by mouth every 12 hours for 4 days.  Dose: 100 mg     gabapentin 100 MG Caps  Commonly known as: NEURONTIN   Take 1 Capsule by mouth every evening for 30 days.  Dose: 100 mg     latanoprost 0.005 % Soln  Commonly known as: XALATAN   Administer 1 Drop into the right eye every evening.  Dose: 1 Drop     oxyCODONE immediate-release 5 MG Tabs  Commonly known as: ROXICODONE   Take 1 Tablet by mouth every 6 hours as needed for Severe Pain for up to 5 days.  Dose: 5 mg     prednisoLONE sodium phosphate 1 % Soln  Commonly known as: INFLAMASE FORTE   Administer 1 Drop into both eyes 4 times a day.  Dose: 1 Drop     sevelamer carbonate 800 MG Tabs tablet  Commonly known as: RENVELA   Take 2 Tablets by mouth 3 times a day with meals for 30 days.  Dose: 1,600 mg     timolol 0.5 % Soln  Commonly known as: TIMOPTIC   Administer 1 Drop into the right eye 2 times a day.  Dose: 1 Drop              Allergies  No Known Allergies    DIET  Orders Placed This Encounter   Procedures    Diet Order Diet: Renal (low potassium)     Standing Status:   Standing     Number of Occurrences:   1     Order Specific Question:   Diet:     Answer:   Renal [8]     Comments:   low potassium       ACTIVITY  As tolerated.  Weight bearing as tolerated    CONSULTATIONS  nephrology and  ophthalmology    PROCEDURES  None    LABORATORY  Lab Results   Component Value Date    SODIUM 139 03/16/2023    POTASSIUM 4.4 03/16/2023    CHLORIDE 98 03/16/2023    CO2 24 03/16/2023    GLUCOSE 123 (H) 03/16/2023    BUN 45 (H) 03/16/2023    CREATININE 7.94 (HH) 03/16/2023        Lab Results   Component Value Date    WBC 5.2 03/16/2023    HEMOGLOBIN 10.0 (L) 03/16/2023    HEMATOCRIT 34.4 (L) 03/16/2023    PLATELETCT 273 03/16/2023        I discussed medications and side effects with the patient.  I discussed prognosis and importance of medical compliance with the patient.  I counseled the patient about diet, exercise, weight loss, smoking cessation, and life style modifications.  All questions and concerns have been addressed.  Total time of the discharge process was 39 minutes.

## 2023-03-16 NOTE — DOCUMENTATION QUERY
"                                                                         [facility]                                                                       Query Response Note      PATIENT:               CONY MORAN  ACCT #:                  [id number]  MRN:                     [id number]  :                      1975  ADMIT DATE:       2023 4:22 AM  DISCH DATE:          RESPONDING  PROVIDER #:        [id number]           QUERY TEXT:    MRSA Cellulitis is documented in the Progress Notes. Can the location(s) of the infection be specified?    The patient's clinical indicators include:  Full thickness wounds of the bilateral thighs is documented in the Wound Team Note on 3/8. \"Chronic thigh wounds-bacterial superinfection. Polymicrobial infection: MRSA, Citrobacter, and pseudomonas\" is documented in the ID Progress Notes on 3/13. \"He has chronic pain in his thighs from chronic  bilateral thigh wounds \" is documented in the Progress Notes on 3/15.    Treatments include: Dakin's Solution, Doxycycline, Vancomycin, and Wound Team Consult.    Risk factors include: dx BLE Wounds, hx Methamphetamine Abuse, hx DM II, hx HTN + ESRD, and hx Non-Compliance with Medication Regimen.    Thank you,  Stone Del Rosario RN, BSN  Clinical   Connect via Mail'Inside  Options provided:   -- MRSA Cellulitis of the bilateral upper thighs is ruled in   -- Other explanation, Please specify   -- Unable to determine      Query created by: Stone Del Rosario on 3/15/2023 1:50 PM    RESPONSE TEXT:    MRSA Cellulitis of the bilateral upper thighs is ruled in          Electronically signed by:  SAQIB FOURNIER MD 3/16/2023 6:01 AM              "

## 2023-03-16 NOTE — FACE TO FACE
Face to Face Note  -  Durable Medical Equipment    Ken Burkett D.O. - NPI: [id number]  I certify that this patient is under my care and that they had a durable medical equipment(DME)face to face encounter by myself that meets the physician DME face-to-face encounter requirements with this patient on:    Date of encounter:   Patient:                    MRN:                       YOB: 2021  Ambrose Watkins  [id number]  1975     The encounter with the patient was in whole, or in part, for the following medical condition, which is the primary reason for durable medical equipment:  Other - leg wounds    I certify that, based on my findings, the following durable medical equipment is medically necessary:    Walkers.    My Clinical findings support the need for the above equipment due to:  Abnormal Gait

## 2023-03-16 NOTE — CARE PLAN
The patient is Stable - Low risk of patient condition declining or worsening    Shift Goals  Clinical Goals: wound care abx  Patient Goals: rest/pain control  Family Goals: POLINA    Progress made toward(s) clinical / shift goals:      Problem: Pain - Standard  Goal: Alleviation of pain or a reduction in pain to the patient’s comfort goal  Outcome: Progressing     Problem: Knowledge Deficit - Standard  Goal: Patient and family/care givers will demonstrate understanding of plan of care, disease process/condition, diagnostic tests and medications  Outcome: Progressing     Problem: Skin Integrity  Goal: Skin integrity is maintained or improved  Outcome: Progressing     Problem: Fall Risk  Goal: Patient will remain free from falls  Outcome: Progressing

## 2023-03-16 NOTE — DISCHARGE PLANNING
CM received call from HD Coordinator, Negin, stating she has been trying to find outpt dialysis clinic but has been unsuccessful since patient has been non compliant and no shows previously. Gave Negin patient's phone to notify him if she gets a confirmation after patient's discharge. Orders for DME for FWW discussed with Dr. Burkett. Patient will be discharged to the location of choice when ready.     1315 - Patient requested list of hotel numbers to call to get hotel room. When CM went to bedside patient asked CM to call hotels to see if they will let him stay there without his drivers license or ID? CM instructed patient that he will need to call them and discuss his situation, list of numbers left at bedside on table. Patient reports he was arrested prior to this admission and his jeep was left at the [facility]. CM stated to patient that we will provide him with a bus pass for today. CM also set up outpatient wound care appointment for 3/23/23 at 0930, and informed patient that he needs to call opthalmology for eye appointment, number listed on AVS. Order received for DME FWW, choice obtained, faxed to [facility] to send referral. CM communicated with RN to do wound care teaching, assist with getting patient clothes from closet and send dressing change supplies home with patient. CM noticed 2 bags of belongings in patient's room today with clothes, patient states they are dirty.     7316 - CM received call from OT asking if they were to be seeing patient. They were requested by RN to see patient. CM instructed OT to contact Dr. Burkett.     1420 - Bus pass given to patient at bedside. RN at bedside asking shoe size. Patient states he wears a 10.5 shoe.

## 2023-03-16 NOTE — PROGRESS NOTES
[facility] Progress Note     HD treatment x 3 hours today ordered by Dr. Metzger.   HD tx initiated at 0817 and ended at 1119.    HD  at bedside. Pt is on contact isolation. Pt is alert, oriented x 4, not in any signs of distress at this time. Left IJ tunneled catheter with clean, dry and intact dressing. No signs of infection noted. Aspirated 3mL on both catheter ports and saline flushed, both patent. Ordered UF 2L, pt requested 3-4L, informed Dr. Metzger and said okay. Pt reported of severe both legs pain with scale of 9/10 upon initiating tx, primary RN informed and medicated per MAR. Epoetin 5,000 units IVP given with HD. HD treatment completed and tolerated well.     UF Net: 3L    Post tx: Left IJ catheter with clean, dry and intact dressing. No signs of infection. Both catheter ports saline flushed, heparin locked as prescribed, clamped and capped. Pt still reported both leg pain with scale of 9/10. Primary RN present at the bedside post HD and administered oral meds with meals.    Report given to primary RN JERRELL Santacruz. See electronic flowsheets for additional information.

## 2023-03-16 NOTE — CARE PLAN
The patient is Stable - Low risk of patient condition declining or worsening    Shift Goals  Clinical Goals: maintain skin integrity; pain mgmt; q shift wound care and drg change  Patient Goals: pain control; wound change  Family Goals: POLINA    Progress made toward(s) clinical / shift goals:  education done on POC, all questions and concerns were addressed.    Patient is not progressing towards the following goals:      Problem: Pain - Standard  Goal: Alleviation of pain or a reduction in pain to the patient’s comfort goal  3/15/2023 1804 by Hannah Santacruz REduinN.  Outcome: Progressing  3/15/2023 1234 by Hannah Santacruz R.N.  Outcome: Progressing     Problem: Knowledge Deficit - Standard  Goal: Patient and family/care givers will demonstrate understanding of plan of care, disease process/condition, diagnostic tests and medications  Outcome: Progressing

## 2023-03-16 NOTE — PROGRESS NOTES
"[facility] -  PROGRESS NOTE               Author: Kai Metzger M.D. Date & Time: 3/16/2023  5:20 AM     HPI:  Patient is a 47 year old male with a PMHx of ESRD on HD qTTS, meth user and history of calciphylaxis here for leg pain.  States he's still using meth.  Last HD session was about 4-5 days ago.  Here potassium was 7.6 and bicarb of 7.  Nephrology consulted for maintenance dialysis.  Currently complaining of leg pain.  History of nonadherence.    DAILY NEPHROLOGY SUMMARY:  2/23 - Pain controlled.  Denies nausea.  No SOB  Had HD yesterday  2/24 - Denies pain or SOB.  Laying in bed comfortable  2/25 - 2L Net UF. NPO for OR this am w/Opth for retinal detachment. Reports that he is hungry. 1200 mL UOP last 24 hrs.   2/26: 2.5L Net UF. Bps better. Sitting at side of bed eating breakfast. States that he wants HD chair set up here. Unable to drive to [place] to HD clinic d/t vision.   2/27: Pt sitting up in bed eating breakfast, no overnight events, VSS on RA, worked with OT this AM and cleared for DC, waiting on DC plan  2/28: Pt confused today  3/01: HD yest, UF 2L. VSS. Sitting up in bed holding his head \"I hurt all over\". Vague about whether medication is helping his pain.   3/02: C/O pain eyes and thighs. Due for dialysis. He voiced no concerns. Eating and drinking okay. VSS.   3/03: HD yest, UF 2L. VSS. Pt sleeps a lot. Briefly wakes and engages in conversation then goes back to sleep. Intermittent nause and pain (legs and eyes) No new issues. CM looking for outpt HD chair.  3/04: Pt seen on HD, sleeping, VSS, medically cleared, just waiting on OP HD chair  3/05: Pt sitting up in bed, says he hurts all over, iHD yesterday with 2L net UF, labs reviewed, VSS on RA  3/06: pt laying in bed sleeping, does not wish to participate in exam much, denies any complaints, for iHD tomorrow   3/07: No events, pt seen and examined on hemodialysis, VSS--see dialysis treatment sheet for full " "details, denies any CP/SOB/Abd pain, irritable  3/08: sitting up in bed, reports \"pain everywhere\", does have dependent edema and BLE edema, + ascites, tolerated iHD yesterday with UF: 2.0L, pending outpt chair   3/09: No events, Pt seen and examined on hemodialysis, VSS--see dialysis treatment sheet for full details, reports generalized pain, denies any SOB/LE edema  3/10: sitting in bed, cranky about me waking him up, c/o loss of vision and pain \"all over\", tolerated iHD yesterday with UF: 2.0L  3/11: No events, pt seen and examined on hemodialysis, VSS--see dialysis treatment sheet for full details, c/o leg pain and eye pain and difficulty seeing  3/12: No events, tolerated HD yesterday with 2 L UF, BP elevated today, no new complaints, still with generalized pain, noted to have purulent drainage from thigh wounds  3/13: No events, no new c/o, high BP readings intermittently   3/14: No events, seen on HD, VSS, no labs for r/w today   3/15:No events, NO NEW C/O   3/16: VSS, no c/o, HD today net 3 L     REVIEW OF SYSTEMS:    A 10 pt review of systems was obtained and is per HPI/&/or daily summary otherwise negative    PMH/PSH/SH/FH:   Reviewed and unchanged since admission note    CURRENT MEDICATIONS:   Reviewed from admission to present day    VS:  /71   Pulse 66   Temp 36.8 °C (98.2 °F) (Temporal)   Resp 17   Ht 1.803 m (5' 10.98\")   Wt 84.1 kg (185 lb 6.5 oz)   SpO2 95%   BMI 25.87 kg/m²     Physical Exam  Vitals and nursing note reviewed.   Constitutional:       Comments: +chronically ill-appearing   HENT:      Head: Normocephalic and atraumatic.   Eyes:      General: No scleral icterus.     Comments: R eye erythema   Cardiovascular:      Rate and Rhythm: Normal rate and regular rhythm.      Comments: LFA AVG  [facility] TDC  Pulmonary:      Effort: Pulmonary effort is normal.   Abdominal:      General: There is no distension.   Musculoskeletal:         General: No deformity.      Right lower leg: Edema " (trace pedal) present.      Left lower leg: Edema (trace pedal) present.   Skin:     Findings: No rash.      Comments: Chronic wounds b/l thighs/abd dressed   Neurological:      Mental Status: He is alert and oriented to person, place, and time. Mental status is at baseline.   Psychiatric:         Attention and Perception: He is inattentive.         Mood and Affect: Affect is flat.         Behavior: Behavior is cooperative.        Fluids:  In: 420 [P.O.:420]  Out: 200     LABS:  Recent Labs     03/13/23  0743 03/14/23 2005 03/16/23  0052   SODIUM 135 138 139   POTASSIUM 4.6 4.6 4.4   CHLORIDE 96 99 98   CO2 24 25 24   GLUCOSE 120* 141* 123*   BUN 46* 34* 45*   CREATININE 7.60* 5.97* 7.94*   CALCIUM 8.7 8.6 8.9       IMAGING:   All imaging reviewed from admission to present day    IMPRESSION:  # ESRD  - Etiology likely 2/2 DM/HTN  - HD qTTHS via [facility] TDC  - Lost his HD chair at [facility] previously  # Hyperkalemia--treated with HD  # LFA AVG thrombosed  - US 2/23 no flow  - s/p OR 2/25 AVG Brachio-axillary creation, thrombectomy and fistulogram  - Stenosis and wall thrombus noted on US, currently using  [facility] TDC   # HTN, fair control            - BP goal < 140/90   # Anemia of CKD, below target  - Goal Ggb 10-11  - Ferritin 2722, %sat 41  # CKD-MBD  - Ca 7.6  - iCa 1.0  - Phos 7.6, improving  # Calciphylaxis hx  - Per report from patient  # Generalized weakness, improved   - Evaluated with PT/OT  # h/o Methamphetamine use   # Homelessness  # Encephalopthy ? Meds vs infection   - Improved  # DM   # Chronic wounds b/l thighs/abd   - Wound care following    -ID consulted and now on antibiotics for infected wounds with purulent drainage  # Conjunctivitis/End Stage Glaucoma  - Diamox  - Latanoprost, Combigan, Cosopy, Preds Forte  # HFrEF           - EF30%    PLAN:  - HD today (Thu), continue iHD qTTS/PRN  - UF as tolerated   - Use [facility] PermCath for now  - AVG eval as OP at    - Continue phos binder sevelamer with  meals  - JACKIE with HD to goal Hgb 10-11  - No dietary protein restrictions  - Dose all meds per ESRD/iHD  - Renal diet, low salt diet, fluid restriction to 1.5L daily  - Can transition to Outpt HD clinic when medically cleared and chair secured.  -Continue wound care  -Antibiotics per ID for skin wounds  - SW support for placement. Requesting local HD chair. Hx of non adherence to HD.  - Unable to find placement yet. No accepting unit per report .

## 2023-03-16 NOTE — DISCHARGE PLANNING
Received Choice Form @: 8580  Agency/ Facility Name: [facility]  Referral Sent per Choice Form @: 4446

## 2023-03-20 ENCOUNTER — HOSPITAL ENCOUNTER (EMERGENCY)
Facility: MEDICAL CENTER | Age: 48
End: 2023-03-20
Attending: EMERGENCY MEDICINE
Payer: MEDICARE

## 2023-03-20 VITALS
BODY MASS INDEX: 26.92 KG/M2 | DIASTOLIC BLOOD PRESSURE: 95 MMHG | HEART RATE: 83 BPM | RESPIRATION RATE: 16 BRPM | OXYGEN SATURATION: 93 % | TEMPERATURE: 98.1 F | SYSTOLIC BLOOD PRESSURE: 155 MMHG | WEIGHT: 192.9 LBS

## 2023-03-20 DIAGNOSIS — Z99.2 ESRD ON DIALYSIS (HCC): ICD-10-CM

## 2023-03-20 DIAGNOSIS — N18.6 ESRD ON DIALYSIS (HCC): ICD-10-CM

## 2023-03-20 LAB — POTASSIUM BLD-SCNC: 4.5 MMOL/L (ref 3.6–5.5)

## 2023-03-20 PROCEDURE — 84132 ASSAY OF SERUM POTASSIUM: CPT

## 2023-03-20 PROCEDURE — 99284 EMERGENCY DEPT VISIT MOD MDM: CPT

## 2023-03-20 ASSESSMENT — FIBROSIS 4 INDEX: FIB4 SCORE: 0.47

## 2023-03-20 NOTE — ED NOTES
Please Advise:    Patient calling to see if you would want to see her again  States that she cancelled 6 month f/u appointment  Says that she was in a dark place trying to figure everything out, but she is a completely new person  Patient wanting to know if she should set up an appointment  Dr Hakan Joseph, [facility]  Pt self ambulated using personal walker to treatment area with out difficulty. Pt is sitting upright in chair with no needs at this time. Chart up for ERP.

## 2023-03-20 NOTE — DISCHARGE INSTRUCTIONS
You are seen in the emergency department for evaluation for need of dialysis.  You show no signs of fluid overload.  Your potassium is reassuring.  You have requested to be discharged and return tomorrow for repeat evaluation for dialysis.    Please also follow-up with your primary care provider.    Please return to the emergency department see medical attention if you develop:  Difficulty breathing, heart racing, any other new or concerning findings

## 2023-03-20 NOTE — ED NOTES
Reviewed discharge instructions, pt verbalized understanding of instructions. States he will follow-up as needed. Denies further questions at this time.   Requesting assistance getting back to shelter. SW contacted.

## 2023-03-20 NOTE — ED PROVIDER NOTES
ER Provider Note    Scribed for Stone Stone M.d. by Adilson Stallings. 3/20/2023  11:40 AM    Primary Care Provider: Pcp Pt States None    CHIEF COMPLAINT  Chief Complaint   Patient presents with    Other     Pt reports he needs dialysis. Pt scheduled for [facility]     LIMITATION TO HISTORY   None    HPI/ROS  OUTSIDE HISTORIAN(S):  None    EXTERNAL RECORDS REVIEWED  Inpatient Notes patient recently admitted for prolonged course, and was ultimately discharged with plan to return to the emergency department for dialysis as needed.    Ambrose Watkins is a 47 y.o. male with a history of diabetes, renal insufficiency, and glaucoma, who presents to the ED via EMS for dialysis onset prior to arrival. The patient states that he typically receives dialysis on Monday's, Wednesday's, and Friday's. He reports last being dialyzed on 3/17/23. The patient was ultimately prompted to return to the ED today after being unable to secure outpatient dialysis. Associated symptoms include bilateral lower extremity swelling. The patient denies any cough, fever, chest pain, or shortness of breath. No alleviating or exacerbating factors noted. He utilizes a walker to ambulate at baseline. The patient is currently homeless.     PAST MEDICAL HISTORY  Past Medical History:   Diagnosis Date    Diabetes     High anion gap metabolic acidosis 8/9/2022    Hyperkalemia 2/22/2022    Hypertension     Renal disease        SURGICAL HISTORY  Past Surgical History:   Procedure Laterality Date    AV FISTULA CREATION Left 2/25/2022    Procedure: CREATION, AV FISTULA-UPPER EXTREMITY GRAFT, AND FISTULOGRAM;  Surgeon: Tone Hartman M.D.;  Location: SURGERY [facility];  Service: Vascular    THROMBECTOMY Left 2/25/2022    Procedure: THROMBECTOMY;  Surgeon: Tone Hartman M.D.;  Location: SURGERY [facility];  Service: Vascular       FAMILY HISTORY  No family history noted.    SOCIAL HISTORY   reports that he has been smoking. He has been smoking an  average of .25 packs per day. He has never used smokeless tobacco. He reports that he does not currently use alcohol. He reports that he does not use drugs.    CURRENT MEDICATIONS  Discharge Medication List as of 3/20/2023  1:21 PM        CONTINUE these medications which have NOT CHANGED    Details   acetaZOLAMIDE (DIAMOX) 125 MG Tab Take 1 Tablet by mouth every 12 hours., Disp-60 Tablet, R-0, Normal      artificial tears (EYE LUBRICANT) Ointment ophth ointment Apply 1 Application. to both eyes every 8 hours., Disp-3.5 g, R-0, Normal      atorvastatin (LIPITOR) 40 MG Tab Take 1 Tablet by mouth at bedtime., Disp-30 Tablet, R-0, Normal      aspirin (ASA) 81 MG Chew Tab chewable tablet Chew 1 Tablet every day., Disp-30 Tablet, R-0, Normal      carvedilol (COREG) 3.125 MG Tab Take 1 Tablet by mouth 2 times a day with meals., Disp-60 Tablet, R-0, Normal      brimonidine (ALPHAGAN) 0.2 % Solution Administer 1 Drop into the right eye 2 times a day., Disp-5 mL, R-0, Normal      dorzolamide-timolol (COSOPT) 22.3-6.8 MG/ML Solution Administer 1 Drop into the right eye 2 times a day., Disp-10 mL, R-0, Normal      doxycycline monohydrate (ADOXA) 100 MG tablet Take 1 Tablet by mouth every 12 hours for 4 days., Disp-8 Tablet, R-0, Normal      gabapentin (NEURONTIN) 100 MG Cap Take 1 Capsule by mouth every evening for 30 days., Disp-30 Capsule, R-0, Normal      latanoprost (XALATAN) 0.005 % Solution Administer 1 Drop into the right eye every evening., Disp-2.5 mL, R-0, Normal      prednisoLONE acetate (PRED FORTE) 1 % Suspension Administer 1 Drop into both eyes 4 times a day., Disp-10 mL, R-0, Normal      sevelamer carbonate (RENVELA) 800 MG Tab tablet Take 2 Tablets by mouth 3 times a day with meals for 30 days., Disp-180 Tablet, R-0, Normal      oxyCODONE immediate-release (ROXICODONE) 5 MG Tab Take 1 Tablet by mouth every 6 hours as needed for Severe Pain for up to 5 days., Disp-20 Tablet, R-0, Normal      ciprofloxacin (CIPRO)  500 MG Tab Take 1 Tablet by mouth every 24 hours for 5 days., Disp-5 Tablet, R-0, Normal             ALLERGIES  Patient has no known allergies.    PHYSICAL EXAM  BP (!) 143/91   Pulse 95   Temp 36.9 °C (98.5 °F) (Temporal)   Resp 16   Wt 87.5 kg (192 lb 14.4 oz)   SpO2 95%   BMI 26.92 kg/m²     Gen: alert, no acute distress  HENT: ATNC  Eyes: normal conjunctiva  Resp/Thorax: CTAB, Dialysis port in place to the left upper chest wall which is non-tender without surrounding erythema, no increased work of breathing, No respiratory distress.   CV: No JVD   Abd: Soft, non-tender, Non-distended  Extremities: 2+ pitting edema to bilateral lower extremities.     DIAGNOSTIC STUDIES & PROCEDURES    Labs:   Labs Reviewed   POCT POTASSIUM   POCT POTASSIUM DEVICE RESULTS     All labs reviewed by me.    COURSE & MEDICAL DECISION MAKING    ED Observation Status? No; Patient does not meet criteria for ED Observation.     INITIAL ASSESSMENT AND PLAN  Care Narrative: Patient presents with end-stage renal disease requesting consultation on dialysis.  He does not have a dialysis provider in an outpatient basis.  He recently was hospitalized and had his last dialysis while he was hospitalized.  He expressed that he is not interested in having dialysis performed today.  He demonstrates no increased work of breathing, no hypoxemia, no fluid overload.  I-STAT potassium performed, which demonstrated normal potassium.  At this point time, I believe the patient will be stable for 1 more day, but and he would like to return tomorrow for his dialysis session.  I believe this is reasonable given the patient's reassuring work-up at this moment.      11:40 AM - Patient seen and evaluated at bedside. Ambrose Watkins is a 47 y.o. male with a history of diabetes, renal insufficiency, and glaucoma, who presents for dialysis. Ordered labs to evaluate. Discussed two treatment plans including attempting to arrange for dialysis now or checking  his potassium to see if it is safe to postpone his dialysis 1 day. The patient has elected to check his potassium.     12:37 PM - RT has not drawn his potassium level yet. The patient has expressed a desire to leave the ED prior to having his potassium checked. Counseled him on the possible dangers of hyperkalemia as well as postponing dialysis with an elevated potassium. He has agreed to continue waiting to have his potassium levels checked.     1:19 PM - Nursing staff informed me that the patient is requesting to leave the ED at this time with plans to return tomorrow for dialysis. His potassium was 4.5. Explained to the patient that he does not appear to be fluid overloaded at this time and his potassium is within normal limits. Instructed the patient to return to the ED tomorrow for repeat dialysis evaluation. Discussed plan for discharge; I advised the patient to follow-up with the [facility] ED tomorrow, and to return to the [facility] ED with any new or worsening symptoms, including difficulty breathing, chest palpitations, or other concerns. Patient was given the opportunity for questions. I addressed all questions or concerns at this time and they verbalize agreement to the plan of care.                  DISPOSITION AND DISCUSSIONS  I have discussed management of the patient with the following physicians and CECILIA's: None    Discussion of management with other QHP or appropriate source(s): RT To perform iStat potassium level check      Escalation of care considered, and ultimately not performed: blood analysis, diagnostic imaging, and acute inpatient care management, however at this time, the patient is most appropriate for outpatient management.    Barriers to care at this time, including but not limited to: Patient does not have established PCP and Patient is homeless.       The patient will return for new or worsening symptoms and is stable at the time of discharge.    The patient is referred to a primary  physician for blood pressure management, diabetic screening, and for all other preventative health concerns.    DISPOSITION:  Patient will be discharged home in stable condition.    FOLLOW UP:  [facility], Emergency Dept  [address] [address] [phone]  In 1 day      FINAL IMPRESSION   1. ESRD on dialysis (HCC)         Adilson SIMON (Scribe), am scribing for, and in the presence of, Stone Stone M.D..    Electronically signed by: Adilson Stallings (Scribe), 3/20/2023    Stone SIMON M.D. personally performed the services described in this documentation, as scribed by Adilson Stallings in my presence, and it is both accurate and complete.    The note accurately reflects work and decisions made by me.  Stone Stone M.D.  3/20/2023  3:49 PM

## 2023-03-20 NOTE — ED TRIAGE NOTES
Ambrose Watkins  47 y.o. male  Chief Complaint   Patient presents with    Other     Pt reports he needs dialysis. Pt scheduled for [facility]       Pt amb to triage with steady gait for above complaint. Pt was recently discharged from this facility on 3/16.  Pt reports he was last dialyzed on 3/17 at this facility   Pt is alert and oriented, speaking in full sentences, follows commands and responds appropriately to questions. Not in any apparent distress. Respirations are even and unlabored.  Pt placed in lobby. Pt educated on triage process. Pt encouraged to alert staff for any changes.  This RN in appropriate PPE during encounter.  Pt placed in mask as well

## 2023-03-27 ENCOUNTER — APPOINTMENT (OUTPATIENT)
Dept: RADIOLOGY | Facility: MEDICAL CENTER | Age: 48
DRG: 640 | End: 2023-03-27
Attending: EMERGENCY MEDICINE
Payer: MEDICARE

## 2023-03-27 ENCOUNTER — HOSPITAL ENCOUNTER (INPATIENT)
Facility: MEDICAL CENTER | Age: 48
LOS: 3 days | DRG: 640 | End: 2023-03-30
Attending: EMERGENCY MEDICINE | Admitting: INTERNAL MEDICINE
Payer: MEDICARE

## 2023-03-27 DIAGNOSIS — L97.929 CALCIPHYLAXIS OF LEFT LOWER EXTREMITY WITH NONHEALING ULCER, UNSPECIFIED ULCER STAGE (HCC): ICD-10-CM

## 2023-03-27 DIAGNOSIS — Z91.199 NONCOMPLIANCE: ICD-10-CM

## 2023-03-27 DIAGNOSIS — Z99.2 ESRD (END STAGE RENAL DISEASE) ON DIALYSIS (HCC): ICD-10-CM

## 2023-03-27 DIAGNOSIS — N18.6 ESRD (END STAGE RENAL DISEASE) ON DIALYSIS (HCC): ICD-10-CM

## 2023-03-27 DIAGNOSIS — Z59.00 HOMELESS: ICD-10-CM

## 2023-03-27 DIAGNOSIS — E87.5 HYPERKALEMIA: ICD-10-CM

## 2023-03-27 DIAGNOSIS — S81.802A OPEN WOUND OF BOTH LOWER EXTREMITIES WITH COMPLICATION, INITIAL ENCOUNTER: ICD-10-CM

## 2023-03-27 DIAGNOSIS — E83.59 CALCIPHYLAXIS OF LEFT LOWER EXTREMITY WITH NONHEALING ULCER, UNSPECIFIED ULCER STAGE (HCC): ICD-10-CM

## 2023-03-27 DIAGNOSIS — S81.801A OPEN WOUND OF BOTH LOWER EXTREMITIES WITH COMPLICATION, INITIAL ENCOUNTER: ICD-10-CM

## 2023-03-27 LAB
ALBUMIN SERPL BCP-MCNC: 3.6 G/DL (ref 3.2–4.9)
ALBUMIN/GLOB SERPL: 0.7 G/DL
ALP SERPL-CCNC: 133 U/L (ref 30–99)
ALT SERPL-CCNC: <5 U/L (ref 2–50)
ANION GAP SERPL CALC-SCNC: 23 MMOL/L (ref 7–16)
ANISOCYTOSIS BLD QL SMEAR: ABNORMAL
AST SERPL-CCNC: 9 U/L (ref 12–45)
BASOPHILS # BLD AUTO: 0.9 % (ref 0–1.8)
BASOPHILS # BLD: 0.09 K/UL (ref 0–0.12)
BILIRUB SERPL-MCNC: 0.5 MG/DL (ref 0.1–1.5)
BLOOD CULTURE HOLD CXBCH: NORMAL
BUN SERPL-MCNC: 93 MG/DL (ref 8–22)
BURR CELLS BLD QL SMEAR: NORMAL
CA-I SERPL-SCNC: 1 MMOL/L (ref 1.1–1.3)
CALCIUM ALBUM COR SERPL-MCNC: 8.8 MG/DL (ref 8.5–10.5)
CALCIUM SERPL-MCNC: 8.5 MG/DL (ref 8.5–10.5)
CHLORIDE SERPL-SCNC: 99 MMOL/L (ref 96–112)
CO2 SERPL-SCNC: 16 MMOL/L (ref 20–33)
CREAT SERPL-MCNC: 15.19 MG/DL (ref 0.5–1.4)
EKG IMPRESSION: NORMAL
EOSINOPHIL # BLD AUTO: 0.17 K/UL (ref 0–0.51)
EOSINOPHIL NFR BLD: 1.7 % (ref 0–6.9)
ERYTHROCYTE [DISTWIDTH] IN BLOOD BY AUTOMATED COUNT: 66.4 FL (ref 35.9–50)
FERRITIN SERPL-MCNC: 839 NG/ML (ref 22–322)
GFR SERPLBLD CREATININE-BSD FMLA CKD-EPI: 4 ML/MIN/1.73 M 2
GLOBULIN SER CALC-MCNC: 5.1 G/DL (ref 1.9–3.5)
GLUCOSE BLD STRIP.AUTO-MCNC: 132 MG/DL (ref 65–99)
GLUCOSE SERPL-MCNC: 112 MG/DL (ref 65–99)
HCT VFR BLD AUTO: 32 % (ref 42–52)
HGB BLD-MCNC: 9.7 G/DL (ref 14–18)
HGB RETIC QN AUTO: 33.4 PG/CELL (ref 29–35)
IMM RETICS NFR: 27.8 % (ref 9.3–17.4)
IRON SATN MFR SERPL: 36 % (ref 15–55)
IRON SERPL-MCNC: 65 UG/DL (ref 50–180)
LYMPHOCYTES # BLD AUTO: 0.44 K/UL (ref 1–4.8)
LYMPHOCYTES NFR BLD: 4.4 % (ref 22–41)
MACROCYTES BLD QL SMEAR: ABNORMAL
MAGNESIUM SERPL-MCNC: 2.5 MG/DL (ref 1.5–2.5)
MANUAL DIFF BLD: NORMAL
MCH RBC QN AUTO: 29.7 PG (ref 27–33)
MCHC RBC AUTO-ENTMCNC: 30.3 G/DL (ref 33.7–35.3)
MCV RBC AUTO: 97.9 FL (ref 81.4–97.8)
MONOCYTES # BLD AUTO: 0.52 K/UL (ref 0–0.85)
MONOCYTES NFR BLD AUTO: 5.2 % (ref 0–13.4)
MORPHOLOGY BLD-IMP: NORMAL
NEUTROPHILS # BLD AUTO: 8.78 K/UL (ref 1.82–7.42)
NEUTROPHILS NFR BLD: 87.8 % (ref 44–72)
NRBC # BLD AUTO: 0.02 K/UL
NRBC BLD-RTO: 0.2 /100 WBC
PLATELET # BLD AUTO: 162 K/UL (ref 164–446)
PLATELET BLD QL SMEAR: NORMAL
PMV BLD AUTO: 11.4 FL (ref 9–12.9)
POIKILOCYTOSIS BLD QL SMEAR: NORMAL
POTASSIUM SERPL-SCNC: 6.1 MMOL/L (ref 3.6–5.5)
PROT SERPL-MCNC: 8.7 G/DL (ref 6–8.2)
PTH-INTACT SERPL-MCNC: 408 PG/ML (ref 14–72)
RBC # BLD AUTO: 3.27 M/UL (ref 4.7–6.1)
RBC BLD AUTO: PRESENT
RETICS # AUTO: 0.07 M/UL (ref 0.04–0.06)
RETICS/RBC NFR: 2.3 % (ref 0.8–2.1)
SODIUM SERPL-SCNC: 138 MMOL/L (ref 135–145)
TIBC SERPL-MCNC: 180 UG/DL (ref 250–450)
UIBC SERPL-MCNC: 115 UG/DL (ref 110–370)
WBC # BLD AUTO: 10 K/UL (ref 4.8–10.8)

## 2023-03-27 PROCEDURE — 36415 COLL VENOUS BLD VENIPUNCTURE: CPT

## 2023-03-27 PROCEDURE — 700111 HCHG RX REV CODE 636 W/ 250 OVERRIDE (IP)

## 2023-03-27 PROCEDURE — 5A1D70Z PERFORMANCE OF URINARY FILTRATION, INTERMITTENT, LESS THAN 6 HOURS PER DAY: ICD-10-PCS | Performed by: INTERNAL MEDICINE

## 2023-03-27 PROCEDURE — 99223 1ST HOSP IP/OBS HIGH 75: CPT | Mod: AI | Performed by: INTERNAL MEDICINE

## 2023-03-27 PROCEDURE — 82728 ASSAY OF FERRITIN: CPT

## 2023-03-27 PROCEDURE — 90935 HEMODIALYSIS ONE EVALUATION: CPT

## 2023-03-27 PROCEDURE — 83550 IRON BINDING TEST: CPT

## 2023-03-27 PROCEDURE — 770020 HCHG ROOM/CARE - TELE (206)

## 2023-03-27 PROCEDURE — 82962 GLUCOSE BLOOD TEST: CPT

## 2023-03-27 PROCEDURE — 700111 HCHG RX REV CODE 636 W/ 250 OVERRIDE (IP): Performed by: INTERNAL MEDICINE

## 2023-03-27 PROCEDURE — 82330 ASSAY OF CALCIUM: CPT

## 2023-03-27 PROCEDURE — 93005 ELECTROCARDIOGRAM TRACING: CPT | Performed by: EMERGENCY MEDICINE

## 2023-03-27 PROCEDURE — 700101 HCHG RX REV CODE 250: Performed by: INTERNAL MEDICINE

## 2023-03-27 PROCEDURE — 700102 HCHG RX REV CODE 250 W/ 637 OVERRIDE(OP): Performed by: INTERNAL MEDICINE

## 2023-03-27 PROCEDURE — 71045 X-RAY EXAM CHEST 1 VIEW: CPT

## 2023-03-27 PROCEDURE — 83970 ASSAY OF PARATHORMONE: CPT

## 2023-03-27 PROCEDURE — 96374 THER/PROPH/DIAG INJ IV PUSH: CPT

## 2023-03-27 PROCEDURE — 83540 ASSAY OF IRON: CPT

## 2023-03-27 PROCEDURE — 83735 ASSAY OF MAGNESIUM: CPT

## 2023-03-27 PROCEDURE — 85025 COMPLETE CBC W/AUTO DIFF WBC: CPT

## 2023-03-27 PROCEDURE — 80053 COMPREHEN METABOLIC PANEL: CPT

## 2023-03-27 PROCEDURE — 99285 EMERGENCY DEPT VISIT HI MDM: CPT

## 2023-03-27 PROCEDURE — A9270 NON-COVERED ITEM OR SERVICE: HCPCS | Performed by: INTERNAL MEDICINE

## 2023-03-27 PROCEDURE — 85007 BL SMEAR W/DIFF WBC COUNT: CPT

## 2023-03-27 PROCEDURE — 85046 RETICYTE/HGB CONCENTRATE: CPT

## 2023-03-27 RX ORDER — SEVELAMER CARBONATE 800 MG/1
1600 TABLET, FILM COATED ORAL
Status: DISCONTINUED | OUTPATIENT
Start: 2023-03-28 | End: 2023-03-28

## 2023-03-27 RX ORDER — SODIUM HYPOCHLORITE 1.25 MG/ML
SOLUTION TOPICAL 2 TIMES DAILY
Status: DISCONTINUED | OUTPATIENT
Start: 2023-03-27 | End: 2023-03-30 | Stop reason: HOSPADM

## 2023-03-27 RX ORDER — LIDOCAINE HYDROCHLORIDE 40 MG/ML
SOLUTION TOPICAL
Status: DISCONTINUED | OUTPATIENT
Start: 2023-03-27 | End: 2023-03-30 | Stop reason: HOSPADM

## 2023-03-27 RX ORDER — ASPIRIN 81 MG/1
81 TABLET, CHEWABLE ORAL DAILY
Status: DISCONTINUED | OUTPATIENT
Start: 2023-03-28 | End: 2023-03-30 | Stop reason: HOSPADM

## 2023-03-27 RX ORDER — HEPARIN SODIUM 5000 [USP'U]/ML
5000 INJECTION, SOLUTION INTRAVENOUS; SUBCUTANEOUS EVERY 8 HOURS
Status: DISCONTINUED | OUTPATIENT
Start: 2023-03-27 | End: 2023-03-30 | Stop reason: HOSPADM

## 2023-03-27 RX ORDER — PREDNISOLONE ACETATE 10 MG/ML
1 SUSPENSION/ DROPS OPHTHALMIC 4 TIMES DAILY
Status: DISCONTINUED | OUTPATIENT
Start: 2023-03-27 | End: 2023-03-30 | Stop reason: HOSPADM

## 2023-03-27 RX ORDER — LIDOCAINE HYDROCHLORIDE 20 MG/ML
1 JELLY TOPICAL
Status: DISCONTINUED | OUTPATIENT
Start: 2023-03-27 | End: 2023-03-30 | Stop reason: HOSPADM

## 2023-03-27 RX ORDER — OXYCODONE HYDROCHLORIDE 5 MG/1
5 TABLET ORAL
Status: DISCONTINUED | OUTPATIENT
Start: 2023-03-27 | End: 2023-03-30 | Stop reason: HOSPADM

## 2023-03-27 RX ORDER — HYDROMORPHONE HYDROCHLORIDE 1 MG/ML
0.25 INJECTION, SOLUTION INTRAMUSCULAR; INTRAVENOUS; SUBCUTANEOUS
Status: DISCONTINUED | OUTPATIENT
Start: 2023-03-27 | End: 2023-03-30 | Stop reason: HOSPADM

## 2023-03-27 RX ORDER — OXYCODONE HYDROCHLORIDE 5 MG/1
2.5 TABLET ORAL
Status: DISCONTINUED | OUTPATIENT
Start: 2023-03-27 | End: 2023-03-30 | Stop reason: HOSPADM

## 2023-03-27 RX ORDER — AMOXICILLIN 250 MG
2 CAPSULE ORAL 2 TIMES DAILY
Status: DISCONTINUED | OUTPATIENT
Start: 2023-03-27 | End: 2023-03-30 | Stop reason: HOSPADM

## 2023-03-27 RX ORDER — ATORVASTATIN CALCIUM 40 MG/1
40 TABLET, FILM COATED ORAL
Status: DISCONTINUED | OUTPATIENT
Start: 2023-03-27 | End: 2023-03-30 | Stop reason: HOSPADM

## 2023-03-27 RX ORDER — BRIMONIDINE TARTRATE 2 MG/ML
1 SOLUTION/ DROPS OPHTHALMIC 2 TIMES DAILY
Status: DISCONTINUED | OUTPATIENT
Start: 2023-03-27 | End: 2023-03-30 | Stop reason: HOSPADM

## 2023-03-27 RX ORDER — HEPARIN SODIUM 1000 [USP'U]/ML
2000 INJECTION, SOLUTION INTRAVENOUS; SUBCUTANEOUS
Status: DISCONTINUED | OUTPATIENT
Start: 2023-03-27 | End: 2023-03-30 | Stop reason: HOSPADM

## 2023-03-27 RX ORDER — HEPARIN SODIUM 1000 [USP'U]/ML
1500 INJECTION, SOLUTION INTRAVENOUS; SUBCUTANEOUS
Status: DISCONTINUED | OUTPATIENT
Start: 2023-03-27 | End: 2023-03-27

## 2023-03-27 RX ORDER — GABAPENTIN 100 MG/1
100 CAPSULE ORAL EVERY EVENING
Status: DISCONTINUED | OUTPATIENT
Start: 2023-03-27 | End: 2023-03-30 | Stop reason: HOSPADM

## 2023-03-27 RX ORDER — BISACODYL 10 MG
10 SUPPOSITORY, RECTAL RECTAL
Status: DISCONTINUED | OUTPATIENT
Start: 2023-03-27 | End: 2023-03-30 | Stop reason: HOSPADM

## 2023-03-27 RX ORDER — ONDANSETRON 2 MG/ML
4 INJECTION INTRAMUSCULAR; INTRAVENOUS EVERY 4 HOURS PRN
Status: DISCONTINUED | OUTPATIENT
Start: 2023-03-27 | End: 2023-03-30 | Stop reason: HOSPADM

## 2023-03-27 RX ORDER — HEPARIN SODIUM 1000 [USP'U]/ML
INJECTION, SOLUTION INTRAVENOUS; SUBCUTANEOUS
Status: COMPLETED
Start: 2023-03-27 | End: 2023-03-27

## 2023-03-27 RX ORDER — CARVEDILOL 3.12 MG/1
3.12 TABLET ORAL 2 TIMES DAILY WITH MEALS
Status: DISCONTINUED | OUTPATIENT
Start: 2023-03-27 | End: 2023-03-30 | Stop reason: HOSPADM

## 2023-03-27 RX ORDER — POLYETHYLENE GLYCOL 3350 17 G/17G
1 POWDER, FOR SOLUTION ORAL
Status: DISCONTINUED | OUTPATIENT
Start: 2023-03-27 | End: 2023-03-30 | Stop reason: HOSPADM

## 2023-03-27 RX ORDER — PROCHLORPERAZINE EDISYLATE 5 MG/ML
5-10 INJECTION INTRAMUSCULAR; INTRAVENOUS EVERY 4 HOURS PRN
Status: DISCONTINUED | OUTPATIENT
Start: 2023-03-27 | End: 2023-03-30 | Stop reason: HOSPADM

## 2023-03-27 RX ORDER — LATANOPROST 50 UG/ML
1 SOLUTION/ DROPS OPHTHALMIC EVERY EVENING
Status: DISCONTINUED | OUTPATIENT
Start: 2023-03-27 | End: 2023-03-30 | Stop reason: HOSPADM

## 2023-03-27 RX ORDER — ACETAZOLAMIDE 250 MG/1
125 TABLET ORAL EVERY 12 HOURS
Status: DISCONTINUED | OUTPATIENT
Start: 2023-03-27 | End: 2023-03-30 | Stop reason: HOSPADM

## 2023-03-27 RX ORDER — LIDOCAINE HYDROCHLORIDE 20 MG/ML
20 INJECTION, SOLUTION INFILTRATION; PERINEURAL
Status: DISCONTINUED | OUTPATIENT
Start: 2023-03-27 | End: 2023-03-30 | Stop reason: HOSPADM

## 2023-03-27 RX ORDER — PROMETHAZINE HYDROCHLORIDE 25 MG/1
12.5-25 TABLET ORAL EVERY 4 HOURS PRN
Status: DISCONTINUED | OUTPATIENT
Start: 2023-03-27 | End: 2023-03-30 | Stop reason: HOSPADM

## 2023-03-27 RX ORDER — HEPARIN SODIUM 1000 [USP'U]/ML
3800 INJECTION, SOLUTION INTRAVENOUS; SUBCUTANEOUS
Status: DISCONTINUED | OUTPATIENT
Start: 2023-03-27 | End: 2023-03-30 | Stop reason: HOSPADM

## 2023-03-27 RX ORDER — LABETALOL HYDROCHLORIDE 5 MG/ML
10 INJECTION, SOLUTION INTRAVENOUS EVERY 4 HOURS PRN
Status: DISCONTINUED | OUTPATIENT
Start: 2023-03-27 | End: 2023-03-30 | Stop reason: HOSPADM

## 2023-03-27 RX ORDER — ACETAMINOPHEN 325 MG/1
650 TABLET ORAL EVERY 6 HOURS PRN
Status: DISCONTINUED | OUTPATIENT
Start: 2023-03-27 | End: 2023-03-30 | Stop reason: HOSPADM

## 2023-03-27 RX ORDER — PROMETHAZINE HYDROCHLORIDE 25 MG/1
12.5-25 SUPPOSITORY RECTAL EVERY 4 HOURS PRN
Status: DISCONTINUED | OUTPATIENT
Start: 2023-03-27 | End: 2023-03-30 | Stop reason: HOSPADM

## 2023-03-27 RX ORDER — ONDANSETRON 4 MG/1
4 TABLET, ORALLY DISINTEGRATING ORAL EVERY 4 HOURS PRN
Status: DISCONTINUED | OUTPATIENT
Start: 2023-03-27 | End: 2023-03-30 | Stop reason: HOSPADM

## 2023-03-27 RX ORDER — DORZOLAMIDE HYDROCHLORIDE AND TIMOLOL MALEATE 20; 5 MG/ML; MG/ML
1 SOLUTION/ DROPS OPHTHALMIC 2 TIMES DAILY
Status: DISCONTINUED | OUTPATIENT
Start: 2023-03-27 | End: 2023-03-30 | Stop reason: HOSPADM

## 2023-03-27 RX ADMIN — BRIMONIDINE TARTRATE 1 DROP: 2 SOLUTION OPHTHALMIC at 23:34

## 2023-03-27 RX ADMIN — CARVEDILOL 3.12 MG: 3.12 TABLET, FILM COATED ORAL at 22:30

## 2023-03-27 RX ADMIN — PREDNISOLONE ACETATE 1 DROP: 10 SUSPENSION/ DROPS OPHTHALMIC at 23:38

## 2023-03-27 RX ADMIN — HEPARIN SODIUM 2000 UNITS: 1000 INJECTION, SOLUTION INTRAVENOUS; SUBCUTANEOUS at 16:39

## 2023-03-27 RX ADMIN — HYDROMORPHONE HYDROCHLORIDE 0.25 MG: 1 INJECTION, SOLUTION INTRAMUSCULAR; INTRAVENOUS; SUBCUTANEOUS at 15:39

## 2023-03-27 RX ADMIN — OXYCODONE 5 MG: 5 TABLET ORAL at 21:00

## 2023-03-27 RX ADMIN — HYDROMORPHONE HYDROCHLORIDE 0.25 MG: 1 INJECTION, SOLUTION INTRAMUSCULAR; INTRAVENOUS; SUBCUTANEOUS at 22:31

## 2023-03-27 RX ADMIN — GABAPENTIN 100 MG: 100 CAPSULE ORAL at 22:30

## 2023-03-27 RX ADMIN — HEPARIN SODIUM 3800 UNITS: 1000 INJECTION, SOLUTION INTRAVENOUS; SUBCUTANEOUS at 19:40

## 2023-03-27 RX ADMIN — LATANOPROST 1 DROP: 50 SOLUTION OPHTHALMIC at 23:36

## 2023-03-27 RX ADMIN — DORZOLAMIDE HYDROCHLORIDE AND TIMOLOL MALEATE 1 DROP: 20; 5 SOLUTION/ DROPS OPHTHALMIC at 23:37

## 2023-03-27 RX ADMIN — ACETAZOLAMIDE 125 MG: 250 TABLET ORAL at 23:36

## 2023-03-27 RX ADMIN — ATORVASTATIN CALCIUM 40 MG: 40 TABLET, FILM COATED ORAL at 22:30

## 2023-03-27 SDOH — ECONOMIC STABILITY - HOUSING INSECURITY: HOMELESSNESS UNSPECIFIED: Z59.00

## 2023-03-27 ASSESSMENT — LIFESTYLE VARIABLES
EVER FELT BAD OR GUILTY ABOUT YOUR DRINKING: NO
EVER FELT BAD OR GUILTY ABOUT YOUR DRINKING: NO
TOTAL SCORE: 0
HAVE YOU EVER FELT YOU SHOULD CUT DOWN ON YOUR DRINKING: NO
TOTAL SCORE: 0
AVERAGE NUMBER OF DAYS PER WEEK YOU HAVE A DRINK CONTAINING ALCOHOL: 0
ON A TYPICAL DAY WHEN YOU DRINK ALCOHOL HOW MANY DRINKS DO YOU HAVE: 0
DOES PATIENT WANT TO STOP DRINKING: CANNOT ASSESS
TOTAL SCORE: 0
CONSUMPTION TOTAL: INCOMPLETE
ALCOHOL_USE: NO
CONSUMPTION TOTAL: NEGATIVE
EVER HAD A DRINK FIRST THING IN THE MORNING TO STEADY YOUR NERVES TO GET RID OF A HANGOVER: NO
TOTAL SCORE: 0
EVER_SMOKED: YES
HOW MANY TIMES IN THE PAST YEAR HAVE YOU HAD 5 OR MORE DRINKS IN A DAY: 0
TOTAL SCORE: 0
TOTAL SCORE: 0
EVER HAD A DRINK FIRST THING IN THE MORNING TO STEADY YOUR NERVES TO GET RID OF A HANGOVER: NO
HAVE PEOPLE ANNOYED YOU BY CRITICIZING YOUR DRINKING: NO
DOES PATIENT WANT TO STOP DRINKING: CANNOT ASSESS
HAVE PEOPLE ANNOYED YOU BY CRITICIZING YOUR DRINKING: NO
ALCOHOL_USE: NO
HAVE YOU EVER FELT YOU SHOULD CUT DOWN ON YOUR DRINKING: NO

## 2023-03-27 ASSESSMENT — COPD QUESTIONNAIRES
DURING THE PAST 4 WEEKS HOW MUCH DID YOU FEEL SHORT OF BREATH: NONE/LITTLE OF THE TIME
DO YOU EVER COUGH UP ANY MUCUS OR PHLEGM?: NO/ONLY WITH OCCASIONAL COLDS OR INFECTIONS
COPD SCREENING SCORE: 2
HAVE YOU SMOKED AT LEAST 100 CIGARETTES IN YOUR ENTIRE LIFE: YES

## 2023-03-27 ASSESSMENT — PATIENT HEALTH QUESTIONNAIRE - PHQ9
1. LITTLE INTEREST OR PLEASURE IN DOING THINGS: NOT AT ALL
SUM OF ALL RESPONSES TO PHQ9 QUESTIONS 1 AND 2: 0
2. FEELING DOWN, DEPRESSED, IRRITABLE, OR HOPELESS: NOT AT ALL

## 2023-03-27 ASSESSMENT — PAIN DESCRIPTION - PAIN TYPE
TYPE: ACUTE PAIN
TYPE: ACUTE PAIN

## 2023-03-27 ASSESSMENT — FIBROSIS 4 INDEX
FIB4 SCORE: 1.18
FIB4 SCORE: 0.47

## 2023-03-27 NOTE — CONSULTS
"[facility] -  CONSULTATION NOTE      DATE & TIME:   3/27/2023  1:41 PM               AUTHOR:  Hudson Leahy D.O.      REASON FOR CONSULTATION:   - Inpatient hemodialysis management.      CHIEF COMPLAINT:   -  \"I can't get dialysis \"      HISTORY OF PRESENT ILLNESS:    Mr. Watkins is a very pleasant 47-year-old gentleman known to us from prior admissions and his dialysis care.  He has end-stage renal disease secondary to diabetes and hypertension.  Prior to dialyzing in the renal area he used to have an outpatient unit in [facility].  He is currently homeless living in a homeless shelter in [place].  He was recently had a long hospitalization approximately for a month discharged about a week ago.  He did not attend any of his outpatient therapies since his last discharge.  He tells me that he has glaucoma and cannot read his pill bottles.  He has not taken any of his medications.  He is having worsening lower extremity edema with weakness.  He is having some dyspnea with exertion.  Currently has a left IJ PermCath.  There is what appears to be a thrombosed AV graft in his left forearm.  At the time of presentation he was found to have a blood pressure 171/103 with a heart rate of 85.  Further lab evaluation is still pending.  We have been asked to see him regarding his renal failure.      No F/C/N/V/CP, +SOB    No melena, hematochezia, hematemesis.    No HA or abdominal pain.      REVIEW OF SYSTEMS:    10 point ROS was performed and is as per HPI or otherwise negative      PAST MEDICAL HISTORY:   - ESRD  - HTN  - Anemia of CKD  - CKD-MBD  Past Medical History:   Diagnosis Date    Diabetes     High anion gap metabolic acidosis 8/9/2022    Hyperkalemia 2/22/2022    Hypertension     Renal disease         PAST SURGICAL HISTORY:   - Dialysis Access Surgery  Past Surgical History:   Procedure Laterality Date    AV FISTULA CREATION Left 2/25/2022    Procedure: CREATION, AV " "FISTULA-UPPER EXTREMITY GRAFT, AND FISTULOGRAM;  Surgeon: Tone Hartman M.D.;  Location: SURGERY [facility];  Service: Vascular    THROMBECTOMY Left 2/25/2022    Procedure: THROMBECTOMY;  Surgeon: Tone Hartman M.D.;  Location: SURGERY [facility];  Service: Vascular       FAMILY HISTORY:   - Reviewed and non contributory to current illness  No family history on file.       SOCIAL HISTORY:   Social History     Tobacco Use    Smoking status: Every Day     Packs/day: 0.25     Types: Cigarettes    Smokeless tobacco: Never   Vaping Use    Vaping Use: Never used   Substance Use Topics    Alcohol use: Not Currently    Drug use: No         HOME MEDICATIONS:   - Reviewed and documented in chart      ALLERGIES:  Patient has no known allergies.      VITAL SIGNS:  BP (!) 171/103   Pulse 85   Temp 36.4 °C (97.6 °F) (Temporal)   Resp 18   Ht 1.778 m (5' 10\")   Wt 89.7 kg (197 lb 12 oz)   SpO2 98%   BMI 28.37 kg/m²   Physical Exam  Nursing note reviewed.   Constitutional:       Appearance: Normal appearance.   HENT:      Head: Normocephalic and atraumatic.      Nose: Nose normal.      Mouth/Throat:      Mouth: Mucous membranes are dry.   Eyes:      General: No scleral icterus.     Extraocular Movements: Extraocular movements intact.      Pupils: Pupils are equal, round, and reactive to light.   Neck:      Comments: Left IJ Permcath  Cardiovascular:      Rate and Rhythm: Normal rate and regular rhythm.      Pulses: Normal pulses.      Heart sounds: Normal heart sounds.   Pulmonary:      Effort: Pulmonary effort is normal. No respiratory distress.      Breath sounds: Examination of the right-lower field reveals decreased breath sounds. Examination of the left-lower field reveals decreased breath sounds. Decreased breath sounds and rales present. No wheezing or rhonchi.   Abdominal:      General: There is no distension.   Musculoskeletal:         General: No tenderness or deformity.      Right lower leg: Edema " present.      Left lower leg: Edema present.      Comments: Thrombosed left forearm AVG   Skin:     General: Skin is warm and dry.      Findings: No rash.   Neurological:      General: No focal deficit present.      Mental Status: He is alert and oriented to person, place, and time.   Psychiatric:         Behavior: Behavior normal.          FLUID BALANCE:  No intake/output data recorded.      LABS:     Pending           IMAGING:  - All imaging reviewed from admission to present day      ASSESSMENTS:   # ESRD    Secondary to Diabetes and Hypertension    Maintenance dialysis qTTS previously     Unclear if he has an outpt unit in [place]    Left IJ Permcath    Failed Left AVG    Will need dialysis today (MON)   # Hypertension    Continue current medications    Volume off with dialysis as BP tolerates   # Edema/Overload    Volume off with HD as BP tolerates   # Anemia    Check iron stores    JACKIE with HD to goal Hgb 10-11    Transfuse PRN   # CKD-MBD    Check PTH and Vit D    Check Phos   # Low Serum Albumin    No dietary protein restrictions    Goal: 1.5g Protein/kg/day   # Homeless    Complicating discharge   # Hx of Meth use   # DM   # Chronic wounds    SUGGESTIONS:   Dialysis today (MON)   Maintenance dialysis qTTS and PRN    Unclear if he has an outpt unit   Will need several daily treatments to optimize volume and clear uremia   Volume off with dialysis as BP tolerates    Try and improve edema to aid wound healing   Continue usual anti-hypertensive medications   Check iron stores   JACKIE with HD to goal Hgb 10-11   Transfuse PRN   Check PTH and Vit D   Check Phos   No dietary protein restrctions    Goal: 1.5g Protein/kg/day   Follow labs with further recommendations to follow    Thank you for this interesting consult and for allowing us to participate in his care.  Will follow with you.

## 2023-03-27 NOTE — ASSESSMENT & PLAN NOTE
- Appears to be volume overloaded due to missing hemodialysis.  -Nephrology on board for dialysis and volume removal.  -Resume home Coreg.  Not on ACE inhibitor/ARB due to ESRD.

## 2023-03-27 NOTE — ASSESSMENT & PLAN NOTE
- Blood pressure elevated, likely due to noncompliance with hemodialysis.  Questionable compliance with medications as well.  -Restart Coreg.  He will be on as needed IV labetalol for significant hypertension.  Optimize blood pressure control.

## 2023-03-27 NOTE — H&P
"Hospital Medicine History & Physical Note    Date of Service  3/27/2023    Primary Care Physician  Pcp Pt States None    Consultants  nephrology    Specialist Names: Dr. Leahy    Code Status  Full Code    Chief Complaint  Chief Complaint   Patient presents with    Other     Pt states he's here for dialysis. Last dialysis Friday. Pt states he hasn't been able to see his medication labels due to glaucoma and hasn't been able to take them because of this.     Wound Check     Between pt's thighs. Pt states, \"I need my wounds bandaged\"       History of Presenting Illness  Ambrose Watkins is a homeless 47 y.o. 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  Patient stated that in the past several days, he has been feeling crummy, with stomach ache, nausea, dry heaving, along with increased leg swelling.  He denies shortness of breath or chest pain.  He complains of pain on the right thigh where he has the chronic wound and is requesting pain medications.  He had no other complaint such as diarrhea, dizziness, or lightheadedness.    ED course:  On evaluation, vital signs were stable albeit with elevated blood pressure.  Blood work are showed stable hemoglobin, potassium 6.1, anion gap of 23 with CO2 of 16, and creatinine of 15.9.  Chest x-ray (personally reviewed) showed hypoinflation but did not show any focal " infiltrates or consolidation.  EKG (my read) showed normal sinus rhythm, no dynamic ischemic changes, and no significant T wave changes.  Nephrology was consulted, and patient subsequently admitted to the hospitalist service.    I discussed the plan of care with patient and ERP .    Review of Systems  ROS    Pertinent positives/negatives as mentioned above.     A complete review of systems was personally done by me. All other systems were negative.       Past Medical History   has a past medical history of Diabetes, High anion gap metabolic acidosis (8/9/2022), Hyperkalemia (2/22/2022), Hypertension, and Renal disease.    Surgical History   has a past surgical history that includes av fistula creation (Left, 2/25/2022) and thrombectomy (Left, 2/25/2022).     Family History    Family history reviewed with patient. There is no family history that is pertinent to the chief complaint.     Social History   reports that he has been smoking. He has been smoking an average of .25 packs per day. He has never used smokeless tobacco. He reports that he does not currently use alcohol. He reports that he does not use drugs.    Allergies  No Known Allergies    Medications  Prior to Admission Medications   Prescriptions Last Dose Informant Patient Reported? Taking?   acetaZOLAMIDE (DIAMOX) 125 MG Tab UNK at [facility] No No   Sig: Take 1 Tablet by mouth every 12 hours.   aspirin (ASA) 81 MG Chew Tab chewable tablet UNK at [facility] No No   Sig: Chew 1 Tablet every day.   atorvastatin (LIPITOR) 40 MG Tab UNK at [facility] No No   Sig: Take 1 Tablet by mouth at bedtime.   brimonidine (ALPHAGAN) 0.2 % Solution UNK at [facility] Home Pharm[facility] No No   Sig: Administer 1 Drop into the right eye 2 times a day.   carvedilol (COREG) 3.125 MG Tab UNK at [facility] Home Pharm[facility] No No   Sig: Take 1 Tablet by mouth 2 times a day with meals.   dorzolamide-timolol (COSOPT) 22.3-6.8 MG/ML  Solution UNK at [facility] Pharm[facility] No No   Sig: Administer 1 Drop into the right eye 2 times a day.   gabapentin (NEURONTIN) 100 MG Cap UNK at [facility] Home Pharm[facility] No No   Sig: Take 1 Capsule by mouth every evening for 30 days.   latanoprost (XALATAN) 0.005 % Solution UNK at [facility] No No   Sig: Administer 1 Drop into the right eye every evening.   prednisoLONE acetate (PRED FORTE) 1 % Suspension UNK at [facility] Home [facility] No No   Sig: Administer 1 Drop into both eyes 4 times a day.   sevelamer carbonate (RENVELA) 800 MG Tab tablet UNK at [facility] Home [facility] No No   Sig: Take 2 Tablets by mouth 3 times a day with meals for 30 days.      Facility-Administered Medications: None       Physical Exam  Temp:  [36.4 °C (97.6 °F)] 36.4 °C (97.6 °F)  Pulse:  [85] 85  Resp:  [18] 18  BP: (171)/(103) 171/103  SpO2:  [98 %] 98 %  Blood Pressure: (!) 171/103   Temperature: 36.4 °C (97.6 °F)   Pulse: 85   Respiration: 18   Pulse Oximetry: 98 %       Physical Exam  Vitals reviewed.   Constitutional:       General: He is not in acute distress.     Appearance: Normal appearance. He is not toxic-appearing or diaphoretic.   HENT:      Head: Normocephalic and atraumatic.      Right Ear: External ear normal.      Left Ear: External ear normal.      Mouth/Throat:      Mouth: Mucous membranes are moist.      Pharynx: No oropharyngeal exudate.   Eyes:      General: No scleral icterus.     Extraocular Movements: Extraocular movements intact.      Conjunctiva/sclera: Conjunctivae normal.      Pupils: Pupils are equal, round, and reactive to light.   Cardiovascular:      Rate and Rhythm: Normal rate and regular rhythm.      Heart sounds: Normal heart sounds. No murmur heard.    No gallop.   Pulmonary:      Effort: Pulmonary effort is normal. No respiratory distress.      Breath sounds: Normal breath sounds. No stridor. No wheezing, rhonchi or rales.   Chest:      Chest wall: No tenderness.    Abdominal:      General: Bowel sounds are normal. There is no distension.      Palpations: Abdomen is soft. There is no mass.      Tenderness: There is no abdominal tenderness. There is no guarding or rebound.   Musculoskeletal:         General: No swelling. Normal range of motion.      Cervical back: Normal range of motion and neck supple.      Right lower leg: Edema present.      Left lower leg: Edema present.      Comments: (+) Chronic thigh wound, right, does not appear to be infected   Lymphadenopathy:      Cervical: No cervical adenopathy.   Skin:     General: Skin is warm and dry.      Coloration: Skin is not jaundiced.      Findings: No rash.   Neurological:      General: No focal deficit present.      Mental Status: He is alert and oriented to person, place, and time.      Cranial Nerves: No cranial nerve deficit.   Psychiatric:         Mood and Affect: Mood normal.         Behavior: Behavior normal.         Thought Content: Thought content normal.         Judgment: Judgment normal.       Laboratory:  Recent Labs     03/27/23  1259   WBC 10.0   RBC 3.27*   HEMOGLOBIN 9.7*   HEMATOCRIT 32.0*   MCV 97.9*   MCH 29.7   MCHC 30.3*   RDW 66.4*   PLATELETCT 162*   MPV 11.4     Recent Labs     03/27/23  1259   SODIUM 138   POTASSIUM 6.1*   CHLORIDE 99   CO2 16*   GLUCOSE 112*   BUN 93*   CREATININE 15.19*   CALCIUM 8.5     Recent Labs     03/27/23  1259   ALTSGPT <5   ASTSGOT 9*   ALKPHOSPHAT 133*   TBILIRUBIN 0.5   GLUCOSE 112*         No results for input(s): NTPROBNP in the last 72 hours.      No results for input(s): TROPONINT in the last 72 hours.    Imaging:  DX-CHEST-PORTABLE (1 VIEW)   Final Result      Hypoinflation without other evidence for acute cardiopulmonary disease.            Imaging studies and EKG results reviewed as above.    Assessment/Plan:  Justification for Admission Status  I anticipate this patient will require at least two midnights for appropriate medical management, necessitating  inpatient admission because of volume overload, and hyperkalemia due to missing hemodialysis.  Per nephrology, he will need more than 1 session of hemodialysis.  He will need close monitoring on telemetry due to high potassium levels, and will need intervention to temporarily lower potassium while waiting for hemodialysis.    Patient will need a Telemetry bed on MEDICAL service .  The need is secondary to hyperkalemia.    * Hyperkalemia- (present on admission)  Assessment & Plan  - Due to missed hemodialysis.  Potassium 6.1.  No EKG changes.  - Will give IV bicarbonate, along with regular insulin IV and dextrose as temporizing measure   -He will get hemodialysis which should for definitive removal of potassium.    Right eye glaucoma, due to angle-closure and phacomorphic components- (present on admission)  Assessment & Plan  - Resume Diamox, along with eyedrops including Alphagan, Cosopt, Xalatan, and prednisolone forte.    End stage renal disease (HCC)- (present on admission)  Assessment & Plan  - He is hemodialysis dependent.  He is homeless, having moved from [facility] to [place].  Extensive attempt to find outpatient hemodialysis chair for him during last hospitalization in the hospital.  He is utilizing the ED for hemodialysis, but has missed the last 1 week of dialysis due to pain.   -Nephrology contacted, plan for hemodialysis.  May need several sessions of hemodialysis due to degree of volume overload.  -Continue Renvela.    Metabolic acidosis- (present on admission)  Assessment & Plan  - High anion gap, due to ESRD and missed hemodialysis  -Hemodialysis as above.  Trend labs.    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  Wound does not appear to be infected.  -Will get wound service to do wound care while in the hospital.  -Holding off on  antibiotics.  -Pain control with oral oxycodone and IV Dilaudid.    Homelessness- (present on admission)  Assessment & Plan  - He lives at the shelter, anticipate return back to shelter on discharge.    Chronic HFrEF (heart failure with reduced ejection fraction) ([facility])- (present on admission)  Assessment & Plan  - Appears to be volume overloaded due to missing hemodialysis.  -Nephrology on board for dialysis and volume removal.  -Resume home Coreg.  Not on ACE inhibitor/ARB due to ESRD.    Hypertension- (present on admission)  Assessment & Plan  - Blood pressure elevated, likely due to noncompliance with hemodialysis.  Questionable compliance with medications as well.  -Restart Coreg.  He will be on as needed IV labetalol for significant hypertension.  Optimize blood pressure control.    Type 2 diabetes mellitus with kidney complication, without long-term current use of insulin ([facility])- (present on admission)  Assessment & Plan  - Recent hemoglobin A1c was 6.5.  -He will be on sliding scale insulin coverage while in-house. Accu-Cheks before meals and at bedtime. Goal to keep BG between 140-180 per 2019 ADA guidelines.          VTE prophylaxis: heparin ppx

## 2023-03-27 NOTE — ASSESSMENT & PLAN NOTE
- Due to missed hemodialysis.  Initial potassium 6.1.  No EKG changes.  -Given IV bicarbonate, along with regular insulin IV and dextrose as temporizing measure   Now resolved with hemodialysis  Finished telemetry monitoring

## 2023-03-27 NOTE — ASSESSMENT & PLAN NOTE
- Recent hemoglobin A1c was 6.5.  -He will be on sliding scale insulin coverage while in-house. Accu-Cheks before meals and at bedtime. Goal to keep BG between 140-180 per 2019 ADA guidelines.

## 2023-03-27 NOTE — ED NOTES
Med rec completed per pt's home pharmacy ([facility])   Allergies reviewed    Pt states that he hasn't taken his meds in awhile

## 2023-03-27 NOTE — ED TRIAGE NOTES
"Chief Complaint   Patient presents with    Other     Pt states he's here for dialysis. Last dialysis Friday. Pt states he hasn't been able to see his medication labels due to glaucoma and hasn't been able to take them because of this.     Wound Check     Between pt's thighs. Pt states, \"I need my wounds bandaged\"         BP (!) 171/103   Pulse 85   Temp 36.4 °C (97.6 °F) (Temporal)   Resp 18   Ht 1.778 m (5' 10\")   Wt 89.7 kg (197 lb 12 oz)   SpO2 98%   BMI 28.37 kg/m²     "

## 2023-03-27 NOTE — ASSESSMENT & PLAN NOTE
- He is hemodialysis dependent.  He is homeless, having moved from [facility] to [place].  Extensive attempt to find outpatient hemodialysis chair for him during last hospitalization in the hospital.  He is utilizing the ED for hemodialysis, but has missed the last 1 week of dialysis due to pain.   -Nephrology contacted, plan for hemodialysis.  May need several sessions of hemodialysis due to degree of volume overload.  -Continue Renvela.  HD today

## 2023-03-27 NOTE — ASSESSMENT & PLAN NOTE
- Has chronic wounds on the right thigh, has recently completed course of antibiotics.  Wound does not appear to be infected.  -Will get wound service to do wound care while in the hospital.  -Holding off on antibiotics.  -Pain control with oral oxycodone and IV Dilaudid.

## 2023-03-27 NOTE — ED PROVIDER NOTES
"ED Provider Note    CHIEF COMPLAINT  Chief Complaint   Patient presents with    Other     Pt states he's here for dialysis. Last dialysis Friday. Pt states he hasn't been able to see his medication labels due to glaucoma and hasn't been able to take them because of this.     Wound Check     Between pt's thighs. Pt states, \"I need my wounds bandaged\"       EXTERNAL RECORDS REVIEWED  Recent admission note 3/16/2023 for end-stage renal disease hyperkalemia and glaucoma with vitreal hemorrhage of the right eye    HPI/ROS  LIMITATION TO HISTORY   Select: : None  OUTSIDE HISTORIAN(S):      Ambrose Watkins is a 47 y.o. male who presents to the emergency department because of inability to get to dialysis and leg wounds.  The patient states he has a history of end-stage renal disease currently supposed to have dialysis 3 times a week he was just released from the hospital on the 16th apparently he was then seen on the 20th apparently then he had dialysis done on Friday of last week.  The patient is currently homeless and he has not had dialysis for 1 week.  The patient also has chronic wounds to his legs and last time the wound dressings were changed was 1 week ago.  The patient presents today via bus because he needs his wounds changed and he needs dialysis.  Patient denies any overt fevers states that he cannot see through his eyes he is not taking any of his medications patient denies chills or diarrhea just states that his legs hurt and he cannot get up and walk too easily states that he cannot get into see dialysis and he can get into see wound care.    PAST MEDICAL HISTORY   has a past medical history of Diabetes, High anion gap metabolic acidosis (8/9/2022), Hyperkalemia (2/22/2022), Hypertension, and Renal disease.    SURGICAL HISTORY   has a past surgical history that includes av fistula creation (Left, 2/25/2022) and thrombectomy (Left, 2/25/2022).    FAMILY HISTORY  No family history on file.    SOCIAL " "HISTORY  Social History     Tobacco Use    Smoking status: Every Day     Packs/day: 0.25     Types: Cigarettes    Smokeless tobacco: Never   Vaping Use    Vaping Use: Never used   Substance and Sexual Activity    Alcohol use: Not Currently    Drug use: No    Sexual activity: Not on file       CURRENT MEDICATIONS  Home Medications       Reviewed by Elsa Watson (Pharmacy Tech) on 03/27/23 at 1445  Med List Status: Complete     Medication Last Dose Status   acetaZOLAMIDE (DIAMOX) 125 MG Tab UNK Active   aspirin (ASA) 81 MG Chew Tab chewable tablet UNK Active   atorvastatin (LIPITOR) 40 MG Tab UNK Active   brimonidine (ALPHAGAN) 0.2 % Solution UNK Active   carvedilol (COREG) 3.125 MG Tab UNK Active   dorzolamide-timolol (COSOPT) 22.3-6.8 MG/ML Solution UNK Active   gabapentin (NEURONTIN) 100 MG Cap UNK Active   latanoprost (XALATAN) 0.005 % Solution UNK Active   prednisoLONE acetate (PRED FORTE) 1 % Suspension UNK Active   sevelamer carbonate (RENVELA) 800 MG Tab tablet UNK Active                    ALLERGIES  No Known Allergies    PHYSICAL EXAM  VITAL SIGNS: BP (!) 171/103   Pulse 85   Temp 36.4 °C (97.6 °F) (Temporal)   Resp 18   Ht 1.778 m (5' 10\")   Wt 89.7 kg (197 lb 12 oz)   SpO2 98%   BMI 28.37 kg/m²    Constitutional: No acute distress  HENT: Normocephalic, Atraumatic, Bilateral external ears normal.  Eyes:  conjunctiva are normal.   Neck: Supple.  Nontender midline  Cardiovascular: Regular rate and rhythm without murmurs gallops or rubs.   Thorax & Lungs: No respiratory distress. Breathing comfortably. Lungs are clear to auscultation bilaterally, there are no wheezes no rales. Chest wall is nontender.  Abdomen: Soft, nontender nondistended.  Skin: Warm, Dry, No erythema,   Back: No tenderness, No CVA tenderness.  Musculoskeletal: She has chronic wounds to both lower extremities as edematous 2-3+ pitting edema all the way up to the mid abdomen.  The wound showed no signs of significant discharge " she is tender to palpation no significant erythema.  Neurologic: Alert & oriented x 3, normal sensation moving all extremities appears normal   Psychiatric: Affect normal, Judgment normal, Mood normal.       DIAGNOSTIC STUDIES / PROCEDURES  EKG  I have independently interpreted this EKG  Interpreted below by myself    LABS  Results for orders placed or performed during the hospital encounter of 03/27/23   CBC with Differential   Result Value Ref Range    WBC 10.0 4.8 - 10.8 K/uL    RBC 3.27 (L) 4.70 - 6.10 M/uL    Hemoglobin 9.7 (L) 14.0 - 18.0 g/dL    Hematocrit 32.0 (L) 42.0 - 52.0 %    MCV 97.9 (H) 81.4 - 97.8 fL    MCH 29.7 27.0 - 33.0 pg    MCHC 30.3 (L) 33.7 - 35.3 g/dL    RDW 66.4 (H) 35.9 - 50.0 fL    Platelet Count 162 (L) 164 - 446 K/uL    MPV 11.4 9.0 - 12.9 fL    Neutrophils-Polys 87.80 (H) 44.00 - 72.00 %    Lymphocytes 4.40 (L) 22.00 - 41.00 %    Monocytes 5.20 0.00 - 13.40 %    Eosinophils 1.70 0.00 - 6.90 %    Basophils 0.90 0.00 - 1.80 %    Nucleated RBC 0.20 /100 WBC    Neutrophils (Absolute) 8.78 (H) 1.82 - 7.42 K/uL    Lymphs (Absolute) 0.44 (L) 1.00 - 4.80 K/uL    Monos (Absolute) 0.52 0.00 - 0.85 K/uL    Eos (Absolute) 0.17 0.00 - 0.51 K/uL    Baso (Absolute) 0.09 0.00 - 0.12 K/uL    NRBC (Absolute) 0.02 K/uL    Anisocytosis 2+ (A)     Macrocytosis 2+ (A)    Complete Metabolic Panel (CMP)   Result Value Ref Range    Sodium 138 135 - 145 mmol/L    Potassium 6.1 (H) 3.6 - 5.5 mmol/L    Chloride 99 96 - 112 mmol/L    Co2 16 (L) 20 - 33 mmol/L    Anion Gap 23.0 (H) 7.0 - 16.0    Glucose 112 (H) 65 - 99 mg/dL    Bun 93 (H) 8 - 22 mg/dL    Creatinine 15.19 (HH) 0.50 - 1.40 mg/dL    Calcium 8.5 8.5 - 10.5 mg/dL    AST(SGOT) 9 (L) 12 - 45 U/L    ALT(SGPT) <5 2 - 50 U/L    Alkaline Phosphatase 133 (H) 30 - 99 U/L    Total Bilirubin 0.5 0.1 - 1.5 mg/dL    Albumin 3.6 3.2 - 4.9 g/dL    Total Protein 8.7 (H) 6.0 - 8.2 g/dL    Globulin 5.1 (H) 1.9 - 3.5 g/dL    A-G Ratio 0.7 g/dL   PLATELET ESTIMATE    Result Value Ref Range    Plt Estimation Normal    MORPHOLOGY   Result Value Ref Range    RBC Morphology Present     Poikilocytosis 1+     Echinocytes 1+    PERIPHERAL SMEAR REVIEW   Result Value Ref Range    Peripheral Smear Review see below    DIFFERENTIAL MANUAL   Result Value Ref Range    Manual Diff Status PERFORMED    CORRECTED CALCIUM   Result Value Ref Range    Correct Calcium 8.8 8.5 - 10.5 mg/dL   ESTIMATED GFR   Result Value Ref Range    GFR (CKD-EPI) 4 (A) >60 mL/min/1.73 m 2   Blood Culture,Hold   Result Value Ref Range    Blood Culture Hold Collected    EKG   Result Value Ref Range    Report       [facility] Emergency Dept.    Test Date:  2023  Pt Name:    CONY MORAN                Department: ER  MRN:        [id number]                      Room:        12  Gender:     Male                         Technician: [id number]  :        1975                   Requested By:FISH LIU  Order #:    [id number]                    Reading MD: FISH LIU MD    Measurements  Intervals                                Axis  Rate:       87                           P:          48  IL:         247                          QRS:        -37  QRSD:       109                          T:          42  QT:         387  QTc:        466    Interpretive Statements  Sinus rhythm  Prolonged IL interval  Left axis deviation  Abnormal R-wave progression, late transition  Compared to ECG 2023 07:04:31  First degree AV block now present  Early repolarization no longer present  Electronically Signed On 3- 14:35:21 PDT by FISH LIU MD           RADIOLOGY  I have independently interpreted the diagnostic imaging associated with this visit and am waiting the final reading from the radiologist.   My preliminary interpretation is as follows: No signs of pulmonary edema  Radiologist interpretation:   DX-CHEST-PORTABLE (1 VIEW)   Final Result      Hypoinflation without other  evidence for acute cardiopulmonary disease.            COURSE & MEDICAL DECISION MAKING    ED Observation Status? Yes; I am placing the patient in to an observation status due to a diagnostic uncertainty as well as therapeutic intensity. Patient placed in observation status at 1:13 PM, 3/27/2023.     Observation plan is as follows: Patient presents because he states that he needs wound care and dialysis.  At this point I have no evidence of hyperkalemia EKG changes or pulmonary edema on initial EKG we will have to get laboratory studies to evaluate for potassium and observation.    Upon Reevaluation, the patient's condition has: not improved; and will be escalated to hospitalization.    Patient discharged from ED Observation status at 2:59 PM (Time) 3/27/2023 (Date).     INITIAL ASSESSMENT, COURSE AND PLAN  Care Narrative: Patient presents emerged department for evaluation.  The patient has chronic wounds to his lower extremities there is no overt signs of cellulitis.  I did consult wound care for bandage changes.  From the standpoint of his end-stage renal disease and noncompliance EKG was done there is no signs of hyperkalemic changes on EKG.  Laboratory studies were drawn does show an elevated potassium of 6.1 with significantly elevated creatinine.  Dr. Leahy nephrology has consulted on the patient and does feel the patient most likely will require multiple bouts of dialysis and is recommended admission of the hospital.  I spoke to the hospitalist to admit this outpatient.        ADDITIONAL PROBLEM LIST  1.  Methamphetamine use  DISPOSITION AND DISCUSSIONS  Will be admitted to the hospitalist in stable condition    FINAL DIAGNOSIS  1. Hyperkalemia    2. ESRD (end stage renal disease) on dialysis (HCC)    3. Open wound of both lower extremities with complication, initial encounter    4. Noncompliance    5. Homeless           Electronically signed by: Mitch Trujillo M.D., 3/27/2023 1:13 PM

## 2023-03-28 LAB
ALBUMIN SERPL BCP-MCNC: 3.2 G/DL (ref 3.2–4.9)
ANION GAP SERPL CALC-SCNC: 18 MMOL/L (ref 7–16)
BASOPHILS # BLD AUTO: 0.9 % (ref 0–1.8)
BASOPHILS # BLD: 0.07 K/UL (ref 0–0.12)
BUN SERPL-MCNC: 55 MG/DL (ref 8–22)
CALCIUM ALBUM COR SERPL-MCNC: 8.5 MG/DL (ref 8.5–10.5)
CALCIUM SERPL-MCNC: 7.9 MG/DL (ref 8.5–10.5)
CHLORIDE SERPL-SCNC: 100 MMOL/L (ref 96–112)
CO2 SERPL-SCNC: 20 MMOL/L (ref 20–33)
CREAT SERPL-MCNC: 10.77 MG/DL (ref 0.5–1.4)
EOSINOPHIL # BLD AUTO: 0.14 K/UL (ref 0–0.51)
EOSINOPHIL NFR BLD: 1.8 % (ref 0–6.9)
ERYTHROCYTE [DISTWIDTH] IN BLOOD BY AUTOMATED COUNT: 65.5 FL (ref 35.9–50)
GFR SERPLBLD CREATININE-BSD FMLA CKD-EPI: 5 ML/MIN/1.73 M 2
GLUCOSE BLD STRIP.AUTO-MCNC: 127 MG/DL (ref 65–99)
GLUCOSE BLD STRIP.AUTO-MCNC: 132 MG/DL (ref 65–99)
GLUCOSE SERPL-MCNC: 118 MG/DL (ref 65–99)
HCT VFR BLD AUTO: 29.2 % (ref 42–52)
HGB BLD-MCNC: 8.8 G/DL (ref 14–18)
HGB RETIC QN AUTO: 32.8 PG/CELL (ref 29–35)
IMM GRANULOCYTES # BLD AUTO: 0.03 K/UL (ref 0–0.11)
IMM GRANULOCYTES NFR BLD AUTO: 0.4 % (ref 0–0.9)
IMM RETICS NFR: 22.4 % (ref 9.3–17.4)
LYMPHOCYTES # BLD AUTO: 1.09 K/UL (ref 1–4.8)
LYMPHOCYTES NFR BLD: 14.1 % (ref 22–41)
MCH RBC QN AUTO: 29.1 PG (ref 27–33)
MCHC RBC AUTO-ENTMCNC: 30.1 G/DL (ref 33.7–35.3)
MCV RBC AUTO: 96.7 FL (ref 81.4–97.8)
MONOCYTES # BLD AUTO: 1 K/UL (ref 0–0.85)
MONOCYTES NFR BLD AUTO: 13 % (ref 0–13.4)
NEUTROPHILS # BLD AUTO: 5.38 K/UL (ref 1.82–7.42)
NEUTROPHILS NFR BLD: 69.8 % (ref 44–72)
NRBC # BLD AUTO: 0 K/UL
NRBC BLD-RTO: 0 /100 WBC
PHOSPHATE SERPL-MCNC: 8.4 MG/DL (ref 2.5–4.5)
PLATELET # BLD AUTO: 133 K/UL (ref 164–446)
PMV BLD AUTO: 10.8 FL (ref 9–12.9)
POTASSIUM SERPL-SCNC: 4.8 MMOL/L (ref 3.6–5.5)
RBC # BLD AUTO: 3.02 M/UL (ref 4.7–6.1)
RETICS # AUTO: 0.07 M/UL (ref 0.04–0.06)
RETICS/RBC NFR: 2.2 % (ref 0.8–2.1)
SODIUM SERPL-SCNC: 138 MMOL/L (ref 135–145)
WBC # BLD AUTO: 7.7 K/UL (ref 4.8–10.8)

## 2023-03-28 PROCEDURE — 90935 HEMODIALYSIS ONE EVALUATION: CPT

## 2023-03-28 PROCEDURE — 700101 HCHG RX REV CODE 250: Performed by: INTERNAL MEDICINE

## 2023-03-28 PROCEDURE — A9270 NON-COVERED ITEM OR SERVICE: HCPCS

## 2023-03-28 PROCEDURE — 82962 GLUCOSE BLOOD TEST: CPT

## 2023-03-28 PROCEDURE — 97602 WOUND(S) CARE NON-SELECTIVE: CPT

## 2023-03-28 PROCEDURE — 700102 HCHG RX REV CODE 250 W/ 637 OVERRIDE(OP): Performed by: INTERNAL MEDICINE

## 2023-03-28 PROCEDURE — 99232 SBSQ HOSP IP/OBS MODERATE 35: CPT | Performed by: HOSPITALIST

## 2023-03-28 PROCEDURE — 36415 COLL VENOUS BLD VENIPUNCTURE: CPT

## 2023-03-28 PROCEDURE — 770006 HCHG ROOM/CARE - MED/SURG/GYN SEMI*

## 2023-03-28 PROCEDURE — 85025 COMPLETE CBC W/AUTO DIFF WBC: CPT

## 2023-03-28 PROCEDURE — A9270 NON-COVERED ITEM OR SERVICE: HCPCS | Performed by: INTERNAL MEDICINE

## 2023-03-28 PROCEDURE — 80069 RENAL FUNCTION PANEL: CPT

## 2023-03-28 PROCEDURE — 85046 RETICYTE/HGB CONCENTRATE: CPT

## 2023-03-28 PROCEDURE — 700102 HCHG RX REV CODE 250 W/ 637 OVERRIDE(OP)

## 2023-03-28 PROCEDURE — 700111 HCHG RX REV CODE 636 W/ 250 OVERRIDE (IP): Performed by: INTERNAL MEDICINE

## 2023-03-28 PROCEDURE — 700111 HCHG RX REV CODE 636 W/ 250 OVERRIDE (IP)

## 2023-03-28 RX ORDER — CALCITRIOL 0.25 UG/1
0.25 CAPSULE, LIQUID FILLED ORAL DAILY
Status: DISCONTINUED | OUTPATIENT
Start: 2023-03-28 | End: 2023-03-30 | Stop reason: HOSPADM

## 2023-03-28 RX ORDER — SEVELAMER CARBONATE 800 MG/1
2400 TABLET, FILM COATED ORAL
Status: DISCONTINUED | OUTPATIENT
Start: 2023-03-28 | End: 2023-03-30 | Stop reason: HOSPADM

## 2023-03-28 RX ORDER — BENZONATATE 100 MG/1
100 CAPSULE ORAL 3 TIMES DAILY PRN
Status: DISCONTINUED | OUTPATIENT
Start: 2023-03-28 | End: 2023-03-30 | Stop reason: HOSPADM

## 2023-03-28 RX ORDER — HEPARIN SODIUM 1000 [USP'U]/ML
INJECTION, SOLUTION INTRAVENOUS; SUBCUTANEOUS
Status: COMPLETED
Start: 2023-03-28 | End: 2023-03-28

## 2023-03-28 RX ADMIN — HYDROMORPHONE HYDROCHLORIDE 0.25 MG: 1 INJECTION, SOLUTION INTRAMUSCULAR; INTRAVENOUS; SUBCUTANEOUS at 17:29

## 2023-03-28 RX ADMIN — OXYCODONE 5 MG: 5 TABLET ORAL at 11:29

## 2023-03-28 RX ADMIN — PREDNISOLONE ACETATE 1 DROP: 10 SUSPENSION/ DROPS OPHTHALMIC at 20:37

## 2023-03-28 RX ADMIN — PREDNISOLONE ACETATE 1 DROP: 10 SUSPENSION/ DROPS OPHTHALMIC at 13:14

## 2023-03-28 RX ADMIN — HEPARIN SODIUM 2000 UNITS: 1000 INJECTION, SOLUTION INTRAVENOUS; SUBCUTANEOUS at 07:40

## 2023-03-28 RX ADMIN — SEVELAMER CARBONATE 2400 MG: 800 TABLET, FILM COATED ORAL at 17:25

## 2023-03-28 RX ADMIN — OXYCODONE 5 MG: 5 TABLET ORAL at 20:36

## 2023-03-28 RX ADMIN — ATORVASTATIN CALCIUM 40 MG: 40 TABLET, FILM COATED ORAL at 20:35

## 2023-03-28 RX ADMIN — SODIUM HYPOCHLORITE 1 ML: 1.25 SOLUTION TOPICAL at 06:00

## 2023-03-28 RX ADMIN — SODIUM HYPOCHLORITE 20 ML: 1.25 SOLUTION TOPICAL at 12:10

## 2023-03-28 RX ADMIN — OXYCODONE 5 MG: 5 TABLET ORAL at 03:53

## 2023-03-28 RX ADMIN — SEVELAMER CARBONATE 2400 MG: 800 TABLET, FILM COATED ORAL at 11:29

## 2023-03-28 RX ADMIN — ASPIRIN 81 MG 81 MG: 81 TABLET ORAL at 06:22

## 2023-03-28 RX ADMIN — CARVEDILOL 3.12 MG: 3.12 TABLET, FILM COATED ORAL at 17:25

## 2023-03-28 RX ADMIN — BENZONATATE 100 MG: 100 CAPSULE ORAL at 23:35

## 2023-03-28 RX ADMIN — ACETAMINOPHEN 650 MG: 325 TABLET, FILM COATED ORAL at 03:53

## 2023-03-28 RX ADMIN — OXYCODONE 5 MG: 5 TABLET ORAL at 08:04

## 2023-03-28 RX ADMIN — ACETAZOLAMIDE 125 MG: 250 TABLET ORAL at 17:26

## 2023-03-28 RX ADMIN — CALCITRIOL CAPSULES 0.25 MCG 0.25 MCG: 0.25 CAPSULE ORAL at 11:29

## 2023-03-28 RX ADMIN — HYDROMORPHONE HYDROCHLORIDE 0.25 MG: 1 INJECTION, SOLUTION INTRAMUSCULAR; INTRAVENOUS; SUBCUTANEOUS at 21:39

## 2023-03-28 RX ADMIN — OXYCODONE 5 MG: 5 TABLET ORAL at 16:29

## 2023-03-28 RX ADMIN — LIDOCAINE HYDROCHLORIDE 10 ML: 40 SOLUTION TOPICAL at 12:08

## 2023-03-28 RX ADMIN — BRIMONIDINE TARTRATE 1 DROP: 2 SOLUTION OPHTHALMIC at 17:27

## 2023-03-28 RX ADMIN — HEPARIN SODIUM 5000 UNITS: 5000 INJECTION, SOLUTION INTRAVENOUS; SUBCUTANEOUS at 13:12

## 2023-03-28 RX ADMIN — DORZOLAMIDE HYDROCHLORIDE AND TIMOLOL MALEATE 1 DROP: 20; 5 SOLUTION/ DROPS OPHTHALMIC at 06:21

## 2023-03-28 RX ADMIN — ACETAZOLAMIDE 125 MG: 250 TABLET ORAL at 06:22

## 2023-03-28 RX ADMIN — SENNOSIDES AND DOCUSATE SODIUM 2 TABLET: 50; 8.6 TABLET ORAL at 17:25

## 2023-03-28 RX ADMIN — HEPARIN SODIUM 5000 UNITS: 5000 INJECTION, SOLUTION INTRAVENOUS; SUBCUTANEOUS at 03:54

## 2023-03-28 RX ADMIN — DORZOLAMIDE HYDROCHLORIDE AND TIMOLOL MALEATE 1 DROP: 20; 5 SOLUTION/ DROPS OPHTHALMIC at 17:27

## 2023-03-28 RX ADMIN — GABAPENTIN 100 MG: 100 CAPSULE ORAL at 17:26

## 2023-03-28 RX ADMIN — HEPARIN SODIUM 3800 UNITS: 1000 INJECTION, SOLUTION INTRAVENOUS; SUBCUTANEOUS at 10:43

## 2023-03-28 RX ADMIN — BRIMONIDINE TARTRATE 1 DROP: 2 SOLUTION OPHTHALMIC at 06:20

## 2023-03-28 RX ADMIN — OXYCODONE 5 MG: 5 TABLET ORAL at 00:48

## 2023-03-28 RX ADMIN — PREDNISOLONE ACETATE 1 DROP: 10 SUSPENSION/ DROPS OPHTHALMIC at 17:27

## 2023-03-28 RX ADMIN — LATANOPROST 1 DROP: 50 SOLUTION OPHTHALMIC at 17:27

## 2023-03-28 ASSESSMENT — ENCOUNTER SYMPTOMS
FEVER: 0
NAUSEA: 1
ABDOMINAL PAIN: 1
HEADACHES: 0
SHORTNESS OF BREATH: 0
WHEEZING: 0
COUGH: 0
CHILLS: 0
MYALGIAS: 1
VOMITING: 0

## 2023-03-28 ASSESSMENT — PAIN DESCRIPTION - PAIN TYPE
TYPE: ACUTE PAIN

## 2023-03-28 ASSESSMENT — COGNITIVE AND FUNCTIONAL STATUS - GENERAL
SUGGESTED CMS G CODE MODIFIER DAILY ACTIVITY: CJ
DRESSING REGULAR LOWER BODY CLOTHING: A LITTLE
HELP NEEDED FOR BATHING: A LITTLE
SUGGESTED CMS G CODE MODIFIER MOBILITY: CJ
MOBILITY SCORE: 20
DAILY ACTIVITIY SCORE: 22
WALKING IN HOSPITAL ROOM: A LITTLE
STANDING UP FROM CHAIR USING ARMS: A LITTLE
CLIMB 3 TO 5 STEPS WITH RAILING: A LOT

## 2023-03-28 NOTE — WOUND TEAM
"[facility]  Inpatient Services  Initial Wound and Skin Care Evaluation    Admission Date: 3/27/2023     Last order of IP CONSULT TO WOUND CARE was found on 3/27/2023 from Hospital Encounter on 3/27/2023     HPI, PMH, SH: Reviewed    Past Surgical History:   Procedure Laterality Date    AV FISTULA CREATION Left 2/25/2022    Procedure: CREATION, AV FISTULA-UPPER EXTREMITY GRAFT, AND FISTULOGRAM;  Surgeon: Tone Hartman M.D.;  Location: SURGERY [facility];  Service: Vascular    THROMBECTOMY Left 2/25/2022    Procedure: THROMBECTOMY;  Surgeon: Tone Hartman M.D.;  Location: SURGERY [facility];  Service: Vascular     Social History     Tobacco Use    Smoking status: Every Day     Packs/day: 0.25     Types: Cigarettes    Smokeless tobacco: Never   Substance Use Topics    Alcohol use: Not Currently     Chief Complaint   Patient presents with    Other     Pt states he's here for dialysis. Last dialysis Friday. Pt states he hasn't been able to see his medication labels due to glaucoma and hasn't been able to take them because of this.     Wound Check     Between pt's thighs. Pt states, \"I need my wounds bandaged\"     Diagnosis: Hyperkalemia [E87.5]    Unit where seen by Wound Team: S535/02     WOUND CONSULT/FOLLOW UP RELATED TO:  B thighs      WOUND HISTORY:  Pt has had wounds since he was first seen for wounds to thighs 2/22/23    WOUND ASSESSMENT/LDA  Wound 02/22/23 Full Thickness Wound Thigh Anterior Right (Active)      03/28/23 1300   Site Assessment Pink;Red    Periwound Assessment Scar tissue    Margins Defined edges    Closure Secondary intention    Drainage Amount Scant    Drainage Description Serous    Treatments Cleansed    Wound Cleansing Normal Saline Irrigation    Periwound Protectant Moisture Barrier    Dressing Cleansing/Solutions 1/4 Strength Dakin's Solution    Dressing Options Mepilex    Dressing Changed Changed    Dressing Status Intact    Dressing Change/Treatment Frequency " Every Shift, and As Needed    NEXT Dressing Change/Treatment Date 03/28/23    NEXT Weekly Photo (Inpatient Only) 04/04/23    Non-staged Wound Description Full thickness 03/28/23 1300   Wound Length (cm) 4.5 cm 03/28/23 1300   Wound Width (cm) 11.6 cm 03/28/23 1300   Wound Depth (cm) 0.3 cm 03/28/23 1300   Wound Surface Area (cm^2) 52.2 cm^2 03/28/23 1300   Wound Volume (cm^3) 15.66 cm^3 03/28/23 1300   Wound Healing % 81 03/28/23 1300   Shape irregular    Exposed Structures None    Number of days: 34       Wound 02/22/23 Full Thickness Wound Thigh Anterior;Medial;Posterior Left (Active)   Wound Image   03/28/23 1300   Site Assessment Dry;Yellow    Periwound Assessment Pink;Scar tissue    Margins Defined edges    Closure Secondary intention    Drainage Amount None    Treatments Cleansed    Wound Cleansing Normal Saline Irrigation    Periwound Protectant Moisture Barrier    Dressing Cleansing/Solutions 1/4 Strength Dakin's Solution    Dressing Options Moist Roll Gauze;Mepilex    Dressing Changed New    Dressing Status Intact    Dressing Change/Treatment Frequency Every Shift, and As Needed    NEXT Dressing Change/Treatment Date 03/28/23    NEXT Weekly Photo (Inpatient Only) 04/04/23    Non-staged Wound Description Full thickness 03/28/23 1300   Wound Length (cm) 2.5 cm 03/28/23 1300   Wound Width (cm) 2 cm 03/28/23 1300   Wound Surface Area (cm^2) 5 cm^2 03/28/23 1300   Shape circular    Wound Odor None    Exposed Structures POLINA    Number of days: 34       Wound 03/15/23 Traumatic Toe, Hallux Distal Left uknown etiology (Active)   Number of days: 13       Wound 03/27/23 Thigh Distal;Posterior Left (Active)      03/28/23 1300   Site Assessment Yellow;Red    Periwound Assessment Intact    Margins Defined edges    Closure Secondary intention    Drainage Amount Scant    Drainage Description Serous    Treatments Cleansed    Wound Cleansing Normal Saline Irrigation    Periwound Protectant Moisture Barrier    Dressing  Cleansing/Solutions 1/4 Strength Dakin's Solution    Dressing Options Moist Roll Gauze;Silicone Adhesive Foam    Dressing Changed Changed    Dressing Status Intact    Dressing Change/Treatment Frequency Daily, and As Needed    NEXT Dressing Change/Treatment Date 03/28/23    NEXT Weekly Photo (Inpatient Only) 04/04/23    Non-staged Wound Description Full thickness 03/28/23 1300   Wound Length (cm) 2 cm 03/28/23 1300   Wound Width (cm) 1.5 cm 03/28/23 1300   Wound Depth (cm) 2 cm 03/28/23 1300   Wound Surface Area (cm^2) 3 cm^2 03/28/23 1300   Wound Volume (cm^3) 6 cm^3 03/28/23 1300   Shape circular    Wound Odor None    Exposed Structures POLINA    Number of days: 1        Vascular:    ROMAINE:   No results found.    Lab Values:    Lab Results   Component Value Date/Time    WBC 7.7 03/28/2023 04:24 AM    RBC 3.02 (L) 03/28/2023 04:24 AM    HEMOGLOBIN 8.8 (L) 03/28/2023 04:24 AM    HEMATOCRIT 29.2 (L) 03/28/2023 04:24 AM    CREACTPROT 14.27 (H) 02/21/2022 09:00 PM    SEDRATEWES 45 (H) 02/21/2022 09:00 PM    HBA1C 5.9 (H) 03/01/2023 12:01 PM        Culture Results show:  Recent Results (from the past 720 hour(s))   CULTURE WOUND W/ GRAM STAIN    Collection Time: 03/10/23  3:25 PM    Specimen: Left Leg; Wound   Result Value Ref Range    Significant Indicator POS (POS)     Source WND     Site LEFT LEG     Culture Result - (A)     Gram Stain Result Few WBCs.  No organisms seen.       Culture Result (A)      Methicillin Resistant Staphylococcus aureus  Moderate growth      Culture Result Citrobacter freundii complex  Moderate growth   (A)     Culture Result Pseudomonas aeruginosa  Moderate growth   (A)        Susceptibility    Methicillin resistant staphylococcus aureus - JASVIR     Azithromycin >4 Resistant mcg/mL     Clindamycin <=0.25 Sensitive mcg/mL     Cefazolin >16 Resistant mcg/mL     Cefepime >16 Resistant mcg/mL     Ceftaroline 1 Sensitive mcg/mL     Daptomycin 1 Sensitive mcg/mL     Erythromycin >4 Resistant mcg/mL      Ampicillin/sulbactam >16/8 Resistant mcg/mL     Oxacillin >2 Resistant mcg/mL     Trimeth/Sulfa <=0.5/9.5 Sensitive mcg/mL     Tetracycline <=4 Sensitive mcg/mL     Vancomycin 1 Sensitive mcg/mL    Pseudomonas aeruginosa - JASVIR     Ciprofloxacin 0.5 Sensitive mcg/mL     Cefepime 8 Sensitive mcg/mL     Gentamicin 4 Sensitive mcg/mL     Meropenem <=1 Sensitive mcg/mL     Amikacin <=16 Sensitive mcg/mL     Pip/Tazobactam <=8 Sensitive mcg/mL     Tobramycin <=2 Sensitive mcg/mL    Citrobacter freundii complex - JASVIR     Ampicillin 16 Resistant mcg/mL     Ceftriaxone <=1 Sensitive mcg/mL     Cefazolin >16 Resistant mcg/mL     Ciprofloxacin <=0.25 Sensitive mcg/mL     Cefepime <=2 Sensitive mcg/mL     Gentamicin <=2 Sensitive mcg/mL     Ampicillin/sulbactam <=4/2 Resistant mcg/mL     Trimeth/Sulfa <=0.5/9.5 Sensitive mcg/mL     Tigecycline <=2 Sensitive mcg/mL     Cefuroxime <=4 Resistant mcg/mL     Ertapenem <=0.5 Sensitive mcg/mL     Minocycline <=4 Sensitive mcg/mL     Moxifloxacin <=2 Sensitive mcg/mL     Pip/Tazobactam <=8 Sensitive mcg/mL     Tobramycin <=2 Sensitive mcg/mL       Pain Level/Medicated:  4% Lidocaine allowed to dwell on wound bed 10min prior and PO pain medications administered by bedside RN 40min prior       INTERVENTIONS BY WOUND TEAM:  Chart and images reviewed. Discussed with bedside RN. All areas of concern (based on picture review, LDA review and discussion with bedside RN) have been thoroughly assessed. Documentation of areas based on significant findings. This RN in to assess patient. Performed standard wound care which includes appropriate positioning, dressing removal and non-selective debridement. Pictures and measurements obtained weekly if/when required.  Preparation for Dressing removal: Dressing soaked with Removed without difficulty  Non-selectively Debrided with:  Normal Saline and Gauze   Sharp debridement: NA  Shahida wound: Cleansed with Normal Saline and Gauze, Prepped with Barrier  paste  Primary Dressing: Dakins Moistened and Roll Gauze  Secondary (Outer) Dressing: Adhesive foam and Mepilex    Advanced Wound Care Discharge Planning  Number of Clinicians necessary to complete wound care: 1  Is patient requiring IV pain medications for dressing changes: no  PO and lidocaine   Length of time for dressing change 30 min. 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 He hasn't been able to get to HD or wound clinic per chart review. L thigh medial wound with dry adherent yellow crust, unable to debride r/t pain, despite soaking with lidocaine moistened gauze. Dakin's Solution® Quarter Strength is a broad-spectrum antimicrobial cleanser that is gentle to the skin. Effective against MRSA, VRE, other bacteria, viruses, molds, fungi and yeast. Also used for odor control. It helps debride chemically and mechanically with drsg removal.        Goals: Steady decrease in wound area and depth weekly.    WOUND TEAM PLAN OF CARE ([X] for frequency of wound follow up,):   Nursing to follow dressing orders written for wound care. Contact wound team if area fails to progress, deteriorates or with any questions/concerns if something comes up before next scheduled follow up (See below as to whether wound is following and frequency of wound follow up)  Dressing changes by wound team:                   Follow up 3 times weekly:                NPWT change 3 times weekly:     Follow up 1 time weekly:   x   Follow up Bi-Monthly:           Follow up Monthly (High Risk):                        Follow up as needed:     Other (explain):     NURSING PLAN OF CARE ORDERS (X):  Dressing changes: See Dressing Care orders: x  Skin care: See Skin  Care orders:   RN Prevention Protocol:   Rectal tube care: See Rectal Tube Care orders:   Other orders:    RSKIN:   CURRENTLY IN PLACE (X), APPLIED THIS VISIT (A), ORDERED (O):   Q shift Rex:  X  Q shift pressure point assessments:  X    Surface/Positioning   Standard Mattress/Trauma Bed     x      Low Airloss          ICU Low Airloss   Bariatric NIKOLAS     Waffle cushion        Waffle Overlay          Reposition q 2 hours   pt performs   TAPs Turning system     Z Chapin Pillow     Offloading/Redistribution not assessed  Sacral Offloading Dressing (Silicone dressing)     Heel Offloading Dressing (Silicone dressing)         Heel float boots (Prevalon boot)             Float Heels off Bed with Pillows           Respiratory RA  Silicone O2 tubing         Gray Foam Ear protectors     Cannula fixation Device (Tender )          High flow offloading Clip    Elastic head band offloading device      Anchorfast                                                         Trach with Optifoam split foam             Containment/Moisture Prevention urinal, continent    Rectal tube or BMS    Purwick/Condom Cath        Dumont Catheter    Barrier wipes           Barrier paste       Antifungal tx      Interdry        Mobilization not assessed      Up to chair        Ambulate      PT/OT      Nutrition       Dietician        Diabetes Education      PO   x  TF     TPN     NPO   # days     Other        Anticipated discharge plans: needs wound care didn't go to appts  LTACH:        SNF/Rehab:                  Home Health Care:           Outpatient Wound Center:           Self/Family Care:        Other:                  Vac Discharge Needs:   Vac Discharge plan is purely a recommendation from wound team and not a requirement for discharge unless otherwise stated by physician.  Not Applicable Pt not on a wound vac:     x  Regular Vac while inpatient, alternative dressing at DC:        Regular Vac in use and continued at DC:            Reg. Vac  w/ Skin Sub/Biologic in use. Will need to be changed 2x wkly:      Veraflo Vac while inpatient, ok to transition to Regular Vac on Discharge (Bedside RN to Clamp small instillation tubing at time of DC):           Veraflo Vac while inpatient, would benefit from remaining on Veraflo Vac upon discharge:

## 2023-03-28 NOTE — PROGRESS NOTES
3hr HD started @ 0740 and completed @ 1041,tx well tolerated,VSS,net UF = 3000ml.R chest TDC dressing CDI.Report given to Jeny Nicolas RN.

## 2023-03-28 NOTE — PROGRESS NOTES
4 Eyes Skin Assessment Completed by SHASHI Fermin and SHASHI Kelly.    Head Scab  Ears WDL  Nose WDL  Mouth WDL  Neck WDL  Breast/Chest WDL  Shoulder Blades scattered scabs  Spine WDL  (R) Arm/Elbow/Hand Bruising and Scab  (L) Arm/Elbow/Hand Bruising and Scab, old fistula  Abdomen WDL  Groin WDL  Scrotum/Coccyx/Buttocks redness, blanching   (R) Leg Edema, wound upper thigh  (L) Leg Edema, wound upper thigh, back of thigh  (R) Heel/Foot/Toe Edema, dry, calloused   (L) Heel/Foot/Toe Edema, dry, calloused          Devices In Places none      Interventions In Place Pillows    Possible Skin Injury yes    Pictures Uploaded Into Epic Yes  Wound Consult Placed Yes  RN Wound Prevention Protocol Ordered Yes

## 2023-03-28 NOTE — PROGRESS NOTES
"[facility] -  PROGRESS NOTE               Author: Hudson Leahy D.O. Date & Time: 3/28/2023  9:02 AM     HPI:  Mr. Watkins is a very pleasant 47-year-old gentleman known to us from prior admissions and his dialysis care.  He has end-stage renal disease secondary to diabetes and hypertension.  Prior to dialyzing in the renal area he used to have an outpatient unit in [facility].  He is currently homeless living in a homeless shelter in [place].  He was recently had a long hospitalization approximately for a month discharged about a week ago.  He did not attend any of his outpatient therapies since his last discharge.  He tells me that he has glaucoma and cannot read his pill bottles.  He has not taken any of his medications.  He is having worsening lower extremity edema with weakness.  He is having some dyspnea with exertion.  Currently has a left IJ PermCath.  There is what appears to be a thrombosed AV graft in his left forearm.  At the time of presentation he was found to have a blood pressure 171/103 with a heart rate of 85.  Further lab evaluation is still pending.  We have been asked to see him regarding his renal failure.        No F/C/N/V/CP, +SOB    No melena, hematochezia, hematemesis.    No HA or abdominal pain.    DAILY NEPHROLOGY SUMMARY:   3/27 - Consult done.  Dialysis provided.   3/28 - Seen on dialysis.   No overnight events.  No new complaints.  Dysnpea better.      Still weak.  Eating \"okay\".     REVIEW OF SYSTEMS:    10 point ROS reviewed and is as per HPI/daily summary or otherwise negative    PMH/PSH/SH/FH: Reviewed and unchanged since admission note  CURRENT MEDICATIONS: Reviewed from admission to present day    VS:  BP (!) 147/83 Comment: RN notified  Pulse 62   Temp 36.6 °C (97.9 °F) (Temporal)   Resp 14   Ht 1.778 m (5' 10\")   Wt 82.4 kg (181 lb 10.5 oz)   SpO2 90%   BMI 26.07 kg/m²     Physical Exam  Vitals and nursing note reviewed. "   Constitutional:       Appearance: Normal appearance.   HENT:      Head: Normocephalic and atraumatic.      Nose: Nose normal.      Mouth/Throat:      Mouth: Mucous membranes are dry.   Eyes:      General: No scleral icterus.     Extraocular Movements: Extraocular movements intact.      Pupils: Pupils are equal, round, and reactive to light.   Neck:      Comments: Left IJ Permcath  Cardiovascular:      Rate and Rhythm: Normal rate and regular rhythm.      Pulses: Normal pulses.      Heart sounds: Normal heart sounds.   Pulmonary:      Effort: Pulmonary effort is normal.      Breath sounds: Examination of the right-lower field reveals decreased breath sounds. Examination of the left-lower field reveals decreased breath sounds. Decreased breath sounds present. No wheezing or rales.   Abdominal:      General: Abdomen is flat. There is no distension.      Palpations: Abdomen is soft.      Tenderness: There is no abdominal tenderness.   Musculoskeletal:         General: No deformity.      Cervical back: Normal range of motion. No tenderness.      Right lower leg: Edema present.      Left lower leg: Edema present.      Comments: Thrombosed left forearm AVG    Skin:     General: Skin is warm and dry.      Findings: No rash.   Neurological:      General: No focal deficit present.      Mental Status: He is alert and oriented to person, place, and time.   Psychiatric:         Behavior: Behavior normal.       Fluids:  In: 240 [P.O.:240]  Out: 1     LABS:  Recent Labs     03/27/23  1259 03/28/23  0424   SODIUM 138 138   POTASSIUM 6.1* 4.8   CHLORIDE 99 100   CO2 16* 20   GLUCOSE 112* 118*   BUN 93* 55*   CREATININE 15.19* 10.77*   CALCIUM 8.5 7.9*       ASSESSMENTS:              # ESRD                          Secondary to Diabetes and Hypertension                          Maintenance dialysis qTTS previously                                      Unclear if he has an outpt unit in [place]                          Left IJ  Permcath                          Failed Left AVG                          Will need dialysis again today (TUES)              # Hypertension                          Continue current medications                          Volume off with dialysis as BP tolerates              # Edema/Overload                          Volume off with HD as BP tolerates              # Anemia                          Iron stores adequate     Retic count low for clinical condition                          JACKIE with HD to goal Hgb 10-11                          Transfuse PRN              # CKD-MBD                          PTH = 408     cCa = 8.5                          Phos = 8.4     Phos binders to goal phos<5.5     Add calcitriol               # Low Serum Albumin     Alb = 3.2                          No dietary protein restrictions                          Goal: 1.5g Protein/kg/day              # Homeless                          Complicating discharge              # Hx of Meth use              # DM              # Chronic wounds     SUGGESTIONS:              Dialysis today (TUES)              Maintenance dialysis qTTS and PRN                          Unclear if he has an outpt unit              Volume off with dialysis as BP tolerates                          Try and improve edema to aid wound healing              Continue usual anti-hypertensive medications              No need for IV iron              JACKIE with HD to goal Hgb 10-11              Transfuse PRN              Phos binders to goal phos<5.5    Add calcitriol              No dietary protein restrctions                          Goal: 1.5g Protein/kg/day              Follow labs with further recommendations to follow     Thank you

## 2023-03-28 NOTE — PROGRESS NOTES
Patient is medical & being transferred to [facility]. Report called to Jeny DANIELLE. Pt to go to new room after dialysis treatment is complete. Pt belongings brought to new room.

## 2023-03-28 NOTE — CARE PLAN
The patient is Stable - Low risk of patient condition declining or worsening    Shift Goals  Clinical Goals: dialysis, tele, wound care  Patient Goals: comfort, rest    Progress made toward(s) clinical / shift goals:      Problem: Pain - Standard  Goal: Alleviation of pain or a reduction in pain to the patient’s comfort goal  3/27/2023 2249 by Otilia Keyes REduinN.  Outcome: Progressing  3/27/2023 2249 by Otilia Keyes R.N.  Outcome: Progressing     Problem: Knowledge Deficit - Standard  Goal: Patient and family/care givers will demonstrate understanding of plan of care, disease process/condition, diagnostic tests and medications  3/27/2023 2249 by APARNA ColonN.  Outcome: Progressing  3/27/2023 2249 by Otilia Keyes, R.N.  Outcome: Progressing     Problem: Fall Risk  Goal: Patient will remain free from falls  3/27/2023 2249 by Otilia Keyes R.N.  Outcome: Progressing  3/27/2023 2249 by Otilia Keyes, R.N.  Outcome: Progressing     Problem: Skin Integrity  Goal: Skin integrity is maintained or improved  Outcome: Progressing

## 2023-03-28 NOTE — CARE PLAN
The patient is Stable - Low risk of patient condition declining or worsening    Shift Goals  Clinical Goals: wound care, dialysis  Patient Goals: wound care, pain management    Progress made toward(s) clinical / shift goals:  pt. Labs being monitored, completed dialysis this shift, wound care consult completed and wounds dressed. Pain being managed with prn pain medications. Pt. Progressing towards goals.       Problem: Pain - Standard  Goal: Alleviation of pain or a reduction in pain to the patient’s comfort goal  Outcome: Progressing     Problem: Knowledge Deficit - Standard  Goal: Patient and family/care givers will demonstrate understanding of plan of care, disease process/condition, diagnostic tests and medications  Outcome: Progressing     Problem: Fall Risk  Goal: Patient will remain free from falls  Outcome: Progressing     Problem: Skin Integrity  Goal: Skin integrity is maintained or improved  Outcome: Progressing       Patient is not progressing towards the following goals:

## 2023-03-28 NOTE — PROGRESS NOTES
[facility] Progress Note         HD today x 3 hours per Dr. Leahy. Tx initiated at 1639 and ended at 1940    Pt A/O x 4, not in distress, on room air, denies chest pain, no bleeding, but has +3 bipedal edema and high blood pressure (SBP >160 mmHg).  LTDC patent, dsg CDI. (+) consent for treatment       NET UF: 3000 ml    Tx tolerated. SBP >160 mmHg, Dr. Leahy was informed and ordered to increase UFG to 2-3L. Blood returned, ports flushed with NS. Heparin 1000 units/ml used to lock catheter per designated amount. CVC ports clamped and capped. Aspirate heparin prior to next CVC use. See eflow sheets for further details.    Report given to BRENDA Keyes RN

## 2023-03-28 NOTE — PROGRESS NOTES
Pt. Alert and oriented x4. Arrived to unit from dialysis. Bed alarm strip placed on bed. Call light and personal items in reach. Pt complaining of pain 7/10 in leg. 4 eye skin check completed. Bed wheels locked, bed in lowest position.

## 2023-03-28 NOTE — PROGRESS NOTES
4 Eyes Skin Assessment Completed by SHASHI Bingham and LJ Collins.    Head WDL  Ears WDL  Nose WDL  Mouth WDL  Neck WDL  Breast/Chest WDL  Shoulder Blades scattered scabs  Spine WDL  (R) Arm/Elbow/Hand WDL  (L) Arm/Elbow/Hand old fistula  Abdomen WDL  Groin WDL  Scrotum/Coccyx/Buttocks Redness and Blanching  (R) Leg Edema, wound upper thigh  (L) Leg Edema, wound upper and back of thigh  (R) Heel/Foot/Toe Edema, dry, calloused  (L) Heel/Foot/Toe Edema, dry, calloused    Devices In Places Tele Box    Interventions In Place Pillows    Possible Skin Injury Yes    Pictures Uploaded Into Epic Yes  Wound Consult Placed Yes  RN Wound Prevention Protocol Ordered Yes

## 2023-03-29 ENCOUNTER — PHARMACY VISIT (OUTPATIENT)
Dept: PHARMACY | Facility: MEDICAL CENTER | Age: 48
End: 2023-03-29
Payer: COMMERCIAL

## 2023-03-29 LAB
ALBUMIN SERPL BCP-MCNC: 3.4 G/DL (ref 3.2–4.9)
ANION GAP SERPL CALC-SCNC: 17 MMOL/L (ref 7–16)
BUN SERPL-MCNC: 38 MG/DL (ref 8–22)
CALCIUM ALBUM COR SERPL-MCNC: 8.7 MG/DL (ref 8.5–10.5)
CALCIUM SERPL-MCNC: 8.2 MG/DL (ref 8.5–10.5)
CHLORIDE SERPL-SCNC: 99 MMOL/L (ref 96–112)
CO2 SERPL-SCNC: 22 MMOL/L (ref 20–33)
CREAT SERPL-MCNC: 8.31 MG/DL (ref 0.5–1.4)
ERYTHROCYTE [DISTWIDTH] IN BLOOD BY AUTOMATED COUNT: 65.8 FL (ref 35.9–50)
GFR SERPLBLD CREATININE-BSD FMLA CKD-EPI: 7 ML/MIN/1.73 M 2
GLUCOSE BLD STRIP.AUTO-MCNC: 108 MG/DL (ref 65–99)
GLUCOSE BLD STRIP.AUTO-MCNC: 114 MG/DL (ref 65–99)
GLUCOSE BLD STRIP.AUTO-MCNC: 118 MG/DL (ref 65–99)
GLUCOSE BLD STRIP.AUTO-MCNC: 128 MG/DL (ref 65–99)
GLUCOSE BLD STRIP.AUTO-MCNC: 149 MG/DL (ref 65–99)
GLUCOSE SERPL-MCNC: 132 MG/DL (ref 65–99)
HCT VFR BLD AUTO: 32.2 % (ref 42–52)
HGB BLD-MCNC: 9.8 G/DL (ref 14–18)
MCH RBC QN AUTO: 29.4 PG (ref 27–33)
MCHC RBC AUTO-ENTMCNC: 30.4 G/DL (ref 33.7–35.3)
MCV RBC AUTO: 96.7 FL (ref 81.4–97.8)
PHOSPHATE SERPL-MCNC: 6.8 MG/DL (ref 2.5–4.5)
PLATELET # BLD AUTO: 140 K/UL (ref 164–446)
PMV BLD AUTO: 10.6 FL (ref 9–12.9)
POTASSIUM SERPL-SCNC: 4.5 MMOL/L (ref 3.6–5.5)
RBC # BLD AUTO: 3.33 M/UL (ref 4.7–6.1)
SODIUM SERPL-SCNC: 138 MMOL/L (ref 135–145)
WBC # BLD AUTO: 6.8 K/UL (ref 4.8–10.8)

## 2023-03-29 PROCEDURE — A9270 NON-COVERED ITEM OR SERVICE: HCPCS | Performed by: INTERNAL MEDICINE

## 2023-03-29 PROCEDURE — 700102 HCHG RX REV CODE 250 W/ 637 OVERRIDE(OP): Performed by: INTERNAL MEDICINE

## 2023-03-29 PROCEDURE — 82962 GLUCOSE BLOOD TEST: CPT | Mod: 91

## 2023-03-29 PROCEDURE — RXMED WILLOW AMBULATORY MEDICATION CHARGE: Performed by: INTERNAL MEDICINE

## 2023-03-29 PROCEDURE — 85027 COMPLETE CBC AUTOMATED: CPT

## 2023-03-29 PROCEDURE — 700111 HCHG RX REV CODE 636 W/ 250 OVERRIDE (IP): Performed by: INTERNAL MEDICINE

## 2023-03-29 PROCEDURE — 90935 HEMODIALYSIS ONE EVALUATION: CPT

## 2023-03-29 PROCEDURE — 80069 RENAL FUNCTION PANEL: CPT

## 2023-03-29 PROCEDURE — 770006 HCHG ROOM/CARE - MED/SURG/GYN SEMI*

## 2023-03-29 PROCEDURE — 36415 COLL VENOUS BLD VENIPUNCTURE: CPT

## 2023-03-29 PROCEDURE — 99232 SBSQ HOSP IP/OBS MODERATE 35: CPT | Performed by: INTERNAL MEDICINE

## 2023-03-29 PROCEDURE — 700111 HCHG RX REV CODE 636 W/ 250 OVERRIDE (IP)

## 2023-03-29 PROCEDURE — 700111 HCHG RX REV CODE 636 W/ 250 OVERRIDE (IP): Performed by: NURSE PRACTITIONER

## 2023-03-29 RX ORDER — OXYCODONE HYDROCHLORIDE 5 MG/1
5 TABLET ORAL EVERY 8 HOURS PRN
Qty: 9 TABLET | Refills: 0 | Status: SHIPPED | OUTPATIENT
Start: 2023-03-29 | End: 2023-04-01

## 2023-03-29 RX ORDER — CALCITRIOL 0.25 UG/1
0.25 CAPSULE, LIQUID FILLED ORAL DAILY
Qty: 30 CAPSULE | Refills: 0 | Status: SHIPPED | OUTPATIENT
Start: 2023-03-30 | End: 2023-04-09

## 2023-03-29 RX ORDER — HYDROMORPHONE HYDROCHLORIDE 1 MG/ML
0.25 INJECTION, SOLUTION INTRAMUSCULAR; INTRAVENOUS; SUBCUTANEOUS ONCE
Status: COMPLETED | OUTPATIENT
Start: 2023-03-29 | End: 2023-03-29

## 2023-03-29 RX ORDER — HEPARIN SODIUM 1000 [USP'U]/ML
INJECTION, SOLUTION INTRAVENOUS; SUBCUTANEOUS
Status: COMPLETED
Start: 2023-03-29 | End: 2023-03-29

## 2023-03-29 RX ADMIN — ACETAZOLAMIDE 125 MG: 250 TABLET ORAL at 18:00

## 2023-03-29 RX ADMIN — SENNOSIDES AND DOCUSATE SODIUM 2 TABLET: 50; 8.6 TABLET ORAL at 05:27

## 2023-03-29 RX ADMIN — PREDNISOLONE ACETATE 1 DROP: 10 SUSPENSION/ DROPS OPHTHALMIC at 18:06

## 2023-03-29 RX ADMIN — PREDNISOLONE ACETATE 1 DROP: 10 SUSPENSION/ DROPS OPHTHALMIC at 20:15

## 2023-03-29 RX ADMIN — ATORVASTATIN CALCIUM 40 MG: 40 TABLET, FILM COATED ORAL at 20:14

## 2023-03-29 RX ADMIN — CARVEDILOL 3.12 MG: 3.12 TABLET, FILM COATED ORAL at 17:59

## 2023-03-29 RX ADMIN — OXYCODONE 5 MG: 5 TABLET ORAL at 06:44

## 2023-03-29 RX ADMIN — CARVEDILOL 3.12 MG: 3.12 TABLET, FILM COATED ORAL at 08:10

## 2023-03-29 RX ADMIN — BRIMONIDINE TARTRATE 1 DROP: 2 SOLUTION OPHTHALMIC at 18:02

## 2023-03-29 RX ADMIN — SODIUM HYPOCHLORITE 30 ML: 1.25 SOLUTION TOPICAL at 20:15

## 2023-03-29 RX ADMIN — EPOETIN ALFA-EPBX 4000 UNITS: 4000 INJECTION, SOLUTION INTRAVENOUS; SUBCUTANEOUS at 13:15

## 2023-03-29 RX ADMIN — SEVELAMER CARBONATE 2400 MG: 800 TABLET, FILM COATED ORAL at 17:58

## 2023-03-29 RX ADMIN — HEPARIN SODIUM 2000 UNITS: 1000 INJECTION, SOLUTION INTRAVENOUS; SUBCUTANEOUS at 12:30

## 2023-03-29 RX ADMIN — HEPARIN SODIUM 3800 UNITS: 1000 INJECTION, SOLUTION INTRAVENOUS; SUBCUTANEOUS at 16:05

## 2023-03-29 RX ADMIN — HYDROMORPHONE HYDROCHLORIDE 0.25 MG: 1 INJECTION, SOLUTION INTRAMUSCULAR; INTRAVENOUS; SUBCUTANEOUS at 08:22

## 2023-03-29 RX ADMIN — OXYCODONE 5 MG: 5 TABLET ORAL at 16:51

## 2023-03-29 RX ADMIN — HEPARIN SODIUM 5000 UNITS: 5000 INJECTION, SOLUTION INTRAVENOUS; SUBCUTANEOUS at 05:27

## 2023-03-29 RX ADMIN — SEVELAMER CARBONATE 2400 MG: 800 TABLET, FILM COATED ORAL at 08:10

## 2023-03-29 RX ADMIN — LATANOPROST 1 DROP: 50 SOLUTION OPHTHALMIC at 18:05

## 2023-03-29 RX ADMIN — ACETAZOLAMIDE 125 MG: 250 TABLET ORAL at 05:26

## 2023-03-29 RX ADMIN — PREDNISOLONE ACETATE 1 DROP: 10 SUSPENSION/ DROPS OPHTHALMIC at 14:03

## 2023-03-29 RX ADMIN — BRIMONIDINE TARTRATE 1 DROP: 2 SOLUTION OPHTHALMIC at 05:27

## 2023-03-29 RX ADMIN — DORZOLAMIDE HYDROCHLORIDE AND TIMOLOL MALEATE 1 DROP: 20; 5 SOLUTION/ DROPS OPHTHALMIC at 18:03

## 2023-03-29 RX ADMIN — HEPARIN SODIUM 5000 UNITS: 5000 INJECTION, SOLUTION INTRAVENOUS; SUBCUTANEOUS at 21:09

## 2023-03-29 RX ADMIN — PREDNISOLONE ACETATE 1 DROP: 10 SUSPENSION/ DROPS OPHTHALMIC at 08:13

## 2023-03-29 RX ADMIN — HYDROMORPHONE HYDROCHLORIDE 0.25 MG: 1 INJECTION, SOLUTION INTRAMUSCULAR; INTRAVENOUS; SUBCUTANEOUS at 21:09

## 2023-03-29 RX ADMIN — ASPIRIN 81 MG 81 MG: 81 TABLET ORAL at 05:27

## 2023-03-29 RX ADMIN — DORZOLAMIDE HYDROCHLORIDE AND TIMOLOL MALEATE 1 DROP: 20; 5 SOLUTION/ DROPS OPHTHALMIC at 05:27

## 2023-03-29 RX ADMIN — SEVELAMER CARBONATE 2400 MG: 800 TABLET, FILM COATED ORAL at 12:15

## 2023-03-29 RX ADMIN — HYDROMORPHONE HYDROCHLORIDE 0.25 MG: 1 INJECTION, SOLUTION INTRAMUSCULAR; INTRAVENOUS; SUBCUTANEOUS at 17:52

## 2023-03-29 RX ADMIN — CALCITRIOL CAPSULES 0.25 MCG 0.25 MCG: 0.25 CAPSULE ORAL at 05:27

## 2023-03-29 RX ADMIN — GABAPENTIN 100 MG: 100 CAPSULE ORAL at 18:00

## 2023-03-29 ASSESSMENT — PAIN DESCRIPTION - PAIN TYPE
TYPE: ACUTE PAIN

## 2023-03-29 NOTE — PROGRESS NOTES
Pt asked that he get something for his cough. I notified the pt that I would speak with the hospitalist. Pt was irritated that I had to get an order to give the medication. Jayy Laguna was contacted for the cough medicine.

## 2023-03-29 NOTE — CARE PLAN
The patient is Stable - Low risk of patient condition declining or worsening    Shift Goals  Clinical Goals: Dialysis, Wound care, Pain Control  Patient Goals: Pain Control  Family Goals: POLINA    Progress made toward(s) clinical / shift goals:      Problem: Pain - Standard  Goal: Alleviation of pain or a reduction in pain to the patient’s comfort goal  Outcome: Progressing  Pt's pain is 9/10. Pt's pain comfort goal is 4/10.     Problem: Knowledge Deficit - Standard  Goal: Patient and family/care givers will demonstrate understanding of plan of care, disease process/condition, diagnostic tests and medications  Outcome: Progressing       Patient is not progressing towards the following goals:

## 2023-03-29 NOTE — DISCHARGE PLANNING
Meds-to-Beds: Discharge prescription orders listed below delivered to patient's bedside. RN Xu notified. Written information regarding the dispensed prescriptions was provided to the patient including the phone number of the pharmacy to call for any questions. Patient elected to have co-payment billed to patient account.       Current Outpatient Medications   Medication Sig Dispense Refill    [START ON 3/30/2023] calcitRIOL (ROCALTROL) 0.25 MCG Cap Take 1 Capsule by mouth every day. 30 Capsule 0    oxyCODONE immediate-release (ROXICODONE) 5 MG Tab Take 1 Tablet by mouth every 8 hours as needed for Severe Pain for up to 3 days. 9 Tablet 0      Erlinda Machuca, PharmD

## 2023-03-29 NOTE — PROGRESS NOTES
"[facility] -  PROGRESS NOTE               Author: Hudson Leahy D.O. Date & Time: 3/29/2023  12:05 PM     HPI:  Mr. Watkins is a very pleasant 47-year-old gentleman known to us from prior admissions and his dialysis care.  He has end-stage renal disease secondary to diabetes and hypertension.  Prior to dialyzing in the renal area he used to have an outpatient unit in [facility].  He is currently homeless living in a homeless shelter in [place].  He was recently had a long hospitalization approximately for a month discharged about a week ago.  He did not attend any of his outpatient therapies since his last discharge.  He tells me that he has glaucoma and cannot read his pill bottles.  He has not taken any of his medications.  He is having worsening lower extremity edema with weakness.  He is having some dyspnea with exertion.  Currently has a left IJ PermCath.  There is what appears to be a thrombosed AV graft in his left forearm.  At the time of presentation he was found to have a blood pressure 171/103 with a heart rate of 85.  Further lab evaluation is still pending.  We have been asked to see him regarding his renal failure.     No F/C/N/V/CP, +SOB    No melena, hematochezia, hematemesis.    No HA or abdominal pain.    DAILY NEPHROLOGY SUMMARY:   3/27 - Consult done.  Dialysis provided.   3/28 - Seen on dialysis.   No overnight events.  No new complaints.  Dysnpea better.      Still weak.  Eating \"okay\".    3/29 - Sleepy this morning but appropriate.  Eating \"okay\"  Denies pain.  No dyspnea    REVIEW OF SYSTEMS:    10 point ROS reviewed and is as per HPI/daily summary or otherwise negative    PMH/PSH/SH/FH: Reviewed and unchanged since admission note  CURRENT MEDICATIONS: Reviewed from admission to present day    VS:  BP (!) 155/92 Comment: rn notified  Pulse 71   Temp 36.4 °C (97.5 °F) (Temporal)   Resp 20   Ht 1.778 m (5' 10\")   Wt 82.4 kg (181 lb 10.5 oz)   SpO2 " 100%   BMI 26.07 kg/m²     Physical Exam  Vitals and nursing note reviewed.   Constitutional:       Appearance: Normal appearance.   HENT:      Head: Normocephalic and atraumatic.      Nose: Nose normal.      Mouth/Throat:      Mouth: Mucous membranes are dry.   Eyes:      General: No scleral icterus.     Extraocular Movements: Extraocular movements intact.      Pupils: Pupils are equal, round, and reactive to light.   Neck:      Comments: Left IJ Permcath  Cardiovascular:      Rate and Rhythm: Normal rate and regular rhythm.      Pulses: Normal pulses.      Heart sounds: Normal heart sounds.   Pulmonary:      Effort: Pulmonary effort is normal.      Breath sounds: Examination of the right-lower field reveals decreased breath sounds. Examination of the left-lower field reveals decreased breath sounds. Decreased breath sounds present. No wheezing or rales.   Abdominal:      General: Abdomen is flat. There is no distension.      Palpations: Abdomen is soft.      Tenderness: There is no abdominal tenderness.   Musculoskeletal:         General: No deformity.      Cervical back: Normal range of motion. No tenderness.      Right lower leg: Edema present.      Left lower leg: Edema present.      Comments: Thrombosed left forearm AVG    Skin:     General: Skin is warm and dry.      Findings: No rash.   Neurological:      General: No focal deficit present.      Mental Status: He is alert and oriented to person, place, and time.   Psychiatric:         Behavior: Behavior normal.       Fluids:  In: 940 [P.O.:440; Dialysis:500]  Out: 3500     LABS:  Recent Labs     03/27/23  1259 03/28/23  0424 03/29/23  0835   SODIUM 138 138 138   POTASSIUM 6.1* 4.8 4.5   CHLORIDE 99 100 99   CO2 16* 20 22   GLUCOSE 112* 118* 132*   BUN 93* 55* 38*   CREATININE 15.19* 10.77* 8.31*   CALCIUM 8.5 7.9* 8.2*         ASSESSMENTS:              # ESRD                          Secondary to Diabetes and Hypertension                          Maintenance  dialysis qTTS previously                                      Unclear if he has an outpt unit in [place]                          Left IJ Permcath                          Failed Left AVG                          Dialysis again today (WED) - mostly for volume    Will plan qMWF going forward while hospitalized              # Hypertension                          Continue current medications                          Volume off with dialysis as BP tolerates              # Edema/Overload                          Volume off with HD as BP tolerates              # Anemia                          Iron stores adequate     Retic count low for clinical condition                          JACKIE with HD to goal Hgb 10-11                          Transfuse PRN              # CKD-MBD                          PTH = 408     cCa = 8.5                          Phos = 8.4     Phos binders to goal phos<5.5     PO calcitriol               # Low Serum Albumin     Alb = 3.2                          No dietary protein restrictions                          Goal: 1.5g Protein/kg/day              # Homeless                          Complicating discharge              # Hx of Meth use              # DM              # Chronic wounds     SUGGESTIONS:              Dialysis today (WED) - Mostly for volume              Maintenance dialysis qTTS and PRN                          Unclear if he has an outpt unit    Will plan qMWF while hospitalized going forward              Volume off with dialysis as BP tolerates                          Try and improve edema to aid wound healing              Continue usual anti-hypertensive medications              No need for IV iron              JACKIE with HD to goal Hgb 10-11              Transfuse PRN              Phos binders to goal phos<5.5    Continue calcitriol and as outpt              No dietary protein restrctions                          Goal: 1.5g Protein/kg/day              Follow labs with further  recommendations to follow     Thank you

## 2023-03-29 NOTE — PROGRESS NOTES
"Assumed care of pt  at 1900. Report received from Day RN. Pt is A&O x 4. Patient stated that their pain is 10/10 and was medicated per the MAR. Pt's pain comfort goal is 4/10. Pt asked, \"why don't I get any IV?\". I asked the pt what he meant by that and he asked, \"why dont I get any IV pain medications?\". I told the pt that that is not ordered and he has oral pain medications. I told the pt that he can speak to the Dr regarding his pain medications and the pt replied, \"I will, that sofía is an idiot anyways\". Pt educated on how to use the call light, pt verbalized understanding. Pt requested multiple times that the bed alarm be turned off stating, \"we are not children\". Bed alarm remains in place. Pt reports back discomfort and asked, \"what are you going to do about that?\". I told the pt that his discomfort is most likely related to being confined to his bed and told that walking would be helpful. Pt stated, \"in this small room, I don't think so. I am blind\". Pt was asked if we could change his dressing and the pt refused. Pt was also upset that he was being \"poked\" multiple times in regards to his blood sugar checks. Pt refused his heparin shot. Bed in lowest locked position, call light within reach, hourly rounding in place. Labs reviewed, orders reviewed, communication board updated. Pt asked that he get some snacks.      "

## 2023-03-29 NOTE — CARE PLAN
The patient is Stable - Low risk of patient condition declining or worsening    Shift Goals  Clinical Goals: dialysis, wound care  Patient Goals: Pain control  Family Goals: POLINA    Progress made toward(s) clinical / shift goals:    Problem: Pain - Standard  Goal: Alleviation of pain or a reduction in pain to the patient’s comfort goal  Outcome: Progressing     Problem: Knowledge Deficit - Standard  Goal: Patient and family/care givers will demonstrate understanding of plan of care, disease process/condition, diagnostic tests and medications  Outcome: Progressing       Patient is not progressing towards the following goals:

## 2023-03-29 NOTE — PROGRESS NOTES
[facility] Nursing Notes     HD today x 3.5 hrs per Dr Hudosn Leahy  Initiated at 1230 and ended 1600     Pre HD assessment     Received patient asleep and sometimes responsive to verbal stimuli.  Complains of sore thoat. Noted to have dry cough  Pre Vital Signs stable    Edema - pitting edema +1     Access: - Left Permacath , tunneled     No s/s of infection: no redness, no tenderness, no pus, no oozing of blood, warm to touch.     Intra HD    No Issues during HD. Vital signs stable        Post HD     UF goal  achieved:   3500 mls - 500 mL (200mls prime + 300mls rinseback)       Target Net UF : 3000mls     Completed HD tolerated well  Post vital signs stable  Nil complaints  Dressing: dry and intact     Documented by  GRABIEL AGUILERA RN    Report given to HIPOLITO Harvey

## 2023-03-29 NOTE — DISCHARGE PLANNING
Case Management Discharge Planning    Admission Date: 3/27/2023  GMLOS: 3.4  ALOS: 2    6-Clicks ADL Score: 22  6-Clicks Mobility Score: 20      Anticipated Discharge Dispo: Discharge Disposition: Discharged to home/self care (01)  Discharge Address:  (Homeless)    DME Needed: No    Action(s) Taken: DC Assessment Complete (See below)    LSW completed chart review. Per chart review, patient is homeless and has been staying at [facility] prior to admission. Patient requires Dialysis but has been inconsistent/noncompliant with obtaining treatment.     Per medical staff, patient visits the ED often to receive dialysis treatment.     Bedside RN reported that patient is medically cleared after dialysis at 12pm today and will need assistance with transportation. Bedside RN reported that patient is blind.     [facility] provided cab voucher #[id number] to bedside nurse.     No other CM needs at this time.     Escalations Completed: None    Medically Clear: Yes    Next Steps: Care coordination to continue to follow and assist with discharge planning as needed.    Barriers to Discharge: None    Is the patient up for discharge tomorrow: Yes    Is transport arranged for discharge disposition: Yes      Care Transition Team Assessment    Information Source  Who is responsible for making decisions for patient? : Patient         Elopement Risk  Legal Hold: No  Ambulatory or Self Mobile in Wheelchair: Yes  Disoriented: No  Psychiatric Symptoms: None  History of Wandering: No  Elopement this Admit: No  Vocalizing Wanting to Leave: No  Displays Behaviors, Body Language Wanting to Leave: No-Not at Risk for Elopement  Elopement Risk: Not at Risk for Elopement    Interdisciplinary Discharge Planning  Lives with - Patient's Self Care Capacity: Alone and Unable to Care For Self  Patient or legal guardian wants to designate a caregiver: No  Support Systems: Shelter  Housing / Facility: Homeless  Durable Medical Equipment:  Walker    Discharge Preparedness  What is your plan after discharge?: Home with help  Prior Functional Level: Ambulatory, Independent with Activities of Daily Living, Independent with Medication Management  Difficulity with ADLs: None  Difficulity with IADLs: None    Functional Assesment  Prior Functional Level: Ambulatory, Independent with Activities of Daily Living, Independent with Medication Management         Vision / Hearing Impairment  Right Eye Vision: Blind, Impaired  Left Eye Vision: Impaired         Advance Directive  Advance Directive?: DPOA for Health Care    Domestic Abuse  Have you ever been the victim of abuse or violence?: No  Physical Abuse or Sexual Abuse: No  Verbal Abuse or Emotional Abuse: No  Possible Abuse/Neglect Reported to:: Not Applicable         Discharge Risks or Barriers  Discharge risks or barriers?: Complex medical needs, Homeless / couch surfing  Patient risk factors: Homeless, Complex medical needs    Anticipated Discharge Information  Discharge Disposition: Discharged to home/self care (01)  Discharge Address:  (Homeless)

## 2023-03-29 NOTE — PROGRESS NOTES
Hospital Medicine Daily Progress Note    Date of Service  3/28/2023    Chief Complaint  Ambrose Watkins is a 47 y.o. male admitted 3/27/2023 with hyperkalemia and ESRD on HD    Hospital Course  No notes on file    Interval Problem Update  3/28: Hyperkalemia resolved, transferred to medical.    I have discussed this patient's plan of care and discharge plan at IDT rounds today with Case Management, Nursing, Nursing leadership, and other members of the IDT team.    Consultants/Specialty  nephrology    Code Status  Full Code    Disposition  Patient is not medically cleared for discharge.   Anticipate discharge to to home with close outpatient follow-up.  I have placed the appropriate orders for post-discharge needs.    Review of Systems  Review of Systems   Constitutional:  Positive for malaise/fatigue. Negative for chills and fever.   Respiratory:  Negative for cough, shortness of breath and wheezing.    Cardiovascular:  Negative for chest pain.   Gastrointestinal:  Positive for abdominal pain and nausea. Negative for vomiting.   Genitourinary:  Negative for dysuria.   Musculoskeletal:  Positive for myalgias.   Neurological:  Negative for headaches.      Physical Exam  Temp:  [36.3 °C (97.3 °F)-37.1 °C (98.8 °F)] 36.8 °C (98.2 °F)  Pulse:  [62-85] 75  Resp:  [14-20] 16  BP: (143-188)/() 148/76  SpO2:  [90 %-97 %] 90 %    Physical Exam  Constitutional:       Appearance: He is well-developed. He is ill-appearing.   HENT:      Head: Normocephalic.   Cardiovascular:      Rate and Rhythm: Normal rate.      Heart sounds:     No gallop.   Pulmonary:      Effort: No respiratory distress.      Breath sounds: No wheezing.   Abdominal:      General: There is no distension.      Tenderness: There is no abdominal tenderness.   Musculoskeletal:         General: Tenderness present.   Skin:     General: Skin is warm.   Neurological:      Mental Status: He is alert. Mental status is at baseline.        Fluids    Intake/Output Summary (Last 24 hours) at 3/28/2023 1734  Last data filed at 3/28/2023 1300  Gross per 24 hour   Intake 1180 ml   Output 3501 ml   Net -2321 ml       Laboratory  Recent Labs     03/27/23  1259 03/28/23  0424   WBC 10.0 7.7   RBC 3.27* 3.02*   HEMOGLOBIN 9.7* 8.8*   HEMATOCRIT 32.0* 29.2*   MCV 97.9* 96.7   MCH 29.7 29.1   MCHC 30.3* 30.1*   RDW 66.4* 65.5*   PLATELETCT 162* 133*   MPV 11.4 10.8     Recent Labs     03/27/23  1259 03/28/23  0424   SODIUM 138 138   POTASSIUM 6.1* 4.8   CHLORIDE 99 100   CO2 16* 20   GLUCOSE 112* 118*   BUN 93* 55*   CREATININE 15.19* 10.77*   CALCIUM 8.5 7.9*                   Imaging  DX-CHEST-PORTABLE (1 VIEW)   Final Result      Hypoinflation without other evidence for acute cardiopulmonary disease.           Assessment/Plan  * Hyperkalemia- (present on admission)  Assessment & Plan  - Due to missed hemodialysis.  Initial potassium 6.1.  No EKG changes.  -Given IV bicarbonate, along with regular insulin IV and dextrose as temporizing measure   Now resolved with hemodialysis  Finished telemetry monitoring    Calciphylaxis of left lower extremity with nonhealing ulcer (HCC)- (present on admission)  Assessment & Plan  - Has chronic wounds on the right thigh, has recently completed course of antibiotics.  Wound does not appear to be infected.  -Will get wound service to do wound care while in the hospital.  -Holding off on antibiotics.  -Pain control with oral oxycodone and IV Dilaudid.    Homelessness- (present on admission)  Assessment & Plan  - He lives at the shelter, anticipate return back to shelter on discharge.    Chronic HFrEF (heart failure with reduced ejection fraction) (HCC)- (present on admission)  Assessment & Plan  - Appears to be volume overloaded due to missing hemodialysis.  -Nephrology on board for dialysis and volume removal.  -Resume home Coreg.  Not on ACE inhibitor/ARB due to ESRD.    Right eye glaucoma, due to angle-closure and  phacomorphic components- (present on admission)  Assessment & Plan  - Resume Diamox, along with eyedrops including Alphagan, Cosopt, Xalatan, and prednisolone forte.    High anion gap metabolic acidosis- (present on admission)  Assessment & Plan  - High anion gap, due to ESRD and missed hemodialysis  Improving with dialysis    End stage renal disease (HCC)- (present on admission)  Assessment & Plan  - He is hemodialysis dependent.  He is homeless, having moved from [facility] to [place].  Extensive attempt to find outpatient hemodialysis chair for him during last hospitalization in the hospital.  He is utilizing the ED for hemodialysis, but has missed the last 1 week of dialysis due to pain.   -Nephrology contacted, plan for hemodialysis.  May need several sessions of hemodialysis due to degree of volume overload.  -Continue Renvela.    Hypertension- (present on admission)  Assessment & Plan  - Blood pressure elevated, likely due to noncompliance with hemodialysis.  Questionable compliance with medications as well.  -Restart Coreg.  He will be on as needed IV labetalol for significant hypertension.  Optimize blood pressure control.    Type 2 diabetes mellitus with kidney complication, without long-term current use of insulin ([facility])- (present on admission)  Assessment & Plan  - Recent hemoglobin A1c was 6.5.  -He will be on sliding scale insulin coverage while in-house. Accu-Cheks before meals and at bedtime. Goal to keep BG between 140-180 per 2019 ADA guidelines.           VTE prophylaxis: heparin ppx    I have performed a physical exam and reviewed and updated ROS and Plan today (3/28/2023). In review of yesterday's note (3/27/2023), there are no changes except as documented above.

## 2023-03-29 NOTE — DISCHARGE SUMMARY
"Discharge Summary    CHIEF COMPLAINT ON ADMISSION  Chief Complaint   Patient presents with    Other     Pt states he's here for dialysis. Last dialysis Friday. Pt states he hasn't been able to see his medication labels due to glaucoma and hasn't been able to take them because of this.     Wound Check     Between pt's thighs. Pt states, \"I need my wounds bandaged\"       Reason for Admission  Wound Check     Admission Date  3/27/2023    CODE STATUS  Full Code    HPI & HOSPITAL COURSE  Ambrose Watkins is a homeless 47 y.o. male with diabetes since 2007, ESRD on HD for past 2-3 years, methamphetamine abuse, tobacco use, hypertension, hyperlipidemia, medical noncompliance, chronic lower extremity wound due to calciphylaxis, was been recently admitted for volume overload, glaucoma, and vitreous hemorrhage after he moved from [facility] to [facility]. Extensive search for outpatient hemodialysis was pursued however no outpatient hemodialysis center accepted him, and was discharged with plan for him to utilize the ED for his hemodialysis needs.  He presented 3/27/2023 with missing his hemodialysis for the past 1 week due to his leg wounds, and pain.  Infectious workup in ED was unremarkable. Nephrology was consulted for HD. Euvolemia was achieved and patient improved clinically. Referral to follow with wound clinic placed. Patient was determined satisfactory for discharge with appropriate follow up.    Therefore, he is discharged in fair and stable condition to home with close outpatient follow-up.    The patient met 2-midnight criteria for an inpatient stay at the time of discharge.    Discharge Date  03/29/2023    FOLLOW UP ITEMS POST DISCHARGE  Please follow up with PCP in 3-5 days for post hospitalization follow up and medication reconciliation.     DISCHARGE DIAGNOSES  Principal Problem:    Hyperkalemia POA: Yes  Active Problems:    Type 2 diabetes mellitus with kidney complication, without long-term current " use of insulin ([facility]) POA: Yes      Overview: Hemoglobin A1c 5/19/2020 was 7.2%. Recent hemoglobin A1c was 6.5%.       Initially started on glargine and aspart and sliding scale.     Hypertension POA: Yes    End stage renal disease (HCC) (Chronic) POA: Yes    High anion gap metabolic acidosis POA: Yes    Right eye glaucoma, due to angle-closure and phacomorphic components POA: Yes    Chronic HFrEF (heart failure with reduced ejection fraction) ([facility]) POA: Yes    Homelessness POA: Yes    Calciphylaxis of left lower extremity with nonhealing ulcer ([facility]) POA: Yes  Resolved Problems:    * No resolved hospital problems. *      FOLLOW UP  No future appointments.  No follow-up provider specified.    MEDICATIONS ON DISCHARGE     Medication List        START taking these medications        Instructions   calcitRIOL 0.25 MCG Caps  Start taking on: March 30, 2023  Commonly known as: ROCALTROL   Take 1 Capsule by mouth every day.  Dose: 0.25 mcg     oxyCODONE immediate-release 5 MG Tabs  Commonly known as: ROXICODONE   Take 1 Tablet by mouth every 8 hours as needed for Severe Pain for up to 3 days.  Dose: 5 mg            CONTINUE taking these medications        Instructions   acetaZOLAMIDE 125 MG Tabs  Commonly known as: DIAMOX   Take 1 Tablet by mouth every 12 hours.  Dose: 125 mg     Aspirin Low Dose 81 MG Chew chewable tablet  Generic drug: aspirin   Chew 1 Tablet every day.  Dose: 81 mg     atorvastatin 40 MG Tabs  Commonly known as: LIPITOR   Take 1 Tablet by mouth at bedtime.  Dose: 40 mg     brimonidine 0.2 % Soln  Commonly known as: ALPHAGAN   Administer 1 Drop into the right eye 2 times a day.  Dose: 1 Drop     carvedilol 3.125 MG Tabs  Commonly known as: COREG   Take 1 Tablet by mouth 2 times a day with meals.  Dose: 3.125 mg     dorzolamide-timolol 22.3-6.8 MG/ML Soln  Commonly known as: COSOPT   Administer 1 Drop into the right eye 2 times a day.  Dose: 1 Drop     gabapentin 100 MG Caps  Commonly known as: NEURONTIN    Take 1 Capsule by mouth every evening for 30 days.  Dose: 100 mg     latanoprost 0.005 % Soln  Commonly known as: XALATAN   Administer 1 Drop into the right eye every evening.  Dose: 1 Drop     prednisoLONE acetate 1 % Susp  Commonly known as: PRED FORTE   Administer 1 Drop into both eyes 4 times a day.  Dose: 1 Drop     sevelamer carbonate 800 MG Tabs tablet  Commonly known as: RENVELA   Take 2 Tablets by mouth 3 times a day with meals for 30 days.  Dose: 1,600 mg              Allergies  No Known Allergies    DIET  Orders Placed This Encounter   Procedures    Diet Order Diet: Renal     Standing Status:   Standing     Number of Occurrences:   1     Order Specific Question:   Diet:     Answer:   Renal [8]       ACTIVITY  As tolerated.  Weight bearing as tolerated    CONSULTATIONS  Nephrology    PROCEDURES  N/A    LABORATORY  Lab Results   Component Value Date    SODIUM 138 03/29/2023    POTASSIUM 4.5 03/29/2023    CHLORIDE 99 03/29/2023    CO2 22 03/29/2023    GLUCOSE 132 (H) 03/29/2023    BUN 38 (H) 03/29/2023    CREATININE 8.31 (HH) 03/29/2023        Lab Results   Component Value Date    WBC 6.8 03/29/2023    HEMOGLOBIN 9.8 (L) 03/29/2023    HEMATOCRIT 32.2 (L) 03/29/2023    PLATELETCT 140 (L) 03/29/2023        Total time of the discharge process exceeds 34 minutes.

## 2023-03-30 VITALS
RESPIRATION RATE: 19 BRPM | WEIGHT: 181.66 LBS | SYSTOLIC BLOOD PRESSURE: 156 MMHG | HEIGHT: 70 IN | OXYGEN SATURATION: 98 % | TEMPERATURE: 97.4 F | BODY MASS INDEX: 26.01 KG/M2 | DIASTOLIC BLOOD PRESSURE: 98 MMHG | HEART RATE: 77 BPM

## 2023-03-30 LAB
ALBUMIN SERPL BCP-MCNC: 3.2 G/DL (ref 3.2–4.9)
BUN SERPL-MCNC: 25 MG/DL (ref 8–22)
CALCIUM ALBUM COR SERPL-MCNC: 8.8 MG/DL (ref 8.5–10.5)
CALCIUM SERPL-MCNC: 8.2 MG/DL (ref 8.5–10.5)
CHLORIDE SERPL-SCNC: 96 MMOL/L (ref 96–112)
CO2 SERPL-SCNC: 26 MMOL/L (ref 20–33)
CREAT SERPL-MCNC: 5.82 MG/DL (ref 0.5–1.4)
ERYTHROCYTE [DISTWIDTH] IN BLOOD BY AUTOMATED COUNT: 65.1 FL (ref 35.9–50)
GFR SERPLBLD CREATININE-BSD FMLA CKD-EPI: 11 ML/MIN/1.73 M 2
GLUCOSE BLD STRIP.AUTO-MCNC: 126 MG/DL (ref 65–99)
GLUCOSE SERPL-MCNC: 115 MG/DL (ref 65–99)
HCT VFR BLD AUTO: 32.4 % (ref 42–52)
HGB BLD-MCNC: 9.9 G/DL (ref 14–18)
MCH RBC QN AUTO: 29.1 PG (ref 27–33)
MCHC RBC AUTO-ENTMCNC: 30.6 G/DL (ref 33.7–35.3)
MCV RBC AUTO: 95.3 FL (ref 81.4–97.8)
PHOSPHATE SERPL-MCNC: 5 MG/DL (ref 2.5–4.5)
PLATELET # BLD AUTO: 130 K/UL (ref 164–446)
PMV BLD AUTO: 10.8 FL (ref 9–12.9)
POTASSIUM SERPL-SCNC: 4 MMOL/L (ref 3.6–5.5)
RBC # BLD AUTO: 3.4 M/UL (ref 4.7–6.1)
SODIUM SERPL-SCNC: 136 MMOL/L (ref 135–145)
WBC # BLD AUTO: 5.7 K/UL (ref 4.8–10.8)

## 2023-03-30 PROCEDURE — 700111 HCHG RX REV CODE 636 W/ 250 OVERRIDE (IP): Performed by: INTERNAL MEDICINE

## 2023-03-30 PROCEDURE — 82962 GLUCOSE BLOOD TEST: CPT

## 2023-03-30 PROCEDURE — 700102 HCHG RX REV CODE 250 W/ 637 OVERRIDE(OP): Performed by: INTERNAL MEDICINE

## 2023-03-30 PROCEDURE — A9270 NON-COVERED ITEM OR SERVICE: HCPCS | Performed by: INTERNAL MEDICINE

## 2023-03-30 PROCEDURE — 36415 COLL VENOUS BLD VENIPUNCTURE: CPT

## 2023-03-30 PROCEDURE — 99239 HOSP IP/OBS DSCHRG MGMT >30: CPT | Performed by: INTERNAL MEDICINE

## 2023-03-30 PROCEDURE — 80069 RENAL FUNCTION PANEL: CPT

## 2023-03-30 PROCEDURE — 85027 COMPLETE CBC AUTOMATED: CPT

## 2023-03-30 RX ADMIN — HEPARIN SODIUM 5000 UNITS: 5000 INJECTION, SOLUTION INTRAVENOUS; SUBCUTANEOUS at 05:54

## 2023-03-30 RX ADMIN — HYDROMORPHONE HYDROCHLORIDE 0.25 MG: 1 INJECTION, SOLUTION INTRAMUSCULAR; INTRAVENOUS; SUBCUTANEOUS at 01:22

## 2023-03-30 RX ADMIN — BRIMONIDINE TARTRATE 1 DROP: 2 SOLUTION OPHTHALMIC at 06:06

## 2023-03-30 RX ADMIN — ASPIRIN 81 MG 81 MG: 81 TABLET ORAL at 05:54

## 2023-03-30 RX ADMIN — OXYCODONE 5 MG: 5 TABLET ORAL at 03:23

## 2023-03-30 RX ADMIN — SEVELAMER CARBONATE 2400 MG: 800 TABLET, FILM COATED ORAL at 08:20

## 2023-03-30 RX ADMIN — CARVEDILOL 3.12 MG: 3.12 TABLET, FILM COATED ORAL at 08:20

## 2023-03-30 RX ADMIN — CALCITRIOL CAPSULES 0.25 MCG 0.25 MCG: 0.25 CAPSULE ORAL at 06:00

## 2023-03-30 RX ADMIN — OXYCODONE 5 MG: 5 TABLET ORAL at 08:42

## 2023-03-30 RX ADMIN — DORZOLAMIDE HYDROCHLORIDE AND TIMOLOL MALEATE 1 DROP: 20; 5 SOLUTION/ DROPS OPHTHALMIC at 06:06

## 2023-03-30 RX ADMIN — HYDROMORPHONE HYDROCHLORIDE 0.25 MG: 1 INJECTION, SOLUTION INTRAMUSCULAR; INTRAVENOUS; SUBCUTANEOUS at 10:47

## 2023-03-30 RX ADMIN — SENNOSIDES AND DOCUSATE SODIUM 2 TABLET: 50; 8.6 TABLET ORAL at 05:54

## 2023-03-30 RX ADMIN — ACETAZOLAMIDE 125 MG: 250 TABLET ORAL at 05:55

## 2023-03-30 RX ADMIN — OXYCODONE 5 MG: 5 TABLET ORAL at 00:21

## 2023-03-30 RX ADMIN — PREDNISOLONE ACETATE 1 DROP: 10 SUSPENSION/ DROPS OPHTHALMIC at 08:20

## 2023-03-30 RX ADMIN — HYDROMORPHONE HYDROCHLORIDE 0.25 MG: 1 INJECTION, SOLUTION INTRAMUSCULAR; INTRAVENOUS; SUBCUTANEOUS at 05:47

## 2023-03-30 ASSESSMENT — PAIN DESCRIPTION - PAIN TYPE
TYPE: ACUTE PAIN

## 2023-03-30 ASSESSMENT — ENCOUNTER SYMPTOMS
WHEEZING: 0
SHORTNESS OF BREATH: 0
VOMITING: 0
FEVER: 0
COUGH: 0
NAUSEA: 1
CHILLS: 0
ABDOMINAL PAIN: 1
MYALGIAS: 1
HEADACHES: 0

## 2023-03-30 NOTE — DISCHARGE PLANNING
Case Management Discharge Planning    Admission Date: 3/27/2023  GMLOS: 3.4  ALOS: 3    6-Clicks ADL Score: 22  6-Clicks Mobility Score: 20      Anticipated Discharge Dispo: Discharge Disposition: Discharged to home/self care (01)  Discharge Address:  (Homeless)    DME Needed: Yes    DME Ordered: Yes    Action(s) Taken: OTHER    Patient discussed during IDT rounds. Patient needs a FWW prior to d/c.     Choice obtained and faxed to OL[facility]ene[facility].       FWW to be delivered at bedside.       Escalations Completed: None    Medically Clear: Yes    Next Steps: Care coordination to continue to follow and assist with d/c planning as needed.     Barriers to Discharge: None    Is the patient up for discharge tomorrow: No, possibly today.

## 2023-03-30 NOTE — CARE PLAN
The patient is Stable - Low risk of patient condition declining or worsening    Shift Goals  Clinical Goals: wound care  Patient Goals: pain management  Family Goals: no family    Progress made toward(s) clinical / shift goals:  progressing  Pain controlled with current pain regimen.  Instructed on plan of care, disease process, and meds.  Fall risk protocol in place.  Wound care performed during shift.    Patient is not progressing towards the following goals:       Consent: The rationale for the repair was explained to the patient and consent was obtained. The risks, benefits and alternatives to therapy were discussed, including but not limited to, the risks of infection, scarring, bleeding, prolonged wound healing, partial or full flap loss/necrosis, venous congestion, contour deformity/dog ears, hypertrophic/keloid scar, damage to nearby structures, incomplete removal, allergy to anesthesia, nerve injury, need for further procedure, and recurrence were addressed. Prior to the procedure, the treatment site was clearly identified and confirmed by the patient. All components of Universal Protocol/PAUSE Rule completed.

## 2023-03-30 NOTE — PROGRESS NOTES
Received patient resting in bed. Reports pain controlled at this time to thighs. PIV to right arm patent with dressing intact. Chest HD cath with dressing intact. Bed in low position, wheels locked, side rails up x3 and call light within reach.

## 2023-03-30 NOTE — FACE TO FACE
Face to Face Note  -  Durable Medical Equipment    Dyllan Rodriguez D.O. - NPI: [id number]  I certify that this patient is under my care and that they had a durable medical equipment(DME)face to face encounter by myself that meets the physician DME face-to-face encounter requirements with this patient on:    Date of encounter:   Patient:                    MRN:                       YOB: 2021  Ambrose Watkins  [id number]  1975     The encounter with the patient was in whole, or in part, for the following medical condition, which is the primary reason for durable medical equipment:  Other - abnormal gait    I certify that, based on my findings, the following durable medical equipment is medically necessary:    Walkers.    My Clinical findings support the need for the above equipment due to:  Abnormal Gait

## 2023-03-30 NOTE — HOSPITAL COURSE
Ambrose Watkins is a homeless 47 y.o. 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 Extensive search for outpatient hemodialysis was pursued however no outpatient hemodialysis center accepted him, and was discharged with plan for him to utilize the ED for his hemodialysis needs.  He presented 3/27/2023 with missing his hemodialysis for the past 1 week due to his leg wounds, and pain.  Infectious workup in ED was unremarkable. Nephrology was consulted for HD. Euvolemia was achieved and patient improved clinically. Referral to follow with wound clinic placed. Patient was determined satisfactory for discharge with appropriate follow up.

## 2023-03-30 NOTE — DISCHARGE PLANNING
Received Choice form @: [id number]  Agency/Facility Name: [facility]  Referral sent per Choice form @: [id number]

## 2023-03-30 NOTE — PROGRESS NOTES
"[facility] -  PROGRESS NOTE               Author: Hudson Leahy D.O. Date & Time: 3/30/2023  11:07 AM     HPI:  Mr. Watkins is a very pleasant 47-year-old gentleman known to us from prior admissions and his dialysis care.  He has end-stage renal disease secondary to diabetes and hypertension.  Prior to dialyzing in the renal area he used to have an outpatient unit in [facility].  He is currently homeless living in a homeless shelter in [place].  He was recently had a long hospitalization approximately for a month discharged about a week ago.  He did not attend any of his outpatient therapies since his last discharge.  He tells me that he has glaucoma and cannot read his pill bottles.  He has not taken any of his medications.  He is having worsening lower extremity edema with weakness.  He is having some dyspnea with exertion.  Currently has a left IJ PermCath.  There is what appears to be a thrombosed AV graft in his left forearm.  At the time of presentation he was found to have a blood pressure 171/103 with a heart rate of 85.  Further lab evaluation is still pending.  We have been asked to see him regarding his renal failure.     No F/C/N/V/CP, +SOB    No melena, hematochezia, hematemesis.    No HA or abdominal pain.    DAILY NEPHROLOGY SUMMARY:   3/27 - Consult done.  Dialysis provided.   3/28 - Seen on dialysis.   No overnight events.  No new complaints.  Dysnpea better.      Still weak.  Eating \"okay\".    3/29 - Sleepy this morning but appropriate.  Eating \"okay\"  Denies pain.  No dyspnea   3/30 - Asking for a new walker.  Feels \"okay\".  Planning to return to the homeless shelter.    No dyspnea.  Denies pain.      REVIEW OF SYSTEMS:    10 point ROS reviewed and is as per HPI/daily summary or otherwise negative    PMH/PSH/SH/FH: Reviewed and unchanged since admission note  CURRENT MEDICATIONS: Reviewed from admission to present day    VS:  BP (!) 156/98   Pulse 77   Temp " "36.3 °C (97.4 °F) (Temporal)   Resp 19   Ht 1.778 m (5' 10\")   Wt 82.4 kg (181 lb 10.5 oz)   SpO2 98%   BMI 26.07 kg/m²     Physical Exam  Vitals and nursing note reviewed.   Constitutional:       Appearance: Normal appearance.   HENT:      Head: Normocephalic and atraumatic.      Nose: Nose normal.      Mouth/Throat:      Mouth: Mucous membranes are dry.   Eyes:      General: No scleral icterus.     Extraocular Movements: Extraocular movements intact.      Pupils: Pupils are equal, round, and reactive to light.   Neck:      Comments: Left IJ Permcath  Cardiovascular:      Rate and Rhythm: Normal rate and regular rhythm.      Pulses: Normal pulses.      Heart sounds: Normal heart sounds.   Pulmonary:      Effort: Pulmonary effort is normal.      Breath sounds: Examination of the right-lower field reveals decreased breath sounds. Examination of the left-lower field reveals decreased breath sounds. Decreased breath sounds present. No wheezing or rales.   Abdominal:      General: Abdomen is flat. There is no distension.      Palpations: Abdomen is soft.      Tenderness: There is no abdominal tenderness.   Musculoskeletal:         General: No deformity.      Cervical back: Normal range of motion. No tenderness.      Right lower leg: Edema present.      Left lower leg: Edema present.      Comments: Thrombosed left forearm AVG    Skin:     General: Skin is warm and dry.      Findings: No rash.   Neurological:      General: No focal deficit present.      Mental Status: He is alert and oriented to person, place, and time.   Psychiatric:         Behavior: Behavior normal.       Fluids:  In: 500 [Dialysis:500]  Out: 3000     LABS:  Recent Labs     03/28/23  0424 03/29/23  0835 03/30/23  0115   SODIUM 138 138 136   POTASSIUM 4.8 4.5 4.0   CHLORIDE 100 99 96   CO2 20 22 26   GLUCOSE 118* 132* 115*   BUN 55* 38* 25*   CREATININE 10.77* 8.31* 5.82*   CALCIUM 7.9* 8.2* 8.2*         ASSESSMENTS:              # ESRD             "             Secondary to Diabetes and Hypertension                         Maintenance dialysis qTTS previously                                      Unclear if he has an outpt unit in [place]                         Left IJ Permcath                         Failed Left AVG                         No need for additional dialysis today (THURS)    Will plan qMWF going forward while hospitalized              # Hypertension                          Continue current medications                          Volume off with dialysis as BP tolerates              # Edema/Overload                          Volume off with HD as BP tolerates              # Anemia                          Iron stores adequate     Retic count low for clinical condition                          JACKIE with HD to goal Hgb 10-11                          Transfuse PRN              # CKD-MBD                          PTH = 408     cCa = 8.5                          Phos = 8.4     Phos binders to goal phos<5.5     PO calcitriol               # Low Serum Albumin     Alb = 3.2                          No dietary protein restrictions                          Goal: 1.5g Protein/kg/day              # Homeless                          Complicating discharge              # Hx of Meth use              # DM              # Chronic wounds     SUGGESTIONS:              No need for additional dialysis today (THURS)              Maintenance dialysis qTTS and PRN                          Unclear if he has an outpt unit    Will plan qMWF while hospitalized going forward              Volume off with dialysis as BP tolerates                          Try and improve edema to aid wound healing              Continue usual anti-hypertensive medications              No need for IV iron              JACKIE with HD to goal Hgb 10-11              Transfuse PRN              Phos binders to goal phos<5.5    Continue calcitriol and as outpt              No dietary protein restrctions                           Goal: 1.5g Protein/kg/day              Follow labs with further recommendations to follow    Okay to transition to outpt dialysis from renal perspective  Will follow     Thank you

## 2023-03-30 NOTE — PROGRESS NOTES
Patient ready for discharge IV removed patient given discharge instructions has no further questions nor concerns at this present time. Walker delivered at bedside and meds to bed given to patient.

## 2023-03-31 NOTE — PROGRESS NOTES
Hospital Medicine Daily Progress Note    Date of Service  3/30/2023    Chief Complaint  Ambrose Watkins is a 47 y.o. male admitted 3/27/2023 with hyperkalemia and ESRD on HD    Hospital Course  Ambrose Watkins is a homeless 47 y.o. male with diabetes since 2007, ESRD on HD for past 2-3 years, methamphetamine abuse, tobacco use, hypertension, hyperlipidemia, medical noncompliance, chronic lower extremity wound due to calciphylaxis, was been recently admitted for volume overload, glaucoma, and vitreous hemorrhage after he moved from [facility]. Extensive search for outpatient hemodialysis was pursued however no outpatient hemodialysis center accepted him, and was discharged with plan for him to utilize the ED for his hemodialysis needs.  He presented 3/27/2023 with missing his hemodialysis for the past 1 week due to his leg wounds, and pain.  Infectious workup in ED was unremarkable. Nephrology was consulted for HD. Euvolemia was achieved and patient improved clinically. Referral to follow with wound clinic placed. Patient was determined satisfactory for discharge with appropriate follow up.    Interval Problem Update  Patient was seen and examined at bedside.  No acute events overnight. Patient is resting comfortably in bed and in no acute distress.     HD per nephro    I have discussed this patient's plan of care and discharge plan at IDT rounds today with Case Management, Nursing, Nursing leadership, and other members of the IDT team.    Consultants/Specialty  nephrology    Code Status  Prior    Disposition  Patient is not medically cleared for discharge.   Anticipate discharge to to home with close outpatient follow-up.  I have placed the appropriate orders for post-discharge needs.    Review of Systems  Review of Systems   Constitutional:  Positive for malaise/fatigue. Negative for chills and fever.   Respiratory:  Negative for cough, shortness of breath and wheezing.     Cardiovascular:  Negative for chest pain.   Gastrointestinal:  Positive for abdominal pain and nausea. Negative for vomiting.   Genitourinary:  Negative for dysuria.   Musculoskeletal:  Positive for myalgias.   Neurological:  Negative for headaches.      Physical Exam  Temp:  [36.3 °C (97.4 °F)-37.1 °C (98.7 °F)] 36.3 °C (97.4 °F)  Pulse:  [77-80] 77  Resp:  [18-19] 19  BP: (156-161)/(87-98) 156/98  SpO2:  [91 %-98 %] 98 %    Physical Exam  Constitutional:       Appearance: He is well-developed. He is ill-appearing.   HENT:      Head: Normocephalic.      Right Ear: External ear normal.      Left Ear: External ear normal.      Nose: No congestion.      Mouth/Throat:      Pharynx: No oropharyngeal exudate.   Eyes:      General: No scleral icterus.  Cardiovascular:      Rate and Rhythm: Normal rate.      Heart sounds:     No gallop.   Pulmonary:      Breath sounds: No wheezing or rhonchi.   Abdominal:      Tenderness: There is no abdominal tenderness. There is no guarding or rebound.   Musculoskeletal:         General: Tenderness present.   Skin:     General: Skin is warm.   Neurological:      Mental Status: He is alert. Mental status is at baseline.       Fluids  No intake or output data in the 24 hours ending 03/30/23 1756      Laboratory  Recent Labs     03/28/23 0424 03/29/23 0835 03/30/23  0115   WBC 7.7 6.8 5.7   RBC 3.02* 3.33* 3.40*   HEMOGLOBIN 8.8* 9.8* 9.9*   HEMATOCRIT 29.2* 32.2* 32.4*   MCV 96.7 96.7 95.3   MCH 29.1 29.4 29.1   MCHC 30.1* 30.4* 30.6*   RDW 65.5* 65.8* 65.1*   PLATELETCT 133* 140* 130*   MPV 10.8 10.6 10.8     Recent Labs     03/28/23 0424 03/29/23  0835 03/30/23  0115   SODIUM 138 138 136   POTASSIUM 4.8 4.5 4.0   CHLORIDE 100 99 96   CO2 20 22 26   GLUCOSE 118* 132* 115*   BUN 55* 38* 25*   CREATININE 10.77* 8.31* 5.82*   CALCIUM 7.9* 8.2* 8.2*                   Imaging  DX-CHEST-PORTABLE (1 VIEW)   Final Result      Hypoinflation without other evidence for acute cardiopulmonary  disease.           Assessment/Plan  * Hyperkalemia- (present on admission)  Assessment & Plan  - Due to missed hemodialysis.  Initial potassium 6.1.  No EKG changes.  -Given IV bicarbonate, along with regular insulin IV and dextrose as temporizing measure   Now resolved with hemodialysis  Finished telemetry monitoring    End stage renal disease (HCC)- (present on admission)  Assessment & Plan  - He is hemodialysis dependent.  He is homeless, having moved from [facility] to [place].  Extensive attempt to find outpatient hemodialysis chair for him during last hospitalization in the hospital.  He is utilizing the ED for hemodialysis, but has missed the last 1 week of dialysis due to pain.   -Nephrology contacted, plan for hemodialysis.  May need several sessions of hemodialysis due to degree of volume overload.  -Continue Renvela.  HD today    Calciphylaxis of left lower extremity with nonhealing ulcer (HCC)- (present on admission)  Assessment & Plan  - Has chronic wounds on the right thigh, has recently completed course of antibiotics.  Wound does not appear to be infected.  -Will get wound service to do wound care while in the hospital.  -Holding off on antibiotics.  -Pain control with oral oxycodone and IV Dilaudid.    Homelessness- (present on admission)  Assessment & Plan  - He lives at the shelter, anticipate return back to shelter on discharge.    Chronic HFrEF (heart failure with reduced ejection fraction) ([facility])- (present on admission)  Assessment & Plan  - Appears to be volume overloaded due to missing hemodialysis.  -Nephrology on board for dialysis and volume removal.  -Resume home Coreg.  Not on ACE inhibitor/ARB due to ESRD.    Right eye glaucoma, due to angle-closure and phacomorphic components- (present on admission)  Assessment & Plan  - Resume Diamox, along with eyedrops including Alphagan, Cosopt, Xalatan, and prednisolone forte.    High anion gap metabolic acidosis- (present on  admission)  Assessment & Plan  - High anion gap, due to ESRD and missed hemodialysis  Improving with dialysis    Hypertension- (present on admission)  Assessment & Plan  - Blood pressure elevated, likely due to noncompliance with hemodialysis.  Questionable compliance with medications as well.  -Restart Coreg.  He will be on as needed IV labetalol for significant hypertension.  Optimize blood pressure control.    Type 2 diabetes mellitus with kidney complication, without long-term current use of insulin ([facility])- (present on admission)  Assessment & Plan  - Recent hemoglobin A1c was 6.5.  -He will be on sliding scale insulin coverage while in-house. Accu-Cheks before meals and at bedtime. Goal to keep BG between 140-180 per 2019 ADA guidelines.           VTE prophylaxis: heparin ppx    I have performed a physical exam and reviewed and updated ROS and Plan today (3/30/2023). In review of yesterday's note (3/29/2023), there are no changes except as documented above.

## 2023-03-31 NOTE — DISCHARGE SUMMARY
"Discharge Summary    CHIEF COMPLAINT ON ADMISSION  Chief Complaint   Patient presents with    Other     Pt states he's here for dialysis. Last dialysis Friday. Pt states he hasn't been able to see his medication labels due to glaucoma and hasn't been able to take them because of this.     Wound Check     Between pt's thighs. Pt states, \"I need my wounds bandaged\"       Reason for Admission  Wound Check     Admission Date  3/27/2023    CODE STATUS  Prior    HPI & HOSPITAL COURSE  Ambrose Watkins is a homeless 47 y.o. 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 Extensive search for outpatient hemodialysis was pursued however no outpatient hemodialysis center accepted him, and was discharged with plan for him to utilize the ED for his hemodialysis needs.  He presented 3/27/2023 with missing his hemodialysis for the past 1 week due to his leg wounds, and pain.  Infectious workup in ED was unremarkable. Nephrology was consulted for HD. Euvolemia was achieved and patient improved clinically. Referral to follow with wound clinic placed. Patient was determined satisfactory for discharge with appropriate follow up.    Therefore, he is discharged in fair and stable condition to home with close outpatient follow-up.    The patient met 2-midnight criteria for an inpatient stay at the time of discharge.    Discharge Date  3/30/2023    FOLLOW UP ITEMS POST DISCHARGE  Please follow up with PCP in 3-5 days for post hospitalization follow up and medication reconciliation.     DISCHARGE DIAGNOSES  Principal Problem:    Hyperkalemia POA: Yes  Active Problems:    Type 2 diabetes mellitus with kidney complication, without long-term current use " of insulin ([facility]) POA: Yes      Overview: Hemoglobin A1c 5/19/2020 was 7.2%. Recent hemoglobin A1c was 6.5%.       Initially started on glargine and aspart and sliding scale.     Hypertension POA: Yes    End stage renal disease (HCC) (Chronic) POA: Yes    High anion gap metabolic acidosis POA: Yes    Right eye glaucoma, due to angle-closure and phacomorphic components POA: Yes    Chronic HFrEF (heart failure with reduced ejection fraction) ([facility]) POA: Yes    Homelessness POA: Yes    Calciphylaxis of left lower extremity with nonhealing ulcer ([facility]) POA: Yes  Resolved Problems:    * No resolved hospital problems. *      FOLLOW UP  No future appointments.  [facility]  [address]  [facility] [address]  [phone]            MEDICATIONS ON DISCHARGE     Medication List        START taking these medications        Instructions   calcitRIOL 0.25 MCG Caps  Commonly known as: ROCALTROL   Take 1 Capsule by mouth every day.  Dose: 0.25 mcg     oxyCODONE immediate-release 5 MG Tabs  Commonly known as: ROXICODONE   Take 1 Tablet by mouth every 8 hours as needed for Severe Pain for up to 3 days.  Dose: 5 mg            CONTINUE taking these medications        Instructions   acetaZOLAMIDE 125 MG Tabs  Commonly known as: DIAMOX   Take 1 Tablet by mouth every 12 hours.  Dose: 125 mg     Aspirin Low Dose 81 MG Chew chewable tablet  Generic drug: aspirin   Chew 1 Tablet every day.  Dose: 81 mg     atorvastatin 40 MG Tabs  Commonly known as: LIPITOR   Take 1 Tablet by mouth at bedtime.  Dose: 40 mg     brimonidine 0.2 % Soln  Commonly known as: ALPHAGAN   Administer 1 Drop into the right eye 2 times a day.  Dose: 1 Drop     carvedilol 3.125 MG Tabs  Commonly known as: COREG   Take 1 Tablet by mouth 2 times a day with meals.  Dose: 3.125 mg     dorzolamide-timolol 22.3-6.8 MG/ML Soln  Commonly known as: COSOPT   Administer 1 Drop into the right eye 2 times a day.  Dose: 1 Drop     gabapentin 100 MG Caps  Commonly known  as: NEURONTIN   Take 1 Capsule by mouth every evening for 30 days.  Dose: 100 mg     latanoprost 0.005 % Soln  Commonly known as: XALATAN   Administer 1 Drop into the right eye every evening.  Dose: 1 Drop     prednisoLONE acetate 1 % Susp  Commonly known as: PRED FORTE   Administer 1 Drop into both eyes 4 times a day.  Dose: 1 Drop     sevelamer carbonate 800 MG Tabs tablet  Commonly known as: RENVELA   Take 2 Tablets by mouth 3 times a day with meals for 30 days.  Dose: 1,600 mg              Allergies  No Known Allergies    DIET  No orders of the defined types were placed in this encounter.      ACTIVITY  As tolerated.  Weight bearing as tolerated    CONSULTATIONS  Nephrology    PROCEDURES  N/A    LABORATORY  Lab Results   Component Value Date    SODIUM 136 03/30/2023    POTASSIUM 4.0 03/30/2023    CHLORIDE 96 03/30/2023    CO2 26 03/30/2023    GLUCOSE 115 (H) 03/30/2023    BUN 25 (H) 03/30/2023    CREATININE 5.82 (HH) 03/30/2023        Lab Results   Component Value Date    WBC 5.7 03/30/2023    HEMOGLOBIN 9.9 (L) 03/30/2023    HEMATOCRIT 32.4 (L) 03/30/2023    PLATELETCT 130 (L) 03/30/2023        Total time of the discharge process exceeds 38 minutes.

## 2023-04-09 ENCOUNTER — HOSPITAL ENCOUNTER (INPATIENT)
Facility: MEDICAL CENTER | Age: 48
LOS: 2 days | DRG: 640 | End: 2023-04-11
Attending: EMERGENCY MEDICINE | Admitting: FAMILY MEDICINE
Payer: MEDICARE

## 2023-04-09 DIAGNOSIS — E86.0 DEHYDRATION: ICD-10-CM

## 2023-04-09 DIAGNOSIS — Z51.89 ENCOUNTER FOR WOUND RE-CHECK: ICD-10-CM

## 2023-04-09 DIAGNOSIS — I10 HYPERTENSION, UNSPECIFIED TYPE: ICD-10-CM

## 2023-04-09 DIAGNOSIS — Z59.00 HOMELESSNESS: ICD-10-CM

## 2023-04-09 DIAGNOSIS — Z72.0 TOBACCO ABUSE: ICD-10-CM

## 2023-04-09 DIAGNOSIS — Z91.199 NON-COMPLIANCE: ICD-10-CM

## 2023-04-09 DIAGNOSIS — E87.5 HYPERKALEMIA: Primary | ICD-10-CM

## 2023-04-09 PROBLEM — Z65.9 MEDICATION NONADHERENCE DUE TO PSYCHOSOCIAL PROBLEM: Status: ACTIVE | Noted: 2023-04-09

## 2023-04-09 PROBLEM — Z91.148 MEDICATION NONADHERENCE DUE TO PSYCHOSOCIAL PROBLEM: Status: ACTIVE | Noted: 2023-04-09

## 2023-04-09 PROBLEM — I50.89 OTHER HEART FAILURE (HCC): Status: ACTIVE | Noted: 2022-02-28

## 2023-04-09 LAB
ALBUMIN SERPL BCP-MCNC: 3.5 G/DL (ref 3.2–4.9)
ALBUMIN/GLOB SERPL: 0.7 G/DL
ALP SERPL-CCNC: 106 U/L (ref 30–99)
ALT SERPL-CCNC: <5 U/L (ref 2–50)
ANION GAP SERPL CALC-SCNC: 27 MMOL/L (ref 7–16)
ANION GAP SERPL CALC-SCNC: 28 MMOL/L (ref 7–16)
AST SERPL-CCNC: 6 U/L (ref 12–45)
BASOPHILS # BLD AUTO: 0.4 % (ref 0–1.8)
BASOPHILS # BLD: 0.03 K/UL (ref 0–0.12)
BILIRUB SERPL-MCNC: 0.6 MG/DL (ref 0.1–1.5)
BUN SERPL-MCNC: 105 MG/DL (ref 8–22)
BUN SERPL-MCNC: 105 MG/DL (ref 8–22)
CALCIUM ALBUM COR SERPL-MCNC: 7.8 MG/DL (ref 8.5–10.5)
CALCIUM SERPL-MCNC: 7.4 MG/DL (ref 8.5–10.5)
CALCIUM SERPL-MCNC: 7.6 MG/DL (ref 8.5–10.5)
CHLORIDE SERPL-SCNC: 96 MMOL/L (ref 96–112)
CHLORIDE SERPL-SCNC: 97 MMOL/L (ref 96–112)
CO2 SERPL-SCNC: 12 MMOL/L (ref 20–33)
CO2 SERPL-SCNC: 14 MMOL/L (ref 20–33)
CREAT SERPL-MCNC: 15.14 MG/DL (ref 0.5–1.4)
CREAT SERPL-MCNC: 15.25 MG/DL (ref 0.5–1.4)
EKG IMPRESSION: NORMAL
EOSINOPHIL # BLD AUTO: 0.08 K/UL (ref 0–0.51)
EOSINOPHIL NFR BLD: 1.1 % (ref 0–6.9)
ERYTHROCYTE [DISTWIDTH] IN BLOOD BY AUTOMATED COUNT: 61 FL (ref 35.9–50)
GFR SERPLBLD CREATININE-BSD FMLA CKD-EPI: 4 ML/MIN/1.73 M 2
GFR SERPLBLD CREATININE-BSD FMLA CKD-EPI: 4 ML/MIN/1.73 M 2
GLOBULIN SER CALC-MCNC: 4.9 G/DL (ref 1.9–3.5)
GLUCOSE SERPL-MCNC: 113 MG/DL (ref 65–99)
GLUCOSE SERPL-MCNC: 135 MG/DL (ref 65–99)
HCT VFR BLD AUTO: 29.8 % (ref 42–52)
HGB BLD-MCNC: 9.2 G/DL (ref 14–18)
IMM GRANULOCYTES # BLD AUTO: 0.02 K/UL (ref 0–0.11)
IMM GRANULOCYTES NFR BLD AUTO: 0.3 % (ref 0–0.9)
LYMPHOCYTES # BLD AUTO: 1.49 K/UL (ref 1–4.8)
LYMPHOCYTES NFR BLD: 21 % (ref 22–41)
MAGNESIUM SERPL-MCNC: 2.3 MG/DL (ref 1.5–2.5)
MCH RBC QN AUTO: 29.6 PG (ref 27–33)
MCHC RBC AUTO-ENTMCNC: 30.9 G/DL (ref 33.7–35.3)
MCV RBC AUTO: 95.8 FL (ref 81.4–97.8)
MONOCYTES # BLD AUTO: 0.63 K/UL (ref 0–0.85)
MONOCYTES NFR BLD AUTO: 8.9 % (ref 0–13.4)
NEUTROPHILS # BLD AUTO: 4.84 K/UL (ref 1.82–7.42)
NEUTROPHILS NFR BLD: 68.3 % (ref 44–72)
NRBC # BLD AUTO: 0 K/UL
NRBC BLD-RTO: 0 /100 WBC
PHOSPHATE SERPL-MCNC: 13.7 MG/DL (ref 2.5–4.5)
PLATELET # BLD AUTO: 182 K/UL (ref 164–446)
PMV BLD AUTO: 10.9 FL (ref 9–12.9)
POTASSIUM SERPL-SCNC: 5.9 MMOL/L (ref 3.6–5.5)
POTASSIUM SERPL-SCNC: 6.1 MMOL/L (ref 3.6–5.5)
PROT SERPL-MCNC: 8.4 G/DL (ref 6–8.2)
RBC # BLD AUTO: 3.11 M/UL (ref 4.7–6.1)
SODIUM SERPL-SCNC: 136 MMOL/L (ref 135–145)
SODIUM SERPL-SCNC: 138 MMOL/L (ref 135–145)
WBC # BLD AUTO: 7.1 K/UL (ref 4.8–10.8)

## 2023-04-09 PROCEDURE — 80053 COMPREHEN METABOLIC PANEL: CPT

## 2023-04-09 PROCEDURE — 84100 ASSAY OF PHOSPHORUS: CPT

## 2023-04-09 PROCEDURE — 83735 ASSAY OF MAGNESIUM: CPT

## 2023-04-09 PROCEDURE — 93005 ELECTROCARDIOGRAM TRACING: CPT | Performed by: EMERGENCY MEDICINE

## 2023-04-09 PROCEDURE — 80048 BASIC METABOLIC PNL TOTAL CA: CPT

## 2023-04-09 PROCEDURE — 82962 GLUCOSE BLOOD TEST: CPT | Mod: 91

## 2023-04-09 PROCEDURE — 84484 ASSAY OF TROPONIN QUANT: CPT

## 2023-04-09 PROCEDURE — 94760 N-INVAS EAR/PLS OXIMETRY 1: CPT

## 2023-04-09 PROCEDURE — 96374 THER/PROPH/DIAG INJ IV PUSH: CPT

## 2023-04-09 PROCEDURE — 36415 COLL VENOUS BLD VENIPUNCTURE: CPT

## 2023-04-09 PROCEDURE — 99285 EMERGENCY DEPT VISIT HI MDM: CPT

## 2023-04-09 PROCEDURE — 700105 HCHG RX REV CODE 258: Performed by: EMERGENCY MEDICINE

## 2023-04-09 PROCEDURE — 700111 HCHG RX REV CODE 636 W/ 250 OVERRIDE (IP): Mod: JG | Performed by: STUDENT IN AN ORGANIZED HEALTH CARE EDUCATION/TRAINING PROGRAM

## 2023-04-09 PROCEDURE — 700102 HCHG RX REV CODE 250 W/ 637 OVERRIDE(OP)

## 2023-04-09 PROCEDURE — 87040 BLOOD CULTURE FOR BACTERIA: CPT | Mod: 91

## 2023-04-09 PROCEDURE — 700111 HCHG RX REV CODE 636 W/ 250 OVERRIDE (IP): Performed by: EMERGENCY MEDICINE

## 2023-04-09 PROCEDURE — 770020 HCHG ROOM/CARE - TELE (206)

## 2023-04-09 PROCEDURE — 700102 HCHG RX REV CODE 250 W/ 637 OVERRIDE(OP): Performed by: EMERGENCY MEDICINE

## 2023-04-09 PROCEDURE — 85025 COMPLETE CBC W/AUTO DIFF WBC: CPT

## 2023-04-09 PROCEDURE — A9270 NON-COVERED ITEM OR SERVICE: HCPCS

## 2023-04-09 RX ORDER — CALCITRIOL 0.25 UG/1
0.25 CAPSULE, LIQUID FILLED ORAL DAILY
Status: DISCONTINUED | OUTPATIENT
Start: 2023-04-10 | End: 2023-04-11 | Stop reason: HOSPADM

## 2023-04-09 RX ORDER — ATORVASTATIN CALCIUM 40 MG/1
40 TABLET, FILM COATED ORAL EVERY EVENING
Status: DISCONTINUED | OUTPATIENT
Start: 2023-04-10 | End: 2023-04-11 | Stop reason: HOSPADM

## 2023-04-09 RX ORDER — HYDROMORPHONE HYDROCHLORIDE 1 MG/ML
0.5 INJECTION, SOLUTION INTRAMUSCULAR; INTRAVENOUS; SUBCUTANEOUS
Status: DISCONTINUED | OUTPATIENT
Start: 2023-04-09 | End: 2023-04-11 | Stop reason: HOSPADM

## 2023-04-09 RX ORDER — HYDROMORPHONE HYDROCHLORIDE 1 MG/ML
0.5 INJECTION, SOLUTION INTRAMUSCULAR; INTRAVENOUS; SUBCUTANEOUS
Status: DISCONTINUED | OUTPATIENT
Start: 2023-04-09 | End: 2023-04-09

## 2023-04-09 RX ORDER — PREDNISOLONE ACETATE 10 MG/ML
1 SUSPENSION/ DROPS OPHTHALMIC 4 TIMES DAILY
Status: DISCONTINUED | OUTPATIENT
Start: 2023-04-10 | End: 2023-04-11 | Stop reason: HOSPADM

## 2023-04-09 RX ORDER — DIPHENHYDRAMINE HCL 25 MG
25 TABLET ORAL ONCE
Status: COMPLETED | OUTPATIENT
Start: 2023-04-09 | End: 2023-04-09

## 2023-04-09 RX ORDER — OXYCODONE HYDROCHLORIDE 5 MG/1
5 TABLET ORAL EVERY 4 HOURS PRN
Status: DISCONTINUED | OUTPATIENT
Start: 2023-04-09 | End: 2023-04-11 | Stop reason: HOSPADM

## 2023-04-09 RX ORDER — MORPHINE SULFATE 4 MG/ML
4 INJECTION INTRAVENOUS ONCE
Status: COMPLETED | OUTPATIENT
Start: 2023-04-09 | End: 2023-04-09

## 2023-04-09 RX ORDER — HEPARIN SODIUM 5000 [USP'U]/ML
5000 INJECTION, SOLUTION INTRAVENOUS; SUBCUTANEOUS EVERY 8 HOURS
Status: DISCONTINUED | OUTPATIENT
Start: 2023-04-10 | End: 2023-04-11 | Stop reason: HOSPADM

## 2023-04-09 RX ORDER — ASPIRIN 81 MG/1
81 TABLET, CHEWABLE ORAL DAILY
Status: DISCONTINUED | OUTPATIENT
Start: 2023-04-10 | End: 2023-04-11 | Stop reason: HOSPADM

## 2023-04-09 RX ORDER — DORZOLAMIDE HYDROCHLORIDE AND TIMOLOL MALEATE 20; 5 MG/ML; MG/ML
1 SOLUTION/ DROPS OPHTHALMIC 2 TIMES DAILY
Status: DISCONTINUED | OUTPATIENT
Start: 2023-04-09 | End: 2023-04-11 | Stop reason: HOSPADM

## 2023-04-09 RX ORDER — PROMETHAZINE HYDROCHLORIDE 25 MG/1
12.5-25 TABLET ORAL EVERY 4 HOURS PRN
Status: DISCONTINUED | OUTPATIENT
Start: 2023-04-09 | End: 2023-04-11 | Stop reason: HOSPADM

## 2023-04-09 RX ORDER — CALCIUM GLUCONATE 20 MG/ML
2 INJECTION, SOLUTION INTRAVENOUS ONCE
Status: COMPLETED | OUTPATIENT
Start: 2023-04-09 | End: 2023-04-09

## 2023-04-09 RX ORDER — ONDANSETRON 2 MG/ML
4 INJECTION INTRAMUSCULAR; INTRAVENOUS ONCE
Status: DISCONTINUED | OUTPATIENT
Start: 2023-04-09 | End: 2023-04-09

## 2023-04-09 RX ORDER — PROCHLORPERAZINE EDISYLATE 5 MG/ML
5-10 INJECTION INTRAMUSCULAR; INTRAVENOUS EVERY 4 HOURS PRN
Status: DISCONTINUED | OUTPATIENT
Start: 2023-04-09 | End: 2023-04-11 | Stop reason: HOSPADM

## 2023-04-09 RX ORDER — PROMETHAZINE HYDROCHLORIDE 25 MG/1
12.5-25 SUPPOSITORY RECTAL EVERY 4 HOURS PRN
Status: DISCONTINUED | OUTPATIENT
Start: 2023-04-09 | End: 2023-04-11 | Stop reason: HOSPADM

## 2023-04-09 RX ORDER — CARVEDILOL 3.12 MG/1
3.12 TABLET ORAL 2 TIMES DAILY WITH MEALS
Status: DISCONTINUED | OUTPATIENT
Start: 2023-04-10 | End: 2023-04-11 | Stop reason: HOSPADM

## 2023-04-09 RX ORDER — ONDANSETRON 4 MG/1
4 TABLET, ORALLY DISINTEGRATING ORAL EVERY 4 HOURS PRN
Status: DISCONTINUED | OUTPATIENT
Start: 2023-04-09 | End: 2023-04-09

## 2023-04-09 RX ORDER — ONDANSETRON 2 MG/ML
4 INJECTION INTRAMUSCULAR; INTRAVENOUS EVERY 4 HOURS PRN
Status: DISCONTINUED | OUTPATIENT
Start: 2023-04-09 | End: 2023-04-09

## 2023-04-09 RX ORDER — SODIUM BICARBONATE IN D5W 150/1000ML
PLASTIC BAG, INJECTION (ML) INTRAVENOUS CONTINUOUS
Status: DISCONTINUED | OUTPATIENT
Start: 2023-04-09 | End: 2023-04-10

## 2023-04-09 RX ORDER — BRIMONIDINE TARTRATE 2 MG/ML
1 SOLUTION/ DROPS OPHTHALMIC 2 TIMES DAILY
Status: DISCONTINUED | OUTPATIENT
Start: 2023-04-09 | End: 2023-04-11 | Stop reason: HOSPADM

## 2023-04-09 RX ORDER — LATANOPROST 50 UG/ML
1 SOLUTION/ DROPS OPHTHALMIC EVERY EVENING
Status: DISCONTINUED | OUTPATIENT
Start: 2023-04-09 | End: 2023-04-11 | Stop reason: HOSPADM

## 2023-04-09 RX ORDER — SEVELAMER CARBONATE 800 MG/1
1600 TABLET, FILM COATED ORAL
Status: DISCONTINUED | OUTPATIENT
Start: 2023-04-10 | End: 2023-04-11 | Stop reason: HOSPADM

## 2023-04-09 RX ADMIN — MORPHINE SULFATE 4 MG: 4 INJECTION INTRAVENOUS at 20:40

## 2023-04-09 RX ADMIN — SODIUM BICARBONATE: 84 INJECTION, SOLUTION INTRAVENOUS at 23:29

## 2023-04-09 RX ADMIN — ALTEPLASE 1.9 MG: 2.2 INJECTION, POWDER, LYOPHILIZED, FOR SOLUTION INTRAVENOUS at 22:45

## 2023-04-09 RX ADMIN — OXYCODONE 5 MG: 5 TABLET ORAL at 23:47

## 2023-04-09 RX ADMIN — DIPHENHYDRAMINE HYDROCHLORIDE 25 MG: 25 TABLET ORAL at 23:47

## 2023-04-09 RX ADMIN — INSULIN HUMAN 4 UNITS: 100 INJECTION, SOLUTION PARENTERAL at 21:58

## 2023-04-09 RX ADMIN — CALCIUM GLUCONATE 2 G: 20 INJECTION, SOLUTION INTRAVENOUS at 22:04

## 2023-04-09 SDOH — ECONOMIC STABILITY - HOUSING INSECURITY: HOMELESSNESS UNSPECIFIED: Z59.00

## 2023-04-09 ASSESSMENT — PAIN DESCRIPTION - PAIN TYPE
TYPE: ACUTE PAIN
TYPE: ACUTE PAIN

## 2023-04-09 ASSESSMENT — LIFESTYLE VARIABLES
CONSUMPTION TOTAL: NEGATIVE
HAVE YOU EVER FELT YOU SHOULD CUT DOWN ON YOUR DRINKING: NO
HAVE PEOPLE ANNOYED YOU BY CRITICIZING YOUR DRINKING: NO
EVER FELT BAD OR GUILTY ABOUT YOUR DRINKING: NO
TOTAL SCORE: 0
DOES PATIENT WANT TO STOP DRINKING: NO
ALCOHOL_USE: NO
ON A TYPICAL DAY WHEN YOU DRINK ALCOHOL HOW MANY DRINKS DO YOU HAVE: 0
HOW MANY TIMES IN THE PAST YEAR HAVE YOU HAD 5 OR MORE DRINKS IN A DAY: 0
EVER HAD A DRINK FIRST THING IN THE MORNING TO STEADY YOUR NERVES TO GET RID OF A HANGOVER: NO
TOTAL SCORE: 0
AVERAGE NUMBER OF DAYS PER WEEK YOU HAVE A DRINK CONTAINING ALCOHOL: 0
TOTAL SCORE: 0

## 2023-04-09 ASSESSMENT — COGNITIVE AND FUNCTIONAL STATUS - GENERAL
MOBILITY SCORE: 22
WALKING IN HOSPITAL ROOM: A LITTLE
SUGGESTED CMS G CODE MODIFIER MOBILITY: CJ
CLIMB 3 TO 5 STEPS WITH RAILING: A LITTLE
DAILY ACTIVITIY SCORE: 22
DRESSING REGULAR LOWER BODY CLOTHING: A LITTLE
SUGGESTED CMS G CODE MODIFIER DAILY ACTIVITY: CJ
HELP NEEDED FOR BATHING: A LITTLE

## 2023-04-09 ASSESSMENT — FIBROSIS 4 INDEX
FIB4 SCORE: 1.468606391695137166
FIB4 SCORE: 0.7

## 2023-04-10 LAB
BASOPHILS # BLD AUTO: 0.8 % (ref 0–1.8)
BASOPHILS # BLD: 0.06 K/UL (ref 0–0.12)
EKG IMPRESSION: NORMAL
EOSINOPHIL # BLD AUTO: 0.13 K/UL (ref 0–0.51)
EOSINOPHIL NFR BLD: 1.8 % (ref 0–6.9)
ERYTHROCYTE [DISTWIDTH] IN BLOOD BY AUTOMATED COUNT: 60 FL (ref 35.9–50)
GLUCOSE BLD STRIP.AUTO-MCNC: 114 MG/DL (ref 65–99)
GLUCOSE BLD STRIP.AUTO-MCNC: 142 MG/DL (ref 65–99)
GLUCOSE BLD STRIP.AUTO-MCNC: 175 MG/DL (ref 65–99)
GLUCOSE BLD STRIP.AUTO-MCNC: 189 MG/DL (ref 65–99)
GLUCOSE BLD STRIP.AUTO-MCNC: 77 MG/DL (ref 65–99)
GLUCOSE BLD STRIP.AUTO-MCNC: 91 MG/DL (ref 65–99)
HCT VFR BLD AUTO: 31.3 % (ref 42–52)
HGB BLD-MCNC: 9.8 G/DL (ref 14–18)
IMM GRANULOCYTES # BLD AUTO: 0.02 K/UL (ref 0–0.11)
IMM GRANULOCYTES NFR BLD AUTO: 0.3 % (ref 0–0.9)
LACTATE SERPL-SCNC: 1.8 MMOL/L (ref 0.5–2)
LYMPHOCYTES # BLD AUTO: 1.51 K/UL (ref 1–4.8)
LYMPHOCYTES NFR BLD: 21 % (ref 22–41)
MCH RBC QN AUTO: 29.3 PG (ref 27–33)
MCHC RBC AUTO-ENTMCNC: 31.3 G/DL (ref 33.7–35.3)
MCV RBC AUTO: 93.7 FL (ref 81.4–97.8)
MONOCYTES # BLD AUTO: 0.66 K/UL (ref 0–0.85)
MONOCYTES NFR BLD AUTO: 9.2 % (ref 0–13.4)
NEUTROPHILS # BLD AUTO: 4.8 K/UL (ref 1.82–7.42)
NEUTROPHILS NFR BLD: 66.9 % (ref 44–72)
NRBC # BLD AUTO: 0 K/UL
NRBC BLD-RTO: 0 /100 WBC
NT-PROBNP SERPL IA-MCNC: ABNORMAL PG/ML (ref 0–125)
PLATELET # BLD AUTO: 176 K/UL (ref 164–446)
PMV BLD AUTO: 11.2 FL (ref 9–12.9)
RBC # BLD AUTO: 3.34 M/UL (ref 4.7–6.1)
TROPONIN T SERPL-MCNC: 402 NG/L (ref 6–19)
TROPONIN T SERPL-MCNC: 467 NG/L (ref 6–19)
WBC # BLD AUTO: 7.2 K/UL (ref 4.8–10.8)

## 2023-04-10 PROCEDURE — 99223 1ST HOSP IP/OBS HIGH 75: CPT | Mod: AI,GC | Performed by: FAMILY MEDICINE

## 2023-04-10 PROCEDURE — 5A1D70Z PERFORMANCE OF URINARY FILTRATION, INTERMITTENT, LESS THAN 6 HOURS PER DAY: ICD-10-PCS | Performed by: STUDENT IN AN ORGANIZED HEALTH CARE EDUCATION/TRAINING PROGRAM

## 2023-04-10 PROCEDURE — 83880 ASSAY OF NATRIURETIC PEPTIDE: CPT

## 2023-04-10 PROCEDURE — 700111 HCHG RX REV CODE 636 W/ 250 OVERRIDE (IP)

## 2023-04-10 PROCEDURE — 700101 HCHG RX REV CODE 250

## 2023-04-10 PROCEDURE — 93005 ELECTROCARDIOGRAM TRACING: CPT

## 2023-04-10 PROCEDURE — A9270 NON-COVERED ITEM OR SERVICE: HCPCS

## 2023-04-10 PROCEDURE — 85025 COMPLETE CBC W/AUTO DIFF WBC: CPT

## 2023-04-10 PROCEDURE — 93010 ELECTROCARDIOGRAM REPORT: CPT | Performed by: STUDENT IN AN ORGANIZED HEALTH CARE EDUCATION/TRAINING PROGRAM

## 2023-04-10 PROCEDURE — 700111 HCHG RX REV CODE 636 W/ 250 OVERRIDE (IP): Performed by: FAMILY MEDICINE

## 2023-04-10 PROCEDURE — 82962 GLUCOSE BLOOD TEST: CPT

## 2023-04-10 PROCEDURE — 84484 ASSAY OF TROPONIN QUANT: CPT

## 2023-04-10 PROCEDURE — 90935 HEMODIALYSIS ONE EVALUATION: CPT

## 2023-04-10 PROCEDURE — 700102 HCHG RX REV CODE 250 W/ 637 OVERRIDE(OP)

## 2023-04-10 PROCEDURE — 83605 ASSAY OF LACTIC ACID: CPT

## 2023-04-10 PROCEDURE — 770020 HCHG ROOM/CARE - TELE (206)

## 2023-04-10 RX ORDER — HEPARIN SODIUM 1000 [USP'U]/ML
3800 INJECTION, SOLUTION INTRAVENOUS; SUBCUTANEOUS
Status: DISCONTINUED | OUTPATIENT
Start: 2023-04-11 | End: 2023-04-11 | Stop reason: HOSPADM

## 2023-04-10 RX ORDER — HEPARIN SODIUM 1000 [USP'U]/ML
3800 INJECTION, SOLUTION INTRAVENOUS; SUBCUTANEOUS
Status: DISCONTINUED | OUTPATIENT
Start: 2023-04-10 | End: 2023-04-10

## 2023-04-10 RX ORDER — HEPARIN SODIUM 1000 [USP'U]/ML
INJECTION, SOLUTION INTRAVENOUS; SUBCUTANEOUS
Status: COMPLETED
Start: 2023-04-10 | End: 2023-04-10

## 2023-04-10 RX ADMIN — BRIMONIDINE TARTRATE 1 DROP: 2 SOLUTION OPHTHALMIC at 05:48

## 2023-04-10 RX ADMIN — CALCITRIOL 0.25 MCG: 0.25 CAPSULE ORAL at 05:48

## 2023-04-10 RX ADMIN — BRIMONIDINE TARTRATE 1 DROP: 2 SOLUTION OPHTHALMIC at 00:11

## 2023-04-10 RX ADMIN — HEPARIN SODIUM 3800 UNITS: 1000 INJECTION, SOLUTION INTRAVENOUS; SUBCUTANEOUS at 11:40

## 2023-04-10 RX ADMIN — LATANOPROST 1 DROP: 50 SOLUTION OPHTHALMIC at 00:11

## 2023-04-10 RX ADMIN — SEVELAMER CARBONATE 1600 MG: 800 TABLET, FILM COATED ORAL at 17:05

## 2023-04-10 RX ADMIN — DORZOLAMIDE HYDROCHLORIDE AND TIMOLOL MALEATE 1 DROP: 20; 5 SOLUTION/ DROPS OPHTHALMIC at 00:11

## 2023-04-10 RX ADMIN — HYDROMORPHONE HYDROCHLORIDE 0.5 MG: 1 INJECTION, SOLUTION INTRAMUSCULAR; INTRAVENOUS; SUBCUTANEOUS at 12:17

## 2023-04-10 RX ADMIN — PREDNISOLONE ACETATE 1 DROP: 10 SUSPENSION/ DROPS OPHTHALMIC at 17:05

## 2023-04-10 RX ADMIN — SEVELAMER CARBONATE 1600 MG: 800 TABLET, FILM COATED ORAL at 12:05

## 2023-04-10 RX ADMIN — DORZOLAMIDE HYDROCHLORIDE AND TIMOLOL MALEATE 1 DROP: 20; 5 SOLUTION/ DROPS OPHTHALMIC at 17:05

## 2023-04-10 RX ADMIN — PREDNISOLONE ACETATE 1 DROP: 10 SUSPENSION/ DROPS OPHTHALMIC at 20:18

## 2023-04-10 RX ADMIN — DORZOLAMIDE HYDROCHLORIDE AND TIMOLOL MALEATE 1 DROP: 20; 5 SOLUTION/ DROPS OPHTHALMIC at 05:48

## 2023-04-10 RX ADMIN — PREDNISOLONE ACETATE 1 DROP: 10 SUSPENSION/ DROPS OPHTHALMIC at 12:11

## 2023-04-10 RX ADMIN — HYDROMORPHONE HYDROCHLORIDE 0.5 MG: 1 INJECTION, SOLUTION INTRAMUSCULAR; INTRAVENOUS; SUBCUTANEOUS at 20:18

## 2023-04-10 RX ADMIN — ATORVASTATIN CALCIUM 40 MG: 40 TABLET, FILM COATED ORAL at 17:05

## 2023-04-10 RX ADMIN — HYDROMORPHONE HYDROCHLORIDE 0.5 MG: 1 INJECTION, SOLUTION INTRAMUSCULAR; INTRAVENOUS; SUBCUTANEOUS at 00:57

## 2023-04-10 RX ADMIN — LATANOPROST 1 DROP: 50 SOLUTION OPHTHALMIC at 17:05

## 2023-04-10 RX ADMIN — ASPIRIN 81 MG: 81 TABLET, CHEWABLE ORAL at 05:48

## 2023-04-10 RX ADMIN — SEVELAMER CARBONATE 1600 MG: 800 TABLET, FILM COATED ORAL at 07:52

## 2023-04-10 RX ADMIN — CARVEDILOL 3.12 MG: 3.12 TABLET, FILM COATED ORAL at 07:52

## 2023-04-10 RX ADMIN — BRIMONIDINE TARTRATE 1 DROP: 2 SOLUTION OPHTHALMIC at 17:05

## 2023-04-10 RX ADMIN — HEPARIN SODIUM 5000 UNITS: 5000 INJECTION, SOLUTION INTRAVENOUS; SUBCUTANEOUS at 05:50

## 2023-04-10 RX ADMIN — HEPARIN SODIUM 5000 UNITS: 5000 INJECTION, SOLUTION INTRAVENOUS; SUBCUTANEOUS at 21:30

## 2023-04-10 RX ADMIN — CARVEDILOL 3.12 MG: 3.12 TABLET, FILM COATED ORAL at 17:05

## 2023-04-10 ASSESSMENT — PAIN DESCRIPTION - PAIN TYPE
TYPE: ACUTE PAIN

## 2023-04-10 NOTE — ASSESSMENT & PLAN NOTE
K+ of 5.9 on admission. Likely due to missed dialysis sessions. Insulin, calcium gluconate and bicarb given in ED.     Plan:  - Dialysis as per nephrology  - Telemetry monitoring  - continue to monitor electrolytes

## 2023-04-10 NOTE — PROGRESS NOTES
Patient transported to room 732 with ACLS RN. Report received and assumed patient care. Pt A&O x 4 and on room air. No signs of distress noted at this time. Pt placed on the tele box and monitors were notified of arrival. Pt updated on plan of care for the day and has call light within reach. Fall precautions are in place.

## 2023-04-10 NOTE — ED PROVIDER NOTES
ED Provider Note    Scribed for Adonis Benites by Kwame Campos. 4/9/2023  7:42 PM    Primary care provider: Patient reports no primary care provider.   Means of arrival: EMS  History obtained from: Patient  History limited by: None    CHIEF COMPLAINT  Chief Complaint   Patient presents with    Leg Pain     Pt c/o chronic leg pain but is increased lately.  Also c/o missed dialysis x 2 this week     EXTERNAL RECORDS REVIEWED  Inpatient Notes: Patient was recently admitted for 3 days on the 27th of last month for hyperkalemia. They have a history of noncompliance and missing dialysis. Has a history of tobacco and methamphetamine abuse as well as a history of hypertension, hyperlipidemia, and chronic heart failure.    HPI/ROS  LIMITATION TO HISTORY   None  OUTSIDE HISTORIAN(S):  None    HPI  Ambrose Watkins is a 47 y.o. male who presents to the Emergency Department via EMS for leg pain. He reports that he is concerned for infection of his inner thigh along with associated leg and inner thigh pain. He reports not having eaten in 3 days. He reports not having done dialysis since a week ago. He admits to smoking but denies any drug or alcohol use. He reports [facility] the [facility].    REVIEW OF SYSTEMS  As above, all other systems reviewed and are negative.   See HPI for further details.     PAST MEDICAL HISTORY   has a past medical history of Diabetes, High anion gap metabolic acidosis (8/9/2022), Hyperkalemia (2/22/2022), Hypertension, and Renal disease.    SURGICAL HISTORY   has a past surgical history that includes av fistula creation (Left, 2/25/2022) and thrombectomy (Left, 2/25/2022).    SOCIAL HISTORY  Social History     Tobacco Use    Smoking status: Every Day     Packs/day: 0.25     Types: Cigarettes    Smokeless tobacco: Never   Vaping Use    Vaping Use: Never used   Substance Use Topics    Alcohol use: Not Currently    Drug use: No      Social History     Substance and Sexual Activity   Drug Use  "No     FAMILY HISTORY  No pertinent family history noted.     CURRENT MEDICATIONS  Current Outpatient Medications   Medication Instructions    acetaZOLAMIDE (DIAMOX) 125 mg, Oral, EVERY 12 HOURS    Aspirin Low Dose 81 mg, Oral, DAILY    atorvastatin (LIPITOR) 40 mg, Oral, EVERY BEDTIME    brimonidine (ALPHAGAN) 0.2 % Solution 1 Drop, Right Eye, 2 TIMES DAILY    calcitRIOL (ROCALTROL) 0.25 mcg, Oral, DAILY    carvedilol (COREG) 3.125 mg, Oral, 2 TIMES DAILY WITH MEALS    dorzolamide-timolol (COSOPT) 22.3-6.8 MG/ML Solution 1 Drop, Right Eye, 2 TIMES DAILY    gabapentin (NEURONTIN) 100 mg, Oral, EVERY EVENING    latanoprost (XALATAN) 0.005 % Solution 1 Drop, Right Eye, EVERY EVENING    prednisoLONE acetate (PRED FORTE) 1 % Suspension 1 Drop, Both Eyes, 4 TIMES DAILY    sevelamer carbonate (RENVELA) 1,600 mg, Oral, 3 TIMES DAILY WITH MEALS      ALLERGIES  No Known Allergies    PHYSICAL EXAM    VITAL SIGNS:   Vitals:    04/09/23 1801 04/09/23 2045 04/09/23 2101 04/09/23 2130   BP:  (!) 178/98 (!) 172/97 (!) 168/109   Pulse:  81 84 89   Resp:  16 18 18   Temp:    36.7 °C (98.1 °F)   TempSrc:    Temporal   SpO2:  99% 99% 99%   Weight: 83 kg (183 lb)   81.1 kg (178 lb 12.7 oz)   Height:    1.778 m (5' 10\")     Vitals: My interpretation: hypertensive, not tachycardic, afebrile, not hypoxic    Reinterpretation of vitals: Hypertensive otherwise unremarkable vital signs    Cardiac Monitor Interpretation: The cardiac monitor revealed normal Sinus Rhythm as interpreted by me. The cardiac monitor was ordered secondary to the patient's history of hyperkalemia and to monitor for dysrhythmia and/or tachycardia.    PE:   Gen: sitting comfortably, speaking clearly, appears in no acute distress   ENT: Mucous membranes moist, posterior pharynx clear, uvula midline, nares patent bilaterally   Neck: Supple, FROM  Pulmonary: Lungs are clear to auscultation bilaterally. No tachypnea  CV:  RRR, no murmur appreciated, pulses 2+ in both upper " and lower extremities  Abdomen: soft, NT/ND; no rebound/guarding  : no CVA or suprapubic tenderness   Neuro: A&Ox4 (person, place, time, situation), speech fluent, gait steady, no focal deficits appreciated  Skin: There are chronic appearing wounds to the bilateral thighs from old surgical incisions, there is one on the left posterior thigh that does penetrate through the skin down to the muscle but no induration, fluctuance or erythema surrounding it, appears chronic in nature    DIAGNOSTIC STUDIES / PROCEDURES    LABS  Results for orders placed or performed during the hospital encounter of 04/09/23   CBC WITH DIFFERENTIAL   Result Value Ref Range    WBC 7.1 4.8 - 10.8 K/uL    RBC 3.11 (L) 4.70 - 6.10 M/uL    Hemoglobin 9.2 (L) 14.0 - 18.0 g/dL    Hematocrit 29.8 (L) 42.0 - 52.0 %    MCV 95.8 81.4 - 97.8 fL    MCH 29.6 27.0 - 33.0 pg    MCHC 30.9 (L) 33.7 - 35.3 g/dL    RDW 61.0 (H) 35.9 - 50.0 fL    Platelet Count 182 164 - 446 K/uL    MPV 10.9 9.0 - 12.9 fL    Neutrophils-Polys 68.30 44.00 - 72.00 %    Lymphocytes 21.00 (L) 22.00 - 41.00 %    Monocytes 8.90 0.00 - 13.40 %    Eosinophils 1.10 0.00 - 6.90 %    Basophils 0.40 0.00 - 1.80 %    Immature Granulocytes 0.30 0.00 - 0.90 %    Nucleated RBC 0.00 /100 WBC    Neutrophils (Absolute) 4.84 1.82 - 7.42 K/uL    Lymphs (Absolute) 1.49 1.00 - 4.80 K/uL    Monos (Absolute) 0.63 0.00 - 0.85 K/uL    Eos (Absolute) 0.08 0.00 - 0.51 K/uL    Baso (Absolute) 0.03 0.00 - 0.12 K/uL    Immature Granulocytes (abs) 0.02 0.00 - 0.11 K/uL    NRBC (Absolute) 0.00 K/uL   COMP METABOLIC PANEL   Result Value Ref Range    Sodium 138 135 - 145 mmol/L    Potassium 5.9 (H) 3.6 - 5.5 mmol/L    Chloride 97 96 - 112 mmol/L    Co2 14 (L) 20 - 33 mmol/L    Anion Gap 27.0 (H) 7.0 - 16.0    Glucose 135 (H) 65 - 99 mg/dL    Bun 105 (H) 8 - 22 mg/dL    Creatinine 15.25 (HH) 0.50 - 1.40 mg/dL    Calcium 7.4 (L) 8.5 - 10.5 mg/dL    AST(SGOT) 6 (L) 12 - 45 U/L    ALT(SGPT) <5 2 - 50 U/L     Alkaline Phosphatase 106 (H) 30 - 99 U/L    Total Bilirubin 0.6 0.1 - 1.5 mg/dL    Albumin 3.5 3.2 - 4.9 g/dL    Total Protein 8.4 (H) 6.0 - 8.2 g/dL    Globulin 4.9 (H) 1.9 - 3.5 g/dL    A-G Ratio 0.7 g/dL   CORRECTED CALCIUM   Result Value Ref Range    Correct Calcium 7.8 (L) 8.5 - 10.5 mg/dL   ESTIMATED GFR   Result Value Ref Range    GFR (CKD-EPI) 4 (A) >60 mL/min/1.73 m 2   EKG (NOW)   Result Value Ref Range    Report       [facility] Emergency Dept.    Test Date:  2023  Pt Name:    CONY MORAN                Department: ER  MRN:        [id number]                      Room:       [facility]  Gender:     Male                         Technician: [id number]  :        [id number]                   Requested By:ROSALIA BENITES  Order #:    [id number]                    Reading MD: Rosalia Benites    Measurements  Intervals                                Axis  Rate:       83                           P:          33  NV:         272                          QRS:        -43  QRSD:       105                          T:          50  QT:         425  QTc:        500    Interpretive Statements  Sinus rhythm  Prolonged NV interval  Left axis deviation  Borderline prolonged QT interval  Compared to ECG 2023 14:15:11  No significant changes  Electronically Signed On 2023 20:18:20 PDT by Rosalia Benites       All labs reviewed by me. Labs were compared to prior labs if they were available. Significant for no leukocytosis, baseline anemia, electrolytes show hyperkalemia, otherwise normal electrolytes, renal dysfunction with creatinine of 15, liver enzymes normal, bilirubin normal    COURSE & MEDICAL DECISION MAKING  Nursing notes, VS, PMSFHx, labs, imaging, EKG reviewed in chart.    ED Observation Status? No; Patient does not meet criteria for ED Observation.     Ddx: Noncompliance, homelessness, chronic wounds, need for dialysis, hyperkalemia, renal dysfunction, end-stage renal failure    MDM:  7:42 PM Ambrose Watkins is a 47 y.o. male who presented with acute on chronic renal failure, history of noncompliance, homelessness, presenting after missing multiple episodes of dialysis.  He has been mated multiple times with similar presentation here today.  He is also complaining of chronic pain from his lower extremity wounds.  These are chronic in nature and appear to be from old diabetic wounds to the thighs.  Does have 1 area that penetrates to the muscle on the left posterior thigh but otherwise they appear chronic, no surrounding erythema, fluctuance or induration present.  Vital signs show hypertension but otherwise unremarkable.  Patient is noncompliant with his medications at home.  He is asking for dialysis.  EKG done is fairly unremarkable.  All labs reviewed by me. Labs were compared to prior labs if they were available. Significant for no leukocytosis, baseline anemia, electrolytes show hyperkalemia, otherwise normal electrolytes, renal dysfunction with creatinine of 15, liver enzymes normal, bilirubin normal  Patient treated emergently with stat protocol for hyperkalemia including calcium, insulin, glucose, bicarb.  Stat paged nephrologist to will order emergent dialysis.  Patient admitted to the hospitalist for continued monitoring and treatment.  Updated patient and he is amenable.  He was treated with pain medications here in the ED.  Verbalized understand plan for admission and is amenable.    8:01 PM I discussed the patient's case and the above findings with Dr. Parnell (ICU) who reported that the patient can go to telemetry.    8:04 PM I discussed the patient's case and the above findings with Dr. Hamilton (Nephrology) who reports that they will dialyze him right away.     ADDITIONAL PROBLEM LIST AND DISPOSITION    I have discussed management of the patient with the following physicians and CECILIA's:  Dr. Parnell (ICU), Dr. Hamilton (Nephrology), Dr. Najjar (Nephrology)     Discussion of management  with other [facility] or appropriate source(s): None     CRITICAL CARE TIME 37 minutes: Acute life-threatening hyperkalemia, noncompliance, dialysis, frequent reevaluation, consultation multiple specialist, admission for emergent dialysis  There was a very real possibility of deterioration of the patient's condition.  This patient required the highest level of care.  I provided critical care services which included: review of the medical record, treatment orders, ordering and reviewing test results, frequent reevaluation of the patient's condition and response to treatment, as well as discussing the case with appropriate personnel and various consultants. The critical care time associated with the care of this patient is exclusive of any procedures or specific interventions.     FINAL IMPRESSION  1. Hyperkalemia Acute   2. Non-compliance Acute   3. Dehydration Acute   4. Encounter for wound re-check Acute   5. Tobacco abuse Acute   6. Hypertension, unspecified type Acute   7. Homelessness Acute      Kwame SIMON (Mar), am scribing for, and in the presence of, Adonis Benites.    Electronically signed by: Kwame Campos (Mar), 4/9/2023    IAdonis personally performed the services described in this documentation, as scribed by Kwame Campos in my presence, and it is both accurate and complete.    The note accurately reflects work and decisions made by me.  Adonis Benites  4/9/2023  9:34 PM

## 2023-04-10 NOTE — ASSESSMENT & PLAN NOTE
As per ophtho note from 2/22 treatment at this point is likely palliative.    Plan:  - Continue medications recommended by ophtho.  - Will hold systemic diamox for now. Can consider restarting in AM.

## 2023-04-10 NOTE — ASSESSMENT & PLAN NOTE
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 One small non-healing ulcer on posterior left thigh. Increased tenderness. Probes to muscle. No drainage. No increased erythema. Does not appear grossly infected. WBC wnl. Pt afebrile.    Plan:  - No antibiotics ordered for now. Will continue to monitor for s/s of infection  - Wound team consult placed: talked to wound care today and they will see pt   - oxycodone 5 mg q4 PRN for pain. Dilaudid 1/2mg IV for severe pain.

## 2023-04-10 NOTE — ASSESSMENT & PLAN NOTE
- Truncal pruritis. No rash. Chronic. Likely related to ESRD/uremic pruritis.  - Will trial benadryl 25 mg. Will defer further mgmt to day team.

## 2023-04-10 NOTE — DISCHARGE PLANNING
Case Management Discharge Planning    Admission Date: 4/9/2023  GMLOS: 2.6  ALOS: 1    6-Clicks ADL Score: 22  6-Clicks Mobility Score: 22      Anticipated Discharge Dispo: Discharge Disposition: Discharged to home/self care (01)    DME Needed: No    Action(s) Taken: OTHER    Escalations Completed: None    Medically Clear: No    Next Steps: f/u with pt and medical team to discuss dc needs and barriers.    Barriers to Discharge: Medical clearance, Dialysis, Transportation, and Homelessness    PC neetu Kam Dialysis Coordinator [phone]; message left requesting confirmation of chair

## 2023-04-10 NOTE — ASSESSMENT & PLAN NOTE
Blood sugars at this time stable w/ POC BS checks.     - Pt on insulin during previous hospitalizations  - Correctional scale ordered. Hypoglycemia protocol in place  - Can consider long acting pending daily insulin requirements.

## 2023-04-10 NOTE — PROGRESS NOTES
Received report from NOC shift RN. Patient is A&Ox4, VSS, no signs of distress. Patient agitated and irritable and yelling at this RN. All needs met at this time, all questions and concerns answered, plan of care continues.

## 2023-04-10 NOTE — DISCHARGE PLANNING
Outpatient Dialysis Note     Pt without home dialysis clinic.   Attempted to place pt at HD clinic in [facility] during the course of his last admission, pt was denied from all clinics in the area due to extended history of non-compliance.     Pt is advised to visit ER for HD needs at this time.     Placed phone call to pt DILIP Frederick left voicemail detailing this information.     Xitlaly Reyes   Dialysis Coordinator / [facility]  Ph: [phone]

## 2023-04-10 NOTE — PROGRESS NOTES
4 Eyes Skin Assessment Completed by SHASHI Crowe and LJ Cisneros-MATHEW.    Head Scar to middle/center of scalp  Ears Redness and Blanching, scab to bottom of right ear  Nose WDL  Mouth WDL  Neck WDL  Breast/Chest Redness and Scab, scratches  Shoulder Blades Redness, scabs and scratches  Spine Redness, scabs and scratches  (R) Arm/Elbow/Hand Redness and Scab  (L) Arm/Elbow/Hand Redness and Scab, fistula  Abdomen WDL  Groin WDL  Scrotum/Coccyx/Buttocks Redness, Blanching, and Discoloration  (R) Leg Redness and Scab, inner thigh chronic wound/ulcer  (L) Leg Redness and Scab, inner thigh chronic wound/ulcer, posterior thigh tunneling wound/ulcer  (R) Heel/Foot/Toe Redness, Blanching, and Boggy  (L) Heel/Foot/Toe Redness, Blanching, Boggy, and Ulcer(s), ulcer to tip of big toe          Devices In Places Tele Box, Blood Pressure Cuff, and Pulse Ox      Interventions In Place Pillows and Pressure Redistribution Mattress    Possible Skin Injury Yes    Pictures Uploaded Into Epic Yes  Wound Consult Placed Yes  RN Wound Prevention Protocol Ordered Yes

## 2023-04-10 NOTE — CONSULTS
[facility] -  CONSULTATION NOTE               Author: Diogo Lazcano M.D. Date & Time: 4/10/2023  9:33 AM       REASON FOR CONSULTATION:   Inpatient hemodialysis management.    CHIEF COMPLAINT:   My back itches    HISTORY OF PRESENT ILLNESS:    Ambrose Watkins is a 47 y.o. male with a complicated medical (including ESRD, dialysis Monday Wednesday Friday) and psychosocial history who presented with worsening pain in the calciphylaxis sites in his left leg and right leg.  The patient reports that this pain has been worsening over the past couple days. Denies any discharge, fevers, erythema, swelling.  Reports that he is not able to take many steps to keep it clean in the home shelter where he is currently living.  Patient missed his last 3 dialysis sessions. He denies any chest pain, tightness, pressure, shortness of breath, palpitations, diaphoresis. Of note this patient has historically been a difficult placement into dialysis centers. Patient has behavioral issues as well as missing sessions. In the past this has been discussed with the risks and ethics committee at [facility]. At that point time it was determined that he would get dialysis through the emergency department here. Nephrology was asked to consult for ESRD and dialysis management and maintenance. He is currently on dialysis with BFR of 350. The line was tPA'd overnight sec to poor flow.    No F/C/N/V/CP/SOB.  No melena, hematochezia, hematemesis.  No HA, visual changes, or abdominal pain.    REVIEW OF SYSTEMS:    10 point ROS was performed and is as per HPI or otherwise negative.    PAST MEDICAL HISTORY:   Past Medical History:   Diagnosis Date    Diabetes     High anion gap metabolic acidosis 8/9/2022    Hyperkalemia 2/22/2022    Hypertension     Renal disease        PAST SURGICAL HISTORY:   Past Surgical History:   Procedure Laterality Date    AV FISTULA CREATION Left 2/25/2022    Procedure: CREATION, AV FISTULA-UPPER EXTREMITY  "GRAFT, AND FISTULOGRAM;  Surgeon: Tone Hartman M.D.;  Location: SURGERY [facility];  Service: Vascular    THROMBECTOMY Left 2/25/2022    Procedure: THROMBECTOMY;  Surgeon: Tone Hartman M.D.;  Location: SURGERY [facility];  Service: Vascular       FAMILY HISTORY:   No family history on file.    SOCIAL HISTORY:   Social History     Tobacco Use   Smoking Status Every Day    Packs/day: 0.25    Types: Cigarettes   Smokeless Tobacco Never     Social History     Substance and Sexual Activity   Alcohol Use Not Currently     Social History     Substance and Sexual Activity   Drug Use No       HOME MEDICATIONS:   Reviewed and documented in chart.    LABORATORY STUDIES:   Recent Labs     04/09/23  1807 04/09/23 2038   SODIUM 138 136   POTASSIUM 5.9* 6.1*   CHLORIDE 97 96   CO2 14* 12*   GLUCOSE 135* 113*   * 105*   CREATININE 15.25* 15.14*   CALCIUM 7.4* 7.6*       ALLERGIES:  Patient has no known allergies.    VS:  BP (!) 141/91   Pulse 77   Temp 36.9 °C (98.4 °F) (Temporal)   Resp 17   Ht 1.778 m (5' 10\")   Wt 81.1 kg (178 lb 12.7 oz)   SpO2 97%   BMI 25.65 kg/m²     Physical Exam  Vitals and nursing note reviewed.   HENT:      Head: Normocephalic and atraumatic.      Nose: Nose normal.      Mouth/Throat:      Mouth: Mucous membranes are moist.      Pharynx: Oropharynx is clear.   Eyes:      Extraocular Movements: Extraocular movements intact.      Conjunctiva/sclera: Conjunctivae normal.      Pupils: Pupils are equal, round, and reactive to light.   Cardiovascular:      Rate and Rhythm: Normal rate and regular rhythm.      Pulses: Normal pulses.      Heart sounds: Normal heart sounds.   Pulmonary:      Effort: Pulmonary effort is normal.      Breath sounds: Normal breath sounds.   Abdominal:      General: Abdomen is flat. Bowel sounds are normal.      Palpations: Abdomen is soft.   Musculoskeletal:      Cervical back: Normal range of motion and neck supple.      Comments:  Healing " calciphylaxis ulcers to his right upper thigh and left upper thigh.  No evidence of infection.  Left posterior thigh with one half a centimeter ulcer that probes to muscle.  No erythema, no drainage, no increased warmth.  Patient is tender.    Skin:     General: Skin is warm.      Capillary Refill: Capillary refill takes less than 2 seconds.   Neurological:      General: No focal deficit present.      Mental Status: He is alert and oriented to person, place, and time. Mental status is at baseline.   Psychiatric:         Mood and Affect: Mood normal.         Behavior: Behavior normal.         Thought Content: Thought content normal.         Judgment: Judgment normal.         FLUID BALANCE:  In: 480 [P.O.:480]  Out: -     IMAGING:  All imaging reviewed from admission to present day    IMPRESSION:  # ESRD  # HTN  - Goal BP < 140/90  # Anemia of CKD  - Goal Hgb 10-11  # CKD-MBD  # Hyperkalemia  # Non-adherence to dialysis treatments as outpatient  # Calciphylaxis  # Pruritus - likely sec to missed dialysis. Uremia.  # Meth abuse  # DM    PLAN:  - HD today and will need again tomorrow  - encourage compliance especially given calciphylaxis which has a high mortality rate  - UF as tolerated  - No dietary protein restrictions  - Dose all meds per ESRD    Thank you for the consultation!

## 2023-04-10 NOTE — CARE PLAN
The patient is Watcher - Medium risk of patient condition declining or worsening    Shift Goals  Clinical Goals: Dialysis treatment  Patient Goals: eat, pain control  Family Goals: POLINA      Problem: Knowledge Deficit - Standard  Goal: Patient and family/care givers will demonstrate understanding of plan of care, disease process/condition, diagnostic tests and medications  Outcome: Progressing       Problem: Pain - Standard  Goal: Alleviation of pain or a reduction in pain to the patient’s comfort goal  Outcome: Not Met

## 2023-04-10 NOTE — ASSESSMENT & PLAN NOTE
Pt received HD today; net -2.7L; pt stable at this time s/p HD.    - Patient receives dialysis at [facility] emergency room Monday Wednesday Friday.  He missed all last week.  - Potassium of 5.9. Given Bicarb, insulin, and calcium gluconate in ED. Repeat K+ of 6.1.  - Nephrology consulted from ED. Dialysis catheter blocked.  tPA put in line.  Will reattempt dialysis early morning.  - Nephrology aware of repeat K+.  Reordered calcium gluconate.  - Renally dose medications  - Pt's case previously discussed with risk and ethics committee at [facility]

## 2023-04-10 NOTE — ASSESSMENT & PLAN NOTE
Patient with history of ejection fraction of 30% in February 2022.  It was deemed at that time it was likely due to fluid overload.  Tonight patient currently has no symptoms of a heart failure exacerbation.  He is not on any heart failure medications.  Defer any further work-up to day team.

## 2023-04-10 NOTE — PROGRESS NOTES
[facility] Progress Note     Pt Arterial port unable to be aspirated or flushed well. MD made aware and has ordered TPA administration of Pt. TPA will dwell overnight and be aspirated tomorrow morning. HD treatment will be done on patient tomorrow.            received consult for unintentional wt loss; 80yo female with PMH of diverticulitis s/p sigmoidectomy, ileostomy, s/p reversal of ileostomy p/w abd pain and SBO's since her reversal done in 2/2018.  Pt pending probably small bowel resection with KATHERINE, pending cardiac and medical clearance.  Upon visit, pt eating breakfast and reports tolerance.  Pt endorses good PO intake PTA with 3 meals/day (low protein) and 2 ensure's per day.  However, pt also reported vomiting 2/2 obstruction.  As pt was vomiting some of consumed food, pt may have been meeting <75% of estimated nutr needs.  NFPE significant for moderate temporal and clavicle muscle wasting.  moderate orbital wasting. no wt/ht recorded on admission for pt; pt reports ~20# wt loss in past 14 months (~14%); not clinically significant.  Based on above, pt meets criteria for moderate malnutrition in chronic illness.  labs WNL.  RECOMMENDATIONS: 1) add ensure enlive BID with ensure pudding once daily 2) monitor PO intake/tolerance 3) add MVI with minerals daily to ensure 100% RDI met 4) obtian wt when feasible

## 2023-04-10 NOTE — PROGRESS NOTES
[facility] Nursing Notes     HD today x 3hrs per Dr Lazcano  Initiated at 0833 and ended 1133     Pre HD assessment     Received patient alert and oriented. No SOB  However, noted patient coughing, crackles heard on both lungs  Vital Signs stable. K- 6.1 today. Permacath issues yesterday.  No complains pre HD.     Edema - pitting edema +1 on both legs     Access: -left Permacath Tunelled, CVC limbs both patent  (Post TPA overnight)     No s/s of infection: no redness, no tenderness, no pus, no oozing of blood, warm to touch.     Intra HD    No issues during HD  Vital signs stable, patient cooperative and asleep in between HD.     Post HD     UF goal  achieved:    3500mls   - 500 mL (200mls prime + 300mls rinseback)       Target Net UF : 3000mls     Completed HD tolerated well  Post vital signs stable  Nil complaints  Dressing: dry and intact     Documented by  GRABIEL AGUILERA RN    Report given to PCN Inez Clemens

## 2023-04-10 NOTE — H&P
[facility] MEDICINE HISTORY AND PHYSICAL     PATIENT ID:  NAME:  Ambrose Watkins  MRN:               [id number]  YOB: 1975    Date of Admission: 4/9/2023     Attending: Padma Glaser M.d.  Primary Care Physician:  Pcp Pt States None    CC:    Chief Complaint   Patient presents with    Leg Pain     Pt c/o chronic leg pain but is increased lately.  Also c/o missed dialysis x 2 this week       HPI: Ambrose Watkins is a 47 y.o. male with a complicated medical (including ESRD, dialysis Monday Wednesday Friday) and psychosocial history who presented with worsening pain in the calciphylaxis sites in his left leg and right leg.  The patient reports that this pain has been worsening over the past couple days. Denies any discharge, fevers, erythema, swelling.  Reports that he is not able to take many steps to keep it clean in the home shelter where he is currently living.  Of note the patient missed his last 3 dialysis sessions and believes his most recent dialysis session was performed over a week ago on Friday.  He denies any chest pain, tightness, pressure, shortness of breath, palpitations, diaphoresis.    The patient also reports having a chronic itch across his trunk and into his back.  Denies any rashes.  This is been worsening over the past week and a half but he has had this itch before.    Of note this patient has historically been a difficult placement into dialysis centers.  As per my chart review this is due to both behavioral issues as well as missing sessions.  In the past this has been discussed with the risks and ethics committee at [facility].  At that point time it was determined that he would get dialysis through the emergency department here.      REVIEW OF SYSTEMS:   Ten systems reviewed and were negative except as noted in the HPI.                PAST MEDICAL HISTORY:  Past Medical History:   Diagnosis Date    Diabetes     High anion gap metabolic acidosis 8/9/2022    Hyperkalemia 2/22/2022     Hypertension     Renal disease        PAST SURGICAL HISTORY:  Past Surgical History:   Procedure Laterality Date    AV FISTULA CREATION Left 2/25/2022    Procedure: CREATION, AV FISTULA-UPPER EXTREMITY GRAFT, AND FISTULOGRAM;  Surgeon: Tone Hartman M.D.;  Location: SURGERY [facility];  Service: Vascular    THROMBECTOMY Left 2/25/2022    Procedure: THROMBECTOMY;  Surgeon: Tone Hartman M.D.;  Location: SURGERY [facility];  Service: Vascular       FAMILY HISTORY:  No family history on file.    SOCIAL HISTORY:   Social History     Socioeconomic History    Marital status: Single     Spouse name: Not on file    Number of children: Not on file    Years of education: Not on file    Highest education level: Not on file   Occupational History    Not on file   Tobacco Use    Smoking status: Every Day     Packs/day: 0.25     Types: Cigarettes    Smokeless tobacco: Never   Vaping Use    Vaping Use: Never used   Substance and Sexual Activity    Alcohol use: Not Currently    Drug use: No    Sexual activity: Not on file   Other Topics Concern    Not on file   Social History Narrative    Not on file     Social Determinants of Health     Financial Resource Strain: Not on file   Food Insecurity: Not on file   Transportation Needs: Not on file   Physical Activity: Not on file   Stress: Not on file   Social Connections: Not on file   Intimate Partner Violence: Not on file   Housing Stability: Not on file       DIET:   Orders Placed This Encounter   Procedures    Diet Order Diet: Renal     Standing Status:   Standing     Number of Occurrences:   1     Order Specific Question:   Diet:     Answer:   Renal [8]       ALLERGIES:  No Known Allergies    OUTPATIENT MEDICATIONS:    Current Facility-Administered Medications:     sodium bicarbonate 150 mEq in D5W infusion (premix), , Intravenous, Continuous, Adonis Benites, Last Rate: 42 mL/hr at 04/09/23 2329, New Bag at 04/09/23 2329    [START ON 4/10/2023] heparin injection  5,000 Units, 5,000 Units, Subcutaneous, Q8HRS, Padma Glaser M.D.    promethazine (PHENERGAN) tablet 12.5-25 mg, 12.5-25 mg, Oral, Q4HRS PRN, Padma Glaser M.D.    promethazine (PHENERGAN) suppository 12.5-25 mg, 12.5-25 mg, Rectal, Q4HRS PRN, Padma Glaser M.D.    prochlorperazine (COMPAZINE) injection 5-10 mg, 5-10 mg, Intravenous, Q4HRS PRN, Padma Glaser M.D.    [START ON 4/10/2023] insulin regular (HumuLIN R,NovoLIN R) injection, 2-9 Units, Subcutaneous, 4X/DAY ACHS **AND** [START ON 4/10/2023] POC blood glucose manual result, , , Q AC AND BEDTIME(S) **AND** NOTIFY MD and PharmD, , , Once **AND** Administer 20 grams of glucose (approximately 8 ounces of fruit juice) every 15 minutes PRN FSBG less than 70 mg/dL, , , PRN **AND** dextrose 10 % BOLUS 25 g, 25 g, Intravenous, Q15 MIN PRN, Tevin Pisano M.D.    [START ON 4/10/2023] calcitRIOL (ROCALTROL) capsule 0.25 mcg, 0.25 mcg, Oral, DAILY, Tevin Pisano M.D.    oxyCODONE immediate-release (ROXICODONE) tablet 5 mg, 5 mg, Oral, Q4HRS PRN, Tevin Pisano M.D., 5 mg at 04/09/23 2347    HYDROmorphone (Dilaudid) injection 0.5 mg, 0.5 mg, Intravenous, Q2HRS PRN, Tevin Pisano M.D.    [START ON 4/10/2023] aspirin (ASA) chewable tab 81 mg, 81 mg, Oral, DAILY, Tevin Pisano M.D.    [START ON 4/10/2023] atorvastatin (LIPITOR) tablet 40 mg, 40 mg, Oral, Q EVENING, Tevin Pisano M.D.    brimonidine (ALPHAGAN) 0.2 % ophthalmic solution 1 Drop, 1 Drop, Right Eye, BID, Tevin Pisano M.D.    [START ON 4/10/2023] carvedilol (COREG) tablet 3.125 mg, 3.125 mg, Oral, BID WITH MEALS, Tevin Pisano M.D.    dorzolamide-timolol (COSOPT) 22.3-6.8 MG/ML ophthalmic drops 1 Drop, 1 Drop, Right Eye, BID, Tevin Pisano M.D.    [START ON 4/10/2023] sevelamer carbonate (RENVELA) tablet 1,600 mg, 1,600 mg, Oral, TID WITH MEALS, Tevin Pisano M.D.    [START ON 4/10/2023] prednisoLONE acetate (PRED FORTE) 1 % ophthalmic suspension 1 Drop, 1 Drop, Right  "Eye, 4X/DAY, Tevin Pisano M.D.    latanoprost (XALATAN) 0.005 % ophthalmic solution 1 Drop, 1 Drop, Right Eye, Q EVENING, Tevin Pisano M.D.    PHYSICAL EXAM:  Vitals:    23 2045 23 2101 23 2130 23 2347   BP: (!) 178/98 (!) 172/97 (!) 168/109 (!) 168/111   Pulse: 81 84 89 83   Resp: 16 18 18 18   Temp:   36.7 °C (98.1 °F) 36.5 °C (97.7 °F)   TempSrc:   Temporal Temporal   SpO2: 99% 99% 99% 99%   Weight:   81.1 kg (178 lb 12.7 oz)    Height:   1.778 m (5' 10\")    , Temp (24hrs), Av.6 °C (97.8 °F), Min:36.4 °C (97.5 °F), Max:36.7 °C (98.1 °F)  , Pulse Oximetry: 99 %, O2 (LPM): 0, O2 Delivery Device: None - Room Air    General: Pt resting in NAD, cooperative   Skin:  Pink, warm and dry.  No rashes  HEENT: NC/AT. PERRL. EOMI. MMM. No nasal discharge. Oropharynx nonerythematous without exudate/plaques  Neck:  Supple without lymphadenopathy or rigidity.  Trunck: Port in left chest without evidence of infection.  Lungs:  Symmetrical.  CTAB with no adventitious breath sounds.  Good air movement   Cardiovascular:  Normal S1/S2, RRR without M/R/G.  Abdomen:  BS+, Soft, NT/ND. No masses noted.  Extremities: Healing calciphylaxis ulcers to his right upper thigh and left upper thigh.  No evidence of infection.  Left posterior thigh with one half a centimeter ulcer that probes to muscle.  No erythema, no drainage, no increased warmth.  Patient is tender.  Spine:  Straight without vertebral anomalies.  CNS:  A&Ox4, follows commands, Strength 5/5 in all extremities.         LAB TESTS:   Admission on 2023   Component Date Value Ref Range Status    WBC 2023 7.1  4.8 - 10.8 K/uL Final    RBC 2023 3.11 (L)  4.70 - 6.10 M/uL Final    Hemoglobin 2023 9.2 (L)  14.0 - 18.0 g/dL Final    Hematocrit 2023 29.8 (L)  42.0 - 52.0 % Final    MCV 2023 95.8  81.4 - 97.8 fL Final    MCH 2023 29.6  27.0 - 33.0 pg Final    MCHC 2023 30.9 (L)  33.7 - 35.3 g/dL Final    " RDW 04/09/2023 61.0 (H)  35.9 - 50.0 fL Final    Platelet Count 04/09/2023 182  164 - 446 K/uL Final    MPV 04/09/2023 10.9  9.0 - 12.9 fL Final    Neutrophils-Polys 04/09/2023 68.30  44.00 - 72.00 % Final    Lymphocytes 04/09/2023 21.00 (L)  22.00 - 41.00 % Final    Monocytes 04/09/2023 8.90  0.00 - 13.40 % Final    Eosinophils 04/09/2023 1.10  0.00 - 6.90 % Final    Basophils 04/09/2023 0.40  0.00 - 1.80 % Final    Immature Granulocytes 04/09/2023 0.30  0.00 - 0.90 % Final    Nucleated RBC 04/09/2023 0.00  /100 WBC Final    Neutrophils (Absolute) 04/09/2023 4.84  1.82 - 7.42 K/uL Final    Includes immature neutrophils, if present.    Lymphs (Absolute) 04/09/2023 1.49  1.00 - 4.80 K/uL Final    Monos (Absolute) 04/09/2023 0.63  0.00 - 0.85 K/uL Final    Eos (Absolute) 04/09/2023 0.08  0.00 - 0.51 K/uL Final    Baso (Absolute) 04/09/2023 0.03  0.00 - 0.12 K/uL Final    Immature Granulocytes (abs) 04/09/2023 0.02  0.00 - 0.11 K/uL Final    NRBC (Absolute) 04/09/2023 0.00  K/uL Final    Sodium 04/09/2023 138  135 - 145 mmol/L Final    Potassium 04/09/2023 5.9 (H)  3.6 - 5.5 mmol/L Final    Chloride 04/09/2023 97  96 - 112 mmol/L Final    Co2 04/09/2023 14 (L)  20 - 33 mmol/L Final    Anion Gap 04/09/2023 27.0 (H)  7.0 - 16.0 Final    Glucose 04/09/2023 135 (H)  65 - 99 mg/dL Final    Bun 04/09/2023 105 (H)  8 - 22 mg/dL Final    Creatinine 04/09/2023 15.25 (HH)  0.50 - 1.40 mg/dL Final    Calcium 04/09/2023 7.4 (L)  8.5 - 10.5 mg/dL Final    AST(SGOT) 04/09/2023 6 (L)  12 - 45 U/L Final    ALT(SGPT) 04/09/2023 <5  2 - 50 U/L Final    Alkaline Phosphatase 04/09/2023 106 (H)  30 - 99 U/L Final    Total Bilirubin 04/09/2023 0.6  0.1 - 1.5 mg/dL Final    Albumin 04/09/2023 3.5  3.2 - 4.9 g/dL Final    Total Protein 04/09/2023 8.4 (H)  6.0 - 8.2 g/dL Final    Globulin 04/09/2023 4.9 (H)  1.9 - 3.5 g/dL Final    A-G Ratio 04/09/2023 0.7  g/dL Final    Correct Calcium 04/09/2023 7.8 (L)  8.5 - 10.5 mg/dL Final    GFR  (CKD-EPI) 2023 4 (A)  >60 mL/min/1.73 m 2 Final    Comment: Estimated Glomerular Filtration Rate is calculated using  race neutral CKD-EPI  equation per NKF-ASN recommendations.      Report 2023    Final                    Value:[facility]    Test Date:  2023  Pt Name:    CONY MORAN                Department: ER  MRN:        [id number]                      Room:       [facility]  Gender:     Male                         Technician: [id number]  :        1975                   Requested By:ROSALIA BENITES  Order #:    [id number]                    Reading MD: Rosalia Benites    Measurements  Intervals                                Axis  Rate:       83                           P:          33  HI:         272                          QRS:        -43  QRSD:       105                          T:          50  QT:         425  QTc:        500    Interpretive Statements  Sinus rhythm  Prolonged HI interval  Left axis deviation  Borderline prolonged QT interval  Compared to ECG 2023 14:15:11  No significant changes  Electronically Signed On 2023 20:18:20 PDT by Rosalia Benites      Sodium 2023 136  135 - 145 mmol/L Final    Potassium 2023 6.1 (H)  3.6 - 5.5 mmol/L Final    Chloride 2023 96  96 - 112 mmol/L Final    Co2 2023 12 (L)  20 - 33 mmol/L Final    Glucose 2023 113 (H)  65 - 99 mg/dL Final    Bun 2023 105 (H)  8 - 22 mg/dL Final    Creatinine 2023 15.14 (HH)  0.50 - 1.40 mg/dL Final    Calcium 2023 7.6 (L)  8.5 - 10.5 mg/dL Final    Anion Gap 2023 28.0 (H)  7.0 - 16.0 Final    Phosphorus 2023 13.7 (HH)  2.5 - 4.5 mg/dL Final    Comment: Critical Result. Read Back Performed. Results called to: RN [id number] by:  [id number] on: 2023 21:39:17      Magnesium 2023 2.3  1.5 - 2.5 mg/dL Final    GFR (CKD-EPI) 2023 4 (A)  >60 mL/min/1.73 m 2 Final    Comment: Estimated Glomerular  "Filtration Rate is calculated using  race neutral CKD-EPI 2021 equation per NKF-ASN recommendations.          CULTURES:   Results       Procedure Component Value Units Date/Time    BLOOD CULTURE [[id number]] Collected: 04/09/23 2026    Order Status: Sent Specimen: Blood from Peripheral Updated: 04/09/23 2132    Narrative:      Per Hospital Policy: Only change Specimen Src: to \"Line\" if  specified by physician order.    BLOOD CULTURE [[id number]] Collected: 04/09/23 2038    Order Status: Sent Specimen: Blood from Peripheral Updated: 04/09/23 2131    Narrative:      Per Hospital Policy: Only change Specimen Src: to \"Line\" if  specified by physician order.            IMAGES:  No orders to display       CONSULTS:   Nephrology    ASSESSMENT/PLAN: 47 y.o. male admitted for .    * Hyperkalemia- (present on admission)  Assessment & Plan  K+ of 5.9 on admission. Likely due to missed dialysis sessions. Insulin, calcium gluconate and bicarb given in ED.     Plan:  - Dialysis as per nephrology  - Telemetry monitoring    Medication nonadherence due to psychosocial problem  Assessment & Plan  Pt was not taking any medications after discharge as he cannot see and didn't know what he was taking.    Plan:  - Social work consulted  - Med rec re-performed by MD off of last discharge summary as patient states he isn't taking anything  - Inability to take medications as prescribed may complicate a safe discharge      Calciphylaxis of left lower extremity with nonhealing ulcer (HCC)- (present on admission)  Assessment & Plan  Multiple calciphylaxis sites on left lower extremity. One small non-healing ulcer on posterior left thigh. Increased tenderness. Probes to muscle. No drainage. No increased erythema. Does not appear grossly infected. WBC wnl. Pt afebrile.    Plan:  - No antibiotics ordered for now. Will continue to monitor for s/s of infection  - Wound team consult placed.  - oxycodone 5 mg q4 PRN for pain. Dilaudid 1/2mg IV for " severe pain.    Right eye glaucoma, due to angle-closure and phacomorphic components- (present on admission)  Assessment & Plan  As per ophtho note from 2/22 treatment at this point is likely palliative.    Plan:  - Continue medications recommended by ophtho.  - Will hold systemic diamox for now. Can consider restarting in AM.    Noncompliance of patient with renal dialysis- (present on admission)  Assessment & Plan  - Complicated psycho-social factors limiting patient's ability to receive care including insurance issues, homelessness, and possible addiction.  - Pt currently receiving dialysis through ED. Missed three sessions.  - Social work consult placed. Previously been discussed with hospital ethics and risk committee.  - Continue to explore barriers to care during hospitalization.    Pruritus- (present on admission)  Assessment & Plan  - Truncal pruritis. No rash. Chronic. Likely related to ESRD/uremic pruritis.  - Will trial benadryl 25 mg. Will defer further mgmt to day team.    Other heart failure (HCC)- (present on admission)  Assessment & Plan  Patient with history of ejection fraction of 30% in February 2022.  It was deemed at that time it was likely due to fluid overload.  Tonight patient currently has no symptoms of a heart failure exacerbation.  He is not on any heart failure medications.  Defer any further work-up to day team.    Methamphetamine abuse (HCC)- (present on admission)  Assessment & Plan  Current use. Counseled on cessation.     End stage renal disease (HCC)- (present on admission)  Assessment & Plan  - Patient receives dialysis at [facility] emergency room Monday Wednesday Friday.  He missed all last week.  - Potassium of 5.9. Given Bicarb, insulin, and calcium gluconate in ED. Repeat K+ of 6.1.  - Nephrology consulted from ED. Dialysis catheter blocked.  tPA put in line.  Will reattempt dialysis early morning.  - Nephrology aware of repeat K+.  Reordered calcium gluconate.  - Renally dose  medications  - Pt's case previously discussed with risk and ethics committee at [facility]    Type 2 diabetes mellitus with kidney complication, without long-term current use of insulin (HCC)- (present on admission)  Assessment & Plan  - Pt on insulin during previous hospitalizations  - Correctional scale ordered. Hypoglycemia protocol in place  - Can consider long acting pending daily insulin requirements.          Core Measures:  Fluids: None  Lines: PIV. Arterial port in left chest  Abx: None  Diet: Renal  PPX: SCD's, heparin  DISPO: Inpatient    CODE STATUS: Full        Tevin Pisano MD  PGY2  [facility]

## 2023-04-10 NOTE — ASSESSMENT & PLAN NOTE
Pt was not taking any medications after discharge as he cannot see and didn't know what he was taking.    Plan:  - Social work consulted  - Med rec re-performed by MD off of last discharge summary as patient states he isn't taking anything  - Inability to take medications as prescribed may complicate a safe discharge

## 2023-04-10 NOTE — ED NOTES
Med rec updated and complete.  Confirmed name and date of birth. Pt reports that he has not been taking his medicaitons. He never started them . Removed all medications from med rec.    Home pharmacy [facility] [phone]

## 2023-04-10 NOTE — ED NOTES
PIV established and labs & 1st set of cultures drawn.  Phleb at bedside and second set of cultures drawn.  Patient medicated per MAR.    Bedside report to T7 RN and patient and all belongings transported to T732-02 by floor staff.  HD RN called and will meet patient in T732-02 for HD.

## 2023-04-10 NOTE — PROGRESS NOTES
Called about elevated troponin at 3:52 AM. This appears to have been ordered from the ED but not performed until this early AM. Pt without CP, SOB, diaphoresis, abdominal pain, pain radiating to his back/shooting down his arms/radiating into his jaw, or diaphoresis. ECG from admission and tele monitor without signs of MI.      Pt without known baseline of troponin in setting of ESRD. Given significant elevation concern for NSTEMI.    - Repeat troponin pending. Will continue to trend.  - Repeat ECG pending.  - Continue tele monitoring  - Continue ASA  - Continue carvedilol  - Pt made NPO for now.  - Will consider heparin drip  - Can consider echo  - Will consider cards consult in AM.

## 2023-04-10 NOTE — ED TRIAGE NOTES
Chief Complaint   Patient presents with    Leg Pain     Pt c/o chronic leg pain but is increased lately.  Also c/o missed dialysis x 2 this week

## 2023-04-10 NOTE — ASSESSMENT & PLAN NOTE
- Complicated psycho-social factors limiting patient's ability to receive care including insurance issues, homelessness, and possible addiction.  - Pt currently receiving dialysis through ED. Missed three sessions.  - Social work consult placed. Previously been discussed with hospital ethics and risk committee.  - Continue to explore barriers to care during hospitalization.

## 2023-04-10 NOTE — ED NOTES
Patient assisted from lobby to room in  and independently transferred from  to [facility] after changing into a gown.  Labs drawn and sent in triage.  Chart up for ERP.

## 2023-04-11 VITALS
DIASTOLIC BLOOD PRESSURE: 57 MMHG | BODY MASS INDEX: 25.6 KG/M2 | HEART RATE: 76 BPM | OXYGEN SATURATION: 98 % | TEMPERATURE: 97 F | RESPIRATION RATE: 16 BRPM | WEIGHT: 178.79 LBS | SYSTOLIC BLOOD PRESSURE: 154 MMHG | HEIGHT: 70 IN

## 2023-04-11 LAB
ALBUMIN SERPL BCP-MCNC: 3.2 G/DL (ref 3.2–4.9)
ALBUMIN/GLOB SERPL: 0.7 G/DL
ALP SERPL-CCNC: 101 U/L (ref 30–99)
ALT SERPL-CCNC: <5 U/L (ref 2–50)
ANION GAP SERPL CALC-SCNC: 20 MMOL/L (ref 7–16)
AST SERPL-CCNC: 6 U/L (ref 12–45)
BILIRUB SERPL-MCNC: 0.6 MG/DL (ref 0.1–1.5)
BUN SERPL-MCNC: 68 MG/DL (ref 8–22)
CALCIUM ALBUM COR SERPL-MCNC: 8 MG/DL (ref 8.5–10.5)
CALCIUM SERPL-MCNC: 7.4 MG/DL (ref 8.5–10.5)
CHLORIDE SERPL-SCNC: 97 MMOL/L (ref 96–112)
CHOLEST SERPL-MCNC: 77 MG/DL (ref 100–199)
CO2 SERPL-SCNC: 21 MMOL/L (ref 20–33)
CREAT SERPL-MCNC: 10.45 MG/DL (ref 0.5–1.4)
GFR SERPLBLD CREATININE-BSD FMLA CKD-EPI: 6 ML/MIN/1.73 M 2
GLOBULIN SER CALC-MCNC: 4.5 G/DL (ref 1.9–3.5)
GLUCOSE SERPL-MCNC: 107 MG/DL (ref 65–99)
HDLC SERPL-MCNC: 25 MG/DL
LDLC SERPL CALC-MCNC: 41 MG/DL
POTASSIUM SERPL-SCNC: 4.6 MMOL/L (ref 3.6–5.5)
PROT SERPL-MCNC: 7.7 G/DL (ref 6–8.2)
SODIUM SERPL-SCNC: 138 MMOL/L (ref 135–145)
TRIGL SERPL-MCNC: 54 MG/DL (ref 0–149)

## 2023-04-11 PROCEDURE — 700111 HCHG RX REV CODE 636 W/ 250 OVERRIDE (IP)

## 2023-04-11 PROCEDURE — 80053 COMPREHEN METABOLIC PANEL: CPT

## 2023-04-11 PROCEDURE — A9270 NON-COVERED ITEM OR SERVICE: HCPCS

## 2023-04-11 PROCEDURE — 700111 HCHG RX REV CODE 636 W/ 250 OVERRIDE (IP): Performed by: FAMILY MEDICINE

## 2023-04-11 PROCEDURE — 99238 HOSP IP/OBS DSCHRG MGMT 30/<: CPT | Mod: GC | Performed by: FAMILY MEDICINE

## 2023-04-11 PROCEDURE — 700102 HCHG RX REV CODE 250 W/ 637 OVERRIDE(OP)

## 2023-04-11 PROCEDURE — 90935 HEMODIALYSIS ONE EVALUATION: CPT

## 2023-04-11 PROCEDURE — 80061 LIPID PANEL: CPT

## 2023-04-11 RX ORDER — BRIMONIDINE TARTRATE 2 MG/ML
1 SOLUTION/ DROPS OPHTHALMIC 2 TIMES DAILY
Qty: 5 ML | Refills: 1 | Status: SHIPPED | OUTPATIENT
Start: 2023-04-11

## 2023-04-11 RX ORDER — DORZOLAMIDE HYDROCHLORIDE AND TIMOLOL MALEATE 20; 5 MG/ML; MG/ML
1 SOLUTION/ DROPS OPHTHALMIC 2 TIMES DAILY
Qty: 10 ML | Refills: 1 | Status: SHIPPED | OUTPATIENT
Start: 2023-04-11

## 2023-04-11 RX ORDER — HEPARIN SODIUM 1000 [USP'U]/ML
INJECTION, SOLUTION INTRAVENOUS; SUBCUTANEOUS
Status: COMPLETED
Start: 2023-04-11 | End: 2023-04-11

## 2023-04-11 RX ORDER — DORZOLAMIDE HYDROCHLORIDE AND TIMOLOL MALEATE 20; 5 MG/ML; MG/ML
1 SOLUTION/ DROPS OPHTHALMIC 2 TIMES DAILY
Qty: 10 ML | Refills: 1 | Status: CANCELLED | OUTPATIENT
Start: 2023-04-11

## 2023-04-11 RX ORDER — ATORVASTATIN CALCIUM 40 MG/1
40 TABLET, FILM COATED ORAL EVERY EVENING
Qty: 30 TABLET | Refills: 1 | Status: SHIPPED | OUTPATIENT
Start: 2023-04-11

## 2023-04-11 RX ORDER — ASPIRIN 81 MG/1
81 TABLET, CHEWABLE ORAL DAILY
Qty: 100 TABLET | Refills: 1 | Status: CANCELLED | OUTPATIENT
Start: 2023-04-12

## 2023-04-11 RX ORDER — LATANOPROST 50 UG/ML
1 SOLUTION/ DROPS OPHTHALMIC EVERY EVENING
Qty: 7.5 ML | Refills: 0 | Status: SHIPPED | OUTPATIENT
Start: 2023-04-11

## 2023-04-11 RX ORDER — SEVELAMER CARBONATE 800 MG/1
1600 TABLET, FILM COATED ORAL
Qty: 270 TABLET | Refills: 1 | Status: SHIPPED | OUTPATIENT
Start: 2023-04-11

## 2023-04-11 RX ORDER — CARVEDILOL 3.12 MG/1
3.12 TABLET ORAL 2 TIMES DAILY WITH MEALS
Qty: 60 TABLET | Refills: 1 | Status: SHIPPED | OUTPATIENT
Start: 2023-04-11

## 2023-04-11 RX ORDER — PREDNISOLONE ACETATE 10 MG/ML
1 SUSPENSION/ DROPS OPHTHALMIC 4 TIMES DAILY
Qty: 10 ML | Refills: 0 | Status: SHIPPED | OUTPATIENT
Start: 2023-04-11

## 2023-04-11 RX ORDER — CALCITRIOL 0.25 UG/1
0.25 CAPSULE, LIQUID FILLED ORAL DAILY
Qty: 30 CAPSULE | Refills: 1 | Status: SHIPPED | OUTPATIENT
Start: 2023-04-12

## 2023-04-11 RX ADMIN — HEPARIN SODIUM 3800 UNITS: 1000 INJECTION, SOLUTION INTRAVENOUS; SUBCUTANEOUS at 10:40

## 2023-04-11 RX ADMIN — CALCITRIOL 0.25 MCG: 0.25 CAPSULE ORAL at 05:20

## 2023-04-11 RX ADMIN — ASPIRIN 81 MG: 81 TABLET, CHEWABLE ORAL at 05:20

## 2023-04-11 RX ADMIN — HYDROMORPHONE HYDROCHLORIDE 0.5 MG: 1 INJECTION, SOLUTION INTRAMUSCULAR; INTRAVENOUS; SUBCUTANEOUS at 03:11

## 2023-04-11 RX ADMIN — DORZOLAMIDE HYDROCHLORIDE AND TIMOLOL MALEATE 1 DROP: 20; 5 SOLUTION/ DROPS OPHTHALMIC at 05:20

## 2023-04-11 RX ADMIN — OXYCODONE 5 MG: 5 TABLET ORAL at 11:13

## 2023-04-11 RX ADMIN — HYDROMORPHONE HYDROCHLORIDE 0.5 MG: 1 INJECTION, SOLUTION INTRAMUSCULAR; INTRAVENOUS; SUBCUTANEOUS at 00:38

## 2023-04-11 RX ADMIN — BRIMONIDINE TARTRATE 1 DROP: 2 SOLUTION OPHTHALMIC at 05:20

## 2023-04-11 RX ADMIN — HEPARIN SODIUM 5000 UNITS: 5000 INJECTION, SOLUTION INTRAVENOUS; SUBCUTANEOUS at 05:20

## 2023-04-11 ASSESSMENT — PAIN DESCRIPTION - PAIN TYPE
TYPE: ACUTE PAIN

## 2023-04-11 NOTE — PROGRESS NOTES
"[facility] -  PROGRESS NOTE               Author: Diogo Lazcano M.D. Date & Time: 4/11/2023  9:02 AM     HPI:  Ambrose Watkins is a 47 y.o. male with a complicated medical (including ESRD, dialysis Monday Wednesday Friday) and psychosocial history who presented with worsening pain in the calciphylaxis sites in his left leg and right leg.  The patient reports that this pain has been worsening over the past couple days. Denies any discharge, fevers, erythema, swelling.  Reports that he is not able to take many steps to keep it clean in the home shelter where he is currently living.  Patient missed his last 3 dialysis sessions. He denies any chest pain, tightness, pressure, shortness of breath, palpitations, diaphoresis. Of note this patient has historically been a difficult placement into dialysis centers. Patient has behavioral issues as well as missing sessions. In the past this has been discussed with the risks and ethics committee at [facility]. At that point time it was determined that he would get dialysis through the emergency department here. Nephrology was asked to consult for ESRD and dialysis management and maintenance. He is currently on dialysis with BFR of 350. The line was tPA'd overnight sec to poor flow.     No F/C/N/V/CP/SOB.  No melena, hematochezia, hematemesis.  No HA, visual changes, or abdominal pain.    DAILY NEPHROLOGY SUMMARY:  4/11 - No new overnight renal events. S/p HD yesterday and tolerated well.     REVIEW OF SYSTEMS:    10 point ROS reviewed and is as per HPI/daily summary or otherwise negative    PMH/PSH/SH/FH:   Reviewed and unchanged since admission note    CURRENT MEDICATIONS:   Reviewed from admission to present day    VS:  BP (!) 153/94   Pulse 78   Temp 36.9 °C (98.4 °F) (Temporal)   Resp 20   Ht 1.778 m (5' 10\")   Wt 81.1 kg (178 lb 12.7 oz)   SpO2 98%   BMI 25.65 kg/m²     Physical Exam  Vitals and nursing note reviewed.   HENT:      Head: " Normocephalic and atraumatic.      Nose: Nose normal.      Mouth/Throat:      Mouth: Mucous membranes are moist.      Pharynx: Oropharynx is clear.   Eyes:      Extraocular Movements: Extraocular movements intact.      Conjunctiva/sclera: Conjunctivae normal.      Pupils: Pupils are equal, round, and reactive to light.   Cardiovascular:      Rate and Rhythm: Normal rate and regular rhythm.      Pulses: Normal pulses.      Heart sounds: Normal heart sounds.   Pulmonary:      Effort: Pulmonary effort is normal.      Breath sounds: Normal breath sounds.   Abdominal:      General: Abdomen is flat. Bowel sounds are normal.      Palpations: Abdomen is soft.   Musculoskeletal:      Cervical back: Normal range of motion and neck supple.      Comments:  Healing calciphylaxis ulcers to his right upper thigh and left upper thigh.  No evidence of infection.  Left posterior thigh with one half a centimeter ulcer that probes to muscle.  No erythema, no drainage, no increased warmth.  Patient is tender.    Skin:     General: Skin is warm.      Capillary Refill: Capillary refill takes less than 2 seconds.   Neurological:      General: No focal deficit present.      Mental Status: He is alert and oriented to person, place, and time. Mental status is at baseline.   Psychiatric:         Mood and Affect: Mood normal.         Behavior: Behavior normal.         Thought Content: Thought content normal.         Judgment: Judgment normal.    Fluids:  In: 910 [P.O.:410; Dialysis:500]  Out: 3500     LABS:  Recent Labs     04/09/23  1807 04/09/23  2038 04/11/23  0515   SODIUM 138 136 138   POTASSIUM 5.9* 6.1* 4.6   CHLORIDE 97 96 97   CO2 14* 12* 21   GLUCOSE 135* 113* 107*   * 105* 68*   CREATININE 15.25* 15.14* 10.45*   CALCIUM 7.4* 7.6* 7.4*       IMAGING:   All imaging reviewed from admission to present day    IMPRESSION:  # ESRD  # HTN  - Goal BP < 140/90  # Anemia of CKD  - Goal Hgb 10-11  # CKD-MBD  # Hyperkalemia  #  Non-adherence to dialysis treatments as outpatient  # Calciphylaxis  # Pruritus - likely sec to missed dialysis. Uremia.  # Meth abuse  # DM     PLAN:  - HD again today. Will be TTS during stay   - encouraged compliance especially given calciphylaxis which has a high mortality rate  - UF as tolerated  - No dietary protein restrictions  - Dose all meds per ESRD

## 2023-04-11 NOTE — DISCHARGE PLANNING
RN CM asked patient where he would go upon discharge. Patient verbalizes that he will return to [facility]. Cab voucher given to primary nurse to go to location of his choice.

## 2023-04-11 NOTE — PROGRESS NOTES
[facility] Progress Note      HD today x 3 hours per Dr. Lazcano.   Initiated at 0735 and ended at 1035.     Assessment:    Stable , not in distress. denies pain. No SOB.      Access used: R IJ  tunneled catheter      Net Fluid Removed: 3,000 mL      Procedure Events/ Complications:  Patient stable throughout treatment. Leg cramps noted at the end of treatment. But tolerable      Post Access Care:   Blood returned. Flushed with NS.  CVC locked with Heparin 1000 units/ mL   Arterial port:   1.9 mL   Venous port:   1.9  mL  Clamped, capped and secured. Labeled   Dressing change: Yes / Due on         Report given to Primary BALAJI Taylor RN.

## 2023-04-11 NOTE — DISCHARGE SUMMARY
[facility] MEDICINE DISCHARGE SUMMARY     Attending:   Dr. Kwan     Resident:   Ken Barraza DO    PATIENT: Ambrose Watkins; [id number]; 1975    ID: 47 y.o. male with past medical history of end-stage renal disease on dialysis, calciphylaxis of bilateral lower extremities, homelessness, and methamphetamine use who presented for leg pain and hemodialysis.     24 Hour Events: No acute events overnight.  Patient underwent dialysis today.    OBJECTIVE:     Vitals:    04/11/23 0038 04/11/23 0311 04/11/23 1045 04/11/23 1234   BP: 137/87 (!) 153/94 (!) 163/95 (!) 154/57   Pulse: 77 78 76    Resp: 18 20 16    Temp: 37.2 °C (99 °F) 36.9 °C (98.4 °F) 36.1 °C (97 °F)    TempSrc: Temporal Temporal Temporal    SpO2: 97% 98% 98%    Weight:       Height:           Intake/Output Summary (Last 24 hours) at 4/11/2023 1247  Last data filed at 4/11/2023 1035  Gross per 24 hour   Intake 620 ml   Output 3500 ml   Net -2880 ml       PE:   General: No acute distress, resting comfortably in bed.  HEENT: Normocephalic, atraumatic.  Respiratory: Non-labored   Neuro: no focal deficits     LABS:  Recent Labs     04/09/23  1807 04/10/23  0147   WBC 7.1 7.2   RBC 3.11* 3.34*   HEMOGLOBIN 9.2* 9.8*   HEMATOCRIT 29.8* 31.3*   MCV 95.8 93.7   MCH 29.6 29.3   RDW 61.0* 60.0*   PLATELETCT 182 176   MPV 10.9 11.2   NEUTSPOLYS 68.30 66.90   LYMPHOCYTES 21.00* 21.00*   MONOCYTES 8.90 9.20   EOSINOPHILS 1.10 1.80   BASOPHILS 0.40 0.80     Recent Labs     04/09/23 1807 04/09/23 2038 04/11/23  0515   SODIUM 138 136 138   POTASSIUM 5.9* 6.1* 4.6   CHLORIDE 97 96 97   CO2 14* 12* 21   * 105* 68*   CREATININE 15.25* 15.14* 10.45*   CALCIUM 7.4* 7.6* 7.4*   MAGNESIUM  --  2.3  --    PHOSPHORUS  --  13.7*  --    ALBUMIN 3.5  --  3.2     Estimated GFR/CRCL = Estimated Creatinine Clearance: 9.2 mL/min (A) (by C-G formula based on SCr of 10.45 mg/dL (HH)).  Recent Labs     04/09/23  1807 04/09/23  2038 04/11/23  0515   GLUCOSE 135* 113* 107*  "    Recent Labs     04/09/23  1807 04/11/23  0515   ASTSGOT 6* 6*   ALTSGPT <5 <5   TBILIRUBIN 0.6 0.6   ALKPHOSPHAT 106* 101*   GLOBULIN 4.9* 4.5*             No results for input(s): INR, APTT, FIBRINOGEN in the last 72 hours.    Invalid input(s): DIMER    MICROBIOLOGY: None    IMAGING:   No orders to display           DISCHARGE SUMMARY:   Brief HPI:  Per Tevin Pisano HPI: \"Ambrose Watkins is a 47 y.o. male with a complicated medical (including ESRD, dialysis Monday Wednesday Friday) and psychosocial history who presented with worsening pain in the calciphylaxis sites in his left leg and right leg.  The patient reports that this pain has been worsening over the past couple days. Denies any discharge, fevers, erythema, swelling.  Reports that he is not able to take many steps to keep it clean in the home shelter where he is currently living.  Of note the patient missed his last 3 dialysis sessions and believes his most recent dialysis session was performed over a week ago on Friday.  He denies any chest pain, tightness, pressure, shortness of breath, palpitations, diaphoresis.     The patient also reports having a chronic itch across his trunk and into his back.  Denies any rashes.  This is been worsening over the past week and a half but he has had this itch before.     Of note this patient has historically been a difficult placement into dialysis centers.  As per my chart review this is due to both behavioral issues as well as missing sessions.  In the past this has been discussed with the risks and ethics committee at [facility].  At that point time it was determined that he would get dialysis through the emergency department here.\"    Admission Diagnosis: Hyperkalemia, calciphylaxis    Discharge Diagnosis: End-stage renal disease    Chronic Diagnoses: End-stage renal disease, methamphetamine use, type 2 diabetes, heart failure, calciphylaxis bilateral lower extremities, right eye glaucoma    Hospital Course: 47 " y.o. male with past medical history of end-stage renal disease on dialysis, calciphylaxis of bilateral lower extremities, homelessness, and methamphetamine use who presented for left lower extremity pain and had missed dialysis.  He was found to have hyperkalemia which was treated with insulin, calcium gluconate and bicarb in the emergency department.  Nephrology was consulted and patient underwent dialysis.  Chronic lower extremity pain secondary to calciphylaxis was managed with wound care during hospitalization.  Patient is currently homeless and has history of chronic nonadherence, and is unable to follow-up with any of the nephrology groups in the community due to nonadherence per case management.  He also does not have a primary care provider currently.  After discussions with case management, current recommendations are for him to return to emergency department for dialysis.  Discussed with patient current plan of care and he expressed understanding.     Consultants:      Nephrology    Procedures:        Hemodialysis     Disposition:   Home    Diet:   Regular     Activity:   As tolerated     Discharge Medications:           Medication List        START taking these medications        Instructions   atorvastatin 40 MG Tabs  Commonly known as: LIPITOR   Take 1 Tablet by mouth every evening.  Dose: 40 mg     brimonidine 0.2 % Soln  Commonly known as: ALPHAGAN   Administer 1 Drop into the right eye 2 times a day.  Dose: 1 Drop     calcitRIOL 0.25 MCG Caps  Start taking on: April 12, 2023  Commonly known as: ROCALTROL   Take 1 Capsule by mouth every day.  Dose: 0.25 mcg     carvedilol 3.125 MG Tabs  Commonly known as: COREG   Take 1 Tablet by mouth 2 times a day with meals.  Dose: 3.125 mg     dorzolamide-timolol 22.3-6.8 MG/ML Soln  Commonly known as: COSOPT   Administer 1 Drop into the right eye 2 times a day.  Dose: 1 Drop     latanoprost 0.005 % Soln  Commonly known as: XALATAN   Administer 1 Drop into the  right eye every evening.  Dose: 1 Drop     prednisoLONE acetate 1 % Susp  Commonly known as: PRED FORTE   Administer 1 Drop into the right eye 4 times a day.  Dose: 1 Drop     sevelamer carbonate 800 MG Tabs tablet  Commonly known as: RENVELA   Take 2 Tablets by mouth 3 times a day with meals.  Dose: 1,600 mg              Instructions:      Recommend calling [facility] or another facility to schedule hospital follow-up and establish care  Recommend following up in the emergency department 4/13/2023 for hemodialysis  Recommend following up with ophthalmologist      Please CC: Pcp Pt States None

## 2023-04-11 NOTE — PROGRESS NOTES
Monitor Summary:    SR 74-80  (R)PAC, bigeminy  1st degree HB with BBB    .26/.12/.46

## 2023-04-11 NOTE — CARE PLAN
The patient is Stable - Low risk of patient condition declining or worsening    Shift Goals  Clinical Goals: monitor labs  Patient Goals: pain control, rest  Family Goals: POLINA    Progress made toward(s) clinical / shift goals:  Pt received dialysis during the day, morning labs are pending. Pt complains of burning pain in his leg wounds. PRN pain medication given and pt states it is helping a little bit.    Problem: Knowledge Deficit - Standard  Goal: Patient and family/care givers will demonstrate understanding of plan of care, disease process/condition, diagnostic tests and medications  Outcome: Progressing     Problem: Pain - Standard  Goal: Alleviation of pain or a reduction in pain to the patient’s comfort goal  Outcome: Progressing     Problem: Fall Risk  Goal: Patient will remain free from falls  Outcome: Progressing       Patient is not progressing towards the following goals:

## 2023-04-15 ENCOUNTER — HOSPITAL ENCOUNTER (EMERGENCY)
Facility: MEDICAL CENTER | Age: 48
End: 2023-04-15
Attending: STUDENT IN AN ORGANIZED HEALTH CARE EDUCATION/TRAINING PROGRAM
Payer: MEDICARE

## 2023-04-15 VITALS
WEIGHT: 315 LBS | HEIGHT: 70 IN | TEMPERATURE: 97.5 F | DIASTOLIC BLOOD PRESSURE: 80 MMHG | BODY MASS INDEX: 45.1 KG/M2 | RESPIRATION RATE: 18 BRPM | OXYGEN SATURATION: 99 % | SYSTOLIC BLOOD PRESSURE: 156 MMHG | HEART RATE: 72 BPM

## 2023-04-15 DIAGNOSIS — E83.59 CALCIPHYLAXIS: ICD-10-CM

## 2023-04-15 DIAGNOSIS — N18.6 ESRD (END STAGE RENAL DISEASE) (HCC): ICD-10-CM

## 2023-04-15 DIAGNOSIS — D64.9 ANEMIA, UNSPECIFIED TYPE: ICD-10-CM

## 2023-04-15 LAB
ALBUMIN SERPL BCP-MCNC: 3.6 G/DL (ref 3.2–4.9)
ALBUMIN/GLOB SERPL: 0.7 G/DL
ALP SERPL-CCNC: 127 U/L (ref 30–99)
ALT SERPL-CCNC: <5 U/L (ref 2–50)
ANION GAP SERPL CALC-SCNC: 17 MMOL/L (ref 7–16)
AST SERPL-CCNC: 11 U/L (ref 12–45)
BASOPHILS # BLD AUTO: 0.5 % (ref 0–1.8)
BASOPHILS # BLD: 0.04 K/UL (ref 0–0.12)
BILIRUB SERPL-MCNC: 0.5 MG/DL (ref 0.1–1.5)
BUN SERPL-MCNC: 46 MG/DL (ref 8–22)
CA-I SERPL-SCNC: 1 MMOL/L (ref 1.1–1.3)
CALCIUM ALBUM COR SERPL-MCNC: 8.7 MG/DL (ref 8.5–10.5)
CALCIUM SERPL-MCNC: 8.4 MG/DL (ref 8.5–10.5)
CHLORIDE SERPL-SCNC: 94 MMOL/L (ref 96–112)
CO2 SERPL-SCNC: 23 MMOL/L (ref 20–33)
CREAT SERPL-MCNC: 6.77 MG/DL (ref 0.5–1.4)
EKG IMPRESSION: NORMAL
EOSINOPHIL # BLD AUTO: 0.1 K/UL (ref 0–0.51)
EOSINOPHIL NFR BLD: 1.2 % (ref 0–6.9)
ERYTHROCYTE [DISTWIDTH] IN BLOOD BY AUTOMATED COUNT: 60 FL (ref 35.9–50)
GFR SERPLBLD CREATININE-BSD FMLA CKD-EPI: 10 ML/MIN/1.73 M 2
GLOBULIN SER CALC-MCNC: 5 G/DL (ref 1.9–3.5)
GLUCOSE SERPL-MCNC: 168 MG/DL (ref 65–99)
HCT VFR BLD AUTO: 31.6 % (ref 42–52)
HGB BLD-MCNC: 9.9 G/DL (ref 14–18)
HGB RETIC QN AUTO: 33.4 PG/CELL (ref 29–35)
IMM GRANULOCYTES # BLD AUTO: 0.04 K/UL (ref 0–0.11)
IMM GRANULOCYTES NFR BLD AUTO: 0.5 % (ref 0–0.9)
IMM RETICS NFR: 8.5 % (ref 9.3–17.4)
IRON SATN MFR SERPL: 19 % (ref 15–55)
IRON SERPL-MCNC: 43 UG/DL (ref 50–180)
LYMPHOCYTES # BLD AUTO: 1.57 K/UL (ref 1–4.8)
LYMPHOCYTES NFR BLD: 19.3 % (ref 22–41)
MCH RBC QN AUTO: 29.6 PG (ref 27–33)
MCHC RBC AUTO-ENTMCNC: 31.3 G/DL (ref 33.7–35.3)
MCV RBC AUTO: 94.6 FL (ref 81.4–97.8)
MONOCYTES # BLD AUTO: 0.86 K/UL (ref 0–0.85)
MONOCYTES NFR BLD AUTO: 10.6 % (ref 0–13.4)
NEUTROPHILS # BLD AUTO: 5.53 K/UL (ref 1.82–7.42)
NEUTROPHILS NFR BLD: 67.9 % (ref 44–72)
NRBC # BLD AUTO: 0 K/UL
NRBC BLD-RTO: 0 /100 WBC
PHOSPHATE SERPL-MCNC: 6.6 MG/DL (ref 2.5–4.5)
PLATELET # BLD AUTO: 143 K/UL (ref 164–446)
PMV BLD AUTO: 11 FL (ref 9–12.9)
POTASSIUM SERPL-SCNC: 5.2 MMOL/L (ref 3.6–5.5)
PROT SERPL-MCNC: 8.6 G/DL (ref 6–8.2)
PTH-INTACT SERPL-MCNC: 376 PG/ML (ref 14–72)
RBC # BLD AUTO: 3.34 M/UL (ref 4.7–6.1)
RETICS # AUTO: 0.06 M/UL (ref 0.04–0.06)
RETICS/RBC NFR: 1.7 % (ref 0.8–2.1)
SODIUM SERPL-SCNC: 134 MMOL/L (ref 135–145)
TIBC SERPL-MCNC: 221 UG/DL (ref 250–450)
UIBC SERPL-MCNC: 178 UG/DL (ref 110–370)
WBC # BLD AUTO: 8.1 K/UL (ref 4.8–10.8)

## 2023-04-15 PROCEDURE — 96376 TX/PRO/DX INJ SAME DRUG ADON: CPT | Mod: XU

## 2023-04-15 PROCEDURE — 80053 COMPREHEN METABOLIC PANEL: CPT

## 2023-04-15 PROCEDURE — 96374 THER/PROPH/DIAG INJ IV PUSH: CPT | Mod: XU

## 2023-04-15 PROCEDURE — 85025 COMPLETE CBC W/AUTO DIFF WBC: CPT

## 2023-04-15 PROCEDURE — 85046 RETICYTE/HGB CONCENTRATE: CPT

## 2023-04-15 PROCEDURE — 83540 ASSAY OF IRON: CPT

## 2023-04-15 PROCEDURE — 84100 ASSAY OF PHOSPHORUS: CPT

## 2023-04-15 PROCEDURE — 90935 HEMODIALYSIS ONE EVALUATION: CPT

## 2023-04-15 PROCEDURE — 700111 HCHG RX REV CODE 636 W/ 250 OVERRIDE (IP): Performed by: INTERNAL MEDICINE

## 2023-04-15 PROCEDURE — 700111 HCHG RX REV CODE 636 W/ 250 OVERRIDE (IP)

## 2023-04-15 PROCEDURE — 99285 EMERGENCY DEPT VISIT HI MDM: CPT

## 2023-04-15 PROCEDURE — 96375 TX/PRO/DX INJ NEW DRUG ADDON: CPT | Mod: XU

## 2023-04-15 PROCEDURE — 700111 HCHG RX REV CODE 636 W/ 250 OVERRIDE (IP): Performed by: STUDENT IN AN ORGANIZED HEALTH CARE EDUCATION/TRAINING PROGRAM

## 2023-04-15 PROCEDURE — 82330 ASSAY OF CALCIUM: CPT

## 2023-04-15 PROCEDURE — 83550 IRON BINDING TEST: CPT

## 2023-04-15 PROCEDURE — 36415 COLL VENOUS BLD VENIPUNCTURE: CPT

## 2023-04-15 PROCEDURE — 93005 ELECTROCARDIOGRAM TRACING: CPT | Performed by: STUDENT IN AN ORGANIZED HEALTH CARE EDUCATION/TRAINING PROGRAM

## 2023-04-15 PROCEDURE — 83970 ASSAY OF PARATHORMONE: CPT

## 2023-04-15 RX ORDER — HEPARIN SODIUM 1000 [USP'U]/ML
INJECTION, SOLUTION INTRAVENOUS; SUBCUTANEOUS
Status: COMPLETED
Start: 2023-04-15 | End: 2023-04-15

## 2023-04-15 RX ORDER — HYDROMORPHONE HYDROCHLORIDE 1 MG/ML
0.5 INJECTION, SOLUTION INTRAMUSCULAR; INTRAVENOUS; SUBCUTANEOUS ONCE
Status: COMPLETED | OUTPATIENT
Start: 2023-04-15 | End: 2023-04-15

## 2023-04-15 RX ORDER — HEPARIN SODIUM 1000 [USP'U]/ML
2000 INJECTION, SOLUTION INTRAVENOUS; SUBCUTANEOUS
Status: DISCONTINUED | OUTPATIENT
Start: 2023-04-15 | End: 2023-04-15 | Stop reason: HOSPADM

## 2023-04-15 RX ORDER — HEPARIN SODIUM 1000 [USP'U]/ML
3800 INJECTION, SOLUTION INTRAVENOUS; SUBCUTANEOUS
Status: DISCONTINUED | OUTPATIENT
Start: 2023-04-15 | End: 2023-04-15 | Stop reason: HOSPADM

## 2023-04-15 RX ADMIN — HEPARIN SODIUM 2000 UNITS: 1000 INJECTION, SOLUTION INTRAVENOUS; SUBCUTANEOUS at 09:05

## 2023-04-15 RX ADMIN — FENTANYL CITRATE 50 MCG: 50 INJECTION, SOLUTION INTRAMUSCULAR; INTRAVENOUS at 01:19

## 2023-04-15 RX ADMIN — EPOETIN ALFA-EPBX 9000 UNITS: 3000 INJECTION, SOLUTION INTRAVENOUS; SUBCUTANEOUS at 10:40

## 2023-04-15 RX ADMIN — HYDROMORPHONE HYDROCHLORIDE 0.5 MG: 1 INJECTION, SOLUTION INTRAMUSCULAR; INTRAVENOUS; SUBCUTANEOUS at 06:50

## 2023-04-15 RX ADMIN — FENTANYL CITRATE 100 MCG: 50 INJECTION, SOLUTION INTRAMUSCULAR; INTRAVENOUS at 02:14

## 2023-04-15 RX ADMIN — HEPARIN SODIUM 3800 UNITS: 1000 INJECTION, SOLUTION INTRAVENOUS; SUBCUTANEOUS at 12:05

## 2023-04-15 ASSESSMENT — PAIN DESCRIPTION - PAIN TYPE
TYPE: CHRONIC PAIN

## 2023-04-15 ASSESSMENT — FIBROSIS 4 INDEX: FIB4 SCORE: 0.72

## 2023-04-15 NOTE — ED NOTES
Checked on bed, connected to monitor,  with unlabored respirations.  Gurney in low position, side rail up for pt safety. Call light within reach. Will continue to monitor.

## 2023-04-15 NOTE — ED TRIAGE NOTES
Patient BIB EMS with a complaint of bilateral upper and lower leg pain. Patient stated he has a chronic wound that is closed but hurts a lot. Patient is currently on a dialysis.

## 2023-04-15 NOTE — ED NOTES
Patient returned from dialysis, A&O, VSS.  Patient resting on gurney, report received from dialysis RN, patient tolerated procedure well.     ERP notified of patient arrival back to ED.

## 2023-04-15 NOTE — ED NOTES
Checked on bed, connected to monitor,  with unlabored respirations.Gurney in low position, side rail up for pt safety. Call light within reach. Will continue to monitor.

## 2023-04-15 NOTE — ED NOTES
PIV removed and bandaged.  Ambrose Nunofatoumata Roland discharged via ambulation with personal FWW.  Discharge instructions given and reviewed, patient educated to follow up with PCP, wound care and nephrology, verbalized understanding.  All personal belongings in possession.  No questions at this time.

## 2023-04-15 NOTE — PROGRESS NOTES
[facility] Nursing Notes     HD today x 3hrs per Dr APARNA Simon  Initiated at 0900 and ended 1200    Readmitted today, signed consent for HD     Pre HD assessment     Received patient alert and oriented. No Sob.  Lungs Clear. Vital Signs stable  No complains pre HD.     Edema - +1 edema on both legs     Access: - Left IJ tunneled, left chest     Dressing changed today as per protocol  No s/s of infection: no redness, no tenderness, no pus, no oozing of blood, warm to touch.     Intra HD    No issues during HD  Vital signs stable,  Patient is asleep in between during HD        Post HD     UF goal  achieved: 3500mL  - 500 mL (200mls prime + 300mls rinseback)       Target Net UF : 3000mls     Completed HD tolerated well  Post vital signs stable  Nil complaints  Dressing: dry and intact     Documented by  GRABIEL AGUILERA RN    Report given to PCN Demi Shah

## 2023-04-15 NOTE — ED NOTES
Med rec completed per dispense history per [facility] [address].  Patient states that he does not know the names of his medications. Unable to verify times of last doses with patient.  Allergies reviewed with patient. NKDA.  No outpatient antibiotics within the last 30 days.  Patient's preferred pharmacy: [facility] on [address].

## 2023-04-15 NOTE — ED PROVIDER NOTES
ED Provider Note    CHIEF COMPLAINT  Chief Complaint   Patient presents with    Leg Pain     Wound on bilateral thigh       EXTERNAL RECORDS REVIEWED  Inpatient Notes most recent discharge summary from 4/11/2023.  Patient has a history of end-stage renal disease calciphylaxis, as well as chronic noncompliance with his dialysis, has been difficult to place in a dialysis center and most recent recommendation per case management was made for him to get his dialysis through the emergency department    HPI/ROS  LIMITATION TO HISTORY   Select: : None  OUTSIDE HISTORIAN(S):  None    Ambrose Lenny Watkins is a 47 y.o. male who presents evaluation of bilateral leg pain and requesting his dialysis.  Patient has a history of calciphylaxis chronic lower extremity as well as chronic lower extremity pain.  Stated the pain got worse and he was due for his dialysis this evening prompting him to come to the emergency department.  Described as a throbbing sensation located throughout his entire left and right lower extremities no alleviating or exacerbating factors.  Does have chronic wounds of his left and right lower extremities.  Does receive dialysis Tuesday Thursday Saturday, states he has to receive his dialysis through the emergency department.  PAST MEDICAL HISTORY   has a past medical history of Diabetes, High anion gap metabolic acidosis (8/9/2022), Hyperkalemia (2/22/2022), Hypertension, and Renal disease.    SURGICAL HISTORY   has a past surgical history that includes av fistula creation (Left, 2/25/2022) and thrombectomy (Left, 2/25/2022).    FAMILY HISTORY  No family history on file.    SOCIAL HISTORY  Social History     Tobacco Use    Smoking status: Every Day     Packs/day: 0.25     Types: Cigarettes    Smokeless tobacco: Never   Vaping Use    Vaping Use: Never used   Substance and Sexual Activity    Alcohol use: Not Currently    Drug use: No    Sexual activity: Not on file       CURRENT MEDICATIONS  Home Medications   "  **Home medications have not yet been reviewed for this encounter**         ALLERGIES  Not on File    PHYSICAL EXAM  VITAL SIGNS: BP (!) 153/83   Pulse 80   Temp 36.7 °C (98 °F) (Temporal)   Resp 15   Ht 1.778 m (5' 10\")   Wt (!) 181 kg (399 lb 4.1 oz)   SpO2 95%   BMI 57.29 kg/m²    Pulse ox interpretation: I interpret this pulse ox as normal.  VITALS - vital signs documented prior to this note have been reviewed and noted,  GENERAL - awake, alert, oriented, GCS 15, no apparent distress, non-toxic  appearing  HEENT - normocephalic, atraumatic, pupils equal, sclera anicteric, mucus  membranes moist  NECK - supple, no meningismus, full active range of motion, trachea midline  CARDIOVASCULAR -systolic ejection murmur  Chest: Tunneled dialysis catheter in the left anterior chest wall  PULMONARY - no respiratory distress, speaking in full sentences, clear to  auscultation bilaterally, no wheezing/ronchi/rales, no accessory muscle use  GASTROINTESTINAL - soft, non-tender, non-distended, no rebound, guarding,  or peritonitis  GENITOURINARY - Deferred  NEUROLOGIC - Awake alert, normal mental status, speech fluid, cognition  normal, moves all extremities  MUSCULOSKELETAL - no obvious asymmetry or deformities present  EXTREMITIES - warm, well-perfused, no cyanosis or significant edema he has chronic wounds of his left upper and right upper extremity, there is no wound on the left posterior thigh with skin breakdown no surrounding erythema fluctuance purulent drainage.  Does have a suture in his left upper extremity he has a fistula in his left upper extremity  DERMATOLOGIC - warm, dry, no rashes, no jaundice  PSYCHIATRIC - normal affect, normal insight, normal concentration    DIAGNOSTIC STUDIES / PROCEDURES  EKG  I have independently interpreted this EKG  EKG shows a first-degree AV block at a rate of 90 normal QRS QTc interval no STEMI pattern no [place] T waves left axis deviation interpretation is " first-degree AV block with left axis deviation is turned interpreted by myself no STEMI    LABS  Labs Reviewed   CBC WITH DIFFERENTIAL - Abnormal; Notable for the following components:       Result Value    RBC 3.34 (*)     Hemoglobin 9.9 (*)     Hematocrit 31.6 (*)     MCHC 31.3 (*)     RDW 60.0 (*)     Platelet Count 143 (*)     Lymphocytes 19.30 (*)     Monos (Absolute) 0.86 (*)     All other components within normal limits   COMP METABOLIC PANEL - Abnormal; Notable for the following components:    Sodium 134 (*)     Chloride 94 (*)     Anion Gap 17.0 (*)     Glucose 168 (*)     Bun 46 (*)     Creatinine 6.77 (*)     Calcium 8.4 (*)     AST(SGOT) 11 (*)     Alkaline Phosphatase 127 (*)     Total Protein 8.6 (*)     Globulin 5.0 (*)     All other components within normal limits   ESTIMATED GFR - Abnormal; Notable for the following components:    GFR (CKD-EPI) 10 (*)     All other components within normal limits   CORRECTED CALCIUM   Labs are consistent with end-stage renal disease    COURSE & MEDICAL DECISION MAKING    ED Observation Status? Yes; I am placing the patient in to an observation status due to a diagnostic uncertainty as well as therapeutic intensity. Patient placed in observation status at 12:57 AM, 4/15/2023.     Observation plan is as follows: Evaluation for pain control as well as possible need for dialysis    Did speak with Dr. Pina who agreed to arrange for the patients regularly scheduled dialysis        INITIAL ASSESSMENT, COURSE AND PLAN  Care Narrative: Patient presented for evaluation of chronic lower extremity pain as well as requesting his dialysis.  Review of records reveal that the patient has been instructed to come to the emergency department to receive his dialysis.  Stated is a most recent hemodialysis episode was Thursday the review of records do show last dialysis was 4 days ago.  He is also complaining of lower extremity pain pain, I believe this is chronic in nature and likely  secondary to his calciphylaxis.  Also of note he did have a suture in his left upper extremity which he stated was placed in a bleeding vessel 2 months ago, I recommended removal however the patient refused.  Labs were obtained showed no significant acidosis, or hyperkalemia and clinically the patient does not appear fluid overloaded.  However given that he has been instructed to receive his dialysis through the emergency department did page and speak with Dr. Bernstein who agreed to to dialyze him today in the morning.  Patient was placed into ED observation pending his regularly scheduled hemodialysis. Otherwise patient has no other acute complaints do believe he can be discharged after his dialysis    HTN/IDDM FOLLOW UP:  The patient has known hypertension and is being followed by their primary care doctor      ADDITIONAL PROBLEM LIST    1 chronic leg pain  2 end-stage renal disease  3 anemia  4.  Hypertension  5.  Chronic leg wounds    DISPOSITION AND DISCUSSIONS  I have discussed management of the patient with the following physicians and CECILIA's:  dr painting nephrology     Discussion of management with other [facility] or appropriate source(s): None     Escalation of care considered, and ultimately not performed:acute inpatient care management, however at this time, the patient is most appropriate for outpatient management    Barriers to care at this time, including but not limited to: Patient is homeless, Patient lacks transportation , Patient does not have insurance, Patient had difficult affording medications, and Patient lacks financial resources.     Decision tools and prescription drugs considered including, but not limited to: Pain Medications   .    FINAL DIAGNOSIS  1. Calciphylaxis    2. ESRD (end stage renal disease) (HCC)    3. Anemia, unspecified type           Electronically signed by: Jimenez Moscoso D.O., 4/15/2023 12:57 AM

## 2023-04-15 NOTE — ED NOTES
Lab tech contacted about 0930 labs ordered x3 and no result.  Reports will run labs as ordered with req sent to lab, RN sent req to lab.

## 2023-04-15 NOTE — DISCHARGE INSTRUCTIONS
We will have a primary care physician contact you she can continue being followed for your wounds and dialysis.  Follow-up with nephrology.  Your next dialysis would be due on Tuesday.

## 2023-04-15 NOTE — CONSULTS
"..[facility] -  CONSULTATION NOTE               Author: EMMA Ramachandran Date & Time: 4/15/2023  9:25 AM       REASON FOR CONSULTATION:   - Inpatient hemodialysis management.    CHIEF COMPLAINT:   -  \"Needs dialysis\"    HISTORY OF PRESENT ILLNESS:    Ambrose Watkins is a 47 y.o. male with a complicated medical (including ESRD, dialysis TThS) and psychosocial history who presented with bilateral leg pain and needing for dialysis.  Pt C/O throbbing sensation located throughout his entire left and right lower extremities.  We have been asked to see him regarding his ESRD and hemodialysis needs.    PT seen on dialysis, sleepy, only complaint is pain in his legs    No F/C/N/V/CP/SOB.  No melena, hematochezia, hematemesis.  No HA, visual changes, or abdominal pain.    REVIEW OF SYSTEMS:    10 point ROS was performed and is as per HPI or otherwise negative    PAST MEDICAL HISTORY:   - ESRD  - HTN  - Anemia of CKD  - CKD-MBD  - hx of calciphylaxsis   - DM II    PAST SURGICAL HISTORY:   - AVF creation  - AVF thrombectomy     FAMILY HISTORY:   - Reviewed and non contributory to current illness    SOCIAL HISTORY:   - + tobacco  - No EtOH  - No illicits    HOME MEDICATIONS:   - Reviewed and documented in chart    LABORATORY STUDIES:   - Reviewed and documented in chart    ALLERGIES:  Patient has no known allergies.    VS:  BP (!) 167/86   Pulse 80   Temp 36.7 °C (98 °F) (Temporal)   Resp 14   Ht 1.778 m (5' 10\")   Wt (!) 181 kg (399 lb 4.1 oz)   SpO2 99%   BMI 57.29 kg/m²   Physical Exam  Constitutional:       Appearance: He is obese.   HENT:      Head: Normocephalic and atraumatic.      Nose: Nose normal.      Mouth/Throat:      Mouth: Mucous membranes are dry.      Pharynx: Oropharynx is clear.   Eyes:      Extraocular Movements: Extraocular movements intact.      Conjunctiva/sclera: Conjunctivae normal.      Pupils: Pupils are equal, round, and reactive to light.   Cardiovascular:      " Rate and Rhythm: Normal rate and regular rhythm.      Comments: R CVC  Pulmonary:      Effort: Pulmonary effort is normal.      Comments: BS diminished in bases   Abdominal:      General: Bowel sounds are normal. There is distension.      Tenderness: There is no abdominal tenderness.   Musculoskeletal:      Cervical back: Normal range of motion and neck supple.      Right lower leg: Edema present.      Left lower leg: Edema present.      Comments: LUE AVF soo B&T    Neurological:      Mental Status: He is alert.          FLUID BALANCE:  No intake/output data recorded.    LABS:  Recent Labs     04/15/23  0115   SODIUM 134*   POTASSIUM 5.2   CHLORIDE 94*   CO2 23   GLUCOSE 168*   BUN 46*   CREATININE 6.77*   CALCIUM 8.4*        IMAGING:  - All imaging reviewed from admission to present day    IMPRESSION:  # ESRD  - non-compliant with outpt HD txs  - via R CVC  - L AVF - maturing   # HTN, sub-optimal control   - Goal BP < 140/90  - UF with iHD as tolerated   - consider starting amlodipine if BPs still elevated after fluid removal   # Anemia of CKD  - Goal Hgb 10-11  - Sub-optimal goal  - JACKIE with iHD   # CKD-MBD  - Difficult to manage with poor complaince  - Ca: 8.4  - PO4 pen  # Hyponatremia, mild   - volume driven   # Thrombocytopenia, mild   - monitor     PLAN:  - iHD today (SAT)  - daily eval for iHD   - Start JACKIE with iHD   - Check phos, iron stores   - UF as tolerated  - No dietary protein restrictions  - Dose all meds per ESRD    Thank you for the consultation!

## 2023-04-15 NOTE — ED NOTES
Patient transported to dialysis via [facility], A&O, all personal belongings in place.

## 2023-04-15 NOTE — ED NOTES
Bedside report received.  Patient A&O, resting on gurney, reparations even and unlabored. Patient updated on POC, awaiting admit room assignment, no questions, call light within reach.

## 2023-04-17 ENCOUNTER — HOSPITAL ENCOUNTER (EMERGENCY)
Facility: MEDICAL CENTER | Age: 48
End: 2023-04-17
Attending: EMERGENCY MEDICINE
Payer: MEDICARE

## 2023-04-17 ENCOUNTER — PATIENT OUTREACH (OUTPATIENT)
Dept: SCHEDULING | Facility: IMAGING CENTER | Age: 48
End: 2023-04-17
Payer: MEDICARE

## 2023-04-17 VITALS
HEART RATE: 85 BPM | RESPIRATION RATE: 18 BRPM | HEIGHT: 69 IN | TEMPERATURE: 98.1 F | OXYGEN SATURATION: 98 % | WEIGHT: 178.79 LBS | DIASTOLIC BLOOD PRESSURE: 107 MMHG | SYSTOLIC BLOOD PRESSURE: 184 MMHG | BODY MASS INDEX: 26.48 KG/M2

## 2023-04-17 DIAGNOSIS — M54.50 ACUTE EXACERBATION OF CHRONIC LOW BACK PAIN: ICD-10-CM

## 2023-04-17 DIAGNOSIS — E83.59 CALCIPHYLAXIS: ICD-10-CM

## 2023-04-17 DIAGNOSIS — M54.17 LUMBOSACRAL RADICULOPATHY: ICD-10-CM

## 2023-04-17 DIAGNOSIS — S81.801A WOUND OF RIGHT LOWER EXTREMITY, INITIAL ENCOUNTER: ICD-10-CM

## 2023-04-17 DIAGNOSIS — G89.29 ACUTE EXACERBATION OF CHRONIC LOW BACK PAIN: ICD-10-CM

## 2023-04-17 PROCEDURE — 99284 EMERGENCY DEPT VISIT MOD MDM: CPT

## 2023-04-17 PROCEDURE — 96372 THER/PROPH/DIAG INJ SC/IM: CPT

## 2023-04-17 PROCEDURE — A9270 NON-COVERED ITEM OR SERVICE: HCPCS | Performed by: EMERGENCY MEDICINE

## 2023-04-17 PROCEDURE — 700102 HCHG RX REV CODE 250 W/ 637 OVERRIDE(OP): Performed by: EMERGENCY MEDICINE

## 2023-04-17 PROCEDURE — 700111 HCHG RX REV CODE 636 W/ 250 OVERRIDE (IP): Performed by: EMERGENCY MEDICINE

## 2023-04-17 RX ORDER — METHOCARBAMOL 500 MG/1
500 TABLET, FILM COATED ORAL ONCE
Status: COMPLETED | OUTPATIENT
Start: 2023-04-17 | End: 2023-04-17

## 2023-04-17 RX ORDER — DEXAMETHASONE SODIUM PHOSPHATE 10 MG/ML
16 INJECTION, SOLUTION INTRAMUSCULAR; INTRAVENOUS ONCE
Status: COMPLETED | OUTPATIENT
Start: 2023-04-17 | End: 2023-04-17

## 2023-04-17 RX ORDER — KETOROLAC TROMETHAMINE 30 MG/ML
30 INJECTION, SOLUTION INTRAMUSCULAR; INTRAVENOUS ONCE
Status: COMPLETED | OUTPATIENT
Start: 2023-04-17 | End: 2023-04-17

## 2023-04-17 RX ADMIN — DEXAMETHASONE SODIUM PHOSPHATE 16 MG: 10 INJECTION INTRAMUSCULAR; INTRAVENOUS at 03:39

## 2023-04-17 RX ADMIN — METHOCARBAMOL 500 MG: 500 TABLET ORAL at 04:59

## 2023-04-17 RX ADMIN — KETOROLAC TROMETHAMINE 30 MG: 30 INJECTION, SOLUTION INTRAMUSCULAR at 03:43

## 2023-04-17 ASSESSMENT — FIBROSIS 4 INDEX: FIB4 SCORE: 1.631784879661263518

## 2023-04-17 NOTE — ED TRIAGE NOTES
"Chief Complaint   Patient presents with    Back Pain     The pt reports back pain all day and woke him up from sleep. The pt initially reports leg pain, chronic pain for the last 9 months. The pt states pain has been getting worse over the last couple of days. The pain has been radiating to back and has been occurring all day. The pt reports having \"Calciphylaxis\" and last dialysis was 2 days ago. However, the pt reports dialysis schedule is T-Th-S       BIB EMS to 27, pt on monitor and in gown, labs drawn and sent. Pt consists of: for the above complaint.    Medications given en route:none    Ht 1.753 m (5' 9\")   Wt 81.1 kg (178 lb 12.7 oz)   BMI 26.40 kg/m²     "

## 2023-04-17 NOTE — DISCHARGE INSTRUCTIONS
Follow-up with primary care this week for reevaluation, to establish care, for medication management, and referral for additional imaging/MRI, physical therapy, specialty evaluation or to pain management if indicated.  Return to the emergency department in 48 hours for wound check.    Encourage compliance with dialysis as scheduled.    Tylenol as needed for discomfort.  Robaxin for pain or spasm.    Weightbearing activity as tolerated.    Return to the emergency department for intractable pain, lower extremity weakness, incontinence or urinary retention, or for missed dialysis, chest pain or shortness of breath, syncope, fever or other new concerns.

## 2023-04-17 NOTE — ED PROVIDER NOTES
"ED Provider Note    CHIEF COMPLAINT  Chief Complaint   Patient presents with    Back Pain     The pt reports back pain all day and woke him up from sleep. The pt initially reports leg pain, chronic pain for the last 9 months. The pt states pain has been getting worse over the last couple of days. The pain has been radiating to back and has been occurring all day. The pt reports having \"Calciphylaxis\" and last dialysis was 2 days ago. However, the pt reports dialysis schedule is T-Th-S       EXTERNAL RECORDS REVIEWED  Inpatient Notes consult note from nephrology on 4/15/2023, patient with complicated medical history including ESRD, dialysis Tuesday Thursday Saturday as well as calciphylaxis with chronic wounds, psychosocial history who presented on that admission for bilateral leg pain and needing dialysis.    HPI/ROS  LIMITATION TO HISTORY   Select: Poor historian  OUTSIDE HISTORIAN(S):  None    Ambrose Watkins is a 47 y.o. male who presents to the emergency department by ambulance for low back pain.  Patient describes history of chronic low back pain, chronic leg pain.  Patient complains of worsening low back pain, radiation to the left low leg today.  He has had similar radiation previously.  Pain with movement and weightbearing but no weakness.  No paresthesias.  No incontinence or urinary retention.  No abdominal pain or flank pain.  No fever or chills.  No nausea or vomiting.  No medications for discomfort prior to arrival.  He denies IV drug use.    Patient is homeless, stays at the shelter.  History of end-stage renal disease on hemodialysis Tuesday, Thursday, Saturday.  Patient also describes history of calciphylaxis, chronic wounds and states his last visit he had a wound on the left posterior leg treated here.  Denies ongoing drainage, redness or discomfort.    PAST MEDICAL HISTORY   has a past medical history of Diabetes, High anion gap metabolic acidosis (8/9/2022), Hyperkalemia (2/22/2022), " "Hypertension, and Renal disease.    SURGICAL HISTORY   has a past surgical history that includes av fistula creation (Left, 2/25/2022) and thrombectomy (Left, 2/25/2022).    FAMILY HISTORY  History reviewed. No pertinent family history.    SOCIAL HISTORY  Social History     Tobacco Use    Smoking status: Every Day     Packs/day: 0.25     Types: Cigarettes    Smokeless tobacco: Never   Vaping Use    Vaping Use: Never used   Substance and Sexual Activity    Alcohol use: Not Currently    Drug use: No    Sexual activity: Not on file       CURRENT MEDICATIONS  Home Medications       Reviewed by Jarred Patricio R.N. (Registered Nurse) on 04/17/23 at 0222  Med List Status: Partial     Medication Last Dose Status   atorvastatin (LIPITOR) 40 MG Tab  Active   brimonidine (ALPHAGAN) 0.2 % Solution  Active   calcitRIOL (ROCALTROL) 0.25 MCG Cap  Active   carvedilol (COREG) 3.125 MG Tab  Active   dorzolamide-timolol (COSOPT) 22.3-6.8 MG/ML Solution  Active   latanoprost (XALATAN) 0.005 % Solution  Active   prednisoLONE acetate (PRED FORTE) 1 % Suspension  Active   sevelamer carbonate (RENVELA) 800 MG Tab tablet  Active                    ALLERGIES  No Known Allergies    PHYSICAL EXAM  VITAL SIGNS: BP (!) 184/107   Pulse 85   Temp 36.7 °C (98.1 °F) (Temporal)   Resp 18   Ht 1.753 m (5' 9\")   Wt 81.1 kg (178 lb 12.7 oz)   SpO2 98%   BMI 26.40 kg/m²    Pulse ox interpretation: I interpret this pulse ox as normal.  Constitutional: Alert in no apparent distress.  Disheveled  HENT: Normocephalic, atraumatic. Bilateral external ears normal, Nose normal.   Eyes: Pupils are equal and reactive, Conjunctiva normal.   Neck: Normal range of motion, Supple  Cardiovascular: Normal peripheral perfusion.  No peripheral edema  Thorax & Lungs: Nonlabored respirations  Abdomen: Soft, non-distended, non-tender to palpation. No palpable or pulsatile masses. No peritoneal signs. No CVA tenderness.  Skin: Warm, Dry.  Multiple chronic wounds, scarring " due to reported history of calciphylaxis.  He does have about 1 x 1 cm open wound on the posterior right thigh without erythema, fluctuance or crepitus.  Packing removed with out bleeding, mild serous drainage.  Due to length of packing this has been replaced for reeval again in 48 hours.  No evidence for ongoing abscess or cellulitis.    Musculoskeletal: Diffuse reproducible mid and low back pain midline and paraspinal musculature that extends over the sacrum.  No step-offs.  No cutaneous changes.  Good range of motion in all major joints. No major deformities noted.   Neurologic: Alert And orient x3.  Moves 4 extremities spontaneously.  Stands and bears weight independently.  Generally weak but equal strength against gravity and resistance bilateral lower extremities.  No saddle anesthesia.    Psychiatric: Odd affect.  Cooperative      COURSE & MEDICAL DECISION MAKING    ED Observation Status? Yes; I am placing the patient in to an observation status due to a diagnostic uncertainty as well as therapeutic intensity. Patient placed in observation status at 3:08 AM, 4/18/2023.     Observation plan is as follows: Pain control and reassessment before final disposition can be made    Upon Reevaluation, the patient's condition has: Improved; and will be discharged.    Patient discharged from ED Observation status at 0545 (Time) 4/17/13 (Date).     INITIAL ASSESSMENT, COURSE AND PLAN  Care Narrative:   Seen evaluated at bedside.  Complicated medical history but today complaining of low back pain with radiation to the left lower extremity.  Has history of the same.  No weakness, paresthesias, incontinence.  He bears weight and ambulates independently with in the exam room without gross deficit.  We will add pain control, Toradol, Robaxin, Decadron for presumed lumbosacral radiculopathy, acute exacerbation of chronic low back pain.  Additionally, history of calciphylaxis, chronic wounds visualized with scarring.  Right  posterior thigh wound appears to be healing well, nursing to replace packing and dressing.    Patient sleeping comfortably on reevaluation.    ADDITIONAL PROBLEM LIST  ESRD on hemodialysis -history of poor compliance but did receive dialysis over the weekend on 4/15 and plans to follow-up on Tuesday 4/18 as scheduled (Tuesday, Thursday, Saturday).  No chest pain, shortness of breath, or clinical evidence for fluid overload.  Elevated blood pressure but chronic for this patient.    History of calciphylaxis, chronic wounds although no evidence for active infection at this time.    DISPOSITION AND DISCUSSIONS  ED evaluation for low back pain most consistent with acute exacerbation of chronic low back pain, lumbosacral radiculopathy.  Denies new trauma, pain pattern or neurologic deficits.  No history of clinical evidence to suggest cauda equina, epidural abscess or other spinal cord compression syndrome.  Despite discomfort he is neurologically intact and nonfocal, ambulates independently.  Patient is much more comfortable after Toradol, Robaxin and Decadron in the emergency department.  We will continue Tylenol and Robaxin for symptoms upon discharge.  Also with history of calciphylaxis, multiple chronic well-healed wounds, he does have open wound to the posterior right thigh that was packed on previous admission, this packing was removed, no evidence for ongoing abscess or cellulitis but given depth of defect, packing replaced and covered by nursing staff, patient will follow-up this week for wound check and removal.  No indication for additional antibiotics, nor narcotics at this time.  Encourage compliance with dialysis as scheduled tomorrow.    Decision tools and prescription drugs considered including, but not limited to: Pain Medications NSAIDs, muscle relaxer more appropriate in the setting .    Stable for discharge home, anticipatory guidance provided, Tylenol for discomfort, Robaxin for breakthrough pain or  spasm, activity as tolerated, follow-up with primary care and dialysis encouraged and strict ED return instructions have been detailed.  Patient is aware of the findings and agreeable to the disposition and plan.    FINAL DIAGNOSIS  1. Acute exacerbation of chronic low back pain    2. Lumbosacral radiculopathy    3. Calciphylaxis    4. Wound of right lower extremity, initial encounter           Electronically signed by: Katheryn Pryor D.O., 4/17/2023 5:56 AM

## 2023-04-17 NOTE — ED NOTES
Patient educated on discharge instructions, follow up appointments, prescriptions, and home care. Patient verbalized understanding. Patient ambulated well with personal walker to RADHA angelo.

## 2023-04-18 ENCOUNTER — PATIENT OUTREACH (OUTPATIENT)
Dept: SCHEDULING | Facility: IMAGING CENTER | Age: 48
End: 2023-04-18
Payer: MEDICARE

## 2023-04-19 ENCOUNTER — PATIENT OUTREACH (OUTPATIENT)
Dept: SCHEDULING | Facility: IMAGING CENTER | Age: 48
End: 2023-04-19
Payer: MEDICARE

## 2023-07-25 NOTE — PROGRESS NOTES
[facility] Internal Medicine Daily Progress Note    Date of Service  8/21/2022    [facility] Team: R IM Purple Team   Attending: Christophe Nuñez M.d.  Senior Resident: Dr. Clement  Intern:  Dr. Adorno  Contact Number: [phone]    Chief Complaint  Ambrose Watkins is a 46 y.o. male admitted 8/8/2022 with progressive weakness with associated nausea, vomiting, and diarrhea    Hospital Course  47 yo male with PMH of diabetes,HFrEF, HTN, and ESRD on HD who presented 8/8/2022 for missing dialysis for past 3 weeks due to travelling to [place] for leisure and did not know where to go to get dialysis. In addition, patient did not have a dialysis chair here in [place].  He states he receives dialysis in [place].  He has been experiencing progressive weakness and associated nausea, vomiting, and diarrhea.  He came to the ER and was found to have electrolyte abnormalities including hyperkalemia, elevated creatinine BUN, and low calcium.  Nephrology consulted and stated that patient will require multiple sessions of dialysis given numerous missed sessions. Currently following [facility] schedule for HD, with vitals/labs gradually improving. Outpatient dialysis chair available 8/23.     Interval Problem Update  Dialysis chair available 8/23, patient dialysis schedule TThSat. Pt states he feels the same, bilateral thigh pain and itchiness and forehead pain from boil. Amenable to trying Benadryl for itchiness and warm compress for boil. Boil has been scratched and scab is present, tender. Denies CP, shortness of breath, abd pain, n/v.    I have discussed this patient's plan of care and discharge plan at IDT rounds today with Case Management, Nursing, Nursing leadership, and other members of the IDT team.    Consultants/Specialty  nephrology    Code Status  Full Code    Disposition  Patient is not medically cleared for discharge.   Anticipate discharge to to home with close outpatient follow-up.  I have placed the appropriate orders  for post-discharge needs.    Review of Systems  Constitutional:  Positive for malaise/fatigue. Negative for chills, diaphoresis and fever.   Respiratory:  Negative for cough and shortness of breath.    Cardiovascular:  Positive for leg swelling. Negative for chest pain and palpitations.   Gastrointestinal:  Positive for abdominal pain and diarrhea. Negative for constipation, nausea and vomiting.        Watery stool   Genitourinary:         Reports minimal urine production   Musculoskeletal:         Bilateral leg pain - right thigh pain with light touch    Neurological:  Positive for weakness. Negative for dizziness and headaches.      Physical Exam  Temp:  [36.8 °C (98.2 °F)-36.9 °C (98.5 °F)] 36.9 °C (98.5 °F)  Pulse:  [83-86] 83  Resp:  [18-20] 18  BP: (128-134)/(69-76) 134/76  SpO2:  [92 %-99 %] 92 %    Physical Exam  Constitutional:       General: He is not in acute distress.     Appearance: He is ill-appearing. He is not toxic-appearing.   HENT:      Head: Normocephalic and atraumatic.      Right Ear: External ear normal.      Left Ear: External ear normal.      Nose: Nose normal.      Mouth/Throat:      Mouth: Mucous membranes are moist.   Eyes:      Extraocular Movements: Extraocular movements intact.      Conjunctiva/sclera: Conjunctivae normal.   Cardiovascular:      Rate and Rhythm: Normal rate and regular rhythm.      Pulses: Normal pulses.      Heart sounds: Normal heart sounds. No murmur heard.     Comments: Right internal jugular tunneled catheter   LUE AV fistula not in use  Pulmonary:      Effort: Pulmonary effort is normal. No respiratory distress.      Breath sounds: Normal breath sounds. No wheezing or rales.   Abdominal:      General: There is distension.      Tenderness: No abdominal tenderness. There is no guarding or rebound.   Musculoskeletal:         General: Swelling present.      Cervical back: Normal range of motion.      Right lower leg: Edema present.      Left lower leg: Edema present.    Skin:     General: Skin is warm and dry.      Comments: Excoriations of upper extremities, lower extremities, scalp, chest/abdomen. Right underarm boil erythematous/tender. Forehead boil tender/erythematous with scab.  Neurological:      General: No focal deficit present.      Mental Status: He is alert and oriented to person, place, and time.   Psychiatric:         Thought Content: Thought content normal    Fluids    Intake/Output Summary (Last 24 hours) at 8/21/2022 1420  Last data filed at 8/20/2022 2038  Gross per 24 hour   Intake 600 ml   Output --   Net 600 ml       Laboratory  Recent Labs     08/20/22 1842 08/21/22  0614   WBC 7.8 9.1   RBC 2.68* 2.63*   HEMOGLOBIN 7.8* 7.8*   HEMATOCRIT 24.5* 24.1*   MCV 91.4 91.6   MCH 29.1 29.7   MCHC 31.8* 32.4*   RDW 58.2* 57.6*   PLATELETCT 240 268   MPV 9.7 9.4     Recent Labs     08/19/22  0754 08/20/22 1842 08/21/22  0614   SODIUM 131* 137 135   POTASSIUM 4.5 4.5 4.6   CHLORIDE 94* 100 99   CO2 21 23 22   GLUCOSE 160* 203* 156*   BUN 49* 40* 45*   CREATININE 7.58* 6.16* 6.38*   CALCIUM 8.0* 8.2* 8.2*                   Imaging  US-EXTREMITY VENOUS LOWER BILAT   Final Result      US-ABDOMEN COMPLETE SURVEY   Final Result      1.  Mildly increased hepatic echotexture is suggestive of fatty infiltration.      2.  Small-to-moderate amount of ascites.      3.  Cholelithiasis         DX-ABDOMEN COMPLETE WITH AP OR PA CXR   Final Result      Fairly diffuse bowel distention with scattered air-fluid levels. Findings are likely related to ileus. More distal obstruction cannot be excluded.      DX-CHEST-PORTABLE (1 VIEW)   Final Result      1.  Mildly enlarged cardiac silhouette with vascular congestion.           Assessment/Plan  Problem Representation:    * Noncompliance of patient with renal dialysis (HCC)- (present on admission)  Assessment & Plan  Has not had dialysis in the past 3 weeks, due to traveling to [place].  Obtained HD in [place], however does not have  an outpatient dialysis chair in [place] and last year in [place]. Dialysis chair available 8/23.    -Signs/symptoms and labs/vitals slowly normalizing  -Nephrology following, on [facility] dialysis  -Completed dialysis 8/16, 8/18-stop early due to pt having bm, 8/20  -Dialysis chair available 8/23.    Multiple air fluid levels of small intestine determined by X-ray  Assessment & Plan  Patients states he has been having watery stools multiple times a day for many days. He is unable to recount when his last solid stool was. Abdomen has become more distended and pain has increased. Abdominal xray with bowel distention with air-fluid levels. Pt has been taking oxycodone for leg pain since 8/8.     -Will continue to monitor with serial abdominal exam and for more form in bowel movement  -Abdominal pain negative today, still distended suspicion for stool burden given oxycodone for leg pain and not passing solid bm. Discussed with pt to try Senna.      Lower extremity pain, bilateral  Assessment & Plan  Anterior leg pain noted on admission and patient given oxycodone for pain. Most likely related to edema due to missed dialysis sessions. CPK and lactic acid wnl. Ambulatory.   -Oxycodone for pain control  -Bilateral DVT US negative    End stage renal disease (HCC)- (present on admission)  Assessment & Plan  Patient receiving HD in [place], has not received HD in 3 weeks and since admission.  Patient has also had multiple thrombectomies/revisions for AV fistula, currently nonfunctioning.  Patient has tunneled dialysis catheter in place and functioning.  Patient not adherent to HD or outpatient medications.  Nephrology following    -Currently on cholecalciferol   -Receiving HD on [facility]ThS  -Per nephrology note, AVG use per Vasc Surg recs; currently using RIJ TDC, last note 3/1 not yet cleared for use   -Per pharmacy ([facility]) pt has not picked up any medications. Prescribed medications were 01/2022 -  Trazodone 50mg QHS, Calcitriol 0.25mg OD, Furosemide 80mg OD, Ca-acetate 667-270 TID, Lisinopril 20mg OD. 12/31/2021 amplodipine 5mg, glipizide ER 5mg, carvedilol 25 BID  -Dialysis chair available 8/23, TTHSat; completed dialysis 8/20    Hypertension- (present on admission)  Assessment & Plan  History of uncontrolled hypertension. Likely due to missing dialysis and nonadherence to outpatient BP medications    -Hydralazine as needed due to history of CHF  -BP well controlled with Carvedilol 25mg twice daily       VTE prophylaxis: heparin ppx    I have performed a physical exam and reviewed and updated ROS and Plan today (8/21/2022). In review of yesterday's note (8/20/2022), there are no changes except as documented above.           Dr. Jung

## 2024-05-22 NOTE — DISCHARGE PLANNING
Liliana from Dr Camarena's office called asking for dc date. She was informed that pt is still here in hospital.   How Severe Is Your Skin Lesion?: mild Has Your Skin Lesion Been Treated?: not been treated Is This A New Presentation, Or A Follow-Up?: Skin Lesions Which Family Member (Optional)?: Bother

## 2025-01-11 NOTE — ED NOTES
Dialysius continues.  Pt somnolent, but responsive to stimulation/.   HR=58 bpm, SHVR=559/78 mmhg, SpO2=92.0 %, Resp=12 B/min, EtCO2=35 mmHg, Apnea=14 Seconds, Pain=0, Kita=2, Comment=sr

## (undated) DEVICE — PADDING CAST 4 IN STERILE - 4 X 4 YDS (24/CA)

## (undated) DEVICE — SUTURE 6-0 PROLENE BV-1 D/A 24 (36PK/BX)"

## (undated) DEVICE — SUTURE 3-0 VICRYL PLUS SH - 8X 18 INCH (12/BX)

## (undated) DEVICE — COVER PROBE STERILE CONE (12EA/CA)

## (undated) DEVICE — SET IRRIGATION CYSTOSCOPY TUBE L80 IN (20EA/CA)

## (undated) DEVICE — SYRINGE NON SAFETY 10 CC 20 GA X 1-1/2 IN (100/BX 4BX/CA)

## (undated) DEVICE — SUCTION INSTRUMENT YANKAUER BULBOUS TIP W/O VENT (50EA/CA)

## (undated) DEVICE — SET EXTENSION 31IN APPX 3.2ML WITH CLAMP (50/CA)WAS 4610-03

## (undated) DEVICE — PAD LAP STERILE 18 X 18 - (5/PK 40PK/CA)

## (undated) DEVICE — SYRINGE 30 ML LL (56/BX)

## (undated) DEVICE — SUTURE 2-0 PDS II CT-2 - (36/BX)

## (undated) DEVICE — GOWN WARMING X-LARGE FLEX - (20/CA)

## (undated) DEVICE — CLIP SM INTNL HRZN TI ESCP LGT - (24EA/PK 25PK/BX)

## (undated) DEVICE — GOWN SURGEONS LARGE - (32/CA)

## (undated) DEVICE — SUTURE GENERAL

## (undated) DEVICE — SET EXTENSION WITH 2 PORTS (48EA/CA) ***PART #2C8610 IS A SUBSTITUTE*****

## (undated) DEVICE — SENSOR SPO2 NEO LNCS ADHESIVE (20/BX) SEE USER NOTES

## (undated) DEVICE — GLOVE BIOGEL PI ORTHO SZ 7 PF LF (40PR/BX)

## (undated) DEVICE — SODIUM CHL IRRIGATION 0.9% 1000ML (12EA/CA)

## (undated) DEVICE — SYRINGE 20 ML LL (50EA/BX 4BX/CA)

## (undated) DEVICE — PACK MAJOR BASIN - (2EA/CA)

## (undated) DEVICE — TIP INTPLS HFLO ML ORFC BTRY - (12/CS)  FOR SURGILAV

## (undated) DEVICE — BANDAGE ELASTIC 6 HONEYCOMB - 6X5YD LF (20/CA)"

## (undated) DEVICE — PACK LOWER EXTREMITY - (2/CA)

## (undated) DEVICE — STAPLER SKIN DISP - (6/BX 10BX/CA) VISISTAT

## (undated) DEVICE — SOD. CHL. INJ. 0.9% 250 ML - (36/CA 50CA/PF)

## (undated) DEVICE — SUTURE 3-0 ETHILON FS-1 - (36/BX) 30 INCH

## (undated) DEVICE — DRAPE SURG STERI-DRAPE 7X11OD - (40EA/CA)

## (undated) DEVICE — DRAPE LOWER EXTREMETY - (6/CA)

## (undated) DEVICE — KIT ANESTHESIA W/CIRCUIT & 3/LT BAG W/FILTER (20EA/CA)

## (undated) DEVICE — SWAB CULTURE AMIES ESWAB (50EA/PK)

## (undated) DEVICE — HANDPIECE 10FT INTPLS SCT PLS IRRIGATION HAND CONTROL SET (6/PK)

## (undated) DEVICE — CANISTER SUCTION 3000ML MECHANICAL FILTER AUTO SHUTOFF MEDI-VAC NONSTERILE LF DISP  (40EA/CA)

## (undated) DEVICE — SLEEVE, VASO, REPROC, LARGE

## (undated) DEVICE — KIT SURGIFLO W/OUT THROMBIN - (6EA/CA)

## (undated) DEVICE — SYRINGE NON SAFETY TB 1 CC 27 GA X 1/2 IN (100/BX 8BX/CA)

## (undated) DEVICE — SURGIFOAM (12X7) - (12EA/CA)

## (undated) DEVICE — ELECTRODE 850 FOAM ADHESIVE - HYDROGEL RADIOTRNSPRNT (50/PK)

## (undated) DEVICE — TRAY SKIN SCRUB PVP WET (20EA/CA) PART #DYND70356 DISCONTINUED

## (undated) DEVICE — VESSELOOP MINI BLUE STERILE - SURG-I-LOOP (10EA/BX)

## (undated) DEVICE — BOVIE BLADE COATED - (50/PK)

## (undated) DEVICE — TUBING CLEARLINK DUO-VENT - C-FLO (48EA/CA)

## (undated) DEVICE — BANDAGE ELASTIC 4 HONEYCOMB - 4"X5YD LF (20/CA)"

## (undated) DEVICE — WATER IRRIGATION STERILE 1000ML (12EA/CA)

## (undated) DEVICE — DRAPE SURGICAL U 77X120 - (10/CA)

## (undated) DEVICE — NEPTUNE 4 PORT MANIFOLD - (20/PK)

## (undated) DEVICE — SURGIFOAM (SIZE 100) - (6EA/CA)

## (undated) DEVICE — SUTURE 4-0 SILK 12 X 18 INCH - (36/BX)

## (undated) DEVICE — WRAP COBAN SELF-ADHERENT 6 IN X  5YDS STERILE TAN (12/CA)

## (undated) DEVICE — HEAD HOLDER JUNIOR/ADULT

## (undated) DEVICE — SUTURE 2-0 VICRYL PLUS SH - 8 X 18 INCH (12/BX)

## (undated) DEVICE — SUTURE 2-0 VICRYL PLUS CT-1 36 (36PK/BX)"

## (undated) DEVICE — CLIP MED INTNL HRZN TI ESCP - (25/BX)

## (undated) DEVICE — SODIUM CHL. IRRIGATION 0.9% 3000ML (4EA/CA 65CA/PF)

## (undated) DEVICE — SLEEVE, VASO, THIGH, MED

## (undated) DEVICE — SUTURE 6-0 PROLENE C-1 D/A 24 (36PK/BX)"

## (undated) DEVICE — SET LEADWIRE 5 LEAD BEDSIDE DISPOSABLE ECG (1SET OF 5/EA)

## (undated) DEVICE — CHLORAPREP 26 ML APPLICATOR - ORANGE TINT(25/CA)

## (undated) DEVICE — SUTURE 0 VICRYL PLUS CT-1 - 36 INCH (36/BX)

## (undated) DEVICE — LACTATED RINGERS INJ 1000 ML - (14EA/CA 60CA/PF)

## (undated) DEVICE — VESSELOOP MAXI BLUE STERILE- SURG-I-LOOP (10EA/BX)

## (undated) DEVICE — DECANTER FLD BLS - (50/CA)

## (undated) DEVICE — GLOVE BIOGEL INDICATOR SZ 7.5 SURGICAL PF LTX - (50PR/BX 4BX/CA)

## (undated) DEVICE — GLOVE BIOGEL SZ 8 SURGICAL PF LTX - (50PR/BX 4BX/CA)

## (undated) DEVICE — ELECTRODE DUAL RETURN W/ CORD - (50/PK)

## (undated) DEVICE — TOWEL STOP TIMEOUT SAFETY FLAG (40EA/CA)

## (undated) DEVICE — MASK ANESTHESIA ADULT  - (100/CA)

## (undated) DEVICE — DRAPE LARGE 3 QUARTER - (20/CA)

## (undated) DEVICE — GOWN WARMING STANDARD FLEX - (30/CA)

## (undated) DEVICE — SWAB ANAEROBIC SPEC.COLLECTOR - (25/PK 4PK/CA 100EA/CA)

## (undated) DEVICE — SUTURE 4-0 30CM STRATAFIX SPIRAL PS-2 (12EA/BX)

## (undated) DEVICE — GELAQUASONIC 100 ULTRASOUND - 48/BX 20GM STERILE FOIL POUCH

## (undated) DEVICE — PACK AV FISTULA (4EA/CA)

## (undated) DEVICE — PROTECTOR ULNA NERVE - (36PR/CA)

## (undated) DEVICE — PAD PREP 24 X 48 CUFFED - (100/CA)

## (undated) DEVICE — SUTURE 5-0 PROLENE C-1 D/A 24 (36PK/BX)"

## (undated) DEVICE — DISH PETRI STERILE (50EA/CA)

## (undated) DEVICE — SUTURE CV

## (undated) DEVICE — SYRINGE NON SAFETY 3 CC 21 GA X 1 1/2 IN (100/BX 8BX/CA)

## (undated) DEVICE — SYRINGE NON SAFETY 5 CC 20 GA X 1-1/2 IN (100/BX 4BX/CA)

## (undated) DEVICE — MAT PATIENT POSITIONING PREVALON (10EA/CA)